# Patient Record
Sex: MALE | Race: WHITE | NOT HISPANIC OR LATINO | Employment: PART TIME | URBAN - METROPOLITAN AREA
[De-identification: names, ages, dates, MRNs, and addresses within clinical notes are randomized per-mention and may not be internally consistent; named-entity substitution may affect disease eponyms.]

---

## 2017-03-15 ENCOUNTER — APPOINTMENT (OUTPATIENT)
Dept: RADIOLOGY | Facility: MEDICAL CENTER | Age: 72
DRG: 504 | End: 2017-03-15
Attending: EMERGENCY MEDICINE
Payer: COMMERCIAL

## 2017-03-15 ENCOUNTER — RESOLUTE PROFESSIONAL BILLING HOSPITAL PROF FEE (OUTPATIENT)
Dept: HOSPITALIST | Facility: MEDICAL CENTER | Age: 72
End: 2017-03-15
Payer: COMMERCIAL

## 2017-03-15 ENCOUNTER — HOSPITAL ENCOUNTER (INPATIENT)
Facility: MEDICAL CENTER | Age: 72
LOS: 35 days | DRG: 504 | End: 2017-04-19
Attending: EMERGENCY MEDICINE | Admitting: HOSPITALIST
Payer: COMMERCIAL

## 2017-03-15 DIAGNOSIS — M86.9 OSTEOMYELITIS OF TOE OF RIGHT FOOT (HCC): ICD-10-CM

## 2017-03-15 DIAGNOSIS — N13.30 HYDRONEPHROSIS, UNSPECIFIED HYDRONEPHROSIS TYPE: ICD-10-CM

## 2017-03-15 PROBLEM — H91.90 HARD OF HEARING: Status: ACTIVE | Noted: 2017-03-15

## 2017-03-15 PROBLEM — F17.200 TOBACCO DEPENDENCE: Status: ACTIVE | Noted: 2017-03-15

## 2017-03-15 PROBLEM — M54.9 BACK PAIN: Status: ACTIVE | Noted: 2017-03-15

## 2017-03-15 PROBLEM — I50.9 CHF (CONGESTIVE HEART FAILURE) (HCC): Status: ACTIVE | Noted: 2017-03-15

## 2017-03-15 PROBLEM — E87.6 HYPOKALEMIA: Status: ACTIVE | Noted: 2017-03-15

## 2017-03-15 LAB
ALBUMIN SERPL BCP-MCNC: 2.6 G/DL (ref 3.2–4.9)
ALBUMIN/GLOB SERPL: 0.5 G/DL
ALP SERPL-CCNC: 63 U/L (ref 30–99)
ALT SERPL-CCNC: 21 U/L (ref 2–50)
ANION GAP SERPL CALC-SCNC: 10 MMOL/L (ref 0–11.9)
AST SERPL-CCNC: 25 U/L (ref 12–45)
BASOPHILS # BLD AUTO: 0.1 % (ref 0–1.8)
BASOPHILS # BLD: 0.02 K/UL (ref 0–0.12)
BILIRUB SERPL-MCNC: 0.7 MG/DL (ref 0.1–1.5)
BNP SERPL-MCNC: 1952 PG/ML (ref 0–100)
BUN SERPL-MCNC: 10 MG/DL (ref 8–22)
CALCIUM SERPL-MCNC: 8.1 MG/DL (ref 8.5–10.5)
CHLORIDE SERPL-SCNC: 97 MMOL/L (ref 96–112)
CO2 SERPL-SCNC: 22 MMOL/L (ref 20–33)
CREAT SERPL-MCNC: 0.71 MG/DL (ref 0.5–1.4)
EOSINOPHIL # BLD AUTO: 0.01 K/UL (ref 0–0.51)
EOSINOPHIL NFR BLD: 0.1 % (ref 0–6.9)
ERYTHROCYTE [DISTWIDTH] IN BLOOD BY AUTOMATED COUNT: 50.4 FL (ref 35.9–50)
GFR SERPL CREATININE-BSD FRML MDRD: >60 ML/MIN/1.73 M 2
GLOBULIN SER CALC-MCNC: 5.6 G/DL (ref 1.9–3.5)
GLUCOSE SERPL-MCNC: 115 MG/DL (ref 65–99)
HCT VFR BLD AUTO: 32 % (ref 42–52)
HGB BLD-MCNC: 11.1 G/DL (ref 14–18)
IMM GRANULOCYTES # BLD AUTO: 0.1 K/UL (ref 0–0.11)
IMM GRANULOCYTES NFR BLD AUTO: 0.7 % (ref 0–0.9)
INR PPP: 1.48 (ref 0.87–1.13)
LACTATE BLD-SCNC: 1.1 MMOL/L (ref 0.5–2)
LYMPHOCYTES # BLD AUTO: 1.02 K/UL (ref 1–4.8)
LYMPHOCYTES NFR BLD: 7.5 % (ref 22–41)
MCH RBC QN AUTO: 34.8 PG (ref 27–33)
MCHC RBC AUTO-ENTMCNC: 34.7 G/DL (ref 33.7–35.3)
MCV RBC AUTO: 100.3 FL (ref 81.4–97.8)
MONOCYTES # BLD AUTO: 1.01 K/UL (ref 0–0.85)
MONOCYTES NFR BLD AUTO: 7.4 % (ref 0–13.4)
NEUTROPHILS # BLD AUTO: 11.47 K/UL (ref 1.82–7.42)
NEUTROPHILS NFR BLD: 84.2 % (ref 44–72)
NRBC # BLD AUTO: 0 K/UL
NRBC BLD AUTO-RTO: 0 /100 WBC
PLATELET # BLD AUTO: 196 K/UL (ref 164–446)
PMV BLD AUTO: 9.6 FL (ref 9–12.9)
POTASSIUM SERPL-SCNC: 3.4 MMOL/L (ref 3.6–5.5)
PROT SERPL-MCNC: 8.2 G/DL (ref 6–8.2)
PROTHROMBIN TIME: 18.4 SEC (ref 12–14.6)
RBC # BLD AUTO: 3.19 M/UL (ref 4.7–6.1)
SODIUM SERPL-SCNC: 129 MMOL/L (ref 135–145)
WBC # BLD AUTO: 13.6 K/UL (ref 4.8–10.8)

## 2017-03-15 PROCEDURE — 72100 X-RAY EXAM L-S SPINE 2/3 VWS: CPT

## 2017-03-15 PROCEDURE — 700111 HCHG RX REV CODE 636 W/ 250 OVERRIDE (IP): Performed by: EMERGENCY MEDICINE

## 2017-03-15 PROCEDURE — 71010 DX-CHEST-PORTABLE (1 VIEW): CPT

## 2017-03-15 PROCEDURE — 96376 TX/PRO/DX INJ SAME DRUG ADON: CPT

## 2017-03-15 PROCEDURE — 96374 THER/PROPH/DIAG INJ IV PUSH: CPT

## 2017-03-15 PROCEDURE — 700102 HCHG RX REV CODE 250 W/ 637 OVERRIDE(OP): Performed by: HOSPITALIST

## 2017-03-15 PROCEDURE — 96375 TX/PRO/DX INJ NEW DRUG ADDON: CPT

## 2017-03-15 PROCEDURE — 80053 COMPREHEN METABOLIC PANEL: CPT

## 2017-03-15 PROCEDURE — 96365 THER/PROPH/DIAG IV INF INIT: CPT

## 2017-03-15 PROCEDURE — 302128 INFUSION PUMP: Performed by: EMERGENCY MEDICINE

## 2017-03-15 PROCEDURE — A9270 NON-COVERED ITEM OR SERVICE: HCPCS | Performed by: HOSPITALIST

## 2017-03-15 PROCEDURE — 700105 HCHG RX REV CODE 258: Performed by: HOSPITALIST

## 2017-03-15 PROCEDURE — 99223 1ST HOSP IP/OBS HIGH 75: CPT | Mod: AI | Performed by: HOSPITALIST

## 2017-03-15 PROCEDURE — 85610 PROTHROMBIN TIME: CPT

## 2017-03-15 PROCEDURE — 96368 THER/DIAG CONCURRENT INF: CPT

## 2017-03-15 PROCEDURE — 83605 ASSAY OF LACTIC ACID: CPT

## 2017-03-15 PROCEDURE — 85025 COMPLETE CBC W/AUTO DIFF WBC: CPT

## 2017-03-15 PROCEDURE — 770020 HCHG ROOM/CARE - TELE (206)

## 2017-03-15 PROCEDURE — 36415 COLL VENOUS BLD VENIPUNCTURE: CPT

## 2017-03-15 PROCEDURE — 99285 EMERGENCY DEPT VISIT HI MDM: CPT

## 2017-03-15 PROCEDURE — 87040 BLOOD CULTURE FOR BACTERIA: CPT

## 2017-03-15 PROCEDURE — 83880 ASSAY OF NATRIURETIC PEPTIDE: CPT

## 2017-03-15 PROCEDURE — 700105 HCHG RX REV CODE 258: Performed by: EMERGENCY MEDICINE

## 2017-03-15 PROCEDURE — 700111 HCHG RX REV CODE 636 W/ 250 OVERRIDE (IP): Performed by: HOSPITALIST

## 2017-03-15 PROCEDURE — 96366 THER/PROPH/DIAG IV INF ADDON: CPT

## 2017-03-15 RX ORDER — MORPHINE SULFATE 4 MG/ML
2 INJECTION, SOLUTION INTRAMUSCULAR; INTRAVENOUS EVERY 4 HOURS PRN
Status: DISCONTINUED | OUTPATIENT
Start: 2017-03-15 | End: 2017-03-17

## 2017-03-15 RX ORDER — AMOXICILLIN 250 MG
2 CAPSULE ORAL 2 TIMES DAILY
Status: DISCONTINUED | OUTPATIENT
Start: 2017-03-16 | End: 2017-03-17

## 2017-03-15 RX ORDER — SIMVASTATIN 20 MG
20 TABLET ORAL NIGHTLY
Status: DISCONTINUED | OUTPATIENT
Start: 2017-03-15 | End: 2017-03-21

## 2017-03-15 RX ORDER — MORPHINE SULFATE 4 MG/ML
4 INJECTION, SOLUTION INTRAMUSCULAR; INTRAVENOUS ONCE
Status: COMPLETED | OUTPATIENT
Start: 2017-03-15 | End: 2017-03-15

## 2017-03-15 RX ORDER — NICOTINE 21 MG/24HR
14 PATCH, TRANSDERMAL 24 HOURS TRANSDERMAL
Status: DISCONTINUED | OUTPATIENT
Start: 2017-03-16 | End: 2017-04-07

## 2017-03-15 RX ORDER — BISACODYL 10 MG
10 SUPPOSITORY, RECTAL RECTAL
Status: DISCONTINUED | OUTPATIENT
Start: 2017-03-16 | End: 2017-03-17

## 2017-03-15 RX ORDER — POTASSIUM CHLORIDE 20 MEQ/1
20 TABLET, EXTENDED RELEASE ORAL 2 TIMES DAILY
Status: DISCONTINUED | OUTPATIENT
Start: 2017-03-15 | End: 2017-03-17

## 2017-03-15 RX ORDER — GABAPENTIN 300 MG/1
300 CAPSULE ORAL 3 TIMES DAILY
Status: DISCONTINUED | OUTPATIENT
Start: 2017-03-15 | End: 2017-03-24

## 2017-03-15 RX ORDER — OXYCODONE HYDROCHLORIDE 5 MG/1
5-10 TABLET ORAL EVERY 6 HOURS PRN
Status: DISCONTINUED | OUTPATIENT
Start: 2017-03-15 | End: 2017-03-15

## 2017-03-15 RX ORDER — OXYCODONE HYDROCHLORIDE 10 MG/1
10 TABLET ORAL EVERY 6 HOURS PRN
Status: DISCONTINUED | OUTPATIENT
Start: 2017-03-15 | End: 2017-03-20

## 2017-03-15 RX ORDER — FUROSEMIDE 10 MG/ML
20 INJECTION INTRAMUSCULAR; INTRAVENOUS
Status: DISCONTINUED | OUTPATIENT
Start: 2017-03-15 | End: 2017-03-22

## 2017-03-15 RX ORDER — OXYCODONE HYDROCHLORIDE 5 MG/1
5 TABLET ORAL EVERY 6 HOURS PRN
Status: DISCONTINUED | OUTPATIENT
Start: 2017-03-15 | End: 2017-03-20

## 2017-03-15 RX ORDER — ONDANSETRON 2 MG/ML
4 INJECTION INTRAMUSCULAR; INTRAVENOUS ONCE
Status: COMPLETED | OUTPATIENT
Start: 2017-03-15 | End: 2017-03-15

## 2017-03-15 RX ORDER — POLYETHYLENE GLYCOL 3350 17 G/17G
1 POWDER, FOR SOLUTION ORAL
Status: DISCONTINUED | OUTPATIENT
Start: 2017-03-16 | End: 2017-03-17

## 2017-03-15 RX ADMIN — MORPHINE SULFATE 4 MG: 4 INJECTION INTRAVENOUS at 18:47

## 2017-03-15 RX ADMIN — VANCOMYCIN HYDROCHLORIDE 2200 MG: 100 INJECTION, POWDER, LYOPHILIZED, FOR SOLUTION INTRAVENOUS at 20:42

## 2017-03-15 RX ADMIN — ONDANSETRON 4 MG: 2 INJECTION, SOLUTION INTRAMUSCULAR; INTRAVENOUS at 18:47

## 2017-03-15 RX ADMIN — MORPHINE SULFATE 4 MG: 4 INJECTION INTRAVENOUS at 20:42

## 2017-03-15 RX ADMIN — SIMVASTATIN 20 MG: 20 TABLET, FILM COATED ORAL at 23:01

## 2017-03-15 RX ADMIN — FUROSEMIDE 20 MG: 10 INJECTION, SOLUTION INTRAVENOUS at 23:01

## 2017-03-15 RX ADMIN — POTASSIUM CHLORIDE 20 MEQ: 1500 TABLET, EXTENDED RELEASE ORAL at 23:01

## 2017-03-15 RX ADMIN — GABAPENTIN 300 MG: 300 CAPSULE ORAL at 23:01

## 2017-03-15 RX ADMIN — AMPICILLIN SODIUM AND SULBACTAM SODIUM 3 G: 2; 1 INJECTION, POWDER, FOR SOLUTION INTRAMUSCULAR; INTRAVENOUS at 23:01

## 2017-03-15 ASSESSMENT — PAIN SCALES - GENERAL
PAINLEVEL_OUTOF10: 10
PAINLEVEL_OUTOF10: 10

## 2017-03-15 NOTE — IP AVS SNAPSHOT
[employer] Access Code: [email or web address]  Expires: 5/19/2017  2:42 PM    [employer]  A secure, online tool to manage your health information     [employer]® is a secure, online tool that connects you to your personalized health information from the privacy of your home -- day or night - making it very easy for you to manage your healthcare. Once the activation process is completed, you can even access your medical information using the [employer] jaymie, which is available for free in the Apple Jaymie store or Google Play store.     [employer] provides the following levels of access (as shown below):   My Chart Features   [facility] Primary Care Doctor [facility]  Specialists [facility]  Urgent  Care Non-[facility]  Primary Care  Doctor   Email your healthcare team securely and privately 24/7 X X X X   Manage appointments: schedule your next appointment; view details of past/upcoming appointments X      Request prescription refills. X      View recent personal medical records, including lab and immunizations X X X X   View health record, including health history, allergies, medications X X X X   Read reports about your outpatient visits, procedures, consult and ER notes X X X X   See your discharge summary, which is a recap of your hospital and/or ER visit that includes your diagnosis, lab results, and care plan. X X       How to register for [employer]:  1. Go to  [email or web address].  2. Click on the Sign Up Now box, which takes you to the New Member Sign Up page. You will need to provide the following information:  a. Enter your [employer] Access Code exactly as it appears at the top of this page. (You will not need to use this code after you’ve completed the sign-up process. If you do not sign up before the expiration date, you must request a new code.)   b. Enter your date of birth.   c. Enter your home email address.   d. Click Submit, and follow the next screen’s instructions.  3. Create a [employer] ID. This will be your [employer]  login ID and cannot be changed, so think of one that is secure and easy to remember.  4. Create a [employer] password. You can change your password at any time.  5. Enter your Password Reset Question and Answer. This can be used at a later time if you forget your password.   6. Enter your e-mail address. This allows you to receive e-mail notifications when new information is available in [employer].  7. Click Sign Up. You can now view your health information.    For assistance activating your [employer] account, call [phone]

## 2017-03-15 NOTE — IP AVS SNAPSHOT
" <p align=\"LEFT\"><IMG SRC=\"//[email or web address]$/[email or web address]\" alt=\"Image\" WIDTH=\"50%\" HEIGHT=\"200\" BORDER=\"\"></p>                   Name:Nicola Jimenez  Medical Record Number:[id number]  CSN: [id number]    YOB: 1945   Age: 72 y.o.  Sex: male  HT:1.778 m (5' 10\") WT: 77 kg (169 lb 12.1 oz)          Admit Date: 3/15/2017     Discharge Date:   Today's Date: 4/19/2017  Attending Doctor:  Bahman Reis M.D.                  Allergies:  Review of patient's allergies indicates no known allergies.          Follow-up Information     1. Follow up with Pcp Not In Computer.    Specialty:  Family Medicine        2. Follow up with Pcp Not In Computer.    Specialty:  Family Medicine        3. Follow up with Wm Cottrell M.D.. Schedule an appointment as soon as possible for a visit in 2 weeks.    Specialty:  Gastroenterology    Contact information    [address] [place] [address]  [phone]           Medication List      Take these Medications        Instructions    acetaminophen 325 MG Tabs   Commonly known as:  TYLENOL    Take 2 Tabs by mouth every 6 hours.   Dose:  650 mg       amiodarone 200 MG Tabs   Commonly known as:  CORDARONE    Take 1 Tab by mouth every day.   Dose:  200 mg       ferrous sulfate 325 (65 FE) MG tablet    Take 1 Tab by mouth every morning with breakfast.   Dose:  325 mg       furosemide 20 MG Tabs   Commonly known as:  LASIX    Take 1 Tab by mouth every day.   Dose:  20 mg       gabapentin 300 MG Caps   Commonly known as:  NEURONTIN    Take 300 mg by mouth 3 times a day.   Dose:  300 mg       lactobacillus granules Pack    Take 1 Packet by mouth 3 times a day, with meals.   Dose:  1 Packet       lidocaine 5 % Ptch   Commonly known as:  LIDODERM    Apply 2 Patches to skin as directed every 24 hours.   Dose:  2 Patch       methocarbamol 500 MG Tabs   Commonly known as:  ROBAXIN    Take 1 Tab by mouth 3 times a day as needed (muscle pain/spasm).   Dose:  500 mg       nicotine 7 " MG/24HR Pt24   Commonly known as:  NICODERM    Apply 1 Patch to skin as directed every 24 hours.   Dose:  1 Patch       NS SOLN 100 mL with ampicillin/sulbactam 3 (2-1) G SOLR 3 g    Doctor's comments:  Continue through 4/27/27   3 g by Intravenous route every 6 hours.   Dose:  3 g       * oxyCODONE CR 20 MG T12a   Commonly known as:  OXYCONTIN    Take 1 Tab by mouth every 12 hours.   Dose:  20 mg       * oxycodone immediate release 10 MG immediate release tablet   Commonly known as:  ROXICODONE    Take 1 Tab by mouth every four hours as needed for Severe Pain.   Dose:  10 mg       potassium chloride SA 20 MEQ Tbcr   Commonly known as:  Kdur    Take 2 Tabs by mouth 2 Times a Day.   Dose:  40 mEq       senna-docusate 8.6-50 MG Tabs   Commonly known as:  PERICOLACE or SENOKOT S    Take 1 Tab by mouth every day.   Dose:  1 Tab       simvastatin 20 MG Tabs   Commonly known as:  ZOCOR    Take 20 mg by mouth every evening.   Dose:  20 mg       vancomycin 50 mg/mL 50 mg/mL Soln    Doctor's comments:  Continue till 4/30/17   Take 2.5 mL by mouth 2 Times a Day.   Dose:  125 mg       * Notice:  This list has 2 medication(s) that are the same as other medications prescribed for you. Read the directions carefully, and ask your doctor or other care provider to review them with you.

## 2017-03-15 NOTE — IP AVS SNAPSHOT
4/19/2017    Nicola Jimenez  [address]  [address]  [facility] [address]    Dear Nicola:    [facility] wants to ensure your discharge home is safe and you or your loved ones have had all of your questions answered regarding your care after you leave the hospital.    Below is a list of resources and contact information should you have any questions regarding your hospital stay, follow-up instructions, or active medical symptoms.    Questions or Concerns Regarding… Contact   Medical Questions Related to Your Discharge  (7 days a week, 8am-5pm) Contact a Nurse Care Coordinator   [phone]   Medical Questions Not Related to Your Discharge  (24 hours a day / 7 days a week)  Contact the Nurse Health Line   [phone]    Medications or Discharge Instructions Refer to your discharge packet   or contact your [facility] Primary Care Provider   [phone]   Follow-up Appointment(s) Schedule your appointment via [employer]   or contact Scheduling [phone]   Billing Review your statement via [employer]  or contact Billing [phone]   Medical Records Review your records via [employer]   or contact Medical Records [phone]     You may receive a telephone call within two days of discharge. This call is to make certain you understand your discharge instructions and have the opportunity to have any questions answered. You can also easily access your medical information, test results and upcoming appointments via the [employer] free online health management tool. You can learn more and sign up at [email or web address]. For assistance setting up your [employer] account, please call [phone].    Once again, we want to ensure your discharge home is safe and that you have a clear understanding of any next steps in your care. If you have any questions or concerns, please do not hesitate to contact us, we are here for you. Thank you for choosing [facility] for your healthcare needs.    Sincerely,    Your [facility] Healthcare Team

## 2017-03-15 NOTE — IP AVS SNAPSHOT
" Home Care Instructions                                                                                                                  Name:Nicola Jimenez  Medical Record Number:[id number]  CSN: [id number]    YOB: 1945   Age: 72 y.o.  Sex: male  HT:1.778 m (5' 10\") WT: 77 kg (169 lb 12.1 oz)          Admit Date: 3/15/2017     Discharge Date:   Today's Date: 4/19/2017  Attending Doctor:  Bahman Reis M.D.                  Allergies:  Review of patient's allergies indicates no known allergies.            Discharge Instructions       Discharge Instructions    Discharged to other by [facility] with escort. Discharged via wheelchair, hospital escort: Yes.  Special equipment needed: Not Applicable    Be sure to schedule a follow-up appointment with your primary care doctor or any specialists as instructed.     Discharge Plan:   Influenza Vaccine Indication: Not indicated: Previously immunized this influenza season and > 8 years of age    I understand that a diet low in cholesterol, fat, and sodium is recommended for good health. Unless I have been given specific instructions below for another diet, I accept this instruction as my diet prescription.   Other diet: regular    Special Instructions: None    · Is patient discharged on Warfarin / Coumadin?   No     · Is patient Post Blood Transfusion?  No    Sutured Wound Care  Sutures are stitches that can be used to close wounds. Taking care of your wound properly can help prevent pain and infection. It can also help your wound to heal more quickly.  HOW TO CARE FOR YOUR SUTURED WOUND  Wound Care   · Keep the wound clean and dry.  · If you were given a bandage (dressing), change it at least one time per day or as told by your doctor. You should also change it if it gets wet or dirty.  · Keep the wound completely dry for the first 24 hours or as told by your doctor. After that time, you may shower or bathe. However, make sure that the wound is not soaked in " water until the sutures have been removed.  · Clean the wound one time each day or as told by your doctor.  ¨ Wash the wound with soap and water.  ¨ Rinse the wound with water to remove all soap.  ¨ Pat the wound dry with a clean towel. Do not rub the wound.  · After cleaning the wound, put a thin layer of antibiotic ointment on it as told your doctor. This ointment:  ¨ Helps to prevent infection.  ¨ Keeps the bandage from sticking to the wound.  · Have the sutures removed as told by your doctor.  General Instructions  · Take or apply medicines only as told by your doctor.  · To help prevent scarring, make sure to cover your wound with sunscreen whenever you are outside after the sutures are removed and the wound is healed. Make sure to wear a sunscreen of at least 30 SPF.  · If you were prescribed an antibiotic medicine or ointment, finish all of it even if you start to feel better.  · Do not scratch or pick at the wound.  · Keep all follow-up visits as told by your doctor. This is important.  · Check your wound every day for signs of infection. Watch for:  ¨ Redness, swelling, or pain.  ¨ Fluid, blood, or pus.  · Raise (elevate) the injured area above the level of your heart while you are sitting or lying down, if possible.  · Avoid stretching your wound.  · Drink enough fluids to keep your pee (urine) clear or pale yellow.  GET HELP IF:  · You were given a tetanus shot and you have any of these where the needle went in:  ¨ Swelling.  ¨ Very bad pain.  ¨ Redness.  ¨ Bleeding.  · You have a fever.  · A wound that was closed breaks open.  · You notice a bad smell coming from the wound.  · You notice something coming out of the wound, such as wood or glass.  · Medicine does not help your pain.  · You have any of these at the site of the wound.  ¨ More redness.  ¨ More swelling.  ¨ More pain.  · You have any of these coming from the wound.  ¨ Fluid.  ¨ Blood.  ¨ Pus.  · You notice a change in the color of your skin  near the wound.  · You need to change the bandage often due to fluid, blood, or pus coming from the wound.  · You have a new rash.  · You have numbness around the wound.  GET HELP RIGHT AWAY IF:  · You have very bad swelling around the wound.  · Your pain suddenly gets worse and is very bad.  · You have painful lumps near the wound or on skin that is anywhere on your body.  · You have a red streak going away from the wound.  · The wound is on your hand or foot and you cannot move a finger or toe like normal.  · The wound is on your hand or foot and you notice that your fingers or toes look pale or bluish.     This information is not intended to replace advice given to you by your health care provider. Make sure you discuss any questions you have with your health care provider.     Document Released: 06/05/2009 Document Revised: 05/03/2016 Document Reviewed: 12/14/2015  [employer] Interactive Patient Education ©2016 [employer]      Depression / Suicide Risk    As you are discharged from this [facility] facility, it is important to learn how to keep safe from harming yourself.    Recognize the warning signs:  · Abrupt changes in personality, positive or negative- including increase in energy   · Giving away possessions  · Change in eating patterns- significant weight changes-  positive or negative  · Change in sleeping patterns- unable to sleep or sleeping all the time   · Unwillingness or inability to communicate  · Depression  · Unusual sadness, discouragement and loneliness  · Talk of wanting to die  · Neglect of personal appearance   · Rebelliousness- reckless behavior  · Withdrawal from people/activities they love  · Confusion- inability to concentrate     If you or a loved one observes any of these behaviors or has concerns about self-harm, here's what you can do:  · Talk about it- your feelings and reasons for harming yourself  · Remove any means that you might use to hurt yourself (examples: pills, rope,  extension cords, firearm)  · Get professional help from the community (Mental Health, Substance Abuse, psychological counseling)  · Do not be alone:Call your Safe Contact- someone whom you trust who will be there for you.  · Call your local CRISIS HOTLINE 803-0461 or 377-009-5839  · Call your local Children's Mobile Crisis Response Team [place] (539) 728-8571 or www.[employer]  · Call the toll free National Suicide Prevention Hotlines   · National Suicide Prevention Lifeline 132-653-CHGL (0998)  · [employer] Hope Line Network 800-SUICIDE (821-6408)  Managing Your High Blood Pressure  Blood pressure is a measurement of how forceful your blood is pressing against the walls of the arteries. Arteries are muscular tubes within the circulatory system. Blood pressure does not stay the same. Blood pressure rises when you are active, excited, or nervous; and it lowers during sleep and relaxation. If the numbers measuring your blood pressure stay above normal most of the time, you are at risk for health problems. High blood pressure (hypertension) is a long-term (chronic) condition in which blood pressure is elevated.  A blood pressure reading is recorded as two numbers, such as 120 over 80 (or 120/80). The first, higher number is called the systolic pressure. It is a measure of the pressure in your arteries as the heart beats. The second, lower number is called the diastolic pressure. It is a measure of the pressure in your arteries as the heart relaxes between beats.   Keeping your blood pressure in a normal range is important to your overall health and prevention of health problems, such as heart disease and stroke. When your blood pressure is uncontrolled, your heart has to work harder than normal. High blood pressure is a very common condition in adults because blood pressure tends to rise with age. Men and women are equally likely to have hypertension but at different times in life. Before age 45, men are more  likely to have hypertension. After 65 years of age, women are more likely to have it. Hypertension is especially common in  Americans. This condition often has no signs or symptoms. The cause of the condition is usually not known. Your caregiver can help you come up with a plan to keep your blood pressure in a normal, healthy range.  BLOOD PRESSURE STAGES  Blood pressure is classified into four stages: normal, prehypertension, stage 1, and stage 2. Your blood pressure reading will be used to determine what type of treatment, if any, is necessary. Appropriate treatment options are tied to these four stages:   Normal  Systolic pressure (mm Hg): below 120.  Diastolic pressure (mm Hg): below 80.  Prehypertension  Systolic pressure (mm Hg): 120 to 139.  Diastolic pressure (mm Hg): 80 to 89.  Stage 1  Systolic pressure (mm Hg): 140 to 159.  Diastolic pressure (mm Hg): 90 to 99.  Stage 2  Systolic pressure (mm Hg): 160 or above.  Diastolic pressure (mm Hg): 100 or above.  RISKS RELATED TO HIGH BLOOD PRESSURE  Managing your blood pressure is an important responsibility. Uncontrolled high blood pressure can lead to:  A heart attack.  A stroke.  A weakened blood vessel (aneurysm).  Heart failure.  Kidney damage.  Eye damage.  Metabolic syndrome.  Memory and concentration problems.  HOW TO MANAGE YOUR BLOOD PRESSURE  Blood pressure can be managed effectively with lifestyle changes and medicines (if needed). Your caregiver will help you come up with a plan to bring your blood pressure within a normal range. Your plan should include the following:  Education  Read all information provided by your caregivers about how to control blood pressure.  Educate yourself on the latest guidelines and treatment recommendations. New research is always being done to further define the risks and treatments for high blood pressure.  Lifestyle changes  Control your weight.  Avoid smoking.  Stay physically active.  Reduce the amount of  salt in your diet.  Reduce stress.  Control any chronic conditions, such as high cholesterol or diabetes.  Reduce your alcohol intake.  Medicines  Several medicines (antihypertensive medicines) are available, if needed, to bring blood pressure within a normal range.  Communication  Review all the medicines you take with your caregiver because there may be side effects or interactions.  Talk with your caregiver about your diet, exercise habits, and other lifestyle factors that may be contributing to high blood pressure.  See your caregiver regularly. Your caregiver can help you create and adjust your plan for managing high blood pressure.  RECOMMENDATIONS FOR TREATMENT AND FOLLOW-UP   The following recommendations are based on current guidelines for managing high blood pressure in nonpregnant adults. Use these recommendations to identify the proper follow-up period or treatment option based on your blood pressure reading. You can discuss these options with your caregiver.  Systolic pressure of 120 to 139 or diastolic pressure of 80 to 89: Follow up with your caregiver as directed.  Systolic pressure of 140 to 160 or diastolic pressure of 90 to 100: Follow up with your caregiver within 2 months.  Systolic pressure above 160 or diastolic pressure above 100: Follow up with your caregiver within 1 month.  Systolic pressure above 180 or diastolic pressure above 110: Consider antihypertensive therapy; follow up with your caregiver within 1 week.  Systolic pressure above 200 or diastolic pressure above 120: Begin antihypertensive therapy; follow up with your caregiver within 1 week.     This information is not intended to replace advice given to you by your health care provider. Make sure you discuss any questions you have with your health care provider.     Document Released: 09/11/2013 Document Reviewed: 09/11/2013  [employer] Interactive Patient Education ©2016 [employer]        Follow-up Information     1. Follow up  with Pcp Not In Computer.    Specialty:  Family Medicine        2. Follow up with Pcp Not In Computer.    Specialty:  Family Medicine        3. Follow up with Wm Cottrell M.D.. Schedule an appointment as soon as possible for a visit in 2 weeks.    Specialty:  Gastroenterology    Contact information    [facility] [place] [id number]  [phone]           Discharge Medication Instructions:    Below are the medications your physician expects you to take upon discharge:    Review all your home medications and newly ordered medications with your doctor and/or pharmacist. Follow medication instructions as directed by your doctor and/or pharmacist.    Please keep your medication list with you and share with your physician.               Medication List      START taking these medications        Instructions    Morning Afternoon Evening Bedtime    acetaminophen 325 MG Tabs   Last time this was given:  650 mg on 4/19/2017 11:13 AM   Commonly known as:  TYLENOL        Take 2 Tabs by mouth every 6 hours.   Dose:  650 mg                        amiodarone 200 MG Tabs   Last time this was given:  200 mg on 4/19/2017  8:35 AM   Commonly known as:  CORDARONE        Take 1 Tab by mouth every day.   Dose:  200 mg                        ferrous sulfate 325 (65 FE) MG tablet   Last time this was given:  325 mg on 4/19/2017  8:35 AM        Take 1 Tab by mouth every morning with breakfast.   Dose:  325 mg                        furosemide 20 MG Tabs   Last time this was given:  20 mg on 4/19/2017  8:35 AM   Commonly known as:  LASIX        Take 1 Tab by mouth every day.   Dose:  20 mg                        lactobacillus granules Pack   Last time this was given:  1 Packet on 4/19/2017 11:13 AM        Take 1 Packet by mouth 3 times a day, with meals.   Dose:  1 Packet                        lidocaine 5 % Ptch   Last time this was given:  2 Patches on 4/19/2017 11:13 AM   Commonly known as:  LIDODERM        Apply 2 Patches to  skin as directed every 24 hours.   Dose:  2 Patch                        methocarbamol 500 MG Tabs   Last time this was given:  500 mg on 4/19/2017  2:31 PM   Commonly known as:  ROBAXIN        Take 1 Tab by mouth 3 times a day as needed (muscle pain/spasm).   Dose:  500 mg                        nicotine 7 MG/24HR Pt24   Last time this was given:  7 mg on 4/19/2017  5:28 AM   Commonly known as:  NICODERM        Apply 1 Patch to skin as directed every 24 hours.   Dose:  1 Patch                        NS SOLN 100 mL with ampicillin/sulbactam 3 (2-1) G SOLR 3 g   Last time this was given:  3 g on 4/19/2017 11:13 AM        Doctor's comments:  Continue through 4/27/27   3 g by Intravenous route every 6 hours.   Dose:  3 g                        * oxyCODONE CR 20 MG T12a   Last time this was given:  20 mg on 4/19/2017  8:35 AM   Commonly known as:  OXYCONTIN        Take 1 Tab by mouth every 12 hours.   Dose:  20 mg                        * oxycodone immediate release 10 MG immediate release tablet   Last time this was given:  10 mg on 4/19/2017  2:31 PM   Commonly known as:  ROXICODONE        Take 1 Tab by mouth every four hours as needed for Severe Pain.   Dose:  10 mg                        potassium chloride SA 20 MEQ Tbcr   Last time this was given:  40 mEq on 4/19/2017  8:35 AM   Commonly known as:  Kdur        Take 2 Tabs by mouth 2 Times a Day.   Dose:  40 mEq                        senna-docusate 8.6-50 MG Tabs   Last time this was given:  1 Tab on 4/17/2017  8:25 PM   Commonly known as:  PERICOLACE or SENOKOT S        Take 1 Tab by mouth every day.   Dose:  1 Tab                        vancomycin 50 mg/mL 50 mg/mL Soln   Last time this was given:  125 mg on 4/19/2017  8:35 AM        Doctor's comments:  Continue till 4/30/17   Take 2.5 mL by mouth 2 Times a Day.   Dose:  125 mg                        * Notice:  This list has 2 medication(s) that are the same as other medications prescribed for you. Read the  directions carefully, and ask your doctor or other care provider to review them with you.      CONTINUE taking these medications        Instructions    Morning Afternoon Evening Bedtime    gabapentin 300 MG Caps   Last time this was given:  600 mg on 4/19/2017  2:31 PM   Commonly known as:  NEURONTIN        Take 300 mg by mouth 3 times a day.   Dose:  300 mg                        simvastatin 20 MG Tabs   Last time this was given:  20 mg on 4/18/2017  8:46 PM   Commonly known as:  ZOCOR        Take 20 mg by mouth every evening.   Dose:  20 mg                             Where to Get Your Medications      Information about where to get these medications is not yet available     ! Ask your nurse or doctor about these medications    - acetaminophen 325 MG Tabs  - amiodarone 200 MG Tabs  - ferrous sulfate 325 (65 FE) MG tablet  - furosemide 20 MG Tabs  - lactobacillus granules Pack  - lidocaine 5 % Ptch  - methocarbamol 500 MG Tabs  - nicotine 7 MG/24HR Pt24  - NS SOLN 100 mL with ampicillin/sulbactam 3 (2-1) G SOLR 3 g  - oxyCODONE CR 20 MG T12a  - oxycodone immediate release 10 MG immediate release tablet  - potassium chloride SA 20 MEQ Tbcr  - senna-docusate 8.6-50 MG Tabs  - vancomycin 50 mg/mL 50 mg/mL Soln            Orders for after discharge     REFERRAL TO LONG TERM ACUTE CARE    Complete by:  As directed        REFERRAL TO SKILLED NURSING FACILITY    Complete by:  As directed        REFERRAL TO SKILLED NURSING FACILITY    Complete by:  As directed        REFERRAL TO SKILLED NURSING FACILITY    Complete by:  As directed              Instructions           Diet / Nutrition:    Follow any diet instructions given to you by your doctor or the dietician, including how much salt (sodium) you are allowed each day.    If you are overweight, talk to your doctor about a weight reduction plan.    Activity:    Remain physically active following your doctor's instructions about exercise and activity.    Rest often.     Any  time you become even a little tired or short of breath, SIT DOWN and rest.    Worsening Symptoms:    Report any of the following signs and symptoms to the doctor's office immediately:    *Pain of jaw, arm, or neck  *Chest pain not relieved by medication                               *Dizziness or loss of consciousness  *Difficulty breathing even when at rest   *More tired than usual                                       *Bleeding drainage or swelling of surgical site  *Swelling of feet, ankles, legs or stomach                 *Fever (>100ºF)  *Pink or blood tinged sputum  *Weight gain (3lbs/day or 5lbs /week)           *Shock from internal defibrillator (if applicable)  *Palpitations or irregular heartbeats                *Cool and/or numb extremities    Stroke Awareness    Common Risk Factors for Stroke include:    Age  Atrial Fibrillation  Carotid Artery Stenosis  Diabetes Mellitus  Excessive alcohol consumption  High blood pressure  Overweight   Physical inactivity  Smoking    Warning signs and symptoms of a stroke include:    *Sudden numbness or weakness of the face, arm or leg (especially on one side of the body).  *Sudden confusion, trouble speaking or understanding.  *Sudden trouble seeing in one or both eyes.  *Sudden trouble walking, dizziness, loss of balance or coordination.Sudden severe headache with no known cause.    It is very important to get treatment quickly when a stroke occurs. If you experience any of the above warning signs, call 911 immediately.                   Disclaimer         Quit Smoking / Tobacco Use:    I understand the use of any tobacco products increases my chance of suffering from future heart disease or stroke and could cause other illnesses which may shorten my life. Quitting the use of tobacco products is the single most important thing I can do to improve my health. For further information on smoking / tobacco cessation call a Toll Free Quit Line at 1-330.544.1225 (*National  Cancer [place]) or 1-565.379.6057 (American Lung Association) or you can access the web based program at www.lungusa.org.    [place] Tobacco Users Help Line:  (161) 320-1154       Toll Free: 6-088-837-8751  Quit Tobacco Program [facility] (383)008-4261    Crisis Hotline:    [place] Crisis Hotline:  8-284-SBBVKUR or 1-737.827.5279    [place] Crisis Hotline:    1-178.344.9189 or 721-760-9506    Discharge Survey:   Thank you for choosing [facility]. We hope we did everything we could to make your hospital stay a pleasant one. You may be receiving a phone survey and we would appreciate your time and participation in answering the questions. Your input is very valuable to us in our efforts to improve our service to our patients and their families.        My signature on this form indicates that:    1. I have reviewed and understand the above information.  2. My questions regarding this information have been answered to my satisfaction.  3. I have formulated a plan with my discharge nurse to obtain my prescribed medications for home.                  Disclaimer         __________________________________                     __________       ________                       Patient Signature                                                 Date                    Time

## 2017-03-16 ENCOUNTER — APPOINTMENT (OUTPATIENT)
Dept: RADIOLOGY | Facility: MEDICAL CENTER | Age: 72
DRG: 504 | End: 2017-03-16
Attending: HOSPITALIST
Payer: COMMERCIAL

## 2017-03-16 ENCOUNTER — APPOINTMENT (OUTPATIENT)
Dept: RADIOLOGY | Facility: MEDICAL CENTER | Age: 72
DRG: 504 | End: 2017-03-16
Attending: NURSE PRACTITIONER
Payer: COMMERCIAL

## 2017-03-16 PROBLEM — R78.81 BACTEREMIA: Status: ACTIVE | Noted: 2017-03-16

## 2017-03-16 LAB
ANION GAP SERPL CALC-SCNC: 10 MMOL/L (ref 0–11.9)
BASOPHILS # BLD AUTO: 0.2 % (ref 0–1.8)
BASOPHILS # BLD: 0.02 K/UL (ref 0–0.12)
BNP SERPL-MCNC: 1688 PG/ML (ref 0–100)
BUN SERPL-MCNC: 10 MG/DL (ref 8–22)
CALCIUM SERPL-MCNC: 8.1 MG/DL (ref 8.5–10.5)
CHLORIDE SERPL-SCNC: 96 MMOL/L (ref 96–112)
CO2 SERPL-SCNC: 24 MMOL/L (ref 20–33)
CREAT SERPL-MCNC: 0.8 MG/DL (ref 0.5–1.4)
EOSINOPHIL # BLD AUTO: 0.02 K/UL (ref 0–0.51)
EOSINOPHIL NFR BLD: 0.2 % (ref 0–6.9)
ERYTHROCYTE [DISTWIDTH] IN BLOOD BY AUTOMATED COUNT: 48.9 FL (ref 35.9–50)
ERYTHROCYTE [SEDIMENTATION RATE] IN BLOOD BY WESTERGREN METHOD: 120 MM/HOUR (ref 0–20)
GFR SERPL CREATININE-BSD FRML MDRD: >60 ML/MIN/1.73 M 2
GLUCOSE SERPL-MCNC: 111 MG/DL (ref 65–99)
HCT VFR BLD AUTO: 34.1 % (ref 42–52)
HGB BLD-MCNC: 11.4 G/DL (ref 14–18)
IMM GRANULOCYTES # BLD AUTO: 0.09 K/UL (ref 0–0.11)
IMM GRANULOCYTES NFR BLD AUTO: 0.8 % (ref 0–0.9)
LYMPHOCYTES # BLD AUTO: 1.29 K/UL (ref 1–4.8)
LYMPHOCYTES NFR BLD: 11.1 % (ref 22–41)
MCH RBC QN AUTO: 33.8 PG (ref 27–33)
MCHC RBC AUTO-ENTMCNC: 33.4 G/DL (ref 33.7–35.3)
MCV RBC AUTO: 101.2 FL (ref 81.4–97.8)
MONOCYTES # BLD AUTO: 1.16 K/UL (ref 0–0.85)
MONOCYTES NFR BLD AUTO: 9.9 % (ref 0–13.4)
NEUTROPHILS # BLD AUTO: 9.09 K/UL (ref 1.82–7.42)
NEUTROPHILS NFR BLD: 77.8 % (ref 44–72)
NRBC # BLD AUTO: 0 K/UL
NRBC BLD AUTO-RTO: 0 /100 WBC
PLATELET # BLD AUTO: 188 K/UL (ref 164–446)
PMV BLD AUTO: 9.9 FL (ref 9–12.9)
POTASSIUM SERPL-SCNC: 3.3 MMOL/L (ref 3.6–5.5)
RBC # BLD AUTO: 3.37 M/UL (ref 4.7–6.1)
SODIUM SERPL-SCNC: 130 MMOL/L (ref 135–145)
WBC # BLD AUTO: 11.7 K/UL (ref 4.8–10.8)

## 2017-03-16 PROCEDURE — 700102 HCHG RX REV CODE 250 W/ 637 OVERRIDE(OP): Performed by: HOSPITALIST

## 2017-03-16 PROCEDURE — 73630 X-RAY EXAM OF FOOT: CPT | Mod: RT

## 2017-03-16 PROCEDURE — 700105 HCHG RX REV CODE 258

## 2017-03-16 PROCEDURE — 700117 HCHG RX CONTRAST REV CODE 255: Performed by: HOSPITALIST

## 2017-03-16 PROCEDURE — 700105 HCHG RX REV CODE 258: Performed by: HOSPITALIST

## 2017-03-16 PROCEDURE — A9270 NON-COVERED ITEM OR SERVICE: HCPCS | Performed by: HOSPITALIST

## 2017-03-16 PROCEDURE — 80048 BASIC METABOLIC PNL TOTAL CA: CPT

## 2017-03-16 PROCEDURE — 73630 X-RAY EXAM OF FOOT: CPT | Mod: LT

## 2017-03-16 PROCEDURE — A9579 GAD-BASE MR CONTRAST NOS,1ML: HCPCS | Performed by: HOSPITALIST

## 2017-03-16 PROCEDURE — 36415 COLL VENOUS BLD VENIPUNCTURE: CPT

## 2017-03-16 PROCEDURE — 72158 MRI LUMBAR SPINE W/O & W/DYE: CPT

## 2017-03-16 PROCEDURE — 85025 COMPLETE CBC W/AUTO DIFF WBC: CPT

## 2017-03-16 PROCEDURE — 99233 SBSQ HOSP IP/OBS HIGH 50: CPT | Performed by: INTERNAL MEDICINE

## 2017-03-16 PROCEDURE — 770020 HCHG ROOM/CARE - TELE (206)

## 2017-03-16 PROCEDURE — 83880 ASSAY OF NATRIURETIC PEPTIDE: CPT

## 2017-03-16 PROCEDURE — 700111 HCHG RX REV CODE 636 W/ 250 OVERRIDE (IP): Performed by: HOSPITALIST

## 2017-03-16 PROCEDURE — 85652 RBC SED RATE AUTOMATED: CPT

## 2017-03-16 RX ORDER — SODIUM CHLORIDE 9 MG/ML
INJECTION, SOLUTION INTRAVENOUS
Status: COMPLETED
Start: 2017-03-16 | End: 2017-03-16

## 2017-03-16 RX ORDER — AMIODARONE HYDROCHLORIDE 200 MG/1
400 TABLET ORAL TWICE DAILY
Status: DISCONTINUED | OUTPATIENT
Start: 2017-03-16 | End: 2017-03-29

## 2017-03-16 RX ADMIN — NICOTINE 14 MG: 14 PATCH TRANSDERMAL at 04:43

## 2017-03-16 RX ADMIN — GABAPENTIN 300 MG: 300 CAPSULE ORAL at 21:26

## 2017-03-16 RX ADMIN — GABAPENTIN 300 MG: 300 CAPSULE ORAL at 15:24

## 2017-03-16 RX ADMIN — MORPHINE SULFATE 2 MG: 4 INJECTION INTRAVENOUS at 00:45

## 2017-03-16 RX ADMIN — VANCOMYCIN HYDROCHLORIDE 125 MG: 10 INJECTION, POWDER, LYOPHILIZED, FOR SOLUTION INTRAVENOUS at 04:44

## 2017-03-16 RX ADMIN — AMPICILLIN SODIUM AND SULBACTAM SODIUM 3 G: 2; 1 INJECTION, POWDER, FOR SOLUTION INTRAMUSCULAR; INTRAVENOUS at 17:21

## 2017-03-16 RX ADMIN — VANCOMYCIN HYDROCHLORIDE 125 MG: 10 INJECTION, POWDER, LYOPHILIZED, FOR SOLUTION INTRAVENOUS at 17:21

## 2017-03-16 RX ADMIN — GABAPENTIN 300 MG: 300 CAPSULE ORAL at 08:54

## 2017-03-16 RX ADMIN — METOPROLOL TARTRATE 25 MG: 25 TABLET, FILM COATED ORAL at 21:27

## 2017-03-16 RX ADMIN — POTASSIUM CHLORIDE 20 MEQ: 1500 TABLET, EXTENDED RELEASE ORAL at 08:55

## 2017-03-16 RX ADMIN — AMIODARONE HYDROCHLORIDE 400 MG: 200 TABLET ORAL at 04:42

## 2017-03-16 RX ADMIN — VANCOMYCIN HYDROCHLORIDE 125 MG: 10 INJECTION, POWDER, LYOPHILIZED, FOR SOLUTION INTRAVENOUS at 23:51

## 2017-03-16 RX ADMIN — MORPHINE SULFATE 2 MG: 4 INJECTION INTRAVENOUS at 04:42

## 2017-03-16 RX ADMIN — MORPHINE SULFATE 2 MG: 4 INJECTION INTRAVENOUS at 21:27

## 2017-03-16 RX ADMIN — METOPROLOL TARTRATE 25 MG: 25 TABLET, FILM COATED ORAL at 04:42

## 2017-03-16 RX ADMIN — MORPHINE SULFATE 2 MG: 4 INJECTION INTRAVENOUS at 08:55

## 2017-03-16 RX ADMIN — OXYCODONE HYDROCHLORIDE 10 MG: 10 TABLET ORAL at 02:56

## 2017-03-16 RX ADMIN — OXYCODONE HYDROCHLORIDE 10 MG: 10 TABLET ORAL at 23:51

## 2017-03-16 RX ADMIN — AMPICILLIN SODIUM AND SULBACTAM SODIUM 3 G: 2; 1 INJECTION, POWDER, FOR SOLUTION INTRAMUSCULAR; INTRAVENOUS at 11:59

## 2017-03-16 RX ADMIN — AMIODARONE HYDROCHLORIDE 400 MG: 200 TABLET ORAL at 21:26

## 2017-03-16 RX ADMIN — SODIUM CHLORIDE 500 ML: 9 INJECTION, SOLUTION INTRAVENOUS at 04:41

## 2017-03-16 RX ADMIN — AMPICILLIN SODIUM AND SULBACTAM SODIUM 3 G: 2; 1 INJECTION, POWDER, FOR SOLUTION INTRAMUSCULAR; INTRAVENOUS at 04:41

## 2017-03-16 RX ADMIN — AMPICILLIN SODIUM AND SULBACTAM SODIUM 3 G: 2; 1 INJECTION, POWDER, FOR SOLUTION INTRAMUSCULAR; INTRAVENOUS at 23:52

## 2017-03-16 RX ADMIN — FUROSEMIDE 20 MG: 10 INJECTION, SOLUTION INTRAVENOUS at 15:24

## 2017-03-16 RX ADMIN — VANCOMYCIN HYDROCHLORIDE 125 MG: 10 INJECTION, POWDER, LYOPHILIZED, FOR SOLUTION INTRAVENOUS at 00:46

## 2017-03-16 RX ADMIN — FUROSEMIDE 20 MG: 10 INJECTION, SOLUTION INTRAVENOUS at 04:42

## 2017-03-16 RX ADMIN — POTASSIUM CHLORIDE 20 MEQ: 1500 TABLET, EXTENDED RELEASE ORAL at 21:27

## 2017-03-16 RX ADMIN — VANCOMYCIN HYDROCHLORIDE 125 MG: 10 INJECTION, POWDER, LYOPHILIZED, FOR SOLUTION INTRAVENOUS at 11:59

## 2017-03-16 RX ADMIN — GADODIAMIDE 20 ML: 287 INJECTION INTRAVENOUS at 20:31

## 2017-03-16 RX ADMIN — MORPHINE SULFATE 2 MG: 4 INJECTION INTRAVENOUS at 15:24

## 2017-03-16 RX ADMIN — SIMVASTATIN 20 MG: 20 TABLET, FILM COATED ORAL at 21:27

## 2017-03-16 ASSESSMENT — ENCOUNTER SYMPTOMS
DEPRESSION: 0
COUGH: 0
CHILLS: 0
VOMITING: 0
SHORTNESS OF BREATH: 0
BACK PAIN: 1
DIZZINESS: 0
DIARRHEA: 0
PALPITATIONS: 0
CLAUDICATION: 0
FEVER: 0
NAUSEA: 0
CONSTIPATION: 0

## 2017-03-16 ASSESSMENT — PATIENT HEALTH QUESTIONNAIRE - PHQ9
2. FEELING DOWN, DEPRESSED, IRRITABLE, OR HOPELESS: NOT AT ALL
SUM OF ALL RESPONSES TO PHQ9 QUESTIONS 1 AND 2: 0
2. FEELING DOWN, DEPRESSED, IRRITABLE, OR HOPELESS: NOT AT ALL
SUM OF ALL RESPONSES TO PHQ QUESTIONS 1-9: 0
1. LITTLE INTEREST OR PLEASURE IN DOING THINGS: NOT AT ALL
SUM OF ALL RESPONSES TO PHQ QUESTIONS 1-9: 0
1. LITTLE INTEREST OR PLEASURE IN DOING THINGS: NOT AT ALL
SUM OF ALL RESPONSES TO PHQ9 QUESTIONS 1 AND 2: 0

## 2017-03-16 ASSESSMENT — PAIN SCALES - GENERAL
PAINLEVEL_OUTOF10: 8
PAINLEVEL_OUTOF10: 8
PAINLEVEL_OUTOF10: 10
PAINLEVEL_OUTOF10: 7
PAINLEVEL_OUTOF10: 9
PAINLEVEL_OUTOF10: 10
PAINLEVEL_OUTOF10: 8
PAINLEVEL_OUTOF10: 8
PAINLEVEL_OUTOF10: 7
PAINLEVEL_OUTOF10: 9

## 2017-03-16 ASSESSMENT — LIFESTYLE VARIABLES
HAVE YOU EVER FELT YOU SHOULD CUT DOWN ON YOUR DRINKING: NO
EVER FELT BAD OR GUILTY ABOUT YOUR DRINKING: NO
TOTAL SCORE: 1
DO YOU DRINK ALCOHOL: YES
HOW MANY TIMES IN THE PAST YEAR HAVE YOU HAD 5 OR MORE DRINKS IN A DAY: 0
EVER HAD A DRINK FIRST THING IN THE MORNING TO STEADY YOUR NERVES TO GET RID OF A HANGOVER: NO
EVER_SMOKED: YES
AVERAGE NUMBER OF DAYS PER WEEK YOU HAVE A DRINK CONTAINING ALCOHOL: 7
TOTAL SCORE: 1
HAVE PEOPLE ANNOYED YOU BY CRITICIZING YOUR DRINKING: YES
ON A TYPICAL DAY WHEN YOU DRINK ALCOHOL HOW MANY DRINKS DO YOU HAVE: 2
CONSUMPTION TOTAL: NEGATIVE
TOTAL SCORE: 1

## 2017-03-16 NOTE — CARE PLAN
Problem: Knowledge Deficit  Goal: Knowledge of disease process/condition, treatment plan, diagnostic tests, and medications will improve  Intervention: Assess knowledge level of disease process/condition, treatment plan, diagnostic tests, and medications  Assess understanding of MRIs

## 2017-03-16 NOTE — CONSULTS
ADULT  INFECTIOUS DISEASES INPATIENT CONSULT NOTE     Date of Service: 03/16/17    Consult Requested By: Gordo Jovel M.D.    Reason for Consultation: Group A strep bacteremia, MSSA right great toe osteomyelitis and clostridium difficile colitis    History of Present Illness:   Nicola Jimenez is a 72 y.o. Man who is a poor historian with a history of CAD with MI, aortic coarctation stent, PAD, AAA s/p graft, esophageal strictures s/p dilatation who left [facility]. History is obtained from review of the medical records at the OSH. He was recently admitted there on 3/9/17 and found to have afib with AVR. He underwent a JEANETH with cardioversion. No vegetation was noted. He was also diagnosed with group A streptococcal bacteremia and R great toe osteomyelitis via MRI. Dr. Powell was consulted but the patient had left prior to being evaluated. In addition, he was complaining of vomiting and was evaluated by GI who performed an esophageal dilatation. He was also diagnosed with C diff colitis. He left against medical advice because he states his pain was not adequately controlled. In discussion with the patient, he does not really know what he was treated for at [facility]. He states he injured his right foot after hitting it against furniture a few weeks ago. He also clipped a toenail on his left foot a few days ago and he noted that the toe started bleeding. He denies any bilateral foot pain at this time. He has also been vomiting recently and has been having diarrhea for a few weeks but no abdominal pain. No recent fevers or chills. He denies any dysuria or hematuria. He also complains of lower back pain. On presentation, he was afebrile with leukocytosis of 13.6. He was started on unasyn and oral vancomycin. ESR is 120. Blood cultures have been obtained and are pending. ID service was consulted for further management.    Review Of Systems:  ROS are negative except as noted above in the HPI.    PMH:  "  Past Medical History   Diagnosis Date   • MI (myocardial infarction)    • Status post aortic coarctation stent placement    • H/O heart artery stent      x2   • Arrhythmia    • Indigestion    • High cholesterol    • Atherosclerosis of aorta    • Carotid artery disease    • PAD (peripheral artery disease)    • History of AAA (abdominal aortic aneurysm) repair 01/30/2012     Graft repair with    • [place] (hard of hearing)    • Esophageal dilatation    • Hernia, inguinal, bilateral    • Umbilical hernia      repair \"years ago\"   • Arthritis    • Myocardial infarct    • MI, old      3 X's late 1980's with cardioversion & coronary stent placement         PSH:  Past Surgical History   Procedure Laterality Date   • Aaa with stent graft  1/30/2012     Performed by SHAGUFTA TRIPP at SURGERY [facility]   • Esophageal motility or manometry  3/27/2012     Performed by CATHY OCHOA at ENDOSCOPY [facility]       FAMILY HX:  None per patient    SOCIAL HX:  Social History     Social History   • Marital Status:      Spouse Name: N/A   • Number of Children: N/A   • Years of Education: N/A     Occupational History   • Not on file.     Social History Main Topics   • Smoking status: Current Every Day Smoker -- 1.00 packs/day     Types: Cigarettes   • Smokeless tobacco: Never Used   • Alcohol Use: Yes      Comment: 2 per day   • Drug Use: No   • Sexual Activity: Not on file     Other Topics Concern   • Not on file     Social History Narrative    ** Merged History Encounter **          History   Smoking status   • Current Every Day Smoker -- 1.00 packs/day   • Types: Cigarettes   Smokeless tobacco   • Never Used     History   Alcohol Use   • Yes     Comment: 2 per day   Admits to marijuana use but denies IVDU    Allergies/Intolerances:  No Known Allergies    History reviewed with the patient    Other Current Medications:    Current facility-administered medications:   •  metoprolol (LOPRESSOR) tablet 25 mg, " "25 mg, Oral, TWICE DAILY, Garett Aponte M.D., 25 mg at 17  •  amiodarone (CORDARONE) tablet 400 mg, 400 mg, Oral, TWICE DAILY, Garett pAonte M.D., 400 mg at 17  •  gabapentin (NEURONTIN) capsule 300 mg, 300 mg, Oral, TID, Garett Aponte M.D., 300 mg at 03/15/17 2301  •  simvastatin (ZOCOR) tablet 20 mg, 20 mg, Oral, Nightly, Garett Aponte M.D., 20 mg at 03/15/17 2301  •  ampicillin/sulbactam (UNASYN) 3 g in  mL IVPB, 3 g, Intravenous, Q6HRS, Garett Aponte M.D., Stopped at 17  •  nicotine (NICODERM) 14 MG/24HR 14 mg, 14 mg, Transdermal, Daily-0600, Garett Aponte M.D., 14 mg at 17  •  morphine (pf) 4 mg/ml injection 2 mg, 2 mg, Intravenous, Q4HRS PRN, Garett Aponte M.D., 2 mg at 17  •  senna-docusate (PERICOLACE or SENOKOT S) 8.6-50 MG per tablet 2 Tab, 2 Tab, Oral, BID **AND** polyethylene glycol/lytes (MIRALAX) PACKET 1 Packet, 1 Packet, Oral, QDAY PRN **AND** magnesium hydroxide (MILK OF MAGNESIA) suspension 30 mL, 30 mL, Oral, QDAY PRN **AND** bisacodyl (DULCOLAX) suppository 10 mg, 10 mg, Rectal, QDAY PRN, Garett Aponte M.D.  •  furosemide (LASIX) injection 20 mg, 20 mg, Intravenous, BID DIURETIC, Garett Aponte M.D., 20 mg at 17  •  potassium chloride SA (Kdur) tablet 20 mEq, 20 mEq, Oral, BID, Garett Aponte M.D., 20 mEq at 03/15/17 2301  •  oxycodone immediate-release (ROXICODONE) tablet 5 mg, 5 mg, Oral, Q6HRS PRN **OR** oxycodone immediate-release (ROXICODONE) tablet 10 mg, 10 mg, Oral, Q6HRS PRN, Garett Aponte M.D., 10 mg at 17 0256  •  vancomycin 50 mg/mL oral soln 125 mg, 125 mg, Oral, Q6HRS, Garett Aponte M.D., 125 mg at 17 0444  [unfilled]    Most Recent Vital Signs:  /88 mmHg  Pulse 99  Temp(Src) 36.8 °C (98.2 °F)  Resp 19  Ht 1.778 m (5' 10\")  Wt 82.5 kg (181 lb 14.1 oz)  BMI 26.10 kg/m2  SpO2 92%  Temp  Av °C (98.6 °F)  Min: 36.8 °C (98.2 °F)  Max: 37.3 °C " "(99.2 °F)    Physical Exam:  General: elderly, no acute distress  HEENT: sclera anicteric, PERRL, EOMI, MMM, no oral lesions, [place]  Neck: supple, no lymphadenopathy  Chest: Decreased breath sounds but CTAB, no r/r/w, normal work of breathing.  Cardiac: IRR, normal S1 S2, no m/r/g   Abdomen: + bowel sounds, soft, non-tender, non-distended, no HSM  Extremities: Bilateral LE and foot edema. R great toe with slight edema but no erythema or drainage. Left third toe with scabbed lesion and absent toenail. No drainage or erythema.   Skin: no rashes or worrisome lesions  Neuro: Alert and oriented times 3, non-focal exam    Pertinent Lab Results:  Recent Labs      03/15/17   1845  03/16/17   0311   WBC  13.6*  11.7*      Recent Labs      03/15/17   1845  03/16/17   0311   HEMOGLOBIN  11.1*  11.4*   HEMATOCRIT  32.0*  34.1*   MCV  100.3*  101.2*   MCH  34.8*  33.8*   PLATELETCT  196  188         Recent Labs      03/15/17   1845  03/16/17   0311   SODIUM  129*  130*   POTASSIUM  3.4*  3.3*   CHLORIDE  97  96   CO2  22  24   CREATININE  0.71  0.80        Recent Labs      03/15/17   1845   ALBUMIN  2.6*        Pertinent Micro:  Results     Procedure Component Value Units Date/Time    BLOOD CULTURE x2 [[id number]] Collected:  03/15/17 1835    Order Status:  Completed Specimen Information:  Blood from Peripheral Updated:  03/15/17 1959    Narrative:      Per Hospital Policy: Only change Specimen Src: to \"Line\" if  specified by physician order.    BLOOD CULTURE x2 [[id number]] Collected:  03/15/17 1907    Order Status:  Completed Specimen Information:  Blood from Peripheral Updated:  03/15/17 1952    Narrative:      Per Hospital Policy: Only change Specimen Src: to \"Line\" if  specified by physician order.    BLOOD CULTURE [[id number]] Collected:  03/15/17 0000    Order Status:  Canceled Specimen Information:  Other from Peripheral     Narrative:      ORDER WAS CANCELLED 03/15/2017 22:57, Duplicate . 03/15/2017  22:57.  Per " "Hospital Policy: Only change Specimen Src: to \"Line\" if  specified by physician order.    BLOOD CULTURE [[id number]] Collected:  03/15/17 0000    Order Status:  Canceled Specimen Information:  Other from Peripheral     Narrative:      ORDER WAS CANCELLED 03/15/2017 22:57, Duplicate . 03/15/2017  22:57.  Per Hospital Policy: Only change Specimen Src: to \"Line\" if  specified by physician order.        No results found for: BLOODCULTU, BLDCULT, BCHOLD     Studies:  Xray of the lumbar spine - degenerative changes; Abdominal aortic aneurysm with stent graft present.    CXR - Hypoinflation without other evidence for acute cardiopulmonary disease.    IMPRESSION:   1. Group A streptococcus bacteremia  2. MSSA and group A strep right foot osteomyelitis  3. Clostridium difficile colitis  4. Back pain  6. Atrial fibrillation  7. Congestive heart failure      PLAN:   Nicola Jimenez is a 72 y.o. man with a history of CAD with MI and stent, aortic coarctation stent, and AAA s/p stent recently admitted at [facility] and was diagnosed with Afib with RVR s/p cardioversion, group A strep bacteremia, group A strep and MSSA right foot osteomyelitis and clostridium difficile colitis who left AMA. I called [facility] microlab today and confirmed the culture data. He had a positive blood culture on 3/9 with group A strep with follow up Bcx on 3/14 negative to date. He also had a wound culture of his left foot which is growing group A strep and MSSA. Agree with current abx. Awaiting for orthopedic evaluation for possible debridement. If no surgical plans, will anticipate a 6 week course of IV abx. Hold PICC for now as patient remains high-risk for leaving against medical advice.        Discussed with IM. Will continue to follow    Laurel Darby M.D.      "

## 2017-03-16 NOTE — H&P
"REASON FOR EVALUATION:  Back pain.    PRIMARY CARE PROVIDER:  None.    HISTORY OF PRESENT ILLNESS:  This is a 72-year-old male, who was recently   discharged from [facility].  The patient has signed out against medical   advice.  During his hospital stay at [facility] from March 9th to March 14th,   the patient was diagnosed with new onset atrial fibrillation with RVR.  He was   seen by Dr. Posada during that hospital stay, did a transesophageal   echocardiogram and cardioverted the patient.  At that time, they also noted no   valve vegetations.  He is being treated for bacteremia, group A strep as well   as methicillin-sensitive Staph aureus.  Patient was on IV antibiotics and was   supposed to get a PICC line and go home with IV antibiotics.  Per the   documentation, a PICC line was placed and removed prior to his discharge.  He   also found evidence of osteomyelitis of the big toe on the right and Dr. Powell of orthopedics was consulted, but the patient left AMA prior to Dr. Powell has seen the patient.  He was also noted that the patient was found   to have C. difficile colitis.  There is also documentation of elevated BNP   that improved with diuresis.  His hospital course at [facility] also   complicated by dysphagia for which apparently GI was consulted and patient   underwent a distal esophageal dilation.  Patient was started on Eliquis for   his atrial fibrillation and it was recommended that it be held until   03/17/2017.  The patient was a very poor historian and all history ____ in the   old chart, but his main complaint when he came to the hospital was lower back   pain.  Patient is able to ambulate.  His lower back pain intensity \"13/10\"   sharp pain with no radiation.  Patient had x-rays done in our facility of the   lumbar spine did not show any acute abnormality other than osteoarthritis.    Patient will be admitted for further care and management for right foot big   toe osteomyelitis, " back pain, known streptococcus and Staph aureus bacteremia,   elevated BNP, and C. difficile colitis.  Based on the patient's grumpiness in   the emergency room, it is very possible patient will leave against medical   advice at our facility as well.    PAST MEDICAL HISTORY:  1.  New onset atrial fibrillation.  2.  Elevated BNP.  3.  Recent diagnosis of C. difficile colitis.  4.  Recent diagnosis of right big toe osteomyelitis.  5.  Dysphagia, status post esophageal dilation.  6.  History of hard of hearing.  7.  History of coronary artery disease with 2 stents.    PAST SURGICAL HISTORY:  Two cardiac stents placed, open reduction and internal   fixation of right ankle, umbilical hernia repair, and abdominal aortic   aneurysm repair.    FAMILY HISTORY:  No family history of coronary artery disease or stroke in the   family.    SOCIAL HISTORY:  He smokes 1 pack of cigarettes per day, drinks 2 alcoholic   beverages per day.  No illicit drugs listed.    ALLERGIES:  None.    MEDICATIONS:  Currently Neurontin 300 mg t.i.d. and Zocor 20 mg daily.    Patient was prescribed some antibiotics from [place], he did not get them   filled.    REVIEW OF SYSTEMS:  No fever, no chills.  No headache.  Generalized malaise.    No chest pain.  No shortness of breath.  No palpitations.  No diaphoresis.  No   abdominal pain or constipation.  He is having diarrhea.  He has the lower   back pain as above.  He has leg edema in both lower extremities and bilateral   foot pain.  Other review systems otherwise negative.    PHYSICAL EXAMINATION:  VITAL SIGNS:  Blood pressure 127/86, respirations 20, pulse 99, and   temperature 37.3.  GENERAL:  He is an elderly male, disheveled, not in acute distress, hard of   hearing.  HEENT:  Oral mucosa is moist.  NECK:  Supple.  There is evidence of mild JVD.  HEART:  S1, S2, regular rate, no murmurs appreciated.  LUNGS:  Mild bilateral rhonchi.  No rales or wheezing.  ABDOMEN:  Distended, soft, and  nontender.  Positive bowel sounds appreciated.  BACK:  I cannot appreciate any pinpoint tenderness.  EXTREMITIES:  He has +1 edema in both lower extremities with chronic venous   stasis changes.  NEUROLOGIC:  Nonfocal.    LABORATORY DATA:  White cell count 13.6, hemoglobin 11, hematocrit 32, MCV   100, and platelet count is 196.  Sodium 129, potassium 3.4, glucose 115, CO2   22, BUN of 10, and creatinine 0.71.  BNP of 1952.  Lactic acid 1.1.  Lumbar   x-ray is showing multilevel degenerative changes.  X-ray of the chest shows   hyperinflation and cardiomegaly.    As per cardiology, patient is supposed to be on Eliquis 5 mg b.i.d.,   amiodarone 400 mg b.i.d., metoprolol 25 mg b.i.d., and the Eliquis supposed to   be held until 03/17/2017 because of his recent esophageal dilatation.    IMPRESSION:  1.  Acute lower back pain.  2.  Group A streptococcus bacteremia.  3.  Methicillin-sensitive Staphylococcus aureus bacteremia.  4.  Osteomyelitis of right big toe.  5.  Clostridium difficile colitis.  6.  Hyponatremia.  7.  Hypokalemia.  8.  Microcytosis.  9.  Anemia of chronic disease.  10.  History of coronary artery disease.  11.  History of tobacco dependence.  12.  Recent esophageal dilation for dysphagia.  13.  Noncompliance.  14.  History of peripheral neuropathy.  15.  Hard of hearing.  16.  Elevated BNP, suspect chronic congestive heart failure.    PLAN:  Patient will be admitted and monitored on telemetry.  Based on   patient's presentation and plan of care, we anticipated 2 midnights stay for   this patient for his lower back pain.  Patient will get an MRI of the lumbar   spine, antibiotics of Unasyn 3 g IV q. 6 hours.  Recommend consulting   infectious disease in the a.m.  Also, recommend consulting Dr. Powell,   orthopedics in the a.m. in regards to his osteomyelitis of his right big toe.    If there is evidence of spine infection, it is unclear whether or not Dr. Powell would be performed surgery on  the spine, we may need a separate   spine surgeon.  Patient's elevated BNP, ____, shortness of breath consistent   with chronic CHF.  Patient will be diuresed with Lasix 20 mg IV q. 12 hours,   replace potassium by mouth.  Patient already had a recent echocardiogram.  We   will try to obtain the results of echocardiogram to see what his ejection   fraction is from [place].  Repeat labs in a.m., CBC, chemistry, and BNP.    Continue patient's Neurontin for history of peripheral neuropathy.  Hold   Eliquis until 03/17/2017 and then resume.  We will be starting patient on   Lopressor 25 mg b.i.d. and amiodarone 400 mg b.i.d. as [facility] cardi[facility]  For his C. difficile colitis, patient will   be on oral vancomycin taper.  Smoking cessation counseled with this patient.    Patient offered a nicotine patch.  Patient's past medical history is   complicated.  Medical decision making is high.       ____________________________________     MD MALAIKA FERNANDEZ / KELLY    DD:  03/16/2017 02:56:23  DT:  03/16/2017 04:57:09    D#:  [id number]  Job#:  [id number]

## 2017-03-16 NOTE — PROGRESS NOTES
2 RN skin assessment completed with RAKESH Cortez. Pt left middle toe is deep red/purple and peeling. Lateral right ankle is red and peeling. Bilateral lower extremities edematous, red/dusky and dry. Bruising noted on pt inner left upper arm (from PICC line removal per pt). Generalized bruising noted. All bony prominences intact.

## 2017-03-16 NOTE — PROGRESS NOTES
Pt refusing completion of admit profile at this time. Naz Keenan RN notified. Will attempt in am prior to end of shift.

## 2017-03-16 NOTE — CARE PLAN
Problem: Safety  Goal: Will remain free from injury  Intervention: Provide assistance with mobility  Ambulate pt in hallways tid

## 2017-03-16 NOTE — ED PROVIDER NOTES
"ED Provider Note    CHIEF COMPLAINT  Chief Complaint   Patient presents with   • Low Back Pain       HPI  Nicola Jimenez is a 72 y.o. male who presents to emergency room today with complaints of low back pain. Patient states this started for 5 days ago when he was hospitalized at [facility] when he woke up with back pain denies any injury to the area. He had left AMA from the hospital diagnosed with bacteremia grew out strep, MRI scan positive for osteomyelitis to his right great toe. Had been treated with IV antibiotics but not completed treatment when he left against medical advice. Denies chest pain, shortness breath. Pain is to his lower back L4-L5 area worsened with palpation nonradiating no numbness or tingling no saddle paresthesia no loss of bowel or bladder no fever or chills. Fairly also patient did develop C. Difficile./MRSA from his culture. Also apparently had history of atrial fibrillation which is now resolved currently on Xarelto. Patient left AMA did not fill his medication/antibiotics.    REVIEW OF SYSTEMS  See HPI for further details. All other systems are negative.     PAST MEDICAL HISTORY  Past Medical History   Diagnosis Date   • MI (myocardial infarction)    • Status post aortic coarctation stent placement    • H/O heart artery stent      x2   • Arrhythmia    • Indigestion    • High cholesterol    • Atherosclerosis of aorta    • Carotid artery disease    • PAD (peripheral artery disease)    • History of AAA (abdominal aortic aneurysm) repair 01/30/2012     Graft repair with    • [place] (hard of hearing)    • Esophageal dilatation    • Hernia, inguinal, bilateral    • Umbilical hernia      repair \"years ago\"   • Arthritis    • Myocardial infarct    • MI, old      3 X's late 1980's with cardioversion & coronary stent placement       FAMILY HISTORY  [unfilled]    SOCIAL HISTORY  Social History     Social History   • Marital Status:      Spouse Name: N/A   • Number of " "Children: N/A   • Years of Education: N/A     Social History Main Topics   • Smoking status: Current Every Day Smoker -- 1.00 packs/day     Types: Cigarettes   • Smokeless tobacco: Never Used   • Alcohol Use: Yes      Comment: 2 per day   • Drug Use: No   • Sexual Activity: Not on file     Other Topics Concern   • Not on file     Social History Narrative    ** Merged History Encounter **            SURGICAL HISTORY  Past Surgical History   Procedure Laterality Date   • Aaa with stent graft  1/30/2012     Performed by SHAGUFTA TRIPP at SURGERY [facility]   • Esophageal motility or manometry  3/27/2012     Performed by CATHY OCHOA at ENDOSCOPY [facility]       CURRENT MEDICATIONS  Home Medications     **Home medications have not yet been reviewed for this encounter**          ALLERGIES  No Known Allergies    PHYSICAL EXAM  VITAL SIGNS: /97 mmHg  Pulse 100  Temp(Src) 36.8 °C (98.3 °F)  Resp 27  Ht 1.778 m (5' 10\")  Wt 86.183 kg (190 lb)  BMI 27.26 kg/m2      Constitutional: Well developed, Well nourished,  acute distress, Non-toxic appearance.   HENT: Normocephalic, Atraumatic, Bilateral external ears normal, Oropharynx moist, No oral exudates, Nose normal.   Eyes: PERRLA, EOMI, Conjunctiva normal, No discharge.   Neck: Normal range of motion, No tenderness, Supple, No stridor.   Lymphatic: No lymphadenopathy noted.   Cardiovascular: Normal heart rate, Normal rhythm, No murmurs, No rubs, No gallops.   Thorax & Lungs: Normal breath sounds, No respiratory distress, No wheezing, No chest tenderness.   Abdomen: Bowel sounds normal, Soft, No tenderness, No masses, No pulsatile masses.   Skin: Warm, Dry, there is some breakdown to the skin over his toes right great toe and left with minimal erythema no other signs of cellulitis at this time  Back: Lower lumbar tenderness L4-L5 increased with direct palpation and with flexion/extension, No CVA tenderness. No signs of infection. No swelling or " erythema.  Extremities: Intact distal pulses, bilateral lower extremity edema, No tenderness, No cyanosis, No clubbing.   Musculoskeletal: Good range of motion in all major joints. No tenderness to palpation or major deformities noted.   Neurologic: Alert & oriented x 3, Normal motor function, Normal sensory function, No focal deficits noted.   Psychiatric: Affect normal, Judgment normal, Mood normal.     EKG      RADIOLOGY/PROCEDURES  DX-LUMBAR SPINE-2 OR 3 VIEWS   Final Result      1.  Multilevel degenerative change of lumbar spine.   2.  No fracture or subluxation.   3.  Abdominal aortic aneurysm with stent graft present.      DX-CHEST-PORTABLE (1 VIEW)   Final Result      1.  Hypoinflation without other evidence for acute cardiopulmonary disease.   2.  Stable cardiomegaly.            COURSE & MEDICAL DECISION MAKING  Pertinent Labs & Imaging studies reviewed. (See chart for details)  Discussed case with hospitalist patient be admitted for further treatment of his osteomyelitis continue IV antibiotics started on vancomycin here in the emergency room pain medication for his lower lumbar back pain which appears to be degenerative in nature a CT scan shows evidence of degenerative process.    FINAL IMPRESSION  1. Osteomyelitis right great toe  2. Bacteremia/strep  3. Lower lumbar back pain, degenerative joint disease         Electronically signed by: Herb Worthy, 3/15/2017 8:29 PM

## 2017-03-16 NOTE — PROGRESS NOTES
Pt arrived on unit via gurney. Transferred to bed x1 assist. Appropriate functioning of tele monitor confirmed with monitor room, rate and rhythm verified. White board updated. Pt updated on POC. VSS. Pt complaining of back pain, prn pain medication not due for 45 more minutes. Pt verbalized understanding.

## 2017-03-16 NOTE — ED NOTES
"Pt back in bed at this time.  Placed on o2 at this time after morphine given.  \"you are cutting into my morphine time\".   Pt upset with the frequent visits from staff.  Pt on monitors.    "

## 2017-03-16 NOTE — ED NOTES
Pt unable to go to the floor at this time secondary to pt needs to be on contact precautions with a single room.

## 2017-03-16 NOTE — ED NOTES
.  Chief Complaint   Patient presents with   • Low Back Pain   pt came via [facility] to [facility] pt c/o back pain at 10 left ama from [facility] earlier today pmh afib currently nsr on monitor

## 2017-03-16 NOTE — PROGRESS NOTES
Hospital Medicine Progress Note, Adult, Complex               Author: Gladysrodger Med Date & Time created: 3/16/2017  2:28 PM     ID/CC: 73 y/o m who recently at [facility] and diagnosed with bacteremia, right great toe osteo and also A.fibb. Left AMA from there and now comes with back pain.     Interval History:  Pt seen and examined, afebrile, having back pain no nausea or vomiting.   ID, ortho and LPS have been consulted appreciate rec.     Review of Systems:  Review of Systems   Constitutional: Negative for fever and chills.   Respiratory: Negative for cough and shortness of breath.    Cardiovascular: Negative for chest pain and palpitations.   Gastrointestinal: Negative for nausea and vomiting.   Genitourinary: Negative for dysuria and urgency.   Musculoskeletal: Positive for back pain.   Psychiatric/Behavioral: Negative for depression.       Physical Exam:  Physical Exam   Constitutional: He is oriented to person, place, and time. He appears well-developed and well-nourished.   HENT:   Head: Normocephalic and atraumatic.   Eyes: Conjunctivae are normal. No scleral icterus.   Neck: Neck supple. No JVD present.   Abdominal: Soft. He exhibits no distension. There is no tenderness.   Musculoskeletal: He exhibits edema (b/l LE).   R great toe with slight edema but no erythema or drainage. Left third toe with scabbed lesion and absent toenail.   Neurological: He is alert and oriented to person, place, and time.   Skin: Skin is warm and dry. No erythema.       Labs:        Invalid input(s): [id number]  Recent Labs      03/15/17   1845  03/16/17   0311   BNPBTYPENAT  1952*  1688*     Recent Labs      03/15/17   1845  03/16/17   0311   SODIUM  129*  130*   POTASSIUM  3.4*  3.3*   CHLORIDE  97  96   CO2  22  24   BUN  10  10   CREATININE  0.71  0.80   CALCIUM  8.1*  8.1*     Recent Labs      03/15/17   1845  03/16/17   0311   ALTSGPT  21   --    ASTSGOT  25   --    ALKPHOSPHAT  63   --    TBILIRUBIN  0.7   --    GLUCOSE   115*  111*     Recent Labs      03/15/17   1807  03/15/17   1845  17   0311   RBC   --   3.19*  3.37*   HEMOGLOBIN   --   11.1*  11.4*   HEMATOCRIT   --   32.0*  34.1*   PLATELETCT   --   196  188   PROTHROMBTM  18.4*   --    --    INR  1.48*   --    --      Recent Labs      03/15/17   1845  17   0311   WBC  13.6*  11.7*   NEUTSPOLYS  84.20*  77.80*   LYMPHOCYTES  7.50*  11.10*   MONOCYTES  7.40  9.90   EOSINOPHILS  0.10  0.20   BASOPHILS  0.10  0.20   ASTSGOT  25   --    ALTSGPT  21   --    ALKPHOSPHAT  63   --    TBILIRUBIN  0.7   --            Hemodynamics:  Temp (24hrs), Av.8 °C (98.3 °F), Min:36.6 °C (97.9 °F), Max:37.3 °C (99.2 °F)  Temperature: 36.7 °C (98.1 °F)  Pulse  Av.3  Min: 80  Max: 104Heart Rate (Monitored): (!) 105  Blood Pressure : 113/75 mmHg, NIBP: 127/86 mmHg     Respiratory:    Respiration: 16, Pulse Oximetry: 91 %        RUL Breath Sounds: Clear, RML Breath Sounds: Clear, RLL Breath Sounds: Diminished, SOLEDAD Breath Sounds: Clear, LLL Breath Sounds: Diminished  Fluids:    Intake/Output Summary (Last 24 hours) at 17 1428  Last data filed at 17 0800   Gross per 24 hour   Intake      0 ml   Output   2450 ml   Net  -2450 ml     Weight: 82.5 kg (181 lb 14.1 oz)  GI/Nutrition:  Orders Placed This Encounter   Procedures   • Diet Order     Standing Status: Standing      Number of Occurrences: 1      Standing Expiration Date:      Order Specific Question:  Diet:     Answer:  Regular [1]     Medical Decision Making, by Problem:  Active Hospital Problems    Diagnosis   • *Osteomyelitis of toe (CMS-HCC) [M86.9]  -diagnosed at [facility]  - Ortho Dr. Samayoa and also LPS have luis consulted appreciate rec  -Repeating MRI of the foot.  -ID consulted appreciate rec.    • Bacteremia [R78.81]  -diagnosed at the outside facility, repeating Bcx here  - Bcx on outside facility  -ID consulted appreciate rec.    • Back pain [M54.9]  -will check MRI of the lumbar spine to r/o  any infection  -cont pain management.    • CHF (congestive heart failure) (CMS-HCC) [I50.9]  - cont diuresis  -monitor    • Hypokalemia [E87.6]  replete  -monitor   • Atrial fibrillation (CMS-HCC) [I48.91]  -cont amiodarone, metoprolol        Labs reviewed, Medications reviewed and Radiology images reviewed  Art catheter: No Art            Antibiotics: Treating active infection/contamination beyond 24 hours perioperative coverage

## 2017-03-16 NOTE — PROGRESS NOTES
"LIMB PRESERVATION SERVICE      DATE OF CONSULTATION: 3/16/2017    REASON FOR CONSULT: Left 3rd toe wound, possible osteomyelitis is right great toe      HISTORY OF PRESENT ILLNESS: Patient is a 72-year-old male, who was hospitalized at [facility] from March 9th to March 14th, with new onset atrial fibrillation with RVR.  He was seen by cardiology during that hospital stay, who cardioverted the patient.  He was being treated for bacteremia, clostridium difficile colitis, group A strep as well  as methicillin-sensitive Staph aureus.  Apparently, an MRI of his right foot was done and showed possible osteomyelitis of his great toe, although he does not have a wound or redness to this toe.  He does have a dry black wound to the middle toe of his left foot, which was cultured and grew MSSA and group A strep.    He was supposed to have been seen by Dr. Powell, however he decided to leave [place] before Dr. Powell was able to consult.  Patient then presented to [facility] ED on 3/15 with back pain, and was admitted.  Infectious diseases has consulted and are managing his antibiotics.  An LPS consult has been requested for his left toe wound and possible osteomyelitis of the right great toe. Patient denies any pain or discomfort in either toe.  He thinks he may have jammed his left toe on something about a week ago.  He is not diabetic.  He has a 60+ year smoking history.         PAST MEDICAL HISTORY:   Past Medical History   Diagnosis Date   • MI (myocardial infarction)    • Status post aortic coarctation stent placement    • H/O heart artery stent      x2   • Arrhythmia    • Indigestion    • High cholesterol    • Atherosclerosis of aorta    • Carotid artery disease    • PAD (peripheral artery disease)    • History of AAA (abdominal aortic aneurysm) repair 01/30/2012     Graft repair with    • DENNIS (hard of hearing)    • Esophageal dilatation    • Hernia, inguinal, bilateral    • Umbilical hernia      repair \"years " "ago\"   • Arthritis    • Myocardial infarct    • MI, old      3 X's late 1980's with cardioversion & coronary stent placement        MEDICATIONS:   Current Facility-Administered Medications   Medication Dose   • metoprolol (LOPRESSOR) tablet 25 mg  25 mg   • amiodarone (CORDARONE) tablet 400 mg  400 mg   • gabapentin (NEURONTIN) capsule 300 mg  300 mg   • simvastatin (ZOCOR) tablet 20 mg  20 mg   • ampicillin/sulbactam (UNASYN) 3 g in  mL IVPB  3 g   • nicotine (NICODERM) 14 MG/24HR 14 mg  14 mg   • morphine (pf) 4 mg/ml injection 2 mg  2 mg   • senna-docusate (PERICOLACE or SENOKOT S) 8.6-50 MG per tablet 2 Tab  2 Tab    And   • polyethylene glycol/lytes (MIRALAX) PACKET 1 Packet  1 Packet    And   • magnesium hydroxide (MILK OF MAGNESIA) suspension 30 mL  30 mL    And   • bisacodyl (DULCOLAX) suppository 10 mg  10 mg   • furosemide (LASIX) injection 20 mg  20 mg   • potassium chloride SA (Kdur) tablet 20 mEq  20 mEq   • oxycodone immediate-release (ROXICODONE) tablet 5 mg  5 mg    Or   • oxycodone immediate-release (ROXICODONE) tablet 10 mg  10 mg   • vancomycin 50 mg/mL oral soln 125 mg  125 mg       ALLERGIES:  No Known Allergies     PAST SURGICAL HISTORY:   Past Surgical History   Procedure Laterality Date   • Aaa with stent graft  1/30/2012     Performed by SHAGUFTA TRIPP at SURGERY [facility]   • Esophageal motility or manometry  3/27/2012     Performed by CATHY OCHOA at ENDOSCOPY [facility]       SOCIAL HISTORY:   Social History     Social History   • Marital Status:      Spouse Name: N/A   • Number of Children: N/A   • Years of Education: N/A     Social History Main Topics   • Smoking status: Current Every Day Smoker -- 1.00 packs/day     Types: Cigarettes   • Smokeless tobacco: Never Used   • Alcohol Use: Yes      Comment: 2 per day   • Drug Use: No   • Sexual Activity: Not on file     Other Topics Concern   • Not on file     Social History Narrative    ** Merged History " "Encounter **             FAMILY HISTORY: No family history on file.    REVIEW OF SYSTEMS:   Review of Systems   Constitutional: Negative for fever, chills and malaise/fatigue.   Respiratory: Negative for cough and shortness of breath.    Cardiovascular: Positive for leg swelling. Negative for chest pain and claudication.   Gastrointestinal: Negative for nausea, vomiting, diarrhea and constipation.   Musculoskeletal: Positive for back pain.   Neurological: Negative for dizziness.        Feet are slightly numb         PHYSICAL EXAMINATION:   VITAL SIGNS: Blood pressure 113/75, pulse 80, temperature 36.7 °C (98.1 °F), resp. rate 16, height 1.778 m (5' 10\"), weight 82.5 kg (181 lb 14.1 oz), SpO2 91 %.  Physical Exam   Constitutional: He is well-developed, well-nourished, and in no distress.   HENT:   Head: Normocephalic.   Eyes: Pupils are equal, round, and reactive to light.   Cardiovascular:   3+ edema to BLEs  Unable to palpate pedal pulses  Sparse hair growth to BLEs  Hemisiderin staining of BLEs     Pulmonary/Chest: Effort normal.   Musculoskeletal: He exhibits edema.   Skin: There is erythema.   Dry, brown tissue and erythema to left 3rd toe, non drainage, no tenderness   Psychiatric: Affect normal.         DIAGNOSTIC DATA:   Recent Labs      03/15/17   1845  03/16/17   0311   WBC  13.6*  11.7*   RBC  3.19*  3.37*   HEMOGLOBIN  11.1*  11.4*   HEMATOCRIT  32.0*  34.1*   MCV  100.3*  101.2*   MCH  34.8*  33.8*   MCHC  34.7  33.4*   RDW  50.4*  48.9   PLATELETCT  196  188   MPV  9.6  9.9     Recent Labs      03/15/17   1845  03/16/17   0311   SODIUM  129*  130*   POTASSIUM  3.4*  3.3*   CHLORIDE  97  96   CO2  22  24   GLUCOSE  115*  111*   BUN  10  10         ASSESSMENT AND PLAN:     1. Possible osteomyelitis of right great toe- Reported as MRI result from [place], images are not available in EPIC.  Patient does not have a wound, redness or swelling of this toe.  Unclear why an MRI was done at [place].  A new " MRI ordered by hospitalist.  I canceled MRI and ordered Xray. Discussed with Dr. Jovel.    2. Ulcer of toe- Dry necrotic ulcer to left 3rd toe, with erythema, no drainage,  Patient states that he jammed his toe on something about a week ago,  Xray ordered.  Wound left GILBERTO    3.  Diminished pedal pulses- Unable to palpate pedal pulses, although pt does have 3+ edema in his feet.  Given the appearance of the left 2nd toe, and pt's long smoking history, BLE arterial studies ordered to assess perfusion    4.  Bilateral lower extremity edema- Per patient, recent onset.  However he has hemisiderin staining of both lower legs, indicating chronic venous stasis.  Consider compression once arterial perfusion has been evaluated.

## 2017-03-16 NOTE — CARE PLAN
Problem: Pain Management  Goal: Pain level will decrease to patient’s comfort goal  Pt assessed for pain regularly and medicated PRN per MAR.     Problem: Communication  Goal: The ability to communicate needs accurately and effectively will improve  Intervention: Use communication aids and/or /Language Line as appropriate  White board updated with POC and care team information upon pt arrival to unit.

## 2017-03-16 NOTE — RESPIRATORY CARE
COPD EDUCATION by COPD CLINICAL EDUCATOR  3/16/2017 at 6:22 AM by Corrina Cabrera     Patient reviewed by COPD education team. Patient does not qualify for COPD program.

## 2017-03-17 ENCOUNTER — APPOINTMENT (OUTPATIENT)
Dept: RADIOLOGY | Facility: MEDICAL CENTER | Age: 72
DRG: 504 | End: 2017-03-17
Attending: INTERNAL MEDICINE
Payer: COMMERCIAL

## 2017-03-17 LAB
ANION GAP SERPL CALC-SCNC: 10 MMOL/L (ref 0–11.9)
BUN SERPL-MCNC: 14 MG/DL (ref 8–22)
CALCIUM SERPL-MCNC: 8.2 MG/DL (ref 8.5–10.5)
CHLORIDE SERPL-SCNC: 95 MMOL/L (ref 96–112)
CO2 SERPL-SCNC: 25 MMOL/L (ref 20–33)
CREAT SERPL-MCNC: 0.77 MG/DL (ref 0.5–1.4)
ERYTHROCYTE [DISTWIDTH] IN BLOOD BY AUTOMATED COUNT: 51 FL (ref 35.9–50)
GFR SERPL CREATININE-BSD FRML MDRD: >60 ML/MIN/1.73 M 2
GLUCOSE SERPL-MCNC: 139 MG/DL (ref 65–99)
HCT VFR BLD AUTO: 35.2 % (ref 42–52)
HGB BLD-MCNC: 11.4 G/DL (ref 14–18)
MCH RBC QN AUTO: 33.8 PG (ref 27–33)
MCHC RBC AUTO-ENTMCNC: 32.4 G/DL (ref 33.7–35.3)
MCV RBC AUTO: 104.5 FL (ref 81.4–97.8)
PLATELET # BLD AUTO: 206 K/UL (ref 164–446)
PMV BLD AUTO: 9 FL (ref 9–12.9)
POTASSIUM SERPL-SCNC: 3.4 MMOL/L (ref 3.6–5.5)
RBC # BLD AUTO: 3.37 M/UL (ref 4.7–6.1)
SODIUM SERPL-SCNC: 130 MMOL/L (ref 135–145)
WBC # BLD AUTO: 12.9 K/UL (ref 4.8–10.8)

## 2017-03-17 PROCEDURE — 700117 HCHG RX CONTRAST REV CODE 255: Performed by: ORTHOPAEDIC SURGERY

## 2017-03-17 PROCEDURE — 99233 SBSQ HOSP IP/OBS HIGH 50: CPT | Performed by: INTERNAL MEDICINE

## 2017-03-17 PROCEDURE — 80048 BASIC METABOLIC PNL TOTAL CA: CPT

## 2017-03-17 PROCEDURE — 51798 US URINE CAPACITY MEASURE: CPT

## 2017-03-17 PROCEDURE — 700102 HCHG RX REV CODE 250 W/ 637 OVERRIDE(OP): Performed by: HOSPITALIST

## 2017-03-17 PROCEDURE — 700102 HCHG RX REV CODE 250 W/ 637 OVERRIDE(OP): Performed by: INTERNAL MEDICINE

## 2017-03-17 PROCEDURE — 74220 X-RAY XM ESOPHAGUS 1CNTRST: CPT

## 2017-03-17 PROCEDURE — A9270 NON-COVERED ITEM OR SERVICE: HCPCS | Performed by: INTERNAL MEDICINE

## 2017-03-17 PROCEDURE — 700105 HCHG RX REV CODE 258: Performed by: HOSPITALIST

## 2017-03-17 PROCEDURE — A9270 NON-COVERED ITEM OR SERVICE: HCPCS | Performed by: HOSPITALIST

## 2017-03-17 PROCEDURE — 700111 HCHG RX REV CODE 636 W/ 250 OVERRIDE (IP): Performed by: HOSPITALIST

## 2017-03-17 PROCEDURE — 93922 UPR/L XTREMITY ART 2 LEVELS: CPT

## 2017-03-17 PROCEDURE — 36415 COLL VENOUS BLD VENIPUNCTURE: CPT

## 2017-03-17 PROCEDURE — 85027 COMPLETE CBC AUTOMATED: CPT

## 2017-03-17 PROCEDURE — 74174 CTA ABD&PLVS W/CONTRAST: CPT

## 2017-03-17 PROCEDURE — 770020 HCHG ROOM/CARE - TELE (206)

## 2017-03-17 PROCEDURE — 700111 HCHG RX REV CODE 636 W/ 250 OVERRIDE (IP): Performed by: INTERNAL MEDICINE

## 2017-03-17 RX ORDER — MORPHINE SULFATE 4 MG/ML
1 INJECTION, SOLUTION INTRAMUSCULAR; INTRAVENOUS EVERY 4 HOURS PRN
Status: DISCONTINUED | OUTPATIENT
Start: 2017-03-17 | End: 2017-03-18

## 2017-03-17 RX ORDER — POTASSIUM CHLORIDE 20 MEQ/1
40 TABLET, EXTENDED RELEASE ORAL 2 TIMES DAILY
Status: DISCONTINUED | OUTPATIENT
Start: 2017-03-17 | End: 2017-03-20

## 2017-03-17 RX ADMIN — POTASSIUM CHLORIDE 40 MEQ: 1500 TABLET, EXTENDED RELEASE ORAL at 08:34

## 2017-03-17 RX ADMIN — OXYCODONE HYDROCHLORIDE 10 MG: 10 TABLET ORAL at 08:39

## 2017-03-17 RX ADMIN — MORPHINE SULFATE 2 MG: 4 INJECTION INTRAVENOUS at 06:05

## 2017-03-17 RX ADMIN — AMPICILLIN SODIUM AND SULBACTAM SODIUM 3 G: 2; 1 INJECTION, POWDER, FOR SOLUTION INTRAMUSCULAR; INTRAVENOUS at 17:32

## 2017-03-17 RX ADMIN — FUROSEMIDE 20 MG: 10 INJECTION, SOLUTION INTRAVENOUS at 06:06

## 2017-03-17 RX ADMIN — AMIODARONE HYDROCHLORIDE 400 MG: 200 TABLET ORAL at 08:34

## 2017-03-17 RX ADMIN — MORPHINE SULFATE 1 MG: 4 INJECTION INTRAVENOUS at 21:10

## 2017-03-17 RX ADMIN — VANCOMYCIN HYDROCHLORIDE 125 MG: 10 INJECTION, POWDER, LYOPHILIZED, FOR SOLUTION INTRAVENOUS at 17:32

## 2017-03-17 RX ADMIN — VANCOMYCIN HYDROCHLORIDE 125 MG: 10 INJECTION, POWDER, LYOPHILIZED, FOR SOLUTION INTRAVENOUS at 12:47

## 2017-03-17 RX ADMIN — NICOTINE 14 MG: 14 PATCH TRANSDERMAL at 06:11

## 2017-03-17 RX ADMIN — METOPROLOL TARTRATE 25 MG: 25 TABLET, FILM COATED ORAL at 08:34

## 2017-03-17 RX ADMIN — FUROSEMIDE 20 MG: 10 INJECTION, SOLUTION INTRAVENOUS at 17:32

## 2017-03-17 RX ADMIN — VANCOMYCIN HYDROCHLORIDE 125 MG: 10 INJECTION, POWDER, LYOPHILIZED, FOR SOLUTION INTRAVENOUS at 06:10

## 2017-03-17 RX ADMIN — GABAPENTIN 300 MG: 300 CAPSULE ORAL at 08:34

## 2017-03-17 RX ADMIN — AMPICILLIN SODIUM AND SULBACTAM SODIUM 3 G: 2; 1 INJECTION, POWDER, FOR SOLUTION INTRAMUSCULAR; INTRAVENOUS at 23:33

## 2017-03-17 RX ADMIN — MORPHINE SULFATE 1 MG: 4 INJECTION INTRAVENOUS at 14:19

## 2017-03-17 RX ADMIN — IOHEXOL 100 ML: 350 INJECTION, SOLUTION INTRAVENOUS at 12:44

## 2017-03-17 RX ADMIN — MORPHINE SULFATE 2 MG: 4 INJECTION INTRAVENOUS at 01:55

## 2017-03-17 RX ADMIN — GABAPENTIN 300 MG: 300 CAPSULE ORAL at 14:19

## 2017-03-17 RX ADMIN — AMPICILLIN SODIUM AND SULBACTAM SODIUM 3 G: 2; 1 INJECTION, POWDER, FOR SOLUTION INTRAMUSCULAR; INTRAVENOUS at 06:04

## 2017-03-17 RX ADMIN — MORPHINE SULFATE 2 MG: 4 INJECTION INTRAVENOUS at 10:13

## 2017-03-17 RX ADMIN — AMPICILLIN SODIUM AND SULBACTAM SODIUM 3 G: 2; 1 INJECTION, POWDER, FOR SOLUTION INTRAMUSCULAR; INTRAVENOUS at 12:47

## 2017-03-17 ASSESSMENT — ENCOUNTER SYMPTOMS
COUGH: 0
PALPITATIONS: 0
DIARRHEA: 0
HEADACHES: 0
BACK PAIN: 1
SHORTNESS OF BREATH: 0
NAUSEA: 0
CHILLS: 0
ABDOMINAL PAIN: 0
DEPRESSION: 0
VOMITING: 0
FEVER: 0

## 2017-03-17 ASSESSMENT — PAIN SCALES - GENERAL
PAINLEVEL_OUTOF10: 8
PAINLEVEL_OUTOF10: 8
PAINLEVEL_OUTOF10: 10
PAINLEVEL_OUTOF10: 8

## 2017-03-17 NOTE — CARE PLAN
Problem: Pain Management  Goal: Pain level will decrease to patient’s comfort goal  Pt assessed for pain regularly and medicated PRN per MAR.     Problem: Communication  Goal: The ability to communicate needs accurately and effectively will improve  Intervention: Use communication aids and/or /Language Line as appropriate  White board updated with POC and care team information during bedside report.      Problem: Safety  Goal: Will remain free from falls  Fall precautions in place. Bed in lowest position. Non-skid socks in place. Personal possessions within reach. Mobility sign on door. Bed-alarm on. Call light within reach. Pt educated regarding fall prevention and states understanding.

## 2017-03-17 NOTE — THERAPY
Pt with hist of esoph diliatation recently at [facility] per nurs. Pt now with non-toleration of fluids and food per nurs with probable reflux. Nurs asking for MBS. SLP provided educ regarding discussing with MD for esophogram orders and follow up with GI as appropriate. Nurs trying to determine which GI was seen by pt while at [facility]. SLP collaborated with MD regarding consideration for esophagram. Thanks, Ajay

## 2017-03-17 NOTE — PROGRESS NOTES
Abd/pelvis CT with contrast scheduled for 11:30, consent signed and in chart, 20 gauge right AC patent.

## 2017-03-17 NOTE — PROGRESS NOTES
Pt A*Ox4, awake in bed. IV site intact. SR on monitor. C/O back pain 4/10, pt aware of time med is due. Will continue to monitor.

## 2017-03-17 NOTE — PROGRESS NOTES
Bedside report received from RAKESH Puente. POC discussed with pt; all questions answered at this time. White board updated. Appropriate functioning of tele monitor confirmed. All needs met at this time.

## 2017-03-17 NOTE — PROGRESS NOTES
Hospital Medicine Progress Note, Adult, Complex               Author: Gordo Jovel Date & Time created: 3/17/2017  1:53 PM     ID/CC: 71 y/o m who recently at [facility] and diagnosed with bacteremia, right great toe osteo and also A.fibb. Left AMA from there and now comes with back pain.     Interval History:  No overnight events, complains of back pain, MRI lumbar done today shows possible infected aneurism endograft. Vascular surgery Ailyn Dinero consulted appreciate rec. CTA abdomen ordered.  X-ray of the foot shows possible osteomyelitis on the left third distal phalanx and also on the right great toe.  Ortho, LPS and ID following appreciate rec.   Pt was also having nausea and vomiting today, he had recently a EGD done and dilation of a esophageal stricture done at the outside facility. Will do a esophagogram today.     Review of Systems:  Review of Systems   Constitutional: Negative for fever and chills.   Respiratory: Negative for cough and shortness of breath.    Cardiovascular: Negative for chest pain and palpitations.   Gastrointestinal: Negative for nausea and vomiting.   Genitourinary: Negative for dysuria and urgency.   Musculoskeletal: Positive for back pain.   Psychiatric/Behavioral: Negative for depression.       Physical Exam:  Physical Exam   Constitutional: He is oriented to person, place, and time. He appears well-developed and well-nourished.   HENT:   Head: Normocephalic and atraumatic.   Eyes: Conjunctivae are normal. No scleral icterus.   Neck: Neck supple. No JVD present.   Abdominal: Soft. He exhibits no distension. There is no tenderness.   Musculoskeletal: He exhibits edema (b/l LE).   R great toe with slight edema but no erythema or drainage. Left third toe with scabbed lesion and absent toenail.   Neurological: He is alert and oriented to person, place, and time.   Skin: Skin is warm and dry. No erythema.       Labs:        Invalid input(s): [id number]  Recent Labs      03/15/17   2594   17   BNPBTYPENAT  1952*  1688*     Recent Labs      03/15/17   1845  03/16/17   0311  03/17/17   0347   SODIUM  129*  130*  130*   POTASSIUM  3.4*  3.3*  3.4*   CHLORIDE  97  96  95*   CO2  22  24  25   BUN  10  10  14   CREATININE  0.71  0.80  0.77   CALCIUM  8.1*  8.1*  8.2*     Recent Labs      03/15/17   1845  03/16/17   0311  03/17/17   0347   ALTSGPT  21   --    --    ASTSGOT  25   --    --    ALKPHOSPHAT  63   --    --    TBILIRUBIN  0.7   --    --    GLUCOSE  115*  111*  139*     Recent Labs      03/15/17   1807  03/15/17   1845  03/16/17   0311  03/17/17   0348   RBC   --   3.19*  3.37*  3.37*   HEMOGLOBIN   --   11.1*  11.4*  11.4*   HEMATOCRIT   --   32.0*  34.1*  35.2*   PLATELETCT   --   196  188  206   PROTHROMBTM  18.4*   --    --    --    INR  1.48*   --    --    --      Recent Labs      03/15/17   1845  03/16/17   0311  03/17/17   0348   WBC  13.6*  11.7*  12.9*   NEUTSPOLYS  84.20*  77.80*   --    LYMPHOCYTES  7.50*  11.10*   --    MONOCYTES  7.40  9.90   --    EOSINOPHILS  0.10  0.20   --    BASOPHILS  0.10  0.20   --    ASTSGOT  25   --    --    ALTSGPT  21   --    --    ALKPHOSPHAT  63   --    --    TBILIRUBIN  0.7   --    --            Hemodynamics:  Temp (24hrs), Av.9 °C (98.4 °F), Min:36.2 °C (97.2 °F), Max:37.4 °C (99.3 °F)  Temperature: 36.6 °C (97.8 °F)  Pulse  Av.6  Min: 70  Max: 104  Blood Pressure : 113/68 mmHg     Respiratory:    Respiration: 18, Pulse Oximetry: 98 %        RUL Breath Sounds: Clear, RML Breath Sounds: Clear, RLL Breath Sounds: Diminished, SOLEDAD Breath Sounds: Clear, LLL Breath Sounds: Diminished  Fluids:    Intake/Output Summary (Last 24 hours) at 17 1353  Last data filed at 17 0600   Gross per 24 hour   Intake      0 ml   Output   1425 ml   Net  -1425 ml     Weight: 81.3 kg (179 lb 3.7 oz)  GI/Nutrition:  Orders Placed This Encounter   Procedures   • Diet Order     Standing Status: Standing      Number of Occurrences: 1      Standing  Expiration Date:      Order Specific Question:  Diet:     Answer:  Regular [1]     Medical Decision Making, by Problem:  Active Hospital Problems    Diagnosis   • *Osteomyelitis of toe (CMS-HCC) [M86.9]  -diagnosed at [facility]  - Ortho Dr. Samayoa and also LPS following  appreciate rec  -xray of foot shows possible osteomyelitis   -ID following appreciate rec.     • Bacteremia [R78.81]  -diagnosed at the outside facility, repeating Bcx here  - Bcx on outside facility  -ID consulted appreciate rec.    • Back pain [M54.9]  -MRI lumbar spine today showed possible AAA endograft infection, CTA abdomen ordered  -Vascular surgery Dr. Dinero consulted appreciate rec.   -cont pain management.    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  -CTA abdomen pending appreciate rec.   -ID following    Vomiting  -was recently diagnosed with a esophageal stricture and had a EGD done with dilation 6-7 days ago  -will check a esophagogram at this time, and depending on results will consider consulting GI.        Labs reviewed, Medications reviewed and Radiology images reviewed  Art catheter: No Art            Antibiotics: Treating active infection/contamination beyond 24 hours perioperative coverage

## 2017-03-17 NOTE — PROGRESS NOTES
Assumed care at 0700, bedside report received from NOC shift RN. Patient is AAOX4 with mod complaint of back pain med per mar. Initial assessment completed, orders reviewed, call light within reach, bed alarm in use, and hourly rounding in place. POC addressed with patient no additional questions at this time.

## 2017-03-17 NOTE — PROGRESS NOTES
Infectious Disease Progress Note    Author: Laurel Darby M.D. DOS & Time created: 3/17/2017  10:27 AM    Chief Complaint   Patient presents with   • Low Back Pain   FU for osteomyelitis, GAS bacteremia and CDI    Interval History:  3/17 AF, WBC 12.9, pt c/o lower back pain, asking for morphine, poor insight into illness, denies any diarrhea  Labs Reviewed, Medications Reviewed, Radiology Reviewed and Wound Reviewed.    Review of Systems:  Review of Systems   Constitutional: Negative for fever and chills.   Respiratory: Negative for cough and shortness of breath.    Gastrointestinal: Negative for nausea, vomiting, abdominal pain and diarrhea.   Genitourinary: Negative for dysuria.   Musculoskeletal: Positive for back pain.   Neurological: Negative for headaches.   All other systems reviewed and are negative.      Hemodynamics:  Temp (24hrs), Av.9 °C (98.4 °F), Min:36.2 °C (97.2 °F), Max:37.4 °C (99.3 °F)  Temperature: 37.3 °C (99.2 °F)  Pulse  Av.2  Min: 80  Max: 104  Blood Pressure : 101/59 mmHg       Physical Exam:  Physical Exam   Constitutional: He appears well-developed.   HENT:   Head: Normocephalic and atraumatic.   [place]   Eyes: EOM are normal. Pupils are equal, round, and reactive to light.   Neck: Neck supple.   Cardiovascular: Normal rate.    Murmur heard.  IRR   Pulmonary/Chest: Effort normal and breath sounds normal.   Abdominal: Soft. There is no tenderness.   Musculoskeletal: Normal range of motion. He exhibits edema.   R great toe with slight edema but no drainage or erythema    Left 3rd toe ulcer without drainage or erythema    Lumbar spine no TTP   Neurological: He is alert.       Labs:  Recent Labs      03/15/17   1845  17   0311  17   0348   WBC  13.6*  11.7*  12.9*   RBC  3.19*  3.37*  3.37*   HEMOGLOBIN  11.1*  11.4*  11.4*   HEMATOCRIT  32.0*  34.1*  35.2*   MCV  100.3*  101.2*  104.5*   MCH  34.8*  33.8*  33.8*   RDW  50.4*  48.9  51.0*   PLATELETCT  196  188  206    MPV  9.6  9.9  9.0   NEUTSPOLYS  84.20*  77.80*   --    LYMPHOCYTES  7.50*  11.10*   --    MONOCYTES  7.40  9.90   --    EOSINOPHILS  0.10  0.20   --    BASOPHILS  0.10  0.20   --      Recent Labs      03/15/17   1845  03/16/17   0311  03/17/17   0347   SODIUM  129*  130*  130*   POTASSIUM  3.4*  3.3*  3.4*   CHLORIDE  97  96  95*   CO2  22  24  25   GLUCOSE  115*  111*  139*   BUN  10  10  14     Recent Labs      03/15/17   1845  03/16/17   0311  03/17/17   0347   ALBUMIN  2.6*   --    --    TBILIRUBIN  0.7   --    --    ALKPHOSPHAT  63   --    --    TOTPROTEIN  8.2   --    --    ALTSGPT  21   --    --    ASTSGOT  25   --    --    CREATININE  0.71  0.80  0.77       BLOOD CULTURE   Date Value Ref Range Status   03/15/2017   Preliminary    No Growth    Note: Blood cultures are incubated for 5 days and  are monitored continuously.Positive blood cultures  are called to the RN and reported as soon as  they are identified.      ':       Imaging  3/17 MRI lumbar spine: There are changes secondary to the abdominal aortic aneurysm endograft. The aneurysm sac measures an approximately 7.6 x 6.7 cm in size. Abnormal contrast enhancement is noted within the wall of the aneurysmal sac. There is also abnormal soft tissue   contrast enhancement in the surrounding soft tissue. These findings are highly suspicious for infected thrombosed aneurysm sac. The possibility of endograft infection is more likely. There is irregular outpouching of the aneurysmal sac along the   posterior portion indenting the left psoas muscle.  2.  Free fluid in the pelvis  3.  There is mild-to-moderate left hydronephrosis likely representing pelviureteric junction obstruction.    3/16 R foot xray - +OM  3/16 L foot xray +OM    Assessment:  Active Hospital Problems    Diagnosis   • *Osteomyelitis of toe (CMS-[facility]) [M86.9]   • Bacteremia [R78.81]   • Back pain [M54.9]   • CHF (congestive heart failure) (CMS-[facility]) [I50.9]   • Hypokalemia [E87.6]   • Tobacco  dependence [F17.200]   • Atrial fibrillation (CMS-HCC) [I48.91]    Endovascular infection       Plan:  Right great toe and left 3rd osteomyelitis  +OM on radiographs  Continue unasyn  3/9 OSH wound cx - GAS, MSSA (I confirmed with [place] microlab)  LPS following  If no surgical intervention, anticipate 6 week course of IV abx    Group A streptococcus bacteremia  3/9 OSH Bcx - GAS  3/14 OSH Bcx - NGTD  3/15 Bcx - NGTD  Abx per above    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 Will hold PICC placement for now    Prognosis guarded    DW IM

## 2017-03-17 NOTE — CONSULTS
DATE OF SERVICE:  03/16/2017    CHIEF COMPLAINT:  Possible right great toe osteomyelitis.    HISTORY OF PRESENT ILLNESS:  The patient is a 72-year-old male who was   hospitalized at [facility] from March 9th to March 14th for new onset atrial   fibrillation with RVR.  He was subsequently cardioverted.  He has also been   treated for bacteremia, Clostridium difficile colitis, group A strep as well   as methicillin-resistant Staphylococcus aureus.  Reportedly, an MRI of his   right foot was performed and showed possible osteomyelitis of his great toe,   although he did not have a wound or redness about his right toe.  He also has   an ulcer about his left third toe.  Records are not available from [facility]   at this time and an MRI is not available as well.  Orthopedics was consulted;   however, the patient left AMA prior to the consultation and then presented to   the ED at [facility] on 03/15/2017 for back pain and was subsequently admitted.    Infectious disease has been consulted and is managing his antibiotics.    Orthopedics was consulted for evaluation of his ____.  He has history of   neuropathy and he is not able to report where this came from.  He denies any   diabetes.  He has a longstanding smoking history.    PAST MEDICAL HISTORY:  Significant for:  1.  Atrial fibrillation.  2.  Elevated BNP.  3.  History of C. diff colitis.  4.  Recent diagnosis of right great toe osteomyelitis.  5.  Dysphagia status esophageal dilation.  6.  History of hearing loss.  7.  Coronary artery disease, status post 3 stent placements.  8.  History of MI.  9.  Peripheral artery disease.  10.  Umbilical hernia.    PAST SURGICAL HISTORY:  Significant for:  1.  AAA repair.  2.  Cardiac stent placement.  3.  Right ankle ORIF.  4.  Umbilical hernia repair.    FAMILY HISTORY:  No family history of coronary artery disease or stroke.    SOCIAL HISTORY:  He smokes, drinks alcohol about 2 drinks per day.  Denies   illicit drugs.    HOME  MEDICATIONS:  1.  Neurontin.  2.  Zocor.  3.  Antibiotics from [place], which was not filled, unknown type.    ALLERGIES:  None.    REVIEW OF SYSTEMS:  As per HPI, otherwise negative.    PHYSICAL EXAMINATION:  VITAL SIGNS:  Blood pressure 127/86, respirations 20, pulse 99, temperature   37.3.  Height 1.78 meters, weight 81 Kg.  GENERAL:  Elderly male, disheveled, in no distress.  HEENT:  Normocephalic, atraumatic.  Poor dentition.  Eyes, pupils equal, round   and reactive to light.  Extraocular muscles intact.  NECK:  No pain with range of motion.  Supple.  Midline trachea.  HEART:  Regular rate and rhythm.  ABDOMEN:  Soft, nontender and nondistended.  CHEST:  Nontender to palpation of the chest wall.  LYMPHATICS:  Bilateral lower extremity pitting edema, at least 2+.  VASCULAR:  Both feet are somewhat cool especially above ____.  Pedal pulses   are difficult to palpate, possibly secondary to notable edema.  MUSCULOSKELETAL:  Nontender to palpation of the feet.  SKIN:  Skin overlying the feet reveals a distal ulcer, which is mostly dry in   the left third toe.  There is no ulcer on the right great toe.  NEUROLOGIC:  Intact, but diminished sensation globally throughout bilateral   lower extremities.  Intact motor function in the tibialis anterior,   gastrocsoleus complex, EHL, and peroneals.    IMAGINGS:  None.    IMPRESSION:  1.  Left third toe ulcer.  2.  Right great toe recent osteomyelitis.  It is unclear about what prompted   the diagnosis of right great toe osteomyelitis.  This is a little bit unusual   given there is no overlying ulcer.  I have recommended we try to get the MRI   imaging from [place] for review.  With regard to his left third toe, this   is currently dry.  There is no evidence of superinfection or abscess   formation.  Given that he has weak pedal pulse, I would recommend a   noninvasive vascular studies and limb preservation consult prior to any   intervention.  He may ultimately  require a toe amputation for his left third   toe ulcer.  However, obtaining vascular service before this would probably be   better.  I have also recommended with the hospitalist consult the limb   preservation service for assistance and workup.  Radiographs of the feet will   be the starting point and probably reasonable bilaterally.  The patient is   cleared to receive any form of antibiotics treatment as determined appropriate   by infectious diseases.  Surgical plans, if any, will depend on the results   of imaging studies and the noninvasive vascular studies.       ____________________________________     MD MARY Garcia / KELLY    DD:  03/16/2017 23:07:25  DT:  03/17/2017 00:08:24    D#:  [id number]  Job#:  [id number]

## 2017-03-18 LAB
ALBUMIN SERPL BCP-MCNC: 2.5 G/DL (ref 3.2–4.9)
ALBUMIN/GLOB SERPL: 0.4 G/DL
ALP SERPL-CCNC: 64 U/L (ref 30–99)
ALT SERPL-CCNC: 27 U/L (ref 2–50)
ANION GAP SERPL CALC-SCNC: 10 MMOL/L (ref 0–11.9)
AST SERPL-CCNC: 37 U/L (ref 12–45)
BASOPHILS # BLD AUTO: 0.2 % (ref 0–1.8)
BASOPHILS # BLD: 0.03 K/UL (ref 0–0.12)
BILIRUB SERPL-MCNC: 0.7 MG/DL (ref 0.1–1.5)
BUN SERPL-MCNC: 10 MG/DL (ref 8–22)
CALCIUM SERPL-MCNC: 8.3 MG/DL (ref 8.5–10.5)
CHLORIDE SERPL-SCNC: 94 MMOL/L (ref 96–112)
CO2 SERPL-SCNC: 27 MMOL/L (ref 20–33)
CREAT SERPL-MCNC: 0.84 MG/DL (ref 0.5–1.4)
EOSINOPHIL # BLD AUTO: 0.02 K/UL (ref 0–0.51)
EOSINOPHIL NFR BLD: 0.2 % (ref 0–6.9)
ERYTHROCYTE [DISTWIDTH] IN BLOOD BY AUTOMATED COUNT: 47.9 FL (ref 35.9–50)
GFR SERPL CREATININE-BSD FRML MDRD: >60 ML/MIN/1.73 M 2
GLOBULIN SER CALC-MCNC: 6.3 G/DL (ref 1.9–3.5)
GLUCOSE SERPL-MCNC: 111 MG/DL (ref 65–99)
HCT VFR BLD AUTO: 34.2 % (ref 42–52)
HGB BLD-MCNC: 11.8 G/DL (ref 14–18)
IMM GRANULOCYTES # BLD AUTO: 0.09 K/UL (ref 0–0.11)
IMM GRANULOCYTES NFR BLD AUTO: 0.7 % (ref 0–0.9)
LYMPHOCYTES # BLD AUTO: 1.33 K/UL (ref 1–4.8)
LYMPHOCYTES NFR BLD: 10 % (ref 22–41)
MCH RBC QN AUTO: 34.8 PG (ref 27–33)
MCHC RBC AUTO-ENTMCNC: 34.5 G/DL (ref 33.7–35.3)
MCV RBC AUTO: 100.9 FL (ref 81.4–97.8)
MONOCYTES # BLD AUTO: 1.03 K/UL (ref 0–0.85)
MONOCYTES NFR BLD AUTO: 7.8 % (ref 0–13.4)
NEUTROPHILS # BLD AUTO: 10.79 K/UL (ref 1.82–7.42)
NEUTROPHILS NFR BLD: 81.1 % (ref 44–72)
NRBC # BLD AUTO: 0 K/UL
NRBC BLD AUTO-RTO: 0 /100 WBC
PLATELET # BLD AUTO: 225 K/UL (ref 164–446)
PMV BLD AUTO: 9.1 FL (ref 9–12.9)
POTASSIUM SERPL-SCNC: 3.3 MMOL/L (ref 3.6–5.5)
PROT SERPL-MCNC: 8.8 G/DL (ref 6–8.2)
RBC # BLD AUTO: 3.39 M/UL (ref 4.7–6.1)
SODIUM SERPL-SCNC: 131 MMOL/L (ref 135–145)
WBC # BLD AUTO: 13.3 K/UL (ref 4.8–10.8)

## 2017-03-18 PROCEDURE — 700111 HCHG RX REV CODE 636 W/ 250 OVERRIDE (IP): Performed by: HOSPITALIST

## 2017-03-18 PROCEDURE — 80053 COMPREHEN METABOLIC PANEL: CPT

## 2017-03-18 PROCEDURE — 700101 HCHG RX REV CODE 250: Performed by: INTERNAL MEDICINE

## 2017-03-18 PROCEDURE — 700111 HCHG RX REV CODE 636 W/ 250 OVERRIDE (IP): Performed by: NURSE PRACTITIONER

## 2017-03-18 PROCEDURE — 770020 HCHG ROOM/CARE - TELE (206)

## 2017-03-18 PROCEDURE — 85025 COMPLETE CBC W/AUTO DIFF WBC: CPT

## 2017-03-18 PROCEDURE — A9270 NON-COVERED ITEM OR SERVICE: HCPCS | Performed by: HOSPITALIST

## 2017-03-18 PROCEDURE — 36415 COLL VENOUS BLD VENIPUNCTURE: CPT

## 2017-03-18 PROCEDURE — 700105 HCHG RX REV CODE 258: Performed by: HOSPITALIST

## 2017-03-18 PROCEDURE — 99232 SBSQ HOSP IP/OBS MODERATE 35: CPT | Performed by: INTERNAL MEDICINE

## 2017-03-18 PROCEDURE — 700102 HCHG RX REV CODE 250 W/ 637 OVERRIDE(OP): Performed by: HOSPITALIST

## 2017-03-18 RX ORDER — MORPHINE SULFATE 4 MG/ML
2 INJECTION, SOLUTION INTRAMUSCULAR; INTRAVENOUS EVERY 4 HOURS PRN
Status: DISCONTINUED | OUTPATIENT
Start: 2017-03-18 | End: 2017-04-07

## 2017-03-18 RX ORDER — LIDOCAINE 50 MG/G
1 PATCH TOPICAL EVERY 24 HOURS
Status: DISCONTINUED | OUTPATIENT
Start: 2017-03-18 | End: 2017-03-21

## 2017-03-18 RX ADMIN — AMPICILLIN SODIUM AND SULBACTAM SODIUM 3 G: 2; 1 INJECTION, POWDER, FOR SOLUTION INTRAMUSCULAR; INTRAVENOUS at 17:21

## 2017-03-18 RX ADMIN — LIDOCAINE 1 PATCH: 50 PATCH CUTANEOUS at 13:31

## 2017-03-18 RX ADMIN — MORPHINE SULFATE 2 MG: 4 INJECTION INTRAVENOUS at 13:25

## 2017-03-18 RX ADMIN — AMPICILLIN SODIUM AND SULBACTAM SODIUM 3 G: 2; 1 INJECTION, POWDER, FOR SOLUTION INTRAMUSCULAR; INTRAVENOUS at 11:26

## 2017-03-18 RX ADMIN — AMPICILLIN SODIUM AND SULBACTAM SODIUM 3 G: 2; 1 INJECTION, POWDER, FOR SOLUTION INTRAMUSCULAR; INTRAVENOUS at 04:59

## 2017-03-18 RX ADMIN — MORPHINE SULFATE 2 MG: 4 INJECTION INTRAVENOUS at 04:55

## 2017-03-18 RX ADMIN — AMPICILLIN SODIUM AND SULBACTAM SODIUM 3 G: 2; 1 INJECTION, POWDER, FOR SOLUTION INTRAMUSCULAR; INTRAVENOUS at 23:43

## 2017-03-18 RX ADMIN — FUROSEMIDE 20 MG: 10 INJECTION, SOLUTION INTRAVENOUS at 04:59

## 2017-03-18 RX ADMIN — MORPHINE SULFATE 2 MG: 4 INJECTION INTRAVENOUS at 17:22

## 2017-03-18 RX ADMIN — MORPHINE SULFATE 2 MG: 4 INJECTION INTRAVENOUS at 01:04

## 2017-03-18 RX ADMIN — MORPHINE SULFATE 2 MG: 4 INJECTION INTRAVENOUS at 20:45

## 2017-03-18 RX ADMIN — NICOTINE 14 MG: 14 PATCH TRANSDERMAL at 04:59

## 2017-03-18 RX ADMIN — MORPHINE SULFATE 2 MG: 4 INJECTION INTRAVENOUS at 08:56

## 2017-03-18 ASSESSMENT — PAIN SCALES - GENERAL
PAINLEVEL_OUTOF10: 8
PAINLEVEL_OUTOF10: 7

## 2017-03-18 ASSESSMENT — ENCOUNTER SYMPTOMS
DEPRESSION: 0
BACK PAIN: 1
VOMITING: 0
NAUSEA: 0
ABDOMINAL PAIN: 0
DIARRHEA: 0
HEADACHES: 0
FEVER: 0
COUGH: 0
CHILLS: 0
PALPITATIONS: 0
SHORTNESS OF BREATH: 0

## 2017-03-18 NOTE — PROGRESS NOTES
Vascular    VSS afeb, only low grade temps since admit.     No new complaints.  No abdominal tenderness, still with some back pain.     Further review of the CT scan indicates the aortic sac is inflamed, but I can't clearly see gas or evidence of endoleak, despite the increased size of the residual sac compared to measurements on duplex about 2 years ago, ( the 2 studies are not definitely the same.). The mortality of explantation is up to 50% and by the appearance of the sac, we may have to consider complete resection of the aorta, a daunting task.        For now, continued IV antibiotics with close monitoring.

## 2017-03-18 NOTE — PROGRESS NOTES
Received report from night RN Hannah. Resumed care. Patient is A+Ox4. Bed alarm is in use. Patient is educated on the use of the call light and calls appropritaly. Bed is in the lowest position. Tele monitor is on the patient and heart rhythm  was confirmed by assessment of monitor reading. Patient has a pain level of 6/10 in back. Patient educated on the need for a stool sample to r/o cdiff. Strict NPO in place. Patient sitting at edge of bed tripod position over tray table.

## 2017-03-18 NOTE — CONSULTS
DATE OF SERVICE:  03/17/2017    REASON FOR CONSULTATION:  Endograft infection.    HISTORY OF PRESENT ILLNESS:  Patient is a 72-year-old gentleman who presents   after recent hospitalization at [facility].  He was   cardioverted after being diagnosed with new onset atrial AFib with RVR.  He   had treatment for bacteremia, Clostridium difficile colitis with MRSA and   group A strep.  Recently, an MRI of the right foot was performed and   demonstrated possible osteomyelitis of the great toe without any wound or   redness.  He left AMA from [facility], but presented   again the next day to the emergency room at [facility] for back pain.  He was   admitted again and we were consulted regarding the findings of a CT scan that   showed possible endograft infection.    Patient was last seen in our office in April 2014 by our nurse practitioner.    At that time, he had no complaints and his aneurysm sac measured 4.8 cm.    Noninvasive studies demonstrated no evidence of endoleak or graft migration.    ABIs at that point were 1.06 and 0.95 on the right, 0.89 and 0.91 on the left.    Presently, he has no complaints of claudication or foot pain, although he   does have pain in the left third toe where an ulcer is seen.  He has mild   lower extremity edema.    His endograft repair was performed January 2012 with a modular bifurcated   device.  A [id number] main body was deployed with a [id number] left limb.    PAST MEDICAL HISTORY:  1.  Atrial fibrillation, newly diagnosed.  2.  Dysphagia with esophageal dilatation.  3.  Hearing deficit.  4.  Coronary artery disease.  5.  Umbilical hernia.    PAST SURGICAL HISTORY:  1.  Endograft repair.  2.  Right ankle orthopedic repair.  3.  Umbilical hernia repair.  4.  Coronary intervention.    MEDICATIONS AT HOME:  Include:  1.  Neurontin.  2.  Zocor.  3.  Now vancomycin.  4.  Lasix.  5.  Unasyn.  6.  Cordarone 400 mg b.i.d.  7.  Lopressor 25 mg  b.i.d.  8.  Nicoderm patch.    FAMILY HISTORY:  Significant only for disease.    ALLERGIES:  None known.    SOCIAL HISTORY:  He continues to smoke 1/2 to 1 pack of cigarettes a day and   drinks alcohol daily, but denies alcohol abuse.  He is .    REVIEW OF SYSTEMS:  Presently,  GENERAL:  He denies any fever, chills, weight loss or gain.  PULMONARY:  He denies shortness of breath, chronic cough.  CARDIAC:  He has no shortness of breath, but does have lower extremity edema.  GASTROINTESTINAL:  He has no abdominal pain, diarrhea or constipation today.  GENITOURINARY:  He has no hematuria or dysuria.  MUSCULOSKELETAL:  He denies claudication.    PHYSICAL EXAMINATION:  VITAL SIGNS:  Blood pressure is 122/74, temperature is 98.6.  He has had no   fevers during this hospitalization.  HEENT:  Pupils are equal, round and reactive to light.  Sclerae anicteric.  NECK:  Supple.  He has no JVD today.  No carotid bruits.  HEART:  Irregular with normal, but distant tones.  No murmur.  ABDOMEN:  Soft and nontender.  I do not feel his aneurysm sac and   specifically, deep palpation is only tender to the fact that I am pushing   heard.  EXTREMITIES:  He has palpable femoral and popliteal pulses.  I cannot feel   tibial pulses, this is likely contributed to by his edema.  EXTREMITIES:  There are no wounds on the right foot and on the left, there is   slight ulcer on the dorsal aspect.  There is no drainage or erythema.    DIAGNOSTIC DATA:  CT scan of the abdomen and pelvis demonstrates stranding and   erythema surrounding the aneurysm sac.  I believe I can appreciate a couple   of small air bubbles within the aneurysm sac itself.  The sac has grown to the   size of 5.4x5.7 from past measurements of 4.8, no endoleak is identified.    There is no evidence of iliac stenosis or significant mesenteric or renal   artery stenosis.    IMPRESSION:  The patient certainly has a clinical scenario of a possible   endograft infection.  The  etiology of this is mysterious as he has had no   exposure recently and he is now 5 years out from his original repair;   therefore, one would suspect exposure from an outside source, but there is no   history consistent with this.  The treatment for this would be explantation of   his endograft and I believe, ideally replacement with a biologic replacement   or, rifampin soaked graft.  However, explantation carries a very high   mortality in the best of circumstances anywhere from 20-25%-50% mortality.  At   the present time, he is afebrile and shows no evidence of sepsis, so there is   no immediacy to repair.  Continue the antibiotic coverage and ordering a   biologic endograft with careful planning will lessen the mortality of this   significant procedure.  Explantation will require removal of the entire   fabric, but the suprarenal fixation portions of the endograft can be left in   place and cut to fit.  His repair certainly seems amenable to this plan.  I   briefly described the situation to the patient and we will review his films   with Dr. Dinero who performed the original repair.  In the meantime, he can   have a regular diet as long as he can tolerate it with his esophageal   stricture and IV antibiotics should be instituted.  Either a central line or   PICC line would be ideal.       ____________________________________     MD REDDY DICKERSON / KELLY    DD:  03/18/2017 07:55:07  DT:  03/18/2017 08:57:30    D#:  [id number]  Job#:  [id number]

## 2017-03-18 NOTE — PROGRESS NOTES
Paged Elder Guthrie to follow up with plan of care  10:21: Re paged JOLIE to follow up with plan of care  10:25: Dr. Rinaldi called back. Keep patient strict NPO. Possible EDG tomorrow.

## 2017-03-18 NOTE — PROGRESS NOTES
Hospital Medicine Progress Note, Adult, Complex               Author: Gladyslatavictorina Jovel Date & Time created: 3/18/2017  1:31 PM     ID/CC: 73 y/o m who recently at [facility] and diagnosed with bacteremia, right great toe osteo and also A.fibb. Left AMA from there and now comes with back pain.     Interval History:  Pt seen and examined, complaining of back pain and requiring more pain medication.   ID, Ortho, GI and vascular surgery following, appreciate rec.     Review of Systems:  Review of Systems   Constitutional: Negative for fever and chills.   Respiratory: Negative for cough and shortness of breath.    Cardiovascular: Negative for chest pain and palpitations.   Gastrointestinal: Negative for nausea and vomiting.   Genitourinary: Negative for dysuria and urgency.   Musculoskeletal: Positive for back pain.   Psychiatric/Behavioral: Negative for depression.       Physical Exam:  Physical Exam   Constitutional: He is oriented to person, place, and time. He appears well-developed and well-nourished.   HENT:   Head: Normocephalic and atraumatic.   Eyes: Conjunctivae are normal. No scleral icterus.   Neck: Neck supple. No JVD present.   Abdominal: Soft. He exhibits no distension. There is no tenderness.   Musculoskeletal: He exhibits edema (b/l LE).   R great toe with slight edema but no erythema or drainage. Left third toe with scabbed lesion and absent toenail.   Neurological: He is alert and oriented to person, place, and time.   Skin: Skin is warm and dry. No erythema.       Labs:        Invalid input(s): [id number]  Recent Labs      03/15/17   1845  03/16/17   0311   BNPBTYPENAT  1952*  1688*     Recent Labs      03/16/17 0311 03/17/17 0347 03/18/17   0356   SODIUM  130*  130*  131*   POTASSIUM  3.3*  3.4*  3.3*   CHLORIDE  96  95*  94*   CO2  24  25  27   BUN  10  14  10   CREATININE  0.80  0.77  0.84   CALCIUM  8.1*  8.2*  8.3*     Recent Labs      03/15/17   1845  03/16/17   0311  03/17/17   0347   17   0356   ALTSGPT  21   --    --   27   ASTSGOT  25   --    --   37   ALKPHOSPHAT  63   --    --   64   TBILIRUBIN  0.7   --    --   0.7   GLUCOSE  115*  111*  139*  111*     Recent Labs      03/15/17   1807   17   0356   RBC   --    < >  3.37*  3.37*  3.39*   HEMOGLOBIN   --    < >  11.4*  11.4*  11.8*   HEMATOCRIT   --    < >  34.1*  35.2*  34.2*   PLATELETCT   --    < >  188  206  225   PROTHROMBTM  18.4*   --    --    --    --    INR  1.48*   --    --    --    --     < > = values in this interval not displayed.     Recent Labs      03/15/17   1845  17   0356   WBC  13.6*  11.7*  12.9*  13.3*   NEUTSPOLYS  84.20*  77.80*   --   81.10*   LYMPHOCYTES  7.50*  11.10*   --   10.00*   MONOCYTES  7.40  9.90   --   7.80   EOSINOPHILS  0.10  0.20   --   0.20   BASOPHILS  0.10  0.20   --   0.20   ASTSGOT  25   --    --   37   ALTSGPT  21   --    --   27   ALKPHOSPHAT  63   --    --   64   TBILIRUBIN  0.7   --    --   0.7           Hemodynamics:  Temp (24hrs), Av.9 °C (98.4 °F), Min:36.4 °C (97.6 °F), Max:37.5 °C (99.5 °F)  Temperature: 36.6 °C (97.9 °F)  Pulse  Av.1  Min: 66  Max: 104  Blood Pressure : 144/80 mmHg     Respiratory:    Respiration: 18, Pulse Oximetry: 97 %        RUL Breath Sounds: Clear, RML Breath Sounds: Clear, RLL Breath Sounds: Diminished, SOLEDAD Breath Sounds: Clear, LLL Breath Sounds: Diminished  Fluids:    Intake/Output Summary (Last 24 hours) at 17 1331  Last data filed at 03/18/17 1000   Gross per 24 hour   Intake    600 ml   Output   2175 ml   Net  -1575 ml     Weight: 83.3 kg (183 lb 10.3 oz)  GI/Nutrition:  Orders Placed This Encounter   Procedures   • DIET NPO     Standing Status: Standing      Number of Occurrences: 1      Standing Expiration Date:      Order Specific Question:  Restrict to:     Answer:  Strict [1]     Medical Decision Making, by Problem:  Active Hospital Problems    Diagnosis    • *Osteomyelitis of toe (CMS-HCC) [M86.9]  -diagnosed at [facility]  - Ortho Dr. Samayoa and also LPS following  appreciate rec  -xray of foot shows possible osteomyelitis   -ID following appreciate rec.     • Bacteremia [R78.81]  -diagnosed at the outside facility, repeating Bcx here  - Bcx on outside facility  -ID consulted appreciate rec.    • Back pain [M54.9]  -MRI lumbar spine today showed possible AAA endograft infection, CTA abdomen ordered  -Vascular surgery following appreciate rec.   -cont pain management.    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  -ID following    Esophageal obstruction  -was recently diagnosed with a esophageal stricture and had a EGD done with dilation 6-7 days ago as per discharge summary from outside facility  -barium swallow here shows esophageal obstruction, GI  Has been consulted, appreciate rec.         Labs reviewed, Medications reviewed and Radiology images reviewed  Art catheter: No Art            Antibiotics: Treating active infection/contamination beyond 24 hours perioperative coverage

## 2017-03-18 NOTE — CARE PLAN
Problem: Communication  Goal: The ability to communicate needs accurately and effectively will improve  Outcome: PROGRESSING AS EXPECTED  Patient's white board is updated. Patient is updated on plan of care. All questions were answered.     Problem: Safety  Goal: Will remain free from falls  Outcome: PROGRESSING AS EXPECTED  Fall safety precautions in place, hourly rounding performed. Socks on, bed locked and lowered, personal possessions within reach, bed alarm on. Pt remains injury free.

## 2017-03-18 NOTE — PROGRESS NOTES
PT care assumed and assessment completed.  PT is aao4, up 1 with fww tolerates well.  Very agitated.  Strict NPO.  MEdicated for severe pain per MAR .  Q2 rounds in place

## 2017-03-18 NOTE — CONSULTS
DATE OF SERVICE:  03/18/2017    REQUESTING PHYSICIAN:  Gordo Jovel MD    REASON FOR CONSULTATION:  Dysphagia.    HISTORY OF PRESENT ILLNESS:  This is a 72-year-old male admitted on the 16th   with complaints of back pain.    The patient was just hospitalized at [facility] from the 9th to   the 14th of this month where he was diagnosed with atrial fibrillation with   rapid ventricular response, bacteremia and C. difficile infection.    Apparently, at that time, he was evaluated by  _____ from my group for   evaluation of dysphagia.  Reportedly, he underwent endoscopy with possible   dilation.    Patient has a long history of dysphagia extending back 5-10 years.  He has   been evaluated by Dr. Nixon in our group in the past.  He states he has   undergone multiple endoscopies with dilations in the past.  He states for the   past 1 month, he has had progressive dysphagia to both solids and liquids   presently despite his recent dilation.  He is unable to tolerate his own   secretions.  A barium esophagram shows distal esophageal obstruction.    REVIEW OF SYSTEMS:  PSYCHIATRIC:  He denies anxiety or depression.  MUSCULOSKELETAL:  Positive for severe low back pain.  DERMATOLOGIC:  No rashes.  GASTROINTESTINAL:  As above.  CARDIOVASCULAR:  Positive for recent palpitations.  PULMONARY:  He denies cough.  ALLERGY AND IMMUNOLOGY:  No hay fever.  ENDOCRINE:  Denies polyuria or polydipsia.  CONSTITUTIONAL:  Positive for fatigue.  GENITOURINARY:  Denies dysuria.    PAST MEDICAL HISTORY:  Dysphagia, atrial fibrillation, C. difficile infection,   osteomyelitis, coronary artery disease, peripheral vascular disease.    PAST SURGICAL HISTORY:  PTCA with stents, ankle surgery, umbilical   herniorrhaphy, abdominal aortic aneurysm repair.    OUTPATIENT MEDICATIONS:  Neurontin, Zocor.    ALLERGIES:  None.    SOCIAL HISTORY:  He is a 1 pack a day tobacco history, drinks 2 alcoholic   beverages per day.    PHYSICAL  EXAMINATION:  VITAL SIGNS:  Temperature is 36.6, pulse 99, respirations 18, /80, O2   sat is 97% on 2 L.  GENERAL APPEARANCE:  He is in mild distress secondary to pain.  He is alert   and oriented x3.  HEENT:  Sclerae are anicteric.  Oropharynx is moist.  Dentition is poor.  NECK:  Supple, without adenopathy.  HEART:  Regular rate and rhythm.  LUNGS:  Show few scattered rhonchi.  ABDOMEN:  Obese, soft, nontender.  BACK:  There is no vertebral tenderness.  EXTREMITIES:  Warm and dry without clubbing, cyanosis or edema.    LABORATORY DATA:  White count 13,000, hematocrit 34, platelet count 225,000.    Sodium 131, potassium 3.3, chloride 94, bicarbonate 27, BUN 10, creatinine   0.8, AST 37, ALT 27, bilirubin 0.7, INR is 1.4.  Blood cultures are negative.    DIAGNOSTIC DATA:  CTA of the abdomen shows aortoiliac graft, abdominal aortic   aneurysm, vascular disease and splenomegaly.  MRI of the lumbar spine shows   abdominal aortic aneurysm endograft with enhancement suggestive of possible   infection.  Barium esophagram shows moderate esophageal dysmotility,   obstruction of distal esophagus with trace barium extending into the stomach.    ASSESSMENT AND PLAN:  1.  A 72-year-old male with dysphagia.  The patient has a chronic esophageal   disorder, which may be related to a distal esophageal stricture.  He has   undergone prior endoscopies with dilations in the past, possibly one within   the past week without benefit.  Current esophagram shows esophageal   dysmotility and possible distal esophageal stricture.  He is not tolerant of   his own secretions at this point.  Plan, we will keep strictly n.p.o.  We will   proceed with endoscopy in the morning with possible dilation.  Informed   consent was obtained after explanation of risks, benefits, and alternatives.  2.  Group A strep and MRSA bacteremia.  3.  Osteomyelitis, the right big toe.  4.  History of recent Clostridium difficile colitis, currently without    symptoms.  5.  Possible infected endovascular grafts.  Plan, this is being evaluated by   Dr. Rodriguez.  6.  Anemia, macrocytic.  7.  Leukocytosis.  8.  Peripheral neuropathy.  9.  Coronary artery disease.  10.  Peripheral vascular disease.  11.  Coagulopathy, mild.  He has been on Eliquis in the past, but this was   stopped post-dilation.  Plan, we will repeat INR prior to possible dilation   tomorrow.       ____________________________________     MORENITA TRUJILLO DO    PIS / NTS    DD:  03/18/2017 12:49:37  DT:  03/18/2017 13:18:01    D#:  [id number]  Job#:  [id number]

## 2017-03-18 NOTE — THERAPY
esophagram findings 3/17-distal esophageal obstruction. Will d/c SLP orders at this time and await reorder if medically appropriate. jose manuel

## 2017-03-18 NOTE — PROGRESS NOTES
Infectious Disease Progress Note    Author: Laurel Darby M.D. DOS & Time created: 3/18/2017  10:13 AM    Chief Complaint   Patient presents with   • Low Back Pain   FU for osteomyelitis, GAS bacteremia and CDI, possible endograft infection    Interval History:  3/17 AF, WBC 12.9, pt c/o lower back pain, asking for morphine, poor insight into illness, denies any diarrhea  3/18 AF, WBC 13.3, sleeping and not arousable to voice, cultures here negative to date  Labs Reviewed, Medications Reviewed, Radiology Reviewed and Wound Reviewed.    Review of Systems:  Review of Systems   Constitutional: Negative for fever and chills.   Respiratory: Negative for cough and shortness of breath.    Gastrointestinal: Negative for nausea, vomiting, abdominal pain and diarrhea.   Genitourinary: Negative for dysuria.   Musculoskeletal: Positive for back pain.   Neurological: Negative for headaches.   All other systems reviewed and are negative.      Hemodynamics:  Temp (24hrs), Av.9 °C (98.4 °F), Min:36.4 °C (97.6 °F), Max:37.5 °C (99.5 °F)  Temperature: 36.6 °C (97.8 °F)  Pulse  Av.7  Min: 66  Max: 104  Blood Pressure : 113/78 mmHg       Physical Exam:  Physical Exam   Constitutional: He appears well-developed.   HENT:   Head: Normocephalic and atraumatic.   [place]   Eyes: EOM are normal. Pupils are equal, round, and reactive to light.   Neck: Neck supple.   Cardiovascular: Normal rate.    Murmur heard.  IRR   Pulmonary/Chest: Effort normal and breath sounds normal.   Abdominal: Soft. There is no tenderness.   Musculoskeletal: Normal range of motion. He exhibits edema.   R great toe with slight edema but no drainage or erythema    Left 3rd toe ulcer without drainage or erythema    Lumbar spine no TTP   Neurological: He is alert.       Labs:  Recent Labs      03/15/17   1845  17   0311  17   0348  17   0356   WBC  13.6*  11.7*  12.9*  13.3*   RBC  3.19*  3.37*  3.37*  3.39*   HEMOGLOBIN  11.1*  11.4*  11.4*   11.8*   HEMATOCRIT  32.0*  34.1*  35.2*  34.2*   MCV  100.3*  101.2*  104.5*  100.9*   MCH  34.8*  33.8*  33.8*  34.8*   RDW  50.4*  48.9  51.0*  47.9   PLATELETCT  196  188  206  225   MPV  9.6  9.9  9.0  9.1   NEUTSPOLYS  84.20*  77.80*   --   81.10*   LYMPHOCYTES  7.50*  11.10*   --   10.00*   MONOCYTES  7.40  9.90   --   7.80   EOSINOPHILS  0.10  0.20   --   0.20   BASOPHILS  0.10  0.20   --   0.20     Recent Labs      03/16/17 0311 03/17/17 0347 03/18/17   0356   SODIUM  130*  130*  131*   POTASSIUM  3.3*  3.4*  3.3*   CHLORIDE  96  95*  94*   CO2  24  25  27   GLUCOSE  111*  139*  111*   BUN  10  14  10     Recent Labs      03/15/17   1845  03/16/17 0311 03/17/17 0347 03/18/17   0356   ALBUMIN  2.6*   --    --   2.5*   TBILIRUBIN  0.7   --    --   0.7   ALKPHOSPHAT  63   --    --   64   TOTPROTEIN  8.2   --    --   8.8*   ALTSGPT  21   --    --   27   ASTSGOT  25   --    --   37   CREATININE  0.71  0.80  0.77  0.84       BLOOD CULTURE   Date Value Ref Range Status   03/15/2017   Preliminary    No Growth    Note: Blood cultures are incubated for 5 days and  are monitored continuously.Positive blood cultures  are called to the RN and reported as soon as  they are identified.      ':       Imaging  3/17 MRI lumbar spine: There are changes secondary to the abdominal aortic aneurysm endograft. The aneurysm sac measures an approximately 7.6 x 6.7 cm in size. Abnormal contrast enhancement is noted within the wall of the aneurysmal sac. There is also abnormal soft tissue   contrast enhancement in the surrounding soft tissue. These findings are highly suspicious for infected thrombosed aneurysm sac. The possibility of endograft infection is more likely. There is irregular outpouching of the aneurysmal sac along the   posterior portion indenting the left psoas muscle.  2.  Free fluid in the pelvis  3.  There is mild-to-moderate left hydronephrosis likely representing pelviureteric junction  obstruction.    3/16 R foot xray - +OM  3/16 L foot xray +OM    Assessment:  Active Hospital Problems    Diagnosis   • *Osteomyelitis of toe (CMS-HCC) [M86.9]   • Bacteremia [R78.81]   • Back pain [M54.9]   • CHF (congestive heart failure) (CMS-HCC) [I50.9]   • Hypokalemia [E87.6]   • Tobacco dependence [F17.200]   • Atrial fibrillation (CMS-HCC) [I48.91]    Endovascular infection       Plan:  Right great toe and left 3rd osteomyelitis  +OM on radiographs  Continue unasyn  3/9 OSH wound cx - GAS, MSSA (I confirmed with [facility] microlab)  LPS following  If no surgical intervention, anticipate 6 week course of IV abx  Arterial studies - neg    Group A streptococcus bacteremia  3/9 OSH Bcx - GAS  3/14 OSH Bcx - NGTD  3/15 Bcx - NGTD  Abx per above    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 Will hold PICC placement for now    Prognosis guarded    DW IM

## 2017-03-19 ENCOUNTER — APPOINTMENT (OUTPATIENT)
Dept: RADIOLOGY | Facility: MEDICAL CENTER | Age: 72
DRG: 504 | End: 2017-03-19
Attending: INTERNAL MEDICINE
Payer: COMMERCIAL

## 2017-03-19 LAB
ALBUMIN SERPL BCP-MCNC: 2.8 G/DL (ref 3.2–4.9)
ALBUMIN/GLOB SERPL: 0.4 G/DL
ALP SERPL-CCNC: 79 U/L (ref 30–99)
ALT SERPL-CCNC: 27 U/L (ref 2–50)
ANION GAP SERPL CALC-SCNC: 15 MMOL/L (ref 0–11.9)
AST SERPL-CCNC: 37 U/L (ref 12–45)
BASOPHILS # BLD AUTO: 0.2 % (ref 0–1.8)
BASOPHILS # BLD: 0.03 K/UL (ref 0–0.12)
BILIRUB SERPL-MCNC: 0.7 MG/DL (ref 0.1–1.5)
BUN SERPL-MCNC: 15 MG/DL (ref 8–22)
CALCIUM SERPL-MCNC: 8.7 MG/DL (ref 8.5–10.5)
CHLORIDE SERPL-SCNC: 90 MMOL/L (ref 96–112)
CO2 SERPL-SCNC: 25 MMOL/L (ref 20–33)
CREAT SERPL-MCNC: 0.8 MG/DL (ref 0.5–1.4)
EOSINOPHIL # BLD AUTO: 0 K/UL (ref 0–0.51)
EOSINOPHIL NFR BLD: 0 % (ref 0–6.9)
ERYTHROCYTE [DISTWIDTH] IN BLOOD BY AUTOMATED COUNT: 48.3 FL (ref 35.9–50)
GFR SERPL CREATININE-BSD FRML MDRD: >60 ML/MIN/1.73 M 2
GLOBULIN SER CALC-MCNC: 6.7 G/DL (ref 1.9–3.5)
GLUCOSE SERPL-MCNC: 104 MG/DL (ref 65–99)
HCT VFR BLD AUTO: 38.9 % (ref 42–52)
HGB BLD-MCNC: 12.7 G/DL (ref 14–18)
IMM GRANULOCYTES # BLD AUTO: 0.08 K/UL (ref 0–0.11)
IMM GRANULOCYTES NFR BLD AUTO: 0.6 % (ref 0–0.9)
INR PPP: 1.53 (ref 0.87–1.13)
LYMPHOCYTES # BLD AUTO: 0.87 K/UL (ref 1–4.8)
LYMPHOCYTES NFR BLD: 6.5 % (ref 22–41)
MCH RBC QN AUTO: 33.3 PG (ref 27–33)
MCHC RBC AUTO-ENTMCNC: 32.6 G/DL (ref 33.7–35.3)
MCV RBC AUTO: 102.1 FL (ref 81.4–97.8)
MONOCYTES # BLD AUTO: 0.92 K/UL (ref 0–0.85)
MONOCYTES NFR BLD AUTO: 6.9 % (ref 0–13.4)
NEUTROPHILS # BLD AUTO: 11.48 K/UL (ref 1.82–7.42)
NEUTROPHILS NFR BLD: 85.8 % (ref 44–72)
NRBC # BLD AUTO: 0 K/UL
NRBC BLD AUTO-RTO: 0 /100 WBC
PLATELET # BLD AUTO: 244 K/UL (ref 164–446)
PMV BLD AUTO: 9.4 FL (ref 9–12.9)
POTASSIUM SERPL-SCNC: 3.2 MMOL/L (ref 3.6–5.5)
PROT SERPL-MCNC: 9.5 G/DL (ref 6–8.2)
PROTHROMBIN TIME: 18.9 SEC (ref 12–14.6)
RBC # BLD AUTO: 3.81 M/UL (ref 4.7–6.1)
SODIUM SERPL-SCNC: 130 MMOL/L (ref 135–145)
WBC # BLD AUTO: 13.4 K/UL (ref 4.8–10.8)

## 2017-03-19 PROCEDURE — 160208 HCHG ENDO MINUTES - EA ADDL 1 MIN LEVEL 4: Performed by: INTERNAL MEDICINE

## 2017-03-19 PROCEDURE — 700101 HCHG RX REV CODE 250: Performed by: INTERNAL MEDICINE

## 2017-03-19 PROCEDURE — 99232 SBSQ HOSP IP/OBS MODERATE 35: CPT | Performed by: INTERNAL MEDICINE

## 2017-03-19 PROCEDURE — 700111 HCHG RX REV CODE 636 W/ 250 OVERRIDE (IP)

## 2017-03-19 PROCEDURE — 700111 HCHG RX REV CODE 636 W/ 250 OVERRIDE (IP): Performed by: NURSE PRACTITIONER

## 2017-03-19 PROCEDURE — 160048 HCHG OR STATISTICAL LEVEL 1-5: Performed by: INTERNAL MEDICINE

## 2017-03-19 PROCEDURE — 85025 COMPLETE CBC W/AUTO DIFF WBC: CPT

## 2017-03-19 PROCEDURE — 700111 HCHG RX REV CODE 636 W/ 250 OVERRIDE (IP): Performed by: HOSPITALIST

## 2017-03-19 PROCEDURE — 700102 HCHG RX REV CODE 250 W/ 637 OVERRIDE(OP): Performed by: HOSPITALIST

## 2017-03-19 PROCEDURE — 160203 HCHG ENDO MINUTES - 1ST 30 MINS LEVEL 4: Performed by: INTERNAL MEDICINE

## 2017-03-19 PROCEDURE — A9270 NON-COVERED ITEM OR SERVICE: HCPCS | Performed by: INTERNAL MEDICINE

## 2017-03-19 PROCEDURE — 700105 HCHG RX REV CODE 258: Performed by: HOSPITALIST

## 2017-03-19 PROCEDURE — 700111 HCHG RX REV CODE 636 W/ 250 OVERRIDE (IP): Performed by: INTERNAL MEDICINE

## 2017-03-19 PROCEDURE — 85610 PROTHROMBIN TIME: CPT

## 2017-03-19 PROCEDURE — A9270 NON-COVERED ITEM OR SERVICE: HCPCS | Performed by: HOSPITALIST

## 2017-03-19 PROCEDURE — 500066 HCHG BITE BLOCK, ECT: Performed by: INTERNAL MEDICINE

## 2017-03-19 PROCEDURE — 160035 HCHG PACU - 1ST 60 MINS PHASE I: Performed by: INTERNAL MEDICINE

## 2017-03-19 PROCEDURE — 700105 HCHG RX REV CODE 258: Performed by: INTERNAL MEDICINE

## 2017-03-19 PROCEDURE — 99153 MOD SED SAME PHYS/QHP EA: CPT | Performed by: INTERNAL MEDICINE

## 2017-03-19 PROCEDURE — 99152 MOD SED SAME PHYS/QHP 5/>YRS: CPT | Performed by: INTERNAL MEDICINE

## 2017-03-19 PROCEDURE — 302136 NUTRITION PUMP: Performed by: INTERNAL MEDICINE

## 2017-03-19 PROCEDURE — 770020 HCHG ROOM/CARE - TELE (206)

## 2017-03-19 PROCEDURE — 36415 COLL VENOUS BLD VENIPUNCTURE: CPT

## 2017-03-19 PROCEDURE — 3E0G76Z INTRODUCTION OF NUTRITIONAL SUBSTANCE INTO UPPER GI, VIA NATURAL OR ARTIFICIAL OPENING: ICD-10-PCS | Performed by: INTERNAL MEDICINE

## 2017-03-19 PROCEDURE — 93005 ELECTROCARDIOGRAM TRACING: CPT | Performed by: INTERNAL MEDICINE

## 2017-03-19 PROCEDURE — 160002 HCHG RECOVERY MINUTES (STAT): Performed by: INTERNAL MEDICINE

## 2017-03-19 PROCEDURE — 0DH68UZ INSERTION OF FEEDING DEVICE INTO STOMACH, VIA NATURAL OR ARTIFICIAL OPENING ENDOSCOPIC: ICD-10-PCS | Performed by: INTERNAL MEDICINE

## 2017-03-19 PROCEDURE — 700102 HCHG RX REV CODE 250 W/ 637 OVERRIDE(OP): Performed by: INTERNAL MEDICINE

## 2017-03-19 PROCEDURE — 80053 COMPREHEN METABOLIC PANEL: CPT

## 2017-03-19 PROCEDURE — 93010 ELECTROCARDIOGRAM REPORT: CPT | Performed by: INTERNAL MEDICINE

## 2017-03-19 RX ORDER — MIDAZOLAM HYDROCHLORIDE 1 MG/ML
.5-2 INJECTION INTRAMUSCULAR; INTRAVENOUS PRN
Status: ACTIVE | OUTPATIENT
Start: 2017-03-19 | End: 2017-03-19

## 2017-03-19 RX ORDER — MORPHINE SULFATE 4 MG/ML
INJECTION, SOLUTION INTRAMUSCULAR; INTRAVENOUS
Status: COMPLETED
Start: 2017-03-19 | End: 2017-03-19

## 2017-03-19 RX ORDER — MIDAZOLAM HYDROCHLORIDE 1 MG/ML
INJECTION INTRAMUSCULAR; INTRAVENOUS
Status: DISCONTINUED | OUTPATIENT
Start: 2017-03-19 | End: 2017-03-19 | Stop reason: HOSPADM

## 2017-03-19 RX ORDER — SODIUM CHLORIDE 9 MG/ML
500 INJECTION, SOLUTION INTRAVENOUS PRN
Status: DISCONTINUED | OUTPATIENT
Start: 2017-03-19 | End: 2017-03-24

## 2017-03-19 RX ORDER — SODIUM CHLORIDE 9 MG/ML
INJECTION, SOLUTION INTRAVENOUS CONTINUOUS
Status: DISCONTINUED | OUTPATIENT
Start: 2017-03-19 | End: 2017-03-21

## 2017-03-19 RX ADMIN — LIDOCAINE 1 PATCH: 50 PATCH CUTANEOUS at 11:57

## 2017-03-19 RX ADMIN — METOPROLOL TARTRATE 25 MG: 25 TABLET, FILM COATED ORAL at 20:18

## 2017-03-19 RX ADMIN — MORPHINE SULFATE 2 MG: 4 INJECTION INTRAVENOUS at 16:38

## 2017-03-19 RX ADMIN — AMPICILLIN SODIUM AND SULBACTAM SODIUM 3 G: 2; 1 INJECTION, POWDER, FOR SOLUTION INTRAMUSCULAR; INTRAVENOUS at 11:55

## 2017-03-19 RX ADMIN — AMIODARONE HYDROCHLORIDE 400 MG: 200 TABLET ORAL at 18:45

## 2017-03-19 RX ADMIN — POTASSIUM CHLORIDE 40 MEQ: 1500 TABLET, EXTENDED RELEASE ORAL at 20:19

## 2017-03-19 RX ADMIN — MORPHINE SULFATE 2 MG: 4 INJECTION INTRAVENOUS at 23:43

## 2017-03-19 RX ADMIN — OXYCODONE HYDROCHLORIDE 10 MG: 10 TABLET ORAL at 20:22

## 2017-03-19 RX ADMIN — FUROSEMIDE 20 MG: 10 INJECTION, SOLUTION INTRAVENOUS at 16:15

## 2017-03-19 RX ADMIN — SODIUM CHLORIDE: 9 INJECTION, SOLUTION INTRAVENOUS at 16:16

## 2017-03-19 RX ADMIN — MORPHINE SULFATE 2 MG: 4 INJECTION INTRAVENOUS at 10:00

## 2017-03-19 RX ADMIN — NICOTINE 14 MG: 14 PATCH TRANSDERMAL at 05:33

## 2017-03-19 RX ADMIN — AMPICILLIN SODIUM AND SULBACTAM SODIUM 3 G: 2; 1 INJECTION, POWDER, FOR SOLUTION INTRAMUSCULAR; INTRAVENOUS at 16:15

## 2017-03-19 RX ADMIN — FUROSEMIDE 20 MG: 10 INJECTION, SOLUTION INTRAVENOUS at 05:32

## 2017-03-19 RX ADMIN — GABAPENTIN 300 MG: 300 CAPSULE ORAL at 20:19

## 2017-03-19 RX ADMIN — MORPHINE SULFATE 2 MG: 4 INJECTION INTRAVENOUS at 01:37

## 2017-03-19 RX ADMIN — AMPICILLIN SODIUM AND SULBACTAM SODIUM 3 G: 2; 1 INJECTION, POWDER, FOR SOLUTION INTRAMUSCULAR; INTRAVENOUS at 23:43

## 2017-03-19 RX ADMIN — AMPICILLIN SODIUM AND SULBACTAM SODIUM 3 G: 2; 1 INJECTION, POWDER, FOR SOLUTION INTRAMUSCULAR; INTRAVENOUS at 05:34

## 2017-03-19 RX ADMIN — MORPHINE SULFATE 2 MG: 4 INJECTION INTRAVENOUS at 05:33

## 2017-03-19 RX ADMIN — MIDAZOLAM HYDROCHLORIDE 1 MG: 1 INJECTION, SOLUTION INTRAMUSCULAR; INTRAVENOUS at 11:10

## 2017-03-19 RX ADMIN — SIMVASTATIN 20 MG: 20 TABLET, FILM COATED ORAL at 20:19

## 2017-03-19 RX ADMIN — VANCOMYCIN HYDROCHLORIDE 125 MG: 10 INJECTION, POWDER, LYOPHILIZED, FOR SOLUTION INTRAVENOUS at 23:43

## 2017-03-19 ASSESSMENT — ENCOUNTER SYMPTOMS
DEPRESSION: 0
PALPITATIONS: 0
DOUBLE VISION: 0
HEADACHES: 0
NAUSEA: 0
CHILLS: 0
BACK PAIN: 1
DIARRHEA: 0
SHORTNESS OF BREATH: 0
COUGH: 0
VOMITING: 0
FEVER: 0
ABDOMINAL PAIN: 0

## 2017-03-19 ASSESSMENT — PAIN SCALES - GENERAL
PAINLEVEL_OUTOF10: 9
PAINLEVEL_OUTOF10: 8
PAINLEVEL_OUTOF10: 7
PAINLEVEL_OUTOF10: 8

## 2017-03-19 NOTE — PROGRESS NOTES
Hospital Medicine Progress Note, Adult, Complex               Author: Gordo Jovel Date & Time created: 3/19/2017  12:16 PM     ID/CC: 73 y/o m who recently at [facility] and diagnosed with bacteremia, right great toe osteo and also A.fibb. Left AMA from there and now comes with back pain.     Interval History:  No overnight events, still complaining of back pain, afebrile. Going for EGD today  ID, Vascular surgery, ortho, GI and LPS following appreciate rec.     Review of Systems:  Review of Systems   Constitutional: Negative for fever and chills.   Eyes: Negative for double vision.   Respiratory: Negative for cough and shortness of breath.    Cardiovascular: Negative for chest pain and palpitations.   Gastrointestinal: Negative for nausea, vomiting, abdominal pain and diarrhea.   Genitourinary: Negative for dysuria and urgency.   Musculoskeletal: Positive for back pain.   Psychiatric/Behavioral: Negative for depression.       Physical Exam:  Physical Exam   Constitutional: He is oriented to person, place, and time. He appears well-developed and well-nourished.   HENT:   Head: Normocephalic and atraumatic.   Eyes: Conjunctivae are normal. No scleral icterus.   Neck: Neck supple. No JVD present.   Cardiovascular: Normal heart sounds.    No murmur heard.  Abdominal: Soft. He exhibits no distension. There is no tenderness.   Musculoskeletal: He exhibits edema (b/l LE).   R great toe with slight edema but no erythema or drainage. Left third toe with scabbed lesion and absent toenail.   Neurological: He is alert and oriented to person, place, and time.   Skin: Skin is warm and dry. No erythema.   Nursing note and vitals reviewed.      Labs:        Invalid input(s): [id number]      Recent Labs      03/17/17   0347  03/18/17   0356  03/19/17   0431   SODIUM  130*  131*  130*   POTASSIUM  3.4*  3.3*  3.2*   CHLORIDE  95*  94*  90*   CO2  25  27  25   BUN  14  10  15   CREATININE  0.77  0.84  0.80   CALCIUM  8.2*  8.3*   8.7     Recent Labs      17   0356  17   0431   ALTSGPT   --   27  27   ASTSGOT   --   37  37   ALKPHOSPHAT   --   64  79   TBILIRUBIN   --   0.7  0.7   GLUCOSE  139*  111*  104*     Recent Labs      17   0356  17   0431   RBC  3.37*  3.39*  3.81*   HEMOGLOBIN  11.4*  11.8*  12.7*   HEMATOCRIT  35.2*  34.2*  38.9*   PLATELETCT  206  225  244   PROTHROMBTM   --    --   18.9*   INR   --    --   1.53*     Recent Labs      17   0356  17   0431   WBC  12.9*  13.3*  13.4*   NEUTSPOLYS   --   81.10*  85.80*   LYMPHOCYTES   --   10.00*  6.50*   MONOCYTES   --   7.80  6.90   EOSINOPHILS   --   0.20  0.00   BASOPHILS   --   0.20  0.20   ASTSGOT   --   37  37   ALTSGPT   --   27  27   ALKPHOSPHAT   --   64  79   TBILIRUBIN   --   0.7  0.7           Hemodynamics:  Temp (24hrs), Av.2 °C (99 °F), Min:36.8 °C (98.3 °F), Max:37.7 °C (99.8 °F)  Temperature: 37 °C (98.6 °F)  Pulse  Av.2  Min: 66  Max: 114Heart Rate (Monitored): (!) 108  Blood Pressure : 133/62 mmHg, NIBP: 128/78 mmHg     Respiratory:    Respiration: 18, Pulse Oximetry: 95 %        RUL Breath Sounds: Clear, RML Breath Sounds: Clear, RLL Breath Sounds: Diminished, SOLEDAD Breath Sounds: Clear, LLL Breath Sounds: Diminished  Fluids:    Intake/Output Summary (Last 24 hours) at 17 1216  Last data filed at 17 0549   Gross per 24 hour   Intake      0 ml   Output   1300 ml   Net  -1300 ml     Weight: 79.4 kg (175 lb 0.7 oz)  GI/Nutrition:  Orders Placed This Encounter   Procedures   • DIET NPO     Standing Status: Standing      Number of Occurrences: 1      Standing Expiration Date:      Order Specific Question:  Restrict to:     Answer:  Strict [1]     Medical Decision Making, by Problem:  Active Hospital Problems    Diagnosis   • *Osteomyelitis of toe (CMS-HCC) [M86.9]  -diagnosed at [facility]  - Ortho Dr. Samayoa and also LPS following  appreciate rec  -xray  of foot shows possible osteomyelitis   -ID following appreciate rec.     • Bacteremia [R78.81]  -diagnosed at the outside facility, repeating Bcx here NGTD  - Bcx on outside facility grew strep   -ID following  appreciate rec.    • Back pain [M54.9]  -MRI lumbar spine today showed possible AAA endograft infection, CTA abdomen ordered  -Vascular surgery following appreciate rec.   -cont pain management.    • CHF (congestive heart failure) (CMS-HCC) [I50.9]  - cont diuresis  -monitor    • Hypokalemia [E87.6]  replete  -monitor   • Atrial fibrillation (CMS-HCC) [I48.91]  -cont amiodarone, metoprolol     infected AAA endograft  -Vascular surgery following , appreciate rec.  -ID following    Esophageal obstruction  -was recently diagnosed with a esophageal stricture and had a EGD done with dilation 6-7 days ago as per discharge summary from outside facility  -barium swallow here shows esophageal obstruction,   -GI  Following going for EGD today , appreciate rec.         Labs reviewed, Medications reviewed and Radiology images reviewed  Art catheter: No Art            Antibiotics: Treating active infection/contamination beyond 24 hours perioperative coverage

## 2017-03-19 NOTE — PROGRESS NOTES
Infectious Disease Progress Note    Author: Laurel Darby M.D. DOS & Time created: 3/19/2017  9:57 AM    Chief Complaint   Patient presents with   • Low Back Pain   FU for osteomyelitis, GAS bacteremia and CDI, possible endograft infection    Interval History:  3/17 AF, WBC 12.9, pt c/o lower back pain, asking for morphine, poor insight into illness, denies any diarrhea  3/18 AF, WBC 13.3, sleeping and not arousable to voice, cultures here negative to date  3/19 AF, WBC 13.4, frustrated about lower back pain not adequately controlled on current pain regimen, plan for EGD with dilatation today  Labs Reviewed, Medications Reviewed, Radiology Reviewed and Wound Reviewed.    Review of Systems:  Review of Systems   Constitutional: Negative for fever and chills.   Respiratory: Negative for cough and shortness of breath.    Gastrointestinal: Negative for nausea, vomiting, abdominal pain and diarrhea.   Genitourinary: Negative for dysuria.   Musculoskeletal: Positive for back pain.   Neurological: Negative for headaches.   All other systems reviewed and are negative.      Hemodynamics:  Temp (24hrs), Av.1 °C (98.8 °F), Min:36.6 °C (97.9 °F), Max:37.7 °C (99.8 °F)  Temperature: 37 °C (98.6 °F)  Pulse  Av.9  Min: 66  Max: 114  Blood Pressure : 133/62 mmHg       Physical Exam:  Physical Exam   Constitutional: He appears well-developed.   HENT:   Head: Normocephalic and atraumatic.   [place]   Eyes: EOM are normal. Pupils are equal, round, and reactive to light.   Neck: Neck supple.   Cardiovascular: Normal rate.    Murmur heard.  IRR   Pulmonary/Chest: Effort normal and breath sounds normal.   Abdominal: Soft. There is no tenderness.   Musculoskeletal: Normal range of motion. He exhibits edema.   R great toe with slight edema but no drainage or erythema    Left 3rd toe ulcer without drainage or erythema    Lumbar spine no TTP   Neurological: He is alert.       Labs:  Recent Labs      17   0348  17   0356   03/19/17   0431   WBC  12.9*  13.3*  13.4*   RBC  3.37*  3.39*  3.81*   HEMOGLOBIN  11.4*  11.8*  12.7*   HEMATOCRIT  35.2*  34.2*  38.9*   MCV  104.5*  100.9*  102.1*   MCH  33.8*  34.8*  33.3*   RDW  51.0*  47.9  48.3   PLATELETCT  206  225  244   MPV  9.0  9.1  9.4   NEUTSPOLYS   --   81.10*  85.80*   LYMPHOCYTES   --   10.00*  6.50*   MONOCYTES   --   7.80  6.90   EOSINOPHILS   --   0.20  0.00   BASOPHILS   --   0.20  0.20     Recent Labs      03/17/17 0347 03/18/17 0356 03/19/17 0431   SODIUM  130*  131*  130*   POTASSIUM  3.4*  3.3*  3.2*   CHLORIDE  95*  94*  90*   CO2  25  27  25   GLUCOSE  139*  111*  104*   BUN  14  10  15     Recent Labs      03/17/17 0347 03/18/17 0356 03/19/17   0431   ALBUMIN   --   2.5*  2.8*   TBILIRUBIN   --   0.7  0.7   ALKPHOSPHAT   --   64  79   TOTPROTEIN   --   8.8*  9.5*   ALTSGPT   --   27  27   ASTSGOT   --   37  37   CREATININE  0.77  0.84  0.80       BLOOD CULTURE   Date Value Ref Range Status   03/15/2017   Preliminary    No Growth    Note: Blood cultures are incubated for 5 days and  are monitored continuously.Positive blood cultures  are called to the RN and reported as soon as  they are identified.      ':       Imaging  3/17 MRI lumbar spine: There are changes secondary to the abdominal aortic aneurysm endograft. The aneurysm sac measures an approximately 7.6 x 6.7 cm in size. Abnormal contrast enhancement is noted within the wall of the aneurysmal sac. There is also abnormal soft tissue   contrast enhancement in the surrounding soft tissue. These findings are highly suspicious for infected thrombosed aneurysm sac. The possibility of endograft infection is more likely. There is irregular outpouching of the aneurysmal sac along the   posterior portion indenting the left psoas muscle.  2.  Free fluid in the pelvis  3.  There is mild-to-moderate left hydronephrosis likely representing pelviureteric junction obstruction.    3/16 R foot xray - +OM  3/16 L  foot xray +OM    Assessment:  Active Hospital Problems    Diagnosis   • *Osteomyelitis of toe (CMS-HCC) [M86.9]   • Bacteremia [R78.81]   • Back pain [M54.9]   • CHF (congestive heart failure) (CMS-HCC) [I50.9]   • Hypokalemia [E87.6]   • Tobacco dependence [F17.200]   • Atrial fibrillation (CMS-HCC) [I48.91]    Endovascular infection       Plan:  Right great toe and left 3rd osteomyelitis  +OM on radiographs  Continue unasyn  3/9 OSH wound cx - GAS, MSSA (I confirmed with [facility] microlab)  LPS following  If no surgical intervention, anticipate 6 week course of IV abx  Arterial studies - neg    Group A streptococcus bacteremia  3/9 OSH Bcx - GAS  3/14 OSH Bcx - NGTD  3/15 Bcx - NGTD  Abx per above    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 Will hold PICC placement for now    Prognosis guarded    DW IM

## 2017-03-19 NOTE — PROGRESS NOTES
Received report at 1900 at bedside. Pt A&O x 4 C/O 8/10 back pain, medicated per mar. Completed assessment. POC discussed with patient. Bed alarm in use, belongings and call light within reach.

## 2017-03-19 NOTE — OR SURGEON
Immediate Post-Operative Note      PreOp Diagnosis: Dysphagia    PostOp Diagnosis: 1. Esophageal dysmotility 2. Hiatus hernia    Procedure(s):  GASTROSCOPY w/ tube placement    Surgeon(s):  Tod Adrian D.O.    Anesthesiologist/Type of Anesthesia:  No anesthesia staff entered./* No anesthesia type entered *    Surgical Staff:  Endoscopy Technician: Kyleigh Anderson  Sedation/Monitoring Nurse: Therese Blank R.N.    Specimen: none    Estimated Blood Loss: none    Findings: above    Complications: none    Rec: Tube feeds           Esophageal manometry        3/19/2017 11:20 AM Tod Adrian

## 2017-03-19 NOTE — PROGRESS NOTES
Patient back from endo with transport. Monitor room called to update. VSS. Patient is sleeping but can be awakened.

## 2017-03-19 NOTE — PROGRESS NOTES
Vascular    VSS afeb.    Patient out of room for EGD.  Notes suggest continued back pain.  Will discuss case with Dr. Dinero.

## 2017-03-19 NOTE — CARE PLAN
Problem: Safety  Goal: Will remain free from injury  Outcome: PROGRESSING AS EXPECTED  Pt is A&Ox4. Pt calls appropriately. Treaded socks are on.         Problem: Knowledge Deficit  Goal: Knowledge of disease process/condition, treatment plan, diagnostic tests, and medications will improve  Outcome: PROGRESSING AS EXPECTED  Plan of care discussed with patient at bedside. Pt verbalizes understanding.

## 2017-03-19 NOTE — PROGRESS NOTES
Received report from night RN millie. Resumed care. Patient is A+Ox4. Bed alarm is in use. Patient is educated on the use of the call light and calls appropritaly. Bed is in the lowest position. Tele monitor is on the patient and heart rhythm  was confirmed by assessment of monitor reading. Patient is sleeping at this time and in no distress. Strict NPO at this time.

## 2017-03-19 NOTE — OP REPORT
DATE OF SERVICE:  03/19/2017    PREOPERATIVE DIAGNOSES:  1.  Dysphagia.  2.  Abnormal barium esophagram.    POSTOPERATIVE DIAGNOSES:  1.  Esophageal dysmotility.  2.  Hiatus hernia.    PROCEDURE:  Esophagogastroduodenoscopy.    SUB-PROCEDURE:  Placement of Cortrak feeding tube.    Informed consent was obtained after explanation of risks, benefits, and   alternatives.    MEDICATIONS:  See nursing notes.    HISTORY:  This 72-year-old male with chronic dysphagia.  Recent endoscopy with   dilation showed no improvement.  Esophagram shows distal esophageal   obstruction.  Informed consent was obtained after explanation of risks,   benefits, and alternatives.    DESCRIPTION OF PROCEDURE:  The procedure was performed in the main endoscopy   unit at [facility].  Patient was monitored throughout the   procedure.  After adequate sedation was achieved, the Olympus    endoscope was passed via the oropharynx into the esophagus without difficulty.    It was then advanced while visualizing the lumen.  Esophagus showed mild   tortuosity and asynchronous contractions consistent with dysmotility.  There   was resistance to passage of the endoscope at the GE junction.  No strictures,   masses nor esophagitis were identified.  The endoscope was then passed   through the stomach and pylorus into the duodenum.  Second portion of duodenum   was reached and was normal.  The endoscope was then withdrawn revealing   normal duodenal bulb and pylorus.  Gastric antrum and body were normal.    Retroflexion of the endoscope revealed a normal gastric angularis and fundus.    A small hiatal hernia was present.  A Cortrak feeding tube was then advanced   via the nares into the esophagus and advanced under endoscopic visualization.    The endoscope was then withdrawn leaving the tip of the tube in the gastric   antrum.  The tube was secured in place to the nose.  The patient tolerated   procedure well.  There were no apparent  complications.    IMPRESSION:  A 72-year-old male with chronic dysphagia, abnormal esophagram   showing distal esophageal obstruction.  Endoscopy shows esophageal dysmotility   and raises concern for the possibility of achalasia.  A hiatal hernia was   also noted.  A Cortrak feeding tube was placed.  I would recommend further   evaluation with esophageal manometry.       ____________________________________     MORENITA TRUJILLO DO    PIS / NTS    DD:  03/19/2017 11:26:20  DT:  03/19/2017 13:51:00    D#:  [id number]  Job#:  [id number]

## 2017-03-20 LAB
ALBUMIN SERPL BCP-MCNC: 2.5 G/DL (ref 3.2–4.9)
ALBUMIN/GLOB SERPL: 0.4 G/DL
ALP SERPL-CCNC: 86 U/L (ref 30–99)
ALT SERPL-CCNC: 45 U/L (ref 2–50)
ANION GAP SERPL CALC-SCNC: 9 MMOL/L (ref 0–11.9)
AST SERPL-CCNC: 66 U/L (ref 12–45)
BACTERIA BLD CULT: NORMAL
BACTERIA BLD CULT: NORMAL
BASOPHILS # BLD AUTO: 0.1 % (ref 0–1.8)
BASOPHILS # BLD: 0.01 K/UL (ref 0–0.12)
BILIRUB SERPL-MCNC: 0.6 MG/DL (ref 0.1–1.5)
BUN SERPL-MCNC: 18 MG/DL (ref 8–22)
CALCIUM SERPL-MCNC: 8.5 MG/DL (ref 8.5–10.5)
CHLORIDE SERPL-SCNC: 95 MMOL/L (ref 96–112)
CO2 SERPL-SCNC: 27 MMOL/L (ref 20–33)
CREAT SERPL-MCNC: 0.88 MG/DL (ref 0.5–1.4)
CRP SERPL HS-MCNC: 16.02 MG/DL (ref 0–0.75)
EKG IMPRESSION: NORMAL
EOSINOPHIL # BLD AUTO: 0.03 K/UL (ref 0–0.51)
EOSINOPHIL NFR BLD: 0.3 % (ref 0–6.9)
ERYTHROCYTE [DISTWIDTH] IN BLOOD BY AUTOMATED COUNT: 47.4 FL (ref 35.9–50)
GFR SERPL CREATININE-BSD FRML MDRD: >60 ML/MIN/1.73 M 2
GLOBULIN SER CALC-MCNC: 5.6 G/DL (ref 1.9–3.5)
GLUCOSE SERPL-MCNC: 147 MG/DL (ref 65–99)
HCT VFR BLD AUTO: 35.1 % (ref 42–52)
HGB BLD-MCNC: 11.7 G/DL (ref 14–18)
IMM GRANULOCYTES # BLD AUTO: 0.04 K/UL (ref 0–0.11)
IMM GRANULOCYTES NFR BLD AUTO: 0.4 % (ref 0–0.9)
LYMPHOCYTES # BLD AUTO: 1.23 K/UL (ref 1–4.8)
LYMPHOCYTES NFR BLD: 12.8 % (ref 22–41)
MAGNESIUM SERPL-MCNC: 1.9 MG/DL (ref 1.5–2.5)
MCH RBC QN AUTO: 33.5 PG (ref 27–33)
MCHC RBC AUTO-ENTMCNC: 33.3 G/DL (ref 33.7–35.3)
MCV RBC AUTO: 100.6 FL (ref 81.4–97.8)
MONOCYTES # BLD AUTO: 0.87 K/UL (ref 0–0.85)
MONOCYTES NFR BLD AUTO: 9.1 % (ref 0–13.4)
NEUTROPHILS # BLD AUTO: 7.41 K/UL (ref 1.82–7.42)
NEUTROPHILS NFR BLD: 77.3 % (ref 44–72)
NRBC # BLD AUTO: 0 K/UL
NRBC BLD AUTO-RTO: 0 /100 WBC
PLATELET # BLD AUTO: 230 K/UL (ref 164–446)
PMV BLD AUTO: 9.2 FL (ref 9–12.9)
POTASSIUM SERPL-SCNC: 2.8 MMOL/L (ref 3.6–5.5)
POTASSIUM SERPL-SCNC: 3.9 MMOL/L (ref 3.6–5.5)
PREALB SERPL-MCNC: 6 MG/DL (ref 18–38)
PROT SERPL-MCNC: 8.1 G/DL (ref 6–8.2)
RBC # BLD AUTO: 3.49 M/UL (ref 4.7–6.1)
SIGNIFICANT IND 70042: NORMAL
SIGNIFICANT IND 70042: NORMAL
SITE SITE: NORMAL
SITE SITE: NORMAL
SODIUM SERPL-SCNC: 131 MMOL/L (ref 135–145)
SOURCE SOURCE: NORMAL
SOURCE SOURCE: NORMAL
WBC # BLD AUTO: 9.6 K/UL (ref 4.8–10.8)

## 2017-03-20 PROCEDURE — 36415 COLL VENOUS BLD VENIPUNCTURE: CPT

## 2017-03-20 PROCEDURE — 700111 HCHG RX REV CODE 636 W/ 250 OVERRIDE (IP): Performed by: NURSE PRACTITIONER

## 2017-03-20 PROCEDURE — 700102 HCHG RX REV CODE 250 W/ 637 OVERRIDE(OP): Performed by: INTERNAL MEDICINE

## 2017-03-20 PROCEDURE — 84134 ASSAY OF PREALBUMIN: CPT

## 2017-03-20 PROCEDURE — 80053 COMPREHEN METABOLIC PANEL: CPT

## 2017-03-20 PROCEDURE — 99232 SBSQ HOSP IP/OBS MODERATE 35: CPT | Performed by: INTERNAL MEDICINE

## 2017-03-20 PROCEDURE — 770020 HCHG ROOM/CARE - TELE (206)

## 2017-03-20 PROCEDURE — A9270 NON-COVERED ITEM OR SERVICE: HCPCS | Performed by: INTERNAL MEDICINE

## 2017-03-20 PROCEDURE — A9270 NON-COVERED ITEM OR SERVICE: HCPCS | Performed by: HOSPITALIST

## 2017-03-20 PROCEDURE — 83735 ASSAY OF MAGNESIUM: CPT

## 2017-03-20 PROCEDURE — 700105 HCHG RX REV CODE 258: Performed by: INTERNAL MEDICINE

## 2017-03-20 PROCEDURE — 84132 ASSAY OF SERUM POTASSIUM: CPT

## 2017-03-20 PROCEDURE — 86140 C-REACTIVE PROTEIN: CPT

## 2017-03-20 PROCEDURE — 700101 HCHG RX REV CODE 250: Performed by: INTERNAL MEDICINE

## 2017-03-20 PROCEDURE — 85025 COMPLETE CBC W/AUTO DIFF WBC: CPT

## 2017-03-20 PROCEDURE — 700105 HCHG RX REV CODE 258: Performed by: HOSPITALIST

## 2017-03-20 PROCEDURE — 700111 HCHG RX REV CODE 636 W/ 250 OVERRIDE (IP): Performed by: HOSPITALIST

## 2017-03-20 PROCEDURE — 700102 HCHG RX REV CODE 250 W/ 637 OVERRIDE(OP): Performed by: HOSPITALIST

## 2017-03-20 PROCEDURE — 302136 NUTRITION PUMP: Performed by: INTERNAL MEDICINE

## 2017-03-20 RX ORDER — POTASSIUM CHLORIDE 7.45 MG/ML
10 INJECTION INTRAVENOUS
Status: COMPLETED | OUTPATIENT
Start: 2017-03-20 | End: 2017-03-20

## 2017-03-20 RX ORDER — OXYCODONE HYDROCHLORIDE 10 MG/1
10 TABLET ORAL EVERY 4 HOURS PRN
Status: DISCONTINUED | OUTPATIENT
Start: 2017-03-20 | End: 2017-03-24

## 2017-03-20 RX ORDER — OXYCODONE HYDROCHLORIDE 5 MG/1
5 TABLET ORAL EVERY 6 HOURS PRN
Status: DISCONTINUED | OUTPATIENT
Start: 2017-03-20 | End: 2017-03-24

## 2017-03-20 RX ORDER — POTASSIUM CHLORIDE 1.5 G/1.58G
40 POWDER, FOR SOLUTION ORAL 2 TIMES DAILY
Status: DISCONTINUED | OUTPATIENT
Start: 2017-03-20 | End: 2017-03-21

## 2017-03-20 RX ADMIN — AMPICILLIN SODIUM AND SULBACTAM SODIUM 3 G: 2; 1 INJECTION, POWDER, FOR SOLUTION INTRAMUSCULAR; INTRAVENOUS at 18:16

## 2017-03-20 RX ADMIN — POTASSIUM CHLORIDE 10 MEQ: 7.46 INJECTION, SOLUTION INTRAVENOUS at 08:00

## 2017-03-20 RX ADMIN — POTASSIUM CHLORIDE 40 MEQ: 1.5 POWDER, FOR SOLUTION ORAL at 22:50

## 2017-03-20 RX ADMIN — GABAPENTIN 300 MG: 300 CAPSULE ORAL at 16:20

## 2017-03-20 RX ADMIN — AMPICILLIN SODIUM AND SULBACTAM SODIUM 3 G: 2; 1 INJECTION, POWDER, FOR SOLUTION INTRAMUSCULAR; INTRAVENOUS at 11:00

## 2017-03-20 RX ADMIN — FUROSEMIDE 20 MG: 10 INJECTION, SOLUTION INTRAVENOUS at 16:19

## 2017-03-20 RX ADMIN — GABAPENTIN 300 MG: 300 CAPSULE ORAL at 08:16

## 2017-03-20 RX ADMIN — VANCOMYCIN HYDROCHLORIDE 125 MG: 10 INJECTION, POWDER, LYOPHILIZED, FOR SOLUTION INTRAVENOUS at 13:36

## 2017-03-20 RX ADMIN — GABAPENTIN 300 MG: 300 CAPSULE ORAL at 22:50

## 2017-03-20 RX ADMIN — POTASSIUM CHLORIDE 10 MEQ: 7.46 INJECTION, SOLUTION INTRAVENOUS at 06:46

## 2017-03-20 RX ADMIN — METOPROLOL TARTRATE 25 MG: 25 TABLET, FILM COATED ORAL at 22:50

## 2017-03-20 RX ADMIN — OXYCODONE HYDROCHLORIDE 10 MG: 10 TABLET ORAL at 05:56

## 2017-03-20 RX ADMIN — AMIODARONE HYDROCHLORIDE 400 MG: 200 TABLET ORAL at 22:50

## 2017-03-20 RX ADMIN — VANCOMYCIN HYDROCHLORIDE 125 MG: 10 INJECTION, POWDER, LYOPHILIZED, FOR SOLUTION INTRAVENOUS at 18:16

## 2017-03-20 RX ADMIN — OXYCODONE HYDROCHLORIDE 10 MG: 10 TABLET ORAL at 09:30

## 2017-03-20 RX ADMIN — OXYCODONE HYDROCHLORIDE 10 MG: 10 TABLET ORAL at 16:20

## 2017-03-20 RX ADMIN — FUROSEMIDE 20 MG: 10 INJECTION, SOLUTION INTRAVENOUS at 05:52

## 2017-03-20 RX ADMIN — POTASSIUM CHLORIDE 10 MEQ: 7.46 INJECTION, SOLUTION INTRAVENOUS at 07:00

## 2017-03-20 RX ADMIN — SIMVASTATIN 20 MG: 20 TABLET, FILM COATED ORAL at 22:50

## 2017-03-20 RX ADMIN — POTASSIUM CHLORIDE 10 MEQ: 7.46 INJECTION, SOLUTION INTRAVENOUS at 10:16

## 2017-03-20 RX ADMIN — AMPICILLIN SODIUM AND SULBACTAM SODIUM 3 G: 2; 1 INJECTION, POWDER, FOR SOLUTION INTRAMUSCULAR; INTRAVENOUS at 05:52

## 2017-03-20 RX ADMIN — AMPICILLIN SODIUM AND SULBACTAM SODIUM 3 G: 2; 1 INJECTION, POWDER, FOR SOLUTION INTRAMUSCULAR; INTRAVENOUS at 22:59

## 2017-03-20 RX ADMIN — LIDOCAINE 1 PATCH: 50 PATCH CUTANEOUS at 13:36

## 2017-03-20 RX ADMIN — NICOTINE 14 MG: 14 PATCH TRANSDERMAL at 05:52

## 2017-03-20 RX ADMIN — METOPROLOL TARTRATE 25 MG: 25 TABLET, FILM COATED ORAL at 08:16

## 2017-03-20 RX ADMIN — SODIUM CHLORIDE: 9 INJECTION, SOLUTION INTRAVENOUS at 05:57

## 2017-03-20 RX ADMIN — POTASSIUM CHLORIDE 40 MEQ: 1.5 POWDER, FOR SOLUTION ORAL at 10:17

## 2017-03-20 RX ADMIN — AMIODARONE HYDROCHLORIDE 400 MG: 200 TABLET ORAL at 08:16

## 2017-03-20 RX ADMIN — VANCOMYCIN HYDROCHLORIDE 125 MG: 10 INJECTION, POWDER, LYOPHILIZED, FOR SOLUTION INTRAVENOUS at 05:53

## 2017-03-20 ASSESSMENT — ENCOUNTER SYMPTOMS
VOMITING: 0
CHILLS: 0
FEVER: 0
GASTROINTESTINAL NEGATIVE: 1
HEADACHES: 0
NAUSEA: 0
DIARRHEA: 0
DOUBLE VISION: 0
COUGH: 0
BACK PAIN: 1
PALPITATIONS: 0
ABDOMINAL PAIN: 0
DEPRESSION: 0
SHORTNESS OF BREATH: 0

## 2017-03-20 ASSESSMENT — PAIN SCALES - GENERAL
PAINLEVEL_OUTOF10: 8
PAINLEVEL_OUTOF10: 2
PAINLEVEL_OUTOF10: 8
PAINLEVEL_OUTOF10: 8

## 2017-03-20 NOTE — PROGRESS NOTES
Report received. Pt care assumed. Assessment performed. Pt AOx4. Pt laying supine in bed. HOB at 30* educated pt on purpose. Pt c/o 9/10 lower back pain and no signs of distress. Medicating per MAR. Bed in low, locked position. Bed alarm on. Call light within reach. Treaded socks on pt.  Hourly rounding in place.

## 2017-03-20 NOTE — PROGRESS NOTES
Explained to pt he needed to get back to bed from EOB since he is receiving a lot of narcotics and appeared fatigued at EOB. Pt refusing to get back to bed. Pt refusing treaded socks, educated, and still refusing.

## 2017-03-20 NOTE — CARE PLAN
Problem: Safety  Goal: Will remain free from injury  Outcome: PROGRESSING AS EXPECTED  Patient's bed is in low position, upper bed rails are up, and call light is within reach    Problem: Respiratory:  Goal: Respiratory status will improve  Outcome: PROGRESSING AS EXPECTED  Oxygen therapy is in use, HOB is at or greater than 30 deg., and patient has demonstrated the ability to cough and take deep breaths appropriately.

## 2017-03-20 NOTE — PROGRESS NOTES
Hospital Medicine Progress Note, Adult, Complex               Author: Gordo Jovel Date & Time created: 3/20/2017  12:41 PM     ID/CC: 71 y/o m who recently at [facility] and diagnosed with bacteremia, right great toe osteo and also A.fibb. Left AMA from there and now comes with back pain.     Interval History:  Pt seen and examined, afebrile, had EGD done yesterday showing dysmotility of the esophagus possible achalasia, GI following planing of doing esophageal manometry study tomorrow appreciate rec.    Osteomyelitis, Ortho and LPS following   Infected AAA endograft, vascular surgery following   Bacteremia ID following.     Review of Systems:  Review of Systems   Constitutional: Negative for fever and chills.   Eyes: Negative for double vision.   Respiratory: Negative for cough and shortness of breath.    Cardiovascular: Negative for chest pain and palpitations.   Gastrointestinal: Negative for nausea, vomiting, abdominal pain and diarrhea.   Genitourinary: Negative for dysuria and urgency.   Musculoskeletal: Positive for back pain.   Psychiatric/Behavioral: Negative for depression.       Physical Exam:  Physical Exam   Constitutional: He is oriented to person, place, and time. He appears well-developed and well-nourished.   HENT:   Head: Normocephalic and atraumatic.   Eyes: Conjunctivae are normal. No scleral icterus.   Neck: Neck supple. No JVD present.   Cardiovascular: Normal heart sounds.    No murmur heard.  Abdominal: Soft. He exhibits no distension. There is no tenderness.   Musculoskeletal: He exhibits edema (b/l LE).   R great toe with slight edema but no erythema or drainage. Left third toe with scabbed lesion and absent toenail.   Neurological: He is alert and oriented to person, place, and time.   Skin: Skin is warm and dry. No erythema.   Nursing note and vitals reviewed.      Labs:        Invalid input(s): [id number]      Recent Labs      03/18/17   0356  03/19/17   0431  03/20/17   0229   17   1153   SODIUM  131*  130*  131*   --    POTASSIUM  3.3*  3.2*  2.8*  3.9   CHLORIDE  94*  90*  95*   --    CO2     --    BUN  10  15  18   --    CREATININE  0.84  0.80  0.88   --    MAGNESIUM   --    --    --   1.9   CALCIUM  8.3*  8.7  8.5   --      Recent Labs      17   ALTSGPT    45   ASTSGOT  37  37  66*   ALKPHOSPHAT  64  79  86   TBILIRUBIN  0.7  0.7  0.6   PREALBUMIN   --    --   6.0*   GLUCOSE  111*  104*  147*     Recent Labs      17   RBC  3.39*  3.81*  3.49*   HEMOGLOBIN  11.8*  12.7*  11.7*   HEMATOCRIT  34.2*  38.9*  35.1*   PLATELETCT  225  244  230   PROTHROMBTM   --   18.9*   --    INR   --   1.53*   --      Recent Labs      17   WBC  13.3*  13.4*  9.6   --    NEUTSPOLYS  81.10*  85.80*  77.30*   --    LYMPHOCYTES  10.00*  6.50*  12.80*   --    MONOCYTES  7.80  6.90  9.10   --    EOSINOPHILS  0.20  0.00  0.30   --    BASOPHILS  0.20  0.20  0.10   --    ASTSGOT  37  37   --   66*   ALTSGPT  27  27   --   45   ALKPHOSPHAT  64  79   --   86   TBILIRUBIN  0.7  0.7   --   0.6           Hemodynamics:  Temp (24hrs), Av.7 °C (98.1 °F), Min:36.4 °C (97.6 °F), Max:37 °C (98.6 °F)  Temperature: 37 °C (98.6 °F)  Pulse  Av.9  Min: 66  Max: 114  Blood Pressure : 114/69 mmHg     Respiratory:    Respiration: 19, Pulse Oximetry: 91 %        RUL Breath Sounds: Clear, RML Breath Sounds: Clear, RLL Breath Sounds: Diminished, SOLEDAD Breath Sounds: Clear, LLL Breath Sounds: Diminished  Fluids:    Intake/Output Summary (Last 24 hours) at 17 1241  Last data filed at 17   Gross per 24 hour   Intake      0 ml   Output    225 ml   Net   -225 ml     Weight: 74.4 kg (164 lb 0.4 oz)  GI/Nutrition:  Orders Placed This Encounter   Procedures   • DIET NPO     Standing Status: Standing      Number of Occurrences: 1      Standing  Expiration Date:      Order Specific Question:  Restrict to:     Answer:  Strict [1]     Medical Decision Making, by Problem:  Active Hospital Problems    Diagnosis   • *Osteomyelitis of toe (CMS-HCC) [M86.9]  -diagnosed at [facility]  - Ortho Dr. Samayoa and also LPS following  appreciate rec  -xray of foot shows possible osteomyelitis   -ID following appreciate rec.     • Bacteremia [R78.81]  -diagnosed at the outside facility, repeating Bcx here NGTD  - Bcx on outside facility grew strep   -ID following  appreciate rec.    • Back pain [M54.9]  -MRI lumbar spine today showed possible AAA endograft infection, CTA abdomen ordered  -Vascular surgery following appreciate rec.   -cont pain management.    • CHF (congestive heart failure) (CMS-HCC) [I50.9]  - cont diuresis  -monitor    • Hypokalemia [E87.6]  replete  -monitor   • Atrial fibrillation (CMS-HCC) [I48.91]  -cont amiodarone, metoprolol     infected AAA endograft  -Vascular surgery following , appreciate rec.  -ID following    Esophageal obstruction  -was recently diagnosed with a esophageal stricture and had a EGD done with dilation 6-7 days ago as per discharge summary from outside facility  -barium swallow here shows esophageal obstruction,   -GI  Following had EGD done yesterday showing dysmotility of the esophagus possible achalasia, GI planing for esophageal manometry study tomorrow appreciate rec.         Labs reviewed, Medications reviewed and Radiology images reviewed  Art catheter: No Art            Antibiotics: Treating active infection/contamination beyond 24 hours perioperative coverage

## 2017-03-20 NOTE — PROGRESS NOTES
Assumed care at 0700, bedside report received from NOC shift RN. Patient is AAOX4 with mod complaint of pain med per MAR. Initial assessment completed, orders reviewed, call light within reach, bed alarm in use, and hourly rounding in place. POC addressed with patient no additional questions at this time. K riders running.

## 2017-03-20 NOTE — DIETARY
"Nutrition Support Assessment TF - Male    Nicola Jimenez is a 72 y.o. male with admitting DX of Back pain, Osteomyelitis of toe (CMS-HCC), CHF (congestive heart failure) (CMS-HCC), Hypokalemia, Tobacco dependence, Hard of hearing    Pertinent History:   1.  New onset atrial fibrillation.  2.  Elevated BNP.  3.  Recent diagnosis of C. difficile colitis.  4.  Recent diagnosis of right big toe osteomyelitis.  5.  Dysphagia, status post esophageal dilation.  6.  History of hard of hearing.  7.  History of coronary artery disease with 2 stents    Allergies:  Review of patient's allergies indicates no known allergies.    Height: 177.8 cm (5' 10\") (per chart)  Weight: 74.4 kg (164 lb 0.4 oz)  Weight to Use in Calculations: 74.4 kg (164 lb 0.4 oz)  Ideal Body Weight: 75.4 kg (166 lb 3.6 oz)  Body mass index is 23.53 kg/(m^2).     Pertinent Labs: Na: 131 (L), Glucose: 147 (H), Albumin: 2.5 (L), Pre-Albumin: 6 (L), CRP: 16.02 (H)  Last BM: 17  Pertinent Medications: Lasix, Klor-Con  Pertinent Fluids: NS Infusion at 75 ml/hr  Surgery / Procedures: Esophagogastroduodenoscopy ( 3/19), placement of Cortrak feeding tube  Skin: per chart a couple of wounds are noted- no wound team eval     Estimated Needs: MSJ x 1.2 = 1780 kcals/day   Total Calories / day: 1750 - 1900  (Calories / k.5 - 25.5)  Total Grams Protein / day: 90 - 104 (Grams Protein / k.2 - 1.4)  Total Fluids ml / day: 1860 ml/day   ( 25 ml/kg)         Assessment / Evaluation: Day 5 of admit, Cortrak has been placed by GI and MD orders for tube feeds to start.     Plan / Recommendation: Start tube feed of Diabetisource AC at 25 ml/hr and advance per protocol to goal rate of 65 ml/hr this will provide: 1872 kcals/day, 94 gm of protein per day and 1279 ml of free water per day. Fluids per MD  RD monitoring         "

## 2017-03-20 NOTE — PROGRESS NOTES
Gastroenterology Progress Note     Author: Wm Cottrell   Date & Time Created: 3/20/2017 10:35 AM    Interval History:  No new issues  Remains on tube feeds via cortrak    Review of Systems:  Review of Systems   Gastrointestinal: Negative.        Physical Exam:  Physical Exam   Cardiovascular: Normal rate.    Pulmonary/Chest: Effort normal.   Abdominal: Soft. Bowel sounds are normal. He exhibits no distension.       Labs:        Invalid input(s): [id number]      Recent Labs      17   SODIUM  131*  130*  131*   POTASSIUM  3.3*  3.2*  2.8*   CHLORIDE  94*  90*  95*   CO2  27  25  27   BUN  10  15  18   CREATININE  0.84  0.80  0.88   CALCIUM  8.3*  8.7  8.5     Recent Labs      17   ALTSGPT  27  27  45   ASTSGOT  37  37  66*   ALKPHOSPHAT  64  79  86   TBILIRUBIN  0.7  0.7  0.6   PREALBUMIN   --    --   6.0*   GLUCOSE  111*  104*  147*     Recent Labs      17   RBC  3.39*  3.81*  3.49*   HEMOGLOBIN  11.8*  12.7*  11.7*   HEMATOCRIT  34.2*  38.9*  35.1*   PLATELETCT  225  244  230   PROTHROMBTM   --   18.9*   --    INR   --   1.53*   --      Recent Labs      17   WBC  13.3*  13.4*  9.6   --    NEUTSPOLYS  81.10*  85.80*  77.30*   --    LYMPHOCYTES  10.00*  6.50*  12.80*   --    MONOCYTES  7.80  6.90  9.10   --    EOSINOPHILS  0.20  0.00  0.30   --    BASOPHILS  0.20  0.20  0.10   --    ASTSGOT  37  37   --   66*   ALTSGPT  27  27   --   45   ALKPHOSPHAT  64  79   --   86   TBILIRUBIN  0.7  0.7   --   0.6     Hemodynamics:  Temp (24hrs), Av.7 °C (98 °F), Min:36.4 °C (97.6 °F), Max:36.9 °C (98.4 °F)  Temperature: 36.7 °C (98 °F)  Pulse  Av.1  Min: 66  Max: 114Heart Rate (Monitored): (!) 108  Blood Pressure : 112/73 mmHg, NIBP: 128/78 mmHg     Respiratory:    Respiration: 20, Pulse Oximetry: 91 %         RUL Breath Sounds: Clear, RML Breath Sounds: Clear, RLL Breath Sounds: Diminished, SOLEDAD Breath Sounds: Clear, LLL Breath Sounds: Diminished  Fluids:    Intake/Output Summary (Last 24 hours) at 03/20/17 1035  Last data filed at 03/19/17 2000   Gross per 24 hour   Intake      0 ml   Output    225 ml   Net   -225 ml     Weight: 74.4 kg (164 lb 0.4 oz)  GI/Nutrition:  Orders Placed This Encounter   Procedures   • DIET NPO     Standing Status: Standing      Number of Occurrences: 1      Standing Expiration Date:      Order Specific Question:  Restrict to:     Answer:  Strict [1]     Medical Decision Making, by Problem:  Active Hospital Problems    Diagnosis   • *Osteomyelitis of toe (CMS-HCC) [M86.9]   • Bacteremia [R78.81]   • Back pain [M54.9]   • CHF (congestive heart failure) (CMS-HCC) [I50.9]   • Hypokalemia [E87.6]   • Tobacco dependence [F17.200]   • Atrial fibrillation (CMS-HCC) [I48.91]       Plan:  -he needs manometry to confirm achalasia  -if achalasia is confirmed then options are surgery, dilation or botox  -will need to pull cortrak at time of the procedure  -keep npo for procedure tomorrow     Core Measures

## 2017-03-20 NOTE — PROGRESS NOTES
Cortrak Placement    Tube Team verified patient name and medical record number prior to tube placement.  Cortrak tube (43 inches, 10 [place]) placed at 61 cm in right nare.  Per Cortrak picture, tube appears to be in the stomach.  Nursing Instructions: Awaiting KUB to confirm placement before use for medications or feeding. Once placement confirmed, flush tube with 30 ml of water, and then remove and save stylet, in patient medication drawer.

## 2017-03-20 NOTE — PROGRESS NOTES
Pt refusing to allow me to remove lidocaine patch. Patient becoming aggressive. Called Ric in pharmacy. Ok to leave patch on pt.

## 2017-03-20 NOTE — PROGRESS NOTES
Infectious Disease Progress Note    Author: Yary Lujan M.D. DOS & Time created: 3/20/2017  3:10 PM    Chief Complaint   Patient presents with   • Low Back Pain   FU for osteomyelitis, GAS bacteremia and CDI, possible endograft infection    Interval History:  3/17 AF, WBC 12.9, pt c/o lower back pain, asking for morphine, poor insight into illness, denies any diarrhea  3/18 AF, WBC 13.3, sleeping and not arousable to voice, cultures here negative to date  3/19 AF, WBC 13.4, frustrated about lower back pain not adequately controlled on current pain regimen, plan for EGD with dilatation today  3/20 AF WBC 9.6 +pain-denies SE abx  Labs Reviewed, Medications Reviewed, Radiology Reviewed and Wound Reviewed.    Review of Systems:  Review of Systems   Constitutional: Negative for fever and chills.   Respiratory: Negative for cough and shortness of breath.    Gastrointestinal: Negative for nausea, vomiting, abdominal pain and diarrhea.   Genitourinary: Negative for dysuria.   Musculoskeletal: Positive for back pain.   Neurological: Negative for headaches.   All other systems reviewed and are negative.      Hemodynamics:  Temp (24hrs), Av.7 °C (98.1 °F), Min:36.4 °C (97.6 °F), Max:37 °C (98.6 °F)  Temperature: 37 °C (98.6 °F)  Pulse  Av.9  Min: 66  Max: 114  Blood Pressure : 114/69 mmHg       Physical Exam:  Physical Exam   Constitutional: He appears well-developed. No distress.   HENT:   Head: Normocephalic and atraumatic.   [place]   Eyes: EOM are normal. Pupils are equal, round, and reactive to light.   Neck: Neck supple.   Cardiovascular: Normal rate.    Murmur heard.  IRR   Pulmonary/Chest: Effort normal and breath sounds normal.   Abdominal: Soft. He exhibits no distension. There is no tenderness.   Musculoskeletal: Normal range of motion. He exhibits edema.   R great toe with slight edema but no drainage or erythema    Left 3rd toe ulcer without drainage or erythema    Lumbar spine no TTP   Neurological:  "He is alert.   Nursing note and vitals reviewed.      Labs:  Recent Labs      03/18/17 0356 03/19/17 0431 03/20/17 0228   WBC  13.3*  13.4*  9.6   RBC  3.39*  3.81*  3.49*   HEMOGLOBIN  11.8*  12.7*  11.7*   HEMATOCRIT  34.2*  38.9*  35.1*   MCV  100.9*  102.1*  100.6*   MCH  34.8*  33.3*  33.5*   RDW  47.9  48.3  47.4   PLATELETCT  225  244  230   MPV  9.1  9.4  9.2   NEUTSPOLYS  81.10*  85.80*  77.30*   LYMPHOCYTES  10.00*  6.50*  12.80*   MONOCYTES  7.80  6.90  9.10   EOSINOPHILS  0.20  0.00  0.30   BASOPHILS  0.20  0.20  0.10     Recent Labs      03/18/17 0356 03/19/17 0431 03/20/17 0229 03/20/17   1153   SODIUM  131*  130*  131*   --    POTASSIUM  3.3*  3.2*  2.8*  3.9   CHLORIDE  94*  90*  95*   --    CO2  27  25  27   --    GLUCOSE  111*  104*  147*   --    BUN  10  15  18   --      Recent Labs      03/18/17 0356 03/19/17 0431 03/20/17 0229   ALBUMIN  2.5*  2.8*  2.5*   TBILIRUBIN  0.7  0.7  0.6   ALKPHOSPHAT  64  79  86   TOTPROTEIN  8.8*  9.5*  8.1   ALTSGPT  27  27  45   ASTSGOT  37  37  66*   CREATININE  0.84  0.80  0.88       BLOOD CULTURE   Date Value Ref Range Status   03/15/2017   Preliminary    No Growth    Note: Blood cultures are incubated for 5 days and  are monitored continuously.Positive blood cultures  are called to the RN and reported as soon as  they are identified.      ':  Results     Procedure Component Value Units Date/Time    BLOOD CULTURE x2 [[id number]] Collected:  03/15/17 1835    Order Status:  Completed Specimen Information:  Blood from Peripheral Updated:  03/16/17 0919     Significant Indicator NEG      Source BLD      Site PERIPHERAL      Blood Culture --      Result:        No Growth    Note: Blood cultures are incubated for 5 days and  are monitored continuously.Positive blood cultures  are called to the RN and reported as soon as  they are identified.      Narrative:      Per Hospital Policy: Only change Specimen Src: to \"Line\" if  specified by " "physician order.    BLOOD CULTURE x2 [[id number]] Collected:  03/15/17 1907    Order Status:  Completed Specimen Information:  Blood from Peripheral Updated:  03/16/17 0919     Significant Indicator NEG      Source BLD      Site PERIPHERAL      Blood Culture --      Result:        No Growth    Note: Blood cultures are incubated for 5 days and  are monitored continuously.Positive blood cultures  are called to the RN and reported as soon as  they are identified.      Narrative:      Per Hospital Policy: Only change Specimen Src: to \"Line\" if  specified by physician order.    BLOOD CULTURE [[id number]] Collected:  03/15/17 0000    Order Status:  Canceled Specimen Information:  Other from Peripheral     Narrative:      ORDER WAS CANCELLED 03/15/2017 22:57, Duplicate . 03/15/2017  22:57.  Per Hospital Policy: Only change Specimen Src: to \"Line\" if  specified by physician order.    BLOOD CULTURE [[id number]] Collected:  03/15/17 0000    Order Status:  Canceled Specimen Information:  Other from Peripheral     Narrative:      ORDER WAS CANCELLED 03/15/2017 22:57, Duplicate . 03/15/2017  22:57.  Per Hospital Policy: Only change Specimen Src: to \"Line\" if  specified by physician order.          Imaging  3/17 MRI lumbar spine: There are changes secondary to the abdominal aortic aneurysm endograft. The aneurysm sac measures an approximately 7.6 x 6.7 cm in size. Abnormal contrast enhancement is noted within the wall of the aneurysmal sac. There is also abnormal soft tissue   contrast enhancement in the surrounding soft tissue. These findings are highly suspicious for infected thrombosed aneurysm sac. The possibility of endograft infection is more likely. There is irregular outpouching of the aneurysmal sac along the   posterior portion indenting the left psoas muscle.  2.  Free fluid in the pelvis  3.  There is mild-to-moderate left hydronephrosis likely representing pelviureteric junction " obstruction.    3/16 R foot xray - +OM  3/16 L foot xray +OM    Assessment:  Active Hospital Problems    Diagnosis   • *Osteomyelitis of toe (CMS-HCC) [M86.9]   • Bacteremia [R78.81]   • Back pain [M54.9]   • CHF (congestive heart failure) (CMS-HCC) [I50.9]   • Hypokalemia [E87.6]   • Tobacco dependence [F17.200]   • Atrial fibrillation (CMS-HCC) [I48.91]    Endovascular infection       Plan:  Right great toe and left 3rd osteomyelitis  +OM on radiographs  3/9 OSH wound cx - GAS, MSSA (I confirmed with [facility] microlab)  LPS following  If no surgical intervention, anticipate 6 week course of IV abx followed by PO due to below concern for vascular infection  Arterial studies - neg  Continue Unasyn    Group A streptococcus bacteremia  3/9 OSH Bcx - GAS  3/14 OSH Bcx - NGTD  3/15 Bcx - NGTD  Abx per above    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 Will hold PICC placement for now    Prognosis guarded    DW IM

## 2017-03-21 ENCOUNTER — APPOINTMENT (OUTPATIENT)
Dept: RADIOLOGY | Facility: MEDICAL CENTER | Age: 72
DRG: 504 | End: 2017-03-21
Attending: FAMILY MEDICINE
Payer: COMMERCIAL

## 2017-03-21 PROBLEM — Z91.199 NONCOMPLIANCE: Status: ACTIVE | Noted: 2017-03-21

## 2017-03-21 PROBLEM — D75.89 MACROCYTOSIS: Status: ACTIVE | Noted: 2017-03-21

## 2017-03-21 PROBLEM — E87.1 HYPONATREMIA: Status: ACTIVE | Noted: 2017-03-21

## 2017-03-21 PROBLEM — A04.72 C. DIFFICILE DIARRHEA: Status: ACTIVE | Noted: 2017-03-21

## 2017-03-21 LAB
ALBUMIN SERPL BCP-MCNC: 2.7 G/DL (ref 3.2–4.9)
ALBUMIN/GLOB SERPL: 0.5 G/DL
ALP SERPL-CCNC: 124 U/L (ref 30–99)
ALT SERPL-CCNC: 96 U/L (ref 2–50)
ANION GAP SERPL CALC-SCNC: 9 MMOL/L (ref 0–11.9)
AST SERPL-CCNC: 100 U/L (ref 12–45)
BASOPHILS # BLD AUTO: 0.3 % (ref 0–1.8)
BASOPHILS # BLD: 0.03 K/UL (ref 0–0.12)
BILIRUB SERPL-MCNC: 0.7 MG/DL (ref 0.1–1.5)
BUN SERPL-MCNC: 16 MG/DL (ref 8–22)
CALCIUM SERPL-MCNC: 8.6 MG/DL (ref 8.5–10.5)
CHLORIDE SERPL-SCNC: 97 MMOL/L (ref 96–112)
CO2 SERPL-SCNC: 25 MMOL/L (ref 20–33)
CREAT SERPL-MCNC: 0.93 MG/DL (ref 0.5–1.4)
EOSINOPHIL # BLD AUTO: 0.02 K/UL (ref 0–0.51)
EOSINOPHIL NFR BLD: 0.2 % (ref 0–6.9)
ERYTHROCYTE [DISTWIDTH] IN BLOOD BY AUTOMATED COUNT: 47.7 FL (ref 35.9–50)
GFR SERPL CREATININE-BSD FRML MDRD: >60 ML/MIN/1.73 M 2
GLOBULIN SER CALC-MCNC: 6 G/DL (ref 1.9–3.5)
GLUCOSE SERPL-MCNC: 109 MG/DL (ref 65–99)
HCT VFR BLD AUTO: 37.2 % (ref 42–52)
HGB BLD-MCNC: 12.5 G/DL (ref 14–18)
IMM GRANULOCYTES # BLD AUTO: 0.06 K/UL (ref 0–0.11)
IMM GRANULOCYTES NFR BLD AUTO: 0.6 % (ref 0–0.9)
LYMPHOCYTES # BLD AUTO: 1.42 K/UL (ref 1–4.8)
LYMPHOCYTES NFR BLD: 13.6 % (ref 22–41)
MCH RBC QN AUTO: 34.2 PG (ref 27–33)
MCHC RBC AUTO-ENTMCNC: 33.6 G/DL (ref 33.7–35.3)
MCV RBC AUTO: 101.6 FL (ref 81.4–97.8)
MONOCYTES # BLD AUTO: 0.88 K/UL (ref 0–0.85)
MONOCYTES NFR BLD AUTO: 8.4 % (ref 0–13.4)
NEUTROPHILS # BLD AUTO: 8.06 K/UL (ref 1.82–7.42)
NEUTROPHILS NFR BLD: 76.9 % (ref 44–72)
NRBC # BLD AUTO: 0 K/UL
NRBC BLD AUTO-RTO: 0 /100 WBC
PLATELET # BLD AUTO: 272 K/UL (ref 164–446)
PMV BLD AUTO: 9.1 FL (ref 9–12.9)
POTASSIUM SERPL-SCNC: 3.4 MMOL/L (ref 3.6–5.5)
PROT SERPL-MCNC: 8.7 G/DL (ref 6–8.2)
RBC # BLD AUTO: 3.66 M/UL (ref 4.7–6.1)
SODIUM SERPL-SCNC: 131 MMOL/L (ref 135–145)
WBC # BLD AUTO: 10.5 K/UL (ref 4.8–10.8)

## 2017-03-21 PROCEDURE — 80053 COMPREHEN METABOLIC PANEL: CPT

## 2017-03-21 PROCEDURE — 700111 HCHG RX REV CODE 636 W/ 250 OVERRIDE (IP): Performed by: HOSPITALIST

## 2017-03-21 PROCEDURE — 160035 HCHG PACU - 1ST 60 MINS PHASE I: Performed by: INTERNAL MEDICINE

## 2017-03-21 PROCEDURE — 76705 ECHO EXAM OF ABDOMEN: CPT

## 2017-03-21 PROCEDURE — 700101 HCHG RX REV CODE 250: Performed by: FAMILY MEDICINE

## 2017-03-21 PROCEDURE — A9270 NON-COVERED ITEM OR SERVICE: HCPCS | Performed by: FAMILY MEDICINE

## 2017-03-21 PROCEDURE — 770020 HCHG ROOM/CARE - TELE (206)

## 2017-03-21 PROCEDURE — 700105 HCHG RX REV CODE 258: Performed by: HOSPITALIST

## 2017-03-21 PROCEDURE — 700111 HCHG RX REV CODE 636 W/ 250 OVERRIDE (IP): Performed by: NURSE PRACTITIONER

## 2017-03-21 PROCEDURE — 700105 HCHG RX REV CODE 258: Performed by: INTERNAL MEDICINE

## 2017-03-21 PROCEDURE — 700102 HCHG RX REV CODE 250 W/ 637 OVERRIDE(OP): Performed by: HOSPITALIST

## 2017-03-21 PROCEDURE — 99233 SBSQ HOSP IP/OBS HIGH 50: CPT | Performed by: FAMILY MEDICINE

## 2017-03-21 PROCEDURE — 160002 HCHG RECOVERY MINUTES (STAT): Performed by: INTERNAL MEDICINE

## 2017-03-21 PROCEDURE — A9270 NON-COVERED ITEM OR SERVICE: HCPCS | Performed by: HOSPITALIST

## 2017-03-21 PROCEDURE — 85025 COMPLETE CBC W/AUTO DIFF WBC: CPT

## 2017-03-21 PROCEDURE — A9270 NON-COVERED ITEM OR SERVICE: HCPCS | Performed by: INTERNAL MEDICINE

## 2017-03-21 PROCEDURE — 700102 HCHG RX REV CODE 250 W/ 637 OVERRIDE(OP): Performed by: FAMILY MEDICINE

## 2017-03-21 PROCEDURE — 160048 HCHG OR STATISTICAL LEVEL 1-5: Performed by: INTERNAL MEDICINE

## 2017-03-21 PROCEDURE — 700102 HCHG RX REV CODE 250 W/ 637 OVERRIDE(OP): Performed by: INTERNAL MEDICINE

## 2017-03-21 PROCEDURE — 91010 ESOPHAGUS MOTILITY STUDY: CPT | Performed by: INTERNAL MEDICINE

## 2017-03-21 PROCEDURE — 36415 COLL VENOUS BLD VENIPUNCTURE: CPT

## 2017-03-21 RX ORDER — POTASSIUM CHLORIDE 1.5 G/1.58G
40 POWDER, FOR SOLUTION ORAL 3 TIMES DAILY
Status: DISCONTINUED | OUTPATIENT
Start: 2017-03-21 | End: 2017-03-24

## 2017-03-21 RX ORDER — LIDOCAINE 50 MG/G
2 PATCH TOPICAL EVERY 24 HOURS
Status: DISCONTINUED | OUTPATIENT
Start: 2017-03-21 | End: 2017-04-19 | Stop reason: HOSPADM

## 2017-03-21 RX ADMIN — VANCOMYCIN HYDROCHLORIDE 125 MG: 10 INJECTION, POWDER, LYOPHILIZED, FOR SOLUTION INTRAVENOUS at 00:03

## 2017-03-21 RX ADMIN — LIDOCAINE 2 PATCH: 50 PATCH CUTANEOUS at 12:24

## 2017-03-21 RX ADMIN — MORPHINE SULFATE 2 MG: 4 INJECTION INTRAVENOUS at 16:49

## 2017-03-21 RX ADMIN — OXYCODONE HYDROCHLORIDE 10 MG: 10 TABLET ORAL at 00:02

## 2017-03-21 RX ADMIN — AMPICILLIN SODIUM AND SULBACTAM SODIUM 3 G: 2; 1 INJECTION, POWDER, FOR SOLUTION INTRAMUSCULAR; INTRAVENOUS at 12:23

## 2017-03-21 RX ADMIN — MORPHINE SULFATE 2 MG: 4 INJECTION INTRAVENOUS at 10:54

## 2017-03-21 RX ADMIN — MORPHINE SULFATE 2 MG: 4 INJECTION INTRAVENOUS at 21:46

## 2017-03-21 RX ADMIN — AMPICILLIN SODIUM AND SULBACTAM SODIUM 3 G: 2; 1 INJECTION, POWDER, FOR SOLUTION INTRAMUSCULAR; INTRAVENOUS at 22:58

## 2017-03-21 RX ADMIN — VANCOMYCIN HYDROCHLORIDE 125 MG: 10 INJECTION, POWDER, LYOPHILIZED, FOR SOLUTION INTRAVENOUS at 22:58

## 2017-03-21 RX ADMIN — OXYCODONE HYDROCHLORIDE 10 MG: 10 TABLET ORAL at 21:10

## 2017-03-21 RX ADMIN — FUROSEMIDE 20 MG: 10 INJECTION, SOLUTION INTRAVENOUS at 16:48

## 2017-03-21 RX ADMIN — VANCOMYCIN HYDROCHLORIDE 125 MG: 10 INJECTION, POWDER, LYOPHILIZED, FOR SOLUTION INTRAVENOUS at 18:40

## 2017-03-21 RX ADMIN — POTASSIUM CHLORIDE 40 MEQ: 1.5 POWDER, FOR SOLUTION ORAL at 21:10

## 2017-03-21 RX ADMIN — FUROSEMIDE 20 MG: 10 INJECTION, SOLUTION INTRAVENOUS at 04:43

## 2017-03-21 RX ADMIN — METOPROLOL TARTRATE 25 MG: 25 TABLET, FILM COATED ORAL at 21:10

## 2017-03-21 RX ADMIN — AMPICILLIN SODIUM AND SULBACTAM SODIUM 3 G: 2; 1 INJECTION, POWDER, FOR SOLUTION INTRAMUSCULAR; INTRAVENOUS at 04:41

## 2017-03-21 RX ADMIN — GABAPENTIN 300 MG: 300 CAPSULE ORAL at 21:10

## 2017-03-21 RX ADMIN — AMPICILLIN SODIUM AND SULBACTAM SODIUM 3 G: 2; 1 INJECTION, POWDER, FOR SOLUTION INTRAMUSCULAR; INTRAVENOUS at 16:48

## 2017-03-21 RX ADMIN — SODIUM CHLORIDE: 9 INJECTION, SOLUTION INTRAVENOUS at 00:03

## 2017-03-21 RX ADMIN — OXYCODONE HYDROCHLORIDE 10 MG: 10 TABLET ORAL at 04:41

## 2017-03-21 RX ADMIN — NICOTINE 14 MG: 14 PATCH TRANSDERMAL at 04:42

## 2017-03-21 RX ADMIN — VANCOMYCIN HYDROCHLORIDE 125 MG: 10 INJECTION, POWDER, LYOPHILIZED, FOR SOLUTION INTRAVENOUS at 04:43

## 2017-03-21 RX ADMIN — AMIODARONE HYDROCHLORIDE 400 MG: 200 TABLET ORAL at 21:10

## 2017-03-21 ASSESSMENT — PAIN SCALES - GENERAL
PAINLEVEL_OUTOF10: 5
PAINLEVEL_OUTOF10: 7
PAINLEVEL_OUTOF10: 6
PAINLEVEL_OUTOF10: 6

## 2017-03-21 ASSESSMENT — ENCOUNTER SYMPTOMS
DIARRHEA: 0
GASTROINTESTINAL NEGATIVE: 1
HEARTBURN: 0
WHEEZING: 0
RESPIRATORY NEGATIVE: 1
HEADACHES: 0
NAUSEA: 0
FEVER: 0
SHORTNESS OF BREATH: 0
BACK PAIN: 1
CHILLS: 0
COUGH: 0
DIZZINESS: 0
SORE THROAT: 0
FOCAL WEAKNESS: 0
VOMITING: 0
WEAKNESS: 1
BLURRED VISION: 0
ABDOMINAL PAIN: 0

## 2017-03-21 NOTE — CARE PLAN
Problem: Pain Management  Goal: Pain level will decrease to patient’s comfort goal  Intervention: Follow pain managment plan developed in collaboration with patient and Interdisciplinary Team  Pt c/o lower back pain. Pt repositioned, ambulated and medicated per MAR.      Problem: Safety  Goal: Will remain free from falls  Intervention: Implement fall precautions    03/21/17 0337   OTHER   Environmental Precautions Personal Belongings, Wastebasket, Call Bell etc. in Easy Reach;Report Given to Other Health Care Providers Regarding Fall Risk;Bed in Low Position;Communication Sign for Patients & Families;Mobility Assessed & Appropriate Sign Placed   IV Pole on Same Side of Bed as Bathroom Yes   Bedrails Bedrails Closest to Bathroom Down   Chair/Bed Strip Alarm Yes - Alarm On   Bed Alarm (Built in - for ICU ONLY) Yes - Alarm On   Pt refusing treaded socks. Educated on risks with out treaded foot wear.

## 2017-03-21 NOTE — PROGRESS NOTES
Cortrak Placement    Tube Team verified patient name and medical record number prior to tube placement.  Cortrak tube (43 inches, 10 [place]) placed at 65 cm in right nare.  Per Cortrak picture, tube appears to be in the stomach.  Nursing Instructions: Awaiting abdomen x-ray to confirm placement before use for medications or feeding. Once placement confirmed, flush tube with 30 ml of water, and then remove and save stylet, in patient medication drawer. Please contact me with any questions or concerns.

## 2017-03-21 NOTE — PROGRESS NOTES
Report received by day shift RN. Pt laying in bed sleeping with HOB 30 degrees and tube feed running diabetasource at 25mL/hr. Fall precauiotns in place with bed alarm on. Call light in place and bed side table with in reach.

## 2017-03-21 NOTE — PROGRESS NOTES
Infectious Disease Progress Note    Author: Yary Lujan M.D. DOS & Time created: 3/21/2017  11:50 AM    Chief Complaint   Patient presents with   • Low Back Pain   FU for osteomyelitis, GAS bacteremia and CDI, possible endograft infection    Interval History:  3/17 AF, WBC 12.9, pt c/o lower back pain, asking for morphine, poor insight into illness, denies any diarrhea  3/18 AF, WBC 13.3, sleeping and not arousable to voice, cultures here negative to date  3/19 AF, WBC 13.4, frustrated about lower back pain not adequately controlled on current pain regimen, plan for EGD with dilatation today  3/20 AF WBC 9.6 +pain-denies SE abx  3/21 AF WBC 10.5 no new complaints  Labs Reviewed, Medications Reviewed, Radiology Reviewed and Wound Reviewed.    Review of Systems:  Review of Systems   Constitutional: Negative for fever and chills.   Respiratory: Negative for cough and shortness of breath.    Gastrointestinal: Negative for nausea, vomiting, abdominal pain and diarrhea.   Genitourinary: Negative for dysuria.   Musculoskeletal: Positive for back pain.   Neurological: Negative for headaches.   All other systems reviewed and are negative.      Hemodynamics:  Temp (24hrs), Av.2 °C (98.9 °F), Min:37 °C (98.6 °F), Max:37.3 °C (99.2 °F)  Temperature: 37.1 °C (98.7 °F)  Pulse  Av.1  Min: 66  Max: 114  Blood Pressure : 109/80 mmHg       Physical Exam:  Physical Exam   Constitutional: He appears well-developed. No distress.   HENT:   Head: Normocephalic and atraumatic.   Eyes: EOM are normal. Pupils are equal, round, and reactive to light.   Neck: Neck supple.   Cardiovascular: Normal rate.    Murmur heard.  IRR   Pulmonary/Chest: Effort normal and breath sounds normal.   Abdominal: Soft. He exhibits no distension. There is no tenderness.   Musculoskeletal: Normal range of motion. He exhibits edema.   R great toe with slight edema but no drainage or erythema    Left 3rd toe ulcer without drainage or  "erythema    Lumbar spine no TTP   Neurological: He is alert.   Nursing note and vitals reviewed.      Labs:  Recent Labs      03/19/17   0431  03/20/17 0228  03/21/17   0152   WBC  13.4*  9.6  10.5   RBC  3.81*  3.49*  3.66*   HEMOGLOBIN  12.7*  11.7*  12.5*   HEMATOCRIT  38.9*  35.1*  37.2*   MCV  102.1*  100.6*  101.6*   MCH  33.3*  33.5*  34.2*   RDW  48.3  47.4  47.7   PLATELETCT  244  230  272   MPV  9.4  9.2  9.1   NEUTSPOLYS  85.80*  77.30*  76.90*   LYMPHOCYTES  6.50*  12.80*  13.60*   MONOCYTES  6.90  9.10  8.40   EOSINOPHILS  0.00  0.30  0.20   BASOPHILS  0.20  0.10  0.30     Recent Labs      03/19/17   0431  03/20/17 0229  03/20/17   1153  03/21/17   0152   SODIUM  130*  131*   --   131*   POTASSIUM  3.2*  2.8*  3.9  3.4*   CHLORIDE  90*  95*   --   97   CO2  25  27   --   25   GLUCOSE  104*  147*   --   109*   BUN  15  18   --   16     Recent Labs      03/19/17   0431 03/20/17 0229 03/21/17   0152   ALBUMIN  2.8*  2.5*  2.7*   TBILIRUBIN  0.7  0.6  0.7   ALKPHOSPHAT  79  86  124*   TOTPROTEIN  9.5*  8.1  8.7*   ALTSGPT  27  45  96*   ASTSGOT  37  66*  100*   CREATININE  0.80  0.88  0.93       BLOOD CULTURE   Date Value Ref Range Status   03/15/2017 No growth after 5 days of incubation.  Final    ':  Results     Procedure Component Value Units Date/Time    BLOOD CULTURE x2 [[id number]] Collected:  03/15/17 1835    Order Status:  Completed Specimen Information:  Blood from Peripheral Updated:  03/20/17 2100     Significant Indicator NEG      Source BLD      Site PERIPHERAL      Blood Culture No growth after 5 days of incubation.     Narrative:      Per Hospital Policy: Only change Specimen Src: to \"Line\" if  specified by physician order.    BLOOD CULTURE x2 [[id number]] Collected:  03/15/17 1907    Order Status:  Completed Specimen Information:  Blood from Peripheral Updated:  03/20/17 2100     Significant Indicator NEG      Source BLD      Site PERIPHERAL      Blood Culture No growth after 5 days of " "incubation.     Narrative:      Per Hospital Policy: Only change Specimen Src: to \"Line\" if  specified by physician order.    BLOOD CULTURE [[id number]] Collected:  03/15/17 0000    Order Status:  Canceled Specimen Information:  Other from Peripheral     Narrative:      ORDER WAS CANCELLED 03/15/2017 22:57, Duplicate . 03/15/2017  22:57.  Per Hospital Policy: Only change Specimen Src: to \"Line\" if  specified by physician order.    BLOOD CULTURE [[id number]] Collected:  03/15/17 0000    Order Status:  Canceled Specimen Information:  Other from Peripheral     Narrative:      ORDER WAS CANCELLED 03/15/2017 22:57, Duplicate . 03/15/2017  22:57.  Per Hospital Policy: Only change Specimen Src: to \"Line\" if  specified by physician order.          Imaging  3/17 MRI lumbar spine: There are changes secondary to the abdominal aortic aneurysm endograft. The aneurysm sac measures an approximately 7.6 x 6.7 cm in size. Abnormal contrast enhancement is noted within the wall of the aneurysmal sac. There is also abnormal soft tissue   contrast enhancement in the surrounding soft tissue. These findings are highly suspicious for infected thrombosed aneurysm sac. The possibility of endograft infection is more likely. There is irregular outpouching of the aneurysmal sac along the   posterior portion indenting the left psoas muscle.  2.  Free fluid in the pelvis  3.  There is mild-to-moderate left hydronephrosis likely representing pelviureteric junction obstruction.    3/16 R foot xray - +OM  3/16 L foot xray +OM    Assessment:  Active Hospital Problems    Diagnosis   • *Osteomyelitis of toe (CMS-HCC) [M86.9]   • Bacteremia [R78.81]   • Back pain [M54.9]   • CHF (congestive heart failure) (CMS-HCC) [I50.9]   • Hypokalemia [E87.6]   • Tobacco dependence [F17.200]   • Atrial fibrillation (CMS-HCC) [I48.91]    Endovascular infection       Plan:  Right great toe and left 3rd osteomyelitis  +OM on radiographs  3/9 OSH " wound cx - GAS, MSSA (I confirmed with [facility]'s microlab)  LPS following  If no surgical intervention, anticipate 6 week course of IV abx followed by PO due to below concern for vascular infection  Arterial studies - neg  Continue Unasyn    Group A streptococcus bacteremia  3/9 OSH Bcx - GAS  3/14 OSH Bcx - NGTD  3/15 Bcx - NGTD  Abx per above    Clostridium difficile diarrhea  Continue oral vancomycin QID for 2 weeks stop date 03/29/17 then BID while on IV abx    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 Will hold PICC placement for now    Prognosis guarded

## 2017-03-21 NOTE — CARE PLAN
Problem: Pain Management  Goal: Pain level will decrease to patient’s comfort goal  Intervention: Follow pain managment plan developed in collaboration with patient and Interdisciplinary Team  Discussed with patient his tolerable pain level based on 0-10 pain scale. Patient stated back to me his tolerable pain level based on 0-10 rating scale. All questions answered at this time.       Problem: Safety  Goal: Will remain free from injury  Discussed with patient the importance of calling for assistance prior to getting out of bed in order to help prevent any falls. Patient accepting of information. All questions answered at this time.

## 2017-03-21 NOTE — PROGRESS NOTES
Hospital Medicine Progress Note, Adult, Complex               Author: William Jimenez Date & Time created: 3/21/2017  1:43 PM     Interval History:  Admitted for Osteomyelitis of toe, Bacteremia, Clostridium difficile diarrhea, recently discharged AMA from outside hospital.    OM - MRI results?  Bacteremia - culture showed Grp A streptococcus from outside hospital  C diff - diarrhea has slowed down  AAA endograft - possible infection on incidental finding on MRI, discussed with Vascular surgery  Afib - back in Afib, cardioverted at outside hospital  CHF - echo done at outside hospital  Dysphagia - awaiting manometry results  Low K      Review of Systems:  Review of Systems   Constitutional: Positive for malaise/fatigue. Negative for fever and chills.   HENT: Negative for sore throat.    Eyes: Negative for blurred vision.   Respiratory: Negative for cough, shortness of breath and wheezing.    Cardiovascular: Negative for chest pain.   Gastrointestinal: Negative for heartburn, nausea, vomiting, abdominal pain and diarrhea.   Genitourinary: Negative for dysuria.   Musculoskeletal: Positive for back pain.   Neurological: Positive for weakness. Negative for dizziness, focal weakness and headaches.       Physical Exam:  Physical Exam   Constitutional: He is oriented to person, place, and time. He appears well-developed and well-nourished.   HENT:   Head: Normocephalic and atraumatic.   Eyes: Conjunctivae are normal. Pupils are equal, round, and reactive to light.   Neck: No tracheal deviation present. No thyromegaly present.   Cardiovascular: Normal rate and regular rhythm.    Pulmonary/Chest: Effort normal and breath sounds normal.   Abdominal: Soft. Bowel sounds are normal. He exhibits no distension. There is no tenderness.   Musculoskeletal: He exhibits edema.   L 3rd toe erythema and swelling   Lymphadenopathy:     He has no cervical adenopathy.   Neurological: He is alert and oriented to person, place, and time.    Nursing note and vitals reviewed.      Labs:        Invalid input(s): [id number]      Recent Labs      17   1153  17   SODIUM  130*  131*   --   131*   POTASSIUM  3.2*  2.8*  3.9  3.4*   CHLORIDE  90*  95*   --   97   CO2  25  27   --   25   BUN  15  18   --   16   CREATININE  0.80  0.88   --   0.93   MAGNESIUM   --    --   1.9   --    CALCIUM  8.7  8.5   --   8.6     Recent Labs      17   ALTSGPT  27  45  96*   ASTSGOT  37  66*  100*   ALKPHOSPHAT  79  86  124*   TBILIRUBIN  0.7  0.6  0.7   PREALBUMIN   --   6.0*   --    GLUCOSE  104*  147*  109*     Recent Labs      17   RBC  3.81*  3.49*  3.66*   HEMOGLOBIN  12.7*  11.7*  12.5*   HEMATOCRIT  38.9*  35.1*  37.2*   PLATELETCT  244  230  272   PROTHROMBTM  18.9*   --    --    INR  1.53*   --    --      Recent Labs      17   WBC  13.4*  9.6   --   10.5   NEUTSPOLYS  85.80*  77.30*   --   76.90*   LYMPHOCYTES  6.50*  12.80*   --   13.60*   MONOCYTES  6.90  9.10   --   8.40   EOSINOPHILS  0.00  0.30   --   0.20   BASOPHILS  0.20  0.10   --   0.30   ASTSGOT  37   --   66*  100*   ALTSGPT  27   --   45  96*   ALKPHOSPHAT  79   --   86  124*   TBILIRUBIN  0.7   --   0.6  0.7           Hemodynamics:  Temp (24hrs), Av.1 °C (98.8 °F), Min:36.8 °C (98.2 °F), Max:37.3 °C (99.2 °F)  Temperature: 36.8 °C (98.2 °F)  Pulse  Av.1  Min: 66  Max: 114  Blood Pressure : 115/87 mmHg     Respiratory:    Respiration: 18, Pulse Oximetry: 90 %        RUL Breath Sounds: Clear, RML Breath Sounds: Clear, RLL Breath Sounds: Diminished, SOLEDAD Breath Sounds: Clear, LLL Breath Sounds: Diminished  Fluids:    Intake/Output Summary (Last 24 hours) at 17 1343  Last data filed at 17 0700   Gross per 24 hour   Intake    660 ml   Output    450 ml   Net    210 ml      Weight: 74.5 kg (164 lb 3.9 oz)  GI/Nutrition:  Orders Placed This Encounter   Procedures   • DIET NPO     Standing Status: Standing      Number of Occurrences: 1      Standing Expiration Date:      Order Specific Question:  Restrict to:     Answer:  Strict [1]     Medical Decision Making, by Problem:  Active Hospital Problems    Diagnosis   • *Osteomyelitis of toe (CMS-HCC) [M86.9]       IV Unasyn, secure MRI results from outside hospital   • C. difficile diarrhea [A04.7]      oral Vancomycin    • Bacteremia [R78.81]         IV Unasyn   • Back pain [M54.9]         increase Lidocaine patch   • Hyponatremia [E87.1]         follow bmp   • Noncompliance [Z91.19]    counseling   • Macrocytosis [D75.89]       check b12, folic, tsh   • Hypokalemia [E87.6]       start Kdur, follow bmp and Mg   • CHF (congestive heart failure) (CMS-HCC) [I50.9]      Metoprolol, IV Lasix, secure Echo results from outside hospital    • Atrial fibrillation (CMS-HCC) [I48.91]     Amiodarone, Metoprolol, holding Eliquis   • S/P AAA (abdominal aortic aneurysm) repair [Z98.890, Z86.79]     possible endograft infection?   • Tobacco dependence [F17.200]      Nicotine patch        Dysphagia.      Cortrak, TF, GI following       Coagulopathy.   check INR       Elevated LFTs.      Follow cmp, check Hepatitis panel, US of RUQ       Dyslipidemia.   hold Zocor    Medications reviewed, EKG reviewed, Labs reviewed and Radiology images reviewed  Art catheter: No Art      DVT Prophylaxis: Contraindicated - High bleeding risk  DVT prophylaxis - mechanical: Contra-indicated  Ulcer prophylaxis: No  Antibiotics: Treating active infection/contamination beyond 24 hours perioperative coverage

## 2017-03-21 NOTE — PROGRESS NOTES
Gastroenterology Progress Note     Author: Wm Cottrell   Date & Time Created: 3/21/2017 7:50 AM    Interval History:  No events    Review of Systems:  Review of Systems   Gastrointestinal: Negative.        Physical Exam:  Physical Exam   Abdominal: Soft. Bowel sounds are normal. He exhibits no distension. There is no tenderness.       Labs:        Invalid input(s): [id number]      Recent Labs      17   1153  17   SODIUM  130*  131*   --   131*   POTASSIUM  3.2*  2.8*  3.9  3.4*   CHLORIDE  90*  95*   --   97   CO2  25  27   --   25   BUN  15  18   --   16   CREATININE  0.80  0.88   --   0.93   MAGNESIUM   --    --   1.9   --    CALCIUM  8.7  8.5   --   8.6     Recent Labs      17   ALTSGPT  27  45  96*   ASTSGOT  37  66*  100*   ALKPHOSPHAT  79  86  124*   TBILIRUBIN  0.7  0.6  0.7   PREALBUMIN   --   6.0*   --    GLUCOSE  104*  147*  109*     Recent Labs      17   RBC  3.81*  3.49*  3.66*   HEMOGLOBIN  12.7*  11.7*  12.5*   HEMATOCRIT  38.9*  35.1*  37.2*   PLATELETCT  244  230  272   PROTHROMBTM  18.9*   --    --    INR  1.53*   --    --      Recent Labs      17   WBC  13.4*  9.6   --   10.5   NEUTSPOLYS  85.80*  77.30*   --   76.90*   LYMPHOCYTES  6.50*  12.80*   --   13.60*   MONOCYTES  6.90  9.10   --   8.40   EOSINOPHILS  0.00  0.30   --   0.20   BASOPHILS  0.20  0.10   --   0.30   ASTSGOT  37   --   66*  100*   ALTSGPT  27   --   45  96*   ALKPHOSPHAT  79   --   86  124*   TBILIRUBIN  0.7   --   0.6  0.7     Hemodynamics:  Temp (24hrs), Av.1 °C (98.7 °F), Min:36.7 °C (98 °F), Max:37.3 °C (99.2 °F)  Temperature: 37 °C (98.6 °F)  Pulse  Av.9  Min: 66  Max: 114  Blood Pressure : 141/88 mmHg     Respiratory:    Respiration: 16, Pulse Oximetry: 93 %        RUL Breath Sounds: Clear, RML  Breath Sounds: Clear, RLL Breath Sounds: Diminished, SOLEDAD Breath Sounds: Clear, LLL Breath Sounds: Diminished  Fluids:    Intake/Output Summary (Last 24 hours) at 03/21/17 0750  Last data filed at 03/21/17 0400   Gross per 24 hour   Intake    660 ml   Output    200 ml   Net    460 ml     Weight: 74.5 kg (164 lb 3.9 oz)  GI/Nutrition:  Orders Placed This Encounter   Procedures   • DIET NPO     Standing Status: Standing      Number of Occurrences: 1      Standing Expiration Date:      Order Specific Question:  Restrict to:     Answer:  Strict [1]     Medical Decision Making, by Problem:  Active Hospital Problems    Diagnosis   • *Osteomyelitis of toe (CMS-HCC) [M86.9]   • Bacteremia [R78.81]   • Back pain [M54.9]   • CHF (congestive heart failure) (CMS-HCC) [I50.9]   • Hypokalemia [E87.6]   • Tobacco dependence [F17.200]   • Atrial fibrillation (CMS-HCC) [I48.91]       Plan:  -manometry today to assess for achalasia  -will need to replace cortrak after manometry  -if indeed he has achalasia then EGD with botox is likely the best option for the short term     Core Measures

## 2017-03-21 NOTE — PROGRESS NOTES
Received report from night RN. Assumed care of patient. Pt A&O x4. Pt has 20g in Rt forearm running NS at 75ml/hr. While getting report received call from Tunde in [facility] stating that she will be calling transport to pick patient up for procedure.  Bed locked and in lowest position with call light within reach.

## 2017-03-22 ENCOUNTER — APPOINTMENT (OUTPATIENT)
Dept: RADIOLOGY | Facility: MEDICAL CENTER | Age: 72
DRG: 504 | End: 2017-03-22
Attending: INTERNAL MEDICINE
Payer: COMMERCIAL

## 2017-03-22 LAB
ALBUMIN SERPL BCP-MCNC: 2.8 G/DL (ref 3.2–4.9)
ALBUMIN/GLOB SERPL: 0.4 G/DL
ALP SERPL-CCNC: 140 U/L (ref 30–99)
ALT SERPL-CCNC: 90 U/L (ref 2–50)
ANION GAP SERPL CALC-SCNC: 11 MMOL/L (ref 0–11.9)
AST SERPL-CCNC: 98 U/L (ref 12–45)
BILIRUB SERPL-MCNC: 0.7 MG/DL (ref 0.1–1.5)
BNP SERPL-MCNC: 434 PG/ML (ref 0–100)
BUN SERPL-MCNC: 19 MG/DL (ref 8–22)
CALCIUM SERPL-MCNC: 8.9 MG/DL (ref 8.5–10.5)
CHLORIDE SERPL-SCNC: 96 MMOL/L (ref 96–112)
CO2 SERPL-SCNC: 24 MMOL/L (ref 20–33)
CREAT SERPL-MCNC: 0.91 MG/DL (ref 0.5–1.4)
FOLATE SERPL-MCNC: 13.4 NG/ML
GFR SERPL CREATININE-BSD FRML MDRD: >60 ML/MIN/1.73 M 2
GLOBULIN SER CALC-MCNC: 7 G/DL (ref 1.9–3.5)
GLUCOSE SERPL-MCNC: 111 MG/DL (ref 65–99)
HAV IGM SERPL QL IA: NEGATIVE
HBV CORE IGM SER QL: NEGATIVE
HBV SURFACE AG SER QL: NEGATIVE
HCV AB SER QL: REACTIVE
INR PPP: 1.4 (ref 0.87–1.13)
MAGNESIUM SERPL-MCNC: 2 MG/DL (ref 1.5–2.5)
PHOSPHATE SERPL-MCNC: 3.7 MG/DL (ref 2.5–4.5)
POTASSIUM SERPL-SCNC: 3.6 MMOL/L (ref 3.6–5.5)
PROT SERPL-MCNC: 9.8 G/DL (ref 6–8.2)
PROTHROMBIN TIME: 17.6 SEC (ref 12–14.6)
SODIUM SERPL-SCNC: 131 MMOL/L (ref 135–145)
TSH SERPL DL<=0.005 MIU/L-ACNC: 7.11 UIU/ML (ref 0.3–3.7)
VIT B12 SERPL-MCNC: 461 PG/ML (ref 211–911)

## 2017-03-22 PROCEDURE — 700105 HCHG RX REV CODE 258: Performed by: HOSPITALIST

## 2017-03-22 PROCEDURE — 84100 ASSAY OF PHOSPHORUS: CPT

## 2017-03-22 PROCEDURE — 83735 ASSAY OF MAGNESIUM: CPT

## 2017-03-22 PROCEDURE — 700102 HCHG RX REV CODE 250 W/ 637 OVERRIDE(OP): Performed by: INTERNAL MEDICINE

## 2017-03-22 PROCEDURE — 700102 HCHG RX REV CODE 250 W/ 637 OVERRIDE(OP): Performed by: FAMILY MEDICINE

## 2017-03-22 PROCEDURE — A9270 NON-COVERED ITEM OR SERVICE: HCPCS | Performed by: INTERNAL MEDICINE

## 2017-03-22 PROCEDURE — 700101 HCHG RX REV CODE 250: Performed by: FAMILY MEDICINE

## 2017-03-22 PROCEDURE — 80053 COMPREHEN METABOLIC PANEL: CPT

## 2017-03-22 PROCEDURE — 80074 ACUTE HEPATITIS PANEL: CPT

## 2017-03-22 PROCEDURE — 87522 HEPATITIS C REVRS TRNSCRPJ: CPT

## 2017-03-22 PROCEDURE — 700105 HCHG RX REV CODE 258: Performed by: INTERNAL MEDICINE

## 2017-03-22 PROCEDURE — 71250 CT THORAX DX C-: CPT

## 2017-03-22 PROCEDURE — A9270 NON-COVERED ITEM OR SERVICE: HCPCS | Performed by: FAMILY MEDICINE

## 2017-03-22 PROCEDURE — 82607 VITAMIN B-12: CPT

## 2017-03-22 PROCEDURE — 82746 ASSAY OF FOLIC ACID SERUM: CPT

## 2017-03-22 PROCEDURE — 36415 COLL VENOUS BLD VENIPUNCTURE: CPT

## 2017-03-22 PROCEDURE — 700111 HCHG RX REV CODE 636 W/ 250 OVERRIDE (IP): Performed by: INTERNAL MEDICINE

## 2017-03-22 PROCEDURE — 84443 ASSAY THYROID STIM HORMONE: CPT

## 2017-03-22 PROCEDURE — 83880 ASSAY OF NATRIURETIC PEPTIDE: CPT

## 2017-03-22 PROCEDURE — 99233 SBSQ HOSP IP/OBS HIGH 50: CPT | Performed by: FAMILY MEDICINE

## 2017-03-22 PROCEDURE — 700102 HCHG RX REV CODE 250 W/ 637 OVERRIDE(OP): Performed by: HOSPITALIST

## 2017-03-22 PROCEDURE — A9270 NON-COVERED ITEM OR SERVICE: HCPCS | Performed by: HOSPITALIST

## 2017-03-22 PROCEDURE — 770020 HCHG ROOM/CARE - TELE (206)

## 2017-03-22 PROCEDURE — 85610 PROTHROMBIN TIME: CPT

## 2017-03-22 PROCEDURE — 700111 HCHG RX REV CODE 636 W/ 250 OVERRIDE (IP): Performed by: HOSPITALIST

## 2017-03-22 RX ORDER — FUROSEMIDE 20 MG/1
20 TABLET ORAL
Status: DISCONTINUED | OUTPATIENT
Start: 2017-03-22 | End: 2017-03-24

## 2017-03-22 RX ADMIN — GABAPENTIN 300 MG: 300 CAPSULE ORAL at 10:03

## 2017-03-22 RX ADMIN — AMPICILLIN SODIUM AND SULBACTAM SODIUM 3 G: 2; 1 INJECTION, POWDER, FOR SOLUTION INTRAMUSCULAR; INTRAVENOUS at 18:18

## 2017-03-22 RX ADMIN — LIDOCAINE 2 PATCH: 50 PATCH CUTANEOUS at 11:26

## 2017-03-22 RX ADMIN — AMIODARONE HYDROCHLORIDE 400 MG: 200 TABLET ORAL at 10:03

## 2017-03-22 RX ADMIN — OXYCODONE HYDROCHLORIDE 10 MG: 10 TABLET ORAL at 16:08

## 2017-03-22 RX ADMIN — FUROSEMIDE 20 MG: 20 TABLET ORAL at 17:10

## 2017-03-22 RX ADMIN — METOPROLOL TARTRATE 25 MG: 25 TABLET, FILM COATED ORAL at 20:42

## 2017-03-22 RX ADMIN — VANCOMYCIN HYDROCHLORIDE 125 MG: 10 INJECTION, POWDER, LYOPHILIZED, FOR SOLUTION INTRAVENOUS at 11:19

## 2017-03-22 RX ADMIN — NICOTINE 14 MG: 14 PATCH TRANSDERMAL at 07:41

## 2017-03-22 RX ADMIN — POTASSIUM CHLORIDE 40 MEQ: 1.5 POWDER, FOR SOLUTION ORAL at 20:41

## 2017-03-22 RX ADMIN — AMIODARONE HYDROCHLORIDE 400 MG: 200 TABLET ORAL at 20:41

## 2017-03-22 RX ADMIN — POTASSIUM CHLORIDE 40 MEQ: 1.5 POWDER, FOR SOLUTION ORAL at 10:03

## 2017-03-22 RX ADMIN — OXYCODONE HYDROCHLORIDE 10 MG: 10 TABLET ORAL at 11:19

## 2017-03-22 RX ADMIN — GABAPENTIN 300 MG: 300 CAPSULE ORAL at 16:07

## 2017-03-22 RX ADMIN — OXYCODONE HYDROCHLORIDE 10 MG: 10 TABLET ORAL at 20:42

## 2017-03-22 RX ADMIN — FUROSEMIDE 20 MG: 10 INJECTION, SOLUTION INTRAVENOUS at 07:41

## 2017-03-22 RX ADMIN — METOPROLOL TARTRATE 25 MG: 25 TABLET, FILM COATED ORAL at 10:03

## 2017-03-22 RX ADMIN — OXYCODONE HYDROCHLORIDE 10 MG: 10 TABLET ORAL at 06:51

## 2017-03-22 RX ADMIN — VANCOMYCIN HYDROCHLORIDE 125 MG: 10 INJECTION, POWDER, LYOPHILIZED, FOR SOLUTION INTRAVENOUS at 06:49

## 2017-03-22 RX ADMIN — AMPICILLIN SODIUM AND SULBACTAM SODIUM 3 G: 2; 1 INJECTION, POWDER, FOR SOLUTION INTRAMUSCULAR; INTRAVENOUS at 05:35

## 2017-03-22 RX ADMIN — AMPICILLIN SODIUM AND SULBACTAM SODIUM 3 G: 2; 1 INJECTION, POWDER, FOR SOLUTION INTRAMUSCULAR; INTRAVENOUS at 12:47

## 2017-03-22 RX ADMIN — GABAPENTIN 300 MG: 300 CAPSULE ORAL at 20:42

## 2017-03-22 RX ADMIN — OXYCODONE HYDROCHLORIDE 10 MG: 10 TABLET ORAL at 02:09

## 2017-03-22 RX ADMIN — VANCOMYCIN HYDROCHLORIDE 125 MG: 10 INJECTION, POWDER, LYOPHILIZED, FOR SOLUTION INTRAVENOUS at 18:30

## 2017-03-22 RX ADMIN — POTASSIUM CHLORIDE 40 MEQ: 1.5 POWDER, FOR SOLUTION ORAL at 16:06

## 2017-03-22 ASSESSMENT — PAIN SCALES - GENERAL
PAINLEVEL_OUTOF10: 7
PAINLEVEL_OUTOF10: 7
PAINLEVEL_OUTOF10: 8
PAINLEVEL_OUTOF10: 7
PAINLEVEL_OUTOF10: 7
PAINLEVEL_OUTOF10: 8
PAINLEVEL_OUTOF10: 7
PAINLEVEL_OUTOF10: 5

## 2017-03-22 ASSESSMENT — ENCOUNTER SYMPTOMS
BACK PAIN: 1
SORE THROAT: 0
NAUSEA: 0
SHORTNESS OF BREATH: 0
WEAKNESS: 1
HEARTBURN: 0
DIARRHEA: 0
BLURRED VISION: 0
CHILLS: 0
FOCAL WEAKNESS: 0
HEADACHES: 0
GASTROINTESTINAL NEGATIVE: 1
ABDOMINAL PAIN: 0
DIZZINESS: 0
COUGH: 0
VOMITING: 0
WHEEZING: 0
FEVER: 0

## 2017-03-22 NOTE — PROGRESS NOTES
Received report from night RN. Assumed care of patient. Patient A&O x4. Patient on room air. Patient has cortrack at 65 in right nare running diabetasource at 50ml/hr. No IV access at this time. Patient laying in bed watching TV. Bed locked and in lowest position with call light within reach.

## 2017-03-22 NOTE — PROGRESS NOTES
Surgical Progress Note    Author: Rod Dinero Date & Time created: 3/21/2017   6:01 PM     Interval Events:  Patient with Strep and Staph bacteremia.  Possible endograft infection. On antibiotics.  Multiple other problems.    Review of Systems   Respiratory: Negative.    Cardiovascular:        Atrial fibrillation recently cardioverted at [facility]  No significant arterial insufficiency by NIV lower extremities   Gastrointestinal: Negative for nausea, vomiting and abdominal pain.        C. Difficile  Esophageal stricture, Achalasia.   Musculoskeletal:        Low lumbar back pain persistent.  History of osteo toe (left 3rd)       Hemodynamics:  Temp (24hrs), Av.1 °C (98.7 °F), Min:36.7 °C (98.1 °F), Max:37.3 °C (99.2 °F)  Temperature: 36.7 °C (98.1 °F)  Pulse  Av.1  Min: 66  Max: 114  Blood Pressure : 118/82 mmHg     Respiratory:    Respiration: 18, Pulse Oximetry: 92 %        RUL Breath Sounds: Clear, RML Breath Sounds: Clear, RLL Breath Sounds: Diminished, SOLEDAD Breath Sounds: Clear, LLL Breath Sounds: Diminished  Neuro:  GCS       Fluids:    Intake/Output Summary (Last 24 hours) at 17 1801  Last data filed at 17 0700   Gross per 24 hour   Intake    660 ml   Output    450 ml   Net    210 ml     Weight: 74.5 kg (164 lb 3.9 oz)  Current Diet Order   Procedures   • DIET NPO     Physical Exam   Constitutional: He is oriented to person, place, and time.   Eyes: Conjunctivae are normal.   Cardiovascular:   DP and PT pulses not palp.  Femoral and popliteal normal.  AAA not palpable.   Pulmonary/Chest: No respiratory distress.   Abdominal: Soft. He exhibits no distension and no mass. There is no tenderness. There is no rebound and no guarding.   Musculoskeletal: He exhibits no edema or tenderness.   Dry small eschar left third toe.   Neurological: He is alert and oriented to person, place, and time.   Skin: Skin is warm.   Psychiatric: His behavior is normal.     Labs:  Recent Results (from the  past 24 hour(s))   COMP METABOLIC PANEL    Collection Time: 03/21/17  1:52 AM   Result Value Ref Range    Sodium 131 (L) 135 - 145 mmol/L    Potassium 3.4 (L) 3.6 - 5.5 mmol/L    Chloride 97 96 - 112 mmol/L    Co2 25 20 - 33 mmol/L    Anion Gap 9.0 0.0 - 11.9    Glucose 109 (H) 65 - 99 mg/dL    Bun 16 8 - 22 mg/dL    Creatinine 0.93 0.50 - 1.40 mg/dL    Calcium 8.6 8.5 - 10.5 mg/dL    AST(SGOT) 100 (H) 12 - 45 U/L    ALT(SGPT) 96 (H) 2 - 50 U/L    Alkaline Phosphatase 124 (H) 30 - 99 U/L    Total Bilirubin 0.7 0.1 - 1.5 mg/dL    Albumin 2.7 (L) 3.2 - 4.9 g/dL    Total Protein 8.7 (H) 6.0 - 8.2 g/dL    Globulin 6.0 (H) 1.9 - 3.5 g/dL    A-G Ratio 0.5 g/dL   CBC WITH DIFFERENTIAL    Collection Time: 03/21/17  1:52 AM   Result Value Ref Range    WBC 10.5 4.8 - 10.8 K/uL    RBC 3.66 (L) 4.70 - 6.10 M/uL    Hemoglobin 12.5 (L) 14.0 - 18.0 g/dL    Hematocrit 37.2 (L) 42.0 - 52.0 %    .6 (H) 81.4 - 97.8 fL    MCH 34.2 (H) 27.0 - 33.0 pg    MCHC 33.6 (L) 33.7 - 35.3 g/dL    RDW 47.7 35.9 - 50.0 fL    Platelet Count 272 164 - 446 K/uL    MPV 9.1 9.0 - 12.9 fL    Neutrophils-Polys 76.90 (H) 44.00 - 72.00 %    Lymphocytes 13.60 (L) 22.00 - 41.00 %    Monocytes 8.40 0.00 - 13.40 %    Eosinophils 0.20 0.00 - 6.90 %    Basophils 0.30 0.00 - 1.80 %    Immature Granulocytes 0.60 0.00 - 0.90 %    Nucleated RBC 0.00 /100 WBC    Neutrophils (Absolute) 8.06 (H) 1.82 - 7.42 K/uL    Lymphs (Absolute) 1.42 1.00 - 4.80 K/uL    Monos (Absolute) 0.88 (H) 0.00 - 0.85 K/uL    Eos (Absolute) 0.02 0.00 - 0.51 K/uL    Baso (Absolute) 0.03 0.00 - 0.12 K/uL    Immature Granulocytes (abs) 0.06 0.00 - 0.11 K/uL    NRBC (Absolute) 0.00 K/uL   ESTIMATED GFR    Collection Time: 03/21/17  1:52 AM   Result Value Ref Range    GFR If African American >60 >60 mL/min/1.73 m 2    GFR If Non African American >60 >60 mL/min/1.73 m 2     Medical Decision Making, by Problem:  Active Hospital Problems    Diagnosis   • C. difficile diarrhea [A04.7]      Priority: High   • Bacteremia [R78.81]     Priority: High   • Back pain [M54.9]     Priority: High   • Osteomyelitis of toe (CMS-HCC) [M86.9]     Priority: High   • Hyponatremia [E87.1]     Priority: Medium   • Noncompliance [Z91.19]     Priority: Medium   • Macrocytosis [D75.89]     Priority: Medium   • Hypokalemia [E87.6]     Priority: Medium   • CHF (congestive heart failure) (CMS-[facility]) [I50.9]     Priority: Medium   • Atrial fibrillation (CMS-[facility]) [I48.91]     Priority: Medium   • S/P AAA (abdominal aortic aneurysm) repair [Z98.890, Z86.79]     Priority: Medium   • Tobacco dependence [F17.200]     Priority: Low     Plan:  Observe while treatment and evaluation underway.  Considering very high risk explant of endograft with aortic allograft or axillo bifemoral bypass.  Not ready to make this plan yet.    Quality Measures:  Core Measures    Discussed patient condition with patient and hospitalist.

## 2017-03-22 NOTE — PROGRESS NOTES
Received report from previous nurse regarding prior 12 hours.  POC reviewed with pt, white board updated, pt verbalized understanding, call light within reach.  Pt tolerated evening meds via Cortrak.  Pt medicated per MAR for pain in low back 7 out of 10.

## 2017-03-22 NOTE — PROGRESS NOTES
Received call from Mai Colunga who is friend of patient. Discussed with patient and he stated okay to also give her personal medical information. Mai phone [phone].

## 2017-03-22 NOTE — PROGRESS NOTES
Hospital Medicine Progress Note, Adult, Complex               Author: William Jimenez Date & Time created: 3/22/2017  1:46 PM     Interval History:  Admitted for Osteomyelitis of toe, Bacteremia, Clostridium difficile diarrhea, recently discharged AMA from outside hospital.    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 Negative for fever and chills.   HENT: Negative for sore throat.    Eyes: Negative for blurred vision.   Respiratory: Negative for cough, shortness of breath and wheezing.    Cardiovascular: Negative for chest pain.   Gastrointestinal: Negative for heartburn, nausea, vomiting, abdominal pain and diarrhea.   Genitourinary: Negative for dysuria.   Musculoskeletal: Positive for back pain.   Neurological: Positive for weakness. Negative for dizziness, focal weakness and headaches.       Physical Exam:  Physical Exam   Constitutional: He is oriented to person, place, and time. He appears well-developed and well-nourished.   HENT:   Head: Normocephalic and atraumatic.   Eyes: Conjunctivae are normal. Pupils are equal, round, and reactive to light.   Neck: No tracheal deviation present. No thyromegaly present.   Cardiovascular: Normal rate and regular rhythm.    Pulmonary/Chest: Effort normal and breath sounds normal.   Abdominal: Soft. Bowel sounds are normal. He exhibits no distension. There is no tenderness.   Musculoskeletal: He exhibits edema.   L 3rd toe erythema and swelling   Lymphadenopathy:     He has no cervical adenopathy.   Neurological: He is alert and oriented to person, place, and time.    Nursing note and vitals reviewed.      Labs:        Invalid input(s): [id number]  Recent Labs      17   BNPBTYPENAT  434*     Recent Labs      17   1153  17   SODIUM  131*   --   131*  131*   POTASSIUM  2.8*  3.9  3.4*  3.6   CHLORIDE  95*   --   97  96   CO2  27   --   25  24   BUN  18   --   16  19   CREATININE  0.88   --   0.93  0.91   MAGNESIUM   --   1.9   --   2.0   PHOSPHORUS   --    --    --   3.7   CALCIUM  8.5   --   8.6  8.9     Recent Labs      17   ALTSGPT  45  96*  90*   ASTSGOT  66*  100*  98*   ALKPHOSPHAT  86  124*  140*   TBILIRUBIN  0.6  0.7  0.7   PREALBUMIN  6.0*   --    --    GLUCOSE  147*  109*  111*     Recent Labs      17   RBC  3.49*  3.66*   --    HEMOGLOBIN  11.7*  12.5*   --    HEMATOCRIT  35.1*  37.2*   --    PLATELETCT  230  272   --    PROTHROMBTM   --    --   17.6*   INR   --    --   1.40*     Recent Labs      17   WBC  9.6   --   10.5   --    NEUTSPOLYS  77.30*   --   76.90*   --    LYMPHOCYTES  12.80*   --   13.60*   --    MONOCYTES  9.10   --   8.40   --    EOSINOPHILS  0.30   --   0.20   --    BASOPHILS  0.10   --   0.30   --    ASTSGOT   --   66*  100*  98*   ALTSGPT   --   45  96*  90*   ALKPHOSPHAT   --   86  124*  140*   TBILIRUBIN   --   0.6  0.7  0.7           Hemodynamics:  Temp (24hrs), Av.5 °C (97.7 °F), Min:36.2 °C (97.2 °F), Max:36.7 °C (98.1 °F)  Temperature: 36.6 °C (97.8 °F)  Pulse  Av.5  Min: 66  Max: 114  Blood Pressure : 102/69 mmHg     Respiratory:    Respiration: 20, Pulse Oximetry: 96 %        RUL Breath Sounds: Clear, RML Breath Sounds: Clear, RLL Breath Sounds: Diminished, SOLEDAD Breath Sounds: Clear, LLL Breath Sounds: Diminished  Fluids:    Intake/Output Summary (Last 24 hours) at 17 1346  Last data filed at 17  0800   Gross per 24 hour   Intake      0 ml   Output   1500 ml   Net  -1500 ml     Weight: 74.2 kg (163 lb 9.3 oz)  GI/Nutrition:  Orders Placed This Encounter   Procedures   • DIET NPO     Standing Status: Standing      Number of Occurrences: 1      Standing Expiration Date:      Order Specific Question:  Restrict to:     Answer:  Strict [1]     Medical Decision Making, by Problem:  Active Hospital Problems    Diagnosis   • *Osteomyelitis of toe (CMS-HCC) [M86.9]       IV Unasyn, secure MRI results from outside hospital   • C. difficile diarrhea [A04.7]      oral Vancomycin    • Bacteremia [R78.81]         IV Unasyn   • Back pain [M54.9]         Lidocaine patch, Oxycodone   • Hyponatremia [E87.1]         follow bmp   • Noncompliance [Z91.19]    counseling   • Macrocytosis [D75.89]       B12, folic wnl   • Hypokalemia [E87.6]      Kdur, follow bmp    • CHF (congestive heart failure) (CMS-HCC) [I50.9]      Metoprolol, IV Lasix, secure Echo results from outside hospital    • Atrial fibrillation (CMS-HCC) [I48.91]     Amiodarone, Metoprolol, holding Eliquis   • S/P AAA (abdominal aortic aneurysm) repair [Z98.890, Z86.79]     possible endograft infection?   • Tobacco dependence [F17.200]      Nicotine patch        Dysphagia.      Cortrak, TF       Coagulopathy.   follow INR       Hepatitis C.      follow cmp       Dyslipidemia.   hold Zocor       Achalasia.   GI following    Medications reviewed, EKG reviewed, Labs reviewed and Radiology images reviewed  Art catheter: No Art      DVT Prophylaxis: Contraindicated - High bleeding risk  DVT prophylaxis - mechanical: Contra-indicated  Ulcer prophylaxis: No  Antibiotics: Treating active infection/contamination beyond 24 hours perioperative coverage

## 2017-03-22 NOTE — CARE PLAN
Problem: Communication  Goal: The ability to communicate needs accurately and effectively will improve  Outcome: PROGRESSING SLOWER THAN EXPECTED  Discussed with the patient the need to communicate effectively with staff in order to address his concerns in the best possible way. Patient understanding of the information and agrees to try.     Problem: Psychosocial Needs:  Goal: Level of anxiety will decrease  Outcome: PROGRESSING SLOWER THAN EXPECTED  Discussed with patient all his concerns about his upcoming procedure tomorrow and answered all questions regarding the information that was discussed between him and the doctor this morning. All patients concerns were addressed at this time and pt states he understands better now the information.

## 2017-03-22 NOTE — PROGRESS NOTES
Infectious Disease Progress Note    Author: Yary Lujan M.D. DOS & Time created: 3/22/2017  4:30 PM    Chief Complaint   Patient presents with   • Low Back Pain   FU for osteomyelitis, GAS bacteremia and CDI, possible endograft infection    Interval History:  3/17 AF, WBC 12.9, pt c/o lower back pain, asking for morphine, poor insight into illness, denies any diarrhea  3/18 AF, WBC 13.3, sleeping and not arousable to voice, cultures here negative to date  3/19 AF, WBC 13.4, frustrated about lower back pain not adequately controlled on current pain regimen, plan for EGD with dilatation today  3/20 AF WBC 9.6 +pain-denies SE abx  3/21 AF WBC 10.5 no new complaints  3/22 AF WBC not done tolerating abx well  Labs Reviewed, Medications Reviewed, Radiology Reviewed and Wound Reviewed.    Review of Systems:  Review of Systems   Constitutional: Negative for fever and chills.   Respiratory: Negative for cough and shortness of breath.    Gastrointestinal: Negative for nausea, vomiting, abdominal pain and diarrhea.   Genitourinary: Negative for dysuria.   Musculoskeletal: Positive for back pain.   Neurological: Negative for headaches.   All other systems reviewed and are negative.      Hemodynamics:  Temp (24hrs), Av.4 °C (97.6 °F), Min:36.2 °C (97.2 °F), Max:36.7 °C (98.1 °F)  Temperature: 36.3 °C (97.3 °F)  Pulse  Av  Min: 63  Max: 114  Blood Pressure : 101/73 mmHg       Physical Exam:  Physical Exam   Constitutional: He appears well-developed. No distress.   HENT:   Head: Normocephalic and atraumatic.   Eyes: EOM are normal. Pupils are equal, round, and reactive to light.   Pink eye right   Neck: Neck supple.   Cardiovascular: Normal rate.    Murmur heard.  IRR   Pulmonary/Chest: Effort normal and breath sounds normal.   Abdominal: Soft. He exhibits no distension. There is no tenderness.   Musculoskeletal: Normal range of motion. He exhibits edema.   R great toe with slight edema but no drainage or  "erythema    Left 3rd toe ulcer without drainage or erythema    Lumbar spine no TTP   Neurological: He is alert.   Nursing note and vitals reviewed.      Labs:  Recent Labs      03/20/17 0228 03/21/17 0152   WBC  9.6  10.5   RBC  3.49*  3.66*   HEMOGLOBIN  11.7*  12.5*   HEMATOCRIT  35.1*  37.2*   MCV  100.6*  101.6*   MCH  33.5*  34.2*   RDW  47.4  47.7   PLATELETCT  230  272   MPV  9.2  9.1   NEUTSPOLYS  77.30*  76.90*   LYMPHOCYTES  12.80*  13.60*   MONOCYTES  9.10  8.40   EOSINOPHILS  0.30  0.20   BASOPHILS  0.10  0.30     Recent Labs      03/20/17 0229 03/20/17   1153  03/21/17 0152 03/22/17   0218   SODIUM  131*   --   131*  131*   POTASSIUM  2.8*  3.9  3.4*  3.6   CHLORIDE  95*   --   97  96   CO2  27   --   25  24   GLUCOSE  147*   --   109*  111*   BUN  18   --   16  19     Recent Labs      03/20/17 0229 03/21/17 0152 03/22/17 0218   ALBUMIN  2.5*  2.7*  2.8*   TBILIRUBIN  0.6  0.7  0.7   ALKPHOSPHAT  86  124*  140*   TOTPROTEIN  8.1  8.7*  9.8*   ALTSGPT  45  96*  90*   ASTSGOT  66*  100*  98*   CREATININE  0.88  0.93  0.91       BLOOD CULTURE   Date Value Ref Range Status   03/15/2017 No growth after 5 days of incubation.  Final    ':  Results     Procedure Component Value Units Date/Time    BLOOD CULTURE x2 [[id number]] Collected:  03/15/17 1835    Order Status:  Completed Specimen Information:  Blood from Peripheral Updated:  03/20/17 2100     Significant Indicator NEG      Source BLD      Site PERIPHERAL      Blood Culture No growth after 5 days of incubation.     Narrative:      Per Hospital Policy: Only change Specimen Src: to \"Line\" if  specified by physician order.    BLOOD CULTURE x2 [[id number]] Collected:  03/15/17 1907    Order Status:  Completed Specimen Information:  Blood from Peripheral Updated:  03/20/17 2100     Significant Indicator NEG      Source BLD      Site PERIPHERAL      Blood Culture No growth after 5 days of incubation.     Narrative:      Per Hospital Policy: " "Only change Specimen Src: to \"Line\" if  specified by physician order.          Imaging  3/17 MRI lumbar spine: There are changes secondary to the abdominal aortic aneurysm endograft. The aneurysm sac measures an approximately 7.6 x 6.7 cm in size. Abnormal contrast enhancement is noted within the wall of the aneurysmal sac. There is also abnormal soft tissue   contrast enhancement in the surrounding soft tissue. These findings are highly suspicious for infected thrombosed aneurysm sac. The possibility of endograft infection is more likely. There is irregular outpouching of the aneurysmal sac along the   posterior portion indenting the left psoas muscle.  2.  Free fluid in the pelvis  3.  There is mild-to-moderate left hydronephrosis likely representing pelviureteric junction obstruction.    3/16 R foot xray - +OM  3/16 L foot xray +OM    Assessment:  Active Hospital Problems    Diagnosis   • *Osteomyelitis of toe (CMS-HCC) [M86.9]   • Bacteremia [R78.81]   • Back pain [M54.9]   • CHF (congestive heart failure) (CMS-HCC) [I50.9]   • Hypokalemia [E87.6]   • Tobacco dependence [F17.200]   • Atrial fibrillation (CMS-HCC) [I48.91]    Endovascular infection       Plan:  Right great toe and left 3rd osteomyelitis  +OM on radiographs  3/9 OSH wound cx - GAS, MSSA (I confirmed with [place]gman microlab)  LPS following  If no surgical intervention, anticipate 6 week course of IV abx followed by PO due to below concern for vascular infection  Arterial studies - neg  Continue Unasyn as above    Group A streptococcus bacteremia  3/9 OSH Bcx - GAS  3/14 OSH Bcx - NGTD  3/15 Bcx - NGTD  Abx per above    Clostridium difficile diarrhea  Continue oral vancomycin QID for 2 weeks stop date 03/29/17 then BID while on IV abx    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

## 2017-03-22 NOTE — CARE PLAN
Problem: Nutritional:  Goal: Nutrition support tolerated and meeting greater than 85% of estimated needs  Outcome: PROGRESSING AS EXPECTED  Intervention: Initiate TF per protocol  TF running DAC @ 50 mL/hr - goal rate 65 mL/hr

## 2017-03-22 NOTE — PROGRESS NOTES
Surgical Progress Note    Author: Rod Dinero Date & Time created: 3/22/2017   2:49 PM     Interval Events:  Vascular follow up.   Review of Systems   Gastrointestinal: Negative for nausea, vomiting, abdominal pain and diarrhea.        Probable achalasia.  On tube feeds   Musculoskeletal: Positive for back pain.        Left third toe osteomyelitis to be amputated.     Hemodynamics:  Temp (24hrs), Av.5 °C (97.7 °F), Min:36.2 °C (97.2 °F), Max:36.7 °C (98.1 °F)  Temperature: 36.6 °C (97.8 °F)  Pulse  Av.5  Min: 66  Max: 114  Blood Pressure : 102/69 mmHg     Respiratory:    Respiration: 20, Pulse Oximetry: 96 %        RUL Breath Sounds: Clear, RML Breath Sounds: Clear, RLL Breath Sounds: Diminished, SOLEDAD Breath Sounds: Clear, LLL Breath Sounds: Diminished  Neuro:  GCS       Fluids:    Intake/Output Summary (Last 24 hours) at 17 1449  Last data filed at 17 0800   Gross per 24 hour   Intake      0 ml   Output   1500 ml   Net  -1500 ml     Weight: 74.2 kg (163 lb 9.3 oz)  Current Diet Order   Procedures   • DIET NPO     Physical Exam   Constitutional: He is oriented to person, place, and time. He appears well-developed.   Eyes: Conjunctivae are normal.   Cardiovascular: Normal rate.    Left DP palpable.  The right DP not.  PT not palpable bilaterally.   Abdominal: He exhibits distension. He exhibits no mass. There is tenderness. There is no rebound and no guarding.   Musculoskeletal: He exhibits edema.   Neurological: He is alert and oriented to person, place, and time.     Labs:  Recent Results (from the past 24 hour(s))   COMP METABOLIC PANEL    Collection Time: 17  2:18 AM   Result Value Ref Range    Sodium 131 (L) 135 - 145 mmol/L    Potassium 3.6 3.6 - 5.5 mmol/L    Chloride 96 96 - 112 mmol/L    Co2 24 20 - 33 mmol/L    Anion Gap 11.0 0.0 - 11.9    Glucose 111 (H) 65 - 99 mg/dL    Bun 19 8 - 22 mg/dL    Creatinine 0.91 0.50 - 1.40 mg/dL    Calcium 8.9 8.5 - 10.5 mg/dL    AST(SGOT) 98  (H) 12 - 45 U/L    ALT(SGPT) 90 (H) 2 - 50 U/L    Alkaline Phosphatase 140 (H) 30 - 99 U/L    Total Bilirubin 0.7 0.1 - 1.5 mg/dL    Albumin 2.8 (L) 3.2 - 4.9 g/dL    Total Protein 9.8 (H) 6.0 - 8.2 g/dL    Globulin 7.0 (H) 1.9 - 3.5 g/dL    A-G Ratio 0.4 g/dL   PROTHROMBIN TIME    Collection Time: 03/22/17  2:18 AM   Result Value Ref Range    PT 17.6 (H) 12.0 - 14.6 sec    INR 1.40 (H) 0.87 - 1.13   VITAMIN B12    Collection Time: 03/22/17  2:18 AM   Result Value Ref Range    Vitamin B12 -True Cobalamin 461 211 - 911 pg/mL   TSH    Collection Time: 03/22/17  2:18 AM   Result Value Ref Range    TSH 7.110 (H) 0.300 - 3.700 uIU/mL   FOLATE    Collection Time: 03/22/17  2:18 AM   Result Value Ref Range    Folate -Folic Acid 13.4 >4.0 ng/mL   MAGNESIUM    Collection Time: 03/22/17  2:18 AM   Result Value Ref Range    Magnesium 2.0 1.5 - 2.5 mg/dL   PHOSPHORUS    Collection Time: 03/22/17  2:18 AM   Result Value Ref Range    Phosphorus 3.7 2.5 - 4.5 mg/dL   HEPATITIS PANEL ACUTE(4 COMPONENTS)    Collection Time: 03/22/17  2:18 AM   Result Value Ref Range    Hepatitis B Surface Antigen Negative Negative    Hepatitis B Cors Ab,IgM Negative Negative    Hepatitis A Virus Ab, IgM Negative Negative    Hepatitis C Antibody Reactive (A) Negative   BTYPE NATRIURETIC PEPTIDE    Collection Time: 03/22/17  2:18 AM   Result Value Ref Range    B Natriuretic Peptide 434 (H) 0 - 100 pg/mL   ESTIMATED GFR    Collection Time: 03/22/17  2:18 AM   Result Value Ref Range    GFR If African American >60 >60 mL/min/1.73 m 2    GFR If Non African American >60 >60 mL/min/1.73 m 2     Medical Decision Making, by Problem:  Active Hospital Problems    Diagnosis   • C. difficile diarrhea [A04.7]     Priority: High   • Bacteremia [R78.81]     Priority: High   • Back pain [M54.9]     Priority: High   • Osteomyelitis of toe (CMS-HCC) [M86.9]     Priority: High   • Hyponatremia [E87.1]     Priority: Medium   • Noncompliance [Z91.19]     Priority: Medium    • Macrocytosis [D75.89]     Priority: Medium   • Hypokalemia [E87.6]     Priority: Medium   • CHF (congestive heart failure) (CMS-HCC) [I50.9]     Priority: Medium   • Atrial fibrillation (CMS-HCC) [I48.91]     Priority: Medium   • S/P AAA (abdominal aortic aneurysm) repair [Z98.890, Z86.79]     Priority: Medium   • Tobacco dependence [F17.200]     Priority: Low     Plan:  Currently on antibiotics, tube feeding with CT pending to evaluate esophagus. C. Dif on treatment, atrial fibrillation, left 3rd to amputation pending.  AAA endograft possible infection.  Would like confirmation before taking on high risk repair.    Quality Measures:  Labs reviewed  Art catheter: No Art                    Discussed patient condition with RN and Patient

## 2017-03-23 ENCOUNTER — APPOINTMENT (OUTPATIENT)
Dept: RADIOLOGY | Facility: MEDICAL CENTER | Age: 72
DRG: 504 | End: 2017-03-23
Attending: FAMILY MEDICINE
Payer: COMMERCIAL

## 2017-03-23 LAB
ALBUMIN SERPL BCP-MCNC: 2.6 G/DL (ref 3.2–4.9)
ALBUMIN/GLOB SERPL: 0.4 G/DL
ALP SERPL-CCNC: 161 U/L (ref 30–99)
ALT SERPL-CCNC: 131 U/L (ref 2–50)
ANION GAP SERPL CALC-SCNC: 10 MMOL/L (ref 0–11.9)
AST SERPL-CCNC: 119 U/L (ref 12–45)
BILIRUB SERPL-MCNC: 0.8 MG/DL (ref 0.1–1.5)
BUN SERPL-MCNC: 23 MG/DL (ref 8–22)
CALCIUM SERPL-MCNC: 8.7 MG/DL (ref 8.5–10.5)
CHLORIDE SERPL-SCNC: 96 MMOL/L (ref 96–112)
CO2 SERPL-SCNC: 22 MMOL/L (ref 20–33)
CREAT SERPL-MCNC: 0.87 MG/DL (ref 0.5–1.4)
GFR SERPL CREATININE-BSD FRML MDRD: >60 ML/MIN/1.73 M 2
GLOBULIN SER CALC-MCNC: 6.1 G/DL (ref 1.9–3.5)
GLUCOSE BLD-MCNC: 119 MG/DL (ref 65–99)
GLUCOSE SERPL-MCNC: 116 MG/DL (ref 65–99)
POTASSIUM SERPL-SCNC: 4 MMOL/L (ref 3.6–5.5)
PROT SERPL-MCNC: 8.7 G/DL (ref 6–8.2)
SODIUM SERPL-SCNC: 128 MMOL/L (ref 135–145)

## 2017-03-23 PROCEDURE — 99152 MOD SED SAME PHYS/QHP 5/>YRS: CPT | Performed by: INTERNAL MEDICINE

## 2017-03-23 PROCEDURE — A9270 NON-COVERED ITEM OR SERVICE: HCPCS | Performed by: INTERNAL MEDICINE

## 2017-03-23 PROCEDURE — A9579 GAD-BASE MR CONTRAST NOS,1ML: HCPCS | Performed by: FAMILY MEDICINE

## 2017-03-23 PROCEDURE — 36415 COLL VENOUS BLD VENIPUNCTURE: CPT

## 2017-03-23 PROCEDURE — A9270 NON-COVERED ITEM OR SERVICE: HCPCS | Performed by: HOSPITALIST

## 2017-03-23 PROCEDURE — 73720 MRI LWR EXTREMITY W/O&W/DYE: CPT | Mod: RT

## 2017-03-23 PROCEDURE — A9270 NON-COVERED ITEM OR SERVICE: HCPCS | Performed by: FAMILY MEDICINE

## 2017-03-23 PROCEDURE — 36569 INSJ PICC 5 YR+ W/O IMAGING: CPT

## 2017-03-23 PROCEDURE — 80053 COMPREHEN METABOLIC PANEL: CPT

## 2017-03-23 PROCEDURE — 160202 HCHG ENDO MINUTES - 1ST 30 MINS LEVEL 3: Performed by: INTERNAL MEDICINE

## 2017-03-23 PROCEDURE — 99153 MOD SED SAME PHYS/QHP EA: CPT | Performed by: INTERNAL MEDICINE

## 2017-03-23 PROCEDURE — 700117 HCHG RX CONTRAST REV CODE 255: Performed by: FAMILY MEDICINE

## 2017-03-23 PROCEDURE — 700111 HCHG RX REV CODE 636 W/ 250 OVERRIDE (IP): Performed by: NURSE PRACTITIONER

## 2017-03-23 PROCEDURE — 700111 HCHG RX REV CODE 636 W/ 250 OVERRIDE (IP): Performed by: INTERNAL MEDICINE

## 2017-03-23 PROCEDURE — 700102 HCHG RX REV CODE 250 W/ 637 OVERRIDE(OP): Performed by: HOSPITALIST

## 2017-03-23 PROCEDURE — 160002 HCHG RECOVERY MINUTES (STAT): Performed by: INTERNAL MEDICINE

## 2017-03-23 PROCEDURE — C1751 CATH, INF, PER/CENT/MIDLINE: HCPCS

## 2017-03-23 PROCEDURE — 700102 HCHG RX REV CODE 250 W/ 637 OVERRIDE(OP): Performed by: INTERNAL MEDICINE

## 2017-03-23 PROCEDURE — 99233 SBSQ HOSP IP/OBS HIGH 50: CPT | Performed by: FAMILY MEDICINE

## 2017-03-23 PROCEDURE — 770020 HCHG ROOM/CARE - TELE (206)

## 2017-03-23 PROCEDURE — 160048 HCHG OR STATISTICAL LEVEL 1-5: Performed by: INTERNAL MEDICINE

## 2017-03-23 PROCEDURE — 700111 HCHG RX REV CODE 636 W/ 250 OVERRIDE (IP)

## 2017-03-23 PROCEDURE — 700101 HCHG RX REV CODE 250: Performed by: FAMILY MEDICINE

## 2017-03-23 PROCEDURE — 73720 MRI LWR EXTREMITY W/O&W/DYE: CPT | Mod: LT

## 2017-03-23 PROCEDURE — 700102 HCHG RX REV CODE 250 W/ 637 OVERRIDE(OP): Performed by: FAMILY MEDICINE

## 2017-03-23 PROCEDURE — 3E0G8GC INTRODUCTION OF OTHER THERAPEUTIC SUBSTANCE INTO UPPER GI, VIA NATURAL OR ARTIFICIAL OPENING ENDOSCOPIC: ICD-10-PCS | Performed by: INTERNAL MEDICINE

## 2017-03-23 PROCEDURE — 160035 HCHG PACU - 1ST 60 MINS PHASE I: Performed by: INTERNAL MEDICINE

## 2017-03-23 PROCEDURE — 82962 GLUCOSE BLOOD TEST: CPT

## 2017-03-23 PROCEDURE — 700105 HCHG RX REV CODE 258: Performed by: INTERNAL MEDICINE

## 2017-03-23 PROCEDURE — 500066 HCHG BITE BLOCK, ECT: Performed by: INTERNAL MEDICINE

## 2017-03-23 PROCEDURE — 160207 HCHG ENDO MINUTES - EA ADDL 1 MIN LEVEL 3: Performed by: INTERNAL MEDICINE

## 2017-03-23 RX ORDER — SODIUM CHLORIDE 9 MG/ML
500 INJECTION, SOLUTION INTRAVENOUS PRN
Status: COMPLETED | OUTPATIENT
Start: 2017-03-23 | End: 2017-03-24

## 2017-03-23 RX ORDER — MIDAZOLAM HYDROCHLORIDE 1 MG/ML
.5-2 INJECTION INTRAMUSCULAR; INTRAVENOUS PRN
Status: ACTIVE | OUTPATIENT
Start: 2017-03-23 | End: 2017-03-23

## 2017-03-23 RX ADMIN — NICOTINE 14 MG: 14 PATCH TRANSDERMAL at 06:37

## 2017-03-23 RX ADMIN — FUROSEMIDE 20 MG: 20 TABLET ORAL at 16:50

## 2017-03-23 RX ADMIN — GABAPENTIN 300 MG: 300 CAPSULE ORAL at 16:51

## 2017-03-23 RX ADMIN — AMPICILLIN SODIUM AND SULBACTAM SODIUM 3 G: 2; 1 INJECTION, POWDER, FOR SOLUTION INTRAMUSCULAR; INTRAVENOUS at 06:31

## 2017-03-23 RX ADMIN — MORPHINE SULFATE 2 MG: 4 INJECTION INTRAVENOUS at 02:17

## 2017-03-23 RX ADMIN — OXYCODONE HYDROCHLORIDE 10 MG: 10 TABLET ORAL at 21:31

## 2017-03-23 RX ADMIN — AMPICILLIN SODIUM AND SULBACTAM SODIUM 3 G: 2; 1 INJECTION, POWDER, FOR SOLUTION INTRAMUSCULAR; INTRAVENOUS at 22:30

## 2017-03-23 RX ADMIN — VANCOMYCIN HYDROCHLORIDE 125 MG: 10 INJECTION, POWDER, LYOPHILIZED, FOR SOLUTION INTRAVENOUS at 23:48

## 2017-03-23 RX ADMIN — AMIODARONE HYDROCHLORIDE 400 MG: 200 TABLET ORAL at 21:33

## 2017-03-23 RX ADMIN — AMPICILLIN SODIUM AND SULBACTAM SODIUM 3 G: 2; 1 INJECTION, POWDER, FOR SOLUTION INTRAMUSCULAR; INTRAVENOUS at 11:51

## 2017-03-23 RX ADMIN — OXYCODONE HYDROCHLORIDE 10 MG: 10 TABLET ORAL at 16:50

## 2017-03-23 RX ADMIN — VANCOMYCIN HYDROCHLORIDE 125 MG: 10 INJECTION, POWDER, LYOPHILIZED, FOR SOLUTION INTRAVENOUS at 20:06

## 2017-03-23 RX ADMIN — METOPROLOL TARTRATE 25 MG: 25 TABLET, FILM COATED ORAL at 21:33

## 2017-03-23 RX ADMIN — POTASSIUM CHLORIDE 40 MEQ: 1.5 POWDER, FOR SOLUTION ORAL at 21:34

## 2017-03-23 RX ADMIN — GABAPENTIN 300 MG: 300 CAPSULE ORAL at 21:33

## 2017-03-23 RX ADMIN — MORPHINE SULFATE 2 MG: 4 INJECTION INTRAVENOUS at 06:27

## 2017-03-23 RX ADMIN — POTASSIUM CHLORIDE 40 MEQ: 1.5 POWDER, FOR SOLUTION ORAL at 16:50

## 2017-03-23 RX ADMIN — LIDOCAINE 2 PATCH: 50 PATCH CUTANEOUS at 13:32

## 2017-03-23 RX ADMIN — AMPICILLIN SODIUM AND SULBACTAM SODIUM 3 G: 2; 1 INJECTION, POWDER, FOR SOLUTION INTRAMUSCULAR; INTRAVENOUS at 16:50

## 2017-03-23 RX ADMIN — AMPICILLIN SODIUM AND SULBACTAM SODIUM 3 G: 2; 1 INJECTION, POWDER, FOR SOLUTION INTRAMUSCULAR; INTRAVENOUS at 00:05

## 2017-03-23 RX ADMIN — VANCOMYCIN HYDROCHLORIDE 125 MG: 10 INJECTION, POWDER, LYOPHILIZED, FOR SOLUTION INTRAVENOUS at 00:13

## 2017-03-23 RX ADMIN — GADODIAMIDE 15 ML: 287 INJECTION INTRAVENOUS at 18:57

## 2017-03-23 RX ADMIN — OXYCODONE HYDROCHLORIDE 10 MG: 10 TABLET ORAL at 02:17

## 2017-03-23 ASSESSMENT — ENCOUNTER SYMPTOMS
BACK PAIN: 1
WEAKNESS: 1
COUGH: 0
WHEEZING: 0
NAUSEA: 0
DIARRHEA: 0
SHORTNESS OF BREATH: 0
DIZZINESS: 0
CHILLS: 0
BLURRED VISION: 0
SORE THROAT: 0
HEARTBURN: 0
ABDOMINAL PAIN: 0
VOMITING: 0
FEVER: 0
FOCAL WEAKNESS: 0
HEADACHES: 0

## 2017-03-23 ASSESSMENT — PAIN SCALES - GENERAL
PAINLEVEL_OUTOF10: 5
PAINLEVEL_OUTOF10: 10
PAINLEVEL_OUTOF10: 8
PAINLEVEL_OUTOF10: 10
PAINLEVEL_OUTOF10: 6
PAINLEVEL_OUTOF10: 9

## 2017-03-23 NOTE — PROGRESS NOTES
Vascular    Discussed at Vascular Conference.  Recommendation is tagged WBC scan.  Alternative needle biopsy.  Scan ordered.  If positive consider ID consult for long term treatment and suppression.

## 2017-03-23 NOTE — PROGRESS NOTES
Hospital Medicine Progress Note, Adult, Complex               Author: William Jimenez Date & Time created: 3/23/2017  12:43 PM     Interval History:  Admitted for Osteomyelitis of toe, Bacteremia, Clostridium difficile diarrhea, recently discharged AMA from outside hospital.    OM - MRI from outside hospital showed abnormal marrow signal of R big toe  Bacteremia - culture showed Grp A streptococcus from outside hospital  C diff - diarrhea has slowed down  AAA endograft - possible infection on incidental finding on MRI  Afib - in and out of Afib, cardioverted at outside hospital  CHF - echo done at outside hospital  Dysphagia - EGD with botox done today     Review of Systems:  Review of Systems   Constitutional: Positive for malaise/fatigue. Negative for fever and chills.   HENT: Negative for sore throat.    Eyes: Negative for blurred vision.   Respiratory: Negative for cough, shortness of breath and wheezing.    Cardiovascular: Negative for chest pain.   Gastrointestinal: Negative for heartburn, nausea, vomiting, abdominal pain and diarrhea.   Genitourinary: Negative for dysuria.   Musculoskeletal: Positive for back pain.   Neurological: Positive for weakness. Negative for dizziness, focal weakness and headaches.       Physical Exam:  Physical Exam   Constitutional: He is oriented to person, place, and time. He appears well-developed and well-nourished.   HENT:   Head: Normocephalic and atraumatic.   Eyes: Conjunctivae are normal. Pupils are equal, round, and reactive to light.   Neck: No tracheal deviation present. No thyromegaly present.   Cardiovascular: Normal rate and regular rhythm.    Pulmonary/Chest: Effort normal and breath sounds normal.   Abdominal: Soft. Bowel sounds are normal. He exhibits no distension. There is no tenderness.   Musculoskeletal: He exhibits edema.   L 3rd toe erythema and swelling   Lymphadenopathy:     He has no cervical adenopathy.   Neurological: He is alert and oriented to  person, place, and time.   Nursing note and vitals reviewed.      Labs:        Invalid input(s): [id number]  Recent Labs      17   BNPBTYPENAT  434*     Recent Labs      17   SODIUM  131*  131*  128*   POTASSIUM  3.4*  3.6  4.0   CHLORIDE  97  96  96   CO2  25  24  22   BUN  16  19  23*   CREATININE  0.93  0.91  0.87   MAGNESIUM   --   2.0   --    PHOSPHORUS   --   3.7   --    CALCIUM  8.6  8.9  8.7     Recent Labs      17   ALTSGPT  96*  90*  131*   ASTSGOT  100*  98*  119*   ALKPHOSPHAT  124*  140*  161*   TBILIRUBIN  0.7  0.7  0.8   GLUCOSE  109*  111*  116*     Recent Labs      17   RBC  3.66*   --    HEMOGLOBIN  12.5*   --    HEMATOCRIT  37.2*   --    PLATELETCT  272   --    PROTHROMBTM   --   17.6*   INR   --   1.40*     Recent Labs      17   WBC  10.5   --    --    NEUTSPOLYS  76.90*   --    --    LYMPHOCYTES  13.60*   --    --    MONOCYTES  8.40   --    --    EOSINOPHILS  0.20   --    --    BASOPHILS  0.30   --    --    ASTSGOT  100*  98*  119*   ALTSGPT  96*  90*  131*   ALKPHOSPHAT  124*  140*  161*   TBILIRUBIN  0.7  0.7  0.8           Hemodynamics:  Temp (24hrs), Av.8 °C (98.3 °F), Min:36.3 °C (97.3 °F), Max:37.2 °C (98.9 °F)  Temperature: 36.9 °C (98.5 °F)  Pulse  Av.1  Min: 63  Max: 114Heart Rate (Monitored): 87  Blood Pressure : 118/78 mmHg, NIBP: 137/84 mmHg     Respiratory:    Respiration: 18, Pulse Oximetry: 95 %        RUL Breath Sounds: Clear, RML Breath Sounds: Clear, RLL Breath Sounds: Diminished, SOLEDAD Breath Sounds: Clear, LLL Breath Sounds: Diminished  Fluids:    Intake/Output Summary (Last 24 hours) at 17 1243  Last data filed at 17 0900   Gross per 24 hour   Intake    615 ml   Output   1250 ml   Net   -635 ml     Weight: 65.4 kg (144 lb 2.9 oz)  GI/Nutrition:  Orders Placed This  Encounter   Procedures   • DIET ORDER     Standing Status: Standing      Number of Occurrences: 1      Standing Expiration Date:      Order Specific Question:  Diet:     Answer:  Full Liquid [11]     Medical Decision Making, by Problem:  Active Hospital Problems    Diagnosis   • *Osteomyelitis of toe (CMS-HCC) [M86.9]       IV Unasyn, MRI here?   • C. difficile diarrhea [A04.7]      oral Vancomycin    • Bacteremia [R78.81]         IV Unasyn   • Back pain [M54.9]         Lidocaine patch, Oxycodone   • Hyponatremia [E87.1]         follow bmp   • Noncompliance [Z91.19]    counseling   • Macrocytosis [D75.89]       B12, folic wnl   • Hypokalemia [E87.6]      Kdur, follow bmp    • CHF (congestive heart failure) (CMS-HCC) [I50.9]      Metoprolol, IV Lasix, secure Echo results from outside hospital    • Atrial fibrillation (CMS-HCC) [I48.91]     Amiodarone, Metoprolol, holding Eliquis   • S/P AAA (abdominal aortic aneurysm) repair [Z98.890, Z86.79]     possible Endograft infection?, for tagged WBC scan   • Tobacco dependence [F17.200]      Nicotine patch        Dysphagia.      Cortrak, TF       Coagulopathy.   follow INR       Hepatitis C.      follow cmp       Dyslipidemia.   hold Zocor       Achalasia.        s/p EGD with botox injection    Medications reviewed, EKG reviewed, Labs reviewed and Radiology images reviewed  Art catheter: No Art      DVT Prophylaxis: Contraindicated - High bleeding risk  DVT prophylaxis - mechanical: Contra-indicated  Ulcer prophylaxis: No  Antibiotics: Treating active infection/contamination beyond 24 hours perioperative coverage

## 2017-03-23 NOTE — PROGRESS NOTES
Received report from previous nurse regarding prior 12 hours.  POC reviewed with pt, white board updated, pt verbalized understanding, call light within reach.  Pt c/o 8/10 pain.  Medicated per MAR.

## 2017-03-23 NOTE — PROCEDURES
GI    EGD  -no esophageal or gastric mass  -mild gastropathy  -placement of 100U of botox into GE junction     Plan  -monitor for improvement in dysphagia  -long term treatment with surgery or dilation depending on his overall clinical status

## 2017-03-23 NOTE — PROGRESS NOTES
Infectious Disease Progress Note    Author: Yary Lujan M.D. DOS & Time created: 3/23/2017  4:30 PM    Chief Complaint   Patient presents with   • Low Back Pain   FU for osteomyelitis, GAS bacteremia and CDI, possible endograft infection    Interval History:  3/17 AF, WBC 12.9, pt c/o lower back pain, asking for morphine, poor insight into illness, denies any diarrhea  3/18 AF, WBC 13.3, sleeping and not arousable to voice, cultures here negative to date  3/19 AF, WBC 13.4, frustrated about lower back pain not adequately controlled on current pain regimen, plan for EGD with dilatation today  3/20 AF WBC 9.6 +pain-denies SE abx  3/21 AF WBC 10.5 no new complaints  3/22 AF WBC not done tolerating abx well  3/23 AF eye feels a little better-pain about the same  Labs Reviewed, Medications Reviewed, Radiology Reviewed and Wound Reviewed.    Review of Systems:  Review of Systems   Constitutional: Negative for fever and chills.   Respiratory: Negative for cough and shortness of breath.    Gastrointestinal: Negative for nausea, vomiting, abdominal pain and diarrhea.   Genitourinary: Negative for dysuria.   Musculoskeletal: Positive for back pain.   Neurological: Negative for headaches.   All other systems reviewed and are negative.      Hemodynamics:  Temp (24hrs), Av.9 °C (98.5 °F), Min:36.7 °C (98.1 °F), Max:37.2 °C (98.9 °F)  Temperature: 36.9 °C (98.5 °F)  Pulse  Av.1  Min: 63  Max: 114Heart Rate (Monitored): 87  Blood Pressure : 118/78 mmHg, NIBP: 137/84 mmHg       Physical Exam:  Physical Exam   Constitutional: He appears well-developed. No distress.   HENT:   Head: Normocephalic and atraumatic.   Eyes: EOM are normal. Pupils are equal, round, and reactive to light.   Pink eye right   Neck: Neck supple.   Cardiovascular: Normal rate.    Murmur heard.  IRR   Pulmonary/Chest: Effort normal and breath sounds normal.   Abdominal: Soft. He exhibits no distension. There is no tenderness.   Musculoskeletal:  "Normal range of motion. He exhibits edema.   R great toe with slight edema but no drainage or erythema    Left 3rd toe ulcer without drainage or erythema    Lumbar spine no TTP   Neurological: He is alert.   Nursing note and vitals reviewed.      Labs:  Recent Labs      03/21/17 0152   WBC  10.5   RBC  3.66*   HEMOGLOBIN  12.5*   HEMATOCRIT  37.2*   MCV  101.6*   MCH  34.2*   RDW  47.7   PLATELETCT  272   MPV  9.1   NEUTSPOLYS  76.90*   LYMPHOCYTES  13.60*   MONOCYTES  8.40   EOSINOPHILS  0.20   BASOPHILS  0.30     Recent Labs      03/21/17   0152 03/22/17   0218  03/23/17   0345   SODIUM  131*  131*  128*   POTASSIUM  3.4*  3.6  4.0   CHLORIDE  97  96  96   CO2  25  24  22   GLUCOSE  109*  111*  116*   BUN  16  19  23*     Recent Labs      03/21/17 0152 03/22/17 0218  03/23/17   0345   ALBUMIN  2.7*  2.8*  2.6*   TBILIRUBIN  0.7  0.7  0.8   ALKPHOSPHAT  124*  140*  161*   TOTPROTEIN  8.7*  9.8*  8.7*   ALTSGPT  96*  90*  131*   ASTSGOT  100*  98*  119*   CREATININE  0.93  0.91  0.87       BLOOD CULTURE   Date Value Ref Range Status   03/15/2017 No growth after 5 days of incubation.  Final    ':  Results     Procedure Component Value Units Date/Time    BLOOD CULTURE x2 [[id number]] Collected:  03/15/17 1835    Order Status:  Completed Specimen Information:  Blood from Peripheral Updated:  03/20/17 2100     Significant Indicator NEG      Source BLD      Site PERIPHERAL      Blood Culture No growth after 5 days of incubation.     Narrative:      Per Hospital Policy: Only change Specimen Src: to \"Line\" if  specified by physician order.    BLOOD CULTURE x2 [[id number]] Collected:  03/15/17 1907    Order Status:  Completed Specimen Information:  Blood from Peripheral Updated:  03/20/17 2100     Significant Indicator NEG      Source BLD      Site PERIPHERAL      Blood Culture No growth after 5 days of incubation.     Narrative:      Per Hospital Policy: Only change Specimen Src: to \"Line\" if  specified by physician " order.          Imaging  3/17 MRI lumbar spine: There are changes secondary to the abdominal aortic aneurysm endograft. The aneurysm sac measures an approximately 7.6 x 6.7 cm in size. Abnormal contrast enhancement is noted within the wall of the aneurysmal sac. There is also abnormal soft tissue   contrast enhancement in the surrounding soft tissue. These findings are highly suspicious for infected thrombosed aneurysm sac. The possibility of endograft infection is more likely. There is irregular outpouching of the aneurysmal sac along the   posterior portion indenting the left psoas muscle.  2.  Free fluid in the pelvis  3.  There is mild-to-moderate left hydronephrosis likely representing pelviureteric junction obstruction.    3/16 R foot xray - +OM  3/16 L foot xray +OM    Assessment:  Active Hospital Problems    Diagnosis   • *Osteomyelitis of toe (CMS-HCC) [M86.9]   • Bacteremia [R78.81]   • Back pain [M54.9]   • CHF (congestive heart failure) (CMS-HCC) [I50.9]   • Tobacco dependence [F17.200]   • Atrial fibrillation (CMS-HCC) [I48.91]    Endovascular infection       Plan:  Right great toe and left 3rd osteomyelitis  +OM on radiographs-plan for repeat MRI  3/9 OSH wound cx - GAS, MSSA (I confirmed with Henry's microlab)  If no surgical intervention, anticipate 6 week course of IV abx followed by PO due to below concern for vascular infection  Continue Unasyn as above    Group A streptococcus bacteremia  3/9 OSH Bcx - GAS  3/14 OSH Bcx - NGTD  3/15 Bcx - NGTD  Abx per above    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

## 2017-03-23 NOTE — PROGRESS NOTES
Many attempts to gain IV access with different RN's and US guided attempts.    0240  Page to hospitalist to update on pt having no IV access.  RN requesting order for an EJ IV.      0245 RN updated Dr. Díaz on pt without IV access.  Ordered EJ placement.      0246  RN notified ED charge of need for placement.

## 2017-03-23 NOTE — PROGRESS NOTES
RN called ED charge RN 2nd time requesting ED nurse to place EJ IV.  Different RN attempted PIV again without success.  Pt now refusing IV placement until EJ can be placed. Pt understands that he has no IV access for pain meds and can no longer receive pain meds via Cortrak b/c procedure today.  Pt frustrated that EJ is taking so long to be placed.

## 2017-03-24 ENCOUNTER — APPOINTMENT (OUTPATIENT)
Dept: RADIOLOGY | Facility: MEDICAL CENTER | Age: 72
DRG: 504 | End: 2017-03-24
Attending: SURGERY
Payer: COMMERCIAL

## 2017-03-24 LAB
ALBUMIN SERPL BCP-MCNC: 2.5 G/DL (ref 3.2–4.9)
ALBUMIN/GLOB SERPL: 0.4 G/DL
ALP SERPL-CCNC: 136 U/L (ref 30–99)
ALT SERPL-CCNC: 93 U/L (ref 2–50)
ANION GAP SERPL CALC-SCNC: 7 MMOL/L (ref 0–11.9)
AST SERPL-CCNC: 65 U/L (ref 12–45)
BILIRUB SERPL-MCNC: 0.7 MG/DL (ref 0.1–1.5)
BUN SERPL-MCNC: 20 MG/DL (ref 8–22)
CALCIUM SERPL-MCNC: 8.7 MG/DL (ref 8.5–10.5)
CHLORIDE SERPL-SCNC: 98 MMOL/L (ref 96–112)
CO2 SERPL-SCNC: 24 MMOL/L (ref 20–33)
CREAT SERPL-MCNC: 0.98 MG/DL (ref 0.5–1.4)
GFR SERPL CREATININE-BSD FRML MDRD: >60 ML/MIN/1.73 M 2
GLOBULIN SER CALC-MCNC: 5.9 G/DL (ref 1.9–3.5)
GLUCOSE SERPL-MCNC: 97 MG/DL (ref 65–99)
HCV RNA SERPL NAA+PROBE-ACNC: <15 IU/ML
HCV RNA SERPL NAA+PROBE-LOG IU: <1.2 LOG IU
HCV RNA SERPL QL NAA+PROBE: NOT DETECTED
MAGNESIUM SERPL-MCNC: 2.1 MG/DL (ref 1.5–2.5)
PATHOLOGY STUDY: NORMAL
POTASSIUM SERPL-SCNC: 4 MMOL/L (ref 3.6–5.5)
PROT SERPL-MCNC: 8.4 G/DL (ref 6–8.2)
SODIUM SERPL-SCNC: 129 MMOL/L (ref 135–145)
T3FREE SERPL-MCNC: 2.48 PG/ML (ref 2.4–4.2)
T4 FREE SERPL-MCNC: 0.99 NG/DL (ref 0.53–1.43)

## 2017-03-24 PROCEDURE — A9270 NON-COVERED ITEM OR SERVICE: HCPCS | Performed by: INTERNAL MEDICINE

## 2017-03-24 PROCEDURE — 84481 FREE ASSAY (FT-3): CPT

## 2017-03-24 PROCEDURE — 83735 ASSAY OF MAGNESIUM: CPT

## 2017-03-24 PROCEDURE — 84439 ASSAY OF FREE THYROXINE: CPT

## 2017-03-24 PROCEDURE — A9270 NON-COVERED ITEM OR SERVICE: HCPCS | Performed by: FAMILY MEDICINE

## 2017-03-24 PROCEDURE — 700102 HCHG RX REV CODE 250 W/ 637 OVERRIDE(OP): Performed by: HOSPITALIST

## 2017-03-24 PROCEDURE — 700111 HCHG RX REV CODE 636 W/ 250 OVERRIDE (IP): Performed by: INTERNAL MEDICINE

## 2017-03-24 PROCEDURE — 99233 SBSQ HOSP IP/OBS HIGH 50: CPT | Performed by: FAMILY MEDICINE

## 2017-03-24 PROCEDURE — 80053 COMPREHEN METABOLIC PANEL: CPT

## 2017-03-24 PROCEDURE — 700105 HCHG RX REV CODE 258: Performed by: INTERNAL MEDICINE

## 2017-03-24 PROCEDURE — 700102 HCHG RX REV CODE 250 W/ 637 OVERRIDE(OP): Performed by: FAMILY MEDICINE

## 2017-03-24 PROCEDURE — 700101 HCHG RX REV CODE 250: Performed by: FAMILY MEDICINE

## 2017-03-24 PROCEDURE — 770020 HCHG ROOM/CARE - TELE (206)

## 2017-03-24 PROCEDURE — 700111 HCHG RX REV CODE 636 W/ 250 OVERRIDE (IP): Performed by: NURSE PRACTITIONER

## 2017-03-24 PROCEDURE — 700102 HCHG RX REV CODE 250 W/ 637 OVERRIDE(OP): Performed by: INTERNAL MEDICINE

## 2017-03-24 PROCEDURE — A9270 NON-COVERED ITEM OR SERVICE: HCPCS | Performed by: HOSPITALIST

## 2017-03-24 RX ORDER — OXYCODONE HCL 10 MG/1
10 TABLET, FILM COATED, EXTENDED RELEASE ORAL EVERY 12 HOURS
Status: DISCONTINUED | OUTPATIENT
Start: 2017-03-24 | End: 2017-03-27

## 2017-03-24 RX ORDER — SODIUM CHLORIDE 9 MG/ML
500 INJECTION, SOLUTION INTRAVENOUS ONCE
Status: COMPLETED | OUTPATIENT
Start: 2017-03-24 | End: 2017-03-24

## 2017-03-24 RX ORDER — OXYCODONE HYDROCHLORIDE 5 MG/1
5 TABLET ORAL EVERY 4 HOURS PRN
Status: DISCONTINUED | OUTPATIENT
Start: 2017-03-24 | End: 2017-03-28

## 2017-03-24 RX ORDER — GABAPENTIN 400 MG/1
400 CAPSULE ORAL 3 TIMES DAILY
Status: DISCONTINUED | OUTPATIENT
Start: 2017-03-24 | End: 2017-03-29

## 2017-03-24 RX ORDER — OXYCODONE HYDROCHLORIDE 10 MG/1
10 TABLET ORAL EVERY 4 HOURS PRN
Status: DISCONTINUED | OUTPATIENT
Start: 2017-03-24 | End: 2017-03-28

## 2017-03-24 RX ADMIN — GABAPENTIN 400 MG: 400 CAPSULE ORAL at 20:44

## 2017-03-24 RX ADMIN — VANCOMYCIN HYDROCHLORIDE 125 MG: 10 INJECTION, POWDER, LYOPHILIZED, FOR SOLUTION INTRAVENOUS at 17:49

## 2017-03-24 RX ADMIN — AMPICILLIN SODIUM AND SULBACTAM SODIUM 3 G: 2; 1 INJECTION, POWDER, FOR SOLUTION INTRAMUSCULAR; INTRAVENOUS at 10:47

## 2017-03-24 RX ADMIN — VANCOMYCIN HYDROCHLORIDE 125 MG: 10 INJECTION, POWDER, LYOPHILIZED, FOR SOLUTION INTRAVENOUS at 06:23

## 2017-03-24 RX ADMIN — AMPICILLIN SODIUM AND SULBACTAM SODIUM 3 G: 2; 1 INJECTION, POWDER, FOR SOLUTION INTRAMUSCULAR; INTRAVENOUS at 16:44

## 2017-03-24 RX ADMIN — OXYCODONE HYDROCHLORIDE 10 MG: 10 TABLET, FILM COATED, EXTENDED RELEASE ORAL at 20:44

## 2017-03-24 RX ADMIN — VANCOMYCIN HYDROCHLORIDE 125 MG: 10 INJECTION, POWDER, LYOPHILIZED, FOR SOLUTION INTRAVENOUS at 12:46

## 2017-03-24 RX ADMIN — GABAPENTIN 300 MG: 300 CAPSULE ORAL at 10:03

## 2017-03-24 RX ADMIN — OXYCODONE HYDROCHLORIDE 10 MG: 10 TABLET ORAL at 17:50

## 2017-03-24 RX ADMIN — SODIUM CHLORIDE 500 ML: 9 INJECTION, SOLUTION INTRAVENOUS at 20:52

## 2017-03-24 RX ADMIN — AMPICILLIN SODIUM AND SULBACTAM SODIUM 3 G: 2; 1 INJECTION, POWDER, FOR SOLUTION INTRAMUSCULAR; INTRAVENOUS at 23:24

## 2017-03-24 RX ADMIN — AMIODARONE HYDROCHLORIDE 400 MG: 200 TABLET ORAL at 10:03

## 2017-03-24 RX ADMIN — OXYCODONE HYDROCHLORIDE 10 MG: 10 TABLET ORAL at 21:42

## 2017-03-24 RX ADMIN — OXYCODONE HYDROCHLORIDE 10 MG: 10 TABLET ORAL at 06:23

## 2017-03-24 RX ADMIN — AMPICILLIN SODIUM AND SULBACTAM SODIUM 3 G: 2; 1 INJECTION, POWDER, FOR SOLUTION INTRAMUSCULAR; INTRAVENOUS at 05:40

## 2017-03-24 RX ADMIN — NICOTINE 14 MG: 14 PATCH TRANSDERMAL at 06:23

## 2017-03-24 RX ADMIN — METOPROLOL TARTRATE 12.5 MG: 25 TABLET, FILM COATED ORAL at 21:42

## 2017-03-24 RX ADMIN — OXYCODONE HYDROCHLORIDE 10 MG: 10 TABLET, FILM COATED, EXTENDED RELEASE ORAL at 14:04

## 2017-03-24 RX ADMIN — POTASSIUM CHLORIDE 40 MEQ: 1.5 POWDER, FOR SOLUTION ORAL at 10:03

## 2017-03-24 RX ADMIN — OXYCODONE HYDROCHLORIDE 10 MG: 10 TABLET ORAL at 02:08

## 2017-03-24 RX ADMIN — VANCOMYCIN HYDROCHLORIDE 125 MG: 10 INJECTION, POWDER, LYOPHILIZED, FOR SOLUTION INTRAVENOUS at 23:24

## 2017-03-24 RX ADMIN — AMIODARONE HYDROCHLORIDE 400 MG: 200 TABLET ORAL at 21:42

## 2017-03-24 RX ADMIN — SODIUM CHLORIDE 500 ML: 9 INJECTION, SOLUTION INTRAVENOUS at 21:45

## 2017-03-24 RX ADMIN — FUROSEMIDE 20 MG: 20 TABLET ORAL at 06:23

## 2017-03-24 RX ADMIN — MORPHINE SULFATE 2 MG: 4 INJECTION INTRAVENOUS at 02:10

## 2017-03-24 RX ADMIN — GABAPENTIN 400 MG: 400 CAPSULE ORAL at 14:04

## 2017-03-24 RX ADMIN — LIDOCAINE 2 PATCH: 50 PATCH CUTANEOUS at 12:46

## 2017-03-24 RX ADMIN — MORPHINE SULFATE 2 MG: 4 INJECTION INTRAVENOUS at 06:22

## 2017-03-24 RX ADMIN — OXYCODONE HYDROCHLORIDE 10 MG: 10 TABLET ORAL at 10:45

## 2017-03-24 ASSESSMENT — PAIN SCALES - GENERAL
PAINLEVEL_OUTOF10: 3
PAINLEVEL_OUTOF10: 7
PAINLEVEL_OUTOF10: 7
PAINLEVEL_OUTOF10: 8
PAINLEVEL_OUTOF10: 5
PAINLEVEL_OUTOF10: 7
PAINLEVEL_OUTOF10: 8
PAINLEVEL_OUTOF10: 6
PAINLEVEL_OUTOF10: 6
PAINLEVEL_OUTOF10: 7
PAINLEVEL_OUTOF10: 6

## 2017-03-24 ASSESSMENT — ENCOUNTER SYMPTOMS
HEADACHES: 0
DIZZINESS: 0
WHEEZING: 0
FEVER: 0
ABDOMINAL PAIN: 0
FOCAL WEAKNESS: 0
VOMITING: 0
DIARRHEA: 0
BACK PAIN: 1
NAUSEA: 0
SORE THROAT: 0
SHORTNESS OF BREATH: 0
CHILLS: 0
CARDIOVASCULAR NEGATIVE: 1
BRUISES/BLEEDS EASILY: 0
WEAKNESS: 1
COUGH: 0
HEARTBURN: 0
BLURRED VISION: 0

## 2017-03-24 NOTE — CARE PLAN
Problem: Nutritional:  Goal: Achieve adequate nutritional intake  Patient will consume 50% of meals on new diet.   Outcome: NOT MET  Intervention: Collaborate with transitional care team and interdisciplinary team to meet patient’s needs  Recommend pt to be evaluated by Speech Pathology to better assess swallowing ability.

## 2017-03-24 NOTE — PROGRESS NOTES
Received report from previous nurse regarding prior 12 hours.  POC reviewed with pt, white board updated, pt verbalized understanding, call light within reach.  Pt EOB, ate 75% of full liquid dinner, able to swallow and keep pills down.  Pt very satisfied he can eat and drink now.

## 2017-03-24 NOTE — DISCHARGE PLANNING
TCN met with patient at bedside to discuss the care teams recommendation for SNF. Patient is agreeable to suggestion and selected [facility]. Choice signed and faxed to [facility]. TCN to follow as needed.

## 2017-03-24 NOTE — PROGRESS NOTES
PICC Insertion Procedure Note    Consents confirmed, vessel patency confirmed with ultrasound. Risks and benefits of procedure explained to patient/family and education regarding central line associated bloodstream infections provided. Questions answered.     PICC placed in RUE per MD order with ultrasound guidance. 4 Fr, single lumen PICC placed in basilic vein after 1 attempt(s). 3 cc's of 1% lidocaine injected intradermally, 21 gauge microintroducer needle and modified Seldinger technique used. 41 cm total catheter length, 0 cm external measurement inserted with good blood return. Each lumen flushed without resistance with 10 mL 0.9% normal saline. PICC line secured with Biopatch and Tegaderm.    PICC tip location in the SVC confirmed by ECG technology. Pt tolerated procedure well.  Patient condition relayed to unit RN or ordering physician via this post procedure note in the EMR.     OneTouchEMR Power PICC ref # [id number], Lot # [id number]

## 2017-03-24 NOTE — PROGRESS NOTES
Pt had 13 beats of VT. Pt was asymptomatic at the time, sitting EOB.  RN paged hospitalist.  Dr. Díaz ordered a BMP and mag.

## 2017-03-24 NOTE — DIETARY
Nutrition Services:  Brief Update    Pt was previously on nutrition support.  Per RN, Cortrak was removed and pt is now on a full liquid diet.  Per ADLs, pt consumed 75% of dinner last night.  RD to monitor for consistent PO intake >50%.  Recommend SLP eval to assess pt swallowing ability.

## 2017-03-24 NOTE — DISCHARGE PLANNING
Medical Social Work    SW reviewed pt's chart and noted that pt does not have an order for SNF. SW contacted assigned RN and requested assigned MD input the order so pt will not have delay in d/c to SNF when approved and ready.     Plan: SW will continue to follow for d/c needs.

## 2017-03-24 NOTE — PROGRESS NOTES
Pt back from MRI via pt transport.  Tele monitor on, monitor room notified, pt currently SR HR 80s. Pt EOB attempting to drink liquid.

## 2017-03-24 NOTE — PROGRESS NOTES
Left 3rd toe osteomyelitis on x-ray.  MRI R foot ordered.  No new complaints.    Exam:  LLE 3rd toe ulcer dry, no erythema.  RLE no changes about the great toe.    #1 Left 3rd toe osteomyelitis    Plan:  - To OR Saturday for L 3rd toe amputation.  Likely no surgery for right great toe given that there is no drainage or other sign of infection at this time  - NPO after midnight Friday  - WBAT  - ABX per ID

## 2017-03-24 NOTE — CARE PLAN
Problem: Safety  Goal: Will remain free from injury  Intervention: Provide assistance with mobility  Pt calls for assistance      Problem: Knowledge Deficit  Goal: Knowledge of the prescribed therapeutic regimen will improve  Outcome: PROGRESSING AS EXPECTED  Pt educated on MRI

## 2017-03-24 NOTE — PROGRESS NOTES
Infectious Disease Progress Note    Author: Yary Lujan M.D. DOS & Time created: 3/24/2017  4:53 PM    Chief Complaint   Patient presents with   • Low Back Pain   FU for osteomyelitis, GAS bacteremia and CDI, suspected endograft infection    Interval History:  3/17 AF, WBC 12.9, pt c/o lower back pain, asking for morphine, poor insight into illness, denies any diarrhea  3/18 AF, WBC 13.3, sleeping and not arousable to voice, cultures here negative to date  3/19 AF, WBC 13.4, frustrated about lower back pain not adequately controlled on current pain regimen, plan for EGD with dilatation today  3/20 AF WBC 9.6 +pain-denies SE abx  3/21 AF WBC 10.5 no new complaints  3/22 AF WBC not done tolerating abx well  3/23 AF eye feels a little better-pain about the same  3/24 AF tolerating abx well-MRI confirm OM  Labs Reviewed, Medications Reviewed, Radiology Reviewed and Wound Reviewed.    Review of Systems:  Review of Systems   Constitutional: Negative for fever and chills.   Respiratory: Negative for cough and shortness of breath.    Gastrointestinal: Negative for nausea, vomiting, abdominal pain and diarrhea.   Genitourinary: Negative for dysuria.   Musculoskeletal: Positive for back pain.   Neurological: Negative for headaches.   All other systems reviewed and are negative.      Hemodynamics:  Temp (24hrs), Av.9 °C (98.4 °F), Min:36.4 °C (97.6 °F), Max:37.4 °C (99.3 °F)  Temperature: 37.1 °C (98.8 °F)  Pulse  Av  Min: 63  Max: 114   Blood Pressure : (!) 90/68 mmHg       Physical Exam:  Physical Exam   Constitutional: He appears well-developed. No distress.   HENT:   Head: Normocephalic and atraumatic.   Eyes: EOM are normal. Pupils are equal, round, and reactive to light.   Pink eye right   Neck: Neck supple.   Cardiovascular: Normal rate.    Murmur heard.  IRR   Pulmonary/Chest: Effort normal and breath sounds normal.   Abdominal: Soft. He exhibits no distension. There is no tenderness.  "  Musculoskeletal: Normal range of motion. He exhibits edema.   R great toe with slight edema but no drainage or erythema, medial callus    Left 3rd toe ulcer without drainage or erythema-dorsal fissure    RUE PICC nontender   Neurological: He is alert.   Nursing note and vitals reviewed.      Labs:  No results for input(s): WBC, RBC, HEMOGLOBIN, HEMATOCRIT, MCV, MCH, RDW, PLATELETCT, MPV, NEUTSPOLYS, LYMPHOCYTES, MONOCYTES, EOSINOPHILS, BASOPHILS, RBCMORPHOLO in the last 72 hours.  Recent Labs      03/22/17 0218 03/23/17 0345 03/24/17 0222   SODIUM  131*  128*  129*   POTASSIUM  3.6  4.0  4.0   CHLORIDE  96  96  98   CO2  24  22  24   GLUCOSE  111*  116*  97   BUN  19  23*  20     Recent Labs      03/22/17 0218 03/23/17 0345 03/24/17 0222   ALBUMIN  2.8*  2.6*  2.5*   TBILIRUBIN  0.7  0.8  0.7   ALKPHOSPHAT  140*  161*  136*   TOTPROTEIN  9.8*  8.7*  8.4*   ALTSGPT  90*  131*  93*   ASTSGOT  98*  119*  65*   CREATININE  0.91  0.87  0.98       BLOOD CULTURE   Date Value Ref Range Status   03/15/2017 No growth after 5 days of incubation.  Final    ':  Results     Procedure Component Value Units Date/Time    BLOOD CULTURE x2 [[id number]] Collected:  03/15/17 1835    Order Status:  Completed Specimen Information:  Blood from Peripheral Updated:  03/20/17 2100     Significant Indicator NEG      Source BLD      Site PERIPHERAL      Blood Culture No growth after 5 days of incubation.     Narrative:      Per Hospital Policy: Only change Specimen Src: to \"Line\" if  specified by physician order.    BLOOD CULTURE x2 [[id number]] Collected:  03/15/17 1907    Order Status:  Completed Specimen Information:  Blood from Peripheral Updated:  03/20/17 2100     Significant Indicator NEG      Source BLD      Site PERIPHERAL      Blood Culture No growth after 5 days of incubation.     Narrative:      Per Hospital Policy: Only change Specimen Src: to \"Line\" if  specified by physician order.          Imaging  3/17 MRI " lumbar spine: There are changes secondary to the abdominal aortic aneurysm endograft. The aneurysm sac measures an approximately 7.6 x 6.7 cm in size. Abnormal contrast enhancement is noted within the wall of the aneurysmal sac. There is also abnormal soft tissue   contrast enhancement in the surrounding soft tissue. These findings are highly suspicious for infected thrombosed aneurysm sac. The possibility of endograft infection is more likely. There is irregular outpouching of the aneurysmal sac along the   posterior portion indenting the left psoas muscle.  2.  Free fluid in the pelvis  3.  There is mild-to-moderate left hydronephrosis likely representing pelviureteric junction obstruction.    3/16 R foot xray - +OM  3/16 L foot xray +OM    Assessment:  Active Hospital Problems    Diagnosis   • *Osteomyelitis of toe (CMS-HCC) [M86.9]   • Bacteremia [R78.81]   • Back pain [M54.9]   • CHF (congestive heart failure) (CMS-HCC) [I50.9]   • Tobacco dependence [F17.200]   • Atrial fibrillation (CMS-HCC) [I48.91]    Endovascular infection       Plan:  Right great toe and left 3rd osteomyelitis  +OM on radiographs-confirmed on repeat MRIs  3/9 OSH wound cx - GAS, MSSA (I confirmed with Garvin's microlab)  If no surgical intervention, anticipate 6 week course of IV abx followed by PO due to below concern for vascular infection  Continue Unasyn as above    Group A streptococcus bacteremia  3/9 OSH Bcx - GAS  3/14 OSH Bcx - NGTD  3/15 Bcx - NGTD  Abx per above    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

## 2017-03-24 NOTE — PROGRESS NOTES
Assumed care at 0715. Report received from RAKESH Cheney. Pt. Awake in bed. Pt. Updated on plan of care. Questions answered. All safety measures in place. Vital signs stable.

## 2017-03-25 LAB
ALBUMIN SERPL BCP-MCNC: 2.2 G/DL (ref 3.2–4.9)
ALBUMIN/GLOB SERPL: 0.4 G/DL
ALP SERPL-CCNC: 119 U/L (ref 30–99)
ALT SERPL-CCNC: 77 U/L (ref 2–50)
ANION GAP SERPL CALC-SCNC: 7 MMOL/L (ref 0–11.9)
AST SERPL-CCNC: 55 U/L (ref 12–45)
BASOPHILS # BLD AUTO: 0.2 % (ref 0–1.8)
BASOPHILS # BLD: 0.02 K/UL (ref 0–0.12)
BILIRUB SERPL-MCNC: 0.7 MG/DL (ref 0.1–1.5)
BUN SERPL-MCNC: 18 MG/DL (ref 8–22)
CALCIUM SERPL-MCNC: 8.1 MG/DL (ref 8.5–10.5)
CHLORIDE SERPL-SCNC: 99 MMOL/L (ref 96–112)
CO2 SERPL-SCNC: 23 MMOL/L (ref 20–33)
CREAT SERPL-MCNC: 0.99 MG/DL (ref 0.5–1.4)
EOSINOPHIL # BLD AUTO: 0.02 K/UL (ref 0–0.51)
EOSINOPHIL NFR BLD: 0.2 % (ref 0–6.9)
ERYTHROCYTE [DISTWIDTH] IN BLOOD BY AUTOMATED COUNT: 48.3 FL (ref 35.9–50)
GFR SERPL CREATININE-BSD FRML MDRD: >60 ML/MIN/1.73 M 2
GLOBULIN SER CALC-MCNC: 5 G/DL (ref 1.9–3.5)
GLUCOSE SERPL-MCNC: 103 MG/DL (ref 65–99)
GRAM STN SPEC: NORMAL
HCT VFR BLD AUTO: 27 % (ref 42–52)
HGB BLD-MCNC: 8.9 G/DL (ref 14–18)
IMM GRANULOCYTES # BLD AUTO: 0.05 K/UL (ref 0–0.11)
IMM GRANULOCYTES NFR BLD AUTO: 0.5 % (ref 0–0.9)
LYMPHOCYTES # BLD AUTO: 0.96 K/UL (ref 1–4.8)
LYMPHOCYTES NFR BLD: 10.1 % (ref 22–41)
MCH RBC QN AUTO: 33.2 PG (ref 27–33)
MCHC RBC AUTO-ENTMCNC: 32.5 G/DL (ref 33.7–35.3)
MCV RBC AUTO: 102.3 FL (ref 81.4–97.8)
MONOCYTES # BLD AUTO: 0.91 K/UL (ref 0–0.85)
MONOCYTES NFR BLD AUTO: 9.6 % (ref 0–13.4)
NEUTROPHILS # BLD AUTO: 7.54 K/UL (ref 1.82–7.42)
NEUTROPHILS NFR BLD: 79.4 % (ref 44–72)
NRBC # BLD AUTO: 0 K/UL
NRBC BLD AUTO-RTO: 0 /100 WBC
PLATELET # BLD AUTO: 187 K/UL (ref 164–446)
PMV BLD AUTO: 9.2 FL (ref 9–12.9)
POTASSIUM SERPL-SCNC: 3.7 MMOL/L (ref 3.6–5.5)
PROT SERPL-MCNC: 7.2 G/DL (ref 6–8.2)
RBC # BLD AUTO: 2.65 M/UL (ref 4.7–6.1)
SIGNIFICANT IND 70042: NORMAL
SITE SITE: NORMAL
SODIUM SERPL-SCNC: 129 MMOL/L (ref 135–145)
SOURCE SOURCE: NORMAL
WBC # BLD AUTO: 9.5 K/UL (ref 4.8–10.8)

## 2017-03-25 PROCEDURE — 99233 SBSQ HOSP IP/OBS HIGH 50: CPT | Performed by: FAMILY MEDICINE

## 2017-03-25 PROCEDURE — 85025 COMPLETE CBC W/AUTO DIFF WBC: CPT

## 2017-03-25 PROCEDURE — 501838 HCHG SUTURE GENERAL: Performed by: ORTHOPAEDIC SURGERY

## 2017-03-25 PROCEDURE — 87186 SC STD MICRODIL/AGAR DIL: CPT | Mod: 91

## 2017-03-25 PROCEDURE — 502240 HCHG MISC OR SUPPLY RC 0272: Performed by: ORTHOPAEDIC SURGERY

## 2017-03-25 PROCEDURE — 87102 FUNGUS ISOLATION CULTURE: CPT

## 2017-03-25 PROCEDURE — 770020 HCHG ROOM/CARE - TELE (206)

## 2017-03-25 PROCEDURE — 500053 HCHG BANDAGE, ELASTIC 4: Performed by: ORTHOPAEDIC SURGERY

## 2017-03-25 PROCEDURE — 160036 HCHG PACU - EA ADDL 30 MINS PHASE I: Performed by: ORTHOPAEDIC SURGERY

## 2017-03-25 PROCEDURE — 700111 HCHG RX REV CODE 636 W/ 250 OVERRIDE (IP): Performed by: INTERNAL MEDICINE

## 2017-03-25 PROCEDURE — 700102 HCHG RX REV CODE 250 W/ 637 OVERRIDE(OP): Performed by: INTERNAL MEDICINE

## 2017-03-25 PROCEDURE — 87205 SMEAR GRAM STAIN: CPT

## 2017-03-25 PROCEDURE — A9270 NON-COVERED ITEM OR SERVICE: HCPCS | Performed by: INTERNAL MEDICINE

## 2017-03-25 PROCEDURE — 110382 HCHG SHELL REV 271: Performed by: ORTHOPAEDIC SURGERY

## 2017-03-25 PROCEDURE — 160039 HCHG SURGERY MINUTES - EA ADDL 1 MIN LEVEL 3: Performed by: ORTHOPAEDIC SURGERY

## 2017-03-25 PROCEDURE — A4606 OXYGEN PROBE USED W OXIMETER: HCPCS | Performed by: ORTHOPAEDIC SURGERY

## 2017-03-25 PROCEDURE — 700101 HCHG RX REV CODE 250

## 2017-03-25 PROCEDURE — 500440 HCHG DRESSING, STERILE ROLL (KERLIX): Performed by: ORTHOPAEDIC SURGERY

## 2017-03-25 PROCEDURE — 87015 SPECIMEN INFECT AGNT CONCNTJ: CPT

## 2017-03-25 PROCEDURE — 160009 HCHG ANES TIME/MIN: Performed by: ORTHOPAEDIC SURGERY

## 2017-03-25 PROCEDURE — 700102 HCHG RX REV CODE 250 W/ 637 OVERRIDE(OP): Performed by: FAMILY MEDICINE

## 2017-03-25 PROCEDURE — 110371 HCHG SHELL REV 272: Performed by: ORTHOPAEDIC SURGERY

## 2017-03-25 PROCEDURE — 87070 CULTURE OTHR SPECIMN AEROBIC: CPT

## 2017-03-25 PROCEDURE — A9270 NON-COVERED ITEM OR SERVICE: HCPCS | Performed by: HOSPITALIST

## 2017-03-25 PROCEDURE — 700101 HCHG RX REV CODE 250: Performed by: FAMILY MEDICINE

## 2017-03-25 PROCEDURE — 700111 HCHG RX REV CODE 636 W/ 250 OVERRIDE (IP)

## 2017-03-25 PROCEDURE — 160048 HCHG OR STATISTICAL LEVEL 1-5: Performed by: ORTHOPAEDIC SURGERY

## 2017-03-25 PROCEDURE — 87077 CULTURE AEROBIC IDENTIFY: CPT

## 2017-03-25 PROCEDURE — 160028 HCHG SURGERY MINUTES - 1ST 30 MINS LEVEL 3: Performed by: ORTHOPAEDIC SURGERY

## 2017-03-25 PROCEDURE — 80053 COMPREHEN METABOLIC PANEL: CPT

## 2017-03-25 PROCEDURE — 700102 HCHG RX REV CODE 250 W/ 637 OVERRIDE(OP): Performed by: HOSPITALIST

## 2017-03-25 PROCEDURE — 500088 HCHG BLADE, SAGITTAL: Performed by: ORTHOPAEDIC SURGERY

## 2017-03-25 PROCEDURE — 88311 DECALCIFY TISSUE: CPT

## 2017-03-25 PROCEDURE — 88305 TISSUE EXAM BY PATHOLOGIST: CPT

## 2017-03-25 PROCEDURE — 160002 HCHG RECOVERY MINUTES (STAT): Performed by: ORTHOPAEDIC SURGERY

## 2017-03-25 PROCEDURE — A9270 NON-COVERED ITEM OR SERVICE: HCPCS | Performed by: FAMILY MEDICINE

## 2017-03-25 PROCEDURE — 160035 HCHG PACU - 1ST 60 MINS PHASE I: Performed by: ORTHOPAEDIC SURGERY

## 2017-03-25 PROCEDURE — 87075 CULTR BACTERIA EXCEPT BLOOD: CPT

## 2017-03-25 PROCEDURE — 0Y6U0Z0 DETACHMENT AT LEFT 3RD TOE, COMPLETE, OPEN APPROACH: ICD-10-PCS | Performed by: ORTHOPAEDIC SURGERY

## 2017-03-25 PROCEDURE — 700105 HCHG RX REV CODE 258: Performed by: INTERNAL MEDICINE

## 2017-03-25 PROCEDURE — 500881 HCHG PACK, EXTREMITY: Performed by: ORTHOPAEDIC SURGERY

## 2017-03-25 RX ORDER — AMOXICILLIN 250 MG
1 CAPSULE ORAL NIGHTLY
Status: DISCONTINUED | OUTPATIENT
Start: 2017-03-25 | End: 2017-04-19 | Stop reason: HOSPADM

## 2017-03-25 RX ORDER — AMOXICILLIN 250 MG
1 CAPSULE ORAL
Status: DISCONTINUED | OUTPATIENT
Start: 2017-03-25 | End: 2017-04-19 | Stop reason: HOSPADM

## 2017-03-25 RX ADMIN — AMPICILLIN SODIUM AND SULBACTAM SODIUM 3 G: 2; 1 INJECTION, POWDER, FOR SOLUTION INTRAMUSCULAR; INTRAVENOUS at 23:08

## 2017-03-25 RX ADMIN — VANCOMYCIN HYDROCHLORIDE 125 MG: 10 INJECTION, POWDER, LYOPHILIZED, FOR SOLUTION INTRAVENOUS at 18:18

## 2017-03-25 RX ADMIN — NICOTINE 14 MG: 14 PATCH TRANSDERMAL at 05:40

## 2017-03-25 RX ADMIN — OXYCODONE HYDROCHLORIDE 10 MG: 10 TABLET ORAL at 18:19

## 2017-03-25 RX ADMIN — LIDOCAINE 2 PATCH: 50 PATCH CUTANEOUS at 12:25

## 2017-03-25 RX ADMIN — OXYCODONE HYDROCHLORIDE 10 MG: 10 TABLET, FILM COATED, EXTENDED RELEASE ORAL at 08:12

## 2017-03-25 RX ADMIN — METOPROLOL TARTRATE 12.5 MG: 25 TABLET, FILM COATED ORAL at 20:32

## 2017-03-25 RX ADMIN — VANCOMYCIN HYDROCHLORIDE 125 MG: 10 INJECTION, POWDER, LYOPHILIZED, FOR SOLUTION INTRAVENOUS at 05:39

## 2017-03-25 RX ADMIN — AMIODARONE HYDROCHLORIDE 400 MG: 200 TABLET ORAL at 20:32

## 2017-03-25 RX ADMIN — OXYCODONE HYDROCHLORIDE 10 MG: 10 TABLET ORAL at 05:40

## 2017-03-25 RX ADMIN — OXYCODONE HYDROCHLORIDE 10 MG: 10 TABLET, FILM COATED, EXTENDED RELEASE ORAL at 20:32

## 2017-03-25 RX ADMIN — GABAPENTIN 400 MG: 400 CAPSULE ORAL at 20:32

## 2017-03-25 RX ADMIN — OXYCODONE HYDROCHLORIDE 10 MG: 10 TABLET ORAL at 14:21

## 2017-03-25 RX ADMIN — AMPICILLIN SODIUM AND SULBACTAM SODIUM 3 G: 2; 1 INJECTION, POWDER, FOR SOLUTION INTRAMUSCULAR; INTRAVENOUS at 05:39

## 2017-03-25 RX ADMIN — GABAPENTIN 400 MG: 400 CAPSULE ORAL at 14:21

## 2017-03-25 RX ADMIN — VANCOMYCIN HYDROCHLORIDE 125 MG: 10 INJECTION, POWDER, LYOPHILIZED, FOR SOLUTION INTRAVENOUS at 23:08

## 2017-03-25 RX ADMIN — AMIODARONE HYDROCHLORIDE 400 MG: 200 TABLET ORAL at 08:12

## 2017-03-25 RX ADMIN — GABAPENTIN 400 MG: 400 CAPSULE ORAL at 08:12

## 2017-03-25 RX ADMIN — AMPICILLIN SODIUM AND SULBACTAM SODIUM 3 G: 2; 1 INJECTION, POWDER, FOR SOLUTION INTRAMUSCULAR; INTRAVENOUS at 12:25

## 2017-03-25 RX ADMIN — VANCOMYCIN HYDROCHLORIDE 125 MG: 10 INJECTION, POWDER, LYOPHILIZED, FOR SOLUTION INTRAVENOUS at 12:25

## 2017-03-25 RX ADMIN — AMPICILLIN SODIUM AND SULBACTAM SODIUM 3 G: 2; 1 INJECTION, POWDER, FOR SOLUTION INTRAMUSCULAR; INTRAVENOUS at 18:18

## 2017-03-25 ASSESSMENT — PAIN SCALES - GENERAL
PAINLEVEL_OUTOF10: 0
PAINLEVEL_OUTOF10: 0
PAINLEVEL_OUTOF10: 5
PAINLEVEL_OUTOF10: 0
PAINLEVEL_OUTOF10: 7
PAINLEVEL_OUTOF10: 0
PAINLEVEL_OUTOF10: 0
PAINLEVEL_OUTOF10: 2
PAINLEVEL_OUTOF10: 0

## 2017-03-25 ASSESSMENT — ENCOUNTER SYMPTOMS
COUGH: 0
FEVER: 0
CHILLS: 0
DIARRHEA: 0
HEADACHES: 0
VOMITING: 0
SHORTNESS OF BREATH: 0
NAUSEA: 0
ABDOMINAL PAIN: 0
BACK PAIN: 1

## 2017-03-25 NOTE — PROGRESS NOTES
Bedside report taken on patient, Assume care of patient. Patient is alert and oriented, Assessment done on patient, patient's BP low, MD page, patient complain of pain, schedule pain medication given with IVF bolus, Patient regurgitating milky brown fluid, asked patient if he was nauseated, patient stated no, that he did not need anything for nausea, that he regurgitate all the time, it was normal for him. Inform patient that he was NPO at midnight.

## 2017-03-25 NOTE — OP REPORT
DATE OF SERVICE:  03/25/2017    PREOPERATIVE DIAGNOSIS:  Left third toe osteomyelitis.    POSTOPERATIVE DIAGNOSIS:  Left third toe osteomyelitis.    PROCEDURE PERFORMED:  Left third toe amputation.    SURGEON:  Edson Samayoa MD    ASSISTANT:  Edgar Sumner PA-C    ANESTHESIOLOGIST:  Torres Hunter MD    ANESTHESIA TYPE:  General.    SPECIMENS:  None.    COMPLICATIONS:  None.    ESTIMATED BLOOD LOSS:  Minimal.    SPECIMENS:  Cultures from left third toe.    OPERATIVE INDICATIONS:  Patient is a pleasant 72-year-old male who left AMA   from [facility], was previously diagnosed with left third toe and right great   toe osteomyelitis.  He has an ulcer with left third toe with no ulcer in his   right great toe.  He has had sporadic drainage from his left third toe.    Radiographs and MRI demonstrated changes consistent with osteomyelitis of the   left third toe.  He has palpable dorsalis pedis pulse and no vascular   intervention has been warranted.  He possibly has an infected endovascular   graft as well.  Given these findings, he was an appropriately indicated   candidate for left third toe amputation.  I discussed the risks, benefits and   alternatives with the patient including the risk of infection, wound healing   complications, neurovascular injury, blood loss, DVT, PE, wound healing   complication resulting an additional surgery as well as well as medical risks   of anesthesia including MI, stroke and death.  We discussed alternatives   including nonoperative management.  We discussed benefits including improved   chance of clearance of infection.  He is happy with the plan.  His informed   consent was signed and documented in the medical record proceeded to the   operating room at [facility].    OPERATIVE COURSE:  He underwent general anesthesia with Dr. Hunter and was   positioned supine.  His left lower extremity was prepped and draped in the   sterile orthopedic fashion using ChloraPrep and the surgical  team scrubbed in.    Procedural pause was conducted to verify correct patient, correct extremity,   presence of the surgeon's initials on the operative extremity and   administration of IV antibiotics in this case with Ancef.  Following   generalized agreement, a teardrop incision about the base of the third toe was   marked and then made with a scalpel.  We dissected down sharply and   disarticulated the third toe at the MTP sharply.  No evidence of purulence was   found proximally.  Hemostasis was obtained with cautery.  We then thoroughly   irrigated the wound with copious amounts of normal saline and closed in layers   with 2-0 Vicryl sutures in the subcutaneous tissue and 3-0 nylon in the skin.    Sterile dressings were applied.  Patient was transferred to the recovery   room in stable condition with no complications.  Cultures from the third toe   were sent and toe was sent for permanent pathology.  Use of Edgar Sumner PA-C as surgical assistant was necessary for assistance with retraction and   wound closure.  Prior to surgery, patient underwent an ankle block by Dr. Hunter, which was requested by me for postoperative pain control.    POSTOPERATIVE PLAN:  1.  Weightbearing as tolerated on left heel.  2.  IV antibiotics per ID.  3.  DVT prophylaxis per medicine, SCDs from orthopedic perspective.  4.  Discharge planning.  I do not anticipate any operative management of the   great toe as there is no overlying ulcer right now.  If ulcerations do   develop, he may need right great toe amputation.       ____________________________________     MD MARY Garcia / KELLY    DD:  03/25/2017 09:57:35  DT:  03/25/2017 11:09:07    D#:  [id number]  Job#:  [id number]

## 2017-03-25 NOTE — PROGRESS NOTES
Infectious Disease Progress Note    Author: Yary Lujan M.D. DOS & Time created: 3/25/2017  12:49 PM    Chief Complaint   Patient presents with   • Low Back Pain   FU for osteomyelitis, GAS bacteremia and CDI, suspected endograft infection    Interval History:  3/17 AF, WBC 12.9, pt c/o lower back pain, asking for morphine, poor insight into illness, denies any diarrhea  3/18 AF, WBC 13.3, sleeping and not arousable to voice, cultures here negative to date  3/19 AF, WBC 13.4, frustrated about lower back pain not adequately controlled on current pain regimen, plan for EGD with dilatation today  3/20 AF WBC 9.6 +pain-denies SE abx  3/21 AF WBC 10.5 no new complaints  3/22 AF WBC not done tolerating abx well  3/23 AF eye feels a little better-pain about the same  3/24 AF tolerating abx well-MRI confirm OM  3/25 AF WBC 9.5 s/p toe amp this am  Labs Reviewed, Medications Reviewed, Radiology Reviewed and Wound Reviewed.    Review of Systems:  Review of Systems   Constitutional: Negative for fever and chills.   Respiratory: Negative for cough and shortness of breath.    Gastrointestinal: Negative for nausea, vomiting, abdominal pain and diarrhea.   Genitourinary: Negative for dysuria.   Musculoskeletal: Positive for back pain.   Neurological: Negative for headaches.   All other systems reviewed and are negative.      Hemodynamics:  Temp (24hrs), Av.9 °C (98.5 °F), Min:36.7 °C (98 °F), Max:37.3 °C (99.2 °F)  Temperature:  (Pt. at procedure )  Pulse  Av.5  Min: 63  Max: 114Heart Rate (Monitored): 76  Blood Pressure : 101/60 mmHg, NIBP: 123/63 mmHg       Physical Exam:  Physical Exam   Constitutional: He appears well-developed. No distress.   HENT:   Head: Normocephalic and atraumatic.   Eyes: EOM are normal. Pupils are equal, round, and reactive to light.   Pink eye right, resolving   Neck: Neck supple.   Cardiovascular: Normal rate.    Murmur heard.  IRR   Pulmonary/Chest: Effort normal and breath sounds  normal.   Abdominal: Soft. He exhibits no distension. There is no tenderness.   Musculoskeletal: Normal range of motion. He exhibits edema.   R great toe with slight edema but no drainage or erythema, medial callus    Left 3rd toe-dressed    RUE PICC nontender   Neurological: He is alert.   Nursing note and vitals reviewed.      Labs:  Recent Labs      03/25/17   0244   WBC  9.5   RBC  2.65*   HEMOGLOBIN  8.9*   HEMATOCRIT  27.0*   MCV  102.3*   MCH  33.2*   RDW  48.3   PLATELETCT  187   MPV  9.2   NEUTSPOLYS  79.40*   LYMPHOCYTES  10.10*   MONOCYTES  9.60   EOSINOPHILS  0.20   BASOPHILS  0.20     Recent Labs      03/23/17   0345  03/24/17   0222  03/25/17   0244   SODIUM  128*  129*  129*   POTASSIUM  4.0  4.0  3.7   CHLORIDE  96  98  99   CO2  22  24  23   GLUCOSE  116*  97  103*   BUN  23*  20  18     Recent Labs      03/23/17   0345  03/24/17   0222  03/25/17   0244   ALBUMIN  2.6*  2.5*  2.2*   TBILIRUBIN  0.8  0.7  0.7   ALKPHOSPHAT  161*  136*  119*   TOTPROTEIN  8.7*  8.4*  7.2   ALTSGPT  131*  93*  77*   ASTSGOT  119*  65*  55*   CREATININE  0.87  0.98  0.99       BLOOD CULTURE   Date Value Ref Range Status   03/15/2017 No growth after 5 days of incubation.  Final    ':  Results     Procedure Component Value Units Date/Time    CULTURE TISSUE W/ GRM STAIN [[id number]] Collected:  03/25/17 0936    Order Status:  Completed Specimen Information:  Other Updated:  03/25/17 1046    ANAEROBIC CULTURE [[id number]] Collected:  03/25/17 0936    Order Status:  Completed Specimen Information:  Other Updated:  03/25/17 1046    FUNGAL CULTURE [[id number]] Collected:  03/25/17 0936    Order Status:  Completed Specimen Information:  Other Updated:  03/25/17 1046    BLOOD CULTURE x2 [[id number]] Collected:  03/15/17 1835    Order Status:  Completed Specimen Information:  Blood from Peripheral Updated:  03/20/17 2100     Significant Indicator NEG      Source BLD      Site PERIPHERAL      Blood Culture No growth after 5 days of  "incubation.     Narrative:      Per Hospital Policy: Only change Specimen Src: to \"Line\" if  specified by physician order.    BLOOD CULTURE x2 [[id number]] Collected:  03/15/17 1907    Order Status:  Completed Specimen Information:  Blood from Peripheral Updated:  03/20/17 2100     Significant Indicator NEG      Source BLD      Site PERIPHERAL      Blood Culture No growth after 5 days of incubation.     Narrative:      Per Hospital Policy: Only change Specimen Src: to \"Line\" if  specified by physician order.          Imaging  3/17 MRI lumbar spine: There are changes secondary to the abdominal aortic aneurysm endograft. The aneurysm sac measures an approximately 7.6 x 6.7 cm in size. Abnormal contrast enhancement is noted within the wall of the aneurysmal sac. There is also abnormal soft tissue   contrast enhancement in the surrounding soft tissue. These findings are highly suspicious for infected thrombosed aneurysm sac. The possibility of endograft infection is more likely. There is irregular outpouching of the aneurysmal sac along the   posterior portion indenting the left psoas muscle.  2.  Free fluid in the pelvis  3.  There is mild-to-moderate left hydronephrosis likely representing pelviureteric junction obstruction.    3/16 R foot xray - +OM  3/16 L foot xray +OM    Assessment:  Active Hospital Problems    Diagnosis   • *Osteomyelitis of toe (CMS-HCC) [M86.9]   • Bacteremia [R78.81]   • Back pain [M54.9]   • CHF (congestive heart failure) (CMS-HCC) [I50.9]   • Tobacco dependence [F17.200]   • Atrial fibrillation (CMS-HCC) [I48.91]    Endovascular infection       Plan:  Right great toe and left 3rd osteomyelitis  +OM on radiographs-confirmed on repeat MRIs  3/9 OSH wound cx - GAS, MSSA (I confirmed with [facility] microlab)  3/25 s/p 3rd toe amp  FU cultures  Continue Unasyn    Group A streptococcus bacteremia  3/9 OSH Bcx - GAS  3/14 OSH Bcx - NGTD  3/15 Bcx - NGTD  Abx per above    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

## 2017-03-25 NOTE — CARE PLAN
Problem: Pain Management  Goal: Pain level will decrease to patient’s comfort goal  Outcome: PROGRESSING AS EXPECTED  Pain control with prn and schedule pain medication

## 2017-03-25 NOTE — CARE PLAN
Problem: Safety  Goal: Will remain free from falls  Outcome: PROGRESSING AS EXPECTED  Fall risk assessed and fall precautions in place, pt standby assist. Bed alarm on, appropriate signs in place, call light and personal belongings within reach. Pt educated to call for help and not get up without assistance. Verbalized understanding.        Problem: Knowledge Deficit  Goal: Knowledge of disease process/condition, treatment plan, diagnostic tests, and medications will improve  Outcome: PROGRESSING AS EXPECTED  Pt educated regarding plan of care for the day, activity, diet, and medications. Verbalized understanding.

## 2017-03-25 NOTE — OR SURGEON
Immediate Post-Operative Note      PreOp Diagnosis: Left 3rd toe osteomyelitis    PostOp Diagnosis: Same    Procedure(s):  Left third toe amputation at MTP    Surgeon(s):  Edson Samayoa M.D.    Anesthesiologist/Type of Anesthesia:  Anesthesiologist: Torres Hunter M.D./General    Surgical Staff:  Circulator: Carlos Chau R.N.  Scrub Person: Edu Johnson  First Assist: Edgar Sumner PA-C    Specimen: Toe, cultures    Estimated Blood Loss: Minimal    Findings: See op note    Complications: None        3/25/2017 9:53 AM Edson Samayoa

## 2017-03-25 NOTE — PROGRESS NOTES
Received report from RAKESH Okeefe. Assumed care at 0715. Pt. Awake in bed. A/OX4. Pt. Updated on plan of care. All safety measures in place.

## 2017-03-25 NOTE — PROGRESS NOTES
MD (Bre) page patient's BP low 80/50, retake 78/53 , MD order 500cc bolus on patient order put in and bolus being given

## 2017-03-25 NOTE — CARE PLAN
Problem: Safety  Goal: Will remain free from injury  Outcome: PROGRESSING AS EXPECTED  Educate and Encourage patient to contact staff before getting up

## 2017-03-25 NOTE — PROGRESS NOTES
Monitor Summary:      Patient stayed Sinus rhythm with some PVC through the night, HR 75-88.  .20/.08/.40

## 2017-03-25 NOTE — PROGRESS NOTES
Vascular  WBC scan will take a few days to complete.  If we confirm endograft infection 6 weeks antibiotics and then long term suppression recommended by vascular panel.  Mortality with explant very high but may be required in some patients.  I explained this to the patient. He feels very weak from weight loss from achalasia.  Knows surgery high risk.  I explained the purpose of the tagged WBC scan.

## 2017-03-25 NOTE — DISCHARGE PLANNING
Received notification via fax from [facility], referral is pending OT and PT notes.

## 2017-03-25 NOTE — PROGRESS NOTES
"Seen and examined.  Left third toe ulcer and MRI findings concerning for osteo.  Similar MRI findings for right great toe, but no overlying ulcer, and patient denies history of ulceration there.    Blood pressure 101/60, pulse 89, temperature 36.9 °C (98.4 °F), resp. rate 18, height 1.778 m (5' 10\"), weight 75.3 kg (166 lb 0.1 oz), SpO2 94 %.    Exam:  LLE palpable DP, some necrotic skin about left third toe with minimal erythema today.  Fires TA/GS/EHL/P, sensation intact S/S/SP/DP/T.    #1 Left third toe osteomyelitis    Plan:  - To OR today for L 3rd toe amputation.  Medical treatment of right great toe given lack of ulcer  - NPO  - WBAT on heel post-op  "

## 2017-03-25 NOTE — DISCHARGE PLANNING
OT and PT notes are needed from [facility], doesn't look like it was completed yet for this pt. NAZARIO Matute notified via voicemail.

## 2017-03-25 NOTE — CARE PLAN
Problem: Pain Management  Goal: Pain level will decrease to patient’s comfort goal  Outcome: PROGRESSING AS EXPECTED  Pt assessed for pain Q4h and medicated PRN. Pt instructed to notify RN of any new or increasing pain to prevent it from becoming intolerable. Verbalized understanding.         Problem: Safety  Goal: Will remain free from falls  Outcome: PROGRESSING AS EXPECTED  Fall risk assessed and fall precautions in place, pt standby assist. Appropriate signs in place, call light and personal belongings within reach. Pt educated to call for help and not get up without assistance. Verbalized understanding.

## 2017-03-26 LAB
ALBUMIN SERPL BCP-MCNC: 2.3 G/DL (ref 3.2–4.9)
ALBUMIN/GLOB SERPL: 0.4 G/DL
ALP SERPL-CCNC: 145 U/L (ref 30–99)
ALT SERPL-CCNC: 103 U/L (ref 2–50)
ANION GAP SERPL CALC-SCNC: 8 MMOL/L (ref 0–11.9)
AST SERPL-CCNC: 128 U/L (ref 12–45)
BASOPHILS # BLD AUTO: 0.2 % (ref 0–1.8)
BASOPHILS # BLD: 0.02 K/UL (ref 0–0.12)
BILIRUB SERPL-MCNC: 0.8 MG/DL (ref 0.1–1.5)
BUN SERPL-MCNC: 16 MG/DL (ref 8–22)
CALCIUM SERPL-MCNC: 7.9 MG/DL (ref 8.5–10.5)
CHLORIDE SERPL-SCNC: 95 MMOL/L (ref 96–112)
CO2 SERPL-SCNC: 24 MMOL/L (ref 20–33)
CREAT SERPL-MCNC: 1.06 MG/DL (ref 0.5–1.4)
EOSINOPHIL # BLD AUTO: 0.02 K/UL (ref 0–0.51)
EOSINOPHIL NFR BLD: 0.2 % (ref 0–6.9)
ERYTHROCYTE [DISTWIDTH] IN BLOOD BY AUTOMATED COUNT: 48.8 FL (ref 35.9–50)
GFR SERPL CREATININE-BSD FRML MDRD: >60 ML/MIN/1.73 M 2
GLOBULIN SER CALC-MCNC: 5.2 G/DL (ref 1.9–3.5)
GLUCOSE SERPL-MCNC: 143 MG/DL (ref 65–99)
HCT VFR BLD AUTO: 24.7 % (ref 42–52)
HGB BLD-MCNC: 8.4 G/DL (ref 14–18)
IMM GRANULOCYTES # BLD AUTO: 0.05 K/UL (ref 0–0.11)
IMM GRANULOCYTES NFR BLD AUTO: 0.5 % (ref 0–0.9)
LYMPHOCYTES # BLD AUTO: 0.78 K/UL (ref 1–4.8)
LYMPHOCYTES NFR BLD: 7.7 % (ref 22–41)
MCH RBC QN AUTO: 34.3 PG (ref 27–33)
MCHC RBC AUTO-ENTMCNC: 34 G/DL (ref 33.7–35.3)
MCV RBC AUTO: 100.8 FL (ref 81.4–97.8)
MONOCYTES # BLD AUTO: 1.02 K/UL (ref 0–0.85)
MONOCYTES NFR BLD AUTO: 10.1 % (ref 0–13.4)
NEUTROPHILS # BLD AUTO: 8.23 K/UL (ref 1.82–7.42)
NEUTROPHILS NFR BLD: 81.3 % (ref 44–72)
NRBC # BLD AUTO: 0 K/UL
NRBC BLD AUTO-RTO: 0 /100 WBC
PLATELET # BLD AUTO: 174 K/UL (ref 164–446)
PMV BLD AUTO: 9.5 FL (ref 9–12.9)
POTASSIUM SERPL-SCNC: 3.5 MMOL/L (ref 3.6–5.5)
PROT SERPL-MCNC: 7.5 G/DL (ref 6–8.2)
RBC # BLD AUTO: 2.45 M/UL (ref 4.7–6.1)
SODIUM SERPL-SCNC: 127 MMOL/L (ref 135–145)
WBC # BLD AUTO: 10.1 K/UL (ref 4.8–10.8)

## 2017-03-26 PROCEDURE — A9270 NON-COVERED ITEM OR SERVICE: HCPCS | Performed by: FAMILY MEDICINE

## 2017-03-26 PROCEDURE — 700105 HCHG RX REV CODE 258: Performed by: INTERNAL MEDICINE

## 2017-03-26 PROCEDURE — 700102 HCHG RX REV CODE 250 W/ 637 OVERRIDE(OP): Performed by: HOSPITALIST

## 2017-03-26 PROCEDURE — 700111 HCHG RX REV CODE 636 W/ 250 OVERRIDE (IP): Performed by: INTERNAL MEDICINE

## 2017-03-26 PROCEDURE — 700111 HCHG RX REV CODE 636 W/ 250 OVERRIDE (IP): Performed by: NURSE PRACTITIONER

## 2017-03-26 PROCEDURE — 700102 HCHG RX REV CODE 250 W/ 637 OVERRIDE(OP): Performed by: INTERNAL MEDICINE

## 2017-03-26 PROCEDURE — 770020 HCHG ROOM/CARE - TELE (206)

## 2017-03-26 PROCEDURE — A9547 IN111 OXYQUINOLINE: HCPCS

## 2017-03-26 PROCEDURE — 700101 HCHG RX REV CODE 250: Performed by: FAMILY MEDICINE

## 2017-03-26 PROCEDURE — A9270 NON-COVERED ITEM OR SERVICE: HCPCS | Performed by: INTERNAL MEDICINE

## 2017-03-26 PROCEDURE — 700102 HCHG RX REV CODE 250 W/ 637 OVERRIDE(OP): Performed by: FAMILY MEDICINE

## 2017-03-26 PROCEDURE — A9270 NON-COVERED ITEM OR SERVICE: HCPCS | Performed by: HOSPITALIST

## 2017-03-26 PROCEDURE — 80053 COMPREHEN METABOLIC PANEL: CPT

## 2017-03-26 PROCEDURE — 700105 HCHG RX REV CODE 258

## 2017-03-26 PROCEDURE — 85025 COMPLETE CBC W/AUTO DIFF WBC: CPT

## 2017-03-26 PROCEDURE — 99233 SBSQ HOSP IP/OBS HIGH 50: CPT | Performed by: FAMILY MEDICINE

## 2017-03-26 RX ORDER — POTASSIUM CHLORIDE 1.5 G/1.58G
20 POWDER, FOR SOLUTION ORAL DAILY
Status: DISCONTINUED | OUTPATIENT
Start: 2017-03-26 | End: 2017-03-27

## 2017-03-26 RX ORDER — POTASSIUM CHLORIDE 20 MEQ/1
20 TABLET, EXTENDED RELEASE ORAL DAILY
Status: DISCONTINUED | OUTPATIENT
Start: 2017-03-26 | End: 2017-03-27

## 2017-03-26 RX ORDER — SODIUM CHLORIDE 9 MG/ML
INJECTION, SOLUTION INTRAVENOUS
Status: COMPLETED
Start: 2017-03-26 | End: 2017-03-26

## 2017-03-26 RX ORDER — SODIUM CHLORIDE 1 G/1
1 TABLET ORAL 2 TIMES DAILY WITH MEALS
Status: DISCONTINUED | OUTPATIENT
Start: 2017-03-26 | End: 2017-03-27

## 2017-03-26 RX ADMIN — OXYCODONE HYDROCHLORIDE 10 MG: 10 TABLET, FILM COATED, EXTENDED RELEASE ORAL at 09:42

## 2017-03-26 RX ADMIN — VANCOMYCIN HYDROCHLORIDE 125 MG: 10 INJECTION, POWDER, LYOPHILIZED, FOR SOLUTION INTRAVENOUS at 06:18

## 2017-03-26 RX ADMIN — AMPICILLIN SODIUM AND SULBACTAM SODIUM 3 G: 2; 1 INJECTION, POWDER, FOR SOLUTION INTRAMUSCULAR; INTRAVENOUS at 23:27

## 2017-03-26 RX ADMIN — GABAPENTIN 400 MG: 400 CAPSULE ORAL at 16:22

## 2017-03-26 RX ADMIN — OXYCODONE HYDROCHLORIDE 10 MG: 10 TABLET ORAL at 06:18

## 2017-03-26 RX ADMIN — LIDOCAINE 2 PATCH: 50 PATCH CUTANEOUS at 13:01

## 2017-03-26 RX ADMIN — OXYCODONE HYDROCHLORIDE 10 MG: 10 TABLET, FILM COATED, EXTENDED RELEASE ORAL at 20:32

## 2017-03-26 RX ADMIN — OXYCODONE HYDROCHLORIDE 10 MG: 10 TABLET ORAL at 00:21

## 2017-03-26 RX ADMIN — SODIUM CHLORIDE 500 ML: 9 INJECTION, SOLUTION INTRAVENOUS at 23:28

## 2017-03-26 RX ADMIN — GABAPENTIN 400 MG: 400 CAPSULE ORAL at 09:42

## 2017-03-26 RX ADMIN — AMIODARONE HYDROCHLORIDE 400 MG: 200 TABLET ORAL at 20:26

## 2017-03-26 RX ADMIN — MORPHINE SULFATE 2 MG: 4 INJECTION INTRAVENOUS at 19:34

## 2017-03-26 RX ADMIN — GABAPENTIN 400 MG: 400 CAPSULE ORAL at 20:31

## 2017-03-26 RX ADMIN — MORPHINE SULFATE 2 MG: 4 INJECTION INTRAVENOUS at 19:30

## 2017-03-26 RX ADMIN — AMPICILLIN SODIUM AND SULBACTAM SODIUM 3 G: 2; 1 INJECTION, POWDER, FOR SOLUTION INTRAMUSCULAR; INTRAVENOUS at 11:59

## 2017-03-26 RX ADMIN — OXYCODONE HYDROCHLORIDE 10 MG: 10 TABLET ORAL at 16:22

## 2017-03-26 RX ADMIN — OXYCODONE HYDROCHLORIDE 10 MG: 10 TABLET ORAL at 11:59

## 2017-03-26 RX ADMIN — VANCOMYCIN HYDROCHLORIDE 125 MG: 10 INJECTION, POWDER, LYOPHILIZED, FOR SOLUTION INTRAVENOUS at 23:27

## 2017-03-26 RX ADMIN — METOPROLOL TARTRATE 12.5 MG: 25 TABLET, FILM COATED ORAL at 09:42

## 2017-03-26 RX ADMIN — NICOTINE 14 MG: 14 PATCH TRANSDERMAL at 06:18

## 2017-03-26 RX ADMIN — AMIODARONE HYDROCHLORIDE 400 MG: 200 TABLET ORAL at 09:42

## 2017-03-26 RX ADMIN — AMPICILLIN SODIUM AND SULBACTAM SODIUM 3 G: 2; 1 INJECTION, POWDER, FOR SOLUTION INTRAMUSCULAR; INTRAVENOUS at 06:18

## 2017-03-26 RX ADMIN — VANCOMYCIN HYDROCHLORIDE 125 MG: 10 INJECTION, POWDER, LYOPHILIZED, FOR SOLUTION INTRAVENOUS at 18:29

## 2017-03-26 RX ADMIN — VANCOMYCIN HYDROCHLORIDE 125 MG: 10 INJECTION, POWDER, LYOPHILIZED, FOR SOLUTION INTRAVENOUS at 11:59

## 2017-03-26 RX ADMIN — SODIUM CHLORIDE TAB 1 GM 1 G: 1 TAB at 18:29

## 2017-03-26 RX ADMIN — POTASSIUM CHLORIDE 20 MEQ: 1.5 POWDER, FOR SOLUTION ORAL at 09:42

## 2017-03-26 RX ADMIN — AMPICILLIN SODIUM AND SULBACTAM SODIUM 3 G: 2; 1 INJECTION, POWDER, FOR SOLUTION INTRAMUSCULAR; INTRAVENOUS at 16:22

## 2017-03-26 ASSESSMENT — ENCOUNTER SYMPTOMS
SHORTNESS OF BREATH: 0
SORE THROAT: 0
DIZZINESS: 0
ABDOMINAL PAIN: 0
HEARTBURN: 0
NAUSEA: 0
COUGH: 0
WHEEZING: 0
WEAKNESS: 1
DIARRHEA: 0
BLURRED VISION: 0
FEVER: 0
BACK PAIN: 1
VOMITING: 0
FOCAL WEAKNESS: 0
CHILLS: 0
HEADACHES: 0

## 2017-03-26 ASSESSMENT — PAIN SCALES - GENERAL
PAINLEVEL_OUTOF10: 7
PAINLEVEL_OUTOF10: 7
PAINLEVEL_OUTOF10: 8
PAINLEVEL_OUTOF10: 0
PAINLEVEL_OUTOF10: 5
PAINLEVEL_OUTOF10: 7

## 2017-03-26 NOTE — PROGRESS NOTES
Hospital Medicine Progress Note, Adult, Complex               Author: William Jimenez Date & Time created: 3/25/2017  12:23 PM     Interval History:  Admitted for Osteomyelitis of toe, Bacteremia, Clostridium difficile diarrhea, recently discharged AMA from outside hospital.    OM - L 3rd toe amputation  Bacteremia - culture showed Grp A Streptococcus from outside hospital, rpt cult neg  C diff - diarrhea has resolved  AAA endograft - possible infection on incidental finding on MRI  Afib - mostly in SR, cardioverted at outside hospital  Dysphagia - EGD with botox done, tolerating full liquids  Low Na, K     Review of Systems:  Review of Systems   Constitutional: Positive for malaise/fatigue. Negative for fever and chills.   HENT: Negative for sore throat.    Eyes: Negative for blurred vision.   Respiratory: Negative for cough, shortness of breath and wheezing.    Cardiovascular: Negative for chest pain.   Gastrointestinal: Negative for heartburn, nausea, vomiting, abdominal pain and diarrhea.   Genitourinary: Negative for dysuria.   Musculoskeletal: Positive for back pain.   Neurological: Positive for weakness. Negative for dizziness, focal weakness and headaches.       Physical Exam:  Physical Exam   Constitutional: He is oriented to person, place, and time. He appears well-developed and well-nourished.   HENT:   Head: Normocephalic and atraumatic.   Eyes: Conjunctivae are normal. Pupils are equal, round, and reactive to light.   Neck: No tracheal deviation present. No thyromegaly present.   Cardiovascular: Normal rate and regular rhythm.    Pulmonary/Chest: Effort normal and breath sounds normal.   Abdominal: Soft. Bowel sounds are normal. He exhibits no distension. There is no tenderness.   Musculoskeletal: He exhibits edema.   L 3rd toe amputation   Lymphadenopathy:     He has no cervical adenopathy.   Neurological: He is alert and oriented to person, place, and time.   Nursing note and vitals  reviewed.      Labs:        Invalid input(s): [id number]      Recent Labs      17   0306   SODIUM  129*  129*  127*   POTASSIUM  4.0  3.7  3.5*   CHLORIDE  98  99  95*   CO2  24  23  24   BUN  20  18  16   CREATININE  0.98  0.99  1.06   MAGNESIUM  2.1   --    --    CALCIUM  8.7  8.1*  7.9*     Recent Labs      17   0306   ALTSGPT  93*  77*  103*   ASTSGOT  65*  55*  128*   ALKPHOSPHAT  136*  119*  145*   TBILIRUBIN  0.7  0.7  0.8   GLUCOSE  97  103*  143*     Recent Labs      17   0306   RBC  2.65*  2.45*   HEMOGLOBIN  8.9*  8.4*   HEMATOCRIT  27.0*  24.7*   PLATELETCT  187  174     Recent Labs      17   0306   WBC   --   9.5  10.1   NEUTSPOLYS   --   79.40*  81.30*   LYMPHOCYTES   --   10.10*  7.70*   MONOCYTES   --   9.60  10.10   EOSINOPHILS   --   0.20  0.20   BASOPHILS   --   0.20  0.20   ASTSGOT  65*  55*  128*   ALTSGPT  93*  77*  103*   ALKPHOSPHAT  136*  119*  145*   TBILIRUBIN  0.7  0.7  0.8           Hemodynamics:  Temp (24hrs), Av.1 °C (98.7 °F), Min:36.8 °C (98.2 °F), Max:37.3 °C (99.2 °F)  Temperature: 37.3 °C (99.2 °F)  Pulse  Av.6  Min: 61  Max: 114   Blood Pressure : 104/55 mmHg     Respiratory:    Respiration: 20, Pulse Oximetry: 94 %        RUL Breath Sounds: Clear, RML Breath Sounds: Clear, RLL Breath Sounds: Diminished, SOLEDAD Breath Sounds: Clear, LLL Breath Sounds: Diminished  Fluids:    Intake/Output Summary (Last 24 hours) at 17 1223  Last data filed at 17 0800   Gross per 24 hour   Intake      0 ml   Output    650 ml   Net   -650 ml     Weight: 78.1 kg (172 lb 2.9 oz)  GI/Nutrition:  Orders Placed This Encounter   Procedures   • DIET ORDER     Standing Status: Standing      Number of Occurrences: 1      Standing Expiration Date:      Order Specific Question:  Diet:     Answer:  Full Liquid [11]     Medical Decision Making, by  Problem:  Active Hospital Problems    Diagnosis   • *Osteomyelitis of toe (CMS-HCC) [M86.9]       IV Unasyn, s/p L 3rd toe amputation   • C. difficile diarrhea [A04.7]      oral Vancomycin    • Bacteremia [R78.81]         IV Unasyn   • Back pain [M54.9]         Lidocaine patch, Oxycodone, add OxyER    • Hyponatremia [E87.1]         start Salt tabs, follow bmp   • Noncompliance [Z91.19]    counseling   • Macrocytosis [D75.89]       B12, folic wnl   • Hypokalemia [E87.6]            start Kdur, follow bmp    • Aortic Stenosis, moderate.   Echo from outside hospital reviewed, no CHF   • Atrial fibrillation (CMS-[facility]) [I48.91]     Amiodarone, Metoprolol, holding Eliquis   • S/P AAA (abdominal aortic aneurysm) repair [Z98.890, Z86.79]     possible Endograft infection?, for tagged WBC scan   • Tobacco dependence [F17.200]      Nicotine patch        Dysphagia.     full liquid, speech to follow       Coagulopathy.   follow INR       Hepatitis C.      follow cmp       Dyslipidemia.   hold Zocor       Achalasia.        s/p EGD with botox injection, Full liquid diet    Medications reviewed, EKG reviewed, Labs reviewed and Radiology images reviewed  Art catheter: No Art      DVT Prophylaxis: Contraindicated - High bleeding risk  DVT prophylaxis - mechanical: Contra-indicated  Ulcer prophylaxis: No  Antibiotics: Treating active infection/contamination beyond 24 hours perioperative coverage

## 2017-03-26 NOTE — PROGRESS NOTES
Persistent sacral area back pain.  Not tender.  Abdomen not tender to deep palpation.  Cannot palpate AAA.  I discussed mortality risk of infected  Endograft.  Alternatives for management reviewed with him.  Hoping for antibiotic response and suppression.

## 2017-03-26 NOTE — PROGRESS NOTES
Hospital Medicine Progress Note, Adult, Complex               Author: William Jimenez Date & Time created: 3/25/2017  12:17 PM     Interval History:  Admitted for Osteomyelitis of toe, Bacteremia, Clostridium difficile diarrhea, recently discharged AMA from outside hospital.    OM - L 3rd toe amputation  Bacteremia - culture showed Grp A Streptococcus from outside hospital, rpt cult neg  C diff - diarrhea has resolved  AAA endograft - possible infection on incidental finding on MRI  Afib - in and out of Afib, cardioverted at outside hospital  Dysphagia - EGD with botox done, tolerating full liquids  Low Na     Review of Systems:  Review of Systems   Constitutional: Positive for malaise/fatigue. Negative for fever and chills.   HENT: Negative for sore throat.    Eyes: Negative for blurred vision.   Respiratory: Negative for cough, shortness of breath and wheezing.    Cardiovascular: Negative for chest pain.   Gastrointestinal: Negative for heartburn, nausea, vomiting, abdominal pain and diarrhea.   Genitourinary: Negative for dysuria.   Musculoskeletal: Positive for back pain.   Neurological: Positive for weakness. Negative for dizziness, focal weakness and headaches.       Physical Exam:  Physical Exam   Constitutional: He is oriented to person, place, and time. He appears well-developed and well-nourished.   HENT:   Head: Normocephalic and atraumatic.   Eyes: Conjunctivae are normal. Pupils are equal, round, and reactive to light.   Neck: No tracheal deviation present. No thyromegaly present.   Cardiovascular: Normal rate and regular rhythm.    Pulmonary/Chest: Effort normal and breath sounds normal.   Abdominal: Soft. Bowel sounds are normal. He exhibits no distension. There is no tenderness.   Musculoskeletal: He exhibits edema.   L 3rd toe amputation   Lymphadenopathy:     He has no cervical adenopathy.   Neurological: He is alert and oriented to person, place, and time.   Nursing note and vitals  reviewed.      Labs:        Invalid input(s): [id number]      Recent Labs      17   0306   SODIUM  129*  129*  127*   POTASSIUM  4.0  3.7  3.5*   CHLORIDE  98  99  95*   CO2  24  23  24   BUN  20  18  16   CREATININE  0.98  0.99  1.06   MAGNESIUM  2.1   --    --    CALCIUM  8.7  8.1*  7.9*     Recent Labs      17   0306   ALTSGPT  93*  77*  103*   ASTSGOT  65*  55*  128*   ALKPHOSPHAT  136*  119*  145*   TBILIRUBIN  0.7  0.7  0.8   GLUCOSE  97  103*  143*     Recent Labs      17   0306   RBC  2.65*  2.45*   HEMOGLOBIN  8.9*  8.4*   HEMATOCRIT  27.0*  24.7*   PLATELETCT  187  174     Recent Labs      17   0306   WBC   --   9.5  10.1   NEUTSPOLYS   --   79.40*  81.30*   LYMPHOCYTES   --   10.10*  7.70*   MONOCYTES   --   9.60  10.10   EOSINOPHILS   --   0.20  0.20   BASOPHILS   --   0.20  0.20   ASTSGOT  65*  55*  128*   ALTSGPT  93*  77*  103*   ALKPHOSPHAT  136*  119*  145*   TBILIRUBIN  0.7  0.7  0.8           Hemodynamics:  Temp (24hrs), Av.1 °C (98.7 °F), Min:36.8 °C (98.2 °F), Max:37.3 °C (99.2 °F)  Temperature: 37.3 °C (99.2 °F)  Pulse  Av.6  Min: 61  Max: 114   Blood Pressure : 104/55 mmHg     Respiratory:    Respiration: 20, Pulse Oximetry: 94 %        RUL Breath Sounds: Clear, RML Breath Sounds: Clear, RLL Breath Sounds: Diminished, SOLEDAD Breath Sounds: Clear, LLL Breath Sounds: Diminished  Fluids:    Intake/Output Summary (Last 24 hours) at 17 1217  Last data filed at 17 0800   Gross per 24 hour   Intake      0 ml   Output    650 ml   Net   -650 ml     Weight: 78.1 kg (172 lb 2.9 oz)  GI/Nutrition:  Orders Placed This Encounter   Procedures   • DIET ORDER     Standing Status: Standing      Number of Occurrences: 1      Standing Expiration Date:      Order Specific Question:  Diet:     Answer:  Full Liquid [11]     Medical Decision Making, by  Problem:  Active Hospital Problems    Diagnosis   • *Osteomyelitis of toe (CMS-[facility]) [M86.9]       IV Unasyn, s/p L 3rd toe amputation   • C. difficile diarrhea [A04.7]      oral Vancomycin    • Bacteremia [R78.81]         IV Unasyn   • Back pain [M54.9]         Lidocaine patch, Oxycodone, add OxyER    • Hyponatremia [E87.1]         follow bmp   • Noncompliance [Z91.19]    counseling   • Macrocytosis [D75.89]       B12, folic wnl   • Hypokalemia [E87.6]            follow bmp    • Aortic Stenosis, moderate.   Echo from outside hospital reviewed, no CHF   • Atrial fibrillation (CMS-HCC) [I48.91]     Amiodarone, Metoprolol, holding Eliquis   • S/P AAA (abdominal aortic aneurysm) repair [Z98.890, Z86.79]     possible Endograft infection?, for tagged WBC scan   • Tobacco dependence [F17.200]      Nicotine patch        Dysphagia.     full liquid, speech to follow       Coagulopathy.   follow INR       Hepatitis C.      follow cmp       Dyslipidemia.   hold Zocor       Achalasia.        s/p EGD with botox injection, Full liquid diet    Medications reviewed, EKG reviewed, Labs reviewed and Radiology images reviewed  Art catheter: No Art      DVT Prophylaxis: Contraindicated - High bleeding risk  DVT prophylaxis - mechanical: Contra-indicated  Ulcer prophylaxis: No  Antibiotics: Treating active infection/contamination beyond 24 hours perioperative coverage

## 2017-03-26 NOTE — CARE PLAN
Problem: Pain Management  Goal: Pain level will decrease to patient’s comfort goal  Outcome: PROGRESSING AS EXPECTED  Pt assessed for pain regularly and medicated PRN per MAR.     Problem: Infection  Goal: Will remain free from infection  Outcome: PROGRESSING AS EXPECTED  Vital signs stable. Pt and family educated on proper hand hygiene. Scrub access port for at least 15 seconds.

## 2017-03-26 NOTE — PROGRESS NOTES
Infectious Disease Progress Note    Author: Yary Lujan M.D. DOS & Time created: 3/26/2017  12:02 PM    Chief Complaint   Patient presents with   • Low Back Pain   FU for osteomyelitis, GAS bacteremia and CDI, suspected endograft infection    Interval History:  3/17 AF, WBC 12.9, pt c/o lower back pain, asking for morphine, poor insight into illness, denies any diarrhea  3/18 AF, WBC 13.3, sleeping and not arousable to voice, cultures here negative to date  3/19 AF, WBC 13.4, frustrated about lower back pain not adequately controlled on current pain regimen, plan for EGD with dilatation today  3/20 AF WBC 9.6 +pain-denies SE abx  3/21 AF WBC 10.5 no new complaints  3/22 AF WBC not done tolerating abx well  3/23 AF eye feels a little better-pain about the same  3/24 AF tolerating abx well-MRI confirm OM  3/25 AF WBC 9.5 s/p toe amp this am  3/26 AF WBC 10.1 deneies any new sxs-  Labs Reviewed, Medications Reviewed, Radiology Reviewed and Wound Reviewed.    Review of Systems:  Review of Systems   Constitutional: Negative for fever and chills.   Respiratory: Negative for cough and shortness of breath.    Gastrointestinal: Negative for nausea, vomiting, abdominal pain and diarrhea.   Genitourinary: Negative for dysuria.   Musculoskeletal: Positive for back pain.   Neurological: Negative for headaches.   All other systems reviewed and are negative.      Hemodynamics:  Temp (24hrs), Av.1 °C (98.7 °F), Min:36.8 °C (98.2 °F), Max:37.3 °C (99.2 °F)  Temperature: 37.3 °C (99.2 °F)  Pulse  Av.6  Min: 61  Max: 114   Blood Pressure : 104/55 mmHg       Physical Exam:  Physical Exam   Constitutional: He appears well-developed. No distress.   HENT:   Head: Normocephalic and atraumatic.   Eyes: EOM are normal. Pupils are equal, round, and reactive to light.   Pink eye right, resolving   Neck: Neck supple.   Cardiovascular: Normal rate.    Murmur heard.  IRR   Pulmonary/Chest: Effort normal and breath sounds normal.    Abdominal: Soft. He exhibits no distension. There is no tenderness.   Musculoskeletal: Normal range of motion. He exhibits edema.   R great toe with slight edema but no drainage or erythema, medial callus    Left 3rd toe-dressed    RUE PICC nontender   Neurological: He is alert.   Nursing note and vitals reviewed.      Labs:  Recent Labs      03/25/17   0244  03/26/17   0306   WBC  9.5  10.1   RBC  2.65*  2.45*   HEMOGLOBIN  8.9*  8.4*   HEMATOCRIT  27.0*  24.7*   MCV  102.3*  100.8*   MCH  33.2*  34.3*   RDW  48.3  48.8   PLATELETCT  187  174   MPV  9.2  9.5   NEUTSPOLYS  79.40*  81.30*   LYMPHOCYTES  10.10*  7.70*   MONOCYTES  9.60  10.10   EOSINOPHILS  0.20  0.20   BASOPHILS  0.20  0.20     Recent Labs      03/24/17   0222  03/25/17   0244  03/26/17   0306   SODIUM  129*  129*  127*   POTASSIUM  4.0  3.7  3.5*   CHLORIDE  98  99  95*   CO2  24  23  24   GLUCOSE  97  103*  143*   BUN  20  18  16     Recent Labs      03/24/17   0222  03/25/17   0244  03/26/17   0306   ALBUMIN  2.5*  2.2*  2.3*   TBILIRUBIN  0.7  0.7  0.8   ALKPHOSPHAT  136*  119*  145*   TOTPROTEIN  8.4*  7.2  7.5   ALTSGPT  93*  77*  103*   ASTSGOT  65*  55*  128*   CREATININE  0.98  0.99  1.06       BLOOD CULTURE   Date Value Ref Range Status   03/15/2017 No growth after 5 days of incubation.  Final    ':  Results     Procedure Component Value Units Date/Time    ANAEROBIC CULTURE [[id number]] Collected:  03/25/17 0936    Order Status:  Completed Specimen Information:  Tissue Updated:  03/26/17 1153     Significant Indicator NEG      Source TISS      Site Left 3rd Toe      Anaerobic Culture, Culture Res Culture in progress.     GRAM STAIN [[id number]] Collected:  03/25/17 0936    Order Status:  Completed Specimen Information:  Tissue Updated:  03/25/17 1718     Significant Indicator .      Source TISS      Site Left 3rd Toe      Gram Stain Result No organisms seen.     CULTURE TISSUE W/ GRM STAIN [[id number]] Collected:  03/25/17 0936    Order  "Status:  Completed Specimen Information:  Other Updated:  03/25/17 1046    FUNGAL CULTURE [[id number]] Collected:  03/25/17 0936    Order Status:  Completed Specimen Information:  Other Updated:  03/25/17 1046    BLOOD CULTURE x2 [[id number]] Collected:  03/15/17 1835    Order Status:  Completed Specimen Information:  Blood from Peripheral Updated:  03/20/17 2100     Significant Indicator NEG      Source BLD      Site PERIPHERAL      Blood Culture No growth after 5 days of incubation.     Narrative:      Per Hospital Policy: Only change Specimen Src: to \"Line\" if  specified by physician order.    BLOOD CULTURE x2 [[id number]] Collected:  03/15/17 1907    Order Status:  Completed Specimen Information:  Blood from Peripheral Updated:  03/20/17 2100     Significant Indicator NEG      Source BLD      Site PERIPHERAL      Blood Culture No growth after 5 days of incubation.     Narrative:      Per Hospital Policy: Only change Specimen Src: to \"Line\" if  specified by physician order.          Imaging  3/17 MRI lumbar spine: There are changes secondary to the abdominal aortic aneurysm endograft. The aneurysm sac measures an approximately 7.6 x 6.7 cm in size. Abnormal contrast enhancement is noted within the wall of the aneurysmal sac. There is also abnormal soft tissue   contrast enhancement in the surrounding soft tissue. These findings are highly suspicious for infected thrombosed aneurysm sac. The possibility of endograft infection is more likely. There is irregular outpouching of the aneurysmal sac along the   posterior portion indenting the left psoas muscle.  2.  Free fluid in the pelvis  3.  There is mild-to-moderate left hydronephrosis likely representing pelviureteric junction obstruction.    3/16 R foot xray - +OM  3/16 L foot xray +OM    Assessment:  Active Hospital Problems    Diagnosis   • *Osteomyelitis of toe (CMS-[facility]) [M86.9]   • Bacteremia [R78.81]   • Back pain [M54.9]   • CHF (congestive heart failure) " (CMS-[facility]) [I50.9]   • Tobacco dependence [F17.200]   • Atrial fibrillation (CMS-HCC) [I48.91]    Endovascular infection       Plan:  Right great toe and left 3rd osteomyelitis  +OM on radiographs-confirmed on repeat MRIs  3/9 OSH wound cx - GAS, MSSA (I confirmed with 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

## 2017-03-27 LAB
ALBUMIN SERPL BCP-MCNC: 2.4 G/DL (ref 3.2–4.9)
ALBUMIN/GLOB SERPL: 0.4 G/DL
ALP SERPL-CCNC: 131 U/L (ref 30–99)
ALT SERPL-CCNC: 86 U/L (ref 2–50)
ANION GAP SERPL CALC-SCNC: 7 MMOL/L (ref 0–11.9)
AST SERPL-CCNC: 75 U/L (ref 12–45)
BASOPHILS # BLD AUTO: 0.1 % (ref 0–1.8)
BASOPHILS # BLD: 0.01 K/UL (ref 0–0.12)
BILIRUB SERPL-MCNC: 0.6 MG/DL (ref 0.1–1.5)
BUN SERPL-MCNC: 12 MG/DL (ref 8–22)
CALCIUM SERPL-MCNC: 8.5 MG/DL (ref 8.5–10.5)
CHLORIDE SERPL-SCNC: 97 MMOL/L (ref 96–112)
CO2 SERPL-SCNC: 24 MMOL/L (ref 20–33)
CREAT SERPL-MCNC: 1 MG/DL (ref 0.5–1.4)
CRP SERPL HS-MCNC: 17.29 MG/DL (ref 0–0.75)
EOSINOPHIL # BLD AUTO: 0.04 K/UL (ref 0–0.51)
EOSINOPHIL NFR BLD: 0.4 % (ref 0–6.9)
ERYTHROCYTE [DISTWIDTH] IN BLOOD BY AUTOMATED COUNT: 48.6 FL (ref 35.9–50)
GFR SERPL CREATININE-BSD FRML MDRD: >60 ML/MIN/1.73 M 2
GLOBULIN SER CALC-MCNC: 5.5 G/DL (ref 1.9–3.5)
GLUCOSE SERPL-MCNC: 119 MG/DL (ref 65–99)
HCT VFR BLD AUTO: 26.2 % (ref 42–52)
HGB BLD-MCNC: 8.7 G/DL (ref 14–18)
IMM GRANULOCYTES # BLD AUTO: 0.05 K/UL (ref 0–0.11)
IMM GRANULOCYTES NFR BLD AUTO: 0.6 % (ref 0–0.9)
IRON SATN MFR SERPL: 15 % (ref 15–55)
IRON SERPL-MCNC: 26 UG/DL (ref 50–180)
LYMPHOCYTES # BLD AUTO: 1.08 K/UL (ref 1–4.8)
LYMPHOCYTES NFR BLD: 11.9 % (ref 22–41)
MAGNESIUM SERPL-MCNC: 2 MG/DL (ref 1.5–2.5)
MCH RBC QN AUTO: 33.2 PG (ref 27–33)
MCHC RBC AUTO-ENTMCNC: 33.2 G/DL (ref 33.7–35.3)
MCV RBC AUTO: 100 FL (ref 81.4–97.8)
MONOCYTES # BLD AUTO: 0.95 K/UL (ref 0–0.85)
MONOCYTES NFR BLD AUTO: 10.5 % (ref 0–13.4)
NEUTROPHILS # BLD AUTO: 6.92 K/UL (ref 1.82–7.42)
NEUTROPHILS NFR BLD: 76.5 % (ref 44–72)
NRBC # BLD AUTO: 0 K/UL
NRBC BLD AUTO-RTO: 0 /100 WBC
PHOSPHATE SERPL-MCNC: 3.5 MG/DL (ref 2.5–4.5)
PLATELET # BLD AUTO: 185 K/UL (ref 164–446)
PMV BLD AUTO: 9.2 FL (ref 9–12.9)
POTASSIUM SERPL-SCNC: 3.4 MMOL/L (ref 3.6–5.5)
PREALB SERPL-MCNC: 5 MG/DL (ref 18–38)
PROT SERPL-MCNC: 7.9 G/DL (ref 6–8.2)
RBC # BLD AUTO: 2.62 M/UL (ref 4.7–6.1)
SODIUM SERPL-SCNC: 128 MMOL/L (ref 135–145)
TIBC SERPL-MCNC: 178 UG/DL (ref 250–450)
WBC # BLD AUTO: 9.1 K/UL (ref 4.8–10.8)

## 2017-03-27 PROCEDURE — 99233 SBSQ HOSP IP/OBS HIGH 50: CPT | Performed by: FAMILY MEDICINE

## 2017-03-27 PROCEDURE — A9270 NON-COVERED ITEM OR SERVICE: HCPCS | Performed by: FAMILY MEDICINE

## 2017-03-27 PROCEDURE — 700105 HCHG RX REV CODE 258: Performed by: INTERNAL MEDICINE

## 2017-03-27 PROCEDURE — 85025 COMPLETE CBC W/AUTO DIFF WBC: CPT

## 2017-03-27 PROCEDURE — 700101 HCHG RX REV CODE 250: Performed by: FAMILY MEDICINE

## 2017-03-27 PROCEDURE — 700111 HCHG RX REV CODE 636 W/ 250 OVERRIDE (IP): Performed by: INTERNAL MEDICINE

## 2017-03-27 PROCEDURE — A9270 NON-COVERED ITEM OR SERVICE: HCPCS | Performed by: INTERNAL MEDICINE

## 2017-03-27 PROCEDURE — 770020 HCHG ROOM/CARE - TELE (206)

## 2017-03-27 PROCEDURE — 86140 C-REACTIVE PROTEIN: CPT

## 2017-03-27 PROCEDURE — 700102 HCHG RX REV CODE 250 W/ 637 OVERRIDE(OP): Performed by: FAMILY MEDICINE

## 2017-03-27 PROCEDURE — 83550 IRON BINDING TEST: CPT

## 2017-03-27 PROCEDURE — 700102 HCHG RX REV CODE 250 W/ 637 OVERRIDE(OP): Performed by: INTERNAL MEDICINE

## 2017-03-27 PROCEDURE — 83540 ASSAY OF IRON: CPT

## 2017-03-27 PROCEDURE — A9270 NON-COVERED ITEM OR SERVICE: HCPCS | Performed by: HOSPITALIST

## 2017-03-27 PROCEDURE — 83735 ASSAY OF MAGNESIUM: CPT

## 2017-03-27 PROCEDURE — 84134 ASSAY OF PREALBUMIN: CPT

## 2017-03-27 PROCEDURE — 700102 HCHG RX REV CODE 250 W/ 637 OVERRIDE(OP): Performed by: HOSPITALIST

## 2017-03-27 PROCEDURE — 84100 ASSAY OF PHOSPHORUS: CPT

## 2017-03-27 PROCEDURE — 80053 COMPREHEN METABOLIC PANEL: CPT

## 2017-03-27 RX ORDER — OXYCODONE HCL 20 MG/1
20 TABLET, FILM COATED, EXTENDED RELEASE ORAL EVERY 12 HOURS
Status: DISCONTINUED | OUTPATIENT
Start: 2017-03-27 | End: 2017-04-19 | Stop reason: HOSPADM

## 2017-03-27 RX ORDER — POTASSIUM CHLORIDE 20 MEQ/1
20 TABLET, EXTENDED RELEASE ORAL 2 TIMES DAILY
Status: DISCONTINUED | OUTPATIENT
Start: 2017-03-27 | End: 2017-03-29

## 2017-03-27 RX ORDER — POTASSIUM CHLORIDE 1.5 G/1.58G
20 POWDER, FOR SOLUTION ORAL 2 TIMES DAILY
Status: DISCONTINUED | OUTPATIENT
Start: 2017-03-27 | End: 2017-03-29

## 2017-03-27 RX ORDER — SODIUM CHLORIDE 1 G/1
1 TABLET ORAL
Status: DISCONTINUED | OUTPATIENT
Start: 2017-03-27 | End: 2017-03-28

## 2017-03-27 RX ADMIN — AMPICILLIN SODIUM AND SULBACTAM SODIUM 3 G: 2; 1 INJECTION, POWDER, FOR SOLUTION INTRAMUSCULAR; INTRAVENOUS at 05:15

## 2017-03-27 RX ADMIN — POTASSIUM CHLORIDE 20 MEQ: 1500 TABLET, EXTENDED RELEASE ORAL at 20:43

## 2017-03-27 RX ADMIN — VANCOMYCIN HYDROCHLORIDE 125 MG: 10 INJECTION, POWDER, LYOPHILIZED, FOR SOLUTION INTRAVENOUS at 18:13

## 2017-03-27 RX ADMIN — AMIODARONE HYDROCHLORIDE 400 MG: 200 TABLET ORAL at 09:44

## 2017-03-27 RX ADMIN — AMPICILLIN SODIUM AND SULBACTAM SODIUM 3 G: 2; 1 INJECTION, POWDER, FOR SOLUTION INTRAMUSCULAR; INTRAVENOUS at 23:37

## 2017-03-27 RX ADMIN — LIDOCAINE 2 PATCH: 50 PATCH CUTANEOUS at 12:46

## 2017-03-27 RX ADMIN — OXYCODONE HYDROCHLORIDE 20 MG: 20 TABLET, FILM COATED, EXTENDED RELEASE ORAL at 09:46

## 2017-03-27 RX ADMIN — OXYCODONE HYDROCHLORIDE 10 MG: 10 TABLET ORAL at 18:12

## 2017-03-27 RX ADMIN — VANCOMYCIN HYDROCHLORIDE 125 MG: 10 INJECTION, POWDER, LYOPHILIZED, FOR SOLUTION INTRAVENOUS at 05:15

## 2017-03-27 RX ADMIN — AMIODARONE HYDROCHLORIDE 400 MG: 200 TABLET ORAL at 20:43

## 2017-03-27 RX ADMIN — AMPICILLIN SODIUM AND SULBACTAM SODIUM 3 G: 2; 1 INJECTION, POWDER, FOR SOLUTION INTRAMUSCULAR; INTRAVENOUS at 12:46

## 2017-03-27 RX ADMIN — OXYCODONE HYDROCHLORIDE 10 MG: 10 TABLET ORAL at 12:46

## 2017-03-27 RX ADMIN — GABAPENTIN 400 MG: 400 CAPSULE ORAL at 20:42

## 2017-03-27 RX ADMIN — GABAPENTIN 400 MG: 400 CAPSULE ORAL at 16:00

## 2017-03-27 RX ADMIN — AMPICILLIN SODIUM AND SULBACTAM SODIUM 3 G: 2; 1 INJECTION, POWDER, FOR SOLUTION INTRAMUSCULAR; INTRAVENOUS at 18:12

## 2017-03-27 RX ADMIN — METOPROLOL TARTRATE 12.5 MG: 25 TABLET, FILM COATED ORAL at 09:45

## 2017-03-27 RX ADMIN — VANCOMYCIN HYDROCHLORIDE 125 MG: 10 INJECTION, POWDER, LYOPHILIZED, FOR SOLUTION INTRAVENOUS at 12:46

## 2017-03-27 RX ADMIN — SODIUM CHLORIDE TAB 1 GM 1 G: 1 TAB at 18:13

## 2017-03-27 RX ADMIN — OXYCODONE HYDROCHLORIDE 10 MG: 10 TABLET ORAL at 23:37

## 2017-03-27 RX ADMIN — GABAPENTIN 400 MG: 400 CAPSULE ORAL at 09:44

## 2017-03-27 RX ADMIN — OXYCODONE HYDROCHLORIDE 20 MG: 20 TABLET, FILM COATED, EXTENDED RELEASE ORAL at 20:44

## 2017-03-27 RX ADMIN — OXYCODONE HYDROCHLORIDE 10 MG: 10 TABLET ORAL at 03:00

## 2017-03-27 RX ADMIN — POTASSIUM CHLORIDE 20 MEQ: 1500 TABLET, EXTENDED RELEASE ORAL at 09:00

## 2017-03-27 RX ADMIN — VANCOMYCIN HYDROCHLORIDE 125 MG: 10 INJECTION, POWDER, LYOPHILIZED, FOR SOLUTION INTRAVENOUS at 23:37

## 2017-03-27 RX ADMIN — NICOTINE 14 MG: 14 PATCH TRANSDERMAL at 05:15

## 2017-03-27 ASSESSMENT — ENCOUNTER SYMPTOMS
BACK PAIN: 1
ABDOMINAL PAIN: 0
CHILLS: 0
HEADACHES: 0
SHORTNESS OF BREATH: 0
HEARTBURN: 0
BLURRED VISION: 0
SORE THROAT: 0
DIARRHEA: 0
FEVER: 0
DIZZINESS: 0
COUGH: 0
VOMITING: 0
WEAKNESS: 1
NAUSEA: 0
FOCAL WEAKNESS: 0
WHEEZING: 0

## 2017-03-27 ASSESSMENT — PAIN SCALES - GENERAL
PAINLEVEL_OUTOF10: 7
PAINLEVEL_OUTOF10: 6
PAINLEVEL_OUTOF10: 7
PAINLEVEL_OUTOF10: 5
PAINLEVEL_OUTOF10: 0

## 2017-03-27 NOTE — PROGRESS NOTES
Vascular    Tagged WBC scan does not show findings consistent with endograft infection.  His low back pain not explained by infection.    Recommend OK from vascular for SNF for long term antibiotics and probable life long suppression.  I will follow up in office on our routine schedule for endografts.

## 2017-03-27 NOTE — PROGRESS NOTES
Bedside report taken on patient, Assume care of patient. Patient is alert and oriented, Assessment done on patient, Patient complaint of pain,  prn pain medication given to patient, patient denies any more need at this time

## 2017-03-27 NOTE — PROGRESS NOTES
"   Orthopaedic Progress Note    Interval changes:  Patient doing well  Left foot dressings CDI    ROS - Patient denies any new issues.  Pain well controlled.    Blood pressure 101/64, pulse 83, temperature 36.8 °C (98.2 °F), resp. rate 20, height 1.778 m (5' 10\"), weight 78.1 kg (172 lb 2.9 oz), SpO2 92 %.      Patient seen and examined  No acute distress  Breathing non labored  RRR  Left foot surgical dressing is clean, dry, and intact. Patient clearly fires tibialis anterior, EHL, and gastrocnemius/soleus. Sensation is intact to light touch throughout superficial peroneal, deep peroneal, tibial, saphenous, and sural nerve distributions. Strong and palpable 2+ dorsalis pedis and posterior tibial pulses with capillary refill less than 2 seconds. No lower leg tenderness or discomfort.       Recent Labs      03/25/17   0244  03/26/17   0306   WBC  9.5  10.1   RBC  2.65*  2.45*   HEMOGLOBIN  8.9*  8.4*   HEMATOCRIT  27.0*  24.7*   MCV  102.3*  100.8*   MCH  33.2*  34.3*   MCHC  32.5*  34.0   RDW  48.3  48.8   PLATELETCT  187  174   MPV  9.2  9.5       Active Hospital Problems    Diagnosis   • C. difficile diarrhea [A04.7]     Priority: High   • Bacteremia [R78.81]     Priority: High   • Back pain [M54.9]     Priority: High   • Osteomyelitis of toe (CMS-[facility]) [M86.9]     Priority: High   • Hyponatremia [E87.1]     Priority: Medium   • Noncompliance [Z91.19]     Priority: Medium   • Macrocytosis [D75.89]     Priority: Medium   • Hypokalemia [E87.6]     Priority: Medium   • CHF (congestive heart failure) (CMS-[facility]) [I50.9]     Priority: Medium   • Atrial fibrillation (CMS-HCC) [I48.91]     Priority: Medium   • S/P AAA (abdominal aortic aneurysm) repair [Z98.890, Z86.79]     Priority: Medium   • Tobacco dependence [F17.200]     Priority: Low       Assessment/Plan:  Patient doing well  POD#1 S/P Left third toe amputation  Wt bearing status - WBAT  Wound care/Drains - dressing change tomorrow then every other day " therafter  Future Procedures - none planned  Sutures/Staples out- 14-21 days post operatively  PT/OT-initiated  Antibiotics: unasyn 3g Q6  DVT Prophylaxis- TEDS/SCDs/Foot pumps  Art-none  Case Coordination for Discharge Planning - Disposition home

## 2017-03-27 NOTE — DISCHARGE PLANNING
Received call from Fariha at [facility] requesting we send over PT notes, Pt has not been completed at this time. Notified NAZARIO Matute and was told an order was placed for the PT and she would notify me when that is completed so I can fax it to [facility]. Pt's insurance is requesting this information for processing.

## 2017-03-27 NOTE — PROGRESS NOTES
Hospital Medicine Progress Note, Adult, Complex               Author: William Jimenez Date & Time created: 3/25/2017  12:39 PM     Interval History:  Admitted for Osteomyelitis of toe, Bacteremia, Clostridium difficile diarrhea, recently discharged AMA from outside hospital.    OM - L 3rd toe amputation  Bacteremia - culture showed Grp A Streptococcus from outside hospital, rpt cult neg  C diff - diarrhea has resolved  AAA endograft - possible infection on incidental finding on MRI, tagged WBC scan negative  Afib - mostly in SR, cardioverted at outside hospital  Dysphagia - EGD with botox done, tolerating full liquids  Low Na, K     Review of Systems:  Review of Systems   Constitutional: Positive for malaise/fatigue. Negative for fever and chills.   HENT: Negative for sore throat.    Eyes: Negative for blurred vision.   Respiratory: Negative for cough, shortness of breath and wheezing.    Cardiovascular: Negative for chest pain.   Gastrointestinal: Negative for heartburn, nausea, vomiting, abdominal pain and diarrhea.   Genitourinary: Negative for dysuria.   Musculoskeletal: Positive for back pain.   Neurological: Positive for weakness. Negative for dizziness, focal weakness and headaches.       Physical Exam:  Physical Exam   Constitutional: He is oriented to person, place, and time. He appears well-developed and well-nourished.   HENT:   Head: Normocephalic and atraumatic.   Eyes: Conjunctivae are normal. Pupils are equal, round, and reactive to light.   Neck: No tracheal deviation present. No thyromegaly present.   Cardiovascular: Normal rate and regular rhythm.    Pulmonary/Chest: Effort normal and breath sounds normal.   Abdominal: Soft. Bowel sounds are normal. He exhibits no distension. There is no tenderness.   Musculoskeletal: He exhibits edema.   L 3rd toe amputation   Lymphadenopathy:     He has no cervical adenopathy.   Neurological: He is alert and oriented to person, place, and time.   Nursing note  and vitals reviewed.      Labs:        Invalid input(s): [id number]      Recent Labs      17   SODIUM  129*  127*  128*   POTASSIUM  3.7  3.5*  3.4*   CHLORIDE  99  95*  97   CO2  23  24  24   BUN  18  16  12   CREATININE  0.99  1.06  1.00   MAGNESIUM   --    --   2.0   PHOSPHORUS   --    --   3.5   CALCIUM  8.1*  7.9*  8.5     Recent Labs      17   ALTSGPT  77*  103*  86*   ASTSGOT  55*  128*  75*   ALKPHOSPHAT  119*  145*  131*   TBILIRUBIN  0.7  0.8  0.6   PREALBUMIN   --    --   5.0*   GLUCOSE  103*  143*  119*     Recent Labs      17   RBC  2.65*  2.45*  2.62*   --    HEMOGLOBIN  8.9*  8.4*  8.7*   --    HEMATOCRIT  27.0*  24.7*  26.2*   --    PLATELETCT  187  174  185   --    IRON   --    --    --   26*   TOTIRONBC   --    --    --   178*     Recent Labs      17   WBC  9.5  10.1  9.1   --    NEUTSPOLYS  79.40*  81.30*  76.50*   --    LYMPHOCYTES  10.10*  7.70*  11.90*   --    MONOCYTES  9.60  10.10  10.50   --    EOSINOPHILS  0.20  0.20  0.40   --    BASOPHILS  0.20  0.20  0.10   --    ASTSGOT  55*  128*   --   75*   ALTSGPT  77*  103*   --   86*   ALKPHOSPHAT  119*  145*   --   131*   TBILIRUBIN  0.7  0.8   --   0.6           Hemodynamics:  Temp (24hrs), Av.9 °C (98.5 °F), Min:36.4 °C (97.6 °F), Max:37.3 °C (99.2 °F)  Temperature: 36.4 °C (97.6 °F)  Pulse  Av.1  Min: 61  Max: 114   Blood Pressure : 106/59 mmHg     Respiratory:    Respiration: 18, Pulse Oximetry: 93 %        RUL Breath Sounds: Clear, RML Breath Sounds: Clear, RLL Breath Sounds: Diminished, SOLEDAD Breath Sounds: Clear, LLL Breath Sounds: Diminished  Fluids:    Intake/Output Summary (Last 24 hours) at 17 1239  Last data filed at 17 0500   Gross per 24 hour   Intake    870 ml   Output   1100 ml   Net   -230  ml     Weight: 74.3 kg (163 lb 12.8 oz)  GI/Nutrition:  Orders Placed This Encounter   Procedures   • DIET ORDER     Standing Status: Standing      Number of Occurrences: 1      Standing Expiration Date:      Order Specific Question:  Diet:     Answer:  Full Liquid [11]     Medical Decision Making, by Problem:  Active Hospital Problems    Diagnosis   • *Osteomyelitis of toe (CMS-HCC) [M86.9]       IV Unasyn until 4/27, s/p L 3rd toe amputation   • C. difficile diarrhea [A04.7]      oral Vancomycin QID until 3/29, then BID while on IV Unasyn   • Bacteremia [R78.81]         IV Unasyn   • Back pain [M54.9]         Lidocaine patch, Oxycodone, increase OxyER    • Hyponatremia [E87.1]         increase Salt tabs, follow bmp   • Noncompliance [Z91.19]    counseling   • Macrocytosis [D75.89]       B12, folic wnl   • Hypokalemia [E87.6]          increase Kdur, follow bmp    • Aortic Stenosis, moderate.   Echo from outside hospital reviewed, no CHF   • Atrial fibrillation (CMS-HCC) [I48.91]     Amiodarone, Metoprolol, holding Eliquis   • S/P AAA (abdominal aortic aneurysm) repair [Z98.890, Z86.79]      tagged WBC scan negative for possible Endograft infection   • Tobacco dependence [F17.200]      Nicotine patch        Dysphagia.     full liquid, speech to follow       Coagulopathy.   follow INR       Hepatitis C.      follow cmp       Dyslipidemia.   hold Zocor       Achalasia.        s/p EGD with botox injection, Full liquid diet    Medications reviewed, EKG reviewed, Labs reviewed and Radiology images reviewed  Art catheter: No Art      DVT Prophylaxis: Contraindicated - High bleeding risk  DVT prophylaxis - mechanical: Contra-indicated  Ulcer prophylaxis: No  Antibiotics: Treating active infection/contamination beyond 24 hours perioperative coverage

## 2017-03-27 NOTE — CARE PLAN
Problem: Pain Management  Goal: Pain level will decrease to patient’s comfort goal  Outcome: PROGRESSING AS EXPECTED  Pain control with prn pain medication     Problem: Safety  Goal: Will remain free from injury  Outcome: PROGRESSING AS EXPECTED  Call bell within reach, pt alert and oriented

## 2017-03-27 NOTE — DISCHARGE PLANNING
Medical Social Work    NAZARIO received voicemail from [facility] who indicated pt would need to have PT/OT notes for auth to be obtained for his return to [facility]. NAZARIO confirmed pt does have PT orders active and submitted on 3/25/17. NAZARIO contacted RAKESH Montiel and informed her [facility] is attempting to complete auth of pt and PT/OT needs to see pt before this can be done. RN confirmed understanding.    Plan: NAZARIO will continue to monitor pt's status and d/c needs. NAZARIO will update [facility] of pt's completed PT/OT once done so auth process can be finished.

## 2017-03-27 NOTE — PROGRESS NOTES
Infectious Disease Progress Note    Author: Laurel Darby M.D. DOS & Time created: 3/27/2017  8:17 AM    Chief Complaint   Patient presents with   • Low Back Pain   FU for osteomyelitis, GAS bacteremia and CDI, suspected endograft infection    Interval History:  3/17 AF, WBC 12.9, pt c/o lower back pain, asking for morphine, poor insight into illness, denies any diarrhea  3/18 AF, WBC 13.3, sleeping and not arousable to voice, cultures here negative to date  3/19 AF, WBC 13.4, frustrated about lower back pain not adequately controlled on current pain regimen, plan for EGD with dilatation today  3/20 AF WBC 9.6 +pain-denies SE abx  3/21 AF WBC 10.5 no new complaints  3/22 AF WBC not done tolerating abx well  3/23 AF eye feels a little better-pain about the same  3/24 AF tolerating abx well-MRI confirm OM  3/25 AF WBC 9.5 s/p toe amp this am  3/26 AF WBC 10.1 deneies any new sxs-  3/27 AF, WBC 9.1, sleeping but arousable to voice, back pain stable  Labs Reviewed, Medications Reviewed, Radiology Reviewed and Wound Reviewed.    Review of Systems:  Review of Systems   Constitutional: Negative for fever and chills.   Respiratory: Negative for cough and shortness of breath.    Gastrointestinal: Negative for nausea, vomiting, abdominal pain and diarrhea.   Genitourinary: Negative for dysuria.   Musculoskeletal: Positive for back pain.   Neurological: Negative for headaches.   All other systems reviewed and are negative.      Hemodynamics:  Temp (24hrs), Av.2 °C (98.9 °F), Min:36.8 °C (98.2 °F), Max:37.4 °C (99.4 °F)  Temperature: 37.3 °C (99.2 °F)  Pulse  Av.2  Min: 61  Max: 114   Blood Pressure : 101/62 mmHg       Physical Exam:  Physical Exam   Constitutional: He appears well-developed. No distress.   HENT:   Head: Normocephalic and atraumatic.   Eyes: EOM are normal. Pupils are equal, round, and reactive to light.   Pink eye right, resolving   Neck: Neck supple.   Cardiovascular: Normal rate.    Murmur  heard.  IRR   Pulmonary/Chest: Effort normal and breath sounds normal.   Abdominal: Soft. He exhibits no distension. There is no tenderness.   Musculoskeletal: Normal range of motion. He exhibits edema.   R great toe with slight edema but no drainage or erythema, medial callus    Left 3rd toe-dressed    RUE PICC nontender   Neurological: He is alert.   Nursing note and vitals reviewed.      Labs:  Recent Labs      03/25/17   0244  03/26/17   0306  03/27/17   0142   WBC  9.5  10.1  9.1   RBC  2.65*  2.45*  2.62*   HEMOGLOBIN  8.9*  8.4*  8.7*   HEMATOCRIT  27.0*  24.7*  26.2*   MCV  102.3*  100.8*  100.0*   MCH  33.2*  34.3*  33.2*   RDW  48.3  48.8  48.6   PLATELETCT  187  174  185   MPV  9.2  9.5  9.2   NEUTSPOLYS  79.40*  81.30*  76.50*   LYMPHOCYTES  10.10*  7.70*  11.90*   MONOCYTES  9.60  10.10  10.50   EOSINOPHILS  0.20  0.20  0.40   BASOPHILS  0.20  0.20  0.10     Recent Labs      03/25/17   0244  03/26/17   0306  03/27/17   0255   SODIUM  129*  127*  128*   POTASSIUM  3.7  3.5*  3.4*   CHLORIDE  99  95*  97   CO2  23  24  24   GLUCOSE  103*  143*  119*   BUN  18  16  12     Recent Labs      03/25/17   0244  03/26/17   0306  03/27/17   0255   ALBUMIN  2.2*  2.3*  2.4*   TBILIRUBIN  0.7  0.8  0.6   ALKPHOSPHAT  119*  145*  131*   TOTPROTEIN  7.2  7.5  7.9   ALTSGPT  77*  103*  86*   ASTSGOT  55*  128*  75*   CREATININE  0.99  1.06  1.00       BLOOD CULTURE   Date Value Ref Range Status   03/15/2017 No growth after 5 days of incubation.  Final    ':  Results     Procedure Component Value Units Date/Time    CULTURE TISSUE W/ GRM STAIN [[id number]]  (Abnormal) Collected:  03/25/17 0936    Order Status:  Completed Specimen Information:  Tissue Updated:  03/26/17 1441     Significant Indicator POS (POS)      Source TISS      Site Left 3rd Toe      Tissue Culture -- (A)      Gram Stain Result No organisms seen.      Tissue Culture -- (A)      Result:        Staphylococcus aureus  Rare growth      ANAEROBIC CULTURE  "[[id number]] Collected:  03/25/17 0936    Order Status:  Completed Specimen Information:  Tissue Updated:  03/26/17 1441     Significant Indicator NEG      Source TISS      Site Left 3rd Toe      Anaerobic Culture, Culture Res Culture in progress.     FUNGAL CULTURE [[id number]] Collected:  03/25/17 0936    Order Status:  Completed Specimen Information:  Tissue Updated:  03/26/17 1441     Significant Indicator NEG      Source TISS      Site Left 3rd Toe      Fungal Culture Culture in progress.     GRAM STAIN [[id number]] Collected:  03/25/17 0936    Order Status:  Completed Specimen Information:  Tissue Updated:  03/25/17 1718     Significant Indicator .      Source TISS      Site Left 3rd Toe      Gram Stain Result No organisms seen.     BLOOD CULTURE x2 [[id number]] Collected:  03/15/17 1835    Order Status:  Completed Specimen Information:  Blood from Peripheral Updated:  03/20/17 2100     Significant Indicator NEG      Source BLD      Site PERIPHERAL      Blood Culture No growth after 5 days of incubation.     Narrative:      Per Hospital Policy: Only change Specimen Src: to \"Line\" if  specified by physician order.    BLOOD CULTURE x2 [[id number]] Collected:  03/15/17 1907    Order Status:  Completed Specimen Information:  Blood from Peripheral Updated:  03/20/17 2100     Significant Indicator NEG      Source BLD      Site PERIPHERAL      Blood Culture No growth after 5 days of incubation.     Narrative:      Per Hospital Policy: Only change Specimen Src: to \"Line\" if  specified by physician order.          Imaging  3/17 MRI lumbar spine: There are changes secondary to the abdominal aortic aneurysm endograft. The aneurysm sac measures an approximately 7.6 x 6.7 cm in size. Abnormal contrast enhancement is noted within the wall of the aneurysmal sac. There is also abnormal soft tissue   contrast enhancement in the surrounding soft tissue. These findings are highly suspicious for infected thrombosed aneurysm sac. The " possibility of endograft infection is more likely. There is irregular outpouching of the aneurysmal sac along the   posterior portion indenting the left psoas muscle.  2.  Free fluid in the pelvis  3.  There is mild-to-moderate left hydronephrosis likely representing pelviureteric junction obstruction.    3/16 R foot xray - +OM  3/16 L foot xray +OM    Assessment:  Active Hospital Problems    Diagnosis   • *Osteomyelitis of toe (CMS-HCC) [M86.9]   • Bacteremia [R78.81]   • Back pain [M54.9]   • CHF (congestive heart failure) (CMS-HCC) [I50.9]   • Tobacco dependence [F17.200]   • Atrial fibrillation (CMS-HCC) [I48.91]    Endovascular infection       Plan:  Right great toe and left 3rd osteomyelitis  +OM on radiographs-confirmed on repeat MRIs  3/9 OSH wound cx - GAS, MSSA (I confirmed with Atkinson's microlab)  3/25 s/p 3rd toe amp. No purulence seen proximally to amp site  OR cx (left 3rd toe) - staph aureus   Continue Unasyn  Anticipate 6 weeks of IV abx. 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  Appreciate vascular evaluation - surgery associated with high mortality  Abx per above followed by chronic abx suppression   Tagged WBC scan - no e/o endovascular infection    Clostridium difficile diarrhea  Continue oral vancomycin QID for 2 weeks stop date 03/29/17 then BID while on IV abx    Esophageal dysmotility-concern for achalasia  H/o strictures with recent dilatation as Atkinson's  Esophagram +distal esophageal obstruction  s/p EGD with dilatation  Keep meds IV    Back pain  Likely 2/2 suspected abdominal aorta endovascular infection    Drug seeking behavior    Prognosis guarded    Dispo: Rec SNF for long-term abx    DW IM

## 2017-03-27 NOTE — PROGRESS NOTES
Assumed care at 0700 . Report received from night  RN. Patient's chart and MAR reviewed. Assessment complete. Pt is resting. Patient was updated on plan of care. Questions answered and concerns addressed. Reports pain of 7 on a 0-10 scale. Pt denies any additional needs at this time. White board updated. Call light, hospital room phone and personal belongings within reach. Medicated per mar

## 2017-03-28 LAB
ALBUMIN SERPL BCP-MCNC: 2.4 G/DL (ref 3.2–4.9)
ALBUMIN/GLOB SERPL: 0.4 G/DL
ALP SERPL-CCNC: 130 U/L (ref 30–99)
ALT SERPL-CCNC: 74 U/L (ref 2–50)
ANION GAP SERPL CALC-SCNC: 9 MMOL/L (ref 0–11.9)
AST SERPL-CCNC: 73 U/L (ref 12–45)
BACTERIA SPEC ANAEROBE CULT: NORMAL
BACTERIA TISS AEROBE CULT: ABNORMAL
BASOPHILS # BLD AUTO: 0 % (ref 0–1.8)
BASOPHILS # BLD: 0 K/UL (ref 0–0.12)
BILIRUB SERPL-MCNC: 0.6 MG/DL (ref 0.1–1.5)
BUN SERPL-MCNC: 9 MG/DL (ref 8–22)
CALCIUM SERPL-MCNC: 8.3 MG/DL (ref 8.5–10.5)
CHLORIDE SERPL-SCNC: 98 MMOL/L (ref 96–112)
CO2 SERPL-SCNC: 25 MMOL/L (ref 20–33)
CREAT SERPL-MCNC: 0.96 MG/DL (ref 0.5–1.4)
EOSINOPHIL # BLD AUTO: 0.07 K/UL (ref 0–0.51)
EOSINOPHIL NFR BLD: 0.9 % (ref 0–6.9)
ERYTHROCYTE [DISTWIDTH] IN BLOOD BY AUTOMATED COUNT: 48.1 FL (ref 35.9–50)
GFR SERPL CREATININE-BSD FRML MDRD: >60 ML/MIN/1.73 M 2
GLOBULIN SER CALC-MCNC: 5.5 G/DL (ref 1.9–3.5)
GLUCOSE SERPL-MCNC: 111 MG/DL (ref 65–99)
GRAM STN SPEC: ABNORMAL
HCT VFR BLD AUTO: 25.6 % (ref 42–52)
HGB BLD-MCNC: 8.5 G/DL (ref 14–18)
LYMPHOCYTES # BLD AUTO: 0.95 K/UL (ref 1–4.8)
LYMPHOCYTES NFR BLD: 12.4 % (ref 22–41)
MANUAL DIFF BLD: NORMAL
MCH RBC QN AUTO: 33.1 PG (ref 27–33)
MCHC RBC AUTO-ENTMCNC: 33.2 G/DL (ref 33.7–35.3)
MCV RBC AUTO: 99.6 FL (ref 81.4–97.8)
MONOCYTES # BLD AUTO: 0.27 K/UL (ref 0–0.85)
MONOCYTES NFR BLD AUTO: 3.5 % (ref 0–13.4)
MORPHOLOGY BLD-IMP: NORMAL
NEUTROPHILS # BLD AUTO: 6.41 K/UL (ref 1.82–7.42)
NEUTROPHILS NFR BLD: 83.2 % (ref 44–72)
NRBC # BLD AUTO: 0 K/UL
NRBC BLD AUTO-RTO: 0 /100 WBC
PLATELET # BLD AUTO: 197 K/UL (ref 164–446)
PLATELET BLD QL SMEAR: NORMAL
PMV BLD AUTO: 8.9 FL (ref 9–12.9)
POTASSIUM SERPL-SCNC: 3.4 MMOL/L (ref 3.6–5.5)
PROT SERPL-MCNC: 7.9 G/DL (ref 6–8.2)
RBC # BLD AUTO: 2.57 M/UL (ref 4.7–6.1)
RBC BLD AUTO: NORMAL
SIGNIFICANT IND 70042: ABNORMAL
SIGNIFICANT IND 70042: NORMAL
SITE SITE: ABNORMAL
SITE SITE: NORMAL
SODIUM SERPL-SCNC: 132 MMOL/L (ref 135–145)
SOURCE SOURCE: ABNORMAL
SOURCE SOURCE: NORMAL
WBC # BLD AUTO: 7.7 K/UL (ref 4.8–10.8)

## 2017-03-28 PROCEDURE — 700111 HCHG RX REV CODE 636 W/ 250 OVERRIDE (IP): Performed by: INTERNAL MEDICINE

## 2017-03-28 PROCEDURE — A9270 NON-COVERED ITEM OR SERVICE: HCPCS | Performed by: FAMILY MEDICINE

## 2017-03-28 PROCEDURE — 85027 COMPLETE CBC AUTOMATED: CPT

## 2017-03-28 PROCEDURE — A9270 NON-COVERED ITEM OR SERVICE: HCPCS | Performed by: HOSPITALIST

## 2017-03-28 PROCEDURE — 80053 COMPREHEN METABOLIC PANEL: CPT

## 2017-03-28 PROCEDURE — 99233 SBSQ HOSP IP/OBS HIGH 50: CPT | Performed by: HOSPITALIST

## 2017-03-28 PROCEDURE — G8978 MOBILITY CURRENT STATUS: HCPCS | Mod: CJ

## 2017-03-28 PROCEDURE — 770006 HCHG ROOM/CARE - MED/SURG/GYN SEMI*

## 2017-03-28 PROCEDURE — 700102 HCHG RX REV CODE 250 W/ 637 OVERRIDE(OP): Performed by: ORTHOPAEDIC SURGERY

## 2017-03-28 PROCEDURE — 700102 HCHG RX REV CODE 250 W/ 637 OVERRIDE(OP): Performed by: FAMILY MEDICINE

## 2017-03-28 PROCEDURE — 700102 HCHG RX REV CODE 250 W/ 637 OVERRIDE(OP): Performed by: INTERNAL MEDICINE

## 2017-03-28 PROCEDURE — G8996 SWALLOW CURRENT STATUS: HCPCS | Mod: CI

## 2017-03-28 PROCEDURE — 92610 EVALUATE SWALLOWING FUNCTION: CPT

## 2017-03-28 PROCEDURE — 700111 HCHG RX REV CODE 636 W/ 250 OVERRIDE (IP): Performed by: HOSPITALIST

## 2017-03-28 PROCEDURE — 700102 HCHG RX REV CODE 250 W/ 637 OVERRIDE(OP): Performed by: HOSPITALIST

## 2017-03-28 PROCEDURE — A9270 NON-COVERED ITEM OR SERVICE: HCPCS | Performed by: INTERNAL MEDICINE

## 2017-03-28 PROCEDURE — A9270 NON-COVERED ITEM OR SERVICE: HCPCS | Performed by: ORTHOPAEDIC SURGERY

## 2017-03-28 PROCEDURE — 97161 PT EVAL LOW COMPLEX 20 MIN: CPT

## 2017-03-28 PROCEDURE — 700105 HCHG RX REV CODE 258: Performed by: INTERNAL MEDICINE

## 2017-03-28 PROCEDURE — G8997 SWALLOW GOAL STATUS: HCPCS | Mod: CI

## 2017-03-28 PROCEDURE — G8979 MOBILITY GOAL STATUS: HCPCS | Mod: CH

## 2017-03-28 PROCEDURE — 85007 BL SMEAR W/DIFF WBC COUNT: CPT

## 2017-03-28 PROCEDURE — 700101 HCHG RX REV CODE 250: Performed by: FAMILY MEDICINE

## 2017-03-28 RX ORDER — OXYCODONE HYDROCHLORIDE 5 MG/1
5 TABLET ORAL EVERY 4 HOURS PRN
Status: DISCONTINUED | OUTPATIENT
Start: 2017-03-28 | End: 2017-04-07

## 2017-03-28 RX ORDER — L. ACIDOPHILUS/L.BULGARICUS 100MM CELL
1 GRANULES IN PACKET (EA) ORAL
Status: DISCONTINUED | OUTPATIENT
Start: 2017-03-28 | End: 2017-04-19 | Stop reason: HOSPADM

## 2017-03-28 RX ORDER — OXYCODONE HYDROCHLORIDE 10 MG/1
10 TABLET ORAL EVERY 4 HOURS PRN
Status: DISCONTINUED | OUTPATIENT
Start: 2017-03-28 | End: 2017-04-07

## 2017-03-28 RX ORDER — ACETAMINOPHEN 325 MG/1
650 TABLET ORAL EVERY 6 HOURS
Status: DISCONTINUED | OUTPATIENT
Start: 2017-03-28 | End: 2017-04-19 | Stop reason: HOSPADM

## 2017-03-28 RX ORDER — SODIUM CHLORIDE 9 MG/ML
INJECTION, SOLUTION INTRAVENOUS
Status: ACTIVE
Start: 2017-03-28 | End: 2017-03-29

## 2017-03-28 RX ADMIN — AMPICILLIN SODIUM AND SULBACTAM SODIUM 3 G: 2; 1 INJECTION, POWDER, FOR SOLUTION INTRAMUSCULAR; INTRAVENOUS at 18:30

## 2017-03-28 RX ADMIN — SODIUM CHLORIDE TAB 1 GM 1 G: 1 TAB at 09:04

## 2017-03-28 RX ADMIN — OXYCODONE HYDROCHLORIDE 10 MG: 10 TABLET ORAL at 12:50

## 2017-03-28 RX ADMIN — VANCOMYCIN HYDROCHLORIDE 125 MG: 10 INJECTION, POWDER, LYOPHILIZED, FOR SOLUTION INTRAVENOUS at 18:30

## 2017-03-28 RX ADMIN — LIDOCAINE 2 PATCH: 50 PATCH CUTANEOUS at 12:50

## 2017-03-28 RX ADMIN — AMIODARONE HYDROCHLORIDE 400 MG: 200 TABLET ORAL at 09:04

## 2017-03-28 RX ADMIN — AMPICILLIN SODIUM AND SULBACTAM SODIUM 3 G: 2; 1 INJECTION, POWDER, FOR SOLUTION INTRAMUSCULAR; INTRAVENOUS at 05:07

## 2017-03-28 RX ADMIN — VANCOMYCIN HYDROCHLORIDE 125 MG: 10 INJECTION, POWDER, LYOPHILIZED, FOR SOLUTION INTRAVENOUS at 23:43

## 2017-03-28 RX ADMIN — ACETAMINOPHEN 650 MG: 325 TABLET, FILM COATED ORAL at 23:43

## 2017-03-28 RX ADMIN — METOPROLOL TARTRATE 12.5 MG: 25 TABLET, FILM COATED ORAL at 09:03

## 2017-03-28 RX ADMIN — VANCOMYCIN HYDROCHLORIDE 125 MG: 10 INJECTION, POWDER, LYOPHILIZED, FOR SOLUTION INTRAVENOUS at 12:50

## 2017-03-28 RX ADMIN — STANDARDIZED SENNA CONCENTRATE AND DOCUSATE SODIUM 1 TABLET: 8.6; 5 TABLET, FILM COATED ORAL at 21:16

## 2017-03-28 RX ADMIN — SODIUM CHLORIDE TAB 1 GM 1 G: 1 TAB at 16:00

## 2017-03-28 RX ADMIN — ACETAMINOPHEN 650 MG: 325 TABLET, FILM COATED ORAL at 18:32

## 2017-03-28 RX ADMIN — METOPROLOL TARTRATE 12.5 MG: 25 TABLET, FILM COATED ORAL at 21:17

## 2017-03-28 RX ADMIN — OXYCODONE HYDROCHLORIDE 20 MG: 20 TABLET, FILM COATED, EXTENDED RELEASE ORAL at 09:04

## 2017-03-28 RX ADMIN — AMPICILLIN SODIUM AND SULBACTAM SODIUM 3 G: 2; 1 INJECTION, POWDER, FOR SOLUTION INTRAMUSCULAR; INTRAVENOUS at 23:43

## 2017-03-28 RX ADMIN — OXYCODONE HYDROCHLORIDE 20 MG: 20 TABLET, FILM COATED, EXTENDED RELEASE ORAL at 21:17

## 2017-03-28 RX ADMIN — POTASSIUM CHLORIDE 20 MEQ: 20 POWDER ORAL at 21:16

## 2017-03-28 RX ADMIN — GABAPENTIN 400 MG: 400 CAPSULE ORAL at 21:17

## 2017-03-28 RX ADMIN — POTASSIUM CHLORIDE 20 MEQ: 1500 TABLET, EXTENDED RELEASE ORAL at 09:04

## 2017-03-28 RX ADMIN — OXYCODONE HYDROCHLORIDE 10 MG: 10 TABLET ORAL at 18:32

## 2017-03-28 RX ADMIN — SODIUM CHLORIDE TAB 1 GM 1 G: 1 TAB at 12:51

## 2017-03-28 RX ADMIN — AMPICILLIN SODIUM AND SULBACTAM SODIUM 3 G: 2; 1 INJECTION, POWDER, FOR SOLUTION INTRAMUSCULAR; INTRAVENOUS at 12:49

## 2017-03-28 RX ADMIN — NICOTINE 14 MG: 14 PATCH TRANSDERMAL at 05:08

## 2017-03-28 RX ADMIN — GABAPENTIN 400 MG: 400 CAPSULE ORAL at 09:04

## 2017-03-28 RX ADMIN — LACTOBACILLUS ACIDOPHILUS / LACTOBACILLUS BULGARICUS 1 PACKET: 100 MILLION CFU STRENGTH GRANULES at 18:32

## 2017-03-28 RX ADMIN — ENOXAPARIN SODIUM 40 MG: 100 INJECTION SUBCUTANEOUS at 18:33

## 2017-03-28 RX ADMIN — OXYCODONE HYDROCHLORIDE 10 MG: 10 TABLET ORAL at 05:06

## 2017-03-28 RX ADMIN — VANCOMYCIN HYDROCHLORIDE 125 MG: 10 INJECTION, POWDER, LYOPHILIZED, FOR SOLUTION INTRAVENOUS at 05:07

## 2017-03-28 RX ADMIN — OXYCODONE HYDROCHLORIDE 10 MG: 10 TABLET ORAL at 23:43

## 2017-03-28 RX ADMIN — GABAPENTIN 400 MG: 400 CAPSULE ORAL at 16:00

## 2017-03-28 RX ADMIN — AMIODARONE HYDROCHLORIDE 400 MG: 200 TABLET ORAL at 21:17

## 2017-03-28 ASSESSMENT — PAIN SCALES - GENERAL
PAINLEVEL_OUTOF10: 8
PAINLEVEL_OUTOF10: 5
PAINLEVEL_OUTOF10: 6
PAINLEVEL_OUTOF10: 2
PAINLEVEL_OUTOF10: 7
PAINLEVEL_OUTOF10: 6

## 2017-03-28 ASSESSMENT — ENCOUNTER SYMPTOMS
SHORTNESS OF BREATH: 0
CHILLS: 0
DIARRHEA: 0
HEADACHES: 0
BACK PAIN: 0
VOMITING: 0
BACK PAIN: 1
NAUSEA: 0
FEVER: 0
ABDOMINAL PAIN: 0
MYALGIAS: 0
COUGH: 0

## 2017-03-28 ASSESSMENT — GAIT ASSESSMENTS
DISTANCE (FEET): 40
GAIT LEVEL OF ASSIST: STAND BY ASSIST
ASSISTIVE DEVICE: FRONT WHEEL WALKER

## 2017-03-28 NOTE — CARE PLAN
Problem: Pain Management  Goal: Pain level will decrease to patient’s comfort goal  Outcome: PROGRESSING AS EXPECTED  Pharmacological pain management in use, repositioning provided, relaxation techniques encouraged.     Problem: Safety  Goal: Will remain free from injury  Outcome: PROGRESSING AS EXPECTED  Patient's bed is in low position, treaded socks are on, bed alarm is on, upper bed rails are up, and call light is within reach

## 2017-03-28 NOTE — CARE PLAN
Problem: Pain Management  Goal: Pain level will decrease to patient’s comfort goal  Outcome: PROGRESSING AS EXPECTED  Pain control with schedule pain med and prn medication

## 2017-03-28 NOTE — PROGRESS NOTES
Vascular  Patient alert.  Chronic low back pain persists.  Can't fully explain source.  We discussed his status. Discharge anticipated to SNF.

## 2017-03-28 NOTE — DISCHARGE PLANNING
Medical Social Work    SW confirmed PT has assessed pt and is recommending discharge to a transitional care facility for continued skilled therapy services prior to d/c home. NAZARIO called AMIE Li and requested she forward therapy notes to [facility] so auth can be obtained. AMIE Li confirmed she would send the needed notes.    Plan: AMIE Li to forward therapy notes for pt to [facility]. NAZARIO will continue to follow for pt's acceptance into SNF.

## 2017-03-28 NOTE — PROGRESS NOTES
Assumed care at 0700, bedside report received from NOC shift RN. Patient is AOx4 with no signs of distress. Initial assessment completed, orders reviewed, call light within reach, pt resting in bedside chair with strip alarm, and hourly rounding in place. POC addressed with patient, no additional questions at this time.

## 2017-03-28 NOTE — PROGRESS NOTES
Received shift report from mariano RN and assumed care of this pt at 1500. Pt AOx4 . Pt reports pain is 6/10 to LLE and back pain.  - Recently medicated prn per MAR. Pt is up in wheel chair ambulated to bed with one assist, . Pt appropriately with call light when needing assistance. Declining bed alarm. PIV assessed and is patent, CDI, SL. Pt is on RA.  Discussed POC for day shift,  comfort, and safety. Patient has call light and personal belongings within reach. Safety and fall precautions in place. Reviewed orders, notes, labs, and test results. Hourly rounding in place with RN rounding on odd hours and CNA on even hours.

## 2017-03-28 NOTE — THERAPY
"Pt is 72 y.o. male admitted 3/15/17 after leaving AMA from outside hospital 3/14/17 where pt was diagnosed with new onset afib and received a cardioversion; R great toe osteomyelitis; hiatal hernia. Pt presented to [facility] ED with primary c/o back pain. PT consulted 3/25/17 after amputation of L 3rd toe at [facility] secondary to progressing osteomyelitis, at this time R great toe osteomyelitis being managed conservatively. PT evaluation revealing poor L LE muscular endurance limiting pt's ability to maintain L LE heel wt bearing status for > 40ft w/ use of FWW. Additionally pt will require further training for safe use of FWW to limit fall risk and increase likelihood of L LE healing. At this time pt will benefit from further rehabilitation at transitional facility prior to d/c home.     Physical Therapy Evaluation completed.   Bed Mobility:  Supine to Sit: Supervised (secondary to lines)  Transfers: Sit to Stand: Contact Guard Assist  Gait: Level Of Assist: Stand by Assist with Front-Wheel Walker       Plan of Care: Will benefit from Physical Therapy 2 times per week  Discharge Recommendations: Equipment: Will Continue to Assess for Equipment Needs. Discharge to a transitional care facility for continued skilled therapy services.    Kamini Braun, [phone]    See \"Rehab Therapy-Acute\" Patient Summary Report for complete documentation.     "

## 2017-03-28 NOTE — PROGRESS NOTES
Patient being transferred to T313 by transport via personal wheelchair, all personal belongings with patient, tele monitor removed, report called by Jocelyne CAMERON, no additional questions or needs at this time.

## 2017-03-28 NOTE — THERAPY
"  Speech Language Therapy Clinical Swallow Evaluation completed.  Functional Status: Pt reports greater than 5 year hist of coughing up food \"into my bucket and then I am on my merry way.\" Pt stating he is tired of the full liquid diet and requests regular food. Pt with resent hist of esophageal issues while at [place]. Pt then admitted to [facility]. Earlier this hospitalization, esophagram and mammometry ordered with esophageal achalasia dx and treated by botox injection by  Dr. Lemus per EMR. Pt ordered for full liquids. Per nurs, pt is requesting \"food.\" Pt seen while sitting up in a chair and at 90 degrees. He is drinking carbonated soda \"ginger ale.\" Nurs has observed pt coughing up what he drinks. Pt taking sips of the ginger ale with no coughing post swallow but pt with probable refluxing up of the ginger ale within one minute of drinking. Pt spitting up the frothy type liquid into a cup. Pt took one medium sized pill and one smaller pill with no reflux of the medications. Pt stating he only \"coughs up large pills.\" Pt provided with possible strategies for esophageal achalasia with pt stating \"I do most of that. Lady, just give me regular food and I will cut it up.\" SLP provided educ to the pt and nurs that MD, either hospitalist or Dr. Lemus, would need to order and approve a diet upgrade. Consider discussing diet upgrade despite risks if medically appropriate. Pt verbalizing his preference is for \"regular food.\" Pt transferring to [facility] now. Plan for SLP to collaborate with staff on orth regarding pt's current status with variable to poor toleration of full liquids with pt stating \"I will do better if they give me food.\"   Recommendations - Diet:  Ordered for full liquids                          Strategies: sitting up for all oral intake= 90 degrees, liquid wash, carbonated drinks as tolerated.                          Medication Administration:  As tolerated.  Plan of Care: Will benefit from " "Speech Therapy 3 times per week  Post-Acute Therapy: Discharge to a transitional care facility for continued skilled therapy services. Thanks, Ajay    See \"Rehab Therapy-Acute\" Patient Summary Report for complete documentation.   "

## 2017-03-28 NOTE — PROGRESS NOTES
Bedside report taken on patient, Assume care of patient. Patient is alert and oriented, Assessment done on patient, vital sign taken,  dressing change on left foot, medication given to patient, Schedule metoprolol held due to patient's BP being low, patient denies any more need at this time

## 2017-03-28 NOTE — DISCHARGE PLANNING
Care Transitions Team    Situation: Pt is a 72 year old male who was admitted for back pain and osteomyelitis of his right great toe. Prior to pt's admission on 3/15/17, pt was at [facility] for atrial fibrillation with RVR and a transecophageal echocardiogram and cardioverted was completed while he was there. Pt left [facility] AMA. While at [facility], pt did have a PICC line placed for IV antibiotics, though it was removed prior to pt leaving.     Background: Since pt's admit, pt has been seen by SLP and it was recommended pt maintain a full-liquid diet as he coughs up what he swallows and was recommended to ST 3x/wk. Pt evaluated by PT and is recommended to discharge to a transitional care facility for continued skilled therapy services prior to d/c home. Pt is currently receiving IV Unasyn, which will have an end date of 4/27/17, and PO Vanco for his active infections. Pt has his 3rd left toe amputated on 3/25/17.    Assessment: Pt has been recommended for SNF. A choice form has been completed for pt with his choice's being [facility] and [facility] second. Pt is yet to be medically cleared for d/c.    Recommendation/Requests: Follow-up with [facility] to determine which SNF is accepting pt.

## 2017-03-28 NOTE — PROGRESS NOTES
Infectious Disease Progress Note    Author: Laurel Darby M.D. DOS & Time created: 3/28/2017  8:16 AM    Chief Complaint   Patient presents with   • Low Back Pain   FU for osteomyelitis, GAS bacteremia and CDI, suspected endograft infection    Interval History:  3/17 AF, WBC 12.9, pt c/o lower back pain, asking for morphine, poor insight into illness, denies any diarrhea  3/18 AF, WBC 13.3, sleeping and not arousable to voice, cultures here negative to date  3/19 AF, WBC 13.4, frustrated about lower back pain not adequately controlled on current pain regimen, plan for EGD with dilatation today  3/20 AF WBC 9.6 +pain-denies SE abx  3/21 AF WBC 10.5 no new complaints  3/22 AF WBC not done tolerating abx well  3/23 AF eye feels a little better-pain about the same  3/24 AF tolerating abx well-MRI confirm OM  3/25 AF WBC 9.5 s/p toe amp this am  3/26 AF WBC 10.1 deneies any new sxs-  3/27 AF, WBC 9.1, sleeping but arousable to voice, back pain stable  3/28 Tmax 99.9, WBC 7.7, not happy about soft foods, and wants to go back on regular diet, having normal BMs, back pain controlled on pain regimen  Labs Reviewed, Medications Reviewed, Radiology Reviewed and Wound Reviewed.    Review of Systems:  Review of Systems   Constitutional: Negative for fever and chills.   Respiratory: Negative for cough and shortness of breath.    Gastrointestinal: Negative for nausea, vomiting, abdominal pain and diarrhea.   Genitourinary: Negative for dysuria.   Musculoskeletal: Positive for back pain.        Stable   Neurological: Negative for headaches.   All other systems reviewed and are negative.      Hemodynamics:  Temp (24hrs), Av.1 °C (98.7 °F), Min:36.2 °C (97.2 °F), Max:37.7 °C (99.9 °F)  Temperature: 37.7 °C (99.9 °F) (RN notified. )  Pulse  Av.5  Min: 61  Max: 114   Blood Pressure : (!) 93/55 mmHg (RN notified. )       Physical Exam:  Physical Exam   Constitutional: He appears well-developed. No distress.   HENT:   Head:  Normocephalic and atraumatic.   Eyes: EOM are normal. Pupils are equal, round, and reactive to light.   Neck: Neck supple.   Cardiovascular: Normal rate.    Murmur heard.  IRR   Pulmonary/Chest: Effort normal and breath sounds normal.   Abdominal: Soft. He exhibits no distension. There is no tenderness.   Musculoskeletal: Normal range of motion. He exhibits edema.   R great toe with slight edema but no drainage or erythema, medial callus. Erythema resolving    Left 3rd toe-dressed    RUE PICC nontender   Neurological: He is alert.   Nursing note and vitals reviewed.      Labs:  Recent Labs      03/26/17   0306  03/27/17   0142  03/28/17   0113   WBC  10.1  9.1  7.7   RBC  2.45*  2.62*  2.57*   HEMOGLOBIN  8.4*  8.7*  8.5*   HEMATOCRIT  24.7*  26.2*  25.6*   MCV  100.8*  100.0*  99.6*   MCH  34.3*  33.2*  33.1*   RDW  48.8  48.6  48.1   PLATELETCT  174  185  197   MPV  9.5  9.2  8.9*   NEUTSPOLYS  81.30*  76.50*  83.20*   LYMPHOCYTES  7.70*  11.90*  12.40*   MONOCYTES  10.10  10.50  3.50   EOSINOPHILS  0.20  0.40  0.90   BASOPHILS  0.20  0.10  0.00   RBCMORPHOLO   --    --   Normal     Recent Labs      03/26/17   0306  03/27/17   0255  03/28/17   0113   SODIUM  127*  128*  132*   POTASSIUM  3.5*  3.4*  3.4*   CHLORIDE  95*  97  98   CO2  24  24  25   GLUCOSE  143*  119*  111*   BUN  16  12  9     Recent Labs      03/26/17   0306  03/27/17   0255  03/28/17   0113   ALBUMIN  2.3*  2.4*  2.4*   TBILIRUBIN  0.8  0.6  0.6   ALKPHOSPHAT  145*  131*  130*   TOTPROTEIN  7.5  7.9  7.9   ALTSGPT  103*  86*  74*   ASTSGOT  128*  75*  73*   CREATININE  1.06  1.00  0.96       BLOOD CULTURE   Date Value Ref Range Status   03/15/2017 No growth after 5 days of incubation.  Final    ':  Results     Procedure Component Value Units Date/Time    CULTURE TISSUE W/ GRM STAIN [[id number]]  (Abnormal)  (Susceptibility) Collected:  03/25/17 0980    Order Status:  Completed Specimen Information:  Tissue Updated:  03/27/17 1108     Significant  Indicator POS (POS)      Source TISS      Site Left 3rd Toe      Tissue Culture -- (A)      Gram Stain Result No organisms seen.      Tissue Culture -- (A)      Result:        Staphylococcus aureus  Rare growth       Tissue Culture -- (A)      Result:        Coagulase-negative Staphylococcus species  Rare growth      Culture & Susceptibility     STAPHYLOCOCCUS AUREUS     Antibiotic Sensitivity Microscan Unit Status    Ampicillin/sulbactam Sensitive <=8/4 mcg/mL Final    Clindamycin Sensitive <=0.5 mcg/mL Final    Daptomycin Sensitive <=0.5 mcg/mL Final    Erythromycin Sensitive <=0.5 mcg/mL Final    Moxifloxacin Sensitive <=0.5 mcg/mL Final    Oxacillin Sensitive <=0.25 mcg/mL Final    Tetracycline Sensitive <=4 mcg/mL Final    Trimeth/Sulfa Sensitive <=0.5/9.5 mcg/mL Final    Vancomycin Sensitive 2 mcg/mL Final                       ANAEROBIC CULTURE [[id number]] Collected:  03/25/17 0936    Order Status:  Completed Specimen Information:  Tissue Updated:  03/27/17 1108     Significant Indicator NEG      Source TISS      Site Left 3rd Toe      Anaerobic Culture, Culture Res Culture in progress.     FUNGAL CULTURE [[id number]] Collected:  03/25/17 0936    Order Status:  Completed Specimen Information:  Tissue Updated:  03/27/17 1108     Significant Indicator NEG      Source TISS      Site Left 3rd Toe      Fungal Culture Culture in progress.     GRAM STAIN [[id number]] Collected:  03/25/17 0936    Order Status:  Completed Specimen Information:  Tissue Updated:  03/27/17 1108     Significant Indicator .      Source TISS      Site Left 3rd Toe      Gram Stain Result No organisms seen.           Imaging  3/17 MRI lumbar spine: There are changes secondary to the abdominal aortic aneurysm endograft. The aneurysm sac measures an approximately 7.6 x 6.7 cm in size. Abnormal contrast enhancement is noted within the wall of the aneurysmal sac. There is also abnormal soft tissue   contrast enhancement in the surrounding soft  tissue. These findings are highly suspicious for infected thrombosed aneurysm sac. The possibility of endograft infection is more likely. There is irregular outpouching of the aneurysmal sac along the   posterior portion indenting the left psoas muscle.  2.  Free fluid in the pelvis  3.  There is mild-to-moderate left hydronephrosis likely representing pelviureteric junction obstruction.    3/16 R foot xray - +OM  3/16 L foot xray +OM    Assessment:  Active Hospital Problems    Diagnosis   • *Osteomyelitis of toe (CMS-HCC) [M86.9]   • Bacteremia [R78.81]   • Back pain [M54.9]   • CHF (congestive heart failure) (CMS-HCC) [I50.9]   • Tobacco dependence [F17.200]   • Atrial fibrillation (CMS-HCC) [I48.91]    Endovascular infection       Plan:  Right great toe and left 3rd osteomyelitis  +OM on radiographs-confirmed on repeat MRIs  3/9 OSH wound cx - GAS, MSSA (I confirmed with [facility] microlab)  3/25 s/p 3rd toe amp. No purulence seen proximally to amp site  OR cx (left 3rd toe) - CoNS (S-unasyn) Vanco LINUS 2  Continue Unasyn  Anticipate 6 weeks of IV abx. 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  Appreciate vascular evaluation - surgery associated with high mortality  Abx per above followed by chronic abx suppression   Tagged WBC scan - no e/o endovascular infection  Abx as per above followed by PO augmentin for suppressive given endograft    Clostridium difficile diarrhea  Continue oral vancomycin QID for 2 weeks stop date 03/29/17 then BID while on IV abx with stop date of 04/30/17    Esophageal dysmotility-concern for achalasia  H/o strictures with recent dilatation as [facility]  Esophagram +distal esophageal obstruction  s/p EGD with dilatation  Keep meds IV    Back pain  Likely  2/2 suspected abdominal aorta endovascular infection    Drug seeking behavior    Prognosis guarded    Dispo: Rec SNF for long-term abx     IM

## 2017-03-29 PROBLEM — I71.40 AAA (ABDOMINAL AORTIC ANEURYSM) (HCC): Status: RESOLVED | Noted: 2017-03-29 | Resolved: 2017-03-29

## 2017-03-29 PROBLEM — I25.10 CAD (CORONARY ARTERY DISEASE): Status: ACTIVE | Noted: 2017-03-29

## 2017-03-29 PROBLEM — D64.9 ANEMIA: Status: ACTIVE | Noted: 2017-03-29

## 2017-03-29 PROBLEM — K74.60 CIRRHOSIS (HCC): Status: ACTIVE | Noted: 2017-03-29

## 2017-03-29 PROBLEM — B18.2 HEP C W/O COMA, CHRONIC (HCC): Status: ACTIVE | Noted: 2017-03-29

## 2017-03-29 PROBLEM — I71.40 AAA (ABDOMINAL AORTIC ANEURYSM) (HCC): Status: ACTIVE | Noted: 2017-03-29

## 2017-03-29 PROBLEM — R13.10 DYSPHAGIA: Status: ACTIVE | Noted: 2017-03-29

## 2017-03-29 PROBLEM — K74.60 CIRRHOSIS (HCC): Status: RESOLVED | Noted: 2017-03-29 | Resolved: 2017-03-29

## 2017-03-29 LAB
ALBUMIN SERPL BCP-MCNC: 2.3 G/DL (ref 3.2–4.9)
ALBUMIN/GLOB SERPL: 0.5 G/DL
ALP SERPL-CCNC: 105 U/L (ref 30–99)
ALT SERPL-CCNC: 56 U/L (ref 2–50)
ANION GAP SERPL CALC-SCNC: 7 MMOL/L (ref 0–11.9)
AST SERPL-CCNC: 45 U/L (ref 12–45)
BASOPHILS # BLD AUTO: 0.2 % (ref 0–1.8)
BASOPHILS # BLD: 0.01 K/UL (ref 0–0.12)
BILIRUB CONJ SERPL-MCNC: 0.2 MG/DL (ref 0.1–0.5)
BILIRUB INDIRECT SERPL-MCNC: 0.3 MG/DL (ref 0–1)
BILIRUB SERPL-MCNC: 0.5 MG/DL (ref 0.1–1.5)
BUN SERPL-MCNC: 10 MG/DL (ref 8–22)
CALCIUM SERPL-MCNC: 8.1 MG/DL (ref 8.5–10.5)
CHLORIDE SERPL-SCNC: 98 MMOL/L (ref 96–112)
CO2 SERPL-SCNC: 27 MMOL/L (ref 20–33)
CREAT SERPL-MCNC: 0.99 MG/DL (ref 0.5–1.4)
EOSINOPHIL # BLD AUTO: 0.08 K/UL (ref 0–0.51)
EOSINOPHIL NFR BLD: 1.4 % (ref 0–6.9)
ERYTHROCYTE [DISTWIDTH] IN BLOOD BY AUTOMATED COUNT: 48.8 FL (ref 35.9–50)
GFR SERPL CREATININE-BSD FRML MDRD: >60 ML/MIN/1.73 M 2
GLOBULIN SER CALC-MCNC: 4.8 G/DL (ref 1.9–3.5)
GLUCOSE SERPL-MCNC: 97 MG/DL (ref 65–99)
HCT VFR BLD AUTO: 22.8 % (ref 42–52)
HGB BLD-MCNC: 7.5 G/DL (ref 14–18)
IMM GRANULOCYTES # BLD AUTO: 0.02 K/UL (ref 0–0.11)
IMM GRANULOCYTES NFR BLD AUTO: 0.3 % (ref 0–0.9)
LYMPHOCYTES # BLD AUTO: 1.5 K/UL (ref 1–4.8)
LYMPHOCYTES NFR BLD: 26.2 % (ref 22–41)
MCH RBC QN AUTO: 33.2 PG (ref 27–33)
MCHC RBC AUTO-ENTMCNC: 32.9 G/DL (ref 33.7–35.3)
MCV RBC AUTO: 100.9 FL (ref 81.4–97.8)
MONOCYTES # BLD AUTO: 0.67 K/UL (ref 0–0.85)
MONOCYTES NFR BLD AUTO: 11.7 % (ref 0–13.4)
NEUTROPHILS # BLD AUTO: 3.44 K/UL (ref 1.82–7.42)
NEUTROPHILS NFR BLD: 60.2 % (ref 44–72)
NRBC # BLD AUTO: 0 K/UL
NRBC BLD AUTO-RTO: 0 /100 WBC
PLATELET # BLD AUTO: 157 K/UL (ref 164–446)
PMV BLD AUTO: 9.1 FL (ref 9–12.9)
POTASSIUM SERPL-SCNC: 3.1 MMOL/L (ref 3.6–5.5)
PROT SERPL-MCNC: 7.1 G/DL (ref 6–8.2)
RBC # BLD AUTO: 2.26 M/UL (ref 4.7–6.1)
SODIUM SERPL-SCNC: 132 MMOL/L (ref 135–145)
WBC # BLD AUTO: 5.7 K/UL (ref 4.8–10.8)

## 2017-03-29 PROCEDURE — 99233 SBSQ HOSP IP/OBS HIGH 50: CPT | Performed by: HOSPITALIST

## 2017-03-29 PROCEDURE — 700102 HCHG RX REV CODE 250 W/ 637 OVERRIDE(OP): Performed by: HOSPITALIST

## 2017-03-29 PROCEDURE — 93970 EXTREMITY STUDY: CPT | Mod: 26 | Performed by: SURGERY

## 2017-03-29 PROCEDURE — 700101 HCHG RX REV CODE 250: Performed by: FAMILY MEDICINE

## 2017-03-29 PROCEDURE — A9270 NON-COVERED ITEM OR SERVICE: HCPCS | Performed by: INTERNAL MEDICINE

## 2017-03-29 PROCEDURE — 770006 HCHG ROOM/CARE - MED/SURG/GYN SEMI*

## 2017-03-29 PROCEDURE — 85025 COMPLETE CBC W/AUTO DIFF WBC: CPT

## 2017-03-29 PROCEDURE — 700102 HCHG RX REV CODE 250 W/ 637 OVERRIDE(OP): Performed by: INTERNAL MEDICINE

## 2017-03-29 PROCEDURE — 700111 HCHG RX REV CODE 636 W/ 250 OVERRIDE (IP): Performed by: INTERNAL MEDICINE

## 2017-03-29 PROCEDURE — 93970 EXTREMITY STUDY: CPT

## 2017-03-29 PROCEDURE — 700105 HCHG RX REV CODE 258

## 2017-03-29 PROCEDURE — 82248 BILIRUBIN DIRECT: CPT

## 2017-03-29 PROCEDURE — 700105 HCHG RX REV CODE 258: Performed by: INTERNAL MEDICINE

## 2017-03-29 PROCEDURE — 700111 HCHG RX REV CODE 636 W/ 250 OVERRIDE (IP): Performed by: HOSPITALIST

## 2017-03-29 PROCEDURE — 700102 HCHG RX REV CODE 250 W/ 637 OVERRIDE(OP): Performed by: FAMILY MEDICINE

## 2017-03-29 PROCEDURE — A9270 NON-COVERED ITEM OR SERVICE: HCPCS

## 2017-03-29 PROCEDURE — 700102 HCHG RX REV CODE 250 W/ 637 OVERRIDE(OP)

## 2017-03-29 PROCEDURE — A9270 NON-COVERED ITEM OR SERVICE: HCPCS | Performed by: FAMILY MEDICINE

## 2017-03-29 PROCEDURE — A9270 NON-COVERED ITEM OR SERVICE: HCPCS | Performed by: HOSPITALIST

## 2017-03-29 PROCEDURE — 80053 COMPREHEN METABOLIC PANEL: CPT

## 2017-03-29 RX ORDER — SIMVASTATIN 20 MG
20 TABLET ORAL EVERY EVENING
Status: DISCONTINUED | OUTPATIENT
Start: 2017-03-29 | End: 2017-04-19 | Stop reason: HOSPADM

## 2017-03-29 RX ORDER — POTASSIUM CHLORIDE 20 MEQ/1
20 TABLET, EXTENDED RELEASE ORAL DAILY
Status: DISCONTINUED | OUTPATIENT
Start: 2017-03-29 | End: 2017-03-29

## 2017-03-29 RX ORDER — ALBUTEROL SULFATE 90 UG/1
AEROSOL, METERED RESPIRATORY (INHALATION)
Status: COMPLETED
Start: 2017-03-29 | End: 2017-03-29

## 2017-03-29 RX ORDER — OXYCODONE HCL 20 MG/1
TABLET, FILM COATED, EXTENDED RELEASE ORAL
Status: COMPLETED
Start: 2017-03-29 | End: 2017-03-29

## 2017-03-29 RX ORDER — POTASSIUM CHLORIDE 20 MEQ/1
TABLET, EXTENDED RELEASE ORAL
Status: COMPLETED
Start: 2017-03-29 | End: 2017-03-29

## 2017-03-29 RX ORDER — AMIODARONE HYDROCHLORIDE 200 MG/1
200 TABLET ORAL
Status: DISCONTINUED | OUTPATIENT
Start: 2017-03-30 | End: 2017-04-19 | Stop reason: HOSPADM

## 2017-03-29 RX ORDER — POTASSIUM CHLORIDE 20 MEQ/1
40 TABLET, EXTENDED RELEASE ORAL 2 TIMES DAILY
Status: DISCONTINUED | OUTPATIENT
Start: 2017-03-29 | End: 2017-04-08

## 2017-03-29 RX ORDER — POTASSIUM CHLORIDE 1.5 G/1.58G
40 POWDER, FOR SOLUTION ORAL 2 TIMES DAILY
Status: DISCONTINUED | OUTPATIENT
Start: 2017-03-29 | End: 2017-04-08

## 2017-03-29 RX ORDER — AMOXICILLIN 250 MG
CAPSULE ORAL
Status: ACTIVE
Start: 2017-03-29 | End: 2017-03-30

## 2017-03-29 RX ORDER — GABAPENTIN 300 MG/1
600 CAPSULE ORAL 3 TIMES DAILY
Status: DISCONTINUED | OUTPATIENT
Start: 2017-03-29 | End: 2017-04-19 | Stop reason: HOSPADM

## 2017-03-29 RX ORDER — SODIUM CHLORIDE 9 MG/ML
INJECTION, SOLUTION INTRAVENOUS
Status: COMPLETED
Start: 2017-03-29 | End: 2017-03-29

## 2017-03-29 RX ORDER — OXYCODONE HYDROCHLORIDE 10 MG/1
TABLET ORAL
Status: COMPLETED
Start: 2017-03-29 | End: 2017-03-29

## 2017-03-29 RX ORDER — SIMVASTATIN 20 MG
TABLET ORAL
Status: COMPLETED
Start: 2017-03-29 | End: 2017-03-29

## 2017-03-29 RX ORDER — GABAPENTIN 300 MG/1
CAPSULE ORAL
Status: COMPLETED
Start: 2017-03-29 | End: 2017-03-29

## 2017-03-29 RX ADMIN — POTASSIUM CHLORIDE 40 MEQ: 1500 TABLET, EXTENDED RELEASE ORAL at 20:49

## 2017-03-29 RX ADMIN — GABAPENTIN 400 MG: 400 CAPSULE ORAL at 08:41

## 2017-03-29 RX ADMIN — NICOTINE 14 MG: 14 PATCH TRANSDERMAL at 05:28

## 2017-03-29 RX ADMIN — GABAPENTIN 600 MG: 300 CAPSULE ORAL at 20:49

## 2017-03-29 RX ADMIN — VANCOMYCIN HYDROCHLORIDE 125 MG: 10 INJECTION, POWDER, LYOPHILIZED, FOR SOLUTION INTRAVENOUS at 05:28

## 2017-03-29 RX ADMIN — OXYCODONE HYDROCHLORIDE 10 MG: 10 TABLET ORAL at 16:52

## 2017-03-29 RX ADMIN — OXYCODONE HYDROCHLORIDE 20 MG: 20 TABLET, FILM COATED, EXTENDED RELEASE ORAL at 20:49

## 2017-03-29 RX ADMIN — SIMVASTATIN 20 MG: 20 TABLET, FILM COATED ORAL at 20:50

## 2017-03-29 RX ADMIN — OXYCODONE HYDROCHLORIDE 10 MG: 10 TABLET ORAL at 20:49

## 2017-03-29 RX ADMIN — ACETAMINOPHEN 650 MG: 325 TABLET, FILM COATED ORAL at 05:29

## 2017-03-29 RX ADMIN — AMPICILLIN SODIUM AND SULBACTAM SODIUM 3 G: 2; 1 INJECTION, POWDER, FOR SOLUTION INTRAMUSCULAR; INTRAVENOUS at 11:56

## 2017-03-29 RX ADMIN — SODIUM CHLORIDE: 9 INJECTION, SOLUTION INTRAVENOUS at 14:15

## 2017-03-29 RX ADMIN — VANCOMYCIN HYDROCHLORIDE 125 MG: 10 INJECTION, POWDER, LYOPHILIZED, FOR SOLUTION INTRAVENOUS at 17:28

## 2017-03-29 RX ADMIN — OXYCODONE HYDROCHLORIDE 20 MG: 20 TABLET, FILM COATED, EXTENDED RELEASE ORAL at 20:45

## 2017-03-29 RX ADMIN — AMPICILLIN SODIUM AND SULBACTAM SODIUM 3 G: 2; 1 INJECTION, POWDER, FOR SOLUTION INTRAMUSCULAR; INTRAVENOUS at 06:23

## 2017-03-29 RX ADMIN — OXYCODONE HYDROCHLORIDE 10 MG: 10 TABLET ORAL at 12:57

## 2017-03-29 RX ADMIN — METOPROLOL TARTRATE 12.5 MG: 25 TABLET, FILM COATED ORAL at 20:50

## 2017-03-29 RX ADMIN — ENOXAPARIN SODIUM 40 MG: 100 INJECTION SUBCUTANEOUS at 08:41

## 2017-03-29 RX ADMIN — GABAPENTIN 300 MG: 300 CAPSULE ORAL at 20:45

## 2017-03-29 RX ADMIN — POTASSIUM CHLORIDE 20 MEQ: 1500 TABLET, EXTENDED RELEASE ORAL at 20:45

## 2017-03-29 RX ADMIN — METOPROLOL TARTRATE 25 MG: 25 TABLET, FILM COATED ORAL at 20:45

## 2017-03-29 RX ADMIN — OXYCODONE HYDROCHLORIDE 10 MG: 10 TABLET ORAL at 20:45

## 2017-03-29 RX ADMIN — OXYCODONE HYDROCHLORIDE 20 MG: 20 TABLET, FILM COATED, EXTENDED RELEASE ORAL at 08:41

## 2017-03-29 RX ADMIN — AMIODARONE HYDROCHLORIDE 400 MG: 200 TABLET ORAL at 08:42

## 2017-03-29 RX ADMIN — OXYCODONE HYDROCHLORIDE 10 MG: 10 TABLET ORAL at 05:29

## 2017-03-29 RX ADMIN — GABAPENTIN 600 MG: 300 CAPSULE ORAL at 14:05

## 2017-03-29 RX ADMIN — VANCOMYCIN HYDROCHLORIDE 125 MG: 10 INJECTION, POWDER, LYOPHILIZED, FOR SOLUTION INTRAVENOUS at 11:56

## 2017-03-29 RX ADMIN — AMPICILLIN SODIUM AND SULBACTAM SODIUM 3 G: 2; 1 INJECTION, POWDER, FOR SOLUTION INTRAMUSCULAR; INTRAVENOUS at 16:47

## 2017-03-29 RX ADMIN — LACTOBACILLUS ACIDOPHILUS / LACTOBACILLUS BULGARICUS 1 PACKET: 100 MILLION CFU STRENGTH GRANULES at 16:47

## 2017-03-29 RX ADMIN — LACTOBACILLUS ACIDOPHILUS / LACTOBACILLUS BULGARICUS 1 PACKET: 100 MILLION CFU STRENGTH GRANULES at 08:41

## 2017-03-29 RX ADMIN — SIMVASTATIN 20 MG: 20 TABLET, FILM COATED ORAL at 21:00

## 2017-03-29 RX ADMIN — POTASSIUM CHLORIDE 20 MEQ: 1500 TABLET, EXTENDED RELEASE ORAL at 08:41

## 2017-03-29 RX ADMIN — LIDOCAINE 2 PATCH: 50 PATCH CUTANEOUS at 11:56

## 2017-03-29 ASSESSMENT — ENCOUNTER SYMPTOMS
COUGH: 0
VOMITING: 0
CHILLS: 0
SPEECH CHANGE: 0
FOCAL WEAKNESS: 0
WEAKNESS: 1
MYALGIAS: 1
FEVER: 0
SHORTNESS OF BREATH: 0
ABDOMINAL PAIN: 0
PALPITATIONS: 0
BACK PAIN: 1
DIARRHEA: 0
HEADACHES: 0
NAUSEA: 0

## 2017-03-29 ASSESSMENT — LIFESTYLE VARIABLES: SUBSTANCE_ABUSE: 1

## 2017-03-29 ASSESSMENT — PAIN SCALES - GENERAL
PAINLEVEL_OUTOF10: 4
PAINLEVEL_OUTOF10: 5
PAINLEVEL_OUTOF10: 3
PAINLEVEL_OUTOF10: 5
PAINLEVEL_OUTOF10: 3

## 2017-03-29 NOTE — DISCHARGE PLANNING
Medical Social Work    SW spoke with Geremias from [facility] who reported that they denied the pt due to the referral stating the pt was in isolation and that the pt is drug seeking.    SW asked [facility] to expand referrals to all local SNFs.

## 2017-03-29 NOTE — PROGRESS NOTES
Hospital Medicine Progress Note, Adult, Complex               Author: Cabrera Berrios Date & Time created: 3/29/2017  8:57 AM     CC: 73 yo hx afib, cad, chf , hep c liver dz- Group A strep, MSSA  Bacteremia, OM Rt big toe, cdiff, dysphagia - - left [facility] from [facility] -admitted w  back pain - s/p  L-3rd toe amputation .    Interval History:    Diarrhea resolved. Pain controlled. Hypokalemic. Anemia- worsened- no symptoms of bleed.     Review of Systems:  Review of Systems   Constitutional: Negative for fever and chills.   HENT: Negative for congestion.    Respiratory: Negative for cough and shortness of breath.    Cardiovascular: Positive for leg swelling. Negative for chest pain and palpitations.   Gastrointestinal: Negative for vomiting and abdominal pain.   Musculoskeletal: Positive for myalgias and back pain.   Neurological: Positive for weakness. Negative for speech change, focal weakness and headaches.   Psychiatric/Behavioral: Positive for substance abuse (tobacco).       Physical Exam:  Physical Exam   Constitutional: He is oriented to person, place, and time. No distress.   Eyes: EOM are normal. No scleral icterus.   Neck: Neck supple.   Cardiovascular: Normal rate and regular rhythm.    Murmur (III/6 erin) heard.  Pulmonary/Chest: No stridor. He has no wheezes. He has no rales.   Abdominal: Soft. Bowel sounds are normal. He exhibits no distension.   Musculoskeletal: He exhibits edema and tenderness (rt great toe w swelling, mild rendess ).   Left 3rd toe amputation - wrapped   Neurological: He is alert and oriented to person, place, and time. No cranial nerve deficit.   Skin: Skin is warm and dry. He is not diaphoretic.       Labs:        Invalid input(s): [id number]      Recent Labs      03/27/17   0255  03/28/17   0113  03/29/17   0540   SODIUM  128*  132*  132*   POTASSIUM  3.4*  3.4*  3.1*   CHLORIDE  97  98  98   CO2  24  25  27   BUN  12  9  10   CREATININE  1.00  0.96  0.99   MAGNESIUM  2.0   --     --    PHOSPHORUS  3.5   --    --    CALCIUM  8.5  8.3*  8.1*     Recent Labs      17   0540   ALTSGPT  86*  74*  56*   ASTSGOT  75*  73*  45   ALKPHOSPHAT  131*  130*  105*   TBILIRUBIN  0.6  0.6  0.5   DBILIRUBIN   --    --   0.2   PREALBUMIN  5.0*   --    --    GLUCOSE  119*  111*  97     Recent Labs      17   0540   RBC  2.62*   --   2.57*  2.26*   HEMOGLOBIN  8.7*   --   8.5*  7.5*   HEMATOCRIT  26.2*   --   25.6*  22.8*   PLATELETCT  185   --   197  157*   IRON   --   26*   --    --    TOTIRONBC   --   178*   --    --      Recent Labs      17   0540   WBC  9.1   --   7.7  5.7   NEUTSPOLYS  76.50*   --   83.20*  60.20   LYMPHOCYTES  11.90*   --   12.40*  26.20   MONOCYTES  10.50   --   3.50  11.70   EOSINOPHILS  0.40   --   0.90  1.40   BASOPHILS  0.10   --   0.00  0.20   ASTSGOT   --   75*  73*  45   ALTSGPT   --   86*  74*  56*   ALKPHOSPHAT   --   131*  130*  105*   TBILIRUBIN   --   0.6  0.6  0.5           Hemodynamics:  Temp (24hrs), Av °C (98.6 °F), Min:36.9 °C (98.4 °F), Max:37.2 °C (99 °F)  Temperature: 36.9 °C (98.5 °F)  Pulse  Av.9  Min: 61  Max: 114   Blood Pressure : 101/61 mmHg     Respiratory:    Respiration: 15, Pulse Oximetry: 92 %        RUL Breath Sounds: Diminished, RML Breath Sounds: Diminished, RLL Breath Sounds: Diminished, SOLEDAD Breath Sounds: Diminished, LLL Breath Sounds: Diminished  Fluids:    Intake/Output Summary (Last 24 hours) at 17 0857  Last data filed at 17 1200   Gross per 24 hour   Intake    840 ml   Output    250 ml   Net    590 ml        GI/Nutrition:  Orders Placed This Encounter   Procedures   • DIET ORDER     Standing Status: Standing      Number of Occurrences: 1      Standing Expiration Date:      Order Specific Question:  Diet:     Answer:  Full Liquid [11]     Medical Decision Making, by  Problem:  Active Hospital Problems    Diagnosis   • *Osteomyelitis of left great toe (CMS-[facility]) [M86.9]- post left  3rd amputation   Wound care  Pain control - w prn oxycodone   Iv unasyn - plan 6 weeks- dw ID dr. Darby.  Leg swelling- fu US eval for dvt.    • C. difficile diarrhea [A04.7]- clinically better  Oral vanco.   • Bacteremia [R78.81] recent mssa, strep   Iv unasyn   • Back pain [M54.9]   sched oxycontin , neurontin - increase dose   • Anemia [D64.9]- worsened- no symptoms of bleed- likely chronic dz, asympt  Monitor.   • Hyponatremia [E87.1]improved, mild   • Macrocytosis [D75.89]   • Hypokalemia [E87.6]start oral kcl.   • CHF (congestive heart failure) (CMS-HCC) [I50.9]   • Atrial fibrillation (CMS-HCC) [I48.91] converted to sinus.  cw amiodarone.    • S/P AAA (abdominal aortic aneurysm) repair [Z98.890, Z86.79]- ? endograft infxn- too high risk for surgery - clinically stable- iv atbs.   • Noncompliance [Z91.19]   • Tobacco dependence [F17.200]   • Hep C w/o coma, chronic (CMS-HCC) [B18.2]   • Dysphagia [R13.10]- w achalasia - post botox at GE junction   Monitor response, full liquids- slp eval .   • CAD (coronary artery disease) [I25.10] stable .       Labs reviewed, Radiology images reviewed and Medications reviewed  Art catheter: No Art      DVT Prophylaxis: Enoxaparin (Lovenox)      Antibiotics: Treating active infection/contamination beyond 24 hours perioperative coverage

## 2017-03-29 NOTE — PROGRESS NOTES
"   Orthopaedic Progress Note    Interval changes:  Patient doing well  Left foot dressings changed incision without complication    ROS - Patient denies any new issues.  Pain well controlled.    Blood pressure 91/66, pulse 68, temperature 36.2 °C (97.2 °F), resp. rate 16, height 1.778 m (5' 10\"), weight 77 kg (169 lb 12.1 oz), SpO2 95 %.      Patient seen and examined  No acute distress  Breathing non labored  RRR  Left foot dressing changed, surgical incision is well approximated and is dry and clean.  There is no erythema, induration, or signs of infection.  Patient clearly fires tibialis anterior, EHL, and gastrocnemius/soleus. Sensation is intact to light touch throughout superficial peroneal, deep peroneal, tibial, saphenous, and sural nerve distributions. Strong and palpable 2+ dorsalis pedis and posterior tibial pulses with capillary refill less than 2 seconds. No lower leg tenderness or discomfort.       Recent Labs      03/27/17   0142  03/28/17   0113  03/29/17   0540   WBC  9.1  7.7  5.7   RBC  2.62*  2.57*  2.26*   HEMOGLOBIN  8.7*  8.5*  7.5*   HEMATOCRIT  26.2*  25.6*  22.8*   MCV  100.0*  99.6*  100.9*   MCH  33.2*  33.1*  33.2*   MCHC  33.2*  33.2*  32.9*   RDW  48.6  48.1  48.8   PLATELETCT  185  197  157*   MPV  9.2  8.9*  9.1       Active Hospital Problems    Diagnosis   • C. difficile diarrhea [A04.7]     Priority: High   • Bacteremia [R78.81]     Priority: High   • Back pain [M54.9]     Priority: High   • Osteomyelitis of toe (CMS-HCC) [M86.9]     Priority: High   • Anemia [D64.9]     Priority: Medium   • Hyponatremia [E87.1]     Priority: Medium   • Macrocytosis [D75.89]     Priority: Medium   • Hypokalemia [E87.6]     Priority: Medium   • CHF (congestive heart failure) (CMS-HCC) [I50.9]     Priority: Medium   • Atrial fibrillation (CMS-HCC) [I48.91]     Priority: Medium   • S/P AAA (abdominal aortic aneurysm) repair [Z98.890, Z86.79]     Priority: Medium   • Noncompliance [Z91.19]     " Priority: Low   • Tobacco dependence [F17.200]     Priority: Low   • Hep C w/o coma, chronic (CMS-HCC) [B18.2]   • Dysphagia [R13.10]   • CAD (coronary artery disease) [I25.10]       Assessment/Plan:  Patient doing well  POD#4 S/P Left third toe amputation  Wt bearing status - WBAT  Wound care/Drains - dressing change every other day    Future Procedures - none planned  Sutures/Staples out- 14-21 days post operatively  PT/OT-initiated  Antibiotics: unasyn 3g Q6, vanco 125mg po Q12  DVT Prophylaxis- TEDS/SCDs/Foot pumps  Art-none  Case Coordination for Discharge Planning - Disposition home

## 2017-03-29 NOTE — PROGRESS NOTES
Vascular    No clinical change.  Labs show anemia and hypokalemia. Patient resting.  Discussed with Dr. Berrios.  No surgery plans at present.

## 2017-03-29 NOTE — PROGRESS NOTES
Infectious Disease Progress Note    Author: Laurel Darby M.D. DOS & Time created: 3/29/2017  2:10 PM    Chief Complaint   Patient presents with   • Low Back Pain   FU for osteomyelitis, GAS bacteremia and CDI, suspected endograft infection    Interval History:  3/17 AF, WBC 12.9, pt c/o lower back pain, asking for morphine, poor insight into illness, denies any diarrhea  3/18 AF, WBC 13.3, sleeping and not arousable to voice, cultures here negative to date  3/19 AF, WBC 13.4, frustrated about lower back pain not adequately controlled on current pain regimen, plan for EGD with dilatation today  3/20 AF WBC 9.6 +pain-denies SE abx  3/21 AF WBC 10.5 no new complaints  3/22 AF WBC not done tolerating abx well  3/23 AF eye feels a little better-pain about the same  3/24 AF tolerating abx well-MRI confirm OM  3/25 AF WBC 9.5 s/p toe amp this am  3/26 AF WBC 10.1 deneies any new sxs-  3/27 AF, WBC 9.1, sleeping but arousable to voice, back pain stable  3/28 Tmax 99.9, WBC 7.7, not happy about soft foods, and wants to go back on regular diet, having normal BMs, back pain controlled on pain regimen  3/29 AF, WBC 5.7, transferred to ortho, happy he took a shower today, plan of care discussed with pain, states he was told he has arthritis of his back, back pain stable, no diarrhea  Labs Reviewed, Medications Reviewed, Radiology Reviewed and Wound Reviewed.    Review of Systems:  Review of Systems   Constitutional: Negative for fever and chills.   Respiratory: Negative for cough and shortness of breath.    Gastrointestinal: Negative for nausea, vomiting, abdominal pain and diarrhea.   Genitourinary: Negative for dysuria.   Musculoskeletal: Positive for back pain.        Stable   Neurological: Negative for headaches.   All other systems reviewed and are negative.      Hemodynamics:  Temp (24hrs), Av.8 °C (98.2 °F), Min:36.2 °C (97.2 °F), Max:37.2 °C (99 °F)  Temperature: 36.2 °C (97.2 °F)  Pulse  Av.6  Min: 61   Max: 114   Blood Pressure : (!) 91/66 mmHg       Physical Exam:  Physical Exam   Constitutional: He appears well-developed. No distress.   HENT:   Head: Normocephalic and atraumatic.   Eyes: EOM are normal. Pupils are equal, round, and reactive to light.   Neck: Neck supple.   Cardiovascular: Normal rate.    Murmur heard.  IRR   Pulmonary/Chest: Effort normal and breath sounds normal.   Abdominal: Soft. He exhibits no distension. There is no tenderness.   Musculoskeletal: Normal range of motion. He exhibits edema.   R great toe with slight edema but no drainage or erythema, medial callus. Erythema resolving    Left 3rd toe-dressed    RUE PICC nontender   Neurological: He is alert.   Nursing note and vitals reviewed.      Labs:  Recent Labs      03/27/17   0142  03/28/17   0113  03/29/17   0540   WBC  9.1  7.7  5.7   RBC  2.62*  2.57*  2.26*   HEMOGLOBIN  8.7*  8.5*  7.5*   HEMATOCRIT  26.2*  25.6*  22.8*   MCV  100.0*  99.6*  100.9*   MCH  33.2*  33.1*  33.2*   RDW  48.6  48.1  48.8   PLATELETCT  185  197  157*   MPV  9.2  8.9*  9.1   NEUTSPOLYS  76.50*  83.20*  60.20   LYMPHOCYTES  11.90*  12.40*  26.20   MONOCYTES  10.50  3.50  11.70   EOSINOPHILS  0.40  0.90  1.40   BASOPHILS  0.10  0.00  0.20   RBCMORPHOLO   --   Normal   --      Recent Labs      03/27/17   0255  03/28/17   0113  03/29/17   0540   SODIUM  128*  132*  132*   POTASSIUM  3.4*  3.4*  3.1*   CHLORIDE  97  98  98   CO2  24  25  27   GLUCOSE  119*  111*  97   BUN  12  9  10     Recent Labs      03/27/17   0255 03/28/17   0113  03/29/17   0540   ALBUMIN  2.4*  2.4*  2.3*   TBILIRUBIN  0.6  0.6  0.5   ALKPHOSPHAT  131*  130*  105*   TOTPROTEIN  7.9  7.9  7.1   ALTSGPT  86*  74*  56*   ASTSGOT  75*  73*  45   CREATININE  1.00  0.96  0.99       BLOOD CULTURE   Date Value Ref Range Status   03/15/2017 No growth after 5 days of incubation.  Final    ':  Results     Procedure Component Value Units Date/Time    FUNGAL CULTURE [[id number]] Collected:  03/25/17  0936    Order Status:  Completed Specimen Information:  Tissue Updated:  03/28/17 1155     Significant Indicator NEG      Source TISS      Site Left 3rd Toe      Fungal Culture Culture in progress.     CULTURE TISSUE W/ GRM STAIN [[id number]]  (Abnormal)  (Susceptibility) Collected:  03/25/17 0936    Order Status:  Completed Specimen Information:  Tissue Updated:  03/28/17 1155     Significant Indicator POS (POS)      Source TISS      Site Left 3rd Toe      Tissue Culture -- (A)      Gram Stain Result No organisms seen.      Tissue Culture -- (A)      Result:        Staphylococcus aureus  Rare growth       Tissue Culture -- (A)      Result:        Staphylococcus epidermidis  Rare growth      Culture & Susceptibility     STAPHYLOCOCCUS AUREUS     Antibiotic Sensitivity Microscan Unit Status    Ampicillin/sulbactam Sensitive <=8/4 mcg/mL Final    Clindamycin Sensitive <=0.5 mcg/mL Final    Daptomycin Sensitive <=0.5 mcg/mL Final    Erythromycin Sensitive <=0.5 mcg/mL Final    Moxifloxacin Sensitive <=0.5 mcg/mL Final    Oxacillin Sensitive <=0.25 mcg/mL Final    Tetracycline Sensitive <=4 mcg/mL Final    Trimeth/Sulfa Sensitive <=0.5/9.5 mcg/mL Final    Vancomycin Sensitive 2 mcg/mL Final              STAPHYLOCOCCUS EPIDERMIDIS     Antibiotic Sensitivity Microscan Unit Status    Ampicillin/sulbactam Resistant <=8/4 mcg/mL Final    Clindamycin Resistant 4 mcg/mL Final    Daptomycin Sensitive <=0.5 mcg/mL Final    Erythromycin Sensitive <=0.5 mcg/mL Final    Moxifloxacin Sensitive <=0.5 mcg/mL Final    Oxacillin Resistant >2 mcg/mL Final    Penicillin Resistant >8 mcg/mL Final    Tetracycline Resistant >8 mcg/mL Final    Trimeth/Sulfa Sensitive <=0.5/9.5 mcg/mL Final    Vancomycin Sensitive 2 mcg/mL Final                       ANAEROBIC CULTURE [[id number]] Collected:  03/25/17 0936    Order Status:  Completed Specimen Information:  Tissue Updated:  03/28/17 1155     Significant Indicator NEG      Source TISS      Site  Left 3rd Toe      Anaerobic Culture, Culture Res No Anaerobes isolated.     GRAM STAIN [[id number]] Collected:  03/25/17 0936    Order Status:  Completed Specimen Information:  Tissue Updated:  03/27/17 1108     Significant Indicator .      Source TISS      Site Left 3rd Toe      Gram Stain Result No organisms seen.           Imaging  3/17 MRI lumbar spine: There are changes secondary to the abdominal aortic aneurysm endograft. The aneurysm sac measures an approximately 7.6 x 6.7 cm in size. Abnormal contrast enhancement is noted within the wall of the aneurysmal sac. There is also abnormal soft tissue   contrast enhancement in the surrounding soft tissue. These findings are highly suspicious for infected thrombosed aneurysm sac. The possibility of endograft infection is more likely. There is irregular outpouching of the aneurysmal sac along the   posterior portion indenting the left psoas muscle.  2.  Free fluid in the pelvis  3.  There is mild-to-moderate left hydronephrosis likely representing pelviureteric junction obstruction.    3/16 R foot xray - +OM  3/16 L foot xray +OM    Assessment:  Active Hospital Problems    Diagnosis   • *Osteomyelitis of toe (CMS-HCC) [M86.9]   • Bacteremia [R78.81]   • Back pain [M54.9]   • CHF (congestive heart failure) (CMS-HCC) [I50.9]   • Tobacco dependence [F17.200]   • Atrial fibrillation (CMS-HCC) [I48.91]    Endovascular infection       Plan:  Right great toe and left 3rd osteomyelitis  +OM on radiographs-confirmed on repeat MRIs  3/9 OSH wound cx - GAS, MSSA (I confirmed with [facility] microlab)  3/25 s/p 3rd toe amp. No purulence seen proximally to amp site  OR cx (left 3rd toe) - CoNS (S-unasyn) Vanco LINUS 2  Continue Unasyn  Anticipate 6 weeks of IV abx. 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  Appreciate vascular evaluation - surgery associated with high mortality  Abx per above followed by chronic abx suppression   Tagged WBC scan - no e/o endovascular infection  Abx as per above followed by PO augmentin for suppressive given endograft    Clostridium difficile diarrhea  Continue oral vancomycin QID for 2 weeks stop date 03/29/17 then BID while on IV abx with stop date of 04/30/17    Esophageal dysmotility-concern for achalasia  H/o strictures with recent dilatation as [place]  Esophagram +distal esophageal obstruction  s/p EGD with dilatation  Keep meds IV    Back pain  Likely 2/2 suspected abdominal aorta endovascular infection    Drug seeking behavior    Prognosis guarded    Dispo: Rec SNF for long-term abx    DW IM

## 2017-03-30 LAB
ALBUMIN SERPL BCP-MCNC: 2.5 G/DL (ref 3.2–4.9)
ALBUMIN/GLOB SERPL: 0.4 G/DL
ALP SERPL-CCNC: 112 U/L (ref 30–99)
ALT SERPL-CCNC: 55 U/L (ref 2–50)
ANION GAP SERPL CALC-SCNC: 6 MMOL/L (ref 0–11.9)
AST SERPL-CCNC: 38 U/L (ref 12–45)
BASOPHILS # BLD AUTO: 0.2 % (ref 0–1.8)
BASOPHILS # BLD: 0.02 K/UL (ref 0–0.12)
BILIRUB SERPL-MCNC: 0.5 MG/DL (ref 0.1–1.5)
BUN SERPL-MCNC: 8 MG/DL (ref 8–22)
CALCIUM SERPL-MCNC: 8.4 MG/DL (ref 8.5–10.5)
CHLORIDE SERPL-SCNC: 101 MMOL/L (ref 96–112)
CO2 SERPL-SCNC: 26 MMOL/L (ref 20–33)
CREAT SERPL-MCNC: 1.01 MG/DL (ref 0.5–1.4)
EOSINOPHIL # BLD AUTO: 0.07 K/UL (ref 0–0.51)
EOSINOPHIL NFR BLD: 0.8 % (ref 0–6.9)
ERYTHROCYTE [DISTWIDTH] IN BLOOD BY AUTOMATED COUNT: 49.1 FL (ref 35.9–50)
GFR SERPL CREATININE-BSD FRML MDRD: >60 ML/MIN/1.73 M 2
GLOBULIN SER CALC-MCNC: 5.8 G/DL (ref 1.9–3.5)
GLUCOSE SERPL-MCNC: 110 MG/DL (ref 65–99)
HCT VFR BLD AUTO: 24.7 % (ref 42–52)
HGB BLD-MCNC: 8.3 G/DL (ref 14–18)
IMM GRANULOCYTES # BLD AUTO: 0.03 K/UL (ref 0–0.11)
IMM GRANULOCYTES NFR BLD AUTO: 0.4 % (ref 0–0.9)
LYMPHOCYTES # BLD AUTO: 1.6 K/UL (ref 1–4.8)
LYMPHOCYTES NFR BLD: 18.8 % (ref 22–41)
MAGNESIUM SERPL-MCNC: 2 MG/DL (ref 1.5–2.5)
MCH RBC QN AUTO: 33.9 PG (ref 27–33)
MCHC RBC AUTO-ENTMCNC: 33.6 G/DL (ref 33.7–35.3)
MCV RBC AUTO: 100.8 FL (ref 81.4–97.8)
MONOCYTES # BLD AUTO: 0.8 K/UL (ref 0–0.85)
MONOCYTES NFR BLD AUTO: 9.4 % (ref 0–13.4)
NEUTROPHILS # BLD AUTO: 6.01 K/UL (ref 1.82–7.42)
NEUTROPHILS NFR BLD: 70.4 % (ref 44–72)
NRBC # BLD AUTO: 0 K/UL
NRBC BLD AUTO-RTO: 0 /100 WBC
PLATELET # BLD AUTO: 209 K/UL (ref 164–446)
PMV BLD AUTO: 8.7 FL (ref 9–12.9)
POTASSIUM SERPL-SCNC: 3.5 MMOL/L (ref 3.6–5.5)
PROT SERPL-MCNC: 8.3 G/DL (ref 6–8.2)
RBC # BLD AUTO: 2.45 M/UL (ref 4.7–6.1)
SODIUM SERPL-SCNC: 133 MMOL/L (ref 135–145)
WBC # BLD AUTO: 8.5 K/UL (ref 4.8–10.8)

## 2017-03-30 PROCEDURE — 99232 SBSQ HOSP IP/OBS MODERATE 35: CPT | Performed by: HOSPITALIST

## 2017-03-30 PROCEDURE — A9270 NON-COVERED ITEM OR SERVICE: HCPCS | Performed by: HOSPITALIST

## 2017-03-30 PROCEDURE — A9270 NON-COVERED ITEM OR SERVICE: HCPCS | Performed by: INTERNAL MEDICINE

## 2017-03-30 PROCEDURE — 80053 COMPREHEN METABOLIC PANEL: CPT

## 2017-03-30 PROCEDURE — 700102 HCHG RX REV CODE 250 W/ 637 OVERRIDE(OP): Performed by: FAMILY MEDICINE

## 2017-03-30 PROCEDURE — A9270 NON-COVERED ITEM OR SERVICE: HCPCS | Performed by: FAMILY MEDICINE

## 2017-03-30 PROCEDURE — 700102 HCHG RX REV CODE 250 W/ 637 OVERRIDE(OP): Performed by: INTERNAL MEDICINE

## 2017-03-30 PROCEDURE — 700105 HCHG RX REV CODE 258

## 2017-03-30 PROCEDURE — 700105 HCHG RX REV CODE 258: Performed by: INTERNAL MEDICINE

## 2017-03-30 PROCEDURE — 770001 HCHG ROOM/CARE - MED/SURG/GYN PRIV*

## 2017-03-30 PROCEDURE — 83735 ASSAY OF MAGNESIUM: CPT

## 2017-03-30 PROCEDURE — 700102 HCHG RX REV CODE 250 W/ 637 OVERRIDE(OP): Performed by: HOSPITALIST

## 2017-03-30 PROCEDURE — 700111 HCHG RX REV CODE 636 W/ 250 OVERRIDE (IP): Performed by: INTERNAL MEDICINE

## 2017-03-30 PROCEDURE — 85025 COMPLETE CBC W/AUTO DIFF WBC: CPT

## 2017-03-30 PROCEDURE — 700111 HCHG RX REV CODE 636 W/ 250 OVERRIDE (IP): Performed by: HOSPITALIST

## 2017-03-30 PROCEDURE — 700101 HCHG RX REV CODE 250: Performed by: FAMILY MEDICINE

## 2017-03-30 RX ORDER — SODIUM CHLORIDE 9 MG/ML
INJECTION, SOLUTION INTRAVENOUS
Status: COMPLETED
Start: 2017-03-30 | End: 2017-03-30

## 2017-03-30 RX ADMIN — SODIUM CHLORIDE: 9 INJECTION, SOLUTION INTRAVENOUS at 11:45

## 2017-03-30 RX ADMIN — OXYCODONE HYDROCHLORIDE 20 MG: 20 TABLET, FILM COATED, EXTENDED RELEASE ORAL at 09:10

## 2017-03-30 RX ADMIN — NICOTINE 14 MG: 14 PATCH TRANSDERMAL at 05:30

## 2017-03-30 RX ADMIN — AMPICILLIN SODIUM AND SULBACTAM SODIUM 3 G: 2; 1 INJECTION, POWDER, FOR SOLUTION INTRAMUSCULAR; INTRAVENOUS at 00:22

## 2017-03-30 RX ADMIN — METOPROLOL TARTRATE 12.5 MG: 25 TABLET, FILM COATED ORAL at 21:40

## 2017-03-30 RX ADMIN — AMPICILLIN SODIUM AND SULBACTAM SODIUM 3 G: 2; 1 INJECTION, POWDER, FOR SOLUTION INTRAMUSCULAR; INTRAVENOUS at 17:18

## 2017-03-30 RX ADMIN — OXYCODONE HYDROCHLORIDE 10 MG: 10 TABLET ORAL at 03:27

## 2017-03-30 RX ADMIN — VANCOMYCIN HYDROCHLORIDE 125 MG: 10 INJECTION, POWDER, LYOPHILIZED, FOR SOLUTION INTRAVENOUS at 09:05

## 2017-03-30 RX ADMIN — AMIODARONE HYDROCHLORIDE 200 MG: 200 TABLET ORAL at 09:05

## 2017-03-30 RX ADMIN — LACTOBACILLUS ACIDOPHILUS / LACTOBACILLUS BULGARICUS 1 PACKET: 100 MILLION CFU STRENGTH GRANULES at 11:35

## 2017-03-30 RX ADMIN — AMPICILLIN SODIUM AND SULBACTAM SODIUM 3 G: 2; 1 INJECTION, POWDER, FOR SOLUTION INTRAMUSCULAR; INTRAVENOUS at 05:29

## 2017-03-30 RX ADMIN — GABAPENTIN 600 MG: 300 CAPSULE ORAL at 09:09

## 2017-03-30 RX ADMIN — ENOXAPARIN SODIUM 40 MG: 100 INJECTION SUBCUTANEOUS at 09:04

## 2017-03-30 RX ADMIN — OXYCODONE HYDROCHLORIDE 10 MG: 10 TABLET ORAL at 17:18

## 2017-03-30 RX ADMIN — GABAPENTIN 600 MG: 300 CAPSULE ORAL at 21:37

## 2017-03-30 RX ADMIN — AMPICILLIN SODIUM AND SULBACTAM SODIUM 3 G: 2; 1 INJECTION, POWDER, FOR SOLUTION INTRAMUSCULAR; INTRAVENOUS at 11:35

## 2017-03-30 RX ADMIN — POTASSIUM CHLORIDE 40 MEQ: 1500 TABLET, EXTENDED RELEASE ORAL at 21:41

## 2017-03-30 RX ADMIN — LACTOBACILLUS ACIDOPHILUS / LACTOBACILLUS BULGARICUS 1 PACKET: 100 MILLION CFU STRENGTH GRANULES at 09:04

## 2017-03-30 RX ADMIN — METOPROLOL TARTRATE 12.5 MG: 25 TABLET, FILM COATED ORAL at 09:10

## 2017-03-30 RX ADMIN — OXYCODONE HYDROCHLORIDE 20 MG: 20 TABLET, FILM COATED, EXTENDED RELEASE ORAL at 21:38

## 2017-03-30 RX ADMIN — OXYCODONE HYDROCHLORIDE 10 MG: 10 TABLET ORAL at 13:12

## 2017-03-30 RX ADMIN — POTASSIUM CHLORIDE 40 MEQ: 1500 TABLET, EXTENDED RELEASE ORAL at 09:10

## 2017-03-30 RX ADMIN — LIDOCAINE 2 PATCH: 50 PATCH CUTANEOUS at 11:35

## 2017-03-30 RX ADMIN — VANCOMYCIN HYDROCHLORIDE 125 MG: 10 INJECTION, POWDER, LYOPHILIZED, FOR SOLUTION INTRAVENOUS at 21:37

## 2017-03-30 RX ADMIN — AMPICILLIN SODIUM AND SULBACTAM SODIUM 3 G: 2; 1 INJECTION, POWDER, FOR SOLUTION INTRAMUSCULAR; INTRAVENOUS at 23:07

## 2017-03-30 RX ADMIN — OXYCODONE HYDROCHLORIDE 10 MG: 10 TABLET ORAL at 09:10

## 2017-03-30 RX ADMIN — GABAPENTIN 600 MG: 300 CAPSULE ORAL at 15:25

## 2017-03-30 RX ADMIN — ACETAMINOPHEN 650 MG: 325 TABLET, FILM COATED ORAL at 03:27

## 2017-03-30 RX ADMIN — SIMVASTATIN 20 MG: 20 TABLET, FILM COATED ORAL at 21:38

## 2017-03-30 RX ADMIN — OXYCODONE HYDROCHLORIDE 5 MG: 5 TABLET ORAL at 21:39

## 2017-03-30 ASSESSMENT — ENCOUNTER SYMPTOMS
MYALGIAS: 1
DIARRHEA: 0
SHORTNESS OF BREATH: 0
SPEECH CHANGE: 0
NAUSEA: 0
CHILLS: 0
VOMITING: 0
COUGH: 0
ABDOMINAL PAIN: 0
FOCAL WEAKNESS: 0
WEAKNESS: 1
DIARRHEA: 1
BACK PAIN: 1
HEADACHES: 0
FEVER: 0

## 2017-03-30 ASSESSMENT — PAIN SCALES - GENERAL
PAINLEVEL_OUTOF10: 8
PAINLEVEL_OUTOF10: 7
PAINLEVEL_OUTOF10: 7
PAINLEVEL_OUTOF10: 6
PAINLEVEL_OUTOF10: 8

## 2017-03-30 ASSESSMENT — LIFESTYLE VARIABLES: SUBSTANCE_ABUSE: 1

## 2017-03-30 NOTE — PROGRESS NOTES
"LIMB PRESERVATION SERVICE     72-year-old male with history of COPD, CAD, and osteomyelitis of toe, admitted with an infected left 3rd toe wound.     POD #4 s/p left 3rd toe amputation    /69 mmHg  Pulse 74  Temp(Src) 36.7 °C (98.1 °F)  Resp 16  Ht 1.778 m (5' 10\")  Wt 77 kg (169 lb 12.1 oz)  BMI 24.36 kg/m2  SpO2 92%   Pain well controlled    Dressing CDI  Foot warm  DP pulse palpable    Vascular on board, no further surgery anticipated    SW working on SNF placement    PLAN  -offloading shoe ordered  -WBAT LLE  -LPS to sign off      "

## 2017-03-30 NOTE — PROGRESS NOTES
Hospital Medicine Progress Note, Adult, Complex               Author: Cabrera Berrios Date & Time created: 3/30/2017  3:20 PM     CC: 73 yo hx afib, cad, chf , hep c liver dz- Group A strep, MSSA  Bacteremia, OM Rt big toe, cdiff, dysphagia - - left AMA from [facility] -admitted w  back pain - s/p  L-3rd toe amputation .    Interval History:    Having loose stools- placed back in isolation.  Us lower exts- no DVT.     Review of Systems:  Review of Systems   Constitutional: Negative for fever and chills.   Respiratory: Negative for cough and shortness of breath.    Cardiovascular: Positive for leg swelling. Negative for chest pain.   Gastrointestinal: Positive for diarrhea. Negative for vomiting and abdominal pain.   Musculoskeletal: Positive for myalgias and back pain.   Neurological: Positive for weakness. Negative for speech change and focal weakness.   Psychiatric/Behavioral: Positive for substance abuse (tobacco).       Physical Exam:  Physical Exam   Constitutional: He is oriented to person, place, and time. No distress.   Eyes: EOM are normal. No scleral icterus.   Neck: Neck supple.   Cardiovascular: Normal rate and regular rhythm.    Murmur (III/6 erin) heard.  Pulmonary/Chest: No stridor. He has no wheezes. He has no rales.   Abdominal: Soft. Bowel sounds are normal. He exhibits no distension.   Musculoskeletal: He exhibits edema and tenderness (rt great toe w swelling).   Left 3rd toe amputation - wrapped   Neurological: He is alert and oriented to person, place, and time. No cranial nerve deficit.   Skin: Skin is warm and dry. He is not diaphoretic.       Labs:        Invalid input(s): [id number]      Recent Labs      03/28/17 0113 03/29/17   0540  03/30/17   0335   SODIUM  132*  132*  133*   POTASSIUM  3.4*  3.1*  3.5*   CHLORIDE  98  98  101   CO2  25  27  26   BUN  9  10  8   CREATININE  0.96  0.99  1.01   MAGNESIUM   --    --   2.0   CALCIUM  8.3*  8.1*  8.4*     Recent Labs      03/28/17 0113   17   0540  17   0335   ALTSGPT  74*  56*  55*   ASTSGOT  73*  45  38   ALKPHOSPHAT  130*  105*  112*   TBILIRUBIN  0.6  0.5  0.5   DBILIRUBIN   --   0.2   --    GLUCOSE  111*  97  110*     Recent Labs      17   0113  17   0540  17   0335   RBC  2.57*  2.26*  2.45*   HEMOGLOBIN  8.5*  7.5*  8.3*   HEMATOCRIT  25.6*  22.8*  24.7*   PLATELETCT  197  157*  209     Recent Labs      17   0113  17   0540  17   0335   WBC  7.7  5.7  8.5   NEUTSPOLYS  83.20*  60.20  70.40   LYMPHOCYTES  12.40*  26.20  18.80*   MONOCYTES  3.50  11.70  9.40   EOSINOPHILS  0.90  1.40  0.80   BASOPHILS  0.00  0.20  0.20   ASTSGOT  73*  45  38   ALTSGPT  74*  56*  55*   ALKPHOSPHAT  130*  105*  112*   TBILIRUBIN  0.6  0.5  0.5           Hemodynamics:  Temp (24hrs), Av.7 °C (98.1 °F), Min:36.3 °C (97.4 °F), Max:36.9 °C (98.5 °F)  Temperature: 36.8 °C (98.2 °F)  Pulse  Av.9  Min: 61  Max: 114   Blood Pressure : 123/73 mmHg     Respiratory:    Respiration: 18, Pulse Oximetry: 92 %        RUL Breath Sounds: Diminished, RML Breath Sounds: Diminished, RLL Breath Sounds: Diminished, SOLEDAD Breath Sounds: Diminished, LLL Breath Sounds: Diminished  Fluids:    Intake/Output Summary (Last 24 hours) at 17 1520  Last data filed at 17 0400   Gross per 24 hour   Intake    800 ml   Output    250 ml   Net    550 ml        GI/Nutrition:  Orders Placed This Encounter   Procedures   • DIET ORDER     Standing Status: Standing      Number of Occurrences: 1      Standing Expiration Date:      Order Specific Question:  Diet:     Answer:  Full Liquid [11]     Medical Decision Making, by Problem:  Active Hospital Problems    Diagnosis   • *Osteomyelitis of left great toe (CMS-HCC) [M86.9]- post left  3rd amputation   Wound care  cw prn oxycodone   Iv unasyn - plan 6 weeks therapy . SS working on DC planning.   Leg swelling- fu US no evid for DVT   • C. difficile diarrhea [A04.7]- recurrent diarrhea.   Oral  vanco.  Isolation precautions.    • Bacteremia [R78.81] recent mssa, strep   Iv unasyn   • Back pain [M54.9]   sched oxycontin , neurontin - recently increased- will titrate up dose as lori.   • Anemia [D64.9]- worsened- no symptoms of bleed- likely chronic dz, asympt  Monitor.   • Hyponatremia [E87.1]improved, mild   • Macrocytosis [D75.89]   • Hypokalemia [E87.6]improved  oral kcl.   • CHF (congestive heart failure) (CMS-HCC) [I50.9]   • Atrial fibrillation (CMS-HCC) [I48.91] converted to sinus.  cw amiodarone.    • S/P AAA (abdominal aortic aneurysm) repair [Z98.890, Z86.79]- ? endograft infxn- surgery is high risk- clinically stable-conservative therapies, dw Dr. Mitchell.   • Noncompliance [Z91.19]   • Tobacco dependence [F17.200]   • Hep C w/o coma, chronic (CMS-HCC) [B18.2]   • Dysphagia [R13.10]- w achalasia - post botox at [facility]   Monitor response, full liquids- slp eval .   • CAD (coronary artery disease) [I25.10] stable .     Discuss with  CM.    Labs reviewed, Radiology images reviewed and Medications reviewed  Art catheter: No Art      DVT Prophylaxis: Enoxaparin (Lovenox)      Antibiotics: Treating active infection/contamination beyond 24 hours perioperative coverage

## 2017-03-30 NOTE — DISCHARGE PLANNING
Medical Social Work    SW spoke with Trini from [facility] who reported that pt was accepted to the [facility] pending insurance auth.    Trini met with pt at bedside; however, pt reported that he did not wish to go to [facility] because his mother passed away there.

## 2017-03-30 NOTE — PROGRESS NOTES
Infectious Disease Progress Note    Author: Laurel Darby M.D. DOS & Time created: 3/30/2017  11:26 AM    Chief Complaint   Patient presents with   • Low Back Pain   FU for osteomyelitis, GAS bacteremia and CDI, suspected endograft infection    Interval History:  3/17 AF, WBC 12.9, pt c/o lower back pain, asking for morphine, poor insight into illness, denies any diarrhea  3/18 AF, WBC 13.3, sleeping and not arousable to voice, cultures here negative to date  3/19 AF, WBC 13.4, frustrated about lower back pain not adequately controlled on current pain regimen, plan for EGD with dilatation today  3/20 AF WBC 9.6 +pain-denies SE abx  3/21 AF WBC 10.5 no new complaints  3/22 AF WBC not done tolerating abx well  3/23 AF eye feels a little better-pain about the same  3/24 AF tolerating abx well-MRI confirm OM  3/25 AF WBC 9.5 s/p toe amp this am  3/26 AF WBC 10.1 deneies any new sxs-  3/27 AF, WBC 9.1, sleeping but arousable to voice, back pain stable  3/28 Tmax 99.9, WBC 7.7, not happy about soft foods, and wants to go back on regular diet, having normal BMs, back pain controlled on pain regimen  3/29 AF, WBC 5.7, transferred to ortho, happy he took a shower today, plan of care discussed with pain, states he was told he has arthritis of his back, back pain stable, no diarrhea  3/30 AF, WBC 8.5, had a loose BM this am follow by normal BM, no abd pain  Labs Reviewed, Medications Reviewed, Radiology Reviewed and Wound Reviewed.    Review of Systems:  Review of Systems   Constitutional: Negative for fever and chills.   Respiratory: Negative for cough and shortness of breath.    Gastrointestinal: Negative for nausea, vomiting, abdominal pain and diarrhea.   Genitourinary: Negative for dysuria.   Musculoskeletal: Positive for back pain.        Stable   Neurological: Negative for headaches.   All other systems reviewed and are negative.      Hemodynamics:  Temp (24hrs), Av.6 °C (97.9 °F), Min:36.2 °C (97.2 °F),  Max:36.9 °C (98.5 °F)  Temperature: 36.3 °C (97.4 °F)  Pulse  Av  Min: 61  Max: 114   Blood Pressure : 117/65 mmHg       Physical Exam:  Physical Exam   Constitutional: He appears well-developed. No distress.   HENT:   Head: Normocephalic and atraumatic.   Eyes: EOM are normal. Pupils are equal, round, and reactive to light.   Neck: Neck supple.   Cardiovascular: Normal rate.    Murmur heard.  IRR   Pulmonary/Chest: Effort normal and breath sounds normal.   Abdominal: Soft. He exhibits no distension. There is no tenderness.   Musculoskeletal: Normal range of motion. He exhibits edema.   R great toe with slight edema but no drainage or erythema, medial callus. Erythema resolving    Left 3rd toe-dressed    RUE PICC nontender   Neurological: He is alert.   Nursing note and vitals reviewed.      Labs:  Recent Labs      1740  17   0335   WBC  7.7  5.7  8.5   RBC  2.57*  2.26*  2.45*   HEMOGLOBIN  8.5*  7.5*  8.3*   HEMATOCRIT  25.6*  22.8*  24.7*   MCV  99.6*  100.9*  100.8*   MCH  33.1*  33.2*  33.9*   RDW  48.1  48.8  49.1   PLATELETCT  197  157*  209   MPV  8.9*  9.1  8.7*   NEUTSPOLYS  83.20*  60.20  70.40   LYMPHOCYTES  12.40*  26.20  18.80*   MONOCYTES  3.50  11.70  9.40   EOSINOPHILS  0.90  1.40  0.80   BASOPHILS  0.00  0.20  0.20   RBCMORPHOLO  Normal   --    --      Recent Labs      17   0540  17   0335   SODIUM  132*  132*  133*   POTASSIUM  3.4*  3.1*  3.5*   CHLORIDE  98  98  101   CO2  25  27  26   GLUCOSE  111*  97  110*   BUN  9  10  8     Recent Labs      17   0540  17   0335   ALBUMIN  2.4*  2.3*  2.5*   TBILIRUBIN  0.6  0.5  0.5   ALKPHOSPHAT  130*  105*  112*   TOTPROTEIN  7.9  7.1  8.3*   ALTSGPT  74*  56*  55*   ASTSGOT  73*  45  38   CREATININE  0.96  0.99  1.01       BLOOD CULTURE   Date Value Ref Range Status   03/15/2017 No growth after 5 days of incubation.  Final    ':  Results     Procedure  Component Value Units Date/Time    CDIFF BY PCR [[id number]]     Order Status:  Canceled Specimen Information:  Stool from Stool     FUNGAL CULTURE [[id number]] Collected:  03/25/17 0936    Order Status:  Completed Specimen Information:  Tissue Updated:  03/28/17 1155     Significant Indicator NEG      Source TISS      Site Left 3rd Toe      Fungal Culture Culture in progress.     CULTURE TISSUE W/ GRM STAIN [[id number]]  (Abnormal)  (Susceptibility) Collected:  03/25/17 0936    Order Status:  Completed Specimen Information:  Tissue Updated:  03/28/17 1155     Significant Indicator POS (POS)      Source TISS      Site Left 3rd Toe      Tissue Culture -- (A)      Gram Stain Result No organisms seen.      Tissue Culture -- (A)      Result:        Staphylococcus aureus  Rare growth       Tissue Culture -- (A)      Result:        Staphylococcus epidermidis  Rare growth      Culture & Susceptibility     STAPHYLOCOCCUS AUREUS     Antibiotic Sensitivity Microscan Unit Status    Ampicillin/sulbactam Sensitive <=8/4 mcg/mL Final    Clindamycin Sensitive <=0.5 mcg/mL Final    Daptomycin Sensitive <=0.5 mcg/mL Final    Erythromycin Sensitive <=0.5 mcg/mL Final    Moxifloxacin Sensitive <=0.5 mcg/mL Final    Oxacillin Sensitive <=0.25 mcg/mL Final    Tetracycline Sensitive <=4 mcg/mL Final    Trimeth/Sulfa Sensitive <=0.5/9.5 mcg/mL Final    Vancomycin Sensitive 2 mcg/mL Final              STAPHYLOCOCCUS EPIDERMIDIS     Antibiotic Sensitivity Microscan Unit Status    Ampicillin/sulbactam Resistant <=8/4 mcg/mL Final    Clindamycin Resistant 4 mcg/mL Final    Daptomycin Sensitive <=0.5 mcg/mL Final    Erythromycin Sensitive <=0.5 mcg/mL Final    Moxifloxacin Sensitive <=0.5 mcg/mL Final    Oxacillin Resistant >2 mcg/mL Final    Penicillin Resistant >8 mcg/mL Final    Tetracycline Resistant >8 mcg/mL Final    Trimeth/Sulfa Sensitive <=0.5/9.5 mcg/mL Final    Vancomycin Sensitive 2 mcg/mL Final                       ANAEROBIC CULTURE  [[id number]] Collected:  03/25/17 0936    Order Status:  Completed Specimen Information:  Tissue Updated:  03/28/17 1155     Significant Indicator NEG      Source TISS      Site Left 3rd Toe      Anaerobic Culture, Culture Res No Anaerobes isolated.     GRAM STAIN [[id number]] Collected:  03/25/17 0936    Order Status:  Completed Specimen Information:  Tissue Updated:  03/27/17 1108     Significant Indicator .      Source TISS      Site Left 3rd Toe      Gram Stain Result No organisms seen.           Imaging  3/17 MRI lumbar spine: There are changes secondary to the abdominal aortic aneurysm endograft. The aneurysm sac measures an approximately 7.6 x 6.7 cm in size. Abnormal contrast enhancement is noted within the wall of the aneurysmal sac. There is also abnormal soft tissue   contrast enhancement in the surrounding soft tissue. These findings are highly suspicious for infected thrombosed aneurysm sac. The possibility of endograft infection is more likely. There is irregular outpouching of the aneurysmal sac along the   posterior portion indenting the left psoas muscle.  2.  Free fluid in the pelvis  3.  There is mild-to-moderate left hydronephrosis likely representing pelviureteric junction obstruction.    3/16 R foot xray - +OM  3/16 L foot xray +OM    Assessment:  Active Hospital Problems    Diagnosis   • *Osteomyelitis of toe (CMS-HCC) [M86.9]   • Bacteremia [R78.81]   • Back pain [M54.9]   • CHF (congestive heart failure) (CMS-HCC) [I50.9]   • Tobacco dependence [F17.200]   • Atrial fibrillation (CMS-HCC) [I48.91]    Endovascular infection       Plan:  Right great toe and left 3rd osteomyelitis  +OM on radiographs-confirmed on repeat MRIs  3/9 OSH wound cx - GAS, MSSA (I confirmed with [facility] microlab)  3/25 s/p 3rd toe amp. No purulence seen proximally to amp site  OR cx (left 3rd toe) - CoNS (S-unasyn) Vanco LINUS 2  Continue Unasyn  Anticipate 6 weeks of IV abx. 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  Appreciate vascular evaluation - surgery associated with high mortality  Abx per above followed by chronic abx suppression   Tagged WBC scan - no e/o endovascular infection  Abx as per above followed by PO augmentin for suppressive given endograft    Clostridium difficile diarrhea  Continue oral vancomycin QID for 2 weeks stop date 03/29/17 then BID while on IV abx with stop date of 04/30/17    Esophageal dysmotility-concern for achalasia  H/o strictures with recent dilatation as [place]'s  Esophagram +distal esophageal obstruction  s/p EGD with dilatation  Keep meds IV    Back pain  Likely 2/2 suspected abdominal aorta endovascular infection    Drug seeking behavior    Prognosis guarded    Dispo:  Awaiting SNF placement for long-term abx    DW IM

## 2017-03-30 NOTE — PROGRESS NOTES
Vascular  Infectious disease note reviewed.  I reviewed CT scans again.  No endoleak. Possibly old thrombus outside the AAA. No reason apparent to operated.Chronic low back pain.

## 2017-03-30 NOTE — PROGRESS NOTES
Spoke with Lindsey in infection control of patient's isolation status. She stated per hospital protocol, the patient should not be retested for C. Diff but should be in special isolation until d/c. Patient has been transferred to private room.

## 2017-03-31 LAB
ALBUMIN SERPL BCP-MCNC: 2.3 G/DL (ref 3.2–4.9)
ALBUMIN/GLOB SERPL: 0.4 G/DL
ALP SERPL-CCNC: 105 U/L (ref 30–99)
ALT SERPL-CCNC: 41 U/L (ref 2–50)
ANION GAP SERPL CALC-SCNC: 7 MMOL/L (ref 0–11.9)
AST SERPL-CCNC: 28 U/L (ref 12–45)
BASOPHILS # BLD AUTO: 0.2 % (ref 0–1.8)
BASOPHILS # BLD: 0.02 K/UL (ref 0–0.12)
BILIRUB SERPL-MCNC: 0.5 MG/DL (ref 0.1–1.5)
BUN SERPL-MCNC: 7 MG/DL (ref 8–22)
CALCIUM SERPL-MCNC: 8.5 MG/DL (ref 8.5–10.5)
CHLORIDE SERPL-SCNC: 102 MMOL/L (ref 96–112)
CO2 SERPL-SCNC: 24 MMOL/L (ref 20–33)
CREAT SERPL-MCNC: 1.03 MG/DL (ref 0.5–1.4)
EOSINOPHIL # BLD AUTO: 0.07 K/UL (ref 0–0.51)
EOSINOPHIL NFR BLD: 0.8 % (ref 0–6.9)
ERYTHROCYTE [DISTWIDTH] IN BLOOD BY AUTOMATED COUNT: 49.3 FL (ref 35.9–50)
GFR SERPL CREATININE-BSD FRML MDRD: >60 ML/MIN/1.73 M 2
GLOBULIN SER CALC-MCNC: 5.6 G/DL (ref 1.9–3.5)
GLUCOSE SERPL-MCNC: 93 MG/DL (ref 65–99)
HCT VFR BLD AUTO: 24 % (ref 42–52)
HGB BLD-MCNC: 7.8 G/DL (ref 14–18)
IMM GRANULOCYTES # BLD AUTO: 0.04 K/UL (ref 0–0.11)
IMM GRANULOCYTES NFR BLD AUTO: 0.4 % (ref 0–0.9)
LYMPHOCYTES # BLD AUTO: 1.8 K/UL (ref 1–4.8)
LYMPHOCYTES NFR BLD: 20 % (ref 22–41)
MCH RBC QN AUTO: 32.9 PG (ref 27–33)
MCHC RBC AUTO-ENTMCNC: 32.5 G/DL (ref 33.7–35.3)
MCV RBC AUTO: 101.3 FL (ref 81.4–97.8)
MONOCYTES # BLD AUTO: 0.87 K/UL (ref 0–0.85)
MONOCYTES NFR BLD AUTO: 9.6 % (ref 0–13.4)
NEUTROPHILS # BLD AUTO: 6.22 K/UL (ref 1.82–7.42)
NEUTROPHILS NFR BLD: 69 % (ref 44–72)
NRBC # BLD AUTO: 0 K/UL
NRBC BLD AUTO-RTO: 0 /100 WBC
PLATELET # BLD AUTO: 198 K/UL (ref 164–446)
PMV BLD AUTO: 9 FL (ref 9–12.9)
POTASSIUM SERPL-SCNC: 4.1 MMOL/L (ref 3.6–5.5)
PROT SERPL-MCNC: 7.9 G/DL (ref 6–8.2)
RBC # BLD AUTO: 2.37 M/UL (ref 4.7–6.1)
SODIUM SERPL-SCNC: 133 MMOL/L (ref 135–145)
WBC # BLD AUTO: 9 K/UL (ref 4.8–10.8)

## 2017-03-31 PROCEDURE — 700102 HCHG RX REV CODE 250 W/ 637 OVERRIDE(OP): Performed by: FAMILY MEDICINE

## 2017-03-31 PROCEDURE — 93978 VASCULAR STUDY: CPT | Mod: 26 | Performed by: SURGERY

## 2017-03-31 PROCEDURE — 700102 HCHG RX REV CODE 250 W/ 637 OVERRIDE(OP): Performed by: INTERNAL MEDICINE

## 2017-03-31 PROCEDURE — A9270 NON-COVERED ITEM OR SERVICE: HCPCS | Performed by: INTERNAL MEDICINE

## 2017-03-31 PROCEDURE — 770001 HCHG ROOM/CARE - MED/SURG/GYN PRIV*

## 2017-03-31 PROCEDURE — 93978 VASCULAR STUDY: CPT

## 2017-03-31 PROCEDURE — 700102 HCHG RX REV CODE 250 W/ 637 OVERRIDE(OP): Performed by: HOSPITALIST

## 2017-03-31 PROCEDURE — A9270 NON-COVERED ITEM OR SERVICE: HCPCS | Performed by: HOSPITALIST

## 2017-03-31 PROCEDURE — 99232 SBSQ HOSP IP/OBS MODERATE 35: CPT | Performed by: HOSPITALIST

## 2017-03-31 PROCEDURE — 700105 HCHG RX REV CODE 258: Performed by: INTERNAL MEDICINE

## 2017-03-31 PROCEDURE — 85025 COMPLETE CBC W/AUTO DIFF WBC: CPT

## 2017-03-31 PROCEDURE — 700101 HCHG RX REV CODE 250: Performed by: FAMILY MEDICINE

## 2017-03-31 PROCEDURE — 700111 HCHG RX REV CODE 636 W/ 250 OVERRIDE (IP): Performed by: INTERNAL MEDICINE

## 2017-03-31 PROCEDURE — G8998 SWALLOW D/C STATUS: HCPCS | Mod: CI

## 2017-03-31 PROCEDURE — 92526 ORAL FUNCTION THERAPY: CPT

## 2017-03-31 PROCEDURE — 80053 COMPREHEN METABOLIC PANEL: CPT

## 2017-03-31 PROCEDURE — A9270 NON-COVERED ITEM OR SERVICE: HCPCS | Performed by: FAMILY MEDICINE

## 2017-03-31 PROCEDURE — G8997 SWALLOW GOAL STATUS: HCPCS | Mod: CI

## 2017-03-31 PROCEDURE — 700111 HCHG RX REV CODE 636 W/ 250 OVERRIDE (IP): Performed by: HOSPITALIST

## 2017-03-31 RX ADMIN — LACTOBACILLUS ACIDOPHILUS / LACTOBACILLUS BULGARICUS 1 PACKET: 100 MILLION CFU STRENGTH GRANULES at 08:40

## 2017-03-31 RX ADMIN — VANCOMYCIN HYDROCHLORIDE 125 MG: 10 INJECTION, POWDER, LYOPHILIZED, FOR SOLUTION INTRAVENOUS at 08:44

## 2017-03-31 RX ADMIN — METOPROLOL TARTRATE 12.5 MG: 25 TABLET, FILM COATED ORAL at 08:44

## 2017-03-31 RX ADMIN — VANCOMYCIN HYDROCHLORIDE 125 MG: 10 INJECTION, POWDER, LYOPHILIZED, FOR SOLUTION INTRAVENOUS at 20:34

## 2017-03-31 RX ADMIN — GABAPENTIN 600 MG: 300 CAPSULE ORAL at 20:34

## 2017-03-31 RX ADMIN — AMIODARONE HYDROCHLORIDE 200 MG: 200 TABLET ORAL at 08:42

## 2017-03-31 RX ADMIN — OXYCODONE HYDROCHLORIDE 10 MG: 10 TABLET ORAL at 15:14

## 2017-03-31 RX ADMIN — OXYCODONE HYDROCHLORIDE 20 MG: 20 TABLET, FILM COATED, EXTENDED RELEASE ORAL at 20:33

## 2017-03-31 RX ADMIN — ENOXAPARIN SODIUM 40 MG: 100 INJECTION SUBCUTANEOUS at 08:42

## 2017-03-31 RX ADMIN — OXYCODONE HYDROCHLORIDE 10 MG: 10 TABLET ORAL at 20:32

## 2017-03-31 RX ADMIN — AMPICILLIN SODIUM AND SULBACTAM SODIUM 3 G: 2; 1 INJECTION, POWDER, FOR SOLUTION INTRAMUSCULAR; INTRAVENOUS at 23:28

## 2017-03-31 RX ADMIN — POTASSIUM CHLORIDE 40 MEQ: 1500 TABLET, EXTENDED RELEASE ORAL at 20:36

## 2017-03-31 RX ADMIN — OXYCODONE HYDROCHLORIDE 20 MG: 20 TABLET, FILM COATED, EXTENDED RELEASE ORAL at 08:41

## 2017-03-31 RX ADMIN — GABAPENTIN 600 MG: 300 CAPSULE ORAL at 15:12

## 2017-03-31 RX ADMIN — NICOTINE 14 MG: 14 PATCH TRANSDERMAL at 05:31

## 2017-03-31 RX ADMIN — GABAPENTIN 600 MG: 300 CAPSULE ORAL at 08:42

## 2017-03-31 RX ADMIN — LIDOCAINE 2 PATCH: 50 PATCH CUTANEOUS at 12:03

## 2017-03-31 RX ADMIN — LACTOBACILLUS ACIDOPHILUS / LACTOBACILLUS BULGARICUS 1 PACKET: 100 MILLION CFU STRENGTH GRANULES at 12:04

## 2017-03-31 RX ADMIN — METOPROLOL TARTRATE 12.5 MG: 25 TABLET, FILM COATED ORAL at 20:34

## 2017-03-31 RX ADMIN — AMPICILLIN SODIUM AND SULBACTAM SODIUM 3 G: 2; 1 INJECTION, POWDER, FOR SOLUTION INTRAMUSCULAR; INTRAVENOUS at 17:02

## 2017-03-31 RX ADMIN — SIMVASTATIN 20 MG: 20 TABLET, FILM COATED ORAL at 20:34

## 2017-03-31 RX ADMIN — AMPICILLIN SODIUM AND SULBACTAM SODIUM 3 G: 2; 1 INJECTION, POWDER, FOR SOLUTION INTRAMUSCULAR; INTRAVENOUS at 12:03

## 2017-03-31 RX ADMIN — POTASSIUM CHLORIDE 40 MEQ: 1500 TABLET, EXTENDED RELEASE ORAL at 08:44

## 2017-03-31 RX ADMIN — LACTOBACILLUS ACIDOPHILUS / LACTOBACILLUS BULGARICUS 1 PACKET: 100 MILLION CFU STRENGTH GRANULES at 17:02

## 2017-03-31 RX ADMIN — AMPICILLIN SODIUM AND SULBACTAM SODIUM 3 G: 2; 1 INJECTION, POWDER, FOR SOLUTION INTRAMUSCULAR; INTRAVENOUS at 05:31

## 2017-03-31 ASSESSMENT — ENCOUNTER SYMPTOMS
FEVER: 0
VOMITING: 0
VOMITING: 1
ABDOMINAL PAIN: 0
COUGH: 0
NAUSEA: 0
SPEECH CHANGE: 0
DIARRHEA: 0
HEADACHES: 0
WEAKNESS: 1
BACK PAIN: 1
FOCAL WEAKNESS: 0
NAUSEA: 1
SHORTNESS OF BREATH: 0
MYALGIAS: 1
DIARRHEA: 1
CHILLS: 0

## 2017-03-31 ASSESSMENT — PAIN SCALES - GENERAL
PAINLEVEL_OUTOF10: 7
PAINLEVEL_OUTOF10: 6

## 2017-03-31 ASSESSMENT — LIFESTYLE VARIABLES: SUBSTANCE_ABUSE: 1

## 2017-03-31 NOTE — PROGRESS NOTES
Vascular    No change in symptoms.  Low back pain.  Eating well.  Antibiotic therapy.  Discussed management alternatives.  Plan AAA duplex

## 2017-03-31 NOTE — PROGRESS NOTES
"S/p 3rd toe amputation.  Doing well, no pain.    Blood pressure 113/60, pulse 68, temperature 36.5 °C (97.7 °F), resp. rate 17, height 1.778 m (5' 10\"), weight 77 kg (169 lb 12.1 oz), SpO2 93 %.    Exam:  Toe amputation incision c/d/i    #1 s/p 3rd toe amputation    Plan:  - WBAT on heel  - ABX per medicine/ID  - d/c planning  "

## 2017-03-31 NOTE — PROGRESS NOTES
Will need to discuss medication orders with physician team, patient unable to tolerate PO medication r/t esophageal reflux.

## 2017-03-31 NOTE — THERAPY
"Speech Language Therapy dysphagia treatment completed.   Functional Status:  Pt awake, alert and sitting up EOB. Pt and RN report pt still with frequent reflux/vomiting on full liquid diet and with med administration. Pt stating \"sometimes I can take 6 bites sometimes I can take 12 before it comes back up.\" Pt reports no improvement in swallow function after Botox for distal esophagus achalasia. Pt taking drinks of thin liquids and bites of purees with no immediate overt s/sx of aspiration however after 3-4 minutes he had belching and coughing concerning for ascending/reflux aspiration. Collaborated with RN ongoing aspiration risk d/t reflux and need to update GI on pts CLOF. RN to further discuss with hospitalist. Oropharyngeal swallow functional.   Recommendations: Update GI re: pts continued difficulty and inability to tolerate full liquid diet/meds.   Plan of Care: Patient with no further skilled SLP needs in the acute care setting at this time  Post-Acute Therapy: Currently anticipate no further skilled therapy needs once patient is discharged from the inpatient setting.    See \"Rehab Therapy-Acute\" Patient Summary Report for complete documentation.     "

## 2017-03-31 NOTE — CARE PLAN
Problem: Pain Management  Goal: Pain level will decrease to patient’s comfort goal  Outcome: PROGRESSING AS EXPECTED    Problem: Communication  Goal: The ability to communicate needs accurately and effectively will improve  Outcome: PROGRESSING AS EXPECTED

## 2017-03-31 NOTE — PROGRESS NOTES
Infectious Disease Progress Note    Author: Laurel Darby M.D. DOS & Time created: 3/31/2017  11:18 AM    Chief Complaint   Patient presents with   • Low Back Pain   FU for osteomyelitis, GAS bacteremia and CDI, suspected endograft infection    Interval History:  3/17 AF, WBC 12.9, pt c/o lower back pain, asking for morphine, poor insight into illness, denies any diarrhea  3/18 AF, WBC 13.3, sleeping and not arousable to voice, cultures here negative to date  3/19 AF, WBC 13.4, frustrated about lower back pain not adequately controlled on current pain regimen, plan for EGD with dilatation today  3/20 AF WBC 9.6 +pain-denies SE abx  3/21 AF WBC 10.5 no new complaints  3/22 AF WBC not done tolerating abx well  3/23 AF eye feels a little better-pain about the same  3/24 AF tolerating abx well-MRI confirm OM  3/25 AF WBC 9.5 s/p toe amp this am  3/26 AF WBC 10.1 deneies any new sxs-  3/27 AF, WBC 9.1, sleeping but arousable to voice, back pain stable  3/28 Tmax 99.9, WBC 7.7, not happy about soft foods, and wants to go back on regular diet, having normal BMs, back pain controlled on pain regimen  3/29 AF, WBC 5.7, transferred to ortho, happy he took a shower today, plan of care discussed with pain, states he was told he has arthritis of his back, back pain stable, no diarrhea  3/30 AF, WBC 8.5, had a loose BM this am follow by normal BM, no abd pain  3/31 AF, WBC 9, nausea and vomiting this am, had soft BM this am  Labs Reviewed, Medications Reviewed, Radiology Reviewed and Wound Reviewed.    Review of Systems:  Review of Systems   Constitutional: Negative for fever and chills.   Respiratory: Negative for cough and shortness of breath.    Gastrointestinal: Negative for nausea, vomiting, abdominal pain and diarrhea.   Genitourinary: Negative for dysuria.   Musculoskeletal: Positive for back pain.        Stable   Neurological: Negative for headaches.   All other systems reviewed and are  negative.      Hemodynamics:  Temp (24hrs), Av.7 °C (98.1 °F), Min:36.1 °C (97 °F), Max:37.1 °C (98.8 °F)  Temperature: 37.1 °C (98.8 °F)  Pulse  Av  Min: 61  Max: 114   Blood Pressure : 111/75 mmHg       Physical Exam:  Physical Exam   Constitutional: He appears well-developed. No distress.   HENT:   Head: Normocephalic and atraumatic.   Eyes: EOM are normal. Pupils are equal, round, and reactive to light.   Neck: Neck supple.   Cardiovascular: Normal rate.    Murmur heard.  IRR   Pulmonary/Chest: Effort normal and breath sounds normal.   Abdominal: Soft. He exhibits no distension. There is no tenderness.   Musculoskeletal: Normal range of motion. He exhibits edema.   R great toe with slight edema but no drainage or erythema, medial callus. Erythema resolving    Left 3rd toe-dressed    RUE PICC nontender   Neurological: He is alert.   Nursing note and vitals reviewed.      Labs:  Recent Labs      17   0305   WBC  5.7  8.5  9.0   RBC  2.26*  2.45*  2.37*   HEMOGLOBIN  7.5*  8.3*  7.8*   HEMATOCRIT  22.8*  24.7*  24.0*   MCV  100.9*  100.8*  101.3*   MCH  33.2*  33.9*  32.9   RDW  48.8  49.1  49.3   PLATELETCT  157*  209  198   MPV  9.1  8.7*  9.0   NEUTSPOLYS  60.20  70.40  69.00   LYMPHOCYTES  26.20  18.80*  20.00*   MONOCYTES  11.70  9.40  9.60   EOSINOPHILS  1.40  0.80  0.80   BASOPHILS  0.20  0.20  0.20     Recent Labs      17   0305   SODIUM  132*  133*  133*   POTASSIUM  3.1*  3.5*  4.1   CHLORIDE  98  101  102   CO2  27  26  24   GLUCOSE  97  110*  93   BUN  10  8  7*     Recent Labs      17   05175  17   0305   ALBUMIN  2.3*  2.5*  2.3*   TBILIRUBIN  0.5  0.5  0.5   ALKPHOSPHAT  105*  112*  105*   TOTPROTEIN  7.1  8.3*  7.9   ALTSGPT  56*  55*  41   ASTSGOT  45  38  28   CREATININE  0.99  1.01  1.03       BLOOD CULTURE   Date Value Ref Range Status   03/15/2017 No growth after 5 days of  incubation.  Final    ':  Results     Procedure Component Value Units Date/Time    CDIFF BY PCR [[id number]]     Order Status:  Canceled Specimen Information:  Stool from Stool     FUNGAL CULTURE [[id number]] Collected:  03/25/17 0936    Order Status:  Completed Specimen Information:  Tissue Updated:  03/28/17 1155     Significant Indicator NEG      Source TISS      Site Left 3rd Toe      Fungal Culture Culture in progress.     CULTURE TISSUE W/ GRM STAIN [[id number]]  (Abnormal)  (Susceptibility) Collected:  03/25/17 0936    Order Status:  Completed Specimen Information:  Tissue Updated:  03/28/17 1155     Significant Indicator POS (POS)      Source TISS      Site Left 3rd Toe      Tissue Culture -- (A)      Gram Stain Result No organisms seen.      Tissue Culture -- (A)      Result:        Staphylococcus aureus  Rare growth       Tissue Culture -- (A)      Result:        Staphylococcus epidermidis  Rare growth      Culture & Susceptibility     STAPHYLOCOCCUS AUREUS     Antibiotic Sensitivity Microscan Unit Status    Ampicillin/sulbactam Sensitive <=8/4 mcg/mL Final    Clindamycin Sensitive <=0.5 mcg/mL Final    Daptomycin Sensitive <=0.5 mcg/mL Final    Erythromycin Sensitive <=0.5 mcg/mL Final    Moxifloxacin Sensitive <=0.5 mcg/mL Final    Oxacillin Sensitive <=0.25 mcg/mL Final    Tetracycline Sensitive <=4 mcg/mL Final    Trimeth/Sulfa Sensitive <=0.5/9.5 mcg/mL Final    Vancomycin Sensitive 2 mcg/mL Final              STAPHYLOCOCCUS EPIDERMIDIS     Antibiotic Sensitivity Microscan Unit Status    Ampicillin/sulbactam Resistant <=8/4 mcg/mL Final    Clindamycin Resistant 4 mcg/mL Final    Daptomycin Sensitive <=0.5 mcg/mL Final    Erythromycin Sensitive <=0.5 mcg/mL Final    Moxifloxacin Sensitive <=0.5 mcg/mL Final    Oxacillin Resistant >2 mcg/mL Final    Penicillin Resistant >8 mcg/mL Final    Tetracycline Resistant >8 mcg/mL Final    Trimeth/Sulfa Sensitive <=0.5/9.5 mcg/mL Final    Vancomycin Sensitive 2  mcg/mL Final                       ANAEROBIC CULTURE [[id number]] Collected:  03/25/17 0936    Order Status:  Completed Specimen Information:  Tissue Updated:  03/28/17 1155     Significant Indicator NEG      Source TISS      Site Left 3rd Toe      Anaerobic Culture, Culture Res No Anaerobes isolated.     GRAM STAIN [[id number]] Collected:  03/25/17 0936    Order Status:  Completed Specimen Information:  Tissue Updated:  03/27/17 1108     Significant Indicator .      Source TISS      Site Left 3rd Toe      Gram Stain Result No organisms seen.           Imaging  3/17 MRI lumbar spine: There are changes secondary to the abdominal aortic aneurysm endograft. The aneurysm sac measures an approximately 7.6 x 6.7 cm in size. Abnormal contrast enhancement is noted within the wall of the aneurysmal sac. There is also abnormal soft tissue   contrast enhancement in the surrounding soft tissue. These findings are highly suspicious for infected thrombosed aneurysm sac. The possibility of endograft infection is more likely. There is irregular outpouching of the aneurysmal sac along the   posterior portion indenting the left psoas muscle.  2.  Free fluid in the pelvis  3.  There is mild-to-moderate left hydronephrosis likely representing pelviureteric junction obstruction.    3/16 R foot xray - +OM  3/16 L foot xray +OM    Assessment:  Active Hospital Problems    Diagnosis   • *Osteomyelitis of toe (CMS-HCC) [M86.9]   • Bacteremia [R78.81]   • Back pain [M54.9]   • CHF (congestive heart failure) (CMS-HCC) [I50.9]   • Tobacco dependence [F17.200]   • Atrial fibrillation (CMS-HCC) [I48.91]    Endovascular infection       Plan:  Right great toe and left 3rd osteomyelitis  +OM on radiographs-confirmed on repeat MRIs  3/9 OSH wound cx - GAS, MSSA (I confirmed with [place] microlab)  3/25 s/p 3rd toe amp. No purulence seen proximally to amp site  OR cx (left 3rd toe) - CoNS (S-unasyn) Vanco LINUS 2  Continue Unasyn  Anticipate 6 weeks of  IV abx. 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  Appreciate vascular evaluation - surgery associated with high mortality  Abx per above followed by chronic abx suppression   Tagged WBC scan - no e/o endovascular infection  Abx as per above followed by PO augmentin for suppressive given endograft    Clostridium difficile diarrhea  Continue oral vancomycin QID for 2 weeks stop date 03/29/17 then BID while on IV abx with stop date of 04/30/17    Esophageal dysmotility-concern for achalasia  H/o strictures with recent dilatation as [place]'s  Esophagram +distal esophageal obstruction  s/p EGD with dilatation  Again with increased N/V today - needs GI re-eval for possible repeat EGD given hx of strictures  Keep meds IV    Back pain  Likely 2/2 suspected abdominal aorta endovascular infection    Drug seeking behavior    Prognosis guarded    Dispo:  Awaiting SNF placement for long-term abx    DW IM

## 2017-03-31 NOTE — PROGRESS NOTES
Hospital Medicine Progress Note, Adult, Complex               Author: Cabrera Berrios Date & Time created: 3/31/2017  8:49 AM     CC: 71 yo hx afib, cad, chf , hep c liver dz- Group A strep, MSSA  Bacteremia, OM Rt big toe, cdiff, dysphagia - - left AMA from [facility] -admitted w  back pain - s/p  L-3rd toe amputation .    Interval History:    Episodes of clear emesis on liquid diet. Reports no signif improvement since botox.    Review of Systems:  Review of Systems   Constitutional: Negative for fever and chills.   Respiratory: Negative for cough and shortness of breath.    Cardiovascular: Positive for leg swelling. Negative for chest pain.   Gastrointestinal: Positive for nausea, vomiting and diarrhea (improved). Negative for abdominal pain.   Musculoskeletal: Positive for myalgias and back pain.   Neurological: Positive for weakness. Negative for speech change and focal weakness.   Psychiatric/Behavioral: Positive for substance abuse (tobacco).       Physical Exam:  Physical Exam   Constitutional: He is oriented to person, place, and time. No distress.   Neck: No JVD present.   Cardiovascular: Normal rate and regular rhythm.    Murmur (III/6 erin) heard.  Pulmonary/Chest: No stridor. He has no wheezes. He has no rales.   Abdominal: Soft. Bowel sounds are normal. He exhibits no distension.   Musculoskeletal: He exhibits edema and tenderness (rt great toe w swelling).   Left 3rd toe amputation - wrapped   Neurological: He is alert and oriented to person, place, and time. No cranial nerve deficit.   Skin: Skin is warm and dry. He is not diaphoretic.       Labs:        Invalid input(s): [id number]      Recent Labs      03/29/17   0540  03/30/17   0335  03/31/17   0305   SODIUM  132*  133*  133*   POTASSIUM  3.1*  3.5*  4.1   CHLORIDE  98  101  102   CO2  27  26  24   BUN  10  8  7*   CREATININE  0.99  1.01  1.03   MAGNESIUM   --   2.0   --    CALCIUM  8.1*  8.4*  8.5     Recent Labs      03/29/17   0540  03/30/17    0335  17   0305   ALTSGPT  56*  55*  41   ASTSGOT  45  38  28   ALKPHOSPHAT  105*  112*  105*   TBILIRUBIN  0.5  0.5  0.5   DBILIRUBIN  0.2   --    --    GLUCOSE  97  110*  93     Recent Labs      17   0540  17   0335  17   0305   RBC  2.26*  2.45*  2.37*   HEMOGLOBIN  7.5*  8.3*  7.8*   HEMATOCRIT  22.8*  24.7*  24.0*   PLATELETCT  157*  209  198     Recent Labs      17   0540  17   0335  17   0305   WBC  5.7  8.5  9.0   NEUTSPOLYS  60.20  70.40  69.00   LYMPHOCYTES  26.20  18.80*  20.00*   MONOCYTES  11.70  9.40  9.60   EOSINOPHILS  1.40  0.80  0.80   BASOPHILS  0.20  0.20  0.20   ASTSGOT  45  38  28   ALTSGPT  56*  55*  41   ALKPHOSPHAT  105*  112*  105*   TBILIRUBIN  0.5  0.5  0.5           Hemodynamics:  Temp (24hrs), Av.6 °C (97.8 °F), Min:36.1 °C (97 °F), Max:36.8 °C (98.2 °F)  Temperature: 36.1 °C (97 °F)  Pulse  Av.8  Min: 61  Max: 114   Blood Pressure : 122/72 mmHg     Respiratory:    Respiration: 18, Pulse Oximetry: 94 %        RUL Breath Sounds: Diminished;Coarse Crackles, RML Breath Sounds: Diminished, RLL Breath Sounds: Diminished, SOLEDAD Breath Sounds: Diminished;Coarse Crackles, LLL Breath Sounds: Diminished  Fluids:  No intake or output data in the 24 hours ending 17 0849     GI/Nutrition:  Orders Placed This Encounter   Procedures   • DIET ORDER     Standing Status: Standing      Number of Occurrences: 1      Standing Expiration Date:      Order Specific Question:  Diet:     Answer:  Full Liquid [11]     Medical Decision Making, by Problem:  Active Hospital Problems    Diagnosis   • *Osteomyelitis of left great toe (CMS-HCC) [M86.9]- post left  3rd amputation   Wound care  cw prn oxycodone   Iv unasyn - plan 6 weeks therapy   • C. difficile diarrhea [A04.7]- recurrent diarrhea.   Oral vanco.  Isolation precautions.    • Bacteremia [R78.81] recent mssa, strep   Iv unasyn   • Back pain [M54.9]   sched oxycontin , neurontin - recently increased-  will titrate up dose as lori.   • Anemia [D64.9]- worsened- no symptoms of bleed- likely chronic dz, asympt  Monitor.   • Hyponatremia [E87.1]improved, mild   • Macrocytosis [D75.89]   • Hypokalemia [E87.6]improved  oral kcl.   • CHF (congestive heart failure) (CMS-[facility]) [I50.9]   • Atrial fibrillation (CMS-[facility]) [I48.91] converted to sinus.  cw amiodarone.    • S/P AAA (abdominal aortic aneurysm) repair [Z98.890, Z86.79]- ? endograft infxn- surgery is high risk- clinically stable-conservative therapies, dw Dr. Mitchell.   • Noncompliance [Z91.19]   • Tobacco dependence [F17.200]   • Hep C w/o coma, chronic (CMS-[facility]) [B18.2]   • Dysphagia [R13.10]- w achalasia - post botox at GE junction - persistent nausea, emesis   Monitor response, full liquids-- will dw GI   • CAD (coronary artery disease) [I25.10] stable .     SNF Placement being worked on , Discussed with CM    Labs reviewed, Radiology images reviewed and Medications reviewed  Art catheter: No Art      DVT Prophylaxis: Enoxaparin (Lovenox)      Antibiotics: Treating active infection/contamination beyond 24 hours perioperative coverage

## 2017-04-01 LAB
ALBUMIN SERPL BCP-MCNC: 2.3 G/DL (ref 3.2–4.9)
ALBUMIN/GLOB SERPL: 0.4 G/DL
ALP SERPL-CCNC: 102 U/L (ref 30–99)
ALT SERPL-CCNC: 33 U/L (ref 2–50)
ANION GAP SERPL CALC-SCNC: 7 MMOL/L (ref 0–11.9)
AST SERPL-CCNC: 24 U/L (ref 12–45)
BASOPHILS # BLD AUTO: 0.1 % (ref 0–1.8)
BASOPHILS # BLD: 0.02 K/UL (ref 0–0.12)
BILIRUB SERPL-MCNC: 0.6 MG/DL (ref 0.1–1.5)
BUN SERPL-MCNC: 8 MG/DL (ref 8–22)
CALCIUM SERPL-MCNC: 8.1 MG/DL (ref 8.5–10.5)
CHLORIDE SERPL-SCNC: 99 MMOL/L (ref 96–112)
CO2 SERPL-SCNC: 25 MMOL/L (ref 20–33)
CREAT SERPL-MCNC: 1.11 MG/DL (ref 0.5–1.4)
EOSINOPHIL # BLD AUTO: 0.02 K/UL (ref 0–0.51)
EOSINOPHIL NFR BLD: 0.1 % (ref 0–6.9)
ERYTHROCYTE [DISTWIDTH] IN BLOOD BY AUTOMATED COUNT: 50 FL (ref 35.9–50)
GFR SERPL CREATININE-BSD FRML MDRD: >60 ML/MIN/1.73 M 2
GLOBULIN SER CALC-MCNC: 5.6 G/DL (ref 1.9–3.5)
GLUCOSE SERPL-MCNC: 112 MG/DL (ref 65–99)
HCT VFR BLD AUTO: 22.9 % (ref 42–52)
HGB BLD-MCNC: 7.5 G/DL (ref 14–18)
IMM GRANULOCYTES # BLD AUTO: 0.06 K/UL (ref 0–0.11)
IMM GRANULOCYTES NFR BLD AUTO: 0.4 % (ref 0–0.9)
LYMPHOCYTES # BLD AUTO: 1.6 K/UL (ref 1–4.8)
LYMPHOCYTES NFR BLD: 11.6 % (ref 22–41)
MCH RBC QN AUTO: 33 PG (ref 27–33)
MCHC RBC AUTO-ENTMCNC: 32.8 G/DL (ref 33.7–35.3)
MCV RBC AUTO: 100.9 FL (ref 81.4–97.8)
MONOCYTES # BLD AUTO: 1 K/UL (ref 0–0.85)
MONOCYTES NFR BLD AUTO: 7.3 % (ref 0–13.4)
NEUTROPHILS # BLD AUTO: 11.04 K/UL (ref 1.82–7.42)
NEUTROPHILS NFR BLD: 80.5 % (ref 44–72)
NRBC # BLD AUTO: 0 K/UL
NRBC BLD AUTO-RTO: 0 /100 WBC
PLATELET # BLD AUTO: 190 K/UL (ref 164–446)
PMV BLD AUTO: 8.9 FL (ref 9–12.9)
POTASSIUM SERPL-SCNC: 4.2 MMOL/L (ref 3.6–5.5)
PROT SERPL-MCNC: 7.9 G/DL (ref 6–8.2)
RBC # BLD AUTO: 2.27 M/UL (ref 4.7–6.1)
SODIUM SERPL-SCNC: 131 MMOL/L (ref 135–145)
WBC # BLD AUTO: 13.7 K/UL (ref 4.8–10.8)

## 2017-04-01 PROCEDURE — A9270 NON-COVERED ITEM OR SERVICE: HCPCS | Performed by: INTERNAL MEDICINE

## 2017-04-01 PROCEDURE — 700102 HCHG RX REV CODE 250 W/ 637 OVERRIDE(OP): Performed by: HOSPITALIST

## 2017-04-01 PROCEDURE — 700111 HCHG RX REV CODE 636 W/ 250 OVERRIDE (IP): Performed by: NURSE PRACTITIONER

## 2017-04-01 PROCEDURE — 700105 HCHG RX REV CODE 258

## 2017-04-01 PROCEDURE — A9270 NON-COVERED ITEM OR SERVICE: HCPCS | Performed by: HOSPITALIST

## 2017-04-01 PROCEDURE — 700111 HCHG RX REV CODE 636 W/ 250 OVERRIDE (IP): Performed by: HOSPITALIST

## 2017-04-01 PROCEDURE — 700102 HCHG RX REV CODE 250 W/ 637 OVERRIDE(OP): Performed by: INTERNAL MEDICINE

## 2017-04-01 PROCEDURE — 700101 HCHG RX REV CODE 250: Performed by: FAMILY MEDICINE

## 2017-04-01 PROCEDURE — 80053 COMPREHEN METABOLIC PANEL: CPT

## 2017-04-01 PROCEDURE — A9270 NON-COVERED ITEM OR SERVICE: HCPCS | Performed by: FAMILY MEDICINE

## 2017-04-01 PROCEDURE — 99232 SBSQ HOSP IP/OBS MODERATE 35: CPT | Performed by: HOSPITALIST

## 2017-04-01 PROCEDURE — 700111 HCHG RX REV CODE 636 W/ 250 OVERRIDE (IP): Performed by: INTERNAL MEDICINE

## 2017-04-01 PROCEDURE — 770001 HCHG ROOM/CARE - MED/SURG/GYN PRIV*

## 2017-04-01 PROCEDURE — 700105 HCHG RX REV CODE 258: Performed by: INTERNAL MEDICINE

## 2017-04-01 PROCEDURE — 85025 COMPLETE CBC W/AUTO DIFF WBC: CPT

## 2017-04-01 PROCEDURE — 700102 HCHG RX REV CODE 250 W/ 637 OVERRIDE(OP): Performed by: FAMILY MEDICINE

## 2017-04-01 RX ORDER — SODIUM CHLORIDE 9 MG/ML
INJECTION, SOLUTION INTRAVENOUS
Status: COMPLETED
Start: 2017-04-01 | End: 2017-04-01

## 2017-04-01 RX ADMIN — OXYCODONE HYDROCHLORIDE 10 MG: 10 TABLET ORAL at 21:32

## 2017-04-01 RX ADMIN — OXYCODONE HYDROCHLORIDE 20 MG: 20 TABLET, FILM COATED, EXTENDED RELEASE ORAL at 21:32

## 2017-04-01 RX ADMIN — GABAPENTIN 600 MG: 300 CAPSULE ORAL at 09:00

## 2017-04-01 RX ADMIN — LACTOBACILLUS ACIDOPHILUS / LACTOBACILLUS BULGARICUS 1 PACKET: 100 MILLION CFU STRENGTH GRANULES at 11:36

## 2017-04-01 RX ADMIN — AMIODARONE HYDROCHLORIDE 200 MG: 200 TABLET ORAL at 08:37

## 2017-04-01 RX ADMIN — AMPICILLIN SODIUM AND SULBACTAM SODIUM 3 G: 2; 1 INJECTION, POWDER, FOR SOLUTION INTRAMUSCULAR; INTRAVENOUS at 05:28

## 2017-04-01 RX ADMIN — AMPICILLIN SODIUM AND SULBACTAM SODIUM 3 G: 2; 1 INJECTION, POWDER, FOR SOLUTION INTRAMUSCULAR; INTRAVENOUS at 16:32

## 2017-04-01 RX ADMIN — AMPICILLIN SODIUM AND SULBACTAM SODIUM 3 G: 2; 1 INJECTION, POWDER, FOR SOLUTION INTRAMUSCULAR; INTRAVENOUS at 11:35

## 2017-04-01 RX ADMIN — OXYCODONE HYDROCHLORIDE 20 MG: 20 TABLET, FILM COATED, EXTENDED RELEASE ORAL at 08:30

## 2017-04-01 RX ADMIN — VANCOMYCIN HYDROCHLORIDE 125 MG: 10 INJECTION, POWDER, LYOPHILIZED, FOR SOLUTION INTRAVENOUS at 08:30

## 2017-04-01 RX ADMIN — POTASSIUM CHLORIDE 40 MEQ: 1.5 POWDER, FOR SOLUTION ORAL at 08:30

## 2017-04-01 RX ADMIN — OXYCODONE HYDROCHLORIDE 10 MG: 10 TABLET ORAL at 00:46

## 2017-04-01 RX ADMIN — SODIUM CHLORIDE: 9 INJECTION, SOLUTION INTRAVENOUS at 04:20

## 2017-04-01 RX ADMIN — OXYCODONE HYDROCHLORIDE 10 MG: 10 TABLET ORAL at 17:15

## 2017-04-01 RX ADMIN — AMPICILLIN SODIUM AND SULBACTAM SODIUM 3 G: 2; 1 INJECTION, POWDER, FOR SOLUTION INTRAMUSCULAR; INTRAVENOUS at 23:46

## 2017-04-01 RX ADMIN — ENOXAPARIN SODIUM 40 MG: 100 INJECTION SUBCUTANEOUS at 08:32

## 2017-04-01 RX ADMIN — POTASSIUM CHLORIDE 40 MEQ: 1500 TABLET, EXTENDED RELEASE ORAL at 21:33

## 2017-04-01 RX ADMIN — GABAPENTIN 600 MG: 300 CAPSULE ORAL at 21:32

## 2017-04-01 RX ADMIN — METOPROLOL TARTRATE 12.5 MG: 25 TABLET, FILM COATED ORAL at 21:33

## 2017-04-01 RX ADMIN — OXYCODONE HYDROCHLORIDE 10 MG: 10 TABLET ORAL at 11:42

## 2017-04-01 RX ADMIN — LACTOBACILLUS ACIDOPHILUS / LACTOBACILLUS BULGARICUS 1 PACKET: 100 MILLION CFU STRENGTH GRANULES at 16:31

## 2017-04-01 RX ADMIN — NICOTINE 14 MG: 14 PATCH TRANSDERMAL at 05:28

## 2017-04-01 RX ADMIN — SIMVASTATIN 20 MG: 20 TABLET, FILM COATED ORAL at 21:32

## 2017-04-01 RX ADMIN — VANCOMYCIN HYDROCHLORIDE 125 MG: 10 INJECTION, POWDER, LYOPHILIZED, FOR SOLUTION INTRAVENOUS at 21:32

## 2017-04-01 RX ADMIN — LIDOCAINE 2 PATCH: 50 PATCH CUTANEOUS at 11:38

## 2017-04-01 RX ADMIN — METOPROLOL TARTRATE 12.5 MG: 25 TABLET, FILM COATED ORAL at 08:36

## 2017-04-01 RX ADMIN — GABAPENTIN 600 MG: 300 CAPSULE ORAL at 14:54

## 2017-04-01 ASSESSMENT — ENCOUNTER SYMPTOMS
CHILLS: 0
BACK PAIN: 1
SPEECH CHANGE: 0
MYALGIAS: 1
DIARRHEA: 1
HEADACHES: 0
SHORTNESS OF BREATH: 0
NAUSEA: 1
COUGH: 0
NAUSEA: 0
FOCAL WEAKNESS: 0
WEAKNESS: 1
VOMITING: 1
ROS GI COMMENTS: IMPROVING
FEVER: 0
ABDOMINAL PAIN: 0

## 2017-04-01 ASSESSMENT — PAIN SCALES - GENERAL
PAINLEVEL_OUTOF10: 7
PAINLEVEL_OUTOF10: 6

## 2017-04-01 ASSESSMENT — LIFESTYLE VARIABLES: SUBSTANCE_ABUSE: 1

## 2017-04-01 NOTE — PROGRESS NOTES
Infectious Disease Progress Note    Author: Laurel Darby M.D. DOS & Time created: 4/1/2017  10:56 AM    Chief Complaint   Patient presents with   • Low Back Pain   FU for osteomyelitis, GAS bacteremia and CDI, suspected endograft infection    Interval History:  3/17 AF, WBC 12.9, pt c/o lower back pain, asking for morphine, poor insight into illness, denies any diarrhea  3/18 AF, WBC 13.3, sleeping and not arousable to voice, cultures here negative to date  3/19 AF, WBC 13.4, frustrated about lower back pain not adequately controlled on current pain regimen, plan for EGD with dilatation today  3/20 AF WBC 9.6 +pain-denies SE abx  3/21 AF WBC 10.5 no new complaints  3/22 AF WBC not done tolerating abx well  3/23 AF eye feels a little better-pain about the same  3/24 AF tolerating abx well-MRI confirm OM  3/25 AF WBC 9.5 s/p toe amp this am  3/26 AF WBC 10.1 deneies any new sxs-  3/27 AF, WBC 9.1, sleeping but arousable to voice, back pain stable  3/28 Tmax 99.9, WBC 7.7, not happy about soft foods, and wants to go back on regular diet, having normal BMs, back pain controlled on pain regimen  3/29 AF, WBC 5.7, transferred to ortho, happy he took a shower today, plan of care discussed with pain, states he was told he has arthritis of his back, back pain stable, no diarrhea  3/30 AF, WBC 8.5, had a loose BM this am follow by normal BM, no abd pain  3/31 AF, WBC 9, nausea and vomiting this am, had soft BM this am  4/1 WBC 13.7, still having emesis, states he continues to be thirsty despite drinking lots of water yesterday, diarrhea improving and stools forming  Labs Reviewed, Medications Reviewed, Radiology Reviewed and Wound Reviewed.    Review of Systems:  Review of Systems   Constitutional: Negative for fever and chills.   Respiratory: Negative for cough and shortness of breath.    Gastrointestinal: Positive for vomiting and diarrhea. Negative for nausea and abdominal pain.        Improving   Genitourinary:  Negative for dysuria.   Musculoskeletal: Positive for back pain.        Stable   Neurological: Negative for headaches.   All other systems reviewed and are negative.      Hemodynamics:  Temp (24hrs), Av.8 °C (98.3 °F), Min:36 °C (96.8 °F), Max:37.5 °C (99.5 °F)  Temperature: 37.4 °C (99.3 °F)  Pulse  Av.4  Min: 61  Max: 114   Blood Pressure : 101/53 mmHg       Physical Exam:  Physical Exam   Constitutional: He appears well-developed. No distress.   HENT:   Head: Normocephalic and atraumatic.   Eyes: EOM are normal. Pupils are equal, round, and reactive to light.   Neck: Neck supple.   Cardiovascular: Normal rate.    Murmur heard.  IRR   Pulmonary/Chest: Effort normal and breath sounds normal.   Abdominal: Soft. He exhibits no distension. There is no tenderness.   Musculoskeletal: Normal range of motion. He exhibits edema.   R great toe with slight edema but no drainage or erythema, medial callus. Erythema resolving    Left 3rd toe-suture in place. No drainage    RUE PICC nontender   Neurological: He is alert.   Nursing note and vitals reviewed.      Labs:  Recent Labs      17   0420   WBC  8.5  9.0  13.7*   RBC  2.45*  2.37*  2.27*   HEMOGLOBIN  8.3*  7.8*  7.5*   HEMATOCRIT  24.7*  24.0*  22.9*   MCV  100.8*  101.3*  100.9*   MCH  33.9*  32.9  33.0   RDW  49.1  49.3  50.0   PLATELETCT  209  198  190   MPV  8.7*  9.0  8.9*   NEUTSPOLYS  70.40  69.00  80.50*   LYMPHOCYTES  18.80*  20.00*  11.60*   MONOCYTES  9.40  9.60  7.30   EOSINOPHILS  0.80  0.80  0.10   BASOPHILS  0.20  0.20  0.10     Recent Labs      17   0305  17   0420   SODIUM  133*  133*  131*   POTASSIUM  3.5*  4.1  4.2   CHLORIDE  101  102  99   CO2  26  24  25   GLUCOSE  110*  93  112*   BUN  8  7*  8     Recent Labs      17  17   0305  17   0420   ALBUMIN  2.5*  2.3*  2.3*   TBILIRUBIN  0.5  0.5  0.6   ALKPHOSPHAT  112*  105*  102*   TOTPROTEIN  8.3*   7.9  7.9   ALTSGPT  55*  41  33   ASTSGOT  38  28  24   CREATININE  1.01  1.03  1.11       BLOOD CULTURE   Date Value Ref Range Status   03/15/2017 No growth after 5 days of incubation.  Final    ':  Results     Procedure Component Value Units Date/Time    CDIFF BY PCR [[id number]]     Order Status:  Canceled Specimen Information:  Stool from Stool     FUNGAL CULTURE [[id number]] Collected:  03/25/17 0936    Order Status:  Completed Specimen Information:  Tissue Updated:  03/28/17 1155     Significant Indicator NEG      Source TISS      Site Left 3rd Toe      Fungal Culture Culture in progress.     CULTURE TISSUE W/ GRM STAIN [[id number]]  (Abnormal)  (Susceptibility) Collected:  03/25/17 0936    Order Status:  Completed Specimen Information:  Tissue Updated:  03/28/17 1155     Significant Indicator POS (POS)      Source TISS      Site Left 3rd Toe      Tissue Culture -- (A)      Gram Stain Result No organisms seen.      Tissue Culture -- (A)      Result:        Staphylococcus aureus  Rare growth       Tissue Culture -- (A)      Result:        Staphylococcus epidermidis  Rare growth      Culture & Susceptibility     STAPHYLOCOCCUS AUREUS     Antibiotic Sensitivity Microscan Unit Status    Ampicillin/sulbactam Sensitive <=8/4 mcg/mL Final    Clindamycin Sensitive <=0.5 mcg/mL Final    Daptomycin Sensitive <=0.5 mcg/mL Final    Erythromycin Sensitive <=0.5 mcg/mL Final    Moxifloxacin Sensitive <=0.5 mcg/mL Final    Oxacillin Sensitive <=0.25 mcg/mL Final    Tetracycline Sensitive <=4 mcg/mL Final    Trimeth/Sulfa Sensitive <=0.5/9.5 mcg/mL Final    Vancomycin Sensitive 2 mcg/mL Final              STAPHYLOCOCCUS EPIDERMIDIS     Antibiotic Sensitivity Microscan Unit Status    Ampicillin/sulbactam Resistant <=8/4 mcg/mL Final    Clindamycin Resistant 4 mcg/mL Final    Daptomycin Sensitive <=0.5 mcg/mL Final    Erythromycin Sensitive <=0.5 mcg/mL Final    Moxifloxacin Sensitive <=0.5 mcg/mL Final    Oxacillin Resistant >2  mcg/mL Final    Penicillin Resistant >8 mcg/mL Final    Tetracycline Resistant >8 mcg/mL Final    Trimeth/Sulfa Sensitive <=0.5/9.5 mcg/mL Final    Vancomycin Sensitive 2 mcg/mL Final                       ANAEROBIC CULTURE [[id number]] Collected:  03/25/17 0936    Order Status:  Completed Specimen Information:  Tissue Updated:  03/28/17 1155     Significant Indicator NEG      Source TISS      Site Left 3rd Toe      Anaerobic Culture, Culture Res No Anaerobes isolated.     GRAM STAIN [[id number]] Collected:  03/25/17 0936    Order Status:  Completed Specimen Information:  Tissue Updated:  03/27/17 1108     Significant Indicator .      Source TISS      Site Left 3rd Toe      Gram Stain Result No organisms seen.           Imaging  3/17 MRI lumbar spine: There are changes secondary to the abdominal aortic aneurysm endograft. The aneurysm sac measures an approximately 7.6 x 6.7 cm in size. Abnormal contrast enhancement is noted within the wall of the aneurysmal sac. There is also abnormal soft tissue   contrast enhancement in the surrounding soft tissue. These findings are highly suspicious for infected thrombosed aneurysm sac. The possibility of endograft infection is more likely. There is irregular outpouching of the aneurysmal sac along the   posterior portion indenting the left psoas muscle.  2.  Free fluid in the pelvis  3.  There is mild-to-moderate left hydronephrosis likely representing pelviureteric junction obstruction.    3/16 R foot xray - +OM  3/16 L foot xray +OM    Assessment:  Active Hospital Problems    Diagnosis   • *Osteomyelitis of toe (CMS-HCC) [M86.9]   • Bacteremia [R78.81]   • Back pain [M54.9]   • CHF (congestive heart failure) (CMS-HCC) [I50.9]   • Tobacco dependence [F17.200]   • Atrial fibrillation (CMS-HCC) [I48.91]    Endovascular infection       Plan:  Right great toe and left 3rd osteomyelitis  +OM on radiographs-confirmed on repeat MRIs  3/9 OSH wound cx - GAS, MSSA (I confirmed with [facility]'s microlab)  3/25 s/p 3rd toe amp. No purulence seen proximally to amp site  OR cx (left 3rd toe) - CoNS (S-unasyn) Vanco LINUS 2  Continue Unasyn  Anticipate 6 weeks of IV abx. 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  Appreciate vascular evaluation - surgery associated with high mortality  Abx per above followed by chronic abx suppression   Tagged WBC scan - no e/o endovascular infection  Abx as per above followed by PO augmentin for suppressive given endograft    Clostridium difficile diarrhea  Continue oral vancomycin QID for 2 weeks stop date 03/29/17 then BID while on IV abx with stop date of 04/30/17    Esophageal dysmotility-concern for achalasia  H/o strictures with recent dilatation as [place]'s  Esophagram +distal esophageal obstruction  s/p EGD with dilatation  Needs GI re-eval for possible repeat EGD given hx of strictures  Keep meds IV    Leukocytosis today  Multifactorial  No new signs or symptoms of infection  Monitor    Back pain  Likely 2/2 suspected abdominal aorta endovascular infection    Drug seeking behavior    Prognosis guarded    Dispo:  Awaiting SNF placement for long-term abx    DW IM

## 2017-04-02 LAB
ABO GROUP BLD: NORMAL
ALBUMIN SERPL BCP-MCNC: 2.2 G/DL (ref 3.2–4.9)
ALBUMIN/GLOB SERPL: 0.4 G/DL
ALP SERPL-CCNC: 88 U/L (ref 30–99)
ALT SERPL-CCNC: 29 U/L (ref 2–50)
ANION GAP SERPL CALC-SCNC: 6 MMOL/L (ref 0–11.9)
AST SERPL-CCNC: 28 U/L (ref 12–45)
BARCODED ABORH UBTYP: 5100
BARCODED PRD CODE UBPRD: NORMAL
BARCODED UNIT NUM UBUNT: NORMAL
BASOPHILS # BLD AUTO: 0.2 % (ref 0–1.8)
BASOPHILS # BLD AUTO: 0.3 % (ref 0–1.8)
BASOPHILS # BLD: 0.02 K/UL (ref 0–0.12)
BASOPHILS # BLD: 0.03 K/UL (ref 0–0.12)
BILIRUB SERPL-MCNC: 0.5 MG/DL (ref 0.1–1.5)
BLD GP AB SCN SERPL QL: NORMAL
BUN SERPL-MCNC: 9 MG/DL (ref 8–22)
CALCIUM SERPL-MCNC: 7.8 MG/DL (ref 8.5–10.5)
CHLORIDE SERPL-SCNC: 98 MMOL/L (ref 96–112)
CO2 SERPL-SCNC: 23 MMOL/L (ref 20–33)
COMPONENT R 8504R: NORMAL
CREAT SERPL-MCNC: 1.11 MG/DL (ref 0.5–1.4)
EOSINOPHIL # BLD AUTO: 0.06 K/UL (ref 0–0.51)
EOSINOPHIL # BLD AUTO: 0.11 K/UL (ref 0–0.51)
EOSINOPHIL NFR BLD: 0.5 % (ref 0–6.9)
EOSINOPHIL NFR BLD: 1.3 % (ref 0–6.9)
ERYTHROCYTE [DISTWIDTH] IN BLOOD BY AUTOMATED COUNT: 50 FL (ref 35.9–50)
ERYTHROCYTE [DISTWIDTH] IN BLOOD BY AUTOMATED COUNT: 51.1 FL (ref 35.9–50)
GFR SERPL CREATININE-BSD FRML MDRD: >60 ML/MIN/1.73 M 2
GLOBULIN SER CALC-MCNC: 5.1 G/DL (ref 1.9–3.5)
GLUCOSE SERPL-MCNC: 114 MG/DL (ref 65–99)
HCT VFR BLD AUTO: 20.2 % (ref 42–52)
HCT VFR BLD AUTO: 22.4 % (ref 42–52)
HGB BLD-MCNC: 6.6 G/DL (ref 14–18)
HGB BLD-MCNC: 7.3 G/DL (ref 14–18)
HGB BLD-MCNC: 7.9 G/DL (ref 14–18)
IMM GRANULOCYTES # BLD AUTO: 0.06 K/UL (ref 0–0.11)
IMM GRANULOCYTES # BLD AUTO: 0.07 K/UL (ref 0–0.11)
IMM GRANULOCYTES NFR BLD AUTO: 0.6 % (ref 0–0.9)
IMM GRANULOCYTES NFR BLD AUTO: 0.7 % (ref 0–0.9)
LYMPHOCYTES # BLD AUTO: 1.63 K/UL (ref 1–4.8)
LYMPHOCYTES # BLD AUTO: 1.76 K/UL (ref 1–4.8)
LYMPHOCYTES NFR BLD: 15.2 % (ref 22–41)
LYMPHOCYTES NFR BLD: 18.7 % (ref 22–41)
MCH RBC QN AUTO: 32.7 PG (ref 27–33)
MCH RBC QN AUTO: 33 PG (ref 27–33)
MCHC RBC AUTO-ENTMCNC: 32.6 G/DL (ref 33.7–35.3)
MCHC RBC AUTO-ENTMCNC: 32.7 G/DL (ref 33.7–35.3)
MCV RBC AUTO: 100.4 FL (ref 81.4–97.8)
MCV RBC AUTO: 101 FL (ref 81.4–97.8)
MONOCYTES # BLD AUTO: 0.78 K/UL (ref 0–0.85)
MONOCYTES # BLD AUTO: 0.82 K/UL (ref 0–0.85)
MONOCYTES NFR BLD AUTO: 7.1 % (ref 0–13.4)
MONOCYTES NFR BLD AUTO: 8.9 % (ref 0–13.4)
NEUTROPHILS # BLD AUTO: 6.13 K/UL (ref 1.82–7.42)
NEUTROPHILS # BLD AUTO: 8.85 K/UL (ref 1.82–7.42)
NEUTROPHILS NFR BLD: 70.2 % (ref 44–72)
NEUTROPHILS NFR BLD: 76.3 % (ref 44–72)
NRBC # BLD AUTO: 0 K/UL
NRBC # BLD AUTO: 0 K/UL
NRBC BLD AUTO-RTO: 0 /100 WBC
NRBC BLD AUTO-RTO: 0 /100 WBC
PLATELET # BLD AUTO: 169 K/UL (ref 164–446)
PLATELET # BLD AUTO: 174 K/UL (ref 164–446)
PMV BLD AUTO: 8.7 FL (ref 9–12.9)
PMV BLD AUTO: 8.9 FL (ref 9–12.9)
POTASSIUM SERPL-SCNC: 3.8 MMOL/L (ref 3.6–5.5)
PRODUCT TYPE UPROD: NORMAL
PROT SERPL-MCNC: 7.3 G/DL (ref 6–8.2)
RBC # BLD AUTO: 2 M/UL (ref 4.7–6.1)
RBC # BLD AUTO: 2.23 M/UL (ref 4.7–6.1)
RH BLD: NORMAL
SODIUM SERPL-SCNC: 127 MMOL/L (ref 135–145)
UNIT STATUS USTAT: NORMAL
WBC # BLD AUTO: 11.6 K/UL (ref 4.8–10.8)
WBC # BLD AUTO: 8.7 K/UL (ref 4.8–10.8)

## 2017-04-02 PROCEDURE — 86850 RBC ANTIBODY SCREEN: CPT

## 2017-04-02 PROCEDURE — 30243N1 TRANSFUSION OF NONAUTOLOGOUS RED BLOOD CELLS INTO CENTRAL VEIN, PERCUTANEOUS APPROACH: ICD-10-PCS | Performed by: HOSPITALIST

## 2017-04-02 PROCEDURE — 700102 HCHG RX REV CODE 250 W/ 637 OVERRIDE(OP): Performed by: FAMILY MEDICINE

## 2017-04-02 PROCEDURE — A9270 NON-COVERED ITEM OR SERVICE: HCPCS | Performed by: INTERNAL MEDICINE

## 2017-04-02 PROCEDURE — 86923 COMPATIBILITY TEST ELECTRIC: CPT

## 2017-04-02 PROCEDURE — 86900 BLOOD TYPING SEROLOGIC ABO: CPT

## 2017-04-02 PROCEDURE — 85025 COMPLETE CBC W/AUTO DIFF WBC: CPT

## 2017-04-02 PROCEDURE — A9270 NON-COVERED ITEM OR SERVICE: HCPCS | Performed by: HOSPITALIST

## 2017-04-02 PROCEDURE — 700105 HCHG RX REV CODE 258

## 2017-04-02 PROCEDURE — 700102 HCHG RX REV CODE 250 W/ 637 OVERRIDE(OP): Performed by: HOSPITALIST

## 2017-04-02 PROCEDURE — 85018 HEMOGLOBIN: CPT

## 2017-04-02 PROCEDURE — P9016 RBC LEUKOCYTES REDUCED: HCPCS

## 2017-04-02 PROCEDURE — 700102 HCHG RX REV CODE 250 W/ 637 OVERRIDE(OP): Performed by: INTERNAL MEDICINE

## 2017-04-02 PROCEDURE — 700101 HCHG RX REV CODE 250: Performed by: FAMILY MEDICINE

## 2017-04-02 PROCEDURE — 700102 HCHG RX REV CODE 250 W/ 637 OVERRIDE(OP): Performed by: ORTHOPAEDIC SURGERY

## 2017-04-02 PROCEDURE — 80053 COMPREHEN METABOLIC PANEL: CPT

## 2017-04-02 PROCEDURE — 99232 SBSQ HOSP IP/OBS MODERATE 35: CPT | Performed by: HOSPITALIST

## 2017-04-02 PROCEDURE — 700111 HCHG RX REV CODE 636 W/ 250 OVERRIDE (IP): Performed by: INTERNAL MEDICINE

## 2017-04-02 PROCEDURE — 700105 HCHG RX REV CODE 258: Performed by: INTERNAL MEDICINE

## 2017-04-02 PROCEDURE — 770001 HCHG ROOM/CARE - MED/SURG/GYN PRIV*

## 2017-04-02 PROCEDURE — 86901 BLOOD TYPING SEROLOGIC RH(D): CPT

## 2017-04-02 PROCEDURE — 36430 TRANSFUSION BLD/BLD COMPNT: CPT

## 2017-04-02 PROCEDURE — A9270 NON-COVERED ITEM OR SERVICE: HCPCS | Performed by: ORTHOPAEDIC SURGERY

## 2017-04-02 PROCEDURE — A9270 NON-COVERED ITEM OR SERVICE: HCPCS | Performed by: FAMILY MEDICINE

## 2017-04-02 RX ORDER — SODIUM CHLORIDE 9 MG/ML
INJECTION, SOLUTION INTRAVENOUS
Status: COMPLETED
Start: 2017-04-02 | End: 2017-04-02

## 2017-04-02 RX ADMIN — LIDOCAINE 2 PATCH: 50 PATCH CUTANEOUS at 11:38

## 2017-04-02 RX ADMIN — OXYCODONE HYDROCHLORIDE 10 MG: 10 TABLET ORAL at 18:05

## 2017-04-02 RX ADMIN — AMPICILLIN SODIUM AND SULBACTAM SODIUM 3 G: 2; 1 INJECTION, POWDER, FOR SOLUTION INTRAMUSCULAR; INTRAVENOUS at 12:53

## 2017-04-02 RX ADMIN — LACTOBACILLUS ACIDOPHILUS / LACTOBACILLUS BULGARICUS 1 PACKET: 100 MILLION CFU STRENGTH GRANULES at 11:38

## 2017-04-02 RX ADMIN — VANCOMYCIN HYDROCHLORIDE 125 MG: 10 INJECTION, POWDER, LYOPHILIZED, FOR SOLUTION INTRAVENOUS at 20:22

## 2017-04-02 RX ADMIN — POTASSIUM CHLORIDE 40 MEQ: 1500 TABLET, EXTENDED RELEASE ORAL at 07:45

## 2017-04-02 RX ADMIN — OXYCODONE HYDROCHLORIDE 10 MG: 10 TABLET ORAL at 01:38

## 2017-04-02 RX ADMIN — OXYCODONE HYDROCHLORIDE 10 MG: 10 TABLET ORAL at 10:04

## 2017-04-02 RX ADMIN — LACTOBACILLUS ACIDOPHILUS / LACTOBACILLUS BULGARICUS 1 PACKET: 100 MILLION CFU STRENGTH GRANULES at 07:45

## 2017-04-02 RX ADMIN — OXYCODONE HYDROCHLORIDE 20 MG: 20 TABLET, FILM COATED, EXTENDED RELEASE ORAL at 07:46

## 2017-04-02 RX ADMIN — NICOTINE 14 MG: 14 PATCH TRANSDERMAL at 05:53

## 2017-04-02 RX ADMIN — POTASSIUM CHLORIDE 40 MEQ: 1.5 POWDER, FOR SOLUTION ORAL at 20:26

## 2017-04-02 RX ADMIN — SODIUM CHLORIDE 500 ML: 9 INJECTION, SOLUTION INTRAVENOUS at 07:48

## 2017-04-02 RX ADMIN — AMPICILLIN SODIUM AND SULBACTAM SODIUM 3 G: 2; 1 INJECTION, POWDER, FOR SOLUTION INTRAMUSCULAR; INTRAVENOUS at 05:53

## 2017-04-02 RX ADMIN — VANCOMYCIN HYDROCHLORIDE 125 MG: 10 INJECTION, POWDER, LYOPHILIZED, FOR SOLUTION INTRAVENOUS at 07:46

## 2017-04-02 RX ADMIN — OXYCODONE HYDROCHLORIDE 20 MG: 20 TABLET, FILM COATED, EXTENDED RELEASE ORAL at 20:22

## 2017-04-02 RX ADMIN — AMPICILLIN SODIUM AND SULBACTAM SODIUM 3 G: 2; 1 INJECTION, POWDER, FOR SOLUTION INTRAMUSCULAR; INTRAVENOUS at 17:33

## 2017-04-02 RX ADMIN — GABAPENTIN 600 MG: 300 CAPSULE ORAL at 14:02

## 2017-04-02 RX ADMIN — STANDARDIZED SENNA CONCENTRATE AND DOCUSATE SODIUM 1 TABLET: 8.6; 5 TABLET, FILM COATED ORAL at 20:22

## 2017-04-02 RX ADMIN — GABAPENTIN 600 MG: 300 CAPSULE ORAL at 07:45

## 2017-04-02 RX ADMIN — GABAPENTIN 600 MG: 300 CAPSULE ORAL at 20:22

## 2017-04-02 RX ADMIN — SODIUM CHLORIDE 1000 ML: 9 INJECTION, SOLUTION INTRAVENOUS at 12:45

## 2017-04-02 RX ADMIN — SIMVASTATIN 20 MG: 20 TABLET, FILM COATED ORAL at 20:22

## 2017-04-02 RX ADMIN — OXYCODONE HYDROCHLORIDE 10 MG: 10 TABLET ORAL at 05:53

## 2017-04-02 RX ADMIN — LACTOBACILLUS ACIDOPHILUS / LACTOBACILLUS BULGARICUS 1 PACKET: 100 MILLION CFU STRENGTH GRANULES at 17:32

## 2017-04-02 RX ADMIN — OXYCODONE HYDROCHLORIDE 10 MG: 10 TABLET ORAL at 14:03

## 2017-04-02 ASSESSMENT — ENCOUNTER SYMPTOMS
HALLUCINATIONS: 0
DIZZINESS: 0
FEVER: 0
HEADACHES: 0
TREMORS: 0
DIARRHEA: 1
MYALGIAS: 1
BACK PAIN: 1
COUGH: 0
NAUSEA: 0
ABDOMINAL PAIN: 0
WEAKNESS: 1
CHILLS: 0
BLOOD IN STOOL: 0
VOMITING: 1
DIARRHEA: 0
NAUSEA: 1
FOCAL WEAKNESS: 0
SHORTNESS OF BREATH: 0
SPEECH CHANGE: 0
TINGLING: 0

## 2017-04-02 ASSESSMENT — PAIN SCALES - GENERAL
PAINLEVEL_OUTOF10: 6
PAINLEVEL_OUTOF10: 4
PAINLEVEL_OUTOF10: 6

## 2017-04-02 ASSESSMENT — LIFESTYLE VARIABLES: SUBSTANCE_ABUSE: 0

## 2017-04-02 NOTE — PROGRESS NOTES
Infectious Disease Progress Note    Author: Laurel Darby M.D. DOS & Time created: 4/2/2017  11:33 AM    Chief Complaint   Patient presents with   • Low Back Pain   FU for osteomyelitis, GAS bacteremia and CDI, suspected endograft infection    Interval History:  3/17 AF, WBC 12.9, pt c/o lower back pain, asking for morphine, poor insight into illness, denies any diarrhea  3/18 AF, WBC 13.3, sleeping and not arousable to voice, cultures here negative to date  3/19 AF, WBC 13.4, frustrated about lower back pain not adequately controlled on current pain regimen, plan for EGD with dilatation today  3/20 AF WBC 9.6 +pain-denies SE abx  3/21 AF WBC 10.5 no new complaints  3/22 AF WBC not done tolerating abx well  3/23 AF eye feels a little better-pain about the same  3/24 AF tolerating abx well-MRI confirm OM  3/25 AF WBC 9.5 s/p toe amp this am  3/26 AF WBC 10.1 deneies any new sxs-  3/27 AF, WBC 9.1, sleeping but arousable to voice, back pain stable  3/28 Tmax 99.9, WBC 7.7, not happy about soft foods, and wants to go back on regular diet, having normal BMs, back pain controlled on pain regimen  3/29 AF, WBC 5.7, transferred to ortho, happy he took a shower today, plan of care discussed with pain, states he was told he has arthritis of his back, back pain stable, no diarrhea  3/30 AF, WBC 8.5, had a loose BM this am follow by normal BM, no abd pain  3/31 AF, WBC 9, nausea and vomiting this am, had soft BM this am  4/1 WBC 13.7, still having emesis, states he continues to be thirsty despite drinking lots of water yesterday, diarrhea improving and stools forming  4/2 AF, WBC 11.6, had nose bleed overnight, being transfused blood for Hg 6.6, tolerating pills  Labs Reviewed, Medications Reviewed, Radiology Reviewed and Wound Reviewed.    Review of Systems:  Review of Systems   Constitutional: Negative for fever and chills.   HENT: Positive for nosebleeds.    Respiratory: Negative for cough and shortness of breath.     Gastrointestinal: Positive for vomiting. Negative for nausea, abdominal pain and diarrhea.        With liquids   Genitourinary: Negative for dysuria.   Musculoskeletal: Positive for back pain.        Stable   Neurological: Negative for headaches.   All other systems reviewed and are negative.      Hemodynamics:  Temp (24hrs), Av.7 °C (98.1 °F), Min:36.3 °C (97.3 °F), Max:37.3 °C (99.1 °F)  Temperature: 36.5 °C (97.7 °F)  Pulse  Av.9  Min: 56  Max: 114   Blood Pressure : 106/61 mmHg       Physical Exam:  Physical Exam   Constitutional: He appears well-developed. No distress.   HENT:   Head: Normocephalic and atraumatic.   Eyes: EOM are normal. Pupils are equal, round, and reactive to light.   Neck: Neck supple.   Cardiovascular: Normal rate.    Murmur heard.  IRR   Pulmonary/Chest: Effort normal and breath sounds normal.   Abdominal: Soft. He exhibits no distension. There is no tenderness.   Musculoskeletal: Normal range of motion. He exhibits edema.   R great toe with slight edema but no drainage or erythema, medial callus. Erythema resolving    Left 3rd toe-suture in place. No drainage    RUE PICC nontender   Neurological: He is alert.   Nursing note and vitals reviewed.      Labs:  Recent Labs      17   0357  17   1000   WBC  13.7*  11.6*  8.7   RBC  2.27*  2.00*  2.23*   HEMOGLOBIN  7.5*  6.6*  7.3*   HEMATOCRIT  22.9*  20.2*  22.4*   MCV  100.9*  101.0*  100.4*   MCH  33.0  33.0  32.7   RDW  50.0  51.1*  50.0   PLATELETCT  190  169  174   MPV  8.9*  8.9*  8.7*   NEUTSPOLYS  80.50*  76.30*  70.20   LYMPHOCYTES  11.60*  15.20*  18.70*   MONOCYTES  7.30  7.10  8.90   EOSINOPHILS  0.10  0.50  1.30   BASOPHILS  0.10  0.30  0.20     Recent Labs      17   0305  17   0420  17   0357   SODIUM  133*  131*  127*   POTASSIUM  4.1  4.2  3.8   CHLORIDE  102  99  98   CO2  24  25  23   GLUCOSE  93  112*  114*   BUN  7*  8  9     Recent Labs      17   7863   04/01/17   0420  04/02/17   0357   ALBUMIN  2.3*  2.3*  2.2*   TBILIRUBIN  0.5  0.6  0.5   ALKPHOSPHAT  105*  102*  88   TOTPROTEIN  7.9  7.9  7.3   ALTSGPT  41  33  29   ASTSGOT  28  24  28   CREATININE  1.03  1.11  1.11       BLOOD CULTURE   Date Value Ref Range Status   03/15/2017 No growth after 5 days of incubation.  Final    ':  Results     Procedure Component Value Units Date/Time    CDIFF BY PCR [[id number]]     Order Status:  Canceled Specimen Information:  Stool from Stool     FUNGAL CULTURE [[id number]] Collected:  03/25/17 0936    Order Status:  Completed Specimen Information:  Tissue Updated:  03/28/17 1155     Significant Indicator NEG      Source TISS      Site Left 3rd Toe      Fungal Culture Culture in progress.     CULTURE TISSUE W/ GRM STAIN [[id number]]  (Abnormal)  (Susceptibility) Collected:  03/25/17 0936    Order Status:  Completed Specimen Information:  Tissue Updated:  03/28/17 1155     Significant Indicator POS (POS)      Source TISS      Site Left 3rd Toe      Tissue Culture -- (A)      Gram Stain Result No organisms seen.      Tissue Culture -- (A)      Result:        Staphylococcus aureus  Rare growth       Tissue Culture -- (A)      Result:        Staphylococcus epidermidis  Rare growth      Culture & Susceptibility     STAPHYLOCOCCUS AUREUS     Antibiotic Sensitivity Microscan Unit Status    Ampicillin/sulbactam Sensitive <=8/4 mcg/mL Final    Clindamycin Sensitive <=0.5 mcg/mL Final    Daptomycin Sensitive <=0.5 mcg/mL Final    Erythromycin Sensitive <=0.5 mcg/mL Final    Moxifloxacin Sensitive <=0.5 mcg/mL Final    Oxacillin Sensitive <=0.25 mcg/mL Final    Tetracycline Sensitive <=4 mcg/mL Final    Trimeth/Sulfa Sensitive <=0.5/9.5 mcg/mL Final    Vancomycin Sensitive 2 mcg/mL Final              STAPHYLOCOCCUS EPIDERMIDIS     Antibiotic Sensitivity Microscan Unit Status    Ampicillin/sulbactam Resistant <=8/4 mcg/mL Final    Clindamycin Resistant 4 mcg/mL Final    Daptomycin Sensitive  <=0.5 mcg/mL Final    Erythromycin Sensitive <=0.5 mcg/mL Final    Moxifloxacin Sensitive <=0.5 mcg/mL Final    Oxacillin Resistant >2 mcg/mL Final    Penicillin Resistant >8 mcg/mL Final    Tetracycline Resistant >8 mcg/mL Final    Trimeth/Sulfa Sensitive <=0.5/9.5 mcg/mL Final    Vancomycin Sensitive 2 mcg/mL Final                       ANAEROBIC CULTURE [[id number]] Collected:  03/25/17 0936    Order Status:  Completed Specimen Information:  Tissue Updated:  03/28/17 1155     Significant Indicator NEG      Source TISS      Site Left 3rd Toe      Anaerobic Culture, Culture Res No Anaerobes isolated.     GRAM STAIN [[id number]] Collected:  03/25/17 0936    Order Status:  Completed Specimen Information:  Tissue Updated:  03/27/17 1108     Significant Indicator .      Source TISS      Site Left 3rd Toe      Gram Stain Result No organisms seen.           Imaging  3/17 MRI lumbar spine: There are changes secondary to the abdominal aortic aneurysm endograft. The aneurysm sac measures an approximately 7.6 x 6.7 cm in size. Abnormal contrast enhancement is noted within the wall of the aneurysmal sac. There is also abnormal soft tissue   contrast enhancement in the surrounding soft tissue. These findings are highly suspicious for infected thrombosed aneurysm sac. The possibility of endograft infection is more likely. There is irregular outpouching of the aneurysmal sac along the   posterior portion indenting the left psoas muscle.  2.  Free fluid in the pelvis  3.  There is mild-to-moderate left hydronephrosis likely representing pelviureteric junction obstruction.    3/16 R foot xray - +OM  3/16 L foot xray +OM    Assessment:  Active Hospital Problems    Diagnosis   • *Osteomyelitis of toe (CMS-HCC) [M86.9]   • Bacteremia [R78.81]   • Back pain [M54.9]   • CHF (congestive heart failure) (CMS-HCC) [I50.9]   • Tobacco dependence [F17.200]   • Atrial fibrillation (CMS-HCC) [I48.91]    Endovascular infection       Plan:  Right  great toe and left 3rd osteomyelitis  +OM on radiographs-confirmed on repeat MRIs  3/9 OSH wound cx - GAS, MSSA (I confirmed with [place]'s microlab)  3/25 s/p 3rd toe amp. No purulence seen proximally to amp site  OR cx (left 3rd toe) - CoNS (S-unasyn) Vanco LINUS 2  Continue Unasyn  Anticipate 6 weeks of IV abx. 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  Appreciate vascular evaluation - surgery associated with high mortality  Abx per above followed by chronic abx suppression   Tagged WBC scan - no e/o endovascular infection  Abx as per above followed by PO augmentin for suppressive given endograft    Clostridium difficile diarrhea  Continue oral vancomycin QID for 2 weeks stop date 03/29/17 then BID while on IV abx with stop date of 04/30/17    Esophageal dysmotility-concern for achalasia  H/o strictures with recent dilatation as [place]'s  Esophagram +distal esophageal obstruction  s/p EGD with dilatation  Needs GI re-eval for possible repeat EGD given hx of strictures  Keep meds IV    Leukocytosis. Resolved  Multifactorial  No new signs or symptoms of infection  Monitor    Back pain  Likely 2/2 suspected abdominal aorta endovascular infection    Drug seeking behavior    Prognosis guarded    Dispo:  Awaiting SNF placement for long-term abx    DW IM

## 2017-04-02 NOTE — PROGRESS NOTES
Hospital Medicine Progress Note, Adult, Complex               Author: Cabrera Berrios Date & Time created: 4/2/2017  8:29 AM     CC: 73 yo hx afib, cad, chf , hep c liver dz- Group A strep, MSSA  Bacteremia, OM Rt big toe, cdiff, dysphagia - - left AMA from [facility] -admitted w  back pain - s/p  L-3rd toe amputation .    Interval History:    Hgb dropped overnight- had mild nose bleed yesterday , was on gentle fluids. Diarrhea improved- no reported bloody stools.   No acute pain , lt headedness or dizziness.     Review of Systems:  Review of Systems   Constitutional: Negative for fever and chills.   HENT: Positive for nosebleeds.    Respiratory: Negative for cough and shortness of breath.    Cardiovascular: Positive for leg swelling (improved. ). Negative for chest pain.   Gastrointestinal: Positive for nausea, vomiting and diarrhea (improved). Negative for abdominal pain, blood in stool and melena.   Musculoskeletal: Positive for myalgias and back pain.   Neurological: Positive for weakness. Negative for dizziness, tingling, tremors, speech change and focal weakness.   Psychiatric/Behavioral: Negative for hallucinations and substance abuse.       Physical Exam:  Physical Exam   Constitutional: He is oriented to person, place, and time. No distress.   Eyes: EOM are normal. No scleral icterus.   Neck: No JVD present.   Cardiovascular: Normal rate and regular rhythm.    Murmur (III/6 erin) heard.  Pulmonary/Chest: No stridor. He has no wheezes. He has no rales.   Abdominal: Soft. Bowel sounds are normal. He exhibits no distension. There is no tenderness.   Musculoskeletal: He exhibits edema and tenderness (rt great toe w mild swelling.).   Left 3rd toe amputation - wrapped   Neurological: He is alert and oriented to person, place, and time. No cranial nerve deficit.   Skin: Skin is warm and dry. He is not diaphoretic.   Psychiatric: He has a normal mood and affect. His behavior is normal.       Labs:        Invalid  input(s): [id number]      Recent Labs      17   SODIUM  133*  131*  127*   POTASSIUM  4.1  4.2  3.8   CHLORIDE  102  99  98   CO2  24  25  23   BUN  7*  8  9   CREATININE  1.03  1.11  1.11   CALCIUM  8.5  8.1*  7.8*     Recent Labs      17   ALTSGPT  41  33  29   ASTSGOT  28  24  28   ALKPHOSPHAT  105*  102*  88   TBILIRUBIN  0.5  0.6  0.5   GLUCOSE  93  112*  114*     Recent Labs      17   RBC  2.37*  2.27*  2.00*   HEMOGLOBIN  7.8*  7.5*  6.6*   HEMATOCRIT  24.0*  22.9*  20.2*   PLATELETCT  198  190  169     Recent Labs      17   WBC  9.0  13.7*  11.6*   NEUTSPOLYS  69.00  80.50*  76.30*   LYMPHOCYTES  20.00*  11.60*  15.20*   MONOCYTES  9.60  7.30  7.10   EOSINOPHILS  0.80  0.10  0.50   BASOPHILS  0.20  0.10  0.30   ASTSGOT  28  24  28   ALTSGPT  41  33  29   ALKPHOSPHAT  105*  102*  88   TBILIRUBIN  0.5  0.6  0.5           Hemodynamics:  Temp (24hrs), Av.9 °C (98.5 °F), Min:36.3 °C (97.3 °F), Max:37.3 °C (99.1 °F)  Temperature: 36.3 °C (97.3 °F)  Pulse  Av.2  Min: 61  Max: 114   Blood Pressure : (!) 95/47 mmHg     Respiratory:    Respiration: 18, Pulse Oximetry: 92 %        RUL Breath Sounds: Diminished;Coarse Crackles, RML Breath Sounds: Diminished, RLL Breath Sounds: Diminished, SOL[place]AD Breath Sounds: Diminished;Coarse Crackles, LLL Breath Sounds: Diminished  Fluids:    Intake/Output Summary (Last 24 hours) at 17 0829  Last data filed at 04/01/17 2130   Gross per 24 hour   Intake      0 ml   Output    400 ml   Net   -400 ml        GI/Nutrition:  Orders Placed This Encounter   Procedures   • DIET ORDER     Standing Status: Standing      Number of Occurrences: 1      Standing Expiration Date:      Order Specific Question:  Diet:     Answer:  Full Liquid [11]     Medical Decision Making, by Problem:  Active  Hospital Problems    Diagnosis   • *Osteomyelitis of left great toe (CMS-HCC) [M86.9]- post left  3rd amputation - wound with decreased redness, swelling, pain.   Iv unasyn - plan 6 weeks therapy  Wound care.   • C. difficile diarrhea [A04.7]-  improved  Oral vanco.  Isolation precautions.    • Bacteremia [R78.81] recent mssa, strep   Iv unasyn   • Back pain [M54.9]   sched oxycontin , neurontin    • Anemia [D64.9]-  no symptoms of severe active bleed, asympt- likely chronic dz, recent nose bleed- no tachycardia, low bp likely baseline cw recent measurements   tx prbc x 1 to goal hgb > 7.  Hold bb for now.   • Hyponatremia [E87.1] worsened  Add Na Cl tablets.   • Macrocytosis [D75.89]   • Hypokalemia [E87.6]improved  oral kcl.   • CHF (congestive heart failure) (CMS-HCC) [I50.9] w/o decompensation .   • Atrial fibrillation (CMS-HCC) [I48.91] converted to sinus.  cw amiodarone.    • S/P AAA (abdominal aortic aneurysm) repair [Z98.890, Z86.79]- ? endograft infxn- surgery is high risk- clinically stable-conservative therapies   • Noncompliance [Z91.19]   • Tobacco dependence [F17.200]   • Hep C w/o coma, chronic (CMS-HCC) [B18.2]   • Dysphagia [R13.10]- w achalasia - post botox at GE junction - persistent liquid regurg -- tolerated  Monitor response, full liquids.  Refer to GI   • CAD (coronary artery disease) [I25.10] stable .       Labs reviewed, Radiology images reviewed and Medications reviewed  Art catheter: No Art      DVT Prophylaxis: Enoxaparin (Lovenox)      Antibiotics: Treating active infection/contamination beyond 24 hours perioperative coverage

## 2017-04-02 NOTE — PROGRESS NOTES
"Surgical Progress Note    Author: Scarlett Rodriguez Date & Time created: 2017   12:09 PM     Interval Events:  Continued complaints of back pain.  Complains that \"they won't let me leave\".     Review of Systems   Constitutional: Negative for fever and chills.   Respiratory: Negative for cough and shortness of breath.    Cardiovascular: Negative for chest pain.   Gastrointestinal: Negative for abdominal pain.   Musculoskeletal: Positive for back pain.     Hemodynamics:  Temp (24hrs), Av.7 °C (98.1 °F), Min:36.3 °C (97.3 °F), Max:37.3 °C (99.1 °F)  Temperature: 36.5 °C (97.7 °F)  Pulse  Av.9  Min: 56  Max: 114   Blood Pressure : 106/61 mmHg     Respiratory:    Respiration: 18, Pulse Oximetry: 96 %        RUL Breath Sounds: Diminished;Coarse Crackles, RML Breath Sounds: Diminished, RLL Breath Sounds: Diminished, SOLEDAD Breath Sounds: Diminished;Coarse Crackles, LLL Breath Sounds: Diminished  Neuro:  GCS       Fluids:    Intake/Output Summary (Last 24 hours) at 17 1209  Last data filed at 17 2130   Gross per 24 hour   Intake      0 ml   Output    400 ml   Net   -400 ml        Current Diet Order   Procedures   • DIET ORDER     Physical Exam   Cardiovascular: Normal rate, regular rhythm, normal heart sounds and intact distal pulses.    Pulmonary/Chest: Effort normal and breath sounds normal.   Abdominal: Soft. There is no tenderness. There is no rebound and no guarding.   Musculoskeletal: Normal range of motion. He exhibits edema.      Aorto-Iliac   Duplex Report     Vascular Laboratory   CONCLUSIONS   1.  Residual endograft sac measuring 5.0cm by 5.4cm.   2.  Patent endograft without stenosis or endoleak.    3.  Possible moderate right common iliac artery stenosis with no evidence    of external iliac artery stenosis.   4.  No evidence of left iliac artery stenosis.      Labs:  Recent Results (from the past 24 hour(s))   COMP METABOLIC PANEL    Collection Time: 17  3:57 AM   Result Value Ref " Range    Sodium 127 (L) 135 - 145 mmol/L    Potassium 3.8 3.6 - 5.5 mmol/L    Chloride 98 96 - 112 mmol/L    Co2 23 20 - 33 mmol/L    Anion Gap 6.0 0.0 - 11.9    Glucose 114 (H) 65 - 99 mg/dL    Bun 9 8 - 22 mg/dL    Creatinine 1.11 0.50 - 1.40 mg/dL    Calcium 7.8 (L) 8.5 - 10.5 mg/dL    AST(SGOT) 28 12 - 45 U/L    ALT(SGPT) 29 2 - 50 U/L    Alkaline Phosphatase 88 30 - 99 U/L    Total Bilirubin 0.5 0.1 - 1.5 mg/dL    Albumin 2.2 (L) 3.2 - 4.9 g/dL    Total Protein 7.3 6.0 - 8.2 g/dL    Globulin 5.1 (H) 1.9 - 3.5 g/dL    A-G Ratio 0.4 g/dL   CBC WITH DIFFERENTIAL    Collection Time: 04/02/17  3:57 AM   Result Value Ref Range    WBC 11.6 (H) 4.8 - 10.8 K/uL    RBC 2.00 (L) 4.70 - 6.10 M/uL    Hemoglobin 6.6 (L) 14.0 - 18.0 g/dL    Hematocrit 20.2 (L) 42.0 - 52.0 %    .0 (H) 81.4 - 97.8 fL    MCH 33.0 27.0 - 33.0 pg    MCHC 32.7 (L) 33.7 - 35.3 g/dL    RDW 51.1 (H) 35.9 - 50.0 fL    Platelet Count 169 164 - 446 K/uL    MPV 8.9 (L) 9.0 - 12.9 fL    Neutrophils-Polys 76.30 (H) 44.00 - 72.00 %    Lymphocytes 15.20 (L) 22.00 - 41.00 %    Monocytes 7.10 0.00 - 13.40 %    Eosinophils 0.50 0.00 - 6.90 %    Basophils 0.30 0.00 - 1.80 %    Immature Granulocytes 0.60 0.00 - 0.90 %    Nucleated RBC 0.00 /100 WBC    Neutrophils (Absolute) 8.85 (H) 1.82 - 7.42 K/uL    Lymphs (Absolute) 1.76 1.00 - 4.80 K/uL    Monos (Absolute) 0.82 0.00 - 0.85 K/uL    Eos (Absolute) 0.06 0.00 - 0.51 K/uL    Baso (Absolute) 0.03 0.00 - 0.12 K/uL    Immature Granulocytes (abs) 0.07 0.00 - 0.11 K/uL    NRBC (Absolute) 0.00 K/uL   ESTIMATED GFR    Collection Time: 04/02/17  3:57 AM   Result Value Ref Range    GFR If African American >60 >60 mL/min/1.73 m 2    GFR If Non African American >60 >60 mL/min/1.73 m 2   COD (ADULT)    Collection Time: 04/02/17  3:57 AM   Result Value Ref Range    ABO Grouping Only O     Rh Grouping Only POS     Antibody Screen-Cod NEG     Component R       R                   Red Blood Cells     [id number]   issued        04/02/17   09:53      Product Type Red Blood Cells LR     Dispense Status Issued     Unit Number (Barcoded) [id number]     Product Code (Barcoded) [id number]     Blood Type (Barcoded) 5100    CBC WITH DIFFERENTIAL    Collection Time: 04/02/17 10:00 AM   Result Value Ref Range    WBC 8.7 4.8 - 10.8 K/uL    RBC 2.23 (L) 4.70 - 6.10 M/uL    Hemoglobin 7.3 (L) 14.0 - 18.0 g/dL    Hematocrit 22.4 (L) 42.0 - 52.0 %    .4 (H) 81.4 - 97.8 fL    MCH 32.7 27.0 - 33.0 pg    MCHC 32.6 (L) 33.7 - 35.3 g/dL    RDW 50.0 35.9 - 50.0 fL    Platelet Count 174 164 - 446 K/uL    MPV 8.7 (L) 9.0 - 12.9 fL    Neutrophils-Polys 70.20 44.00 - 72.00 %    Lymphocytes 18.70 (L) 22.00 - 41.00 %    Monocytes 8.90 0.00 - 13.40 %    Eosinophils 1.30 0.00 - 6.90 %    Basophils 0.20 0.00 - 1.80 %    Immature Granulocytes 0.70 0.00 - 0.90 %    Nucleated RBC 0.00 /100 WBC    Neutrophils (Absolute) 6.13 1.82 - 7.42 K/uL    Lymphs (Absolute) 1.63 1.00 - 4.80 K/uL    Monos (Absolute) 0.78 0.00 - 0.85 K/uL    Eos (Absolute) 0.11 0.00 - 0.51 K/uL    Baso (Absolute) 0.02 0.00 - 0.12 K/uL    Immature Granulocytes (abs) 0.06 0.00 - 0.11 K/uL    NRBC (Absolute) 0.00 K/uL     Medical Decision Making, by Problem:  Active Hospital Problems    Diagnosis   • C. difficile diarrhea [A04.7]     Priority: High   • Bacteremia [R78.81]     Priority: High   • Back pain [M54.9]     Priority: High   • Osteomyelitis of toe (CMS-HCC) [M86.9]     Priority: High   • Anemia [D64.9]     Priority: Medium   • Hyponatremia [E87.1]     Priority: Medium   • Macrocytosis [D75.89]     Priority: Medium   • Hypokalemia [E87.6]     Priority: Medium   • CHF (congestive heart failure) (CMS-HCC) [I50.9]     Priority: Medium   • Atrial fibrillation (CMS-HCC) [I48.91]     Priority: Medium   • S/P AAA (abdominal aortic aneurysm) repair [Z98.890, Z86.79]     Priority: Medium   • Noncompliance [Z91.19]     Priority: Low   • Tobacco dependence [F17.200]     Priority: Low   • Hep C  w/o coma, chronic (CMS-HCC) [B18.2]   • Dysphagia [R13.10]   • CAD (coronary artery disease) [I25.10]     Plan:  No immediate need for intervention with normal DEVANG's and only slight elevation in PSV across right distal CURT.  Normal tagged WBCscan so diagnosis of graft infection remains up in the air.  He has never been tender.  Do we have another good reason for back pain.  Somewhere, mention was made of spinal stenosis, so we do something for treatment or further diagnosis.    Quality Measures:  Core Measures    Discussed patient condition with Patient

## 2017-04-03 LAB
ALBUMIN SERPL BCP-MCNC: 2.2 G/DL (ref 3.2–4.9)
ALBUMIN/GLOB SERPL: 0.4 G/DL
ALP SERPL-CCNC: 87 U/L (ref 30–99)
ALT SERPL-CCNC: 31 U/L (ref 2–50)
ANION GAP SERPL CALC-SCNC: 5 MMOL/L (ref 0–11.9)
AST SERPL-CCNC: 37 U/L (ref 12–45)
BASOPHILS # BLD AUTO: 0.1 % (ref 0–1.8)
BASOPHILS # BLD: 0.01 K/UL (ref 0–0.12)
BILIRUB SERPL-MCNC: 0.6 MG/DL (ref 0.1–1.5)
BUN SERPL-MCNC: 8 MG/DL (ref 8–22)
CALCIUM SERPL-MCNC: 8 MG/DL (ref 8.5–10.5)
CHLORIDE SERPL-SCNC: 98 MMOL/L (ref 96–112)
CO2 SERPL-SCNC: 26 MMOL/L (ref 20–33)
CREAT SERPL-MCNC: 0.99 MG/DL (ref 0.5–1.4)
CRP SERPL HS-MCNC: 14.75 MG/DL (ref 0–0.75)
EOSINOPHIL # BLD AUTO: 0.07 K/UL (ref 0–0.51)
EOSINOPHIL NFR BLD: 0.8 % (ref 0–6.9)
ERYTHROCYTE [DISTWIDTH] IN BLOOD BY AUTOMATED COUNT: 56.8 FL (ref 35.9–50)
GFR SERPL CREATININE-BSD FRML MDRD: >60 ML/MIN/1.73 M 2
GLOBULIN SER CALC-MCNC: 5.1 G/DL (ref 1.9–3.5)
GLUCOSE SERPL-MCNC: 109 MG/DL (ref 65–99)
HCT VFR BLD AUTO: 22.5 % (ref 42–52)
HGB BLD-MCNC: 7.5 G/DL (ref 14–18)
IMM GRANULOCYTES # BLD AUTO: 0.06 K/UL (ref 0–0.11)
IMM GRANULOCYTES NFR BLD AUTO: 0.7 % (ref 0–0.9)
LYMPHOCYTES # BLD AUTO: 1.28 K/UL (ref 1–4.8)
LYMPHOCYTES NFR BLD: 14.5 % (ref 22–41)
MCH RBC QN AUTO: 32.1 PG (ref 27–33)
MCHC RBC AUTO-ENTMCNC: 33.3 G/DL (ref 33.7–35.3)
MCV RBC AUTO: 96.2 FL (ref 81.4–97.8)
MONOCYTES # BLD AUTO: 0.7 K/UL (ref 0–0.85)
MONOCYTES NFR BLD AUTO: 7.9 % (ref 0–13.4)
NEUTROPHILS # BLD AUTO: 6.7 K/UL (ref 1.82–7.42)
NEUTROPHILS NFR BLD: 76 % (ref 44–72)
NRBC # BLD AUTO: 0 K/UL
NRBC BLD AUTO-RTO: 0 /100 WBC
PLATELET # BLD AUTO: 171 K/UL (ref 164–446)
PMV BLD AUTO: 8.8 FL (ref 9–12.9)
POTASSIUM SERPL-SCNC: 3.6 MMOL/L (ref 3.6–5.5)
PREALB SERPL-MCNC: 4 MG/DL (ref 18–38)
PROT SERPL-MCNC: 7.3 G/DL (ref 6–8.2)
RBC # BLD AUTO: 2.34 M/UL (ref 4.7–6.1)
SODIUM SERPL-SCNC: 129 MMOL/L (ref 135–145)
WBC # BLD AUTO: 8.8 K/UL (ref 4.8–10.8)

## 2017-04-03 PROCEDURE — 700101 HCHG RX REV CODE 250: Performed by: FAMILY MEDICINE

## 2017-04-03 PROCEDURE — 86140 C-REACTIVE PROTEIN: CPT

## 2017-04-03 PROCEDURE — 700105 HCHG RX REV CODE 258

## 2017-04-03 PROCEDURE — A9270 NON-COVERED ITEM OR SERVICE: HCPCS | Performed by: HOSPITALIST

## 2017-04-03 PROCEDURE — 85025 COMPLETE CBC W/AUTO DIFF WBC: CPT

## 2017-04-03 PROCEDURE — 700102 HCHG RX REV CODE 250 W/ 637 OVERRIDE(OP): Performed by: HOSPITALIST

## 2017-04-03 PROCEDURE — 770001 HCHG ROOM/CARE - MED/SURG/GYN PRIV*

## 2017-04-03 PROCEDURE — 700111 HCHG RX REV CODE 636 W/ 250 OVERRIDE (IP): Performed by: INTERNAL MEDICINE

## 2017-04-03 PROCEDURE — 700111 HCHG RX REV CODE 636 W/ 250 OVERRIDE (IP): Performed by: HOSPITALIST

## 2017-04-03 PROCEDURE — A9270 NON-COVERED ITEM OR SERVICE: HCPCS | Performed by: FAMILY MEDICINE

## 2017-04-03 PROCEDURE — 700105 HCHG RX REV CODE 258: Performed by: INTERNAL MEDICINE

## 2017-04-03 PROCEDURE — 84134 ASSAY OF PREALBUMIN: CPT

## 2017-04-03 PROCEDURE — 99232 SBSQ HOSP IP/OBS MODERATE 35: CPT | Performed by: HOSPITALIST

## 2017-04-03 PROCEDURE — A9270 NON-COVERED ITEM OR SERVICE: HCPCS | Performed by: INTERNAL MEDICINE

## 2017-04-03 PROCEDURE — 700102 HCHG RX REV CODE 250 W/ 637 OVERRIDE(OP): Performed by: FAMILY MEDICINE

## 2017-04-03 PROCEDURE — 700102 HCHG RX REV CODE 250 W/ 637 OVERRIDE(OP): Performed by: INTERNAL MEDICINE

## 2017-04-03 PROCEDURE — 80053 COMPREHEN METABOLIC PANEL: CPT

## 2017-04-03 RX ORDER — SODIUM CHLORIDE 9 MG/ML
INJECTION, SOLUTION INTRAVENOUS
Status: COMPLETED
Start: 2017-04-03 | End: 2017-04-03

## 2017-04-03 RX ADMIN — LACTOBACILLUS ACIDOPHILUS / LACTOBACILLUS BULGARICUS 1 PACKET: 100 MILLION CFU STRENGTH GRANULES at 11:40

## 2017-04-03 RX ADMIN — VANCOMYCIN HYDROCHLORIDE 125 MG: 10 INJECTION, POWDER, LYOPHILIZED, FOR SOLUTION INTRAVENOUS at 08:29

## 2017-04-03 RX ADMIN — OXYCODONE HYDROCHLORIDE 10 MG: 10 TABLET ORAL at 05:10

## 2017-04-03 RX ADMIN — SIMVASTATIN 20 MG: 20 TABLET, FILM COATED ORAL at 20:19

## 2017-04-03 RX ADMIN — AMPICILLIN SODIUM AND SULBACTAM SODIUM 3 G: 2; 1 INJECTION, POWDER, FOR SOLUTION INTRAMUSCULAR; INTRAVENOUS at 16:07

## 2017-04-03 RX ADMIN — OXYCODONE HYDROCHLORIDE 20 MG: 20 TABLET, FILM COATED, EXTENDED RELEASE ORAL at 20:19

## 2017-04-03 RX ADMIN — GABAPENTIN 600 MG: 300 CAPSULE ORAL at 08:27

## 2017-04-03 RX ADMIN — POTASSIUM CHLORIDE 40 MEQ: 1500 TABLET, EXTENDED RELEASE ORAL at 08:28

## 2017-04-03 RX ADMIN — GABAPENTIN 600 MG: 300 CAPSULE ORAL at 15:10

## 2017-04-03 RX ADMIN — OXYCODONE HYDROCHLORIDE 10 MG: 10 TABLET ORAL at 16:07

## 2017-04-03 RX ADMIN — OXYCODONE HYDROCHLORIDE 10 MG: 10 TABLET ORAL at 11:57

## 2017-04-03 RX ADMIN — AMPICILLIN SODIUM AND SULBACTAM SODIUM 3 G: 2; 1 INJECTION, POWDER, FOR SOLUTION INTRAMUSCULAR; INTRAVENOUS at 11:39

## 2017-04-03 RX ADMIN — AMPICILLIN SODIUM AND SULBACTAM SODIUM 3 G: 2; 1 INJECTION, POWDER, FOR SOLUTION INTRAMUSCULAR; INTRAVENOUS at 00:19

## 2017-04-03 RX ADMIN — POTASSIUM CHLORIDE 40 MEQ: 1500 TABLET, EXTENDED RELEASE ORAL at 20:20

## 2017-04-03 RX ADMIN — NICOTINE 14 MG: 14 PATCH TRANSDERMAL at 05:08

## 2017-04-03 RX ADMIN — LACTOBACILLUS ACIDOPHILUS / LACTOBACILLUS BULGARICUS 1 PACKET: 100 MILLION CFU STRENGTH GRANULES at 08:27

## 2017-04-03 RX ADMIN — SODIUM CHLORIDE: 9 INJECTION, SOLUTION INTRAVENOUS at 16:15

## 2017-04-03 RX ADMIN — OXYCODONE HYDROCHLORIDE 20 MG: 20 TABLET, FILM COATED, EXTENDED RELEASE ORAL at 08:27

## 2017-04-03 RX ADMIN — LIDOCAINE 2 PATCH: 50 PATCH CUTANEOUS at 11:39

## 2017-04-03 RX ADMIN — AMPICILLIN SODIUM AND SULBACTAM SODIUM 3 G: 2; 1 INJECTION, POWDER, FOR SOLUTION INTRAMUSCULAR; INTRAVENOUS at 05:08

## 2017-04-03 RX ADMIN — VANCOMYCIN HYDROCHLORIDE 125 MG: 10 INJECTION, POWDER, LYOPHILIZED, FOR SOLUTION INTRAVENOUS at 20:19

## 2017-04-03 RX ADMIN — METOPROLOL TARTRATE 12.5 MG: 25 TABLET, FILM COATED ORAL at 08:27

## 2017-04-03 RX ADMIN — OXYCODONE HYDROCHLORIDE 10 MG: 10 TABLET ORAL at 00:21

## 2017-04-03 RX ADMIN — GABAPENTIN 600 MG: 300 CAPSULE ORAL at 20:20

## 2017-04-03 RX ADMIN — LACTOBACILLUS ACIDOPHILUS / LACTOBACILLUS BULGARICUS 1 PACKET: 100 MILLION CFU STRENGTH GRANULES at 16:07

## 2017-04-03 RX ADMIN — ENOXAPARIN SODIUM 40 MG: 100 INJECTION SUBCUTANEOUS at 08:28

## 2017-04-03 RX ADMIN — AMIODARONE HYDROCHLORIDE 200 MG: 200 TABLET ORAL at 08:28

## 2017-04-03 ASSESSMENT — ENCOUNTER SYMPTOMS
CHILLS: 0
WEAKNESS: 1
MYALGIAS: 1
VOMITING: 0
COUGH: 0
DIARRHEA: 1
NAUSEA: 0
ABDOMINAL PAIN: 0
VOMITING: 1
SHORTNESS OF BREATH: 0
DIARRHEA: 0
FEVER: 0
SPEECH CHANGE: 0
HEADACHES: 0
FOCAL WEAKNESS: 0
BACK PAIN: 1

## 2017-04-03 ASSESSMENT — PAIN SCALES - GENERAL
PAINLEVEL_OUTOF10: 5
PAINLEVEL_OUTOF10: 6
PAINLEVEL_OUTOF10: 4
PAINLEVEL_OUTOF10: 6
PAINLEVEL_OUTOF10: 8
PAINLEVEL_OUTOF10: 5
PAINLEVEL_OUTOF10: 5
PAINLEVEL_OUTOF10: 6
PAINLEVEL_OUTOF10: 5
PAINLEVEL_OUTOF10: 6
PAINLEVEL_OUTOF10: 5
PAINLEVEL_OUTOF10: 5

## 2017-04-03 NOTE — PROGRESS NOTES
Hospital Medicine Progress Note, Adult, Complex               Author: Cabrera Berrios Date & Time created: 4/3/2017  8:12 AM     CC: 71 yo hx afib, cad, chf , hep c liver dz- Group A strep, MSSA  Bacteremia, OM Rt big toe, cdiff, dysphagia - - left AMA from [facility] -admitted w  back pain - s/p  L-3rd toe amputation .    Interval History:    Anemia improved post Prbc tx. No acute bleed symptoms or epistaxis.  Still regurgeing his liquids.    Review of Systems:  Review of Systems   Constitutional: Negative for fever.   HENT: Positive for nosebleeds.    Respiratory: Negative for cough and shortness of breath.    Cardiovascular: Positive for leg swelling (improved. ). Negative for chest pain.   Gastrointestinal: Positive for vomiting and diarrhea (improved). Negative for abdominal pain.   Musculoskeletal: Positive for myalgias and back pain.   Neurological: Positive for weakness. Negative for speech change and focal weakness.       Physical Exam:  Physical Exam   Constitutional: He is oriented to person, place, and time. No distress.   Eyes: EOM are normal. No scleral icterus.   Neck: No JVD present.   Cardiovascular: Normal rate and regular rhythm.    Murmur (III/6 erin) heard.  Pulmonary/Chest: No stridor. He has no wheezes. He has no rales.   Abdominal: Soft. Bowel sounds are normal. He exhibits no distension. There is no tenderness.   Musculoskeletal: He exhibits edema and tenderness (mild.  rt great toe w mild swelling.).   Left 3rd toe amputation - wrapped- sutures dry, no dehisc, or redness.    Neurological: He is alert and oriented to person, place, and time. No cranial nerve deficit.   Skin: Skin is warm and dry. He is not diaphoretic.       Labs:        Invalid input(s): [id number]      Recent Labs      04/01/17   0420  04/02/17   0357  04/03/17   0320   SODIUM  131*  127*  129*   POTASSIUM  4.2  3.8  3.6   CHLORIDE  99  98  98   CO2  25  23  26   BUN  8  9  8   CREATININE  1.11  1.11  0.99   CALCIUM  8.1*   7.8*  8.0*     Recent Labs      17   0420  17   0357  17   0320   ALTSGPT  33  29  31   ASTSGOT  24  28  37   ALKPHOSPHAT  102*  88  87   TBILIRUBIN  0.6  0.5  0.6   PREALBUMIN   --    --   4.0*   GLUCOSE  112*  114*  109*     Recent Labs      17   0357  17   1000  17   1410  17   0320   RBC  2.00*  2.23*   --   2.34*   HEMOGLOBIN  6.6*  7.3*  7.9*  7.5*   HEMATOCRIT  20.2*  22.4*   --   22.5*   PLATELETCT  169  174   --   171     Recent Labs      17   0420  17   0357  17   1000  17   0320   WBC  13.7*  11.6*  8.7  8.8   NEUTSPOLYS  80.50*  76.30*  70.20  76.00*   LYMPHOCYTES  11.60*  15.20*  18.70*  14.50*   MONOCYTES  7.30  7.10  8.90  7.90   EOSINOPHILS  0.10  0.50  1.30  0.80   BASOPHILS  0.10  0.30  0.20  0.10   ASTSGOT  24  28   --   37   ALTSGPT  33  29   --   31   ALKPHOSPHAT  102*  88   --   87   TBILIRUBIN  0.6  0.5   --   0.6           Hemodynamics:  Temp (24hrs), Av.7 °C (98 °F), Min:36.4 °C (97.5 °F), Max:37 °C (98.6 °F)  Temperature: 36.9 °C (98.4 °F)  Pulse  Av.6  Min: 56  Max: 114   Blood Pressure : (!) 92/55 mmHg (RN notified)     Respiratory:    Respiration: 17, Pulse Oximetry: 100 %           Fluids:    Intake/Output Summary (Last 24 hours) at 17 0812  Last data filed at 17 0500   Gross per 24 hour   Intake    300 ml   Output   1100 ml   Net   -800 ml        GI/Nutrition:  Orders Placed This Encounter   Procedures   • DIET ORDER     Standing Status: Standing      Number of Occurrences: 1      Standing Expiration Date:      Order Specific Question:  Diet:     Answer:  Full Liquid [11]     Medical Decision Making, by Problem:  Active Hospital Problems    Diagnosis   • *Osteomyelitis of left great toe (CMS-HCC) [M86.9]- post left  3rd amputation - wound with decreased redness, swelling, pain - wound stabilized .  Iv unasyn - plan 6 weeks ,  Wound care.  SS arranging for snf assignment.  No acceptance so far.   • C.  difficile diarrhea [A04.7]-  improved  Oral vanco.  Isolation precautions.    • Bacteremia [R78.81] recent mssa, strep   Iv unasyn   • Back pain [M54.9] improved control.  sched oxycontin , neurontin    • Anemia [D64.9]-  no symptoms of severe active bleed, asympt- likely chronic dz, recent nose bleed- no tachycardia, low bp likely baseline - hgb improved post prbc tx   Hold bb for sbp < 100  Monitor hgb.   • Hyponatremia [E87.1] improved   Na Cl tablet trial if worsens.    • Macrocytosis [D75.89]   • Hypokalemia [E87.6]improved  oral kcl.   • CHF (congestive heart failure) (CMS-[facility]) [I50.9] w/o decompensation .   • Atrial fibrillation (CMS-[facility]) [I48.91] converted to sinus.  cw amiodarone.    • S/P AAA (abdominal aortic aneurysm) repair [Z98.890, Z86.79]- ? endograft infxn- surgery is high risk- clinically stable-conservative therapies   • Noncompliance [Z91.19]   • Tobacco dependence [F17.200]   • Hep C w/o coma, chronic (CMS-[facility]) [B18.2]   • Dysphagia [R13.10]- w achalasia - post botox at GE junction - persistent liquid regurg -- tolerated  Monitor response, full liquids.  Refer to GI   • CAD (coronary artery disease) [I25.10] stable .       Labs reviewed, Radiology images reviewed and Medications reviewed  Art catheter: No Art      DVT Prophylaxis: Enoxaparin (Lovenox)      Antibiotics: Treating active infection/contamination beyond 24 hours perioperative coverage

## 2017-04-03 NOTE — PROGRESS NOTES
Infectious Disease Progress Note    Author: Kerrie Flores M.D. DOS & Time created: 4/3/2017  2:55 PM    Chief Complaint   Patient presents with   • Low Back Pain   FU for osteomyelitis, GAS bacteremia and CDI, suspected endograft infection    Interval History:  3/17 AF, WBC 12.9, pt c/o lower back pain, asking for morphine, poor insight into illness, denies any diarrhea  3/18 AF, WBC 13.3, sleeping and not arousable to voice, cultures here negative to date  3/19 AF, WBC 13.4, frustrated about lower back pain not adequately controlled on current pain regimen, plan for EGD with dilatation today  3/20 AF WBC 9.6 +pain-denies SE abx  3/21 AF WBC 10.5 no new complaints  3/22 AF WBC not done tolerating abx well  3/23 AF eye feels a little better-pain about the same  3/24 AF tolerating abx well-MRI confirm OM  3/25 AF WBC 9.5 s/p toe amp this am  3/26 AF WBC 10.1 deneies any new sxs-  3/27 AF, WBC 9.1, sleeping but arousable to voice, back pain stable  3/28 Tmax 99.9, WBC 7.7, not happy about soft foods, and wants to go back on regular diet, having normal BMs, back pain controlled on pain regimen  3/29 AF, WBC 5.7, transferred to ortho, happy he took a shower today, plan of care discussed with pain, states he was told he has arthritis of his back, back pain stable, no diarrhea  3/30 AF, WBC 8.5, had a loose BM this am follow by normal BM, no abd pain  3/31 AF, WBC 9, nausea and vomiting this am, had soft BM this am  4/1 WBC 13.7, still having emesis, states he continues to be thirsty despite drinking lots of water yesterday, diarrhea improving and stools forming  4/2 AF, WBC 11.6, had nose bleed overnight, being transfused blood for Hg 6.6, tolerating pills  4/3- denies any diarrhea. No fevers.   Labs Reviewed, Medications Reviewed, Radiology Reviewed and Wound Reviewed.    Review of Systems:  Review of Systems   Constitutional: Negative for fever and chills.   HENT: Positive for nosebleeds.    Respiratory: Negative for  cough and shortness of breath.    Gastrointestinal: Negative for nausea, vomiting, abdominal pain and diarrhea.   Genitourinary: Negative for dysuria.   Musculoskeletal: Positive for back pain.        Stable   Neurological: Negative for headaches.   All other systems reviewed and are negative.      Hemodynamics:  Temp (24hrs), Av.7 °C (98.1 °F), Min:36.3 °C (97.3 °F), Max:37 °C (98.6 °F)  Temperature: 36.5 °C (97.7 °F)  Pulse  Av.5  Min: 56  Max: 114   Blood Pressure : 111/58 mmHg       Physical Exam:  Physical Exam   Constitutional: He appears well-developed. No distress.   HENT:   Head: Normocephalic and atraumatic.   Eyes: EOM are normal. Pupils are equal, round, and reactive to light.   Neck: Neck supple.   Cardiovascular: Normal rate.    Murmur heard.  IRR   Pulmonary/Chest: Effort normal and breath sounds normal.   Abdominal: Soft. He exhibits no distension. There is no tenderness.   Musculoskeletal: Normal range of motion. He exhibits edema.   R great toe with slight edema but no drainage or erythema, medial callus. Erythema resolving    Left 3rd toe-suture in place. No drainage    RUE PICC nontender   Neurological: He is alert.   Nursing note and vitals reviewed.      Labs:  Recent Labs      17   0357  17   1000  17   1410  17   0320   WBC  11.6*  8.7   --   8.8   RBC  2.00*  2.23*   --   2.34*   HEMOGLOBIN  6.6*  7.3*  7.9*  7.5*   HEMATOCRIT  20.2*  22.4*   --   22.5*   MCV  101.0*  100.4*   --   96.2   MCH  33.0  32.7   --   32.1   RDW  51.1*  50.0   --   56.8*   PLATELETCT  169  174   --   171   MPV  8.9*  8.7*   --   8.8*   NEUTSPOLYS  76.30*  70.20   --   76.00*   LYMPHOCYTES  15.20*  18.70*   --   14.50*   MONOCYTES  7.10  8.90   --   7.90   EOSINOPHILS  0.50  1.30   --   0.80   BASOPHILS  0.30  0.20   --   0.10     Recent Labs      17   0420  17   0357  17   0320   SODIUM  131*  127*  129*   POTASSIUM  4.2  3.8  3.6   CHLORIDE  99  98  98   CO2  25   23  26   GLUCOSE  112*  114*  109*   BUN  8  9  8     Recent Labs      04/01/17   0420  04/02/17   0357  04/03/17   0320   ALBUMIN  2.3*  2.2*  2.2*   TBILIRUBIN  0.6  0.5  0.6   ALKPHOSPHAT  102*  88  87   TOTPROTEIN  7.9  7.3  7.3   ALTSGPT  33  29  31   ASTSGOT  24  28  37   CREATININE  1.11  1.11  0.99       BLOOD CULTURE   Date Value Ref Range Status   03/15/2017 No growth after 5 days of incubation.  Final    ':  Results     Procedure Component Value Units Date/Time    CDIFF BY PCR [[id number]]     Order Status:  Canceled Specimen Information:  Stool from Stool     FUNGAL CULTURE [[id number]] Collected:  03/25/17 0936    Order Status:  Completed Specimen Information:  Tissue Updated:  03/28/17 1155     Significant Indicator NEG      Source TISS      Site Left 3rd Toe      Fungal Culture Culture in progress.     CULTURE TISSUE W/ GRM STAIN [[id number]]  (Abnormal)  (Susceptibility) Collected:  03/25/17 0936    Order Status:  Completed Specimen Information:  Tissue Updated:  03/28/17 1155     Significant Indicator POS (POS)      Source TISS      Site Left 3rd Toe      Tissue Culture -- (A)      Gram Stain Result No organisms seen.      Tissue Culture -- (A)      Result:        Staphylococcus aureus  Rare growth       Tissue Culture -- (A)      Result:        Staphylococcus epidermidis  Rare growth      Culture & Susceptibility     STAPHYLOCOCCUS AUREUS     Antibiotic Sensitivity Microscan Unit Status    Ampicillin/sulbactam Sensitive <=8/4 mcg/mL Final    Clindamycin Sensitive <=0.5 mcg/mL Final    Daptomycin Sensitive <=0.5 mcg/mL Final    Erythromycin Sensitive <=0.5 mcg/mL Final    Moxifloxacin Sensitive <=0.5 mcg/mL Final    Oxacillin Sensitive <=0.25 mcg/mL Final    Tetracycline Sensitive <=4 mcg/mL Final    Trimeth/Sulfa Sensitive <=0.5/9.5 mcg/mL Final    Vancomycin Sensitive 2 mcg/mL Final              STAPHYLOCOCCUS EPIDERMIDIS     Antibiotic Sensitivity Microscan Unit Status    Ampicillin/sulbactam  Resistant <=8/4 mcg/mL Final    Clindamycin Resistant 4 mcg/mL Final    Daptomycin Sensitive <=0.5 mcg/mL Final    Erythromycin Sensitive <=0.5 mcg/mL Final    Moxifloxacin Sensitive <=0.5 mcg/mL Final    Oxacillin Resistant >2 mcg/mL Final    Penicillin Resistant >8 mcg/mL Final    Tetracycline Resistant >8 mcg/mL Final    Trimeth/Sulfa Sensitive <=0.5/9.5 mcg/mL Final    Vancomycin Sensitive 2 mcg/mL Final                       ANAEROBIC CULTURE [[id number]] Collected:  03/25/17 0936    Order Status:  Completed Specimen Information:  Tissue Updated:  03/28/17 1155     Significant Indicator NEG      Source TISS      Site Left 3rd Toe      Anaerobic Culture, Culture Res No Anaerobes isolated.           Imaging  3/17 MRI lumbar spine: There are changes secondary to the abdominal aortic aneurysm endograft. The aneurysm sac measures an approximately 7.6 x 6.7 cm in size. Abnormal contrast enhancement is noted within the wall of the aneurysmal sac. There is also abnormal soft tissue   contrast enhancement in the surrounding soft tissue. These findings are highly suspicious for infected thrombosed aneurysm sac. The possibility of endograft infection is more likely. There is irregular outpouching of the aneurysmal sac along the   posterior portion indenting the left psoas muscle.  2.  Free fluid in the pelvis  3.  There is mild-to-moderate left hydronephrosis likely representing pelviureteric junction obstruction.    3/16 R foot xray - +OM  3/16 L foot xray +OM    Assessment:  Active Hospital Problems    Diagnosis   • *Osteomyelitis of toe (CMS-HCC) [M86.9]   • Bacteremia [R78.81]   • Back pain [M54.9]   • CHF (congestive heart failure) (CMS-HCC) [I50.9]   • Tobacco dependence [F17.200]   • Atrial fibrillation (CMS-HCC) [I48.91]    Endovascular infection       Plan:  Right great toe and left 3rd osteomyelitis  +OM on radiographs-confirmed on repeat MRIs  3/9 OSH wound cx - GAS, MSSA (I confirmed with [place]  microlab)  3/25 s/p 3rd toe amp. No purulence seen proximally to amp site  OR cx (left 3rd toe) - CoNS (S-unasyn) Vanco LINUS 2  Continue Unasyn  Anticipate 6 weeks of IV abx. 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  Appreciate vascular evaluation - surgery associated with high mortality  Abx per above followed by chronic abx suppression   Tagged WBC scan - no e/o endovascular infection  Abx as per above followed by PO augmentin for suppressive given endograft    Clostridium difficile diarrhea  Continue oral vancomycin QID for 2 weeks stop date 03/29/17 then BID while on IV abx with stop date of 04/30/17    Esophageal dysmotility-concern for achalasia  H/o strictures with recent dilatation as Isabela's  Esophagram +distal esophageal obstruction  s/p EGD with dilatation  Needs GI re-eval for possible repeat EGD given hx of strictures  Keep meds IV    Leukocytosis. Resolved  Multifactorial  No new signs or symptoms of infection  Monitor    Back pain  Likely 2/2 suspected abdominal aorta endovascular infection    Drug seeking behavior    Prognosis guarded    Dispo:  Awaiting SNF placement for long-term abx    DW IM

## 2017-04-03 NOTE — CARE PLAN
Problem: Pain Management  Goal: Pain level will decrease to patient’s comfort goal  Pain medications given per MAR. Other non-pharmacologic measures for pain initiated.    Problem: Skin Integrity  Goal: Risk for impaired skin integrity will decrease  Assessed for signs of skin breakdown. Encouraged frequent turns and repositioning to prevent development of pressure ulcers.

## 2017-04-03 NOTE — CARE PLAN
Problem: Pain Management  Goal: Pain level will decrease to patient’s comfort goal  PRN and scheduled pain medication given to pt.  Encouraged pt to rest and elevate Lower extremities.  Pt uses call light when needing assistance. Pt steady on feet needing stand by.

## 2017-04-03 NOTE — PROGRESS NOTES
Received shift report from mariano RN and assumed care of this pt at 0715. Pt AOx4 . Pt reports pain is 5/10 to LLE - Recently medicated prn per MAR. Pt is up with one assist and haynes assist, . Pt appropriately with call light when needing assistance. Bed alarm declined, pt educated on importance of using call light when needing assistance. Pt verbalized and demonstrates uses of call light . PIV assessed and is patent, CDI, running  IVF. Pt is on Ra  .  Discussed POC for day shift,  comfort, and safety. Patient has call light and personal belongings within reach. Safety and fall precautions in place. Reviewed orders, notes, labs, and test results. Hourly rounding in place with RN rounding on odd hours and CNA on even hours.

## 2017-04-03 NOTE — DISCHARGE PLANNING
Medical Social Work    Pt is declined or pending by all local SNFs other than [facility]. IV abx (Unayn) Stop date: 4/27/17. SW asked [facility] to resend referral to [facility].

## 2017-04-04 LAB
ALBUMIN SERPL BCP-MCNC: 2.4 G/DL (ref 3.2–4.9)
ALBUMIN/GLOB SERPL: 0.5 G/DL
ALP SERPL-CCNC: 98 U/L (ref 30–99)
ALT SERPL-CCNC: 45 U/L (ref 2–50)
ANION GAP SERPL CALC-SCNC: 8 MMOL/L (ref 0–11.9)
AST SERPL-CCNC: 60 U/L (ref 12–45)
BASOPHILS # BLD AUTO: 0.1 % (ref 0–1.8)
BASOPHILS # BLD: 0.02 K/UL (ref 0–0.12)
BILIRUB SERPL-MCNC: 0.6 MG/DL (ref 0.1–1.5)
BUN SERPL-MCNC: 7 MG/DL (ref 8–22)
CALCIUM SERPL-MCNC: 8 MG/DL (ref 8.5–10.5)
CHLORIDE SERPL-SCNC: 99 MMOL/L (ref 96–112)
CO2 SERPL-SCNC: 23 MMOL/L (ref 20–33)
CREAT SERPL-MCNC: 1.1 MG/DL (ref 0.5–1.4)
EOSINOPHIL # BLD AUTO: 0.03 K/UL (ref 0–0.51)
EOSINOPHIL NFR BLD: 0.2 % (ref 0–6.9)
ERYTHROCYTE [DISTWIDTH] IN BLOOD BY AUTOMATED COUNT: 56.7 FL (ref 35.9–50)
GFR SERPL CREATININE-BSD FRML MDRD: >60 ML/MIN/1.73 M 2
GLOBULIN SER CALC-MCNC: 5.3 G/DL (ref 1.9–3.5)
GLUCOSE SERPL-MCNC: 136 MG/DL (ref 65–99)
HCT VFR BLD AUTO: 22.7 % (ref 42–52)
HGB BLD-MCNC: 7.4 G/DL (ref 14–18)
IMM GRANULOCYTES # BLD AUTO: 0.08 K/UL (ref 0–0.11)
IMM GRANULOCYTES NFR BLD AUTO: 0.6 % (ref 0–0.9)
LYMPHOCYTES # BLD AUTO: 1.25 K/UL (ref 1–4.8)
LYMPHOCYTES NFR BLD: 8.7 % (ref 22–41)
MCH RBC QN AUTO: 31.5 PG (ref 27–33)
MCHC RBC AUTO-ENTMCNC: 32.6 G/DL (ref 33.7–35.3)
MCV RBC AUTO: 96.6 FL (ref 81.4–97.8)
MONOCYTES # BLD AUTO: 1.07 K/UL (ref 0–0.85)
MONOCYTES NFR BLD AUTO: 7.4 % (ref 0–13.4)
NEUTROPHILS # BLD AUTO: 11.93 K/UL (ref 1.82–7.42)
NEUTROPHILS NFR BLD: 83 % (ref 44–72)
NRBC # BLD AUTO: 0 K/UL
NRBC BLD AUTO-RTO: 0 /100 WBC
PLATELET # BLD AUTO: 176 K/UL (ref 164–446)
PMV BLD AUTO: 8.8 FL (ref 9–12.9)
POTASSIUM SERPL-SCNC: 3.7 MMOL/L (ref 3.6–5.5)
PROT SERPL-MCNC: 7.7 G/DL (ref 6–8.2)
RBC # BLD AUTO: 2.35 M/UL (ref 4.7–6.1)
SODIUM SERPL-SCNC: 130 MMOL/L (ref 135–145)
WBC # BLD AUTO: 14.4 K/UL (ref 4.8–10.8)

## 2017-04-04 PROCEDURE — 700102 HCHG RX REV CODE 250 W/ 637 OVERRIDE(OP): Performed by: FAMILY MEDICINE

## 2017-04-04 PROCEDURE — 99232 SBSQ HOSP IP/OBS MODERATE 35: CPT | Performed by: HOSPITALIST

## 2017-04-04 PROCEDURE — 700101 HCHG RX REV CODE 250: Performed by: FAMILY MEDICINE

## 2017-04-04 PROCEDURE — 700102 HCHG RX REV CODE 250 W/ 637 OVERRIDE(OP): Performed by: HOSPITALIST

## 2017-04-04 PROCEDURE — 700105 HCHG RX REV CODE 258: Performed by: INTERNAL MEDICINE

## 2017-04-04 PROCEDURE — A9270 NON-COVERED ITEM OR SERVICE: HCPCS | Performed by: HOSPITALIST

## 2017-04-04 PROCEDURE — 85025 COMPLETE CBC W/AUTO DIFF WBC: CPT

## 2017-04-04 PROCEDURE — A9270 NON-COVERED ITEM OR SERVICE: HCPCS | Performed by: FAMILY MEDICINE

## 2017-04-04 PROCEDURE — 80053 COMPREHEN METABOLIC PANEL: CPT

## 2017-04-04 PROCEDURE — 700111 HCHG RX REV CODE 636 W/ 250 OVERRIDE (IP): Performed by: HOSPITALIST

## 2017-04-04 PROCEDURE — 700102 HCHG RX REV CODE 250 W/ 637 OVERRIDE(OP): Performed by: ORTHOPAEDIC SURGERY

## 2017-04-04 PROCEDURE — A9270 NON-COVERED ITEM OR SERVICE: HCPCS | Performed by: ORTHOPAEDIC SURGERY

## 2017-04-04 PROCEDURE — 700105 HCHG RX REV CODE 258

## 2017-04-04 PROCEDURE — 700102 HCHG RX REV CODE 250 W/ 637 OVERRIDE(OP): Performed by: INTERNAL MEDICINE

## 2017-04-04 PROCEDURE — 770021 HCHG ROOM/CARE - ISO PRIVATE

## 2017-04-04 PROCEDURE — 700111 HCHG RX REV CODE 636 W/ 250 OVERRIDE (IP): Performed by: INTERNAL MEDICINE

## 2017-04-04 PROCEDURE — A9270 NON-COVERED ITEM OR SERVICE: HCPCS | Performed by: INTERNAL MEDICINE

## 2017-04-04 RX ORDER — SODIUM CHLORIDE 9 MG/ML
INJECTION, SOLUTION INTRAVENOUS
Status: COMPLETED
Start: 2017-04-04 | End: 2017-04-04

## 2017-04-04 RX ADMIN — LACTOBACILLUS ACIDOPHILUS / LACTOBACILLUS BULGARICUS 1 PACKET: 100 MILLION CFU STRENGTH GRANULES at 11:55

## 2017-04-04 RX ADMIN — OXYCODONE HYDROCHLORIDE 20 MG: 20 TABLET, FILM COATED, EXTENDED RELEASE ORAL at 09:46

## 2017-04-04 RX ADMIN — VANCOMYCIN HYDROCHLORIDE 125 MG: 10 INJECTION, POWDER, LYOPHILIZED, FOR SOLUTION INTRAVENOUS at 09:47

## 2017-04-04 RX ADMIN — SIMVASTATIN 20 MG: 20 TABLET, FILM COATED ORAL at 20:40

## 2017-04-04 RX ADMIN — GABAPENTIN 600 MG: 300 CAPSULE ORAL at 09:45

## 2017-04-04 RX ADMIN — ENOXAPARIN SODIUM 40 MG: 100 INJECTION SUBCUTANEOUS at 09:45

## 2017-04-04 RX ADMIN — LACTOBACILLUS ACIDOPHILUS / LACTOBACILLUS BULGARICUS 1 PACKET: 100 MILLION CFU STRENGTH GRANULES at 09:46

## 2017-04-04 RX ADMIN — GABAPENTIN 600 MG: 300 CAPSULE ORAL at 16:12

## 2017-04-04 RX ADMIN — SODIUM CHLORIDE 500 ML: 9 INJECTION, SOLUTION INTRAVENOUS at 16:17

## 2017-04-04 RX ADMIN — METOPROLOL TARTRATE 12.5 MG: 25 TABLET, FILM COATED ORAL at 20:40

## 2017-04-04 RX ADMIN — SODIUM CHLORIDE 500 ML: 9 INJECTION, SOLUTION INTRAVENOUS at 05:17

## 2017-04-04 RX ADMIN — OXYCODONE HYDROCHLORIDE 10 MG: 10 TABLET ORAL at 20:40

## 2017-04-04 RX ADMIN — GABAPENTIN 600 MG: 300 CAPSULE ORAL at 20:39

## 2017-04-04 RX ADMIN — LIDOCAINE 2 PATCH: 50 PATCH CUTANEOUS at 11:54

## 2017-04-04 RX ADMIN — LACTOBACILLUS ACIDOPHILUS / LACTOBACILLUS BULGARICUS 1 PACKET: 100 MILLION CFU STRENGTH GRANULES at 16:13

## 2017-04-04 RX ADMIN — AMPICILLIN SODIUM AND SULBACTAM SODIUM 3 G: 2; 1 INJECTION, POWDER, FOR SOLUTION INTRAMUSCULAR; INTRAVENOUS at 00:02

## 2017-04-04 RX ADMIN — POTASSIUM CHLORIDE 40 MEQ: 1500 TABLET, EXTENDED RELEASE ORAL at 20:42

## 2017-04-04 RX ADMIN — AMPICILLIN SODIUM AND SULBACTAM SODIUM 3 G: 2; 1 INJECTION, POWDER, FOR SOLUTION INTRAMUSCULAR; INTRAVENOUS at 22:56

## 2017-04-04 RX ADMIN — OXYCODONE HYDROCHLORIDE 10 MG: 10 TABLET ORAL at 16:14

## 2017-04-04 RX ADMIN — STANDARDIZED SENNA CONCENTRATE AND DOCUSATE SODIUM 1 TABLET: 8.6; 5 TABLET, FILM COATED ORAL at 20:40

## 2017-04-04 RX ADMIN — OXYCODONE HYDROCHLORIDE 20 MG: 20 TABLET, FILM COATED, EXTENDED RELEASE ORAL at 20:40

## 2017-04-04 RX ADMIN — AMPICILLIN SODIUM AND SULBACTAM SODIUM 3 G: 2; 1 INJECTION, POWDER, FOR SOLUTION INTRAMUSCULAR; INTRAVENOUS at 16:12

## 2017-04-04 RX ADMIN — POTASSIUM CHLORIDE 40 MEQ: 1500 TABLET, EXTENDED RELEASE ORAL at 09:46

## 2017-04-04 RX ADMIN — AMPICILLIN SODIUM AND SULBACTAM SODIUM 3 G: 2; 1 INJECTION, POWDER, FOR SOLUTION INTRAMUSCULAR; INTRAVENOUS at 11:52

## 2017-04-04 RX ADMIN — VANCOMYCIN HYDROCHLORIDE 125 MG: 10 INJECTION, POWDER, LYOPHILIZED, FOR SOLUTION INTRAVENOUS at 20:39

## 2017-04-04 RX ADMIN — NICOTINE 14 MG: 14 PATCH TRANSDERMAL at 05:16

## 2017-04-04 RX ADMIN — AMPICILLIN SODIUM AND SULBACTAM SODIUM 3 G: 2; 1 INJECTION, POWDER, FOR SOLUTION INTRAMUSCULAR; INTRAVENOUS at 05:16

## 2017-04-04 ASSESSMENT — ENCOUNTER SYMPTOMS
SHORTNESS OF BREATH: 0
FOCAL WEAKNESS: 0
WEAKNESS: 1
SPEECH CHANGE: 0
MYALGIAS: 1
NERVOUS/ANXIOUS: 1
HEADACHES: 0
ABDOMINAL PAIN: 0
VOMITING: 1
BACK PAIN: 1
NAUSEA: 0
GASTROINTESTINAL NEGATIVE: 1
VOMITING: 0
HALLUCINATIONS: 0
COUGH: 0
DIARRHEA: 1
CHILLS: 0
FEVER: 0

## 2017-04-04 ASSESSMENT — PAIN SCALES - GENERAL
PAINLEVEL_OUTOF10: 6
PAINLEVEL_OUTOF10: 8
PAINLEVEL_OUTOF10: 7
PAINLEVEL_OUTOF10: 3
PAINLEVEL_OUTOF10: 6
PAINLEVEL_OUTOF10: 7
PAINLEVEL_OUTOF10: 5

## 2017-04-04 NOTE — PROGRESS NOTES
Received shift report from mariano RN and assumed care of this pt at 0715. Pt AOx4 . Pt reports pain is 8/10 to LLE - Sceduled medicated per MAR Given. Pt is up at edge of bed,  Pt declines bed alarm. Education given, pt verbalizes and compliant withusing call light when needing assistance . PICC line to right upper arm assessed and is patent,  Pt declining Chlorhexidine bath, education provided. PICC dressing CDI, IVF running TKO. Pt is on RA.  Discussed POC for day shift,  comfort, and safety. Patient has call light and personal belongings within reach. Safety and fall precautions in place. Reviewed orders, notes, labs, and test results. Hourly rounding in place with RN rounding on odd hours and CNA on even hours.

## 2017-04-04 NOTE — PROGRESS NOTES
Hospital Medicine Progress Note, Adult, Complex               Author: Cabrera Berrios Date & Time created: 4/4/2017  8:19 AM     CC: 73 yo hx afib, cad, chf , hep c liver dz- Group A strep, MSSA  Bacteremia, OM Rt big toe, cdiff, dysphagia - - left AMA from [facility] -admitted w  back pain - s/p  L-3rd toe amputation .    Interval History:    Formed bowel movement. No bloody stools.   Increased wbc - afeb.  Tolerating IV atbs.  Declined by SNF s - plan cont'd inpt antibiotics     Review of Systems:  Review of Systems   Constitutional: Negative for fever.   HENT: Negative for nosebleeds.    Respiratory: Negative for cough and shortness of breath.    Cardiovascular: Positive for leg swelling (improved. ). Negative for chest pain.   Gastrointestinal: Positive for vomiting and diarrhea (improved). Negative for abdominal pain.   Musculoskeletal: Positive for myalgias and back pain.   Neurological: Positive for weakness. Negative for speech change and focal weakness.   Psychiatric/Behavioral: Negative for hallucinations. The patient is nervous/anxious.        Physical Exam:  Physical Exam   Constitutional: He is oriented to person, place, and time. No distress.   Eyes: EOM are normal. No scleral icterus.   Neck: No JVD present.   Cardiovascular: Normal rate and regular rhythm.    Murmur (III/6 erin) heard.  Pulmonary/Chest: No stridor. He has no wheezes. He has no rales.   Abdominal: Soft. Bowel sounds are normal. He exhibits no distension. There is no tenderness.   Musculoskeletal: He exhibits edema and tenderness (mild.  rt great toe w mild swelling.).   Left 3rd toe amputation - wrapped- sutures dry, no dehisc, or redness.    Neurological: He is alert and oriented to person, place, and time. No cranial nerve deficit.   Skin: Skin is warm and dry. He is not diaphoretic.   Psychiatric:   Easily restless, anxious        Labs:        Invalid input(s): [id number]      Recent Labs      04/02/17   0357  04/03/17   0320   17   0530   SODIUM  127*  129*  130*   POTASSIUM  3.8  3.6  3.7   CHLORIDE  98  98  99   CO2  23  26  23   BUN  9  8  7*   CREATININE  1.11  0.99  1.10   CALCIUM  7.8*  8.0*  8.0*     Recent Labs      17   0357  17   0320  17   0530   ALTSGPT  29  31  45   ASTSGOT  28  37  60*   ALKPHOSPHAT  88  87  98   TBILIRUBIN  0.5  0.6  0.6   PREALBUMIN   --   4.0*   --    GLUCOSE  114*  109*  136*     Recent Labs      17   1000  17   1410  17   0320  17   0530   RBC  2.23*   --   2.34*  2.35*   HEMOGLOBIN  7.3*  7.9*  7.5*  7.4*   HEMATOCRIT  22.4*   --   22.5*  22.7*   PLATELETCT  174   --   171  176     Recent Labs      17   03517   1000  17   0320  17   0530   WBC  11.6*  8.7  8.8  14.4*   NEUTSPOLYS  76.30*  70.20  76.00*  83.00*   LYMPHOCYTES  15.20*  18.70*  14.50*  8.70*   MONOCYTES  7.10  8.90  7.90  7.40   EOSINOPHILS  0.50  1.30  0.80  0.20   BASOPHILS  0.30  0.20  0.10  0.10   ASTSGOT  28   --   37  60*   ALTSGPT  29   --   31  45   ALKPHOSPHAT  88   --   87  98   TBILIRUBIN  0.5   --   0.6  0.6           Hemodynamics:  Temp (24hrs), Av °C (98.6 °F), Min:36.5 °C (97.7 °F), Max:37.4 °C (99.3 °F)  Temperature: 37.4 °C (99.3 °F)  Pulse  Av.4  Min: 56  Max: 114   Blood Pressure : (!) 93/46 mmHg (RN notified)     Respiratory:    Respiration: 18, Pulse Oximetry: 90 %        RUL Breath Sounds: Diminished, RML Breath Sounds: Diminished, RLL Breath Sounds: Diminished, SOLEDAD Breath Sounds: Diminished, LLL Breath Sounds: Diminished  Fluids:    Intake/Output Summary (Last 24 hours) at 17 0819  Last data filed at 17 0600   Gross per 24 hour   Intake   1320 ml   Output   1201 ml   Net    119 ml        GI/Nutrition:  Orders Placed This Encounter   Procedures   • DIET ORDER     Standing Status: Standing      Number of Occurrences: 1      Standing Expiration Date:      Order Specific Question:  Diet:     Answer:  Full Liquid [11]      Medical Decision Making, by Problem:  Active Hospital Problems    Diagnosis   • *Osteomyelitis of left great toe (CMS-[facility]) [M86.9]- post left  3rd amputation - clinically better   Iv unasyn - plan 6 weeks, Wound care.  ID input.  Difficult dc to SNF, no acceptance.    • C. difficile diarrhea [A04.7]-  improved  Oral vanco.  Isolation precautions.    • Bacteremia [R78.81] recent mssa, strep on atbs above, afeb.   • Back pain [M54.9] improved control.  sched oxycontin , neurontin    • Anemia [D64.9]- asympt- likely chronic dz, recent nose bleed- no tachycardia, low bp likely baseline - post prbc tx.  Hold bb for sbp < 100  Monitor hgb, acute bleed symptoms.   • Hyponatremia [E87.1] improved   Na Cl tablet trial if worsens.    • Macrocytosis [D75.89]   • Hypokalemia [E87.6]corrected   Continue oral  kcl with concern for additional gi losses    • CHF (congestive heart failure) (CMS-HCC) [I50.9] w/o decompensation .   • Atrial fibrillation (CMS-HCC) [I48.91] converted to sinus.  cw amiodarone.    • S/P AAA (abdominal aortic aneurysm) repair [Z98.890, Z86.79]- ? endograft infxn- surgery is high risk- clinically stable-conservative therapies   • Noncompliance [Z91.19]   • Tobacco dependence [F17.200]   • Hep C w/o coma, chronic (CMS-HCC) [B18.2]   • Dysphagia [R13.10]- w achalasia - post botox at GE junction - persistent liquid regurg   \will Dw GI   • CAD (coronary artery disease) [I25.10] stable .       Labs reviewed, Radiology images reviewed and Medications reviewed  Art catheter: No Art      DVT Prophylaxis: Enoxaparin (Lovenox)      Antibiotics: Treating active infection/contamination beyond 24 hours perioperative coverage

## 2017-04-04 NOTE — PROGRESS NOTES
Surgical Progress Note    Author: Rod Dinero Date & Time created: 2017   12:01 PM     Interval Events:  Vascular follow up of possible endograft infection.   Review of Systems   Gastrointestinal: Negative.         Swallowing food but not completely normally.  Low back not hurting presently.       Hemodynamics:  Temp (24hrs), Av.1 °C (98.7 °F), Min:36.7 °C (98.1 °F), Max:37.4 °C (99.3 °F)  Temperature: 36.7 °C (98.1 °F)  Pulse  Av.3  Min: 56  Max: 114   Blood Pressure : (!) 99/59 mmHg     Respiratory:    Respiration: 16, Pulse Oximetry: 94 %        RUL Breath Sounds: Diminished, RML Breath Sounds: Diminished, RLL Breath Sounds: Diminished, SOLEDAD Breath Sounds: Diminished, LLL Breath Sounds: Diminished  Neuro:  GCS       Fluids:    Intake/Output Summary (Last 24 hours) at 17 1201  Last data filed at 17 1000   Gross per 24 hour   Intake   1320 ml   Output   1550 ml   Net   -230 ml        Current Diet Order   Procedures   • DIET ORDER     Physical Exam   Constitutional: He is oriented to person, place, and time.   Abdominal: He exhibits no mass. There is no tenderness. There is no rebound and no guarding.   Musculoskeletal: He exhibits edema.   Neurological: He is alert and oriented to person, place, and time.   Skin: Skin is warm.   Psychiatric: He has a normal mood and affect. His behavior is normal.     Labs:  Recent Results (from the past 24 hour(s))   COMP METABOLIC PANEL    Collection Time: 17  5:30 AM   Result Value Ref Range    Sodium 130 (L) 135 - 145 mmol/L    Potassium 3.7 3.6 - 5.5 mmol/L    Chloride 99 96 - 112 mmol/L    Co2 23 20 - 33 mmol/L    Anion Gap 8.0 0.0 - 11.9    Glucose 136 (H) 65 - 99 mg/dL    Bun 7 (L) 8 - 22 mg/dL    Creatinine 1.10 0.50 - 1.40 mg/dL    Calcium 8.0 (L) 8.5 - 10.5 mg/dL    AST(SGOT) 60 (H) 12 - 45 U/L    ALT(SGPT) 45 2 - 50 U/L    Alkaline Phosphatase 98 30 - 99 U/L    Total Bilirubin 0.6 0.1 - 1.5 mg/dL    Albumin 2.4 (L) 3.2 - 4.9 g/dL     Total Protein 7.7 6.0 - 8.2 g/dL    Globulin 5.3 (H) 1.9 - 3.5 g/dL    A-G Ratio 0.5 g/dL   CBC WITH DIFFERENTIAL    Collection Time: 04/04/17  5:30 AM   Result Value Ref Range    WBC 14.4 (H) 4.8 - 10.8 K/uL    RBC 2.35 (L) 4.70 - 6.10 M/uL    Hemoglobin 7.4 (L) 14.0 - 18.0 g/dL    Hematocrit 22.7 (L) 42.0 - 52.0 %    MCV 96.6 81.4 - 97.8 fL    MCH 31.5 27.0 - 33.0 pg    MCHC 32.6 (L) 33.7 - 35.3 g/dL    RDW 56.7 (H) 35.9 - 50.0 fL    Platelet Count 176 164 - 446 K/uL    MPV 8.8 (L) 9.0 - 12.9 fL    Neutrophils-Polys 83.00 (H) 44.00 - 72.00 %    Lymphocytes 8.70 (L) 22.00 - 41.00 %    Monocytes 7.40 0.00 - 13.40 %    Eosinophils 0.20 0.00 - 6.90 %    Basophils 0.10 0.00 - 1.80 %    Immature Granulocytes 0.60 0.00 - 0.90 %    Nucleated RBC 0.00 /100 WBC    Neutrophils (Absolute) 11.93 (H) 1.82 - 7.42 K/uL    Lymphs (Absolute) 1.25 1.00 - 4.80 K/uL    Monos (Absolute) 1.07 (H) 0.00 - 0.85 K/uL    Eos (Absolute) 0.03 0.00 - 0.51 K/uL    Baso (Absolute) 0.02 0.00 - 0.12 K/uL    Immature Granulocytes (abs) 0.08 0.00 - 0.11 K/uL    NRBC (Absolute) 0.00 K/uL   ESTIMATED GFR    Collection Time: 04/04/17  5:30 AM   Result Value Ref Range    GFR If African American >60 >60 mL/min/1.73 m 2    GFR If Non African American >60 >60 mL/min/1.73 m 2     Medical Decision Making, by Problem:  Active Hospital Problems    Diagnosis   • C. difficile diarrhea [A04.7]     Priority: High   • Bacteremia [R78.81]     Priority: High   • Back pain [M54.9]     Priority: High   • Osteomyelitis of toe (CMS-HCC) [M86.9]     Priority: High   • Anemia [D64.9]     Priority: Medium   • Hyponatremia [E87.1]     Priority: Medium   • Macrocytosis [D75.89]     Priority: Medium   • Hypokalemia [E87.6]     Priority: Medium   • CHF (congestive heart failure) (CMS-[facility]) [I50.9]     Priority: Medium   • Atrial fibrillation (CMS-[facility]) [I48.91]     Priority: Medium   • S/P AAA (abdominal aortic aneurysm) repair [Z98.890, Z86.79]     Priority: Medium   •  Noncompliance [Z91.19]     Priority: Low   • Tobacco dependence [F17.200]     Priority: Low   • Hep C w/o coma, chronic (CMS-HCC) [B18.2]   • Dysphagia [R13.10]   • CAD (coronary artery disease) [I25.10]     Plan:  Encourage him to walk more in room and elevate legs to reduce edema.  On antibiotic therapy.  Duplex findings AAA endograft are not abnormal.  Recommend long term antibiotic therapy and monitoring of endograft.    Quality Measures:  Core Measures    Discussed patient condition with RN and Patient

## 2017-04-04 NOTE — PROGRESS NOTES
Infectious Disease Progress Note    Author: Kerrie Flores M.D. DOS & Time created: 4/4/2017  4:25 PM    Chief Complaint   Patient presents with   • Low Back Pain   FU for osteomyelitis, GAS bacteremia and CDI, suspected endograft infection    Interval History:  3/17 AF, WBC 12.9, pt c/o lower back pain, asking for morphine, poor insight into illness, denies any diarrhea  3/18 AF, WBC 13.3, sleeping and not arousable to voice, cultures here negative to date  3/19 AF, WBC 13.4, frustrated about lower back pain not adequately controlled on current pain regimen, plan for EGD with dilatation today  3/20 AF WBC 9.6 +pain-denies SE abx  3/21 AF WBC 10.5 no new complaints  3/22 AF WBC not done tolerating abx well  3/23 AF eye feels a little better-pain about the same  3/24 AF tolerating abx well-MRI confirm OM  3/25 AF WBC 9.5 s/p toe amp this am  3/26 AF WBC 10.1 deneies any new sxs-  3/27 AF, WBC 9.1, sleeping but arousable to voice, back pain stable  3/28 Tmax 99.9, WBC 7.7, not happy about soft foods, and wants to go back on regular diet, having normal BMs, back pain controlled on pain regimen  3/29 AF, WBC 5.7, transferred to ortho, happy he took a shower today, plan of care discussed with pain, states he was told he has arthritis of his back, back pain stable, no diarrhea  3/30 AF, WBC 8.5, had a loose BM this am follow by normal BM, no abd pain  3/31 AF, WBC 9, nausea and vomiting this am, had soft BM this am  4/1 WBC 13.7, still having emesis, states he continues to be thirsty despite drinking lots of water yesterday, diarrhea improving and stools forming  4/2 AF, WBC 11.6, had nose bleed overnight, being transfused blood for Hg 6.6, tolerating pills  4/3- denies any diarrhea. No fevers.   Labs Reviewed, Medications Reviewed, Radiology Reviewed and Wound Reviewed.    Review of Systems:  Review of Systems   Constitutional: Negative for fever and chills.   HENT: Positive for nosebleeds.    Respiratory: Negative for  cough and shortness of breath.    Gastrointestinal: Positive for diarrhea. Negative for nausea, vomiting and abdominal pain.   Genitourinary: Negative for dysuria.   Musculoskeletal: Positive for back pain.        Stable   Neurological: Negative for headaches.   All other systems reviewed and are negative.      Hemodynamics:  Temp (24hrs), Av.8 °C (98.3 °F), Min:36.4 °C (97.6 °F), Max:37.4 °C (99.3 °F)  Temperature: 36.6 °C (97.8 °F)  Pulse  Av.3  Min: 56  Max: 114   Blood Pressure : 107/59 mmHg       Physical Exam:  Physical Exam   Constitutional: He appears well-developed. No distress.   HENT:   Head: Normocephalic and atraumatic.   Eyes: EOM are normal. Pupils are equal, round, and reactive to light.   Neck: Neck supple.   Cardiovascular: Normal rate.    Murmur heard.  IRR   Pulmonary/Chest: Effort normal and breath sounds normal.   Abdominal: Soft. He exhibits no distension. There is no tenderness.   Musculoskeletal: Normal range of motion. He exhibits edema.   R great toe with slight edema but no drainage or erythema, medial callus. Erythema resolving    Left 3rd toe-suture in place. No drainage    RUE PICC nontender   Neurological: He is alert.   Nursing note and vitals reviewed.      Labs:  Recent Labs      17   1000  17   1410  17   0320  17   0530   WBC  8.7   --   8.8  14.4*   RBC  2.23*   --   2.34*  2.35*   HEMOGLOBIN  7.3*  7.9*  7.5*  7.4*   HEMATOCRIT  22.4*   --   22.5*  22.7*   MCV  100.4*   --   96.2  96.6   MCH  32.7   --   32.1  31.5   RDW  50.0   --   56.8*  56.7*   PLATELETCT  174   --   171  176   MPV  8.7*   --   8.8*  8.8*   NEUTSPOLYS  70.20   --   76.00*  83.00*   LYMPHOCYTES  18.70*   --   14.50*  8.70*   MONOCYTES  8.90   --   7.90  7.40   EOSINOPHILS  1.30   --   0.80  0.20   BASOPHILS  0.20   --   0.10  0.10     Recent Labs      17   0357  17   0320  17   0530   SODIUM  127*  129*  130*   POTASSIUM  3.8  3.6  3.7   CHLORIDE  98  98  99    CO2  23  26  23   GLUCOSE  114*  109*  136*   BUN  9  8  7*     Recent Labs      04/02/17   0357  04/03/17   0320  04/04/17   0530   ALBUMIN  2.2*  2.2*  2.4*   TBILIRUBIN  0.5  0.6  0.6   ALKPHOSPHAT  88  87  98   TOTPROTEIN  7.3  7.3  7.7   ALTSGPT  29  31  45   ASTSGOT  28  37  60*   CREATININE  1.11  0.99  1.10       BLOOD CULTURE   Date Value Ref Range Status   03/15/2017 No growth after 5 days of incubation.  Final    ':  Results     Procedure Component Value Units Date/Time    CDIFF BY PCR [[id number]]     Order Status:  Canceled Specimen Information:  Stool from Stool           Imaging  3/17 MRI lumbar spine: There are changes secondary to the abdominal aortic aneurysm endograft. The aneurysm sac measures an approximately 7.6 x 6.7 cm in size. Abnormal contrast enhancement is noted within the wall of the aneurysmal sac. There is also abnormal soft tissue   contrast enhancement in the surrounding soft tissue. These findings are highly suspicious for infected thrombosed aneurysm sac. The possibility of endograft infection is more likely. There is irregular outpouching of the aneurysmal sac along the   posterior portion indenting the left psoas muscle.  2.  Free fluid in the pelvis  3.  There is mild-to-moderate left hydronephrosis likely representing pelviureteric junction obstruction.    3/16 R foot xray - +OM  3/16 L foot xray +OM    Assessment:  Active Hospital Problems    Diagnosis   • *Osteomyelitis of toe (CMS-HCC) [M86.9]   • Bacteremia [R78.81]   • Back pain [M54.9]   • CHF (congestive heart failure) (CMS-HCC) [I50.9]   • Tobacco dependence [F17.200]   • Atrial fibrillation (CMS-HCC) [I48.91]    Endovascular infection       Plan:  Right great toe and left 3rd osteomyelitis  +OM on radiographs-confirmed on repeat MRIs  3/9 OSH wound cx - GAS, MSSA (I confirmed with [place] microlab)  3/25 s/p 3rd toe amp. No purulence seen proximally to amp site  OR cx (left 3rd toe) - CoNS (S-unasyn) Vanco LINUS  2  Continue Unasyn  Anticipate 6 weeks of IV abx. 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  Appreciate vascular evaluation - surgery associated with high mortality  Abx per above followed by chronic abx suppression   Tagged WBC scan - no e/o endovascular infection  If truly a graft infection is suspected will consider a pet scan as outpt    Clostridium difficile diarrhea  Continue oral vancomycin QID for 2 weeks stop date 03/29/17 then BID while on IV abx with stop date of 04/30/17    Esophageal dysmotility-concern for achalasia  H/o strictures with recent dilatation as [place]'s  Esophagram +distal esophageal obstruction  s/p EGD with dilatation  Needs GI re-eval for possible repeat EGD given hx of strictures  Keep meds IV    Leukocytosis. Resolved  Multifactorial  No new signs or symptoms of infection  Monitor    Back pain  Chronic per patient  Drug seeking behavior    Prognosis guarded    Dispo:  Awaiting SNF placement for long-term abx    JENNA HOSKINS

## 2017-04-04 NOTE — CARE PLAN
"Problem: Hemodynamic Status  Goal: Stable Vital Signs and Fluid Balance  BP 99/59 mmHg  Pulse 78  Temp(Src) 36.7 °C (98.1 °F)  Resp 16  Ht 1.778 m (5' 10\")  Wt 77 kg (169 lb 12.1 oz)  BMI 24.36 kg/m2  SpO2 94%  Pt asymptomatic, able to make needs known.  IVF running TKO,  Diminished LS, apical sinus devonte rhythm.  Held morning scheduled blood pressure medication, Dr. Sinclair aware.         "

## 2017-04-04 NOTE — DISCHARGE PLANNING
Medical Social Work    SW met with pt at bedside to complete assessment and discuss discharge plan. Pt is declined or pending by all local SNFs other than [facility]. IV abx (Unayn) Stop date: 4/27/17. Pt refuses to go to [facility]. SW will speak with attending physician about D/C home with home infusion.    Care Transition Team Assessment    Information Source  Orientation : Oriented x 4  Information Given By: Patient  Informant's Name:  (Nicola Jimenez)  Who is responsible for making decisions for patient? : Patient    Readmission Evaluation  Is this a readmission?: No    Elopement Risk  Legal Hold: No  Ambulatory or Self Mobile in Wheelchair: No-Not an Elopement Risk  Disoriented: No  Psychiatric Symptoms: None  History of Wandering: No  Elopement this Admit: No  Vocalizing Wanting to Leave: No  Displays Behaviors, Body Language Wanting to Leave: No-Not at Risk for Elopement  Elopement Risk: Not at Risk for Elopement  Wanderguard On: Unavailable  Personal Belongings: Hospital Clothing Only    Interdisciplinary Discharge Planning  Lives with - Patient's Self Care Capacity: Alone and Able to Care For Self  Patient or legal guardian wants to designate a caregiver (see row info): No  Support Systems: Family Member(s)  Housing / Facility: 1 Story House  Do You Take your Prescribed Medications Regularly: Yes  Able to Return to Previous ADL's: Yes  Mobility Issues: No  Prior Services: None  Patient Expects to be Discharged to:: home  Assistance Needed: Unknown at this Time  Durable Medical Equipment: Not Applicable    Discharge Preparedness  What is your plan after discharge?: Uncertain - pending medical team collaboration  What are your discharge supports?: Child  Prior Functional Level: Ambulatory  Difficulity with ADLs: None  Difficulity with IADLs: None    Functional Assesment  Prior Functional Level: Ambulatory    Finances  Financial Barriers to Discharge: No  Prescription Coverage:  (Not sure)    Vision /  Hearing Impairment  Vision Impairment : No  Hearing Impairment : Yes  Hearing Impairment: Both Ears  Does Pt Need Special Equipment for the Hearing Impaired?: No    Values / Beliefs / Concerns  Values / Beliefs Concerns : No    Advance Directive  Advance Directive?: DPOA for Health Care (Pt states daughter is POA, but no paperwork is scanned in)    Domestic Abuse  Have you ever been the victim of abuse or violence?: No  Physical Abuse or Sexual Abuse: No  Verbal Abuse or Emotional Abuse: No  Possible Abuse Reported to::     Psychological Assessment  History of Substance Abuse: None  History of Psychiatric Problems: No    Anticipated discharge disposition: Discharge needs currently unknown

## 2017-04-04 NOTE — PROGRESS NOTES
Pt declined chlorhexidine daily bath. Educated pt on importance of keeping the central line clean.  Pt contentious to decline.

## 2017-04-05 LAB
ALBUMIN SERPL BCP-MCNC: 2.4 G/DL (ref 3.2–4.9)
ALBUMIN/GLOB SERPL: 0.5 G/DL
ALP SERPL-CCNC: 101 U/L (ref 30–99)
ALT SERPL-CCNC: 53 U/L (ref 2–50)
ANION GAP SERPL CALC-SCNC: 9 MMOL/L (ref 0–11.9)
AST SERPL-CCNC: 73 U/L (ref 12–45)
BASOPHILS # BLD AUTO: 0.2 % (ref 0–1.8)
BASOPHILS # BLD: 0.02 K/UL (ref 0–0.12)
BILIRUB SERPL-MCNC: 0.6 MG/DL (ref 0.1–1.5)
BUN SERPL-MCNC: 7 MG/DL (ref 8–22)
CALCIUM SERPL-MCNC: 8.4 MG/DL (ref 8.5–10.5)
CHLORIDE SERPL-SCNC: 100 MMOL/L (ref 96–112)
CO2 SERPL-SCNC: 22 MMOL/L (ref 20–33)
CREAT SERPL-MCNC: 1 MG/DL (ref 0.5–1.4)
EOSINOPHIL # BLD AUTO: 0.08 K/UL (ref 0–0.51)
EOSINOPHIL NFR BLD: 0.7 % (ref 0–6.9)
ERYTHROCYTE [DISTWIDTH] IN BLOOD BY AUTOMATED COUNT: 56 FL (ref 35.9–50)
GFR SERPL CREATININE-BSD FRML MDRD: >60 ML/MIN/1.73 M 2
GLOBULIN SER CALC-MCNC: 5.3 G/DL (ref 1.9–3.5)
GLUCOSE SERPL-MCNC: 112 MG/DL (ref 65–99)
HCT VFR BLD AUTO: 23.8 % (ref 42–52)
HGB BLD-MCNC: 7.8 G/DL (ref 14–18)
HGB BLD-MCNC: 8 G/DL (ref 14–18)
IMM GRANULOCYTES # BLD AUTO: 0.05 K/UL (ref 0–0.11)
IMM GRANULOCYTES NFR BLD AUTO: 0.5 % (ref 0–0.9)
LYMPHOCYTES # BLD AUTO: 1.62 K/UL (ref 1–4.8)
LYMPHOCYTES NFR BLD: 14.9 % (ref 22–41)
MCH RBC QN AUTO: 32.7 PG (ref 27–33)
MCHC RBC AUTO-ENTMCNC: 33.6 G/DL (ref 33.7–35.3)
MCV RBC AUTO: 97.1 FL (ref 81.4–97.8)
MONOCYTES # BLD AUTO: 0.82 K/UL (ref 0–0.85)
MONOCYTES NFR BLD AUTO: 7.5 % (ref 0–13.4)
NEUTROPHILS # BLD AUTO: 8.31 K/UL (ref 1.82–7.42)
NEUTROPHILS NFR BLD: 76.2 % (ref 44–72)
NRBC # BLD AUTO: 0 K/UL
NRBC BLD AUTO-RTO: 0 /100 WBC
PLATELET # BLD AUTO: 213 K/UL (ref 164–446)
PMV BLD AUTO: 8.9 FL (ref 9–12.9)
POTASSIUM SERPL-SCNC: 4.1 MMOL/L (ref 3.6–5.5)
PROT SERPL-MCNC: 7.7 G/DL (ref 6–8.2)
RBC # BLD AUTO: 2.45 M/UL (ref 4.7–6.1)
SODIUM SERPL-SCNC: 131 MMOL/L (ref 135–145)
WBC # BLD AUTO: 10.9 K/UL (ref 4.8–10.8)

## 2017-04-05 PROCEDURE — 700111 HCHG RX REV CODE 636 W/ 250 OVERRIDE (IP): Performed by: HOSPITALIST

## 2017-04-05 PROCEDURE — 700102 HCHG RX REV CODE 250 W/ 637 OVERRIDE(OP): Performed by: HOSPITALIST

## 2017-04-05 PROCEDURE — A9270 NON-COVERED ITEM OR SERVICE: HCPCS | Performed by: INTERNAL MEDICINE

## 2017-04-05 PROCEDURE — 85025 COMPLETE CBC W/AUTO DIFF WBC: CPT

## 2017-04-05 PROCEDURE — 700102 HCHG RX REV CODE 250 W/ 637 OVERRIDE(OP): Performed by: FAMILY MEDICINE

## 2017-04-05 PROCEDURE — 700105 HCHG RX REV CODE 258: Performed by: INTERNAL MEDICINE

## 2017-04-05 PROCEDURE — 700111 HCHG RX REV CODE 636 W/ 250 OVERRIDE (IP): Performed by: INTERNAL MEDICINE

## 2017-04-05 PROCEDURE — A9270 NON-COVERED ITEM OR SERVICE: HCPCS | Performed by: HOSPITALIST

## 2017-04-05 PROCEDURE — 99232 SBSQ HOSP IP/OBS MODERATE 35: CPT | Performed by: HOSPITALIST

## 2017-04-05 PROCEDURE — 700105 HCHG RX REV CODE 258

## 2017-04-05 PROCEDURE — 85018 HEMOGLOBIN: CPT

## 2017-04-05 PROCEDURE — 700102 HCHG RX REV CODE 250 W/ 637 OVERRIDE(OP): Performed by: INTERNAL MEDICINE

## 2017-04-05 PROCEDURE — 80053 COMPREHEN METABOLIC PANEL: CPT

## 2017-04-05 PROCEDURE — A9270 NON-COVERED ITEM OR SERVICE: HCPCS | Performed by: ORTHOPAEDIC SURGERY

## 2017-04-05 PROCEDURE — 770021 HCHG ROOM/CARE - ISO PRIVATE

## 2017-04-05 PROCEDURE — 700101 HCHG RX REV CODE 250: Performed by: FAMILY MEDICINE

## 2017-04-05 PROCEDURE — A9270 NON-COVERED ITEM OR SERVICE: HCPCS | Performed by: FAMILY MEDICINE

## 2017-04-05 PROCEDURE — 700102 HCHG RX REV CODE 250 W/ 637 OVERRIDE(OP): Performed by: ORTHOPAEDIC SURGERY

## 2017-04-05 RX ORDER — SODIUM CHLORIDE 9 MG/ML
500 INJECTION, SOLUTION INTRAVENOUS ONCE
Status: COMPLETED | OUTPATIENT
Start: 2017-04-05 | End: 2017-04-05

## 2017-04-05 RX ORDER — SODIUM CHLORIDE 9 MG/ML
INJECTION, SOLUTION INTRAVENOUS
Status: COMPLETED
Start: 2017-04-05 | End: 2017-04-05

## 2017-04-05 RX ADMIN — LACTOBACILLUS ACIDOPHILUS / LACTOBACILLUS BULGARICUS 1 PACKET: 100 MILLION CFU STRENGTH GRANULES at 08:47

## 2017-04-05 RX ADMIN — NICOTINE 14 MG: 14 PATCH TRANSDERMAL at 05:24

## 2017-04-05 RX ADMIN — SODIUM CHLORIDE 500 ML: 9 INJECTION, SOLUTION INTRAVENOUS at 18:23

## 2017-04-05 RX ADMIN — AMPICILLIN SODIUM AND SULBACTAM SODIUM 3 G: 2; 1 INJECTION, POWDER, FOR SOLUTION INTRAMUSCULAR; INTRAVENOUS at 11:37

## 2017-04-05 RX ADMIN — STANDARDIZED SENNA CONCENTRATE AND DOCUSATE SODIUM 1 TABLET: 8.6; 5 TABLET, FILM COATED ORAL at 21:51

## 2017-04-05 RX ADMIN — AMPICILLIN SODIUM AND SULBACTAM SODIUM 3 G: 2; 1 INJECTION, POWDER, FOR SOLUTION INTRAMUSCULAR; INTRAVENOUS at 16:58

## 2017-04-05 RX ADMIN — POTASSIUM CHLORIDE 40 MEQ: 1500 TABLET, EXTENDED RELEASE ORAL at 21:51

## 2017-04-05 RX ADMIN — ENOXAPARIN SODIUM 40 MG: 100 INJECTION SUBCUTANEOUS at 08:49

## 2017-04-05 RX ADMIN — GABAPENTIN 600 MG: 300 CAPSULE ORAL at 14:21

## 2017-04-05 RX ADMIN — GABAPENTIN 600 MG: 300 CAPSULE ORAL at 08:47

## 2017-04-05 RX ADMIN — LIDOCAINE 2 PATCH: 50 PATCH CUTANEOUS at 11:38

## 2017-04-05 RX ADMIN — VANCOMYCIN HYDROCHLORIDE 125 MG: 10 INJECTION, POWDER, LYOPHILIZED, FOR SOLUTION INTRAVENOUS at 08:52

## 2017-04-05 RX ADMIN — SODIUM CHLORIDE 500 ML: 9 INJECTION, SOLUTION INTRAVENOUS at 14:21

## 2017-04-05 RX ADMIN — LACTOBACILLUS ACIDOPHILUS / LACTOBACILLUS BULGARICUS 1 PACKET: 100 MILLION CFU STRENGTH GRANULES at 16:58

## 2017-04-05 RX ADMIN — LACTOBACILLUS ACIDOPHILUS / LACTOBACILLUS BULGARICUS 1 PACKET: 100 MILLION CFU STRENGTH GRANULES at 11:37

## 2017-04-05 RX ADMIN — AMPICILLIN SODIUM AND SULBACTAM SODIUM 3 G: 2; 1 INJECTION, POWDER, FOR SOLUTION INTRAMUSCULAR; INTRAVENOUS at 05:24

## 2017-04-05 RX ADMIN — OXYCODONE HYDROCHLORIDE 5 MG: 5 TABLET ORAL at 02:38

## 2017-04-05 RX ADMIN — OXYCODONE HYDROCHLORIDE 20 MG: 20 TABLET, FILM COATED, EXTENDED RELEASE ORAL at 08:46

## 2017-04-05 RX ADMIN — VANCOMYCIN HYDROCHLORIDE 125 MG: 10 INJECTION, POWDER, LYOPHILIZED, FOR SOLUTION INTRAVENOUS at 21:52

## 2017-04-05 RX ADMIN — GABAPENTIN 600 MG: 300 CAPSULE ORAL at 21:51

## 2017-04-05 RX ADMIN — OXYCODONE HYDROCHLORIDE 5 MG: 5 TABLET ORAL at 15:36

## 2017-04-05 RX ADMIN — POTASSIUM CHLORIDE 40 MEQ: 1500 TABLET, EXTENDED RELEASE ORAL at 08:47

## 2017-04-05 RX ADMIN — AMPICILLIN SODIUM AND SULBACTAM SODIUM 3 G: 2; 1 INJECTION, POWDER, FOR SOLUTION INTRAMUSCULAR; INTRAVENOUS at 21:50

## 2017-04-05 RX ADMIN — SIMVASTATIN 20 MG: 20 TABLET, FILM COATED ORAL at 21:51

## 2017-04-05 RX ADMIN — OXYCODONE HYDROCHLORIDE 20 MG: 20 TABLET, FILM COATED, EXTENDED RELEASE ORAL at 21:51

## 2017-04-05 RX ADMIN — AMIODARONE HYDROCHLORIDE 200 MG: 200 TABLET ORAL at 08:47

## 2017-04-05 ASSESSMENT — ENCOUNTER SYMPTOMS
HALLUCINATIONS: 0
ABDOMINAL PAIN: 0
BACK PAIN: 1
SHORTNESS OF BREATH: 0
DIARRHEA: 1
COUGH: 0
WEAKNESS: 1
NERVOUS/ANXIOUS: 1
HEADACHES: 0
FOCAL WEAKNESS: 0
SPEECH CHANGE: 0
NAUSEA: 0
DIARRHEA: 0
VOMITING: 1
VOMITING: 0
FEVER: 0
MYALGIAS: 1
CHILLS: 0

## 2017-04-05 ASSESSMENT — PAIN SCALES - GENERAL
PAINLEVEL_OUTOF10: 5
PAINLEVEL_OUTOF10: 4
PAINLEVEL_OUTOF10: 4

## 2017-04-05 NOTE — PROGRESS NOTES
Infectious Disease Progress Note    Author: Kerrie Flores M.D. DOS & Time created: 4/5/2017  11:37 AM    Chief Complaint   Patient presents with   • Low Back Pain   FU for osteomyelitis, GAS bacteremia and CDI, suspected endograft infection    Interval History:  3/17 AF, WBC 12.9, pt c/o lower back pain, asking for morphine, poor insight into illness, denies any diarrhea  3/18 AF, WBC 13.3, sleeping and not arousable to voice, cultures here negative to date  3/19 AF, WBC 13.4, frustrated about lower back pain not adequately controlled on current pain regimen, plan for EGD with dilatation today  3/20 AF WBC 9.6 +pain-denies SE abx  3/21 AF WBC 10.5 no new complaints  3/22 AF WBC not done tolerating abx well  3/23 AF eye feels a little better-pain about the same  3/24 AF tolerating abx well-MRI confirm OM  3/25 AF WBC 9.5 s/p toe amp this am  3/26 AF WBC 10.1 deneies any new sxs-  3/27 AF, WBC 9.1, sleeping but arousable to voice, back pain stable  3/28 Tmax 99.9, WBC 7.7, not happy about soft foods, and wants to go back on regular diet, having normal BMs, back pain controlled on pain regimen  3/29 AF, WBC 5.7, transferred to ortho, happy he took a shower today, plan of care discussed with pain, states he was told he has arthritis of his back, back pain stable, no diarrhea  3/30 AF, WBC 8.5, had a loose BM this am follow by normal BM, no abd pain  3/31 AF, WBC 9, nausea and vomiting this am, had soft BM this am  4/1 WBC 13.7, still having emesis, states he continues to be thirsty despite drinking lots of water yesterday, diarrhea improving and stools forming  4/2 AF, WBC 11.6, had nose bleed overnight, being transfused blood for Hg 6.6, tolerating pills  4/3- denies any diarrhea. No fevers.   4/4- no fevers. No diarrhea. Back pain under control.   Labs Reviewed, Medications Reviewed, Radiology Reviewed and Wound Reviewed.    Review of Systems:  Review of Systems   Constitutional: Negative for fever and chills.    HENT: Negative for nosebleeds.    Respiratory: Negative for cough and shortness of breath.    Gastrointestinal: Negative for nausea, vomiting, abdominal pain and diarrhea.   Genitourinary: Negative for dysuria.   Musculoskeletal: Positive for back pain.        Stable   Neurological: Negative for headaches.   All other systems reviewed and are negative.      Hemodynamics:  Temp (24hrs), Av.4 °C (97.6 °F), Min:36.3 °C (97.4 °F), Max:36.6 °C (97.8 °F)  Temperature: 36.4 °C (97.6 °F)  Pulse  Av.2  Min: 56  Max: 114   Blood Pressure : (!) 97/59 mmHg       Physical Exam:  Physical Exam   Constitutional: He is oriented to person, place, and time. He appears well-developed. No distress.   HENT:   Head: Normocephalic and atraumatic.   Eyes: EOM are normal. Pupils are equal, round, and reactive to light.   Neck: Neck supple.   Cardiovascular: Normal rate.    Murmur heard.  IRR   Pulmonary/Chest: Effort normal and breath sounds normal.   Abdominal: Soft. He exhibits no distension. There is no tenderness.   Musculoskeletal: Normal range of motion. He exhibits edema.   R great toe with slight edema but no drainage or erythema, medial callus. Erythema resolving    Left 3rd toe-suture in place. No drainage    RUE PICC nontender   Neurological: He is alert and oriented to person, place, and time.   Skin:   Chronic changes   Nursing note and vitals reviewed.      Labs:  Recent Labs      17   0320  17   0530  17   0230   WBC  8.8  14.4*  10.9*   RBC  2.34*  2.35*  2.45*   HEMOGLOBIN  7.5*  7.4*  8.0*   HEMATOCRIT  22.5*  22.7*  23.8*   MCV  96.2  96.6  97.1   MCH  32.1  31.5  32.7   RDW  56.8*  56.7*  56.0*   PLATELETCT  171  176  213   MPV  8.8*  8.8*  8.9*   NEUTSPOLYS  76.00*  83.00*  76.20*   LYMPHOCYTES  14.50*  8.70*  14.90*   MONOCYTES  7.90  7.40  7.50   EOSINOPHILS  0.80  0.20  0.70   BASOPHILS  0.10  0.10  0.20     Recent Labs      17   0320  17   0530  17   0230   SODIUM  129*   130*  131*   POTASSIUM  3.6  3.7  4.1   CHLORIDE  98  99  100   CO2  26  23  22   GLUCOSE  109*  136*  112*   BUN  8  7*  7*     Recent Labs      04/03/17   0320  04/04/17   0530  04/05/17   0230   ALBUMIN  2.2*  2.4*  2.4*   TBILIRUBIN  0.6  0.6  0.6   ALKPHOSPHAT  87  98  101*   TOTPROTEIN  7.3  7.7  7.7   ALTSGPT  31  45  53*   ASTSGOT  37  60*  73*   CREATININE  0.99  1.10  1.00       BLOOD CULTURE   Date Value Ref Range Status   03/15/2017 No growth after 5 days of incubation.  Final    ':  Results     Procedure Component Value Units Date/Time    CDIFF BY PCR [[id number]]     Order Status:  Canceled Specimen Information:  Stool from Stool           Imaging  3/17 MRI lumbar spine: There are changes secondary to the abdominal aortic aneurysm endograft. The aneurysm sac measures an approximately 7.6 x 6.7 cm in size. Abnormal contrast enhancement is noted within the wall of the aneurysmal sac. There is also abnormal soft tissue   contrast enhancement in the surrounding soft tissue. These findings are highly suspicious for infected thrombosed aneurysm sac. The possibility of endograft infection is more likely. There is irregular outpouching of the aneurysmal sac along the   posterior portion indenting the left psoas muscle.  2.  Free fluid in the pelvis  3.  There is mild-to-moderate left hydronephrosis likely representing pelviureteric junction obstruction.    3/16 R foot xray - +OM  3/16 L foot xray +OM    Assessment:  Active Hospital Problems    Diagnosis   • *Osteomyelitis of toe (CMS-HCC) [M86.9]   • Bacteremia [R78.81]   • Back pain [M54.9]   • CHF (congestive heart failure) (CMS-HCC) [I50.9]   • Tobacco dependence [F17.200]   • Atrial fibrillation (CMS-HCC) [I48.91]    Endovascular infection       Plan:  Right great toe and left 3rd osteomyelitis  +OM on radiographs-confirmed on repeat MRIs  3/9 OSH wound cx - GAS, MSSA (I confirmed with [place] microlab)  3/25 s/p 3rd toe amp. No purulence seen  proximally to amp site  OR cx (left 3rd toe) - CoNS (S-unasyn) Vanco LINUS 2  Continue Unasyn  Anticipate 6 weeks of IV abx. 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  Appreciate vascular evaluation - surgery associated with high mortality  Abx per above followed by chronic abx suppression   Tagged WBC scan - no e/o endovascular infection  If truly a graft infection is suspected will consider a PET scan as outpt      Clostridium difficile diarrhea  Continue oral vancomycin QID for 2 weeks stop date 03/29/17 then BID while on IV abx with stop date of 04/30/17    Esophageal dysmotility-concern for achalasia  H/o strictures with recent dilatation as [place]'s  Esophagram +distal esophageal obstruction  s/p EGD with dilatation  Needs GI re-eval for possible repeat EGD given hx of strictures  Keep meds IV    Leukocytosis. Resolved  Multifactorial  No new signs or symptoms of infection  Monitor    Back pain  Chronic per patient      Prognosis guarded    Dispo:  Awaiting SNF placement for long-term abx    DW IM

## 2017-04-05 NOTE — PROGRESS NOTES
Hospital Medicine Progress Note, Adult, Complex               Author: Cabrera Berrios Date & Time created: 4/5/2017  9:29 AM     CC: 71 yo hx afib, cad, chf , hep c liver dz- Group A strep, MSSA  Bacteremia, OM Rt big toe, cdiff, dysphagia - - left AMA from [facility] -admitted w  back pain - s/p  L-3rd toe amputation .    Interval History:    Has one SNF acceptance, he refused to go there due death experience with loved one. Tolerating IV Atbs. Recent leukocytosis - now Nl. afeb.    Review of Systems:  Review of Systems   Constitutional: Negative for fever and chills.   Respiratory: Negative for cough and shortness of breath.    Cardiovascular: Positive for leg swelling (improved. ). Negative for chest pain.   Gastrointestinal: Positive for vomiting and diarrhea (improved). Negative for abdominal pain.   Musculoskeletal: Positive for myalgias and back pain.   Neurological: Positive for weakness. Negative for speech change and focal weakness.   Psychiatric/Behavioral: Negative for hallucinations. The patient is nervous/anxious.        Physical Exam:  Physical Exam   Constitutional: He is oriented to person, place, and time. No distress.   Eyes: EOM are normal. No scleral icterus.   Cardiovascular: Normal rate and regular rhythm.    Murmur (III/6 erin) heard.  Pulmonary/Chest: He has no wheezes. He has no rales.   Abdominal: Soft. Bowel sounds are normal. He exhibits no distension. There is no tenderness.   Musculoskeletal: He exhibits edema and tenderness (mild.  rt great toe w mild swelling.).   Left 3rd toe amputation - wrapped- sutures dry, no dehisc, or redness.    Neurological: He is alert and oriented to person, place, and time. No cranial nerve deficit.   Skin: Skin is warm and dry. He is not diaphoretic.   Psychiatric:   Easily restless, anxious        Labs:        Invalid input(s): [id number]      Recent Labs      04/03/17   0320  04/04/17   0530  04/05/17   0230   SODIUM  129*  130*  131*   POTASSIUM  3.6   3.7  4.1   CHLORIDE  98  99  100   CO2  26  23  22   BUN  8  7*  7*   CREATININE  0.99  1.10  1.00   CALCIUM  8.0*  8.0*  8.4*     Recent Labs      17   0230   ALTSGPT  31  45  53*   ASTSGOT  37  60*  73*   ALKPHOSPHAT  87  98  101*   TBILIRUBIN  0.6  0.6  0.6   PREALBUMIN  4.0*   --    --    GLUCOSE  109*  136*  112*     Recent Labs      17   0230   RBC  2.34*  2.35*  2.45*   HEMOGLOBIN  7.5*  7.4*  8.0*   HEMATOCRIT  22.5*  22.7*  23.8*   PLATELETCT  171  176  213     Recent Labs      17   0230   WBC  8.8  14.4*  10.9*   NEUTSPOLYS  76.00*  83.00*  76.20*   LYMPHOCYTES  14.50*  8.70*  14.90*   MONOCYTES  7.90  7.40  7.50   EOSINOPHILS  0.80  0.20  0.70   BASOPHILS  0.10  0.10  0.20   ASTSGOT  37  60*  73*   ALTSGPT  31  45  53*   ALKPHOSPHAT  87  98  101*   TBILIRUBIN  0.6  0.6  0.6           Hemodynamics:  Temp (24hrs), Av.4 °C (97.6 °F), Min:36.3 °C (97.4 °F), Max:36.6 °C (97.8 °F)  Temperature: 36.4 °C (97.6 °F)  Pulse  Av.2  Min: 56  Max: 114   Blood Pressure : (!) 97/59 mmHg     Respiratory:    Respiration: 18, Pulse Oximetry: 93 %        RUL Breath Sounds: Clear;Diminished, RML Breath Sounds: Diminished, RLL Breath Sounds: Diminished, SOLEDAD Breath Sounds: Clear;Diminished, LLL Breath Sounds: Diminished  Fluids:    Intake/Output Summary (Last 24 hours) at 17 0929  Last data filed at 17 0400   Gross per 24 hour   Intake    840 ml   Output    800 ml   Net     40 ml        GI/Nutrition:  Orders Placed This Encounter   Procedures   • DIET ORDER     Standing Status: Standing      Number of Occurrences: 1      Standing Expiration Date:      Order Specific Question:  Diet:     Answer:  Full Liquid [11]     Medical Decision Making, by Problem:  Active Hospital Problems    Diagnosis   • *Osteomyelitis of left great toe (CMS-HCC) [M86.9]- post left  3rd amputation - clinically  better   Iv unasyn - plan 6 weeks, Wound care.  Has [facility] snf acceptance,  refuses to go  w mothers death experience there.   • C. difficile diarrhea [A04.7]-  Improved w formed stools.  Oral vanco.  Isolation precautions.    • Bacteremia [R78.81] recent mssa, strep on atbs above, afeb.- fu cultures neg.   • Back pain [M54.9]   sched oxycontin , neurontin    • Anemia [D64.9]- asympt- likely chronic dz, recent nose bleed- no tachycardia, low bp likely baseline - post prbc tx.  Hold bb for sbp < 100  Monitor hgb, acute bleed symptoms.   • Hyponatremia [E87.1] improved   Monitor.    • Macrocytosis [D75.89]   • Hypokalemia [E87.6]corrected   Continue oral  kcl with concern for additional gi losses    • CHF (congestive heart failure) (CMS-[facility]) [I50.9] w/o decompensation .   • Atrial fibrillation (CMS-[facility]) [I48.91] converted to sinus.  cw amiodarone.    • S/P AAA (abdominal aortic aneurysm) repair [Z98.890, Z86.79]- ? endograft infxn- surgery is high risk- clinically stable-conservative therapies  dw Dr Nguyen ID- will consider outpt PET scan to more definitely dx infxn.  May need prolonged suppressive atb therapy if confirmed.    • Noncompliance [Z91.19]   • Tobacco dependence [F17.200]   • Hep C w/o coma, chronic (CMS-HCC) [B18.2]   • Dysphagia [R13.10]- w achalasia - post botox at GE junction - persistent liquid regurg   Gi referral.    • CAD (coronary artery disease) [I25.10] stable .       Labs reviewed, Radiology images reviewed and Medications reviewed  Art catheter: No Art      DVT Prophylaxis: Enoxaparin (Lovenox)      Antibiotics: Treating active infection/contamination beyond 24 hours perioperative coverage

## 2017-04-05 NOTE — CARE PLAN
Received report from day RN, assumed care at 1915; Pt A&Ox4, sitting up on the edge of bed; Pt pain 3-5/10 on his back, and Lt foot; CMS intact; Swelling/Pitting edema 2-3+ noted on BLE; Pt does call appropriately; Pt is up w SA assist, spoint Cane; PICC RUE assessed and is patent, CDI, TKO; Pt is on RA-2L NC w SaO2 >90%; Call light and personal possessions within reach, discussed safety interventions w pt; Reviewed recent notes, labs, diagnostics, MD orders; Hourly rounding in place;  s/p Lt 3rd toe amputated, dressing in place, CDI. On Abx Unasyn and Vanco Oral for cdiff        Problem: Pain Management  Goal: Pain level will decrease to patient’s comfort goal  Outcome: PROGRESSING AS EXPECTED  Pt pain kept under control at 3-5/10 on his back/Lt foot with current pain regimen.      Problem: Discharge Barriers/Planning  Goal: Patient’s continuum of care needs will be met  Outcome: PROGRESSING AS EXPECTED  Pending SNF placement vs d/c to Home with home infusion ?

## 2017-04-06 PROCEDURE — A9270 NON-COVERED ITEM OR SERVICE: HCPCS | Performed by: HOSPITALIST

## 2017-04-06 PROCEDURE — 700111 HCHG RX REV CODE 636 W/ 250 OVERRIDE (IP): Performed by: HOSPITALIST

## 2017-04-06 PROCEDURE — A9270 NON-COVERED ITEM OR SERVICE: HCPCS | Performed by: INTERNAL MEDICINE

## 2017-04-06 PROCEDURE — 700102 HCHG RX REV CODE 250 W/ 637 OVERRIDE(OP): Performed by: HOSPITALIST

## 2017-04-06 PROCEDURE — 99232 SBSQ HOSP IP/OBS MODERATE 35: CPT | Performed by: HOSPITALIST

## 2017-04-06 PROCEDURE — 770021 HCHG ROOM/CARE - ISO PRIVATE

## 2017-04-06 PROCEDURE — 700102 HCHG RX REV CODE 250 W/ 637 OVERRIDE(OP): Performed by: FAMILY MEDICINE

## 2017-04-06 PROCEDURE — 700101 HCHG RX REV CODE 250: Performed by: FAMILY MEDICINE

## 2017-04-06 PROCEDURE — 700102 HCHG RX REV CODE 250 W/ 637 OVERRIDE(OP): Performed by: INTERNAL MEDICINE

## 2017-04-06 PROCEDURE — A9270 NON-COVERED ITEM OR SERVICE: HCPCS | Performed by: FAMILY MEDICINE

## 2017-04-06 PROCEDURE — 700105 HCHG RX REV CODE 258: Performed by: INTERNAL MEDICINE

## 2017-04-06 PROCEDURE — 700111 HCHG RX REV CODE 636 W/ 250 OVERRIDE (IP): Performed by: INTERNAL MEDICINE

## 2017-04-06 RX ORDER — METHOCARBAMOL 500 MG/1
500 TABLET, FILM COATED ORAL 3 TIMES DAILY
Status: DISCONTINUED | OUTPATIENT
Start: 2017-04-06 | End: 2017-04-19 | Stop reason: HOSPADM

## 2017-04-06 RX ADMIN — GABAPENTIN 600 MG: 300 CAPSULE ORAL at 20:38

## 2017-04-06 RX ADMIN — METHOCARBAMOL 500 MG: 500 TABLET ORAL at 20:54

## 2017-04-06 RX ADMIN — GABAPENTIN 600 MG: 300 CAPSULE ORAL at 10:06

## 2017-04-06 RX ADMIN — OXYCODONE HYDROCHLORIDE 10 MG: 10 TABLET ORAL at 10:30

## 2017-04-06 RX ADMIN — OXYCODONE HYDROCHLORIDE 20 MG: 20 TABLET, FILM COATED, EXTENDED RELEASE ORAL at 10:06

## 2017-04-06 RX ADMIN — OXYCODONE HYDROCHLORIDE 10 MG: 10 TABLET ORAL at 20:40

## 2017-04-06 RX ADMIN — OXYCODONE HYDROCHLORIDE 10 MG: 10 TABLET ORAL at 15:39

## 2017-04-06 RX ADMIN — ENOXAPARIN SODIUM 40 MG: 100 INJECTION SUBCUTANEOUS at 10:07

## 2017-04-06 RX ADMIN — OXYCODONE HYDROCHLORIDE 20 MG: 20 TABLET, FILM COATED, EXTENDED RELEASE ORAL at 20:39

## 2017-04-06 RX ADMIN — POTASSIUM CHLORIDE 40 MEQ: 1500 TABLET, EXTENDED RELEASE ORAL at 10:07

## 2017-04-06 RX ADMIN — LACTOBACILLUS ACIDOPHILUS / LACTOBACILLUS BULGARICUS 1 PACKET: 100 MILLION CFU STRENGTH GRANULES at 10:06

## 2017-04-06 RX ADMIN — LIDOCAINE 2 PATCH: 50 PATCH CUTANEOUS at 12:07

## 2017-04-06 RX ADMIN — LACTOBACILLUS ACIDOPHILUS / LACTOBACILLUS BULGARICUS 1 PACKET: 100 MILLION CFU STRENGTH GRANULES at 11:59

## 2017-04-06 RX ADMIN — AMPICILLIN SODIUM AND SULBACTAM SODIUM 3 G: 2; 1 INJECTION, POWDER, FOR SOLUTION INTRAMUSCULAR; INTRAVENOUS at 17:17

## 2017-04-06 RX ADMIN — POTASSIUM CHLORIDE 40 MEQ: 1500 TABLET, EXTENDED RELEASE ORAL at 20:39

## 2017-04-06 RX ADMIN — AMPICILLIN SODIUM AND SULBACTAM SODIUM 3 G: 2; 1 INJECTION, POWDER, FOR SOLUTION INTRAMUSCULAR; INTRAVENOUS at 06:17

## 2017-04-06 RX ADMIN — GABAPENTIN 600 MG: 300 CAPSULE ORAL at 15:38

## 2017-04-06 RX ADMIN — OXYCODONE HYDROCHLORIDE 10 MG: 10 TABLET ORAL at 06:19

## 2017-04-06 RX ADMIN — SIMVASTATIN 20 MG: 20 TABLET, FILM COATED ORAL at 20:38

## 2017-04-06 RX ADMIN — METHOCARBAMOL 500 MG: 500 TABLET ORAL at 15:38

## 2017-04-06 RX ADMIN — LACTOBACILLUS ACIDOPHILUS / LACTOBACILLUS BULGARICUS 1 PACKET: 100 MILLION CFU STRENGTH GRANULES at 17:17

## 2017-04-06 RX ADMIN — AMPICILLIN SODIUM AND SULBACTAM SODIUM 3 G: 2; 1 INJECTION, POWDER, FOR SOLUTION INTRAMUSCULAR; INTRAVENOUS at 11:59

## 2017-04-06 RX ADMIN — AMIODARONE HYDROCHLORIDE 200 MG: 200 TABLET ORAL at 10:06

## 2017-04-06 RX ADMIN — VANCOMYCIN HYDROCHLORIDE 125 MG: 10 INJECTION, POWDER, LYOPHILIZED, FOR SOLUTION INTRAVENOUS at 10:06

## 2017-04-06 RX ADMIN — VANCOMYCIN HYDROCHLORIDE 125 MG: 10 INJECTION, POWDER, LYOPHILIZED, FOR SOLUTION INTRAVENOUS at 20:38

## 2017-04-06 RX ADMIN — NICOTINE 14 MG: 14 PATCH TRANSDERMAL at 06:19

## 2017-04-06 ASSESSMENT — ENCOUNTER SYMPTOMS
HEADACHES: 0
ABDOMINAL PAIN: 0
CHILLS: 0
NAUSEA: 0
DIARRHEA: 0
SHORTNESS OF BREATH: 0
NERVOUS/ANXIOUS: 1
SPEECH CHANGE: 0
MYALGIAS: 1
COUGH: 0
BACK PAIN: 1
PALPITATIONS: 0
HALLUCINATIONS: 0
WEAKNESS: 1
VOMITING: 1
FOCAL WEAKNESS: 0
FEVER: 0

## 2017-04-06 ASSESSMENT — PAIN SCALES - GENERAL
PAINLEVEL_OUTOF10: 4
PAINLEVEL_OUTOF10: 6
PAINLEVEL_OUTOF10: 4
PAINLEVEL_OUTOF10: 4
PAINLEVEL_OUTOF10: 6
PAINLEVEL_OUTOF10: 4

## 2017-04-06 NOTE — PROGRESS NOTES
Patient aox4. Contact for c-diff. Patient pain meds given. Rested well throughout shift. Call light within reach. Plan of care ongoing.

## 2017-04-06 NOTE — PROGRESS NOTES
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 I discussed with  recommendation of long term suppression and if approved an outpatient PET scan that would apparently confirm infection or absence thereof.    Continues to have regurgitation after meals secondary to achalasia.  Main complaint is low back pain.  MRI did not show apparent spine etiology.  AAA not tender on exam.  I cannot correlate the low back pain to the AAA, but if it is, the only resolusion might be explant of endograft with very high operative mortality.  Also unless we can confirm infection I don't recommend abdominal exploration.  Patient wants relief of low back pain before he is discharged.  Dr. Berrios and I discussed.  If back pain can be managed and patient discharged then we can see how he does on antibiotic suppression and see if PET scan can be approved and done.  30 minutes in total time examining and discussing with patient and doctor discussions re management.

## 2017-04-06 NOTE — CARE PLAN
Problem: Pain Management  Goal: Pain level will decrease to patient’s comfort goal  Outcome: PROGRESSING AS EXPECTED  Pt. Taking oxy 5 with adequate pain control

## 2017-04-06 NOTE — PROGRESS NOTES
Infectious Disease Progress Note    Author: Kerrie Flores M.D. DOS & Time created: 4/6/2017  11:00 AM    Chief Complaint   Patient presents with   • Low Back Pain   FU for osteomyelitis, GAS bacteremia and CDI, suspected endograft infection    Interval History:  3/17 AF, WBC 12.9, pt c/o lower back pain, asking for morphine, poor insight into illness, denies any diarrhea  3/18 AF, WBC 13.3, sleeping and not arousable to voice, cultures here negative to date  3/19 AF, WBC 13.4, frustrated about lower back pain not adequately controlled on current pain regimen, plan for EGD with dilatation today  3/20 AF WBC 9.6 +pain-denies SE abx  3/21 AF WBC 10.5 no new complaints  3/22 AF WBC not done tolerating abx well  3/23 AF eye feels a little better-pain about the same  3/24 AF tolerating abx well-MRI confirm OM  3/25 AF WBC 9.5 s/p toe amp this am  3/26 AF WBC 10.1 deneies any new sxs-  3/27 AF, WBC 9.1, sleeping but arousable to voice, back pain stable  3/28 Tmax 99.9, WBC 7.7, not happy about soft foods, and wants to go back on regular diet, having normal BMs, back pain controlled on pain regimen  3/29 AF, WBC 5.7, transferred to ortho, happy he took a shower today, plan of care discussed with pain, states he was told he has arthritis of his back, back pain stable, no diarrhea  3/30 AF, WBC 8.5, had a loose BM this am follow by normal BM, no abd pain  3/31 AF, WBC 9, nausea and vomiting this am, had soft BM this am  4/1 WBC 13.7, still having emesis, states he continues to be thirsty despite drinking lots of water yesterday, diarrhea improving and stools forming  4/2 AF, WBC 11.6, had nose bleed overnight, being transfused blood for Hg 6.6, tolerating pills  4/3- denies any diarrhea. No fevers.   4/4- no fevers. No diarrhea. Back pain under control.   4/6- no diarrhea. No fevers. Still throws up everything  Labs Reviewed, Medications Reviewed, Radiology Reviewed and Wound Reviewed.    Review of Systems:  Review of  Systems   Constitutional: Negative for fever and chills.   HENT: Negative for nosebleeds.    Respiratory: Negative for cough and shortness of breath.    Gastrointestinal: Positive for vomiting. Negative for nausea, abdominal pain and diarrhea.   Genitourinary: Negative for dysuria.   Musculoskeletal: Positive for back pain.        Stable   Neurological: Negative for headaches.   All other systems reviewed and are negative.      Hemodynamics:  Temp (24hrs), Av.8 °C (98.2 °F), Min:36.1 °C (97 °F), Max:37.3 °C (99.1 °F)  Temperature: 36.8 °C (98.2 °F)  Pulse  Av.2  Min: 56  Max: 114   Blood Pressure : (!) 95/53 mmHg       Physical Exam:  Physical Exam   Constitutional: He is oriented to person, place, and time. He appears well-developed. No distress.   HENT:   Head: Normocephalic and atraumatic.   Eyes: EOM are normal. Pupils are equal, round, and reactive to light.   Neck: Neck supple.   Cardiovascular: Normal rate.    Murmur heard.  IRR   Pulmonary/Chest: Effort normal and breath sounds normal.   Abdominal: Soft. He exhibits no distension. There is no tenderness.   Musculoskeletal: Normal range of motion. He exhibits edema.   R great toe with slight edema but no drainage or erythema, medial callus. Erythema resolving    Left 3rd toe-suture in place. No drainage    RUE PICC nontender   Neurological: He is alert and oriented to person, place, and time.   Skin:   Chronic changes   Nursing note and vitals reviewed.      Labs:  Recent Labs      17   0530  17   0230  17   1650   WBC  14.4*  10.9*   --    RBC  2.35*  2.45*   --    HEMOGLOBIN  7.4*  8.0*  7.8*   HEMATOCRIT  22.7*  23.8*   --    MCV  96.6  97.1   --    MCH  31.5  32.7   --    RDW  56.7*  56.0*   --    PLATELETCT  176  213   --    MPV  8.8*  8.9*   --    NEUTSPOLYS  83.00*  76.20*   --    LYMPHOCYTES  8.70*  14.90*   --    MONOCYTES  7.40  7.50   --    EOSINOPHILS  0.20  0.70   --    BASOPHILS  0.10  0.20   --      Recent Labs       04/04/17   0530  04/05/17   0230   SODIUM  130*  131*   POTASSIUM  3.7  4.1   CHLORIDE  99  100   CO2  23  22   GLUCOSE  136*  112*   BUN  7*  7*     Recent Labs      04/04/17   0530  04/05/17   0230   ALBUMIN  2.4*  2.4*   TBILIRUBIN  0.6  0.6   ALKPHOSPHAT  98  101*   TOTPROTEIN  7.7  7.7   ALTSGPT  45  53*   ASTSGOT  60*  73*   CREATININE  1.10  1.00       BLOOD CULTURE   Date Value Ref Range Status   03/15/2017 No growth after 5 days of incubation.  Final    ':  Results     ** No results found for the last 168 hours. **          Imaging  3/17 MRI lumbar spine: There are changes secondary to the abdominal aortic aneurysm endograft. The aneurysm sac measures an approximately 7.6 x 6.7 cm in size. Abnormal contrast enhancement is noted within the wall of the aneurysmal sac. There is also abnormal soft tissue   contrast enhancement in the surrounding soft tissue. These findings are highly suspicious for infected thrombosed aneurysm sac. The possibility of endograft infection is more likely. There is irregular outpouching of the aneurysmal sac along the   posterior portion indenting the left psoas muscle.  2.  Free fluid in the pelvis  3.  There is mild-to-moderate left hydronephrosis likely representing pelviureteric junction obstruction.    3/16 R foot xray - +OM  3/16 L foot xray +OM    Assessment:  Active Hospital Problems    Diagnosis   • *Osteomyelitis of toe (CMS-HCC) [M86.9]   • Bacteremia [R78.81]   • Back pain [M54.9]   • CHF (congestive heart failure) (CMS-HCC) [I50.9]   • Tobacco dependence [F17.200]   • Atrial fibrillation (CMS-HCC) [I48.91]    Endovascular infection       Plan:  Right great toe and left 3rd osteomyelitis  +OM on radiographs-confirmed on repeat MRIs  3/9 OSH wound cx - GAS, MSSA (I confirmed with [place] microlab)  3/25 s/p 3rd toe amp. No purulence seen proximally to amp site  OR cx (left 3rd toe) - CoNS (S-unasyn) Vanco LINUS 2  Continue Unasyn  Anticipate 6 weeks of IV abx. 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  Appreciate vascular evaluation - surgery associated with high mortality  Abx per above followed by chronic abx suppression   Tagged WBC scan - no e/o endovascular infection  If truly a graft infection is suspected will consider a PET scan as outpt      Clostridium difficile diarrhea  Continue oral vancomycin QID for 2 weeks stop date 03/29/17 then BID while on IV abx with stop date of 04/30/17    Esophageal dysmotility-concern for achalasia  H/o strictures with recent dilatation as [place]'s  Esophagram +distal esophageal obstruction  s/p EGD with dilatation  Needs GI re-eval for possible repeat EGD given hx of strictures      Leukocytosis. Resolved  Multifactorial  No new signs or symptoms of infection  Monitor    Back pain  Chronic per patient      Prognosis guarded    Dispo:  Awaiting SNF placement for long-term abx    JENNA IM

## 2017-04-06 NOTE — PROGRESS NOTES
Hospital Medicine Progress Note, Adult, Complex               Author: Cabrera Berrios Date & Time created: 4/6/2017  8:46 AM     CC: 73 yo hx afib, cad, chf , hep c liver dz- Group A strep, MSSA  Bacteremia, OM Rt big toe, cdiff, dysphagia - - left AMA from [facility] -admitted w  back pain - s/p  L-3rd toe amputation .    Interval History:    Episodes of asympt hypotension - sbp 80-90s. Afeb on IV atbs. Usual co back pain, regurgeing liquids.    Review of Systems:  Review of Systems   Constitutional: Negative for fever and chills.   Respiratory: Negative for cough and shortness of breath.    Cardiovascular: Positive for leg swelling. Negative for chest pain and palpitations.   Gastrointestinal: Positive for vomiting. Negative for abdominal pain and diarrhea.   Musculoskeletal: Positive for myalgias and back pain.   Neurological: Positive for weakness. Negative for speech change and focal weakness.   Psychiatric/Behavioral: Negative for hallucinations. The patient is nervous/anxious.        Physical Exam:  Physical Exam   Constitutional: He is oriented to person, place, and time. No distress.   Eyes: EOM are normal. No scleral icterus.   Neck: No JVD present.   Cardiovascular: Normal rate and regular rhythm.    Murmur (III/6 erin) heard.  Pulmonary/Chest: No stridor. He has no wheezes. He has no rales.   Abdominal: Soft. Bowel sounds are normal. He exhibits no distension. There is no tenderness.   Musculoskeletal: He exhibits edema and tenderness (mild.  rt great toe w mild swelling.).   Left 3rd toe amputation - wrapped- sutures dry, no dehisc, or redness.    Neurological: He is alert and oriented to person, place, and time. No cranial nerve deficit.   Skin: Skin is warm and dry. He is not diaphoretic.   Psychiatric: His mood appears anxious. He is inattentive.       Labs:        Invalid input(s): [id number]      Recent Labs      04/04/17   0530  04/05/17   0230   SODIUM  130*  131*   POTASSIUM  3.7  4.1   CHLORIDE   99  100   CO2  23  22   BUN  7*  7*   CREATININE  1.10  1.00   CALCIUM  8.0*  8.4*     Recent Labs      17   0530  17   0230   ALTSGPT  45  53*   ASTSGOT  60*  73*   ALKPHOSPHAT  98  101*   TBILIRUBIN  0.6  0.6   GLUCOSE  136*  112*     Recent Labs      17   0530  17   0230  17   1650   RBC  2.35*  2.45*   --    HEMOGLOBIN  7.4*  8.0*  7.8*   HEMATOCRIT  22.7*  23.8*   --    PLATELETCT  176  213   --      Recent Labs      17   0530  17   0230   WBC  14.4*  10.9*   NEUTSPOLYS  83.00*  76.20*   LYMPHOCYTES  8.70*  14.90*   MONOCYTES  7.40  7.50   EOSINOPHILS  0.20  0.70   BASOPHILS  0.10  0.20   ASTSGOT  60*  73*   ALTSGPT  45  53*   ALKPHOSPHAT  98  101*   TBILIRUBIN  0.6  0.6           Hemodynamics:  Temp (24hrs), Av.8 °C (98.2 °F), Min:36.1 °C (97 °F), Max:37.3 °C (99.1 °F)  Temperature: 36.8 °C (98.2 °F)  Pulse  Av.2  Min: 56  Max: 114   Blood Pressure : (!) 95/53 mmHg     Respiratory:    Respiration: 20, Pulse Oximetry: 90 %        RUL Breath Sounds: Clear;Diminished, RML Breath Sounds: Diminished, RLL Breath Sounds: Diminished, SOLEDAD Breath Sounds: Clear;Diminished, LLL Breath Sounds: Diminished  Fluids:  No intake or output data in the 24 hours ending 17 0846     GI/Nutrition:  Orders Placed This Encounter   Procedures   • DIET ORDER     Standing Status: Standing      Number of Occurrences: 1      Standing Expiration Date:      Order Specific Question:  Diet:     Answer:  Full Liquid [11]     Medical Decision Making, by Problem:  Active Hospital Problems    Diagnosis   • *Osteomyelitis of left great toe (CMS-HCC) [M86.9]- post left  3rd amputation - improved swelling, redness.  Iv unasyn - plan 6 weeks- thru 17 then oral atbs  Discussed with - [facility] accepted,  refuses to go  w [facility] there.   • C. difficile diarrhea [A04.7]-  Improved  Oral vanco thru  3 days beyond iv atb completion  Lift Isolation precaution if cont'd  formed stools.   • Bacteremia [R78.81] recent mssa, strep on atbs above, afeb.- fu cultures neg.   • Back pain [M54.9]   sched oxycontin    neurontin recently increased, monitor response.    • Anemia [D64.9]- asympt- likely chronic dz, recent nose bleed- no tachycardia, low bp likely baseline - post prbc tx- stable   Hypotensive episodes- BB stopped.  Monitor hgb, acute bleed symptoms.   • Hyponatremia [E87.1] improved   Monitor.    • Macrocytosis [D75.89]   • Hypokalemia [E87.6]corrected   Decrease kcl.   • CHF (congestive heart failure) (CMS-[facility]) [I50.9] w/o decompensation .   • Atrial fibrillation (CMS-[facility]) [I48.91] converted to sinus.  cw amiodarone.    • S/P AAA (abdominal aortic aneurysm) repair [Z98.890, Z86.79]- ? endograft infxn- surgery is high risk- clinically stable-conservative therapies  To fu ID outpatient -considering  PET scan to more definitely dx infxn.  May need prolonged suppressive atb therapy if confirmed.    • Noncompliance [Z91.19]   • Tobacco dependence [F17.200]   • Hep C w/o coma, chronic (CMS-[facility]) [B18.2]   • Dysphagia [R13.10]- w achalasia - post botox at GE junction - persistent liquid regurg   Gi referral.    • CAD (coronary artery disease) [I25.10] stable .  Transaminitis - mild, abd exam benign- ? Drug induced  Consider alternative to Amio if persistently elevated.       Labs reviewed, Radiology images reviewed and Medications reviewed  Art catheter: No Art      DVT Prophylaxis: Enoxaparin (Lovenox)      Antibiotics: Treating active infection/contamination beyond 24 hours perioperative coverage

## 2017-04-07 LAB
ABO GROUP BLD: NORMAL
ALBUMIN SERPL BCP-MCNC: 2.4 G/DL (ref 3.2–4.9)
ALBUMIN/GLOB SERPL: 0.5 G/DL
ALP SERPL-CCNC: 105 U/L (ref 30–99)
ALT SERPL-CCNC: 56 U/L (ref 2–50)
ANION GAP SERPL CALC-SCNC: 9 MMOL/L (ref 0–11.9)
AST SERPL-CCNC: 82 U/L (ref 12–45)
BASOPHILS # BLD AUTO: 0.3 % (ref 0–1.8)
BASOPHILS # BLD: 0.02 K/UL (ref 0–0.12)
BILIRUB SERPL-MCNC: 0.5 MG/DL (ref 0.1–1.5)
BUN SERPL-MCNC: 6 MG/DL (ref 8–22)
CALCIUM SERPL-MCNC: 8.2 MG/DL (ref 8.5–10.5)
CHLORIDE SERPL-SCNC: 100 MMOL/L (ref 96–112)
CO2 SERPL-SCNC: 24 MMOL/L (ref 20–33)
CREAT SERPL-MCNC: 1.04 MG/DL (ref 0.5–1.4)
EOSINOPHIL # BLD AUTO: 0.11 K/UL (ref 0–0.51)
EOSINOPHIL NFR BLD: 1.4 % (ref 0–6.9)
ERYTHROCYTE [DISTWIDTH] IN BLOOD BY AUTOMATED COUNT: 55.8 FL (ref 35.9–50)
GFR SERPL CREATININE-BSD FRML MDRD: >60 ML/MIN/1.73 M 2
GLOBULIN SER CALC-MCNC: 5 G/DL (ref 1.9–3.5)
GLUCOSE SERPL-MCNC: 114 MG/DL (ref 65–99)
HCT VFR BLD AUTO: 22 % (ref 42–52)
HGB BLD-MCNC: 7.1 G/DL (ref 14–18)
IMM GRANULOCYTES # BLD AUTO: 0.03 K/UL (ref 0–0.11)
IMM GRANULOCYTES NFR BLD AUTO: 0.4 % (ref 0–0.9)
LYMPHOCYTES # BLD AUTO: 1.43 K/UL (ref 1–4.8)
LYMPHOCYTES NFR BLD: 18.6 % (ref 22–41)
MCH RBC QN AUTO: 31.4 PG (ref 27–33)
MCHC RBC AUTO-ENTMCNC: 32.3 G/DL (ref 33.7–35.3)
MCV RBC AUTO: 97.3 FL (ref 81.4–97.8)
MONOCYTES # BLD AUTO: 0.8 K/UL (ref 0–0.85)
MONOCYTES NFR BLD AUTO: 10.4 % (ref 0–13.4)
NEUTROPHILS # BLD AUTO: 5.28 K/UL (ref 1.82–7.42)
NEUTROPHILS NFR BLD: 68.9 % (ref 44–72)
NRBC # BLD AUTO: 0 K/UL
NRBC BLD AUTO-RTO: 0 /100 WBC
PLATELET # BLD AUTO: 188 K/UL (ref 164–446)
PMV BLD AUTO: 8.6 FL (ref 9–12.9)
POTASSIUM SERPL-SCNC: 4.4 MMOL/L (ref 3.6–5.5)
PROT SERPL-MCNC: 7.4 G/DL (ref 6–8.2)
RBC # BLD AUTO: 2.26 M/UL (ref 4.7–6.1)
RH BLD: NORMAL
SODIUM SERPL-SCNC: 133 MMOL/L (ref 135–145)
WBC # BLD AUTO: 7.7 K/UL (ref 4.8–10.8)

## 2017-04-07 PROCEDURE — 700102 HCHG RX REV CODE 250 W/ 637 OVERRIDE(OP): Performed by: HOSPITALIST

## 2017-04-07 PROCEDURE — A9270 NON-COVERED ITEM OR SERVICE: HCPCS | Performed by: ORTHOPAEDIC SURGERY

## 2017-04-07 PROCEDURE — 700102 HCHG RX REV CODE 250 W/ 637 OVERRIDE(OP): Performed by: FAMILY MEDICINE

## 2017-04-07 PROCEDURE — A9270 NON-COVERED ITEM OR SERVICE: HCPCS | Performed by: HOSPITALIST

## 2017-04-07 PROCEDURE — 700102 HCHG RX REV CODE 250 W/ 637 OVERRIDE(OP): Performed by: INTERNAL MEDICINE

## 2017-04-07 PROCEDURE — 86900 BLOOD TYPING SEROLOGIC ABO: CPT

## 2017-04-07 PROCEDURE — A9270 NON-COVERED ITEM OR SERVICE: HCPCS | Performed by: INTERNAL MEDICINE

## 2017-04-07 PROCEDURE — A9270 NON-COVERED ITEM OR SERVICE: HCPCS | Performed by: FAMILY MEDICINE

## 2017-04-07 PROCEDURE — 700111 HCHG RX REV CODE 636 W/ 250 OVERRIDE (IP): Performed by: INTERNAL MEDICINE

## 2017-04-07 PROCEDURE — 86901 BLOOD TYPING SEROLOGIC RH(D): CPT

## 2017-04-07 PROCEDURE — 700111 HCHG RX REV CODE 636 W/ 250 OVERRIDE (IP): Performed by: HOSPITALIST

## 2017-04-07 PROCEDURE — 85025 COMPLETE CBC W/AUTO DIFF WBC: CPT

## 2017-04-07 PROCEDURE — 700105 HCHG RX REV CODE 258: Performed by: INTERNAL MEDICINE

## 2017-04-07 PROCEDURE — 700101 HCHG RX REV CODE 250: Performed by: FAMILY MEDICINE

## 2017-04-07 PROCEDURE — 99233 SBSQ HOSP IP/OBS HIGH 50: CPT | Performed by: HOSPITALIST

## 2017-04-07 PROCEDURE — 770021 HCHG ROOM/CARE - ISO PRIVATE

## 2017-04-07 PROCEDURE — 700102 HCHG RX REV CODE 250 W/ 637 OVERRIDE(OP): Performed by: ORTHOPAEDIC SURGERY

## 2017-04-07 PROCEDURE — 80053 COMPREHEN METABOLIC PANEL: CPT

## 2017-04-07 RX ORDER — OXYCODONE HYDROCHLORIDE 10 MG/1
10 TABLET ORAL EVERY 4 HOURS PRN
Status: DISCONTINUED | OUTPATIENT
Start: 2017-04-07 | End: 2017-04-12

## 2017-04-07 RX ORDER — FERROUS SULFATE 325(65) MG
325 TABLET ORAL
Status: DISCONTINUED | OUTPATIENT
Start: 2017-04-08 | End: 2017-04-19 | Stop reason: HOSPADM

## 2017-04-07 RX ORDER — OXYCODONE HYDROCHLORIDE 5 MG/1
5 TABLET ORAL EVERY 4 HOURS PRN
Status: DISCONTINUED | OUTPATIENT
Start: 2017-04-07 | End: 2017-04-12

## 2017-04-07 RX ADMIN — METHOCARBAMOL 500 MG: 500 TABLET ORAL at 07:54

## 2017-04-07 RX ADMIN — OXYCODONE HYDROCHLORIDE 10 MG: 10 TABLET ORAL at 05:51

## 2017-04-07 RX ADMIN — ACETAMINOPHEN 650 MG: 325 TABLET, FILM COATED ORAL at 17:29

## 2017-04-07 RX ADMIN — POTASSIUM CHLORIDE 40 MEQ: 1500 TABLET, EXTENDED RELEASE ORAL at 07:54

## 2017-04-07 RX ADMIN — AMIODARONE HYDROCHLORIDE 200 MG: 200 TABLET ORAL at 07:55

## 2017-04-07 RX ADMIN — LIDOCAINE 2 PATCH: 50 PATCH CUTANEOUS at 12:52

## 2017-04-07 RX ADMIN — GABAPENTIN 600 MG: 300 CAPSULE ORAL at 15:52

## 2017-04-07 RX ADMIN — AMPICILLIN SODIUM AND SULBACTAM SODIUM 3 G: 2; 1 INJECTION, POWDER, FOR SOLUTION INTRAMUSCULAR; INTRAVENOUS at 16:42

## 2017-04-07 RX ADMIN — AMPICILLIN SODIUM AND SULBACTAM SODIUM 3 G: 2; 1 INJECTION, POWDER, FOR SOLUTION INTRAMUSCULAR; INTRAVENOUS at 11:29

## 2017-04-07 RX ADMIN — AMPICILLIN SODIUM AND SULBACTAM SODIUM 3 G: 2; 1 INJECTION, POWDER, FOR SOLUTION INTRAMUSCULAR; INTRAVENOUS at 05:51

## 2017-04-07 RX ADMIN — LACTOBACILLUS ACIDOPHILUS / LACTOBACILLUS BULGARICUS 1 PACKET: 100 MILLION CFU STRENGTH GRANULES at 16:41

## 2017-04-07 RX ADMIN — VANCOMYCIN HYDROCHLORIDE 125 MG: 10 INJECTION, POWDER, LYOPHILIZED, FOR SOLUTION INTRAVENOUS at 07:54

## 2017-04-07 RX ADMIN — OXYCODONE HYDROCHLORIDE 20 MG: 20 TABLET, FILM COATED, EXTENDED RELEASE ORAL at 07:54

## 2017-04-07 RX ADMIN — ACETAMINOPHEN 650 MG: 325 TABLET, FILM COATED ORAL at 23:36

## 2017-04-07 RX ADMIN — STANDARDIZED SENNA CONCENTRATE AND DOCUSATE SODIUM 1 TABLET: 8.6; 5 TABLET, FILM COATED ORAL at 20:47

## 2017-04-07 RX ADMIN — AMPICILLIN SODIUM AND SULBACTAM SODIUM 3 G: 2; 1 INJECTION, POWDER, FOR SOLUTION INTRAMUSCULAR; INTRAVENOUS at 00:00

## 2017-04-07 RX ADMIN — AMPICILLIN SODIUM AND SULBACTAM SODIUM 3 G: 2; 1 INJECTION, POWDER, FOR SOLUTION INTRAMUSCULAR; INTRAVENOUS at 23:36

## 2017-04-07 RX ADMIN — ENOXAPARIN SODIUM 40 MG: 100 INJECTION SUBCUTANEOUS at 07:54

## 2017-04-07 RX ADMIN — OXYCODONE HYDROCHLORIDE 10 MG: 10 TABLET ORAL at 00:37

## 2017-04-07 RX ADMIN — POTASSIUM CHLORIDE 40 MEQ: 1500 TABLET, EXTENDED RELEASE ORAL at 20:46

## 2017-04-07 RX ADMIN — GABAPENTIN 600 MG: 300 CAPSULE ORAL at 20:47

## 2017-04-07 RX ADMIN — LACTOBACILLUS ACIDOPHILUS / LACTOBACILLUS BULGARICUS 1 PACKET: 100 MILLION CFU STRENGTH GRANULES at 07:54

## 2017-04-07 RX ADMIN — METHOCARBAMOL 500 MG: 500 TABLET ORAL at 15:52

## 2017-04-07 RX ADMIN — NICOTINE 14 MG: 14 PATCH TRANSDERMAL at 05:51

## 2017-04-07 RX ADMIN — LACTOBACILLUS ACIDOPHILUS / LACTOBACILLUS BULGARICUS 1 PACKET: 100 MILLION CFU STRENGTH GRANULES at 11:29

## 2017-04-07 RX ADMIN — SIMVASTATIN 20 MG: 20 TABLET, FILM COATED ORAL at 20:47

## 2017-04-07 RX ADMIN — ACETAMINOPHEN 650 MG: 325 TABLET, FILM COATED ORAL at 11:29

## 2017-04-07 RX ADMIN — OXYCODONE HYDROCHLORIDE 10 MG: 10 TABLET ORAL at 16:42

## 2017-04-07 RX ADMIN — GABAPENTIN 600 MG: 300 CAPSULE ORAL at 07:54

## 2017-04-07 RX ADMIN — OXYCODONE HYDROCHLORIDE 20 MG: 20 TABLET, FILM COATED, EXTENDED RELEASE ORAL at 20:46

## 2017-04-07 ASSESSMENT — ENCOUNTER SYMPTOMS
DIARRHEA: 0
SHORTNESS OF BREATH: 0
NAUSEA: 0
VOMITING: 1
CHILLS: 0
COUGH: 0
FEVER: 0
HEADACHES: 0
ABDOMINAL PAIN: 0
BACK PAIN: 1
VOMITING: 0

## 2017-04-07 ASSESSMENT — PAIN SCALES - GENERAL
PAINLEVEL_OUTOF10: 6
PAINLEVEL_OUTOF10: 4
PAINLEVEL_OUTOF10: 6

## 2017-04-07 NOTE — PROGRESS NOTES
Infectious Disease Progress Note    Author: Kerrie Flores M.D. DOS & Time created: 4/7/2017  12:16 PM    Chief Complaint   Patient presents with   • Low Back Pain   FU for osteomyelitis, GAS bacteremia and CDI, suspected endograft infection    Interval History:  3/17 AF, WBC 12.9, pt c/o lower back pain, asking for morphine, poor insight into illness, denies any diarrhea  3/18 AF, WBC 13.3, sleeping and not arousable to voice, cultures here negative to date  3/19 AF, WBC 13.4, frustrated about lower back pain not adequately controlled on current pain regimen, plan for EGD with dilatation today  3/20 AF WBC 9.6 +pain-denies SE abx  3/21 AF WBC 10.5 no new complaints  3/22 AF WBC not done tolerating abx well  3/23 AF eye feels a little better-pain about the same  3/24 AF tolerating abx well-MRI confirm OM  3/25 AF WBC 9.5 s/p toe amp this am  3/26 AF WBC 10.1 deneies any new sxs-  3/27 AF, WBC 9.1, sleeping but arousable to voice, back pain stable  3/28 Tmax 99.9, WBC 7.7, not happy about soft foods, and wants to go back on regular diet, having normal BMs, back pain controlled on pain regimen  3/29 AF, WBC 5.7, transferred to ortho, happy he took a shower today, plan of care discussed with pain, states he was told he has arthritis of his back, back pain stable, no diarrhea  3/30 AF, WBC 8.5, had a loose BM this am follow by normal BM, no abd pain  3/31 AF, WBC 9, nausea and vomiting this am, had soft BM this am  4/1 WBC 13.7, still having emesis, states he continues to be thirsty despite drinking lots of water yesterday, diarrhea improving and stools forming  4/2 AF, WBC 11.6, had nose bleed overnight, being transfused blood for Hg 6.6, tolerating pills  4/3- denies any diarrhea. No fevers.   4/4- no fevers. No diarrhea. Back pain under control.   4/6- no diarrhea. No fevers. Still throws up everything  4/7- no fevers. No new issues. The pain issues being addressed.  Labs Reviewed, Medications Reviewed, Radiology  Reviewed and Wound Reviewed.    Review of Systems:  Review of Systems   Constitutional: Negative for fever and chills.   HENT: Negative for nosebleeds.    Respiratory: Negative for cough and shortness of breath.    Gastrointestinal: Positive for vomiting. Negative for nausea, abdominal pain and diarrhea.   Genitourinary: Negative for dysuria.   Musculoskeletal: Positive for back pain.        Stable   Neurological: Negative for headaches.   All other systems reviewed and are negative.      Hemodynamics:  Temp (24hrs), Av.8 °C (98.2 °F), Min:36.5 °C (97.7 °F), Max:37.3 °C (99.1 °F)  Temperature: 36.5 °C (97.7 °F)  Pulse  Av.1  Min: 56  Max: 114   Blood Pressure : (!) 94/56 mmHg       Physical Exam:  Physical Exam   Constitutional: He is oriented to person, place, and time. He appears well-developed. No distress.   HENT:   Head: Normocephalic and atraumatic.   Eyes: EOM are normal. Pupils are equal, round, and reactive to light.   Neck: Neck supple.   Cardiovascular: Normal rate.    Murmur heard.  IRR   Pulmonary/Chest: Effort normal and breath sounds normal.   Abdominal: Soft. He exhibits no distension. There is no tenderness.   Musculoskeletal: Normal range of motion. He exhibits edema.   R great toe with slight edema but no drainage or erythema, medial callus. Erythema resolving    Left 3rd toe-suture in place. No drainage    RUE PICC nontender   Neurological: He is alert and oriented to person, place, and time.   Skin:   Chronic changes   Nursing note and vitals reviewed.      Labs:  Recent Labs      17   0230  17   1650  17   0038   WBC  10.9*   --   7.7   RBC  2.45*   --   2.26*   HEMOGLOBIN  8.0*  7.8*  7.1*   HEMATOCRIT  23.8*   --   22.0*   MCV  97.1   --   97.3   MCH  32.7   --   31.4   RDW  56.0*   --   55.8*   PLATELETCT  213   --   188   MPV  8.9*   --   8.6*   NEUTSPOLYS  76.20*   --   68.90   LYMPHOCYTES  14.90*   --   18.60*   MONOCYTES  7.50   --   10.40   EOSINOPHILS  0.70    --   1.40   BASOPHILS  0.20   --   0.30     Recent Labs      04/05/17   0230  04/07/17   0005   SODIUM  131*  133*   POTASSIUM  4.1  4.4   CHLORIDE  100  100   CO2  22  24   GLUCOSE  112*  114*   BUN  7*  6*     Recent Labs      04/05/17   0230  04/07/17   0005   ALBUMIN  2.4*  2.4*   TBILIRUBIN  0.6  0.5   ALKPHOSPHAT  101*  105*   TOTPROTEIN  7.7  7.4   ALTSGPT  53*  56*   ASTSGOT  73*  82*   CREATININE  1.00  1.04       BLOOD CULTURE   Date Value Ref Range Status   03/15/2017 No growth after 5 days of incubation.  Final    ':  Results     ** No results found for the last 168 hours. **          Imaging  3/17 MRI lumbar spine: There are changes secondary to the abdominal aortic aneurysm endograft. The aneurysm sac measures an approximately 7.6 x 6.7 cm in size. Abnormal contrast enhancement is noted within the wall of the aneurysmal sac. There is also abnormal soft tissue   contrast enhancement in the surrounding soft tissue. These findings are highly suspicious for infected thrombosed aneurysm sac. The possibility of endograft infection is more likely. There is irregular outpouching of the aneurysmal sac along the   posterior portion indenting the left psoas muscle.  2.  Free fluid in the pelvis  3.  There is mild-to-moderate left hydronephrosis likely representing pelviureteric junction obstruction.    3/16 R foot xray - +OM  3/16 L foot xray +OM    Assessment:  Active Hospital Problems    Diagnosis   • *Osteomyelitis of toe (CMS-HCC) [M86.9]   • Bacteremia [R78.81]   • Back pain [M54.9]   • CHF (congestive heart failure) (CMS-HCC) [I50.9]   • Tobacco dependence [F17.200]   • Atrial fibrillation (CMS-HCC) [I48.91]    Endovascular infection       Plan:  Right great toe and left 3rd osteomyelitis  +OM on radiographs-confirmed on repeat MRIs  3/9 OSH wound cx - GAS, MSSA (I confirmed with [place]'s microlab)  3/25 s/p 3rd toe amp. No purulence seen proximally to amp site  OR cx (left 3rd toe) - CoNS (S-unasyn)  Vanco LINUS 2  Continue Unasyn  Anticipate 6 weeks of IV abx. 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  Appreciate vascular evaluation - surgery associated with high mortality  Abx per above followed by chronic abx suppression   Tagged WBC scan - no e/o endovascular infection  If truly a graft infection is suspected consider a PET scan as outpt      Clostridium difficile diarrhea  Continue oral vancomycin QID for 2 weeks stop date 03/29/17 then BID while on IV abx with stop date of 04/30/17    Esophageal dysmotility-concern for achalasia  H/o strictures with recent dilatation as [place]'s  Esophagram +distal esophageal obstruction  s/p EGD with dilatation  Needs GI re-eval for possible repeat EGD given hx of strictures      Leukocytosis. Resolved  Multifactorial  No new signs or symptoms of infection  Monitor    Back pain  Chronic per patient      Prognosis guarded    Dispo:  Awaiting SNF placement for long-term abx    DW IM/ vascular

## 2017-04-07 NOTE — PROGRESS NOTES
Pt has been cooperative and friendly throughout the shift, pt is hard of hearing but has his hearing aides in place. Pain has been managed under current regimen. Pt has been spitting up fluids/saliva throughout the shift, this has been normal for him per night shift RN and MD.

## 2017-04-07 NOTE — PROGRESS NOTES
Patient's low back pain still present.  Dr. Berrios managing.  Goal to control patients pain and achieve discharge on antibiotics and do outpatient PET.  He is eating oatmeal for breakfast and the achalasia symptoms persist but not severe.

## 2017-04-07 NOTE — CARE PLAN
Problem: Pain Management  Goal: Pain level will decrease to patient’s comfort goal  Intervention: Follow pain managment plan developed in collaboration with patient and Interdisciplinary Team  Pain managed under current regimen.      Problem: Communication  Goal: The ability to communicate needs accurately and effectively will improve  Intervention: Educate patient and significant other/support system about the plan of care, procedures, treatments, medications and allow for questions  Discussed POC with pt, pt understands.

## 2017-04-07 NOTE — CARE PLAN
Problem: Pain Management  Goal: Pain level will decrease to patient’s comfort goal  Outcome: PROGRESSING AS EXPECTED  Pain controlled with scheduled oxycontin and tylenol    Problem: Knowledge Deficit  Goal: Knowledge of disease process/condition, treatment plan, diagnostic tests, and medications will improve  Outcome: PROGRESSING AS EXPECTED  Updated with plan of care, cont iv abx

## 2017-04-07 NOTE — PROGRESS NOTES
Hospital Medicine Progress Note, Adult, Complex               Author: Kane Moreira Date & Time created: 4/7/2017  3:33 PM     Interval History:  Admitted for with findings of bilateral toes osteomyelitis, dysphagia/achalasia, AAA endograft infection, hyponatremia, hypokalemia, transaminitis, cirrhosis, etc.    Review of Systems:  Review of Systems   Constitutional: Negative for fever and chills.   Cardiovascular: Positive for leg swelling.   Gastrointestinal: Negative for nausea and vomiting.   Skin: Negative for rash.       Physical Exam:  Physical Exam  Nursing note and vitals reviewed.  Constitutional: He is oriented to person, place, and time. He appears well-developed and well-nourished.   HENT:   Head: Normocephalic and atraumatic.   Right Ear: External ear normal.   Left Ear: External ear normal.   Nose: Nose normal.   Mouth/Throat: Oropharynx is small with Mallanpati score of 4.  Mucosa is clear and moist.   Eyes: Conjunctivae and extraocular motions are normal. Pupils are equal, round, and reactive to light.   Neck: Normal range of motion. Neck supple.   Cardiovascular: Normal rate, regular rhythm, 3/6 GERARDO and intact distal pulses.  +4 BLEE.  Pulmonary/Chest: Effort normal and breath sounds normal.   Abdominal: Soft. Bowel sounds are normal.   Musculoskeletal: Normal range of motion.   Neurological: He is alert and oriented to person, place, and time.   Skin: Skin is warm and dry.  R-foot dressing C/D/I.        Labs:        Invalid input(s): [id number]      Recent Labs      04/05/17   0230  04/07/17   0005   SODIUM  131*  133*   POTASSIUM  4.1  4.4   CHLORIDE  100  100   CO2  22  24   BUN  7*  6*   CREATININE  1.00  1.04   CALCIUM  8.4*  8.2*     Recent Labs      04/05/17   0230  04/07/17   0005   ALTSGPT  53*  56*   ASTSGOT  73*  82*   ALKPHOSPHAT  101*  105*   TBILIRUBIN  0.6  0.5   GLUCOSE  112*  114*     Recent Labs      04/05/17   0230  04/05/17   1650  04/07/17   0038   RBC  2.45*   --   2.26*    HEMOGLOBIN  8.0*  7.8*  7.1*   HEMATOCRIT  23.8*   --   22.0*   PLATELETCT  213   --   188     Recent Labs      17   0230  17   0005  17   0038   WBC  10.9*   --   7.7   NEUTSPOLYS  76.20*   --   68.90   LYMPHOCYTES  14.90*   --   18.60*   MONOCYTES  7.50   --   10.40   EOSINOPHILS  0.70   --   1.40   BASOPHILS  0.20   --   0.30   ASTSGOT  73*  82*   --    ALTSGPT  53*  56*   --    ALKPHOSPHAT  101*  105*   --    TBILIRUBIN  0.6  0.5   --            Hemodynamics:  Temp (24hrs), Av.7 °C (98.1 °F), Min:36.4 °C (97.6 °F), Max:37.3 °C (99.1 °F)  Temperature: 36.4 °C (97.6 °F)  Pulse  Av  Min: 56  Max: 114   Blood Pressure : (!) 94/49 mmHg     Respiratory:    Respiration: 16, Pulse Oximetry: 89 %        RUL Breath Sounds: Clear, RML Breath Sounds: Diminished, RLL Breath Sounds: Diminished, SOLEDAD Breath Sounds: Clear, LLL Breath Sounds: Diminished  Fluids:  No intake or output data in the 24 hours ending 17 1533     GI/Nutrition:  Orders Placed This Encounter   Procedures   • DIET ORDER     Standing Status: Standing      Number of Occurrences: 1      Standing Expiration Date:      Order Specific Question:  Diet:     Answer:  Full Liquid [11]     Medical Decision Making, by Problem:  Active Hospital Problems    Diagnosis   • Bacteremia [R78.81]/*Osteomyelitis of toe (CMS-[facility]) [M86.9]/?endograft infx - s/p amputation.  Cont Abx per ID.   • C. difficile diarrhea [A04.7] - resolved.  DC PO vanc.   • Back pain [M54.9] - judicious use of narcotics.   • Anemia [D64.9] - Hgb decreasing.  Transfuse if Hgb <7.  Borderline Fe deficiency - start FeSO4 replacement.   • Hyponatremia [E87.1] - mild.  Follow c NS IVF.   • CHF (congestive heart failure) (CMS-[facility]) [I50.9] - check echo.   • Atrial fibrillation (CMS-HCC) [I48.91] - recently cardioverted at [place].  Rate controlled.  CCR and follow.   • S/P AAA (abdominal aortic aneurysm) repair [Z98.890, Z86.79] - per Vas Surg.   • Noncompliance [Z91.19]  - pt education and encouragement.   • Tobacco dependence [F17.200] - cessation ed.  Nicoderm.   • Hep C w/o coma, chronic (CMS-HCC) [B18.2] - outpatient follow up.   • Dysphagia [R13.10] /achalasia - s/p EGD/injection.  Outpatient GI follow p.   Cirrhosis - avoid hepatotoxic med's.  EtOH - cessation ed.  Stable issues - med hx (CAD/PAD, presbycusis), hypokalemia  Preventives - IS, Vax, stool soft, DVTP.  Dispo - complex/guarded.      EKG reviewed, Radiology images reviewed, Labs reviewed and Medications reviewed  Art catheter: No Art      DVT Prophylaxis: Enoxaparin (Lovenox)    Ulcer prophylaxis: Not indicated  Antibiotics: Treating active infection/contamination beyond 24 hours perioperative coverage  Assessed for rehab: Patient was assess for and/or received rehabilitation services during this hospitalization

## 2017-04-07 NOTE — PROGRESS NOTES
Pt ambulating in kumar with steady gait and tolerated well.  Tolerating full liq diet fairly well but still with some difficulty swallowing.  Updated with plan of care, call light in reach and encourage to call for assistance.

## 2017-04-08 LAB
BASOPHILS # BLD AUTO: 0.4 % (ref 0–1.8)
BASOPHILS # BLD: 0.03 K/UL (ref 0–0.12)
EOSINOPHIL # BLD AUTO: 0.06 K/UL (ref 0–0.51)
EOSINOPHIL NFR BLD: 0.8 % (ref 0–6.9)
ERYTHROCYTE [DISTWIDTH] IN BLOOD BY AUTOMATED COUNT: 55.1 FL (ref 35.9–50)
HCT VFR BLD AUTO: 25.6 % (ref 42–52)
HGB BLD-MCNC: 8.3 G/DL (ref 14–18)
IMM GRANULOCYTES # BLD AUTO: 0.03 K/UL (ref 0–0.11)
IMM GRANULOCYTES NFR BLD AUTO: 0.4 % (ref 0–0.9)
LYMPHOCYTES # BLD AUTO: 1.29 K/UL (ref 1–4.8)
LYMPHOCYTES NFR BLD: 16.4 % (ref 22–41)
MCH RBC QN AUTO: 31.7 PG (ref 27–33)
MCHC RBC AUTO-ENTMCNC: 32.4 G/DL (ref 33.7–35.3)
MCV RBC AUTO: 97.7 FL (ref 81.4–97.8)
MONOCYTES # BLD AUTO: 0.52 K/UL (ref 0–0.85)
MONOCYTES NFR BLD AUTO: 6.6 % (ref 0–13.4)
NEUTROPHILS # BLD AUTO: 5.95 K/UL (ref 1.82–7.42)
NEUTROPHILS NFR BLD: 75.4 % (ref 44–72)
NRBC # BLD AUTO: 0 K/UL
NRBC BLD AUTO-RTO: 0 /100 WBC
PLATELET # BLD AUTO: 197 K/UL (ref 164–446)
PMV BLD AUTO: 8.7 FL (ref 9–12.9)
RBC # BLD AUTO: 2.62 M/UL (ref 4.7–6.1)
WBC # BLD AUTO: 7.9 K/UL (ref 4.8–10.8)

## 2017-04-08 PROCEDURE — 700111 HCHG RX REV CODE 636 W/ 250 OVERRIDE (IP): Performed by: INTERNAL MEDICINE

## 2017-04-08 PROCEDURE — A9270 NON-COVERED ITEM OR SERVICE: HCPCS | Performed by: HOSPITALIST

## 2017-04-08 PROCEDURE — 700102 HCHG RX REV CODE 250 W/ 637 OVERRIDE(OP): Performed by: HOSPITALIST

## 2017-04-08 PROCEDURE — 700102 HCHG RX REV CODE 250 W/ 637 OVERRIDE(OP): Performed by: FAMILY MEDICINE

## 2017-04-08 PROCEDURE — 700111 HCHG RX REV CODE 636 W/ 250 OVERRIDE (IP): Performed by: HOSPITALIST

## 2017-04-08 PROCEDURE — 700111 HCHG RX REV CODE 636 W/ 250 OVERRIDE (IP): Performed by: NURSE PRACTITIONER

## 2017-04-08 PROCEDURE — A9270 NON-COVERED ITEM OR SERVICE: HCPCS | Performed by: FAMILY MEDICINE

## 2017-04-08 PROCEDURE — 99233 SBSQ HOSP IP/OBS HIGH 50: CPT | Performed by: HOSPITALIST

## 2017-04-08 PROCEDURE — 700105 HCHG RX REV CODE 258: Performed by: INTERNAL MEDICINE

## 2017-04-08 PROCEDURE — 85025 COMPLETE CBC W/AUTO DIFF WBC: CPT

## 2017-04-08 PROCEDURE — 700101 HCHG RX REV CODE 250: Performed by: FAMILY MEDICINE

## 2017-04-08 PROCEDURE — 770021 HCHG ROOM/CARE - ISO PRIVATE

## 2017-04-08 PROCEDURE — 700105 HCHG RX REV CODE 258

## 2017-04-08 RX ORDER — SODIUM CHLORIDE 9 MG/ML
INJECTION, SOLUTION INTRAVENOUS
Status: COMPLETED
Start: 2017-04-08 | End: 2017-04-08

## 2017-04-08 RX ORDER — SODIUM CHLORIDE 9 MG/ML
500 INJECTION, SOLUTION INTRAVENOUS ONCE
Status: COMPLETED | OUTPATIENT
Start: 2017-04-08 | End: 2017-04-10

## 2017-04-08 RX ORDER — POTASSIUM CHLORIDE 20 MEQ/1
40 TABLET, EXTENDED RELEASE ORAL DAILY
Status: DISCONTINUED | OUTPATIENT
Start: 2017-04-08 | End: 2017-04-13

## 2017-04-08 RX ADMIN — METHOCARBAMOL 500 MG: 500 TABLET ORAL at 09:18

## 2017-04-08 RX ADMIN — AMPICILLIN SODIUM AND SULBACTAM SODIUM 3 G: 2; 1 INJECTION, POWDER, FOR SOLUTION INTRAMUSCULAR; INTRAVENOUS at 06:40

## 2017-04-08 RX ADMIN — GABAPENTIN 600 MG: 300 CAPSULE ORAL at 20:26

## 2017-04-08 RX ADMIN — ENOXAPARIN SODIUM 40 MG: 100 INJECTION SUBCUTANEOUS at 09:19

## 2017-04-08 RX ADMIN — GABAPENTIN 600 MG: 300 CAPSULE ORAL at 15:40

## 2017-04-08 RX ADMIN — LIDOCAINE 2 PATCH: 50 PATCH CUTANEOUS at 12:05

## 2017-04-08 RX ADMIN — AMPICILLIN SODIUM AND SULBACTAM SODIUM 3 G: 2; 1 INJECTION, POWDER, FOR SOLUTION INTRAMUSCULAR; INTRAVENOUS at 17:17

## 2017-04-08 RX ADMIN — SIMVASTATIN 20 MG: 20 TABLET, FILM COATED ORAL at 20:26

## 2017-04-08 RX ADMIN — METHOCARBAMOL 500 MG: 500 TABLET ORAL at 20:26

## 2017-04-08 RX ADMIN — AMPICILLIN SODIUM AND SULBACTAM SODIUM 3 G: 2; 1 INJECTION, POWDER, FOR SOLUTION INTRAMUSCULAR; INTRAVENOUS at 12:04

## 2017-04-08 RX ADMIN — OXYCODONE HYDROCHLORIDE 20 MG: 20 TABLET, FILM COATED, EXTENDED RELEASE ORAL at 20:26

## 2017-04-08 RX ADMIN — SODIUM CHLORIDE 500 ML: 9 INJECTION, SOLUTION INTRAVENOUS at 12:06

## 2017-04-08 RX ADMIN — LACTOBACILLUS ACIDOPHILUS / LACTOBACILLUS BULGARICUS 1 PACKET: 100 MILLION CFU STRENGTH GRANULES at 06:36

## 2017-04-08 RX ADMIN — OXYCODONE HYDROCHLORIDE 10 MG: 10 TABLET ORAL at 17:21

## 2017-04-08 RX ADMIN — ACETAMINOPHEN 650 MG: 325 TABLET, FILM COATED ORAL at 17:16

## 2017-04-08 RX ADMIN — SODIUM CHLORIDE 500 ML: 9 INJECTION, SOLUTION INTRAVENOUS at 06:41

## 2017-04-08 RX ADMIN — LACTOBACILLUS ACIDOPHILUS / LACTOBACILLUS BULGARICUS 1 PACKET: 100 MILLION CFU STRENGTH GRANULES at 12:04

## 2017-04-08 RX ADMIN — AMPICILLIN SODIUM AND SULBACTAM SODIUM 3 G: 2; 1 INJECTION, POWDER, FOR SOLUTION INTRAMUSCULAR; INTRAVENOUS at 23:00

## 2017-04-08 RX ADMIN — POTASSIUM CHLORIDE 40 MEQ: 1500 TABLET, EXTENDED RELEASE ORAL at 09:19

## 2017-04-08 RX ADMIN — SODIUM CHLORIDE: 9 INJECTION, SOLUTION INTRAVENOUS at 22:30

## 2017-04-08 RX ADMIN — NICOTINE 7 MG: 7 PATCH TRANSDERMAL at 06:37

## 2017-04-08 RX ADMIN — OXYCODONE HYDROCHLORIDE 20 MG: 20 TABLET, FILM COATED, EXTENDED RELEASE ORAL at 09:18

## 2017-04-08 RX ADMIN — ACETAMINOPHEN 650 MG: 325 TABLET, FILM COATED ORAL at 12:04

## 2017-04-08 RX ADMIN — SODIUM CHLORIDE 500 ML: 9 INJECTION, SOLUTION INTRAVENOUS at 04:44

## 2017-04-08 RX ADMIN — AMIODARONE HYDROCHLORIDE 200 MG: 200 TABLET ORAL at 09:18

## 2017-04-08 RX ADMIN — FERROUS SULFATE TAB 325 MG (65 MG ELEMENTAL FE) 325 MG: 325 (65 FE) TAB at 06:36

## 2017-04-08 RX ADMIN — ACETAMINOPHEN 650 MG: 325 TABLET, FILM COATED ORAL at 06:36

## 2017-04-08 RX ADMIN — METHOCARBAMOL 500 MG: 500 TABLET ORAL at 15:40

## 2017-04-08 RX ADMIN — GABAPENTIN 600 MG: 300 CAPSULE ORAL at 09:18

## 2017-04-08 RX ADMIN — LACTOBACILLUS ACIDOPHILUS / LACTOBACILLUS BULGARICUS 1 PACKET: 100 MILLION CFU STRENGTH GRANULES at 17:17

## 2017-04-08 ASSESSMENT — ENCOUNTER SYMPTOMS
SHORTNESS OF BREATH: 0
HEADACHES: 0
BACK PAIN: 1
COUGH: 0
DIARRHEA: 0
CHILLS: 0
FEVER: 0
VOMITING: 1
VOMITING: 0
NAUSEA: 0
ABDOMINAL PAIN: 0

## 2017-04-08 ASSESSMENT — PAIN SCALES - GENERAL
PAINLEVEL_OUTOF10: 7
PAINLEVEL_OUTOF10: 7

## 2017-04-08 NOTE — PROGRESS NOTES
Hospital Medicine Progress Note, Adult, Complex               Author: Kane Moreira Date & Time created: 4/8/2017  12:59 PM     Interval History:  Admitted for with findings of bilateral toes osteomyelitis, dysphagia/achalasia, AAA endograft infection, hyponatremia, hypokalemia, transaminitis, cirrhosis, etc.  Leg swelling without change.    Review of Systems:  Review of Systems   Cardiovascular: Positive for leg swelling.   Gastrointestinal: Negative for nausea and vomiting.   Skin: Negative for rash.   All other systems reviewed and are negative.      Physical Exam:  Physical Exam  Nursing note and vitals reviewed.  Constitutional: He is oriented to person, place, and time. He appears well-developed and well-nourished overweight.   HENT:   Head: Normocephalic and atraumatic.   Right Ear: External ear normal.   Left Ear: External ear normal.   Nose: Nose normal.   Eyes: Conjunctivae and extraocular motions are normal.    Neck: Normal range of motion. Neck supple.   Cardiovascular: Normal rate.  +4 BLEE without change.  Pulmonary/Chest: Effort normal.   Abdominal: Soft. Bowel sounds are normal.   Musculoskeletal: Normal range of motion.   Neurological: He is alert and oriented to person, place, and time.   Skin: Skin is warm and dry.  R-foot dressing C/D/I.        Labs:        Invalid input(s): [id number]      Recent Labs      04/07/17   0005   SODIUM  133*   POTASSIUM  4.4   CHLORIDE  100   CO2  24   BUN  6*   CREATININE  1.04   CALCIUM  8.2*     Recent Labs      04/07/17   0005   ALTSGPT  56*   ASTSGOT  82*   ALKPHOSPHAT  105*   TBILIRUBIN  0.5   GLUCOSE  114*     Recent Labs      04/05/17   1650  04/07/17   0038  04/08/17   0426   RBC   --   2.26*  2.62*   HEMOGLOBIN  7.8*  7.1*  8.3*   HEMATOCRIT   --   22.0*  25.6*   PLATELETCT   --   188  197     Recent Labs      04/07/17   0005  04/07/17   0038  04/08/17   0426   WBC   --   7.7  7.9   NEUTSPOLYS   --   68.90  75.40*   LYMPHOCYTES   --   18.60*  16.40*    MONOCYTES   --   10.40  6.60   EOSINOPHILS   --   1.40  0.80   BASOPHILS   --   0.30  0.40   ASTSGOT  82*   --    --    ALTSGPT  56*   --    --    ALKPHOSPHAT  105*   --    --    TBILIRUBIN  0.5   --    --            Hemodynamics:  Temp (24hrs), Av.4 °C (97.6 °F), Min:36.3 °C (97.4 °F), Max:36.6 °C (97.8 °F)  Temperature: 36.4 °C (97.6 °F)  Pulse  Av.5  Min: 56  Max: 114   Blood Pressure : (!) 97/50 mmHg     Respiratory:    Respiration: 17, Pulse Oximetry: 94 %        RUL Breath Sounds: Clear, RML Breath Sounds: Diminished, RLL Breath Sounds: Diminished, SOLEDAD Breath Sounds: Clear, LLL Breath Sounds: Diminished  Fluids:    Intake/Output Summary (Last 24 hours) at 17 1259  Last data filed at 17 0650   Gross per 24 hour   Intake   1140 ml   Output      0 ml   Net   1140 ml        GI/Nutrition:  Orders Placed This Encounter   Procedures   • DIET ORDER     Standing Status: Standing      Number of Occurrences: 1      Standing Expiration Date:      Order Specific Question:  Diet:     Answer:  Full Liquid [11]     Medical Decision Making, by Problem:  Active Hospital Problems    Diagnosis   • Bacteremia [R78.81]/*Osteomyelitis of toe (CMS-[facility]) [M86.9]/?endograft infx - s/p amputation.  Cont Abx per ID.   • Back pain [M54.9] - judicious use of narcotics.   • Anemia [D64.9] - Hgb decreasing.  Transfuse if Hgb <7.  Borderline Fe deficiency - FeSO4 replacement.   • Hyponatremia [E87.1] - mild.  Follow c NS IVF.   • CHF (congestive heart failure) (CMS-[facility]) [I50.9] - await echo.   • Atrial fibrillation (CMS-[facility]) [I48.91] - recently cardioverted at [place].  Rate controlled.  CCR and follow.   • S/P AAA (abdominal aortic aneurysm) repair [Z98.890, Z86.79] - per Vas Surg.   • Noncompliance [Z91.19] - pt education and encouragement.   • Tobacco dependence [F17.200] - cessation ed.  Nicoderm.   • Hep C w/o coma, chronic (CMS-HCC) [B18.2] - outpatient follow up.   • Dysphagia [R13.10] /achalasia - s/p  EGD/injection.  Outpatient GI follow p.   Leg edema - SCD's.  Encourage leg elevation.  Hypomag - replete and follow.  Cirrhosis - avoid hepatotoxic med's.  EtOH - cessation ed.  Other - decrease KCl replacement rate.  Stable issues - med hx (CAD/PAD, presbycusis), hypokalemia,C diff, preventives.  Dispo - complex/guarded.      Labs reviewed and Medications reviewed  Art catheter: No Art      DVT Prophylaxis: Enoxaparin (Lovenox)    Ulcer prophylaxis: Not indicated  Antibiotics: Treating active infection/contamination beyond 24 hours perioperative coverage  Assessed for rehab: Patient was assess for and/or received rehabilitation services during this hospitalization

## 2017-04-08 NOTE — CARE PLAN
Problem: Communication  Goal: The ability to communicate needs accurately and effectively will improve  Outcome: PROGRESSING AS EXPECTED  Reoriented pt to environment as needed; white board updated with [facility] staff and return time. Pt notified of hourly rounding and unit routine.    Appropriate signs in place at doorway for pt. Pt allowed time to ask questions; no questions at this time.    Problem: Infection  Goal: Will remain free from infection  Outcome: PROGRESSING AS EXPECTED  Contact precautions in place. Performed hand washing before and after pt contact. Educated pt on contact precautions to help prevent spread of infection/germs. CHG bath given to pt by CNA. PICC line in place for long term ABX. No signs on infection present.

## 2017-04-08 NOTE — PROGRESS NOTES
Pt A&O x 4. Pt states 6/10 pain level; pain medication given as ordered. Pt on 3 L NC at 92% 02 saturations with no shortness of breath. R foot dressing changed and is dry, clean, and intact. L PICC line patent and flushing. Last BM on 4/6, Pt on isolation for C DIFF. Pt uses call light appropriately. Personal belongings and call light within reach. Discussed POC, safety, and mobility; pt has no questions at this time. Bed in lowest position with treaded socks on pt. Pt up independently with cane. Hourly rounding in place.

## 2017-04-08 NOTE — PROGRESS NOTES
Pt awake, walking around the room, denies pain at this time, pt on room air, oxygen saturation between 93-97%, right foot dressing intact, no drainage noted, PICC line intact, call light and fluids within reach, edema to BLE, hourly rounds in place.

## 2017-04-08 NOTE — PROGRESS NOTES
Infectious Disease Progress Note    Author: Kerrie Flores M.D. DOS & Time created: 4/8/2017  4:35 PM    Chief Complaint   Patient presents with   • Low Back Pain   FU for osteomyelitis, GAS bacteremia and CDI, suspected endograft infection    Interval History:  3/17 AF, WBC 12.9, pt c/o lower back pain, asking for morphine, poor insight into illness, denies any diarrhea  3/18 AF, WBC 13.3, sleeping and not arousable to voice, cultures here negative to date  3/19 AF, WBC 13.4, frustrated about lower back pain not adequately controlled on current pain regimen, plan for EGD with dilatation today  3/20 AF WBC 9.6 +pain-denies SE abx  3/21 AF WBC 10.5 no new complaints  3/22 AF WBC not done tolerating abx well  3/23 AF eye feels a little better-pain about the same  3/24 AF tolerating abx well-MRI confirm OM  3/25 AF WBC 9.5 s/p toe amp this am  3/26 AF WBC 10.1 deneies any new sxs-  3/27 AF, WBC 9.1, sleeping but arousable to voice, back pain stable  3/28 Tmax 99.9, WBC 7.7, not happy about soft foods, and wants to go back on regular diet, having normal BMs, back pain controlled on pain regimen  3/29 AF, WBC 5.7, transferred to ortho, happy he took a shower today, plan of care discussed with pain, states he was told he has arthritis of his back, back pain stable, no diarrhea  3/30 AF, WBC 8.5, had a loose BM this am follow by normal BM, no abd pain  3/31 AF, WBC 9, nausea and vomiting this am, had soft BM this am  4/1 WBC 13.7, still having emesis, states he continues to be thirsty despite drinking lots of water yesterday, diarrhea improving and stools forming  4/2 AF, WBC 11.6, had nose bleed overnight, being transfused blood for Hg 6.6, tolerating pills  4/3- denies any diarrhea. No fevers.   4/4- no fevers. No diarrhea. Back pain under control.   4/6- no diarrhea. No fevers. Still throws up everything  4/7- no fevers. No new issues. The pain issues being addressed.  4/8- no fevers. No new issues  Labs Reviewed,  Medications Reviewed, Radiology Reviewed and Wound Reviewed.    Review of Systems:  Review of Systems   Constitutional: Negative for fever and chills.   HENT: Negative for nosebleeds.    Respiratory: Negative for cough and shortness of breath.    Gastrointestinal: Positive for vomiting. Negative for nausea, abdominal pain and diarrhea.   Genitourinary: Negative for dysuria.   Musculoskeletal: Positive for back pain.        Stable   Neurological: Negative for headaches.   All other systems reviewed and are negative.      Hemodynamics:  Temp (24hrs), Av.4 °C (97.6 °F), Min:36.3 °C (97.4 °F), Max:36.6 °C (97.8 °F)  Temperature: 36.5 °C (97.7 °F)  Pulse  Av.4  Min: 56  Max: 114   Blood Pressure : 120/62 mmHg       Physical Exam:  Physical Exam   Constitutional: He is oriented to person, place, and time. He appears well-developed. No distress.   HENT:   Head: Normocephalic and atraumatic.   Eyes: EOM are normal. Pupils are equal, round, and reactive to light.   Neck: Neck supple.   Cardiovascular: Normal rate.    Murmur heard.  IRR   Pulmonary/Chest: Effort normal and breath sounds normal.   Abdominal: Soft. He exhibits no distension. There is no tenderness.   Musculoskeletal: Normal range of motion. He exhibits edema.   R great toe with slight edema but no drainage or erythema, medial callus. Erythema resolving    Left 3rd toe-suture in place. No drainage    RUE PICC nontender   Neurological: He is alert and oriented to person, place, and time.   Skin:   Chronic changes   Nursing note and vitals reviewed.      Labs:  Recent Labs      17   1650  17   0038  17   0426   WBC   --   7.7  7.9   RBC   --   2.26*  2.62*   HEMOGLOBIN  7.8*  7.1*  8.3*   HEMATOCRIT   --   22.0*  25.6*   MCV   --   97.3  97.7   MCH   --   31.4  31.7   RDW   --   55.8*  55.1*   PLATELETCT   --   188  197   MPV   --   8.6*  8.7*   NEUTSPOLYS   --   68.90  75.40*   LYMPHOCYTES   --   18.60*  16.40*   MONOCYTES   --   10.40   6.60   EOSINOPHILS   --   1.40  0.80   BASOPHILS   --   0.30  0.40     Recent Labs      04/07/17   0005   SODIUM  133*   POTASSIUM  4.4   CHLORIDE  100   CO2  24   GLUCOSE  114*   BUN  6*     Recent Labs      04/07/17   0005   ALBUMIN  2.4*   TBILIRUBIN  0.5   ALKPHOSPHAT  105*   TOTPROTEIN  7.4   ALTSGPT  56*   ASTSGOT  82*   CREATININE  1.04       BLOOD CULTURE   Date Value Ref Range Status   03/15/2017 No growth after 5 days of incubation.  Final    ':  Results     ** No results found for the last 168 hours. **          Imaging  3/17 MRI lumbar spine: There are changes secondary to the abdominal aortic aneurysm endograft. The aneurysm sac measures an approximately 7.6 x 6.7 cm in size. Abnormal contrast enhancement is noted within the wall of the aneurysmal sac. There is also abnormal soft tissue   contrast enhancement in the surrounding soft tissue. These findings are highly suspicious for infected thrombosed aneurysm sac. The possibility of endograft infection is more likely. There is irregular outpouching of the aneurysmal sac along the   posterior portion indenting the left psoas muscle.  2.  Free fluid in the pelvis  3.  There is mild-to-moderate left hydronephrosis likely representing pelviureteric junction obstruction.    3/16 R foot xray - +OM  3/16 L foot xray +OM    Assessment:  Active Hospital Problems    Diagnosis   • *Osteomyelitis of toe (CMS-HCC) [M86.9]   • Bacteremia [R78.81]   • Back pain [M54.9]   • CHF (congestive heart failure) (CMS-HCC) [I50.9]   • Tobacco dependence [F17.200]   • Atrial fibrillation (CMS-HCC) [I48.91]    Endovascular infection       Plan:  Right great toe and left 3rd osteomyelitis  +OM on radiographs-confirmed on repeat MRIs  3/9 OSH wound cx - GAS, MSSA (I confirmed with [place] microlab)  3/25 s/p 3rd toe amp. No purulence seen proximally to amp site  OR cx (left 3rd toe) - CoNS (S-unasyn) Vanco LINUS 2  Continue Unasyn  Anticipate 6 weeks of IV abx. 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  Appreciate vascular evaluation - surgery associated with high mortality  Abx per above followed by chronic abx suppression   Tagged WBC scan - no e/o endovascular infection  If truly a graft infection is suspected consider a PET scan as outpt      Clostridium difficile diarrhea  Continue oral vancomycin QID for 2 weeks stop date 03/29/17 then BID while on IV abx with stop date of 04/30/17    Esophageal dysmotility-concern for achalasia  H/o strictures with recent dilatation as [place]'s  Esophagram +distal esophageal obstruction  s/p EGD with dilatation  Needs GI re-eval for possible repeat EGD given hx of strictures      Leukocytosis. Resolved  Multifactorial  No new signs or symptoms of infection  Monitor    Back pain  Chronic per patient      Prognosis guarded    Dispo:  Awaiting SNF placement for long-term abx    DW IM/ vascular

## 2017-04-08 NOTE — PROGRESS NOTES
Spoke to hospitalist regarding Pt's trending low BPs of 85/45 at 0400 and 100/49 at 0000.  mL bolus ordered via telephone read back. Will continue to monitor.

## 2017-04-09 ENCOUNTER — APPOINTMENT (OUTPATIENT)
Dept: RADIOLOGY | Facility: MEDICAL CENTER | Age: 72
DRG: 504 | End: 2017-04-09
Attending: SURGERY
Payer: COMMERCIAL

## 2017-04-09 LAB
ANION GAP SERPL CALC-SCNC: 9 MMOL/L (ref 0–11.9)
BUN SERPL-MCNC: 8 MG/DL (ref 8–22)
CALCIUM SERPL-MCNC: 8.2 MG/DL (ref 8.5–10.5)
CHLORIDE SERPL-SCNC: 102 MMOL/L (ref 96–112)
CO2 SERPL-SCNC: 24 MMOL/L (ref 20–33)
CREAT SERPL-MCNC: 1.04 MG/DL (ref 0.5–1.4)
GFR SERPL CREATININE-BSD FRML MDRD: >60 ML/MIN/1.73 M 2
GLUCOSE SERPL-MCNC: 115 MG/DL (ref 65–99)
LV EJECT FRACT  99904: 55
MAGNESIUM SERPL-MCNC: 1.8 MG/DL (ref 1.5–2.5)
POTASSIUM SERPL-SCNC: 3.9 MMOL/L (ref 3.6–5.5)
SODIUM SERPL-SCNC: 135 MMOL/L (ref 135–145)

## 2017-04-09 PROCEDURE — A9579 GAD-BASE MR CONTRAST NOS,1ML: HCPCS | Performed by: SURGERY

## 2017-04-09 PROCEDURE — 700102 HCHG RX REV CODE 250 W/ 637 OVERRIDE(OP): Performed by: FAMILY MEDICINE

## 2017-04-09 PROCEDURE — 99232 SBSQ HOSP IP/OBS MODERATE 35: CPT | Performed by: HOSPITALIST

## 2017-04-09 PROCEDURE — 93306 TTE W/DOPPLER COMPLETE: CPT

## 2017-04-09 PROCEDURE — 770021 HCHG ROOM/CARE - ISO PRIVATE

## 2017-04-09 PROCEDURE — A9270 NON-COVERED ITEM OR SERVICE: HCPCS | Performed by: HOSPITALIST

## 2017-04-09 PROCEDURE — 700102 HCHG RX REV CODE 250 W/ 637 OVERRIDE(OP): Performed by: HOSPITALIST

## 2017-04-09 PROCEDURE — 700117 HCHG RX CONTRAST REV CODE 255: Performed by: SURGERY

## 2017-04-09 PROCEDURE — 93306 TTE W/DOPPLER COMPLETE: CPT | Mod: 26 | Performed by: INTERNAL MEDICINE

## 2017-04-09 PROCEDURE — 700105 HCHG RX REV CODE 258: Performed by: INTERNAL MEDICINE

## 2017-04-09 PROCEDURE — 700111 HCHG RX REV CODE 636 W/ 250 OVERRIDE (IP): Performed by: INTERNAL MEDICINE

## 2017-04-09 PROCEDURE — 80048 BASIC METABOLIC PNL TOTAL CA: CPT

## 2017-04-09 PROCEDURE — 700111 HCHG RX REV CODE 636 W/ 250 OVERRIDE (IP): Performed by: HOSPITALIST

## 2017-04-09 PROCEDURE — 74183 MRI ABD W/O CNTR FLWD CNTR: CPT

## 2017-04-09 PROCEDURE — A9270 NON-COVERED ITEM OR SERVICE: HCPCS | Performed by: FAMILY MEDICINE

## 2017-04-09 PROCEDURE — 72196 MRI PELVIS W/DYE: CPT

## 2017-04-09 PROCEDURE — 700101 HCHG RX REV CODE 250: Performed by: FAMILY MEDICINE

## 2017-04-09 PROCEDURE — 83735 ASSAY OF MAGNESIUM: CPT

## 2017-04-09 RX ADMIN — MAGNESIUM SULFATE IN DEXTROSE 1 G: 10 INJECTION, SOLUTION INTRAVENOUS at 08:54

## 2017-04-09 RX ADMIN — AMPICILLIN SODIUM AND SULBACTAM SODIUM 3 G: 2; 1 INJECTION, POWDER, FOR SOLUTION INTRAMUSCULAR; INTRAVENOUS at 12:07

## 2017-04-09 RX ADMIN — GABAPENTIN 600 MG: 300 CAPSULE ORAL at 07:34

## 2017-04-09 RX ADMIN — METHOCARBAMOL 500 MG: 500 TABLET ORAL at 15:11

## 2017-04-09 RX ADMIN — AMPICILLIN SODIUM AND SULBACTAM SODIUM 3 G: 2; 1 INJECTION, POWDER, FOR SOLUTION INTRAMUSCULAR; INTRAVENOUS at 05:31

## 2017-04-09 RX ADMIN — LACTOBACILLUS ACIDOPHILUS / LACTOBACILLUS BULGARICUS 1 PACKET: 100 MILLION CFU STRENGTH GRANULES at 18:25

## 2017-04-09 RX ADMIN — AMPICILLIN SODIUM AND SULBACTAM SODIUM 3 G: 2; 1 INJECTION, POWDER, FOR SOLUTION INTRAMUSCULAR; INTRAVENOUS at 22:41

## 2017-04-09 RX ADMIN — GABAPENTIN 600 MG: 300 CAPSULE ORAL at 20:47

## 2017-04-09 RX ADMIN — GABAPENTIN 600 MG: 300 CAPSULE ORAL at 15:11

## 2017-04-09 RX ADMIN — POTASSIUM CHLORIDE 40 MEQ: 1500 TABLET, EXTENDED RELEASE ORAL at 07:34

## 2017-04-09 RX ADMIN — OXYCODONE HYDROCHLORIDE 10 MG: 10 TABLET ORAL at 22:41

## 2017-04-09 RX ADMIN — LIDOCAINE 2 PATCH: 50 PATCH CUTANEOUS at 12:07

## 2017-04-09 RX ADMIN — OXYCODONE HYDROCHLORIDE 10 MG: 10 TABLET ORAL at 00:12

## 2017-04-09 RX ADMIN — AMIODARONE HYDROCHLORIDE 200 MG: 200 TABLET ORAL at 07:35

## 2017-04-09 RX ADMIN — AMPICILLIN SODIUM AND SULBACTAM SODIUM 3 G: 2; 1 INJECTION, POWDER, FOR SOLUTION INTRAMUSCULAR; INTRAVENOUS at 18:25

## 2017-04-09 RX ADMIN — OXYCODONE HYDROCHLORIDE 20 MG: 20 TABLET, FILM COATED, EXTENDED RELEASE ORAL at 20:47

## 2017-04-09 RX ADMIN — SIMVASTATIN 20 MG: 20 TABLET, FILM COATED ORAL at 20:47

## 2017-04-09 RX ADMIN — LACTOBACILLUS ACIDOPHILUS / LACTOBACILLUS BULGARICUS 1 PACKET: 100 MILLION CFU STRENGTH GRANULES at 07:34

## 2017-04-09 RX ADMIN — METHOCARBAMOL 500 MG: 500 TABLET ORAL at 07:34

## 2017-04-09 RX ADMIN — FERROUS SULFATE TAB 325 MG (65 MG ELEMENTAL FE) 325 MG: 325 (65 FE) TAB at 07:34

## 2017-04-09 RX ADMIN — METHOCARBAMOL 500 MG: 500 TABLET ORAL at 20:46

## 2017-04-09 RX ADMIN — OXYCODONE HYDROCHLORIDE 20 MG: 20 TABLET, FILM COATED, EXTENDED RELEASE ORAL at 07:34

## 2017-04-09 RX ADMIN — NICOTINE 7 MG: 7 PATCH TRANSDERMAL at 06:26

## 2017-04-09 RX ADMIN — ACETAMINOPHEN 650 MG: 325 TABLET, FILM COATED ORAL at 00:12

## 2017-04-09 RX ADMIN — ENOXAPARIN SODIUM 40 MG: 100 INJECTION SUBCUTANEOUS at 07:34

## 2017-04-09 RX ADMIN — OXYCODONE HYDROCHLORIDE 10 MG: 10 TABLET ORAL at 18:32

## 2017-04-09 RX ADMIN — GADODIAMIDE 17 ML: 287 INJECTION INTRAVENOUS at 17:54

## 2017-04-09 ASSESSMENT — ENCOUNTER SYMPTOMS
VOMITING: 0
COUGH: 0
DIARRHEA: 0
HEADACHES: 0
VOMITING: 1
ABDOMINAL PAIN: 0
BACK PAIN: 1
FEVER: 0
NAUSEA: 0
CHILLS: 0
SHORTNESS OF BREATH: 0

## 2017-04-09 ASSESSMENT — PAIN SCALES - GENERAL
PAINLEVEL_OUTOF10: 8
PAINLEVEL_OUTOF10: 7

## 2017-04-09 NOTE — PROGRESS NOTES
Vascular    VSS afeb    Patient sleeping and I did not wake him. Discussed the finding on MRI and CT scan with Dr. Flores.  Operative therapy for explantation of the stent graft carries a mortality of up to 50%, but non-operative therapy, 70%.  I again reviewed the CTsan and looked as well at the MRI.  I would like to repeat the MRI with attention to the aortic sac, to see if there has been change in the appearance since 3 weeks of IV abx.

## 2017-04-09 NOTE — CARE PLAN
Problem: Infection  Goal: Will remain free from infection  Outcome: PROGRESSING AS EXPECTED  Patient educated on safe use of PICC as well as keeping surgical incisions clean and dressed. Patient refuses CHG baths reporting they are cold and uncomfortable, prefer to take a shower.     Problem: Skin Integrity  Goal: Risk for impaired skin integrity will decrease  Outcome: PROGRESSING AS EXPECTED  Patient educated on changing positions frequently as well as elevating BLE to help and reduce edema (skin is tight and fragile on BLE). Patient allowing elevation and SCD use when laying in bed

## 2017-04-09 NOTE — PROGRESS NOTES
Hospital Medicine Progress Note, Adult, Complex               Author: Kane Moreira Date & Time created: 4/9/2017  12:42 PM     Interval History:  Admitted for with findings of bilateral toes osteomyelitis, dysphagia/achalasia, AAA endograft infection, hyponatremia, hypokalemia, transaminitis, cirrhosis, etc.  Leg swelling maybe better.  He states he's been urinating a lot.    Review of Systems:  Review of Systems   Cardiovascular: Positive for leg swelling.   Gastrointestinal: Negative for nausea and vomiting.   All other systems reviewed and are negative.      Physical Exam:  Physical Exam  Nursing note and vitals reviewed.  Constitutional: He is oriented to person, place, and time. He appears well-developed and well-nourished overweight.   HENT:   Head: Normocephalic and atraumatic.   Right Ear: External ear normal.   Left Ear: External ear normal.   Nose: Nose normal.   Eyes: Conjunctivae and extraocular motions are normal.    Neck: Normal range of motion. Neck supple.   Cardiovascular:  +4 BLEE - min change.  Pulmonary/Chest: Effort normal.   Abdominal: Bowel sounds are normal.   Musculoskeletal: Normal range of motion.   Neurological: He is alert and oriented to person, place, and time.   Skin: Skin is warm and dry.  R-foot dressing C/D/I.        Labs:        Invalid input(s): [id number]      Recent Labs      04/07/17   0005  04/09/17   0400   SODIUM  133*  135   POTASSIUM  4.4  3.9   CHLORIDE  100  102   CO2  24  24   BUN  6*  8   CREATININE  1.04  1.04   MAGNESIUM   --   1.8   CALCIUM  8.2*  8.2*     Recent Labs      04/07/17   0005  04/09/17   0400   ALTSGPT  56*   --    ASTSGOT  82*   --    ALKPHOSPHAT  105*   --    TBILIRUBIN  0.5   --    GLUCOSE  114*  115*     Recent Labs      04/07/17   0038  04/08/17   0426   RBC  2.26*  2.62*   HEMOGLOBIN  7.1*  8.3*   HEMATOCRIT  22.0*  25.6*   PLATELETCT  188  197     Recent Labs      04/07/17   0005  04/07/17 0038  04/08/17   0426   WBC   --   7.7  7.9    NEUTSPOLYS   --   68.90  75.40*   LYMPHOCYTES   --   18.60*  16.40*   MONOCYTES   --   10.40  6.60   EOSINOPHILS   --   1.40  0.80   BASOPHILS   --   0.30  0.40   ASTSGOT  82*   --    --    ALTSGPT  56*   --    --    ALKPHOSPHAT  105*   --    --    TBILIRUBIN  0.5   --    --            Hemodynamics:  Temp (24hrs), Av.3 °C (97.4 °F), Min:36.2 °C (97.1 °F), Max:36.7 °C (98.1 °F)  Temperature: 36.2 °C (97.2 °F)  Pulse  Av.1  Min: 56  Max: 114   Blood Pressure : (!) 94/57 mmHg     Respiratory:    Respiration: 16, Pulse Oximetry: 95 %     Work Of Breathing / Effort: Mild  RUL Breath Sounds: Clear, RML Breath Sounds: Diminished, RLL Breath Sounds: Diminished, SOLEDAD Breath Sounds: Clear, LLL Breath Sounds: Diminished  Fluids:    Intake/Output Summary (Last 24 hours) at 17 1242  Last data filed at 17 0900   Gross per 24 hour   Intake    240 ml   Output   2900 ml   Net  -2660 ml        GI/Nutrition:  Orders Placed This Encounter   Procedures   • DIET ORDER     Standing Status: Standing      Number of Occurrences: 1      Standing Expiration Date:      Order Specific Question:  Diet:     Answer:  Full Liquid [11]     Medical Decision Making, by Problem:  Active Hospital Problems    Diagnosis   • Bacteremia [R78.81]/*Osteomyelitis of toe (CMS-HCC) [M86.9]/?endograft infx - s/p amputation.  Cont Abx per ID.   • Back pain [M54.9] - judicious use of narcotics.   • Anemia [D64.9] - s/p xfusion.  Hgb increasing.  Transfuse if Hgb <7.  Borderline Fe deficiency - FeSO4 replacement.   • CHF (congestive heart failure) (CMS-HCC) [I50.9] - still await echo.   • Atrial fibrillation (CMS-HCC) [I48.91] - recently cardioverted at [place].  Rate controlled.  CCR and follow.   • S/P AAA (abdominal aortic aneurysm) repair [Z98.890, Z86.79] - per [facility].   • Noncompliance [Z91.19] - pt education and encouragement.   • Tobacco dependence [F17.200] - cessation ed.  Nicoderm.   • Hep C w/o coma, chronic (CMS-HCC) [B18.2] -  outpatient follow up.   • Dysphagia [R13.10] /achalasia - s/p EGD/injection.  Outpatient GI follow p.   Leg edema - slight improvement.  Cont SCD's and leg elevation.  Cirrhosis - avoid hepatotoxic med's.  EtOH - cessation ed.  Stable issues - med hx (CAD/PAD, presbycusis), hypokalemia,C diff, preventives, hyponatremia, hypomag.  Dispo - complex/guarded.  LTAC referral.      Labs reviewed and Medications reviewed  Art catheter: No Art      DVT Prophylaxis: Enoxaparin (Lovenox)    Ulcer prophylaxis: Not indicated  Antibiotics: Treating active infection/contamination beyond 24 hours perioperative coverage  Assessed for rehab: Patient was assess for and/or received rehabilitation services during this hospitalization

## 2017-04-09 NOTE — PROGRESS NOTES
L 3rd toe amp, sutures in place, dressing missing. Cleaned with ns and applied new dressing. Patient educated that he would need to elevate BLE and wear SCD's this evening, verbalized understanding. PICC dressing changed this evening as well. Patient continues to have difficulty keeping liquids down. Reporting loose stools. Pain to lower back, lidocaine patch in place. Patient refusing cold or warm packs after removal of lidocaine patches. Patient is calm and cooperative.

## 2017-04-10 PROBLEM — Z72.0 TOBACCO ABUSE: Status: ACTIVE | Noted: 2017-04-10

## 2017-04-10 PROBLEM — R74.01 TRANSAMINITIS: Status: ACTIVE | Noted: 2017-04-10

## 2017-04-10 PROBLEM — K74.60 CIRRHOSIS OF LIVER (HCC): Status: ACTIVE | Noted: 2017-04-10

## 2017-04-10 PROBLEM — K22.0 ACHALASIA: Status: ACTIVE | Noted: 2017-04-10

## 2017-04-10 LAB
CRP SERPL HS-MCNC: 9.37 MG/DL (ref 0–0.75)
PREALB SERPL-MCNC: 5 MG/DL (ref 18–38)

## 2017-04-10 PROCEDURE — 700101 HCHG RX REV CODE 250: Performed by: FAMILY MEDICINE

## 2017-04-10 PROCEDURE — 700102 HCHG RX REV CODE 250 W/ 637 OVERRIDE(OP): Performed by: HOSPITALIST

## 2017-04-10 PROCEDURE — 84134 ASSAY OF PREALBUMIN: CPT

## 2017-04-10 PROCEDURE — 700105 HCHG RX REV CODE 258

## 2017-04-10 PROCEDURE — 700111 HCHG RX REV CODE 636 W/ 250 OVERRIDE (IP): Performed by: INTERNAL MEDICINE

## 2017-04-10 PROCEDURE — A9270 NON-COVERED ITEM OR SERVICE: HCPCS | Performed by: HOSPITALIST

## 2017-04-10 PROCEDURE — 700102 HCHG RX REV CODE 250 W/ 637 OVERRIDE(OP): Performed by: ORTHOPAEDIC SURGERY

## 2017-04-10 PROCEDURE — 700105 HCHG RX REV CODE 258: Performed by: INTERNAL MEDICINE

## 2017-04-10 PROCEDURE — 770006 HCHG ROOM/CARE - MED/SURG/GYN SEMI*

## 2017-04-10 PROCEDURE — 86140 C-REACTIVE PROTEIN: CPT

## 2017-04-10 PROCEDURE — 700111 HCHG RX REV CODE 636 W/ 250 OVERRIDE (IP): Performed by: HOSPITALIST

## 2017-04-10 PROCEDURE — 99233 SBSQ HOSP IP/OBS HIGH 50: CPT | Performed by: HOSPITALIST

## 2017-04-10 PROCEDURE — 700102 HCHG RX REV CODE 250 W/ 637 OVERRIDE(OP): Performed by: FAMILY MEDICINE

## 2017-04-10 PROCEDURE — A9270 NON-COVERED ITEM OR SERVICE: HCPCS | Performed by: ORTHOPAEDIC SURGERY

## 2017-04-10 PROCEDURE — 700102 HCHG RX REV CODE 250 W/ 637 OVERRIDE(OP): Performed by: SURGERY

## 2017-04-10 PROCEDURE — A9270 NON-COVERED ITEM OR SERVICE: HCPCS | Performed by: FAMILY MEDICINE

## 2017-04-10 PROCEDURE — A9270 NON-COVERED ITEM OR SERVICE: HCPCS | Performed by: SURGERY

## 2017-04-10 RX ORDER — SODIUM CHLORIDE 9 MG/ML
INJECTION, SOLUTION INTRAVENOUS
Status: COMPLETED
Start: 2017-04-10 | End: 2017-04-10

## 2017-04-10 RX ORDER — FUROSEMIDE 20 MG/1
20 TABLET ORAL
Status: DISCONTINUED | OUTPATIENT
Start: 2017-04-10 | End: 2017-04-19 | Stop reason: HOSPADM

## 2017-04-10 RX ADMIN — LACTOBACILLUS ACIDOPHILUS / LACTOBACILLUS BULGARICUS 1 PACKET: 100 MILLION CFU STRENGTH GRANULES at 16:49

## 2017-04-10 RX ADMIN — AMPICILLIN SODIUM AND SULBACTAM SODIUM 3 G: 2; 1 INJECTION, POWDER, FOR SOLUTION INTRAMUSCULAR; INTRAVENOUS at 06:33

## 2017-04-10 RX ADMIN — NICOTINE 7 MG: 7 PATCH TRANSDERMAL at 06:33

## 2017-04-10 RX ADMIN — SODIUM CHLORIDE: 9 INJECTION, SOLUTION INTRAVENOUS at 04:30

## 2017-04-10 RX ADMIN — ACETAMINOPHEN 650 MG: 325 TABLET, FILM COATED ORAL at 11:37

## 2017-04-10 RX ADMIN — METHOCARBAMOL 500 MG: 500 TABLET ORAL at 07:54

## 2017-04-10 RX ADMIN — AMPICILLIN SODIUM AND SULBACTAM SODIUM 3 G: 2; 1 INJECTION, POWDER, FOR SOLUTION INTRAMUSCULAR; INTRAVENOUS at 11:37

## 2017-04-10 RX ADMIN — OXYCODONE HYDROCHLORIDE 20 MG: 20 TABLET, FILM COATED, EXTENDED RELEASE ORAL at 21:49

## 2017-04-10 RX ADMIN — OXYCODONE HYDROCHLORIDE 20 MG: 20 TABLET, FILM COATED, EXTENDED RELEASE ORAL at 07:53

## 2017-04-10 RX ADMIN — OXYCODONE HYDROCHLORIDE 10 MG: 10 TABLET ORAL at 21:49

## 2017-04-10 RX ADMIN — GABAPENTIN 600 MG: 300 CAPSULE ORAL at 14:20

## 2017-04-10 RX ADMIN — OXYCODONE HYDROCHLORIDE 10 MG: 10 TABLET ORAL at 07:08

## 2017-04-10 RX ADMIN — AMPICILLIN SODIUM AND SULBACTAM SODIUM 3 G: 2; 1 INJECTION, POWDER, FOR SOLUTION INTRAMUSCULAR; INTRAVENOUS at 16:49

## 2017-04-10 RX ADMIN — ACETAMINOPHEN 650 MG: 325 TABLET, FILM COATED ORAL at 16:49

## 2017-04-10 RX ADMIN — METHOCARBAMOL 500 MG: 500 TABLET ORAL at 21:49

## 2017-04-10 RX ADMIN — ACETAMINOPHEN 650 MG: 325 TABLET, FILM COATED ORAL at 01:32

## 2017-04-10 RX ADMIN — LACTOBACILLUS ACIDOPHILUS / LACTOBACILLUS BULGARICUS 1 PACKET: 100 MILLION CFU STRENGTH GRANULES at 11:37

## 2017-04-10 RX ADMIN — ENOXAPARIN SODIUM 40 MG: 100 INJECTION SUBCUTANEOUS at 07:56

## 2017-04-10 RX ADMIN — ACETAMINOPHEN 650 MG: 325 TABLET, FILM COATED ORAL at 06:33

## 2017-04-10 RX ADMIN — LACTOBACILLUS ACIDOPHILUS / LACTOBACILLUS BULGARICUS 1 PACKET: 100 MILLION CFU STRENGTH GRANULES at 07:55

## 2017-04-10 RX ADMIN — OXYCODONE HYDROCHLORIDE 10 MG: 10 TABLET ORAL at 02:48

## 2017-04-10 RX ADMIN — FUROSEMIDE 20 MG: 20 TABLET ORAL at 12:52

## 2017-04-10 RX ADMIN — FERROUS SULFATE TAB 325 MG (65 MG ELEMENTAL FE) 325 MG: 325 (65 FE) TAB at 07:53

## 2017-04-10 RX ADMIN — GABAPENTIN 600 MG: 300 CAPSULE ORAL at 21:49

## 2017-04-10 RX ADMIN — AMPICILLIN SODIUM AND SULBACTAM SODIUM 3 G: 2; 1 INJECTION, POWDER, FOR SOLUTION INTRAMUSCULAR; INTRAVENOUS at 21:50

## 2017-04-10 RX ADMIN — OXYCODONE HYDROCHLORIDE 10 MG: 10 TABLET ORAL at 16:52

## 2017-04-10 RX ADMIN — AMIODARONE HYDROCHLORIDE 200 MG: 200 TABLET ORAL at 07:54

## 2017-04-10 RX ADMIN — LIDOCAINE 2 PATCH: 50 PATCH CUTANEOUS at 12:52

## 2017-04-10 RX ADMIN — STANDARDIZED SENNA CONCENTRATE AND DOCUSATE SODIUM 1 TABLET: 8.6; 5 TABLET, FILM COATED ORAL at 21:49

## 2017-04-10 RX ADMIN — POTASSIUM CHLORIDE 40 MEQ: 1500 TABLET, EXTENDED RELEASE ORAL at 07:55

## 2017-04-10 RX ADMIN — METHOCARBAMOL 500 MG: 500 TABLET ORAL at 14:20

## 2017-04-10 RX ADMIN — SIMVASTATIN 20 MG: 20 TABLET, FILM COATED ORAL at 21:49

## 2017-04-10 RX ADMIN — GABAPENTIN 600 MG: 300 CAPSULE ORAL at 07:54

## 2017-04-10 ASSESSMENT — PAIN SCALES - GENERAL
PAINLEVEL_OUTOF10: 3
PAINLEVEL_OUTOF10: 4
PAINLEVEL_OUTOF10: 8
PAINLEVEL_OUTOF10: 3
PAINLEVEL_OUTOF10: 5
PAINLEVEL_OUTOF10: 7
PAINLEVEL_OUTOF10: 4

## 2017-04-10 ASSESSMENT — ENCOUNTER SYMPTOMS
BACK PAIN: 1
DIARRHEA: 0
VOMITING: 1
FEVER: 0
CONSTIPATION: 0
CHILLS: 0
HEADACHES: 0
VOMITING: 0
ABDOMINAL PAIN: 0
COUGH: 0
SHORTNESS OF BREATH: 0
NAUSEA: 0

## 2017-04-10 NOTE — PROGRESS NOTES
Vascular    Pain under control.  MRI reviewed and no significant change.  Impingement on left Psoas not severe.  No endoleak. Details in report but basically no significant change and no alarming findings.  Large diameter on MRI unchanged from prior and greater than CTA.  Severe edema bilateral. Lower extremities.  Will try lasix.  Encourage walking. Observe.

## 2017-04-10 NOTE — CARE PLAN
Problem: Pain Management  Goal: Pain level will decrease to patient’s comfort goal  Outcome: PROGRESSING AS EXPECTED  Pt complains about pain. Med per MAR.     Problem: Infection  Goal: Will remain free from infection  Outcome: PROGRESSING AS EXPECTED  IV abx.

## 2017-04-10 NOTE — PROGRESS NOTES
Assumed care of patient on 1845 on 4/9, patient oriented X4, very [place], painful in low back, medicated with prn oxycodone, up to edge of bed self, picc line draws blood well, dressing changed on RLE...brayden rn

## 2017-04-10 NOTE — DISCHARGE PLANNING
[facility]S received a call from [facility] Requesting the referral be re-sent for reevaluation.

## 2017-04-10 NOTE — DISCHARGE PLANNING
Care Transition Team Discharge Planning    Anticipated Discharge Information  Anticipated discharge disposition: Discharge needs currently unknown    Care Transition Team Interventions  Were Resources Provided?: Yes  Medical Referrals: SNF referral         Discharge Plan:  NAZARIO spoke to Radha ([phone]) at [facility] in regards to pt. Radha wants to know why the pt has not been transferred to SNF and NAZARIO updated Radha on the status. Radha spoke to SNF facilities about their denial. [facility] and [facility] are re-looking at pt. Radha stated that insurance is going to stop payment shortly for services if pt is not accepted/refuses to go to SNF; pt will need to be provided with an IMM letter.

## 2017-04-10 NOTE — PROGRESS NOTES
Hospital Medicine Progress Note, Adult, Complex               Author: Kane Moreira Date & Time created: 4/10/2017  2:47 PM     Interval History:  Admitted for with findings of bilateral toes osteomyelitis, dysphagia/achalasia, AAA endograft infection, hyponatremia, hypokalemia, transaminitis, cirrhosis, etc.  Leg swelling maybe better but now having L-leg rash.    Review of Systems:  Review of Systems   Cardiovascular: Positive for leg swelling.   Gastrointestinal: Negative for nausea and vomiting.   Skin: Positive for rash.   All other systems reviewed and are negative.      Physical Exam:  Physical Exam  Nursing note and vitals reviewed.  Constitutional: He is oriented to person, place, and time. He appears well-developed and well-nourished overweight.   HENT:   Head: Normocephalic and atraumatic.   Right Ear: External ear normal.   Left Ear: External ear normal.   Nose: Nose normal.   Eyes: Conjunctivae and extraocular motions are normal.    Neck: Normal range of motion. Neck supple.   Cardiovascular:  +4 BLEE - but c greater decrease in RLE.  Pulmonary/Chest: Effort normal.   Abdominal: Bowel sounds are normal.   Musculoskeletal: Normal range of motion.   Neurological: He is alert and oriented to person, place, and time.   Skin: Skin is warm and dry.  R-foot dressing C/D/I.  New macular rash on L-calf in the distribution of the SCD that blanches c palpation.        Labs:        Invalid input(s): [id number]      Recent Labs      04/09/17   0400   SODIUM  135   POTASSIUM  3.9   CHLORIDE  102   CO2  24   BUN  8   CREATININE  1.04   MAGNESIUM  1.8   CALCIUM  8.2*     Recent Labs      04/09/17   0400  04/10/17   0250   PREALBUMIN   --   5.0*   GLUCOSE  115*   --      Recent Labs      04/08/17   0426   RBC  2.62*   HEMOGLOBIN  8.3*   HEMATOCRIT  25.6*   PLATELETCT  197     Recent Labs      04/08/17   0426   WBC  7.9   NEUTSPOLYS  75.40*   LYMPHOCYTES  16.40*   MONOCYTES  6.60   EOSINOPHILS  0.80   BASOPHILS  0.40            Hemodynamics:  Temp (24hrs), Av.7 °C (98 °F), Min:36.4 °C (97.6 °F), Max:37 °C (98.6 °F)  Temperature: 36.6 °C (97.9 °F)  Pulse  Av.8  Min: 56  Max: 114   Blood Pressure : 100/47 mmHg     Respiratory:    Respiration: 18, Pulse Oximetry: 96 %     Work Of Breathing / Effort: Mild  RUL Breath Sounds: Clear, RML Breath Sounds: Diminished, RLL Breath Sounds: Diminished, SOLEDAD Breath Sounds: Clear, LLL Breath Sounds: Diminished  Fluids:    Intake/Output Summary (Last 24 hours) at 04/10/17 1447  Last data filed at 04/10/17 1200   Gross per 24 hour   Intake    580 ml   Output   1600 ml   Net  -1020 ml        GI/Nutrition:  Orders Placed This Encounter   Procedures   • DIET ORDER     Standing Status: Standing      Number of Occurrences: 1      Standing Expiration Date:      Order Specific Question:  Diet:     Answer:  Full Liquid [11]     Medical Decision Making, by Problem:  Active Hospital Problems    Diagnosis   • Bacteremia [R78.81]/*Osteomyelitis of toe (CMS-HCC) [M86.9]/?endograft infx - s/p amputation.  Cont Abx per ID.   • Back pain [M54.9] - judicious use of narcotics.   • Anemia [D64.9] - s/p xfusion.  Hgb increasing.  Transfuse if Hgb <7.  Borderline Fe deficiency - FeSO4 replacement.   • CHF (congestive heart failure) (CMS-HCC) [I50.9] - echo shows EF of 55% c mod MR and PHTN.   • Atrial fibrillation (CMS-HCC) [I48.91] - recently cardioverted at [place].  Rate controlled.  CCR and follow.   • S/P AAA (abdominal aortic aneurysm) repair [Z98.890, Z86.79] - per [facility].   • Noncompliance [Z91.19] - pt education and encouragement.   • Tobacco dependence [F17.200] - cessation ed.  Nicoderm.   • Hep C w/o coma, chronic (CMS-HCC) [B18.2] - outpatient follow up.   • Dysphagia [R13.10] /achalasia - s/p EGD/injection.  Outpatient GI follow p.   Leg edema - slight improvement.  Cont SCD's on RLE only and encourage LLEelevation.  LLE rash - likely traumatic from SCD.  Follow for now.  Cirrhosis - avoid  hepatotoxic med's.  EtOH - cessation ed.  Stable issues - med hx (CAD/PAD, presbycusis), hypokalemia,C diff, preventives, hyponatremia, hypomag.  Dispo - complex/guarded.  Await LTAC dispo.      Labs reviewed and Medications reviewed  Art catheter: No Art      DVT Prophylaxis: Enoxaparin (Lovenox)    Ulcer prophylaxis: Not indicated  Antibiotics: Treating active infection/contamination beyond 24 hours perioperative coverage  Assessed for rehab: Patient was assess for and/or received rehabilitation services during this hospitalization

## 2017-04-10 NOTE — PROGRESS NOTES
Infectious Disease Progress Note    Author: JESS Tran DOS & Time created: 4/10/2017  11:00 AM    Chief Complaint   Patient presents with   • Low Back Pain   FU for osteomyelitis, GAS bacteremia and CDI, suspected endograft infection    Interval History:  3/17 AF, WBC 12.9, pt c/o lower back pain, asking for morphine, poor insight into illness, denies any diarrhea  3/18 AF, WBC 13.3, sleeping and not arousable to voice, cultures here negative to date  3/19 AF, WBC 13.4, frustrated about lower back pain not adequately controlled on current pain regimen, plan for EGD with dilatation today  3/20 AF WBC 9.6 +pain-denies SE abx  3/21 AF WBC 10.5 no new complaints  3/22 AF WBC not done tolerating abx well  3/23 AF eye feels a little better-pain about the same  3/24 AF tolerating abx well-MRI confirm OM  3/25 AF WBC 9.5 s/p toe amp this am  3/26 AF WBC 10.1 deneies any new sxs-  3/27 AF, WBC 9.1, sleeping but arousable to voice, back pain stable  3/28 Tmax 99.9, WBC 7.7, not happy about soft foods, and wants to go back on regular diet, having normal BMs, back pain controlled on pain regimen  3/29 AF, WBC 5.7, transferred to ortho, happy he took a shower today, plan of care discussed with pain, states he was told he has arthritis of his back, back pain stable, no diarrhea  3/30 AF, WBC 8.5, had a loose BM this am follow by normal BM, no abd pain  3/31 AF, WBC 9, nausea and vomiting this am, had soft BM this am  4/1 WBC 13.7, still having emesis, states he continues to be thirsty despite drinking lots of water yesterday, diarrhea improving and stools forming  4/2 AF, WBC 11.6, had nose bleed overnight, being transfused blood for Hg 6.6, tolerating pills  4/3- denies any diarrhea. No fevers.   4/4- no fevers. No diarrhea. Back pain under control.   4/6- no diarrhea. No fevers. Still throws up everything  4/7- no fevers. No new issues. The pain issues being addressed.  4/8- no fevers. No new issues  4/9-  continues to regurgitate. No fevers.   4/10- AF, no CBC.  CRP 9.37.  Minimal lower back pain with pain medication regiment, 3/10.  Tolerating abx, no complaints.  Labs Reviewed, Medications Reviewed, Radiology Reviewed and Wound Reviewed.    Review of Systems:  Review of Systems   Constitutional: Negative for fever and chills.   HENT: Negative for nosebleeds.    Respiratory: Negative for cough and shortness of breath.    Cardiovascular: Negative for chest pain.   Gastrointestinal: Positive for vomiting. Negative for nausea, abdominal pain, diarrhea and constipation.   Genitourinary: Negative for dysuria.   Musculoskeletal: Positive for back pain.        Stable   Skin: Negative for rash.   Neurological: Negative for headaches.       Hemodynamics:  Temp (24hrs), Av.6 °C (97.9 °F), Min:36.2 °C (97.2 °F), Max:37 °C (98.6 °F)  Temperature: 36.6 °C (97.9 °F)  Pulse  Av.8  Min: 56  Max: 114   Blood Pressure : 100/47 mmHg       Physical Exam:  Physical Exam   Constitutional: He is oriented to person, place, and time. He appears well-developed. No distress.   HENT:   Head: Normocephalic and atraumatic.   Eyes: EOM are normal. Pupils are equal, round, and reactive to light.   Neck: Normal range of motion. Neck supple.   Cardiovascular: Normal rate.    Murmur heard.  IRR   Pulmonary/Chest: Effort normal and breath sounds normal. No respiratory distress.   Abdominal: Soft. Bowel sounds are normal. He exhibits no distension. There is no tenderness.   Musculoskeletal: Normal range of motion. He exhibits edema.   R great toe with slight edema- no drainage or erythema, medial callus.   Left 3rd toe-sutures in place. No drainage or erythema.  RUE PICC- non tender, no erythema.   Neurological: He is alert and oriented to person, place, and time.   Skin:   Chronic changes   Nursing note and vitals reviewed.      Labs:  Recent Labs      17   0426   WBC  7.9   RBC  2.62*   HEMOGLOBIN  8.3*   HEMATOCRIT  25.6*   MCV  97.7    MCH  31.7   RDW  55.1*   PLATELETCT  197   MPV  8.7*   NEUTSPOLYS  75.40*   LYMPHOCYTES  16.40*   MONOCYTES  6.60   EOSINOPHILS  0.80   BASOPHILS  0.40     Recent Labs      04/09/17   0400   SODIUM  135   POTASSIUM  3.9   CHLORIDE  102   CO2  24   GLUCOSE  115*   BUN  8     Recent Labs      04/09/17   0400   CREATININE  1.04       BLOOD CULTURE   Date Value Ref Range Status   03/15/2017 No growth after 5 days of incubation.  Final    ':  Results     ** No results found for the last 168 hours. **          Imaging    MR-ABDOMEN-WITH & W/O and MR-PELVIS WITH  4/9/2017  Impression        1. Evaluate for interval change in enhancement is limited between current MR and prior lumbar MR due to difference in technique but there may be slightly less soft tissue enhancement surrounding the wall.    2. The size of the thrombosed infrarenal abdominal aortic aneurysm is grossly unchanged. No evidence of endoleak.    3. Patent aortobiiliac endograft.    4. Unchanged indentation of the aneurysm sac on the left psoas muscle. No evidence of invasion.    5. Small focal low signal T2 area with peripheral enhancement in the right psoas muscle is of unclear etiology and could relate to scarring or heterotopic calcification.     ECHOCARDIOGRAM COMP W/O CONT  4/9/2017   CONCLUSIONS  Normal left ventricular size and systolic function.  Left ventricular ejection fraction is visually estimated to be 55%.  Severe aortic stenosis.  Moderate mitral regurgitation.  Right heart pressures are consistent with moderate pulmonary   hypertension.  Severely dilated left atrium.    MR-FOOT-WITH & W/O RIGHT  3/23/2017  Impression        1. Abnormal signal in the distal aspect of the first distal phalanx is consistent with osteomyelitis.    2.  No abscess is identified.    3.  Susceptibility artifact in the plantar soft tissues at the level of the fourth tarsometatarsal joint and base of the third proximal phalanx, possibly related to prior surgery.  Foreign body cannot be excluded.    4.  Diffuse edema in the forefoot.     3/17 MRI lumbar spine: There are changes secondary to the abdominal aortic aneurysm endograft. The aneurysm sac measures an approximately 7.6 x 6.7 cm in size. Abnormal contrast enhancement is noted within the wall of the aneurysmal sac. There is also abnormal soft tissue   contrast enhancement in the surrounding soft tissue. These findings are highly suspicious for infected thrombosed aneurysm sac. The possibility of endograft infection is more likely. There is irregular outpouching of the aneurysmal sac along the   posterior portion indenting the left psoas muscle.  2.  Free fluid in the pelvis  3.  There is mild-to-moderate left hydronephrosis likely representing pelviureteric junction obstruction.    3/16 R foot xray - +OM  3/16 L foot xray +OM    Assessment:  Active Hospital Problems    Diagnosis   • *Osteomyelitis of toe (CMS-HCC) [M86.9]   • Bacteremia [R78.81]   • Back pain [M54.9]   • CHF (congestive heart failure) (CMS-HCC) [I50.9]   • Tobacco dependence [F17.200]   • Atrial fibrillation (CMS-HCC) [I48.91]    Endovascular infection       Plan:  Right great toe and left 3rd osteomyelitis  +OM on radiographs-confirmed on repeat MRIs  OSH wound cx on 3/9 - GAS, MSSA (I confirmed with [place] microlab)  S/p 3rd toe amp on 3/25. No purulence seen proximally to amp site  OR cx (left 3rd toe) on 3/25 - MSSA (S-unasyn) Vanco LINUS 2, MRSE  Continue IV Unasyn  Anticipate 6 weeks of IV abx. 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  MRI on 4/9 (reviewed today)- Size of thrombosed infrarenal AAA grossly unchanged,  no evidence of endoleak.  IV abx as above, Follow it with po amoxicillin      Clostridium difficile diarrhea  Completed vancomycin QID for 2 weeks on 03/29/17.   Continue PO Vancomycin BID while on IV abx with 3 additional days, stop date of 04/30/17.    Esophageal dysmotility-concern for achalasia  H/o strictures with recent dilatation as [place]  Esophagram +distal esophageal obstruction  s/p EGD with dilatation  Needs GI re-eval for possible repeat EGD given hx of strictures    Leukocytosis. Resolved  Multifactorial  No new signs or symptoms of infection  Monitor    Back pain  Chronic per patient    Prognosis guarded    Dispo:  Awaiting SNF placement for long-term abx

## 2017-04-10 NOTE — CARE PLAN
Problem: Nutritional:  Goal: Achieve adequate nutritional intake  Patient will consume 50% of meals on new diet.   Outcome: PROGRESSING AS EXPECTED  -Barrier to adequate nutrition status:  Dysphagia/achalasia - s/p EGD/ injection for ongoing regurgitation; outpatient GI following, per MD progress note today  -SLP signed off on 3/31, as this is GI related issue     Recommend: please add daily liquid MVI to meet 100% RDI for vitamins/minerals, given prolonged need for full liquid diet - recommend Theravite.  Discussed with RN.

## 2017-04-11 PROBLEM — I35.0 SEVERE AORTIC STENOSIS: Status: ACTIVE | Noted: 2017-04-11

## 2017-04-11 PROCEDURE — 700102 HCHG RX REV CODE 250 W/ 637 OVERRIDE(OP): Performed by: HOSPITALIST

## 2017-04-11 PROCEDURE — 700111 HCHG RX REV CODE 636 W/ 250 OVERRIDE (IP): Performed by: HOSPITALIST

## 2017-04-11 PROCEDURE — 700102 HCHG RX REV CODE 250 W/ 637 OVERRIDE(OP): Performed by: INTERNAL MEDICINE

## 2017-04-11 PROCEDURE — A9270 NON-COVERED ITEM OR SERVICE: HCPCS | Performed by: INTERNAL MEDICINE

## 2017-04-11 PROCEDURE — A9270 NON-COVERED ITEM OR SERVICE: HCPCS | Performed by: HOSPITALIST

## 2017-04-11 PROCEDURE — 700111 HCHG RX REV CODE 636 W/ 250 OVERRIDE (IP): Performed by: INTERNAL MEDICINE

## 2017-04-11 PROCEDURE — A9270 NON-COVERED ITEM OR SERVICE: HCPCS | Performed by: SURGERY

## 2017-04-11 PROCEDURE — 700102 HCHG RX REV CODE 250 W/ 637 OVERRIDE(OP): Performed by: FAMILY MEDICINE

## 2017-04-11 PROCEDURE — 770021 HCHG ROOM/CARE - ISO PRIVATE

## 2017-04-11 PROCEDURE — 700102 HCHG RX REV CODE 250 W/ 637 OVERRIDE(OP): Performed by: SURGERY

## 2017-04-11 PROCEDURE — 99407 BEHAV CHNG SMOKING > 10 MIN: CPT | Performed by: INTERNAL MEDICINE

## 2017-04-11 PROCEDURE — A9270 NON-COVERED ITEM OR SERVICE: HCPCS | Performed by: FAMILY MEDICINE

## 2017-04-11 PROCEDURE — 700105 HCHG RX REV CODE 258: Performed by: INTERNAL MEDICINE

## 2017-04-11 PROCEDURE — 99233 SBSQ HOSP IP/OBS HIGH 50: CPT | Mod: 25 | Performed by: INTERNAL MEDICINE

## 2017-04-11 PROCEDURE — 700101 HCHG RX REV CODE 250: Performed by: FAMILY MEDICINE

## 2017-04-11 PROCEDURE — 700105 HCHG RX REV CODE 258

## 2017-04-11 RX ORDER — SODIUM CHLORIDE 9 MG/ML
INJECTION, SOLUTION INTRAVENOUS
Status: COMPLETED
Start: 2017-04-11 | End: 2017-04-11

## 2017-04-11 RX ADMIN — AMPICILLIN SODIUM AND SULBACTAM SODIUM 3 G: 2; 1 INJECTION, POWDER, FOR SOLUTION INTRAMUSCULAR; INTRAVENOUS at 06:06

## 2017-04-11 RX ADMIN — OXYCODONE HYDROCHLORIDE 10 MG: 10 TABLET ORAL at 20:22

## 2017-04-11 RX ADMIN — LACTOBACILLUS ACIDOPHILUS / LACTOBACILLUS BULGARICUS 1 PACKET: 100 MILLION CFU STRENGTH GRANULES at 11:17

## 2017-04-11 RX ADMIN — GABAPENTIN 600 MG: 300 CAPSULE ORAL at 14:46

## 2017-04-11 RX ADMIN — SODIUM CHLORIDE 1000 ML: 9 INJECTION, SOLUTION INTRAVENOUS at 06:07

## 2017-04-11 RX ADMIN — FERROUS SULFATE TAB 325 MG (65 MG ELEMENTAL FE) 325 MG: 325 (65 FE) TAB at 08:05

## 2017-04-11 RX ADMIN — ACETAMINOPHEN 650 MG: 325 TABLET, FILM COATED ORAL at 11:17

## 2017-04-11 RX ADMIN — OXYCODONE HYDROCHLORIDE 10 MG: 10 TABLET ORAL at 06:06

## 2017-04-11 RX ADMIN — VANCOMYCIN HYDROCHLORIDE 125 MG: 10 INJECTION, POWDER, LYOPHILIZED, FOR SOLUTION INTRAVENOUS at 11:17

## 2017-04-11 RX ADMIN — ENOXAPARIN SODIUM 40 MG: 100 INJECTION SUBCUTANEOUS at 08:05

## 2017-04-11 RX ADMIN — VANCOMYCIN HYDROCHLORIDE 125 MG: 10 INJECTION, POWDER, LYOPHILIZED, FOR SOLUTION INTRAVENOUS at 20:22

## 2017-04-11 RX ADMIN — OXYCODONE HYDROCHLORIDE 10 MG: 10 TABLET ORAL at 02:12

## 2017-04-11 RX ADMIN — GABAPENTIN 600 MG: 300 CAPSULE ORAL at 20:22

## 2017-04-11 RX ADMIN — OXYCODONE HYDROCHLORIDE 20 MG: 20 TABLET, FILM COATED, EXTENDED RELEASE ORAL at 20:22

## 2017-04-11 RX ADMIN — AMPICILLIN SODIUM AND SULBACTAM SODIUM 3 G: 2; 1 INJECTION, POWDER, FOR SOLUTION INTRAMUSCULAR; INTRAVENOUS at 11:15

## 2017-04-11 RX ADMIN — AMPICILLIN SODIUM AND SULBACTAM SODIUM 3 G: 2; 1 INJECTION, POWDER, FOR SOLUTION INTRAMUSCULAR; INTRAVENOUS at 17:24

## 2017-04-11 RX ADMIN — POTASSIUM CHLORIDE 40 MEQ: 1500 TABLET, EXTENDED RELEASE ORAL at 08:04

## 2017-04-11 RX ADMIN — METHOCARBAMOL 500 MG: 500 TABLET ORAL at 08:04

## 2017-04-11 RX ADMIN — ACETAMINOPHEN 650 MG: 325 TABLET, FILM COATED ORAL at 06:06

## 2017-04-11 RX ADMIN — LACTOBACILLUS ACIDOPHILUS / LACTOBACILLUS BULGARICUS 1 PACKET: 100 MILLION CFU STRENGTH GRANULES at 17:24

## 2017-04-11 RX ADMIN — LIDOCAINE 2 PATCH: 50 PATCH CUTANEOUS at 11:15

## 2017-04-11 RX ADMIN — GABAPENTIN 600 MG: 300 CAPSULE ORAL at 08:04

## 2017-04-11 RX ADMIN — ACETAMINOPHEN 650 MG: 325 TABLET, FILM COATED ORAL at 17:24

## 2017-04-11 RX ADMIN — NICOTINE 7 MG: 7 PATCH TRANSDERMAL at 06:06

## 2017-04-11 RX ADMIN — AMPICILLIN SODIUM AND SULBACTAM SODIUM 3 G: 2; 1 INJECTION, POWDER, FOR SOLUTION INTRAMUSCULAR; INTRAVENOUS at 23:00

## 2017-04-11 RX ADMIN — LACTOBACILLUS ACIDOPHILUS / LACTOBACILLUS BULGARICUS 1 PACKET: 100 MILLION CFU STRENGTH GRANULES at 08:05

## 2017-04-11 RX ADMIN — OXYCODONE HYDROCHLORIDE 20 MG: 20 TABLET, FILM COATED, EXTENDED RELEASE ORAL at 08:05

## 2017-04-11 RX ADMIN — METHOCARBAMOL 500 MG: 500 TABLET ORAL at 20:22

## 2017-04-11 RX ADMIN — FUROSEMIDE 20 MG: 20 TABLET ORAL at 08:05

## 2017-04-11 RX ADMIN — METHOCARBAMOL 500 MG: 500 TABLET ORAL at 14:47

## 2017-04-11 RX ADMIN — AMIODARONE HYDROCHLORIDE 200 MG: 200 TABLET ORAL at 08:05

## 2017-04-11 RX ADMIN — ACETAMINOPHEN 650 MG: 325 TABLET, FILM COATED ORAL at 00:32

## 2017-04-11 RX ADMIN — SIMVASTATIN 20 MG: 20 TABLET, FILM COATED ORAL at 20:22

## 2017-04-11 ASSESSMENT — ENCOUNTER SYMPTOMS
BACK PAIN: 1
NERVOUS/ANXIOUS: 1
DIARRHEA: 0
ABDOMINAL PAIN: 0
FEVER: 0
VOMITING: 0
SHORTNESS OF BREATH: 0
VOMITING: 1
COUGH: 0
HEADACHES: 0
NAUSEA: 0
CHILLS: 0
CONSTIPATION: 0

## 2017-04-11 ASSESSMENT — PAIN SCALES - GENERAL
PAINLEVEL_OUTOF10: 6
PAINLEVEL_OUTOF10: 0
PAINLEVEL_OUTOF10: 0
PAINLEVEL_OUTOF10: 4
PAINLEVEL_OUTOF10: 3
PAINLEVEL_OUTOF10: 6
PAINLEVEL_OUTOF10: 0
PAINLEVEL_OUTOF10: 0
PAINLEVEL_OUTOF10: ASSUMED PAIN PRESENT

## 2017-04-11 NOTE — PROGRESS NOTES
Pt awake and alert, sitting up at edge of bed, eating breakfast, denies pain, dressing to right foot CDI, redness and swelling noted to BLE, PICC line intact, call light and fluids placed within reach.

## 2017-04-11 NOTE — CARE PLAN
Received report from day RN, assumed care at 1915; Pt A&Ox4, sitting up on the edge of bed; Pt reports pain is 5-6/10 on his lower back; Swelling and noted on both feet/leg, redness on Left leg; Positive for vomiting; Pt is up w SA assist, stable; PICC RUE assessed and is patent, CDI, NS TKO; Pt is on RA w SaO2 >90%; Call light and personal possessions within reach, discussed safety interventions w pt; Reviewed recent notes, labs, diagnostics, MD orders; Hourly rounding in place        Problem: Safety  Goal: Will remain free from injury  Outcome: PROGRESSING AS EXPECTED  Place pt call light and belongings within reached, pt calls approriately; Instructed to call for assistance, Risk for fall education implemented; Bed lowered and locked, upper siderails up; Hourly rounding in place    Problem: Pain Management  Goal: Pain level will decrease to patient’s comfort goal  Outcome: PROGRESSING AS EXPECTED  Pt complain of pain 7-8/10 on his Rt arm/elbow and Rt leg. Medicated per MAR. Pt able to sleep. Pain decreases to 5-6/10.      Problem: Pain Management  Goal: Pain level will decrease to patient’s comfort goal  Outcome: PROGRESSING AS EXPECTED  Pt pain is well controlled with current pain regimen kept within 3-6 on his Lower back.       Problem: Discharge Barriers/Planning  Goal: Patient’s continuum of care needs will be met  Outcome: PROGRESSING AS EXPECTED  Awaits for SNF placement

## 2017-04-11 NOTE — PROGRESS NOTES
Vascular    Leg edema slightly less.  Back pain controlled at present.  Reviewing and discussing test results and reconsidering ,management options for AAA endograft.  Discussed with patient, sister, radiology.

## 2017-04-11 NOTE — CARE PLAN
Problem: Pain Management  Goal: Pain level will decrease to patient’s comfort goal  Outcome: PROGRESSING AS EXPECTED  Pain managed with prn and routine medications, tolerating well.

## 2017-04-11 NOTE — PROGRESS NOTES
Infectious Disease Progress Note    Author: JESS Tran DOS & Time created: 4/11/2017  10:16 AM    Chief Complaint   Patient presents with   • Low Back Pain   FU for osteomyelitis, GAS bacteremia and CDI, suspected endograft infection    Interval History:  3/17 AF, WBC 12.9, pt c/o lower back pain, asking for morphine, poor insight into illness, denies any diarrhea  3/18 AF, WBC 13.3, sleeping and not arousable to voice, cultures here negative to date  3/19 AF, WBC 13.4, frustrated about lower back pain not adequately controlled on current pain regimen, plan for EGD with dilatation today  3/20 AF WBC 9.6 +pain-denies SE abx  3/21 AF WBC 10.5 no new complaints  3/22 AF WBC not done tolerating abx well  3/23 AF eye feels a little better-pain about the same  3/24 AF tolerating abx well-MRI confirm OM  3/25 AF WBC 9.5 s/p toe amp this am  3/26 AF WBC 10.1 deneies any new sxs-  3/27 AF, WBC 9.1, sleeping but arousable to voice, back pain stable  3/28 Tmax 99.9, WBC 7.7, not happy about soft foods, and wants to go back on regular diet, having normal BMs, back pain controlled on pain regimen  3/29 AF, WBC 5.7, transferred to ortho, happy he took a shower today, plan of care discussed with pain, states he was told he has arthritis of his back, back pain stable, no diarrhea  3/30 AF, WBC 8.5, had a loose BM this am follow by normal BM, no abd pain  3/31 AF, WBC 9, nausea and vomiting this am, had soft BM this am  4/1 WBC 13.7, still having emesis, states he continues to be thirsty despite drinking lots of water yesterday, diarrhea improving and stools forming  4/2 AF, WBC 11.6, had nose bleed overnight, being transfused blood for Hg 6.6, tolerating pills  4/3- denies any diarrhea. No fevers.   4/4- no fevers. No diarrhea. Back pain under control.   4/6- no diarrhea. No fevers. Still throws up everything  4/7- no fevers. No new issues. The pain issues being addressed.  4/8- no fevers. No new issues  4/9-  continues to regurgitate. No fevers.   4/10- AF, no CBC.  CRP 9.37.  Minimal lower back pain with pain medication regiment, 3/10.  Tolerating abx, no complaints.  - AF, no CBC.  Walking around room, denies overall pain.  Tolerating abx without complaints.  Labs Reviewed, Medications Reviewed, Radiology Reviewed and Wound Reviewed.    Review of Systems:  Review of Systems   Constitutional: Negative for fever and chills.   Respiratory: Negative for cough and shortness of breath.    Cardiovascular: Negative for chest pain.   Gastrointestinal: Positive for vomiting. Negative for nausea, abdominal pain, diarrhea and constipation.   Genitourinary: Negative for dysuria.   Musculoskeletal: Positive for back pain.        Stable   Skin: Negative for rash.   Neurological: Negative for headaches.       Hemodynamics:  Temp (24hrs), Av.4 °C (97.5 °F), Min:36.2 °C (97.1 °F), Max:36.5 °C (97.7 °F)  Temperature: 36.5 °C (97.7 °F)  Pulse  Av.7  Min: 56  Max: 114   Blood Pressure : 103/64 mmHg       Physical Exam:  Physical Exam   Constitutional: He is oriented to person, place, and time. He appears well-developed. No distress.   HENT:   Head: Normocephalic and atraumatic.   Eyes: EOM are normal. Pupils are equal, round, and reactive to light.   Neck: Normal range of motion. Neck supple.   Cardiovascular: Normal rate.    Murmur heard.  IRR   Pulmonary/Chest: Effort normal and breath sounds normal. No respiratory distress.   Abdominal: Soft. Bowel sounds are normal. He exhibits no distension. There is no tenderness.   Musculoskeletal: Normal range of motion. He exhibits edema.   R great toe with slight edema- no drainage or erythema, medial callus.   Left 3rd toe-sutures in place. No drainage or erythema.  RUE PICC- non tender, no erythema.  BLE edema   Neurological: He is alert and oriented to person, place, and time.   Skin: Skin is warm and dry. No rash noted.   Chronic changes   Nursing note and vitals  reviewed.      Labs:  No results for input(s): WBC, RBC, HEMOGLOBIN, HEMATOCRIT, MCV, MCH, RDW, PLATELETCT, MPV, NEUTSPOLYS, LYMPHOCYTES, MONOCYTES, EOSINOPHILS, BASOPHILS, RBCMORPHOLO in the last 72 hours.  Recent Labs      04/09/17   0400   SODIUM  135   POTASSIUM  3.9   CHLORIDE  102   CO2  24   GLUCOSE  115*   BUN  8     Recent Labs      04/09/17   0400   CREATININE  1.04       BLOOD CULTURE   Date Value Ref Range Status   03/15/2017 No growth after 5 days of incubation.  Final    ':  Results     ** No results found for the last 168 hours. **          Imaging    MR-ABDOMEN-WITH & W/O and MR-PELVIS WITH  4/9/2017  Impression        1. Evaluate for interval change in enhancement is limited between current MR and prior lumbar MR due to difference in technique but there may be slightly less soft tissue enhancement surrounding the wall.    2. The size of the thrombosed infrarenal abdominal aortic aneurysm is grossly unchanged. No evidence of endoleak.    3. Patent aortobiiliac endograft.    4. Unchanged indentation of the aneurysm sac on the left psoas muscle. No evidence of invasion.    5. Small focal low signal T2 area with peripheral enhancement in the right psoas muscle is of unclear etiology and could relate to scarring or heterotopic calcification.     ECHOCARDIOGRAM COMP W/O CONT  4/9/2017   CONCLUSIONS  Normal left ventricular size and systolic function.  Left ventricular ejection fraction is visually estimated to be 55%.  Severe aortic stenosis.  Moderate mitral regurgitation.  Right heart pressures are consistent with moderate pulmonary   hypertension.  Severely dilated left atrium.    MR-FOOT-WITH & W/O RIGHT  3/23/2017  Impression        1. Abnormal signal in the distal aspect of the first distal phalanx is consistent with osteomyelitis.    2.  No abscess is identified.    3.  Susceptibility artifact in the plantar soft tissues at the level of the fourth tarsometatarsal joint and base of the third  proximal phalanx, possibly related to prior surgery. Foreign body cannot be excluded.    4.  Diffuse edema in the forefoot.     3/17 MRI lumbar spine: There are changes secondary to the abdominal aortic aneurysm endograft. The aneurysm sac measures an approximately 7.6 x 6.7 cm in size. Abnormal contrast enhancement is noted within the wall of the aneurysmal sac. There is also abnormal soft tissue   contrast enhancement in the surrounding soft tissue. These findings are highly suspicious for infected thrombosed aneurysm sac. The possibility of endograft infection is more likely. There is irregular outpouching of the aneurysmal sac along the   posterior portion indenting the left psoas muscle.  2.  Free fluid in the pelvis  3.  There is mild-to-moderate left hydronephrosis likely representing pelviureteric junction obstruction.    3/16 R foot xray - +OM  3/16 L foot xray +OM    Assessment:  Active Hospital Problems    Diagnosis   • *Osteomyelitis of toe (CMS-HCC) [M86.9]   • Bacteremia [R78.81]   • Back pain [M54.9]   • CHF (congestive heart failure) (CMS-HCC) [I50.9]   • Tobacco dependence [F17.200]   • Atrial fibrillation (CMS-HCC) [I48.91]    Endovascular infection       Plan:  Right great toe and left 3rd osteomyelitis  +OM on radiographs-confirmed on repeat MRIs  OSH wound cx on 3/9 - GAS, MSSA (I confirmed with [place] microlab)  S/p 3rd toe amp on 3/25. No purulence seen proximally to amp site  OR cx (left 3rd toe) on 3/25 - MSSA (S-unasyn) Vanco LINUS 2, MRSE  Continue IV Unasyn  Anticipate 6 weeks of IV abx. Stop date 04/27/17.    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  MRI on 4/9 (reviewed today)-  Size of thrombosed infrarenal AAA grossly unchanged, no evidence of endoleak.  IV abx as above, Follow it with po amoxicillin      Clostridium difficile diarrhea  Completed vancomycin QID for 2 weeks on 03/29/17.   Continue PO Vancomycin BID while on IV abx with 3 additional days, stop date of 04/30/17.    Esophageal dysmotility-concern for achalasia  H/o strictures with recent dilatation as [place]  Esophagram +distal esophageal obstruction  s/p EGD with dilatation  Needs GI re-eval for possible repeat EGD given hx of strictures    Leukocytosis. Resolved  Multifactorial  No new signs or symptoms of infection  Monitor    Back pain  Chronic per patient    Prognosis guarded    Dispo:  Awaiting SNF placement for long-term abx, being re-evaluated by [facility] and [facility].

## 2017-04-11 NOTE — PROGRESS NOTES
Hospital Medicine Progress Note, Adult, Complex               Author: Kyleigh Bhakta  Date & Time created: 4/11/2017  2:01 PM     CC: Back pain    Interval History:  72M hx AAA s/p repair, recent findings of bilateral toes osteomyelitis (left AMA from [place] prior to ortho eval and PICC placement), dysphagia/achalasia s/p dilation ([facility]); admitted w back pain found to have suggestion of AAA endograft infection, as well as hyponatremia, hypokalemia.  He was found to have MSSA bacteremia, with source being toe osteo  3/9: Outside hospital with positive blood cx (GAS)  3/19: EGD w esophageal dilation for achalasia/stricture.  Diet is liquid.  3/25: L 3td Toe amputation   4/9: Echo with no e/o vegetation  4/10: Left leg rash where SCD was  4/11: PO vanc resumed, vascular confirmed no plan for endograft removal    Review of Systems:  Review of Systems   Cardiovascular: Positive for leg swelling.   Gastrointestinal: Negative for nausea and vomiting.   Musculoskeletal: Positive for back pain.   Skin: Positive for rash.   Psychiatric/Behavioral: The patient is nervous/anxious.    All other systems reviewed and are negative.      Physical Exam:  Physical ExamNursing note and vitals reviewed.  Constitutional: He is oriented to person, place, and time. He appears well-developed and well-nourished overweight.   HENT: Poor dentition, no e/o exudate  Nose: Nose normal.   Eyes: Conjunctivae and extraocular motions are normal.    Neck: Normal range of motion. Neck supple.   Cardiovascular:  +2+ BLEE - R>L  Pulmonary/Chest: Effort normal, no accessory muscle use.   Abdominal: Bowel sounds are normal.   Musculoskeletal: Normal range of motion.   Neurological: He is alert and oriented to person, place, and time.   Skin: Skin is warm and dry.  R-foot dressing C/D/I.  Macular rash on L-calf in the distribution of the SCD that blanches c palpation.    Labs:        Invalid input(s): [id number]      Recent Labs       17   0400   SODIUM  135   POTASSIUM  3.9   CHLORIDE  102   CO2  24   BUN  8   CREATININE  1.04   MAGNESIUM  1.8   CALCIUM  8.2*     Recent Labs      17   0400  04/10/17   0250   PREALBUMIN   --   5.0*   GLUCOSE  115*   --      No results for input(s): RBC, HEMOGLOBIN, HEMATOCRIT, PLATELETCT, PROTHROMBTM, APTT, INR, IRON, FERRITIN, TOTIRONBC in the last 72 hours.            Hemodynamics:  Temp (24hrs), Av.4 °C (97.5 °F), Min:36.2 °C (97.1 °F), Max:36.5 °C (97.7 °F)  Temperature: 36.5 °C (97.7 °F)  Pulse  Av.7  Min: 56  Max: 114   Blood Pressure : 103/64 mmHg     Respiratory:    Respiration: 16, Pulse Oximetry: 90 %        RUL Breath Sounds: Clear, RML Breath Sounds: Diminished, RLL Breath Sounds: Diminished, SOLEDAD Breath Sounds: Clear, LLL Breath Sounds: Diminished  Fluids:    Intake/Output Summary (Last 24 hours) at 17 1401  Last data filed at 17 0600   Gross per 24 hour   Intake   1400 ml   Output   2400 ml   Net  -1000 ml        GI/Nutrition:  Orders Placed This Encounter   Procedures   • DIET ORDER     Standing Status: Standing      Number of Occurrences: 1      Standing Expiration Date:      Order Specific Question:  Diet:     Answer:  Full Liquid [11]     Medical Decision Making, by Problem:  Active Hospital Problems    Diagnosis   • Strep/MSSA Bacteremia - Bacteremia on cultures 3/9 from outside hospital.  *Osteomyelitis of 3rd R toe (CMS-HCC) s/p amputation on 3/25  -Unasyn through  --> PO Amoxicillin     • Back pain [M54.9] - judicious use of narcotics.  MRI imaging has been negative for compression fracture.  No neurologic deficit     • Anemia [D64.9] - s/p xfusion  On .  Hgb increasing.    -Transfuse if Hgb <7.    -Borderline Fe deficiency - FeSO4 replacement.     • Chronic systolic CHF (congestive heart failure) (CMS-HCC) [I50.9] - echo shows mildly reduced EF of 55% c mod MR and PHTN.  -DC IVF     • Atrial fibrillation (CMS-HCC) [I48.91] - recently cardioverted at  [facility].  Rate controlled.   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  Risk>benefit of removal  -No plan to remove endograft, per Vas Surg.     • Noncompliance [Z91.19] - pt education and encouragement.  -Has left AMA previous admissions     • Tobacco dependence [F17.200] -   -counseled for more than 10 minutes on tobacco cessation 37402   -Nicoderm.     • Hep C w/o coma, chronic with cirrhosis (CMS-HCC) [B18.2] - outpatient follow up.     • C Dif Diarrhea - needs PO vanc through IV abx  -PO vanc BID (per ID recs)     • Dysphagia [R13.10] /suspected achalasia - s/p EGD/injection.    -Outpatient GI follow p.       EtOH - cessation ed.    Dispo - complex/guarded.  Await LTAC dispo, consider resume Anticoag.  DW ID re: Abx plan and Vanc plan      Labs reviewed and Medications reviewed  Art catheter: No Art      DVT Prophylaxis: Enoxaparin (Lovenox)    Ulcer prophylaxis: Not indicated  Antibiotics: Treating active infection/contamination beyond 24 hours perioperative coverage  Assessed for rehab: Patient was assess for and/or received rehabilitation services during this hospitalization

## 2017-04-11 NOTE — CARE PLAN
Problem: Communication  Goal: The ability to communicate needs accurately and effectively will improve  Outcome: PROGRESSING AS EXPECTED  Able to verbalize needs to staff, call light within reach.

## 2017-04-12 LAB
ALBUMIN SERPL BCP-MCNC: 2.5 G/DL (ref 3.2–4.9)
BASOPHILS # BLD AUTO: 0.4 % (ref 0–1.8)
BASOPHILS # BLD: 0.03 K/UL (ref 0–0.12)
BNP SERPL-MCNC: 666 PG/ML (ref 0–100)
BUN SERPL-MCNC: 8 MG/DL (ref 8–22)
CALCIUM SERPL-MCNC: 8.4 MG/DL (ref 8.5–10.5)
CHLORIDE SERPL-SCNC: 98 MMOL/L (ref 96–112)
CO2 SERPL-SCNC: 26 MMOL/L (ref 20–33)
CREAT SERPL-MCNC: 1.21 MG/DL (ref 0.5–1.4)
EOSINOPHIL # BLD AUTO: 0.07 K/UL (ref 0–0.51)
EOSINOPHIL NFR BLD: 1 % (ref 0–6.9)
ERYTHROCYTE [DISTWIDTH] IN BLOOD BY AUTOMATED COUNT: 54.9 FL (ref 35.9–50)
GFR SERPL CREATININE-BSD FRML MDRD: 59 ML/MIN/1.73 M 2
GLUCOSE SERPL-MCNC: 101 MG/DL (ref 65–99)
HCT VFR BLD AUTO: 23 % (ref 42–52)
HGB BLD-MCNC: 7.5 G/DL (ref 14–18)
IMM GRANULOCYTES # BLD AUTO: 0.06 K/UL (ref 0–0.11)
IMM GRANULOCYTES NFR BLD AUTO: 0.8 % (ref 0–0.9)
LYMPHOCYTES # BLD AUTO: 1.38 K/UL (ref 1–4.8)
LYMPHOCYTES NFR BLD: 19.4 % (ref 22–41)
MCH RBC QN AUTO: 31.9 PG (ref 27–33)
MCHC RBC AUTO-ENTMCNC: 32.6 G/DL (ref 33.7–35.3)
MCV RBC AUTO: 97.9 FL (ref 81.4–97.8)
MONOCYTES # BLD AUTO: 0.65 K/UL (ref 0–0.85)
MONOCYTES NFR BLD AUTO: 9.1 % (ref 0–13.4)
NEUTROPHILS # BLD AUTO: 4.93 K/UL (ref 1.82–7.42)
NEUTROPHILS NFR BLD: 69.3 % (ref 44–72)
NRBC # BLD AUTO: 0 K/UL
NRBC BLD AUTO-RTO: 0 /100 WBC
PHOSPHATE SERPL-MCNC: 4.5 MG/DL (ref 2.5–4.5)
PLATELET # BLD AUTO: 220 K/UL (ref 164–446)
PMV BLD AUTO: 8.8 FL (ref 9–12.9)
POTASSIUM SERPL-SCNC: 3.9 MMOL/L (ref 3.6–5.5)
RBC # BLD AUTO: 2.35 M/UL (ref 4.7–6.1)
SODIUM SERPL-SCNC: 129 MMOL/L (ref 135–145)
WBC # BLD AUTO: 7.1 K/UL (ref 4.8–10.8)

## 2017-04-12 PROCEDURE — 85025 COMPLETE CBC W/AUTO DIFF WBC: CPT

## 2017-04-12 PROCEDURE — 700102 HCHG RX REV CODE 250 W/ 637 OVERRIDE(OP): Performed by: SURGERY

## 2017-04-12 PROCEDURE — 700101 HCHG RX REV CODE 250: Performed by: FAMILY MEDICINE

## 2017-04-12 PROCEDURE — 770021 HCHG ROOM/CARE - ISO PRIVATE

## 2017-04-12 PROCEDURE — 700102 HCHG RX REV CODE 250 W/ 637 OVERRIDE(OP): Performed by: INTERNAL MEDICINE

## 2017-04-12 PROCEDURE — A9270 NON-COVERED ITEM OR SERVICE: HCPCS | Performed by: HOSPITALIST

## 2017-04-12 PROCEDURE — 80069 RENAL FUNCTION PANEL: CPT

## 2017-04-12 PROCEDURE — 700102 HCHG RX REV CODE 250 W/ 637 OVERRIDE(OP): Performed by: HOSPITALIST

## 2017-04-12 PROCEDURE — A9270 NON-COVERED ITEM OR SERVICE: HCPCS | Performed by: INTERNAL MEDICINE

## 2017-04-12 PROCEDURE — 99233 SBSQ HOSP IP/OBS HIGH 50: CPT | Performed by: INTERNAL MEDICINE

## 2017-04-12 PROCEDURE — 700105 HCHG RX REV CODE 258: Performed by: INTERNAL MEDICINE

## 2017-04-12 PROCEDURE — A9270 NON-COVERED ITEM OR SERVICE: HCPCS | Performed by: SURGERY

## 2017-04-12 PROCEDURE — 700111 HCHG RX REV CODE 636 W/ 250 OVERRIDE (IP): Performed by: HOSPITALIST

## 2017-04-12 PROCEDURE — 700102 HCHG RX REV CODE 250 W/ 637 OVERRIDE(OP): Performed by: FAMILY MEDICINE

## 2017-04-12 PROCEDURE — A9270 NON-COVERED ITEM OR SERVICE: HCPCS | Performed by: FAMILY MEDICINE

## 2017-04-12 PROCEDURE — 83880 ASSAY OF NATRIURETIC PEPTIDE: CPT

## 2017-04-12 PROCEDURE — 700111 HCHG RX REV CODE 636 W/ 250 OVERRIDE (IP): Performed by: INTERNAL MEDICINE

## 2017-04-12 RX ORDER — OXYCODONE HYDROCHLORIDE 5 MG/1
15 TABLET ORAL EVERY 4 HOURS PRN
Status: DISCONTINUED | OUTPATIENT
Start: 2017-04-12 | End: 2017-04-19 | Stop reason: HOSPADM

## 2017-04-12 RX ORDER — OXYCODONE HYDROCHLORIDE 10 MG/1
10 TABLET ORAL EVERY 4 HOURS PRN
Status: DISCONTINUED | OUTPATIENT
Start: 2017-04-12 | End: 2017-04-19 | Stop reason: HOSPADM

## 2017-04-12 RX ADMIN — LIDOCAINE 2 PATCH: 50 PATCH CUTANEOUS at 11:39

## 2017-04-12 RX ADMIN — LACTOBACILLUS ACIDOPHILUS / LACTOBACILLUS BULGARICUS 1 PACKET: 100 MILLION CFU STRENGTH GRANULES at 11:36

## 2017-04-12 RX ADMIN — ENOXAPARIN SODIUM 40 MG: 100 INJECTION SUBCUTANEOUS at 08:54

## 2017-04-12 RX ADMIN — GABAPENTIN 600 MG: 300 CAPSULE ORAL at 21:07

## 2017-04-12 RX ADMIN — AMIODARONE HYDROCHLORIDE 200 MG: 200 TABLET ORAL at 08:54

## 2017-04-12 RX ADMIN — OXYCODONE HYDROCHLORIDE 10 MG: 5 TABLET ORAL at 21:07

## 2017-04-12 RX ADMIN — AMPICILLIN SODIUM AND SULBACTAM SODIUM 3 G: 2; 1 INJECTION, POWDER, FOR SOLUTION INTRAMUSCULAR; INTRAVENOUS at 11:39

## 2017-04-12 RX ADMIN — METHOCARBAMOL 500 MG: 500 TABLET ORAL at 14:41

## 2017-04-12 RX ADMIN — OXYCODONE HYDROCHLORIDE 5 MG: 5 TABLET ORAL at 07:41

## 2017-04-12 RX ADMIN — FERROUS SULFATE TAB 325 MG (65 MG ELEMENTAL FE) 325 MG: 325 (65 FE) TAB at 07:42

## 2017-04-12 RX ADMIN — GABAPENTIN 600 MG: 300 CAPSULE ORAL at 08:53

## 2017-04-12 RX ADMIN — AMPICILLIN SODIUM AND SULBACTAM SODIUM 3 G: 2; 1 INJECTION, POWDER, FOR SOLUTION INTRAMUSCULAR; INTRAVENOUS at 05:40

## 2017-04-12 RX ADMIN — POTASSIUM CHLORIDE 40 MEQ: 1500 TABLET, EXTENDED RELEASE ORAL at 08:54

## 2017-04-12 RX ADMIN — GABAPENTIN 600 MG: 300 CAPSULE ORAL at 14:41

## 2017-04-12 RX ADMIN — METHOCARBAMOL 500 MG: 500 TABLET ORAL at 08:54

## 2017-04-12 RX ADMIN — ACETAMINOPHEN 650 MG: 325 TABLET, FILM COATED ORAL at 16:57

## 2017-04-12 RX ADMIN — OXYCODONE HYDROCHLORIDE 20 MG: 20 TABLET, FILM COATED, EXTENDED RELEASE ORAL at 21:07

## 2017-04-12 RX ADMIN — SIMVASTATIN 20 MG: 20 TABLET, FILM COATED ORAL at 21:07

## 2017-04-12 RX ADMIN — VANCOMYCIN HYDROCHLORIDE 125 MG: 10 INJECTION, POWDER, LYOPHILIZED, FOR SOLUTION INTRAVENOUS at 21:07

## 2017-04-12 RX ADMIN — VANCOMYCIN HYDROCHLORIDE 125 MG: 10 INJECTION, POWDER, LYOPHILIZED, FOR SOLUTION INTRAVENOUS at 08:53

## 2017-04-12 RX ADMIN — AMPICILLIN SODIUM AND SULBACTAM SODIUM 3 G: 2; 1 INJECTION, POWDER, FOR SOLUTION INTRAMUSCULAR; INTRAVENOUS at 23:26

## 2017-04-12 RX ADMIN — OXYCODONE HYDROCHLORIDE 10 MG: 10 TABLET ORAL at 00:20

## 2017-04-12 RX ADMIN — METHOCARBAMOL 500 MG: 500 TABLET ORAL at 21:07

## 2017-04-12 RX ADMIN — AMPICILLIN SODIUM AND SULBACTAM SODIUM 3 G: 2; 1 INJECTION, POWDER, FOR SOLUTION INTRAMUSCULAR; INTRAVENOUS at 16:57

## 2017-04-12 RX ADMIN — OXYCODONE HYDROCHLORIDE 20 MG: 20 TABLET, FILM COATED, EXTENDED RELEASE ORAL at 08:54

## 2017-04-12 RX ADMIN — ACETAMINOPHEN 650 MG: 325 TABLET, FILM COATED ORAL at 11:36

## 2017-04-12 RX ADMIN — FUROSEMIDE 20 MG: 20 TABLET ORAL at 08:53

## 2017-04-12 RX ADMIN — ACETAMINOPHEN 650 MG: 325 TABLET, FILM COATED ORAL at 00:00

## 2017-04-12 RX ADMIN — LACTOBACILLUS ACIDOPHILUS / LACTOBACILLUS BULGARICUS 1 PACKET: 100 MILLION CFU STRENGTH GRANULES at 16:57

## 2017-04-12 RX ADMIN — NICOTINE 7 MG: 7 PATCH TRANSDERMAL at 05:41

## 2017-04-12 RX ADMIN — OXYCODONE HYDROCHLORIDE 10 MG: 10 TABLET ORAL at 11:36

## 2017-04-12 RX ADMIN — OXYCODONE HYDROCHLORIDE 10 MG: 5 TABLET ORAL at 17:04

## 2017-04-12 RX ADMIN — LACTOBACILLUS ACIDOPHILUS / LACTOBACILLUS BULGARICUS 1 PACKET: 100 MILLION CFU STRENGTH GRANULES at 07:42

## 2017-04-12 ASSESSMENT — PAIN SCALES - GENERAL
PAINLEVEL_OUTOF10: 8
PAINLEVEL_OUTOF10: 5
PAINLEVEL_OUTOF10: 8
PAINLEVEL_OUTOF10: 5
PAINLEVEL_OUTOF10: 8
PAINLEVEL_OUTOF10: 7
PAINLEVEL_OUTOF10: 6

## 2017-04-12 ASSESSMENT — ENCOUNTER SYMPTOMS
NAUSEA: 0
HEADACHES: 0
VOMITING: 1
NERVOUS/ANXIOUS: 1
ABDOMINAL PAIN: 0
BACK PAIN: 1
DIARRHEA: 0
CHILLS: 1
SHORTNESS OF BREATH: 0
CONSTIPATION: 0
FEVER: 0
VOMITING: 0
COUGH: 0

## 2017-04-12 NOTE — DISCHARGE PLANNING
Pt on IV antibiotics until 4/27. LTAC order received. Met with the pt at the bedside. Per pt choice of [facility] faxed to [facility], both [place] and [place] facilities.

## 2017-04-12 NOTE — CARE PLAN
Problem: Communication  Goal: The ability to communicate needs accurately and effectively will improve  Outcome: PROGRESSING AS EXPECTED  Pt uses call light when needed assistance,

## 2017-04-12 NOTE — CARE PLAN
Problem: Nutritional:  Goal: Achieve adequate nutritional intake  Patient will consume 50% of meals on new diet.   Outcome: MET Date Met:  04/12/17  Per ADL, pt eating 50-75% and % of last five documented meals. Pt on Full liquid for past 20 days; providing limited food options and pt requesting diet advancement (see SLP note). Plan/Rec: Discussed with Nutrition Representative, pt to be called daily (pt is in isolation room) for meal preferences to provide some more variety.  Advance diet as determined safe per SLP/GI MD. RD available PRN.

## 2017-04-12 NOTE — PROGRESS NOTES
Pt awake and alert, medicated with pain medication, hot pack applied to back, pt sitting up at edge of bed, dressing changed to left foot, no drainage noted, call light and fluids placed within reach, PICC line with blood return, hourly rounds in place.

## 2017-04-12 NOTE — PROGRESS NOTES
Hospital Medicine Progress Note, Adult, Complex               Author: Kyleigh Bhakta  Date & Time created: 4/12/2017  1:30 PM     CC: Back pain    Interval History:  72M hx AAA s/p repair, recent findings of bilateral toes osteomyelitis (left AMA from [place] prior to ortho eval and PICC placement), dysphagia/achalasia s/p dilation ([facility]); admitted w back pain found to have suggestion of AAA endograft infection, as well as hyponatremia, hypokalemia.  He was found to have MSSA bacteremia, with source being toe osteo  3/9: Outside hospital with positive blood cx (GAS)  3/19: EGD w esophageal dilation for achalasia/stricture.  Diet is liquid.  3/25: L 3td Toe amputation   4/9: Echo with no e/o vegetation  4/10: Left leg rash where SCD was  4/11: PO vanc resumed, vascular considering plan for endograft removal  4/12: Severe back pain after missing dose of narcotic, tolerating unasyn    Review of Systems:  Review of Systems   Cardiovascular: Positive for leg swelling.   Gastrointestinal: Negative for nausea and vomiting.   Musculoskeletal: Positive for back pain.   Skin: Positive for rash.   Psychiatric/Behavioral: The patient is nervous/anxious.    All other systems reviewed and are negative.      Physical Exam:  Physical ExamNursing note and vitals reviewed.  Constitutional: Acutely painful, sitting on side of the bed, bending forward.  He appears well-developed and well-nourished overweight.   HENT: Poor dentition, no e/o exudate  Eyes: Conjunctivae and extraocular motions are normal.    Neck: Normal range of motion. Neck supple.   Cardiovascular:  2+ BLEE - R>L, no murmurs  Pulmonary/Chest: Effort normal, no accessory muscle use.   Abdominal: Bowel sounds are normal.   Musculoskeletal: Normal range of motion.   Neurological: He is alert and oriented to person, place, and time.   Skin: Skin is warm and dry.  R-foot dressing C/D/I.  Macular rash on L-calf in the distribution of the SCD that blanches c  palpation.    Labs:        Invalid input(s): [id number]  Recent Labs      17   0010   BNPBTYPENAT  666*     Recent Labs      17   0010   SODIUM  129*   POTASSIUM  3.9   CHLORIDE  98   CO2  26   BUN  8   CREATININE  1.21   PHOSPHORUS  4.5   CALCIUM  8.4*     Recent Labs      04/10/17   0250  17   0010   PREALBUMIN  5.0*   --    GLUCOSE   --   101*     Recent Labs      17   0010   RBC  2.35*   HEMOGLOBIN  7.5*   HEMATOCRIT  23.0*   PLATELETCT  220     Recent Labs      17   0010   WBC  7.1   NEUTSPOLYS  69.30   LYMPHOCYTES  19.40*   MONOCYTES  9.10   EOSINOPHILS  1.00   BASOPHILS  0.40           Hemodynamics:  Temp (24hrs), Av.7 °C (98 °F), Min:36.6 °C (97.9 °F), Max:36.8 °C (98.2 °F)  Temperature: 36.8 °C (98.2 °F)  Pulse  Av.7  Min: 56  Max: 114   Blood Pressure : 105/67 mmHg     Respiratory:    Respiration: 18, Pulse Oximetry: 94 %        RUL Breath Sounds: Clear, RML Breath Sounds: Diminished, RLL Breath Sounds: Diminished, SOLEDAD Breath Sounds: Clear, LLL Breath Sounds: Diminished  Fluids:    Intake/Output Summary (Last 24 hours) at 17 1330  Last data filed at 17 0900   Gross per 24 hour   Intake    240 ml   Output    600 ml   Net   -360 ml        GI/Nutrition:  Orders Placed This Encounter   Procedures   • DIET ORDER     Standing Status: Standing      Number of Occurrences: 1      Standing Expiration Date:      Order Specific Question:  Diet:     Answer:  Full Liquid [11]     Medical Decision Making, by Problem:  Active Hospital Problems    Diagnosis   • Strep/MSSA Bacteremia - Bacteremia on cultures 3/9 from outside hospital.  *Osteomyelitis of 3rd R toe (CMS-[facility]) s/p amputation on 3/25  -Unasyn through  --> PO Amoxicillin     • Back pain [M54.9] - judicious use of narcotics.  MRI imaging has been negative for compression fracture.  No neurologic deficit     • Anemia [D64.9] - s/p xfusion  On .  Hgb increasing.    -Transfuse if Hgb <7.    -Borderline Fe  deficiency - FeSO4 replacement.     • Chronic systolic CHF (congestive heart failure) (CMS-HCC) [I50.9] - echo shows mildly reduced EF of 55% c mod MR and PHTN.  -DC IVF     • Atrial fibrillation (CMS-HCC) [I48.91] - recently cardioverted at [place].  Rate controlled.   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  Risk>benefit of removal  -No plan to remove endograft, per Vas Surg.     • Hyponatremia - mild  -Continue Lasix  -Repeat in AM     • Noncompliance [Z91.19] - pt education and encouragement.  -Has left AMA previous admissions     • Tobacco dependence [F17.200] -   -counseled for more than 10 minutes on tobacco cessation 09897   -Nicoderm.     • Hep C w/o coma, chronic with cirrhosis (CMS-HCC) [B18.2] - outpatient follow up.     • C Dif Diarrhea - needs PO vanc through IV abx  -PO vanc BID (per ID recs)     • Dysphagia [R13.10] /suspected achalasia - s/p EGD/injection.    -Outpatient GI follow p.       EtOH - cessation ed.    Dispo - complex/guarded.  Await LTAC dispo, consider resume Anticoag.  DW ID re: Abx plan and Vanc plan      Labs reviewed and Medications reviewed  Art catheter: No Art      DVT Prophylaxis: Enoxaparin (Lovenox)    Ulcer prophylaxis: Not indicated  Antibiotics: Treating active infection/contamination beyond 24 hours perioperative coverage  Assessed for rehab: Patient was assess for and/or received rehabilitation services during this hospitalization

## 2017-04-12 NOTE — CARE PLAN
Problem: Pain Management  Goal: Pain level will decrease to patient’s comfort goal  Outcome: PROGRESSING AS EXPECTED  Managing pain with pain medication, tolerating well.

## 2017-04-12 NOTE — PROGRESS NOTES
Infectious Disease Progress Note    Author: JESS Tran DOS & Time created: 4/12/2017  10:45 AM    Chief Complaint   Patient presents with   • Low Back Pain   FU for osteomyelitis, GAS bacteremia and CDI, suspected endograft infection    Interval History:  3/17 AF, WBC 12.9, pt c/o lower back pain, asking for morphine, poor insight into illness, denies any diarrhea  3/18 AF, WBC 13.3, sleeping and not arousable to voice, cultures here negative to date  3/19 AF, WBC 13.4, frustrated about lower back pain not adequately controlled on current pain regimen, plan for EGD with dilatation today  3/20 AF WBC 9.6 +pain-denies SE abx  3/21 AF WBC 10.5 no new complaints  3/22 AF WBC not done tolerating abx well  3/23 AF eye feels a little better-pain about the same  3/24 AF tolerating abx well-MRI confirm OM  3/25 AF WBC 9.5 s/p toe amp this am  3/26 AF WBC 10.1 deneies any new sxs-  3/27 AF, WBC 9.1, sleeping but arousable to voice, back pain stable  3/28 Tmax 99.9, WBC 7.7, not happy about soft foods, and wants to go back on regular diet, having normal BMs, back pain controlled on pain regimen  3/29 AF, WBC 5.7, transferred to ortho, happy he took a shower today, plan of care discussed with pain, states he was told he has arthritis of his back, back pain stable, no diarrhea  3/30 AF, WBC 8.5, had a loose BM this am follow by normal BM, no abd pain  3/31 AF, WBC 9, nausea and vomiting this am, had soft BM this am  4/1 WBC 13.7, still having emesis, states he continues to be thirsty despite drinking lots of water yesterday, diarrhea improving and stools forming  4/2 AF, WBC 11.6, had nose bleed overnight, being transfused blood for Hg 6.6, tolerating pills  4/3- denies any diarrhea. No fevers.   4/4- no fevers. No diarrhea. Back pain under control.   4/6- no diarrhea. No fevers. Still throws up everything  4/7- no fevers. No new issues. The pain issues being addressed.  4/8- no fevers. No new issues  4/9-  "continues to regurgitate. No fevers.   4/10- AF, no CBC.  CRP 9.37.  Minimal lower back pain with pain medication regiment, 3/10.  Tolerating abx, no complaints.  - AF, no CBC.  Walking around room, denies overall pain.  Tolerating abx without complaints.  - AF, WBC 7.1.  Laying in bed, states he is \"freezing.\"  Denies pain, tolerating abx.  Labs Reviewed, Medications Reviewed, Radiology Reviewed and Wound Reviewed.    Review of Systems:  Review of Systems   Constitutional: Positive for chills. Negative for fever.   Respiratory: Negative for cough and shortness of breath.    Cardiovascular: Negative for chest pain.   Gastrointestinal: Positive for vomiting. Negative for nausea, abdominal pain, diarrhea and constipation.   Genitourinary: Negative for dysuria.   Musculoskeletal: Positive for back pain.        Stable   Skin: Negative for rash.   Neurological: Negative for headaches.       Hemodynamics:  Temp (24hrs), Av.7 °C (98 °F), Min:36.6 °C (97.9 °F), Max:36.7 °C (98.1 °F)  Temperature: 36.7 °C (98 °F)  Pulse  Av.7  Min: 56  Max: 114   Blood Pressure : 114/60 mmHg       Physical Exam:  Physical Exam   Constitutional: He is oriented to person, place, and time. He appears well-developed. No distress.   HENT:   Head: Normocephalic and atraumatic.   Eyes: EOM are normal. Pupils are equal, round, and reactive to light.   Neck: Normal range of motion. Neck supple.   Cardiovascular: Normal rate.    Murmur heard.  IRR   Pulmonary/Chest: Effort normal and breath sounds normal. No respiratory distress.   Abdominal: Soft. Bowel sounds are normal. He exhibits no distension. There is no tenderness.   Musculoskeletal: Normal range of motion. He exhibits edema.   R great toe with slight edema- no drainage or erythema, medial callus.   Left 3rd toe-sutures in place. No drainage or erythema.  RUE PICC- non tender, no erythema.  +1 BLE edema   Neurological: He is alert and oriented to person, place, and time. "   Skin: Skin is warm and dry. No rash noted. There is erythema.   Chronic changes  LLE erythema- improving   Nursing note and vitals reviewed.      Labs:  Recent Labs      04/12/17   0010   WBC  7.1   RBC  2.35*   HEMOGLOBIN  7.5*   HEMATOCRIT  23.0*   MCV  97.9*   MCH  31.9   RDW  54.9*   PLATELETCT  220   MPV  8.8*   NEUTSPOLYS  69.30   LYMPHOCYTES  19.40*   MONOCYTES  9.10   EOSINOPHILS  1.00   BASOPHILS  0.40     Recent Labs      04/12/17   0010   SODIUM  129*   POTASSIUM  3.9   CHLORIDE  98   CO2  26   GLUCOSE  101*   BUN  8     Recent Labs      04/12/17   0010   ALBUMIN  2.5*   CREATININE  1.21       BLOOD CULTURE   Date Value Ref Range Status   03/15/2017 No growth after 5 days of incubation.  Final    ':  Results     ** No results found for the last 168 hours. **          Imaging    MR-ABDOMEN-WITH & W/O and MR-PELVIS WITH  4/9/2017  Impression        1. Evaluate for interval change in enhancement is limited between current MR and prior lumbar MR due to difference in technique but there may be slightly less soft tissue enhancement surrounding the wall.    2. The size of the thrombosed infrarenal abdominal aortic aneurysm is grossly unchanged. No evidence of endoleak.    3. Patent aortobiiliac endograft.    4. Unchanged indentation of the aneurysm sac on the left psoas muscle. No evidence of invasion.    5. Small focal low signal T2 area with peripheral enhancement in the right psoas muscle is of unclear etiology and could relate to scarring or heterotopic calcification.     ECHOCARDIOGRAM COMP W/O CONT  4/9/2017   CONCLUSIONS  Normal left ventricular size and systolic function.  Left ventricular ejection fraction is visually estimated to be 55%.  Severe aortic stenosis.  Moderate mitral regurgitation.  Right heart pressures are consistent with moderate pulmonary   hypertension.  Severely dilated left atrium.    MR-FOOT-WITH & W/O RIGHT  3/23/2017  Impression        1. Abnormal signal in the distal aspect of  the first distal phalanx is consistent with osteomyelitis.    2.  No abscess is identified.    3.  Susceptibility artifact in the plantar soft tissues at the level of the fourth tarsometatarsal joint and base of the third proximal phalanx, possibly related to prior surgery. Foreign body cannot be excluded.    4.  Diffuse edema in the forefoot.     3/17 MRI lumbar spine: There are changes secondary to the abdominal aortic aneurysm endograft. The aneurysm sac measures an approximately 7.6 x 6.7 cm in size. Abnormal contrast enhancement is noted within the wall of the aneurysmal sac. There is also abnormal soft tissue   contrast enhancement in the surrounding soft tissue. These findings are highly suspicious for infected thrombosed aneurysm sac. The possibility of endograft infection is more likely. There is irregular outpouching of the aneurysmal sac along the   posterior portion indenting the left psoas muscle.  2.  Free fluid in the pelvis  3.  There is mild-to-moderate left hydronephrosis likely representing pelviureteric junction obstruction.    3/16 R foot xray - +OM  3/16 L foot xray +OM    Assessment:  Active Hospital Problems    Diagnosis   • *Osteomyelitis of toe (CMS-HCC) [M86.9]   • Bacteremia [R78.81]   • Back pain [M54.9]   • CHF (congestive heart failure) (CMS-HCC) [I50.9]   • Tobacco dependence [F17.200]   • Atrial fibrillation (CMS-HCC) [I48.91]    Endovascular infection       Plan:  Right great toe and left 3rd osteomyelitis  +OM on radiographs-confirmed on repeat MRIs  OSH wound cx on 3/9 - GAS, MSSA (I confirmed with [place] microlab)  S/p 3rd toe amp on 3/25. No purulence seen proximally to amp site  OR cx (left 3rd toe) on 3/25 - MSSA (S-unasyn) Vanco LINUS 2, MRSE  Continue IV Unasyn  Anticipate 6 weeks of IV abx. Stop date 04/27/17.    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  MRI on 4/9 (reviewed today)- Size of thrombosed infrarenal AAA grossly unchanged, no evidence of endoleak.  IV abx as above, Follow it with po amoxicillin      Clostridium difficile diarrhea  Completed vancomycin QID for 2 weeks on 03/29/17.   Continue PO Vancomycin BID while on IV abx with 3 additional days, stop date of 04/30/17.    Esophageal dysmotility-concern for achalasia  H/o strictures with recent dilatation as [place]'s  Esophagram +distal esophageal obstruction  s/p EGD with dilatation  Needs GI re-eval for possible repeat EGD given hx of strictures    Leukocytosis. Resolved  Multifactorial  No new signs or symptoms of infection  Monitor    Back pain  Chronic per patient    Prognosis guarded    Dispo:  Awaiting SNF placement for long-term abx, being re-evaluated by [facility] care and [facility].

## 2017-04-13 LAB
ABO GROUP BLD: NORMAL
ALBUMIN SERPL BCP-MCNC: 2.2 G/DL (ref 3.2–4.9)
BARCODED ABORH UBTYP: 9500
BARCODED PRD CODE UBPRD: NORMAL
BARCODED UNIT NUM UBUNT: NORMAL
BASOPHILS # BLD AUTO: 0.1 % (ref 0–1.8)
BASOPHILS # BLD: 0.01 K/UL (ref 0–0.12)
BLD GP AB SCN SERPL QL: NORMAL
BUN SERPL-MCNC: 9 MG/DL (ref 8–22)
CALCIUM SERPL-MCNC: 8 MG/DL (ref 8.5–10.5)
CHLORIDE SERPL-SCNC: 101 MMOL/L (ref 96–112)
CO2 SERPL-SCNC: 26 MMOL/L (ref 20–33)
COMPONENT R 8504R: NORMAL
CREAT SERPL-MCNC: 1.16 MG/DL (ref 0.5–1.4)
EOSINOPHIL # BLD AUTO: 0.04 K/UL (ref 0–0.51)
EOSINOPHIL NFR BLD: 0.6 % (ref 0–6.9)
ERYTHROCYTE [DISTWIDTH] IN BLOOD BY AUTOMATED COUNT: 55 FL (ref 35.9–50)
ERYTHROCYTE [DISTWIDTH] IN BLOOD BY AUTOMATED COUNT: 55.2 FL (ref 35.9–50)
GFR SERPL CREATININE-BSD FRML MDRD: >60 ML/MIN/1.73 M 2
GLUCOSE SERPL-MCNC: 96 MG/DL (ref 65–99)
HCT VFR BLD AUTO: 20.3 % (ref 42–52)
HCT VFR BLD AUTO: 25.3 % (ref 42–52)
HGB BLD-MCNC: 6.5 G/DL (ref 14–18)
HGB BLD-MCNC: 8.3 G/DL (ref 14–18)
IMM GRANULOCYTES # BLD AUTO: 0.02 K/UL (ref 0–0.11)
IMM GRANULOCYTES NFR BLD AUTO: 0.3 % (ref 0–0.9)
LYMPHOCYTES # BLD AUTO: 1.01 K/UL (ref 1–4.8)
LYMPHOCYTES NFR BLD: 14.2 % (ref 22–41)
MCH RBC QN AUTO: 30.8 PG (ref 27–33)
MCH RBC QN AUTO: 31.6 PG (ref 27–33)
MCHC RBC AUTO-ENTMCNC: 32 G/DL (ref 33.7–35.3)
MCHC RBC AUTO-ENTMCNC: 32.8 G/DL (ref 33.7–35.3)
MCV RBC AUTO: 96.2 FL (ref 81.4–97.8)
MCV RBC AUTO: 96.2 FL (ref 81.4–97.8)
MONOCYTES # BLD AUTO: 0.73 K/UL (ref 0–0.85)
MONOCYTES NFR BLD AUTO: 10.3 % (ref 0–13.4)
NEUTROPHILS # BLD AUTO: 5.3 K/UL (ref 1.82–7.42)
NEUTROPHILS NFR BLD: 74.5 % (ref 44–72)
NRBC # BLD AUTO: 0 K/UL
NRBC BLD AUTO-RTO: 0 /100 WBC
PHOSPHATE SERPL-MCNC: 4.3 MG/DL (ref 2.5–4.5)
PLATELET # BLD AUTO: 182 K/UL (ref 164–446)
PLATELET # BLD AUTO: 209 K/UL (ref 164–446)
PMV BLD AUTO: 8.8 FL (ref 9–12.9)
PMV BLD AUTO: 8.9 FL (ref 9–12.9)
POTASSIUM SERPL-SCNC: 3.4 MMOL/L (ref 3.6–5.5)
PRODUCT TYPE UPROD: NORMAL
RBC # BLD AUTO: 2.11 M/UL (ref 4.7–6.1)
RBC # BLD AUTO: 2.63 M/UL (ref 4.7–6.1)
RH BLD: NORMAL
SODIUM SERPL-SCNC: 134 MMOL/L (ref 135–145)
UNIT STATUS USTAT: NORMAL
WBC # BLD AUTO: 10.4 K/UL (ref 4.8–10.8)
WBC # BLD AUTO: 7.1 K/UL (ref 4.8–10.8)

## 2017-04-13 PROCEDURE — 700101 HCHG RX REV CODE 250: Performed by: FAMILY MEDICINE

## 2017-04-13 PROCEDURE — 85025 COMPLETE CBC W/AUTO DIFF WBC: CPT

## 2017-04-13 PROCEDURE — 85027 COMPLETE CBC AUTOMATED: CPT

## 2017-04-13 PROCEDURE — 700102 HCHG RX REV CODE 250 W/ 637 OVERRIDE(OP): Performed by: FAMILY MEDICINE

## 2017-04-13 PROCEDURE — A9270 NON-COVERED ITEM OR SERVICE: HCPCS | Performed by: HOSPITALIST

## 2017-04-13 PROCEDURE — 700102 HCHG RX REV CODE 250 W/ 637 OVERRIDE(OP): Performed by: INTERNAL MEDICINE

## 2017-04-13 PROCEDURE — 700102 HCHG RX REV CODE 250 W/ 637 OVERRIDE(OP): Performed by: HOSPITALIST

## 2017-04-13 PROCEDURE — 86900 BLOOD TYPING SEROLOGIC ABO: CPT

## 2017-04-13 PROCEDURE — 86923 COMPATIBILITY TEST ELECTRIC: CPT

## 2017-04-13 PROCEDURE — G8980 MOBILITY D/C STATUS: HCPCS | Mod: CI

## 2017-04-13 PROCEDURE — G8979 MOBILITY GOAL STATUS: HCPCS | Mod: CH

## 2017-04-13 PROCEDURE — 86901 BLOOD TYPING SEROLOGIC RH(D): CPT

## 2017-04-13 PROCEDURE — 700102 HCHG RX REV CODE 250 W/ 637 OVERRIDE(OP): Performed by: SURGERY

## 2017-04-13 PROCEDURE — P9016 RBC LEUKOCYTES REDUCED: HCPCS

## 2017-04-13 PROCEDURE — 97535 SELF CARE MNGMENT TRAINING: CPT

## 2017-04-13 PROCEDURE — 700111 HCHG RX REV CODE 636 W/ 250 OVERRIDE (IP): Performed by: INTERNAL MEDICINE

## 2017-04-13 PROCEDURE — 36430 TRANSFUSION BLD/BLD COMPNT: CPT

## 2017-04-13 PROCEDURE — 700105 HCHG RX REV CODE 258

## 2017-04-13 PROCEDURE — A9270 NON-COVERED ITEM OR SERVICE: HCPCS | Performed by: SURGERY

## 2017-04-13 PROCEDURE — A9270 NON-COVERED ITEM OR SERVICE: HCPCS | Performed by: INTERNAL MEDICINE

## 2017-04-13 PROCEDURE — 86850 RBC ANTIBODY SCREEN: CPT

## 2017-04-13 PROCEDURE — 80069 RENAL FUNCTION PANEL: CPT

## 2017-04-13 PROCEDURE — 700111 HCHG RX REV CODE 636 W/ 250 OVERRIDE (IP): Performed by: HOSPITALIST

## 2017-04-13 PROCEDURE — 99233 SBSQ HOSP IP/OBS HIGH 50: CPT | Performed by: INTERNAL MEDICINE

## 2017-04-13 PROCEDURE — 700105 HCHG RX REV CODE 258: Performed by: INTERNAL MEDICINE

## 2017-04-13 PROCEDURE — 770021 HCHG ROOM/CARE - ISO PRIVATE

## 2017-04-13 PROCEDURE — A9270 NON-COVERED ITEM OR SERVICE: HCPCS | Performed by: FAMILY MEDICINE

## 2017-04-13 RX ORDER — SODIUM CHLORIDE 9 MG/ML
INJECTION, SOLUTION INTRAVENOUS
Status: COMPLETED
Start: 2017-04-13 | End: 2017-04-13

## 2017-04-13 RX ORDER — POTASSIUM CHLORIDE 20 MEQ/1
40 TABLET, EXTENDED RELEASE ORAL 2 TIMES DAILY
Status: DISCONTINUED | OUTPATIENT
Start: 2017-04-13 | End: 2017-04-19 | Stop reason: HOSPADM

## 2017-04-13 RX ADMIN — SODIUM CHLORIDE: 9 INJECTION, SOLUTION INTRAVENOUS at 03:00

## 2017-04-13 RX ADMIN — POTASSIUM CHLORIDE 40 MEQ: 1500 TABLET, EXTENDED RELEASE ORAL at 20:03

## 2017-04-13 RX ADMIN — AMPICILLIN SODIUM AND SULBACTAM SODIUM 3 G: 2; 1 INJECTION, POWDER, FOR SOLUTION INTRAMUSCULAR; INTRAVENOUS at 07:44

## 2017-04-13 RX ADMIN — VANCOMYCIN HYDROCHLORIDE 125 MG: 10 INJECTION, POWDER, LYOPHILIZED, FOR SOLUTION INTRAVENOUS at 08:20

## 2017-04-13 RX ADMIN — ENOXAPARIN SODIUM 40 MG: 100 INJECTION SUBCUTANEOUS at 08:20

## 2017-04-13 RX ADMIN — LIDOCAINE 2 PATCH: 50 PATCH CUTANEOUS at 12:14

## 2017-04-13 RX ADMIN — AMPICILLIN SODIUM AND SULBACTAM SODIUM 3 G: 2; 1 INJECTION, POWDER, FOR SOLUTION INTRAMUSCULAR; INTRAVENOUS at 12:14

## 2017-04-13 RX ADMIN — SIMVASTATIN 20 MG: 20 TABLET, FILM COATED ORAL at 20:02

## 2017-04-13 RX ADMIN — OXYCODONE HYDROCHLORIDE 15 MG: 5 TABLET ORAL at 16:05

## 2017-04-13 RX ADMIN — METHOCARBAMOL 500 MG: 500 TABLET ORAL at 16:03

## 2017-04-13 RX ADMIN — OXYCODONE HYDROCHLORIDE 15 MG: 5 TABLET ORAL at 12:25

## 2017-04-13 RX ADMIN — ACETAMINOPHEN 650 MG: 325 TABLET, FILM COATED ORAL at 17:03

## 2017-04-13 RX ADMIN — FUROSEMIDE 20 MG: 20 TABLET ORAL at 08:20

## 2017-04-13 RX ADMIN — LACTOBACILLUS ACIDOPHILUS / LACTOBACILLUS BULGARICUS 1 PACKET: 100 MILLION CFU STRENGTH GRANULES at 17:03

## 2017-04-13 RX ADMIN — OXYCODONE HYDROCHLORIDE 15 MG: 5 TABLET ORAL at 20:09

## 2017-04-13 RX ADMIN — NICOTINE 7 MG: 7 PATCH TRANSDERMAL at 05:12

## 2017-04-13 RX ADMIN — OXYCODONE HYDROCHLORIDE 10 MG: 5 TABLET ORAL at 01:23

## 2017-04-13 RX ADMIN — FERROUS SULFATE TAB 325 MG (65 MG ELEMENTAL FE) 325 MG: 325 (65 FE) TAB at 08:20

## 2017-04-13 RX ADMIN — GABAPENTIN 600 MG: 300 CAPSULE ORAL at 20:02

## 2017-04-13 RX ADMIN — ACETAMINOPHEN 650 MG: 325 TABLET, FILM COATED ORAL at 01:23

## 2017-04-13 RX ADMIN — OXYCODONE HYDROCHLORIDE 20 MG: 20 TABLET, FILM COATED, EXTENDED RELEASE ORAL at 08:20

## 2017-04-13 RX ADMIN — GABAPENTIN 600 MG: 300 CAPSULE ORAL at 16:03

## 2017-04-13 RX ADMIN — VANCOMYCIN HYDROCHLORIDE 125 MG: 10 INJECTION, POWDER, LYOPHILIZED, FOR SOLUTION INTRAVENOUS at 20:02

## 2017-04-13 RX ADMIN — METHOCARBAMOL 500 MG: 500 TABLET ORAL at 08:20

## 2017-04-13 RX ADMIN — ACETAMINOPHEN 650 MG: 325 TABLET, FILM COATED ORAL at 05:12

## 2017-04-13 RX ADMIN — OXYCODONE HYDROCHLORIDE 10 MG: 5 TABLET ORAL at 05:12

## 2017-04-13 RX ADMIN — METHOCARBAMOL 500 MG: 500 TABLET ORAL at 20:02

## 2017-04-13 RX ADMIN — OXYCODONE HYDROCHLORIDE 20 MG: 20 TABLET, FILM COATED, EXTENDED RELEASE ORAL at 20:03

## 2017-04-13 RX ADMIN — GABAPENTIN 600 MG: 300 CAPSULE ORAL at 08:20

## 2017-04-13 RX ADMIN — AMIODARONE HYDROCHLORIDE 200 MG: 200 TABLET ORAL at 08:20

## 2017-04-13 RX ADMIN — AMPICILLIN SODIUM AND SULBACTAM SODIUM 3 G: 2; 1 INJECTION, POWDER, FOR SOLUTION INTRAMUSCULAR; INTRAVENOUS at 17:02

## 2017-04-13 RX ADMIN — LACTOBACILLUS ACIDOPHILUS / LACTOBACILLUS BULGARICUS 1 PACKET: 100 MILLION CFU STRENGTH GRANULES at 12:14

## 2017-04-13 RX ADMIN — POTASSIUM CHLORIDE 40 MEQ: 1500 TABLET, EXTENDED RELEASE ORAL at 08:20

## 2017-04-13 RX ADMIN — ACETAMINOPHEN 650 MG: 325 TABLET, FILM COATED ORAL at 12:16

## 2017-04-13 ASSESSMENT — ENCOUNTER SYMPTOMS
BLOOD IN STOOL: 0
DIARRHEA: 0
ABDOMINAL PAIN: 0
VOMITING: 1
CHILLS: 0
CONSTIPATION: 0
NERVOUS/ANXIOUS: 1
COUGH: 0
SHORTNESS OF BREATH: 0
BACK PAIN: 1
VOMITING: 0
FEVER: 0
BRUISES/BLEEDS EASILY: 0
HEADACHES: 0
NAUSEA: 0

## 2017-04-13 ASSESSMENT — PAIN SCALES - GENERAL
PAINLEVEL_OUTOF10: 6
PAINLEVEL_OUTOF10: 6
PAINLEVEL_OUTOF10: 5
PAINLEVEL_OUTOF10: 4
PAINLEVEL_OUTOF10: 5
PAINLEVEL_OUTOF10: 6

## 2017-04-13 NOTE — PROGRESS NOTES
Hospital Medicine Progress Note, Adult, Complex               Author: Kyleigh Bhakta  Date & Time created: 4/13/2017  2:35 PM     CC: Back pain    Interval History:  72M hx AAA s/p repair, recent findings of bilateral toes osteomyelitis (left AMA from [place] prior to ortho eval and PICC placement), dysphagia/achalasia s/p dilation ([facility]); admitted w back pain found to have suggestion of AAA endograft infection, as well as hyponatremia, hypokalemia.  He was found to have MSSA bacteremia, with source being toe osteo  3/9: Outside hospital with positive blood cx (GAS)  3/19: EGD w esophageal dilation for achalasia/stricture.  Diet is liquid.  3/25: L 3td Toe amputation   4/9: Echo with no e/o vegetation  4/10: Left leg rash where SCD was  4/11: PO vanc resumed, vascular considering plan for endograft removal  4/12: Severe back pain after missing dose of narcotic, tolerating unasyn  4/13: Hgb dropped to 6.5, no bleeding. Back pain improved.    Review of Systems:  Review of Systems   Cardiovascular: Positive for leg swelling.   Gastrointestinal: Positive for vomiting (15min after eating, no nausea.). Negative for nausea, blood in stool and melena.   Musculoskeletal: Positive for back pain.   Skin: Positive for rash (LLE).   Endo/Heme/Allergies: Does not bruise/bleed easily.   Psychiatric/Behavioral: The patient is nervous/anxious.    All other systems reviewed and are negative.      Physical Exam:  Physical ExamNursing note and vitals reviewed.  Constitutional: Acutely painful, sitting on side of the bed, bending forward.  He appears well-developed and well-nourished overweight.   HENT: Poor dentition, no e/o exudate  Eyes: Conjunctivae and extraocular motions are normal.    Neck: Normal range of motion. Neck supple.   Cardiovascular:  2+ BLEE - R>L, no murmurs  Pulmonary/Chest: Effort normal, no accessory muscle use.   Abdominal: Bowel sounds are normal.   Musculoskeletal: Normal range of motion. No point  tenderness over spine  Neurological: He is alert and oriented to person, place, and time.   Skin: Skin is warm and dry.  R-foot dressing C/D/I.  Macular rash on L anterior leg in the distribution of the SCD that is non-blanching/petechial    Labs:        Invalid input(s): [id number]  Recent Labs      17   0010   BNPBTYPENAT  666*     Recent Labs      17   0010  17   0120   SODIUM  129*  134*   POTASSIUM  3.9  3.4*   CHLORIDE  98  101   CO2  26  26   BUN  8  9   CREATININE  1.21  1.16   PHOSPHORUS  4.5  4.3   CALCIUM  8.4*  8.0*     Recent Labs      170  17   012   GLUCOSE  101*  96     Recent Labs      17   0915   RBC  2.35*  2.11*  2.63*   HEMOGLOBIN  7.5*  6.5*  8.3*   HEMATOCRIT  23.0*  20.3*  25.3*   PLATELETCT  220  182  209     Recent Labs      17   0915   WBC  7.1  7.1  10.4   NEUTSPOLYS  69.30  74.50*   --    LYMPHOCYTES  19.40*  14.20*   --    MONOCYTES  9.10  10.30   --    EOSINOPHILS  1.00  0.60   --    BASOPHILS  0.40  0.10   --            Hemodynamics:  Temp (24hrs), Av.8 °C (98.2 °F), Min:36.3 °C (97.3 °F), Max:37.3 °C (99.1 °F)  Temperature: 37.3 °C (99.1 °F)  Pulse  Av.6  Min: 56  Max: 114   Blood Pressure : 106/47 mmHg     Respiratory:    Respiration: 16, Pulse Oximetry: 94 %     Work Of Breathing / Effort: Mild  RUL Breath Sounds: Clear, RML Breath Sounds: Diminished, RLL Breath Sounds: Diminished, SOLEDAD Breath Sounds: Clear, LLL Breath Sounds: Diminished  Fluids:    Intake/Output Summary (Last 24 hours) at 17 1435  Last data filed at 17 1200   Gross per 24 hour   Intake    540 ml   Output   1450 ml   Net   -910 ml        GI/Nutrition:  Orders Placed This Encounter   Procedures   • DIET ORDER     Standing Status: Standing      Number of Occurrences: 1      Standing Expiration Date:      Order Specific Question:  Diet:     Answer:  Full Liquid [11]     Medical  Decision Making, by Problem:  Active Hospital Problems    Diagnosis   • Strep/MSSA Bacteremia - Bacteremia on cultures 3/9 from outside hospital.  *Osteomyelitis of 3rd R toe (CMS-HCC) s/p amputation on 3/25  -Unasyn through 4/26 --> PO Amoxicillin     • Back pain [M54.9] - judicious use of narcotics.  MRI imaging has been negative for compression fracture.  No neurologic deficit     • Anemia [D64.9] - s/p xfusion on 4/2.  Hgb still labile, no observed blood loss and has been started on PO iron.  Dropped to 6.5 today  -Transfuse 1U    -Borderline Fe deficiency - FeSO4 replacement.  -Ok to continue lovenox unless e/o bleeding  -Hold off on therapeutic anticoagulation     • Chronic systolic CHF (congestive heart failure) (CMS-HCC) [I50.9] - echo shows mildly reduced EF of 55% c mod MR and PHTN.  -Lasix 20 PO daily     • Atrial fibrillation (CMS-HCC) [I48.91] - recently cardioverted at [place].  Rate controlled.   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  Risk>benefit of removal  -Awaiting final plan to remove or leave in place endograft, per [facility].     • Hyponatremia - mild  -Continue Lasix  -Repeat in AM     • Noncompliance [Z91.19] - pt education and encouragement.  -Has left AMA previous admissions     • Tobacco dependence [F17.200] -   -counseled  -Nicoderm.     • Hep C w/o coma, chronic with cirrhosis (CMS-HCC) [B18.2] - outpatient follow up.     • C Dif Diarrhea - needs PO vanc through IV abx  -PO vanc BID (per ID recs) through 4/30     • Dysphagia [R13.10] /suspected achalasia - s/p EGD/injection.    -Outpatient GI follow p.       EtOH - cessation ed.    Dispo - complex/guarded.  Await LTAC dispo, CBC in AM, if continues to drop, DC lovenox  Was [facility]      Labs reviewed and Medications reviewed  Kaelyn  catheter: No Art      DVT Prophylaxis: Enoxaparin (Lovenox)    Ulcer prophylaxis: Not indicated  Antibiotics: Treating active infection/contamination beyond 24 hours perioperative coverage  Assessed for rehab: Patient was assess for and/or received rehabilitation services during this hospitalization

## 2017-04-13 NOTE — PROGRESS NOTES
Blood transfusion started, education provided to pt on process and procedure, pt understands and consents.  Consent for blood transfusion in chart.

## 2017-04-13 NOTE — DISCHARGE PLANNING
I spoke with Lore from [facility] this morning and she said that they are waiting for the [employer] HMO to let them know if they will authorize LTAC for this patient.  She said it could take a few days but that she will keep us posted.

## 2017-04-13 NOTE — PROGRESS NOTES
Hgb is 6.5.  Call placed to Hospitalist, order to transfuse 1 unit of PRBCs received from Charles Cohen.

## 2017-04-13 NOTE — PROGRESS NOTES
Vascular  Patient has pain in the center of his low back at level top of the sacrum.  Radiology suggested I exam the Sacral iliac joints for tenderness since there is a mild suggestion on MRI of inflammation in this area.  These SI joints are not the area of pain and are not tender.  The center of his low back is not tender. The pain is a little deeper and centrally located.  Dr. French of radiology reviewed the 2 MRIs and the CT scan and does not see findings that he would consider represent infection.  He confirms AAA sac thrombosed and no endoleak.  Aortic wall enhances slightly consistent with normal blood supply.  Psoas abnormality on right does not have characteristics of inflammation. Large AAA by MRI is not supported by CT.  Margarita French and Tameka of radiology explain the the WBC scan is very sensitive and should show abnormal findings if an infection present.  There is no abnormal uptake present.  I conclude that there are no convincing findings of AAA endograft infection.  Source of his bacteremia not known unless it was osteo of left 3rd toe that has been amputated.  Urology Dr. Mooney to see patient in next few days to assess and treat if necessary the left hydronephrosis.  35 minutes with patient X 2 , discussion with radiologists and review of films and disccusion with urology.

## 2017-04-13 NOTE — PROGRESS NOTES
Pt awake and alert, pain is controlled with short and long acting pain medicaitons, tolerating well, PICC Line intact, flushing well, return blood noted, pt had 1 unit of RBC this am, tolerated well, no s/e noted, call light and fluids placed within reach, dressing changed to left foot, no drainage noted.

## 2017-04-13 NOTE — PROGRESS NOTES
Infectious Disease Progress Note    Author: JESS Tran DOS & Time created: 4/13/2017  10:45 AM    Chief Complaint   Patient presents with   • Low Back Pain   FU for osteomyelitis, GAS bacteremia and CDI, suspected endograft infection    Interval History:  3/17 AF, WBC 12.9, pt c/o lower back pain, asking for morphine, poor insight into illness, denies any diarrhea  3/18 AF, WBC 13.3, sleeping and not arousable to voice, cultures here negative to date  3/19 AF, WBC 13.4, frustrated about lower back pain not adequately controlled on current pain regimen, plan for EGD with dilatation today  3/20 AF WBC 9.6 +pain-denies SE abx  3/21 AF WBC 10.5 no new complaints  3/22 AF WBC not done tolerating abx well  3/23 AF eye feels a little better-pain about the same  3/24 AF tolerating abx well-MRI confirm OM  3/25 AF WBC 9.5 s/p toe amp this am  3/26 AF WBC 10.1 deneies any new sxs-  3/27 AF, WBC 9.1, sleeping but arousable to voice, back pain stable  3/28 Tmax 99.9, WBC 7.7, not happy about soft foods, and wants to go back on regular diet, having normal BMs, back pain controlled on pain regimen  3/29 AF, WBC 5.7, transferred to ortho, happy he took a shower today, plan of care discussed with pain, states he was told he has arthritis of his back, back pain stable, no diarrhea  3/30 AF, WBC 8.5, had a loose BM this am follow by normal BM, no abd pain  3/31 AF, WBC 9, nausea and vomiting this am, had soft BM this am  4/1 WBC 13.7, still having emesis, states he continues to be thirsty despite drinking lots of water yesterday, diarrhea improving and stools forming  4/2 AF, WBC 11.6, had nose bleed overnight, being transfused blood for Hg 6.6, tolerating pills  4/3- denies any diarrhea. No fevers.   4/4- no fevers. No diarrhea. Back pain under control.   4/6- no diarrhea. No fevers. Still throws up everything  4/7- no fevers. No new issues. The pain issues being addressed.  4/8- no fevers. No new issues  4/9-  "continues to regurgitate. No fevers.   4/10- AF, no CBC.  CRP 9.37.  Minimal lower back pain with pain medication regiment, 3/10.  Tolerating abx, no complaints.  - AF, no CBC.  Walking around room, denies overall pain.  Tolerating abx without complaints.  - AF, WBC 7.1.  Laying in bed, states he is \"freezing.\"  Denies pain, tolerating abx.  - AF, WBC 7.1.  H&H 6.5 & 20.3, received 1 unit PRBCs this morning.  Feeling better, especially since he had his pain pill this morning.  Denies overall pain, tolerating abx.  Labs Reviewed, Medications Reviewed, Radiology Reviewed and Wound Reviewed.    Review of Systems:  Review of Systems   Constitutional: Negative for fever and chills.   Respiratory: Negative for cough and shortness of breath.    Cardiovascular: Negative for chest pain.   Gastrointestinal: Negative for nausea, vomiting, abdominal pain, diarrhea and constipation.   Genitourinary: Negative for dysuria.   Musculoskeletal: Positive for back pain.        Stable   Skin: Negative for rash.   Neurological: Negative for headaches.       Hemodynamics:  Temp (24hrs), Av.7 °C (98.1 °F), Min:36.3 °C (97.3 °F), Max:37 °C (98.6 °F)  Temperature: 36.9 °C (98.5 °F)  Pulse  Av.6  Min: 56  Max: 114   Blood Pressure : 129/57 mmHg       Physical Exam:  Physical Exam   Constitutional: He is oriented to person, place, and time. He appears well-developed. No distress.   HENT:   Head: Normocephalic and atraumatic.   Eyes: EOM are normal. Pupils are equal, round, and reactive to light.   Neck: Neck supple.   Cardiovascular: Normal rate.    Murmur heard.  IRR   Pulmonary/Chest: Effort normal and breath sounds normal. No respiratory distress. He has no wheezes.   Abdominal: Soft. Bowel sounds are normal. He exhibits no distension. There is no tenderness.   Musculoskeletal: Normal range of motion. He exhibits edema.   R great toe with slight edema- no drainage or erythema, medial callus.   Left 3rd toe-sutures in " place. No drainage or erythema.  RUE PICC- non tender, no erythema.  +1 BLE edema- slightly improved   Neurological: He is alert and oriented to person, place, and time.   Skin: Skin is warm and dry. No rash noted. There is erythema.   Chronic changes  LLE erythema- improving   Nursing note and vitals reviewed.      Labs:  Recent Labs      04/12/17   0010  04/13/17   0120  04/13/17   0915   WBC  7.1  7.1  10.4   RBC  2.35*  2.11*  2.63*   HEMOGLOBIN  7.5*  6.5*  8.3*   HEMATOCRIT  23.0*  20.3*  25.3*   MCV  97.9*  96.2  96.2   MCH  31.9  30.8  31.6   RDW  54.9*  55.2*  55.0*   PLATELETCT  220  182  209   MPV  8.8*  8.8*  8.9*   NEUTSPOLYS  69.30  74.50*   --    LYMPHOCYTES  19.40*  14.20*   --    MONOCYTES  9.10  10.30   --    EOSINOPHILS  1.00  0.60   --    BASOPHILS  0.40  0.10   --      Recent Labs      04/12/17   0010  04/13/17   0120   SODIUM  129*  134*   POTASSIUM  3.9  3.4*   CHLORIDE  98  101   CO2  26  26   GLUCOSE  101*  96   BUN  8  9     Recent Labs      04/12/17   0010  04/13/17   0120   ALBUMIN  2.5*  2.2*   CREATININE  1.21  1.16       BLOOD CULTURE   Date Value Ref Range Status   03/15/2017 No growth after 5 days of incubation.  Final    ':  Results     ** No results found for the last 168 hours. **          Imaging    MR-ABDOMEN-WITH & W/O and MR-PELVIS WITH  4/9/2017  Impression        1. Evaluate for interval change in enhancement is limited between current MR and prior lumbar MR due to difference in technique but there may be slightly less soft tissue enhancement surrounding the wall.    2. The size of the thrombosed infrarenal abdominal aortic aneurysm is grossly unchanged. No evidence of endoleak.    3. Patent aortobiiliac endograft.    4. Unchanged indentation of the aneurysm sac on the left psoas muscle. No evidence of invasion.    5. Small focal low signal T2 area with peripheral enhancement in the right psoas muscle is of unclear etiology and could relate to scarring or heterotopic  calcification.     ECHOCARDIOGRAM COMP W/O CONT  4/9/2017   CONCLUSIONS  Normal left ventricular size and systolic function.  Left ventricular ejection fraction is visually estimated to be 55%.  Severe aortic stenosis.  Moderate mitral regurgitation.  Right heart pressures are consistent with moderate pulmonary   hypertension.  Severely dilated left atrium.    MR-FOOT-WITH & W/O RIGHT  3/23/2017  Impression        1. Abnormal signal in the distal aspect of the first distal phalanx is consistent with osteomyelitis.    2.  No abscess is identified.    3.  Susceptibility artifact in the plantar soft tissues at the level of the fourth tarsometatarsal joint and base of the third proximal phalanx, possibly related to prior surgery. Foreign body cannot be excluded.    4.  Diffuse edema in the forefoot.     3/17 MRI lumbar spine: There are changes secondary to the abdominal aortic aneurysm endograft. The aneurysm sac measures an approximately 7.6 x 6.7 cm in size. Abnormal contrast enhancement is noted within the wall of the aneurysmal sac. There is also abnormal soft tissue   contrast enhancement in the surrounding soft tissue. These findings are highly suspicious for infected thrombosed aneurysm sac. The possibility of endograft infection is more likely. There is irregular outpouching of the aneurysmal sac along the   posterior portion indenting the left psoas muscle.  2.  Free fluid in the pelvis  3.  There is mild-to-moderate left hydronephrosis likely representing pelviureteric junction obstruction.    3/16 R foot xray - +OM  3/16 L foot xray +OM    Assessment:  Active Hospital Problems    Diagnosis   • *Osteomyelitis of toe (CMS-HCC) [M86.9]   • Bacteremia [R78.81]   • Back pain [M54.9]   • CHF (congestive heart failure) (CMS-HCC) [I50.9]   • Tobacco dependence [F17.200]   • Atrial fibrillation (CMS-HCC) [I48.91]    Endovascular infection       Plan:  Right great toe and left 3rd osteomyelitis  +OM on  radiographs-confirmed on repeat MRIs  OSH wound cx on 3/9 - GAS, MSSA (I confirmed with [place]'s microlab)  S/p 3rd toe amp on 3/25. No purulence seen proximally to amp site  OR cx (left 3rd toe) on 3/25 - MSSA (S-unasyn) Vanco LINUS 2, MRSE  Continue IV Unasyn  Anticipate 6 weeks of IV abx. Stop date 04/27/17.    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  MRI on 4/9 (reviewed today)- Size of thrombosed infrarenal AAA grossly unchanged, no evidence of endoleak.  IV abx as above, Follow it with po amoxicillin      Clostridium difficile diarrhea  Completed vancomycin QID for 2 weeks on 03/29/17.   Continue PO Vancomycin BID while on IV abx with 3 additional days, stop date of 04/30/17.    Esophageal dysmotility-concern for achalasia  H/o strictures with recent dilatation as [place]'s  Esophagram +distal esophageal obstruction  s/p EGD with dilatation  Needs GI re-eval for possible repeat EGD given hx of strictures    Leukocytosis. Resolved  Multifactorial  No new signs or symptoms of infection  Monitor    Back pain  Chronic per patient    Prognosis guarded    Dispo:  Awaiting SNF placement for long-term abx, being re-evaluated by [facility] care and [facility].  Also being evaluated by [facility].    Discussed with Hospitalist- Dr. Bhakta.

## 2017-04-13 NOTE — CARE PLAN
Problem: Safety  Goal: Will remain free from injury  Outcome: PROGRESSING AS EXPECTED  Pt calls for assistance as needed, pt is ambulatory,steady on feet. Hourly rounds in place.     Problem: Infection  Goal: Will remain free from infection  Outcome: PROGRESSING AS EXPECTED  Pt on IV anitbiotics for infection control.

## 2017-04-13 NOTE — CARE PLAN
Problem: Pain Management  Goal: Pain level will decrease to patient’s comfort goal  Intervention: Follow pain managment plan developed in collaboration with patient and Interdisciplinary Team  Pt reports worsening of pain compared to previous days.  Pain controlled with oxy 10mg Q4 as well as oxycontin Q12.        Problem: Safety  Goal: Will remain free from injury  Intervention: Provide assistance with mobility  No injuries this shift, pt calls appropriately with needs.  Has steady gait when ambulating.        Problem: Skin Integrity  Goal: Risk for impaired skin integrity will decrease  Outcome: PROGRESSING AS EXPECTED

## 2017-04-14 LAB
ALBUMIN SERPL BCP-MCNC: 2.5 G/DL (ref 3.2–4.9)
BUN SERPL-MCNC: 11 MG/DL (ref 8–22)
CALCIUM SERPL-MCNC: 8.2 MG/DL (ref 8.5–10.5)
CHLORIDE SERPL-SCNC: 99 MMOL/L (ref 96–112)
CO2 SERPL-SCNC: 24 MMOL/L (ref 20–33)
CREAT SERPL-MCNC: 1.25 MG/DL (ref 0.5–1.4)
ERYTHROCYTE [DISTWIDTH] IN BLOOD BY AUTOMATED COUNT: 57 FL (ref 35.9–50)
GFR SERPL CREATININE-BSD FRML MDRD: 56 ML/MIN/1.73 M 2
GLUCOSE SERPL-MCNC: 94 MG/DL (ref 65–99)
HCT VFR BLD AUTO: 24.1 % (ref 42–52)
HGB BLD-MCNC: 7.9 G/DL (ref 14–18)
MCH RBC QN AUTO: 31.3 PG (ref 27–33)
MCHC RBC AUTO-ENTMCNC: 32.8 G/DL (ref 33.7–35.3)
MCV RBC AUTO: 95.6 FL (ref 81.4–97.8)
PHOSPHATE SERPL-MCNC: 3.7 MG/DL (ref 2.5–4.5)
PLATELET # BLD AUTO: 209 K/UL (ref 164–446)
PMV BLD AUTO: 9 FL (ref 9–12.9)
POTASSIUM SERPL-SCNC: 3.8 MMOL/L (ref 3.6–5.5)
RBC # BLD AUTO: 2.52 M/UL (ref 4.7–6.1)
SODIUM SERPL-SCNC: 131 MMOL/L (ref 135–145)
WBC # BLD AUTO: 8.9 K/UL (ref 4.8–10.8)

## 2017-04-14 PROCEDURE — 700102 HCHG RX REV CODE 250 W/ 637 OVERRIDE(OP): Performed by: ORTHOPAEDIC SURGERY

## 2017-04-14 PROCEDURE — 700102 HCHG RX REV CODE 250 W/ 637 OVERRIDE(OP): Performed by: FAMILY MEDICINE

## 2017-04-14 PROCEDURE — A9270 NON-COVERED ITEM OR SERVICE: HCPCS | Performed by: HOSPITALIST

## 2017-04-14 PROCEDURE — 700102 HCHG RX REV CODE 250 W/ 637 OVERRIDE(OP): Performed by: HOSPITALIST

## 2017-04-14 PROCEDURE — 770021 HCHG ROOM/CARE - ISO PRIVATE

## 2017-04-14 PROCEDURE — 700105 HCHG RX REV CODE 258: Performed by: INTERNAL MEDICINE

## 2017-04-14 PROCEDURE — 700102 HCHG RX REV CODE 250 W/ 637 OVERRIDE(OP): Performed by: SURGERY

## 2017-04-14 PROCEDURE — 80069 RENAL FUNCTION PANEL: CPT

## 2017-04-14 PROCEDURE — 85027 COMPLETE CBC AUTOMATED: CPT

## 2017-04-14 PROCEDURE — A9270 NON-COVERED ITEM OR SERVICE: HCPCS | Performed by: INTERNAL MEDICINE

## 2017-04-14 PROCEDURE — 700101 HCHG RX REV CODE 250: Performed by: FAMILY MEDICINE

## 2017-04-14 PROCEDURE — 700105 HCHG RX REV CODE 258

## 2017-04-14 PROCEDURE — A9270 NON-COVERED ITEM OR SERVICE: HCPCS | Performed by: SURGERY

## 2017-04-14 PROCEDURE — 99232 SBSQ HOSP IP/OBS MODERATE 35: CPT | Performed by: INTERNAL MEDICINE

## 2017-04-14 PROCEDURE — 700102 HCHG RX REV CODE 250 W/ 637 OVERRIDE(OP): Performed by: INTERNAL MEDICINE

## 2017-04-14 PROCEDURE — 700111 HCHG RX REV CODE 636 W/ 250 OVERRIDE (IP): Performed by: HOSPITALIST

## 2017-04-14 PROCEDURE — 700111 HCHG RX REV CODE 636 W/ 250 OVERRIDE (IP): Performed by: INTERNAL MEDICINE

## 2017-04-14 PROCEDURE — A9270 NON-COVERED ITEM OR SERVICE: HCPCS | Performed by: FAMILY MEDICINE

## 2017-04-14 PROCEDURE — A9270 NON-COVERED ITEM OR SERVICE: HCPCS | Performed by: ORTHOPAEDIC SURGERY

## 2017-04-14 RX ORDER — SODIUM CHLORIDE 9 MG/ML
INJECTION, SOLUTION INTRAVENOUS
Status: COMPLETED
Start: 2017-04-14 | End: 2017-04-14

## 2017-04-14 RX ADMIN — AMPICILLIN SODIUM AND SULBACTAM SODIUM 3 G: 2; 1 INJECTION, POWDER, FOR SOLUTION INTRAMUSCULAR; INTRAVENOUS at 23:20

## 2017-04-14 RX ADMIN — OXYCODONE HYDROCHLORIDE 15 MG: 5 TABLET ORAL at 20:14

## 2017-04-14 RX ADMIN — LACTOBACILLUS ACIDOPHILUS / LACTOBACILLUS BULGARICUS 1 PACKET: 100 MILLION CFU STRENGTH GRANULES at 10:55

## 2017-04-14 RX ADMIN — ACETAMINOPHEN 650 MG: 325 TABLET, FILM COATED ORAL at 23:20

## 2017-04-14 RX ADMIN — FERROUS SULFATE TAB 325 MG (65 MG ELEMENTAL FE) 325 MG: 325 (65 FE) TAB at 08:44

## 2017-04-14 RX ADMIN — GABAPENTIN 600 MG: 300 CAPSULE ORAL at 20:15

## 2017-04-14 RX ADMIN — AMPICILLIN SODIUM AND SULBACTAM SODIUM 3 G: 2; 1 INJECTION, POWDER, FOR SOLUTION INTRAMUSCULAR; INTRAVENOUS at 10:55

## 2017-04-14 RX ADMIN — NICOTINE 7 MG: 7 PATCH TRANSDERMAL at 04:21

## 2017-04-14 RX ADMIN — ACETAMINOPHEN 650 MG: 325 TABLET, FILM COATED ORAL at 00:04

## 2017-04-14 RX ADMIN — VANCOMYCIN HYDROCHLORIDE 125 MG: 10 INJECTION, POWDER, LYOPHILIZED, FOR SOLUTION INTRAVENOUS at 08:45

## 2017-04-14 RX ADMIN — ACETAMINOPHEN 650 MG: 325 TABLET, FILM COATED ORAL at 12:02

## 2017-04-14 RX ADMIN — AMPICILLIN SODIUM AND SULBACTAM SODIUM 3 G: 2; 1 INJECTION, POWDER, FOR SOLUTION INTRAMUSCULAR; INTRAVENOUS at 00:04

## 2017-04-14 RX ADMIN — GABAPENTIN 600 MG: 300 CAPSULE ORAL at 08:44

## 2017-04-14 RX ADMIN — SIMVASTATIN 20 MG: 20 TABLET, FILM COATED ORAL at 20:15

## 2017-04-14 RX ADMIN — ACETAMINOPHEN 650 MG: 325 TABLET, FILM COATED ORAL at 18:01

## 2017-04-14 RX ADMIN — OXYCODONE HYDROCHLORIDE 15 MG: 5 TABLET ORAL at 00:04

## 2017-04-14 RX ADMIN — LACTOBACILLUS ACIDOPHILUS / LACTOBACILLUS BULGARICUS 1 PACKET: 100 MILLION CFU STRENGTH GRANULES at 16:40

## 2017-04-14 RX ADMIN — METHOCARBAMOL 500 MG: 500 TABLET ORAL at 08:45

## 2017-04-14 RX ADMIN — METHOCARBAMOL 500 MG: 500 TABLET ORAL at 20:14

## 2017-04-14 RX ADMIN — AMPICILLIN SODIUM AND SULBACTAM SODIUM 3 G: 2; 1 INJECTION, POWDER, FOR SOLUTION INTRAMUSCULAR; INTRAVENOUS at 04:21

## 2017-04-14 RX ADMIN — POTASSIUM CHLORIDE 40 MEQ: 1500 TABLET, EXTENDED RELEASE ORAL at 20:15

## 2017-04-14 RX ADMIN — AMPICILLIN SODIUM AND SULBACTAM SODIUM 3 G: 2; 1 INJECTION, POWDER, FOR SOLUTION INTRAMUSCULAR; INTRAVENOUS at 16:40

## 2017-04-14 RX ADMIN — OXYCODONE HYDROCHLORIDE 20 MG: 20 TABLET, FILM COATED, EXTENDED RELEASE ORAL at 08:45

## 2017-04-14 RX ADMIN — GABAPENTIN 600 MG: 300 CAPSULE ORAL at 16:01

## 2017-04-14 RX ADMIN — ACETAMINOPHEN 650 MG: 325 TABLET, FILM COATED ORAL at 04:21

## 2017-04-14 RX ADMIN — FUROSEMIDE 20 MG: 20 TABLET ORAL at 08:45

## 2017-04-14 RX ADMIN — POTASSIUM CHLORIDE 40 MEQ: 1500 TABLET, EXTENDED RELEASE ORAL at 08:45

## 2017-04-14 RX ADMIN — LIDOCAINE 2 PATCH: 50 PATCH CUTANEOUS at 12:01

## 2017-04-14 RX ADMIN — LACTOBACILLUS ACIDOPHILUS / LACTOBACILLUS BULGARICUS 1 PACKET: 100 MILLION CFU STRENGTH GRANULES at 08:45

## 2017-04-14 RX ADMIN — METHOCARBAMOL 500 MG: 500 TABLET ORAL at 16:02

## 2017-04-14 RX ADMIN — OXYCODONE HYDROCHLORIDE 15 MG: 5 TABLET ORAL at 04:22

## 2017-04-14 RX ADMIN — SODIUM CHLORIDE: 9 INJECTION, SOLUTION INTRAVENOUS at 23:00

## 2017-04-14 RX ADMIN — OXYCODONE HYDROCHLORIDE 15 MG: 5 TABLET ORAL at 16:03

## 2017-04-14 RX ADMIN — VANCOMYCIN HYDROCHLORIDE 125 MG: 10 INJECTION, POWDER, LYOPHILIZED, FOR SOLUTION INTRAVENOUS at 20:14

## 2017-04-14 RX ADMIN — OXYCODONE HYDROCHLORIDE 20 MG: 20 TABLET, FILM COATED, EXTENDED RELEASE ORAL at 20:15

## 2017-04-14 RX ADMIN — ENOXAPARIN SODIUM 40 MG: 100 INJECTION SUBCUTANEOUS at 08:45

## 2017-04-14 RX ADMIN — AMIODARONE HYDROCHLORIDE 200 MG: 200 TABLET ORAL at 08:45

## 2017-04-14 ASSESSMENT — ENCOUNTER SYMPTOMS
SHORTNESS OF BREATH: 0
BRUISES/BLEEDS EASILY: 0
BACK PAIN: 1
DIARRHEA: 0
HEADACHES: 0
NAUSEA: 0
CHILLS: 0
COUGH: 0
BLOOD IN STOOL: 0
VOMITING: 0
FEVER: 0
ABDOMINAL PAIN: 0
CONSTIPATION: 0

## 2017-04-14 ASSESSMENT — PAIN SCALES - GENERAL
PAINLEVEL_OUTOF10: 5
PAINLEVEL_OUTOF10: 5
PAINLEVEL_OUTOF10: 4
PAINLEVEL_OUTOF10: 5
PAINLEVEL_OUTOF10: 8

## 2017-04-14 NOTE — PROGRESS NOTES
Infectious Disease Progress Note    Author: Yary Lujan M.D. DOS & Time created: 4/14/2017  1:46 PM    Chief Complaint   Patient presents with   • Low Back Pain   FU for osteomyelitis, GAS bacteremia and CDI, suspected endograft infection    Interval History:  3/17 AF, WBC 12.9, pt c/o lower back pain, asking for morphine, poor insight into illness, denies any diarrhea  3/18 AF, WBC 13.3, sleeping and not arousable to voice, cultures here negative to date  3/19 AF, WBC 13.4, frustrated about lower back pain not adequately controlled on current pain regimen, plan for EGD with dilatation today  3/20 AF WBC 9.6 +pain-denies SE abx  3/21 AF WBC 10.5 no new complaints  3/22 AF WBC not done tolerating abx well  3/23 AF eye feels a little better-pain about the same  3/24 AF tolerating abx well-MRI confirm OM  3/25 AF WBC 9.5 s/p toe amp this am  3/26 AF WBC 10.1 deneies any new sxs-  3/27 AF, WBC 9.1, sleeping but arousable to voice, back pain stable  3/28 Tmax 99.9, WBC 7.7, not happy about soft foods, and wants to go back on regular diet, having normal BMs, back pain controlled on pain regimen  3/29 AF, WBC 5.7, transferred to ortho, happy he took a shower today, plan of care discussed with pain, states he was told he has arthritis of his back, back pain stable, no diarrhea  3/30 AF, WBC 8.5, had a loose BM this am follow by normal BM, no abd pain  3/31 AF, WBC 9, nausea and vomiting this am, had soft BM this am  4/1 WBC 13.7, still having emesis, states he continues to be thirsty despite drinking lots of water yesterday, diarrhea improving and stools forming  4/2 AF, WBC 11.6, had nose bleed overnight, being transfused blood for Hg 6.6, tolerating pills  4/3- denies any diarrhea. No fevers.   4/4- no fevers. No diarrhea. Back pain under control.   4/6- no diarrhea. No fevers. Still throws up everything  4/7- no fevers. No new issues. The pain issues being addressed.  4/8- no fevers. No new issues  4/9- continues  "to regurgitate. No fevers.   4/10- AF, no CBC.  CRP 9.37.  Minimal lower back pain with pain medication regiment, 3/10.  Tolerating abx, no complaints.  - AF, no CBC.  Walking around room, denies overall pain.  Tolerating abx without complaints.  - AF, WBC 7.1.  Laying in bed, states he is \"freezing.\"  Denies pain, tolerating abx.  - AF, WBC 7.1.  H&H 6.5 & 20.3, received 1 unit PRBCs this morning.  Feeling better, especially since he had his pain pill this morning.  Denies overall pain, tolerating abx.   AF WBC 8.9 no new complaints  Labs Reviewed, Medications Reviewed, Radiology Reviewed and Wound Reviewed.    Review of Systems:  Review of Systems   Constitutional: Negative for fever and chills.   Respiratory: Negative for cough and shortness of breath.    Cardiovascular: Negative for chest pain.   Gastrointestinal: Negative for nausea, vomiting, abdominal pain, diarrhea and constipation.   Genitourinary: Negative for dysuria.   Musculoskeletal: Positive for back pain.        Stable   Skin: Negative for rash.   Neurological: Negative for headaches.       Hemodynamics:  Temp (24hrs), Av.9 °C (98.4 °F), Min:36.7 °C (98.1 °F), Max:37.1 °C (98.8 °F)  Temperature:  (no q4 vitals per RN)  Pulse  Av.4  Min: 56  Max: 114   Blood Pressure : 102/60 mmHg       Physical Exam:  Physical Exam   Constitutional: He is oriented to person, place, and time. He appears well-developed. No distress.   HENT:   Head: Normocephalic and atraumatic.   Eyes: EOM are normal. Pupils are equal, round, and reactive to light.   Neck: Neck supple.   Cardiovascular: Normal rate.    Murmur heard.  IRR   Pulmonary/Chest: Effort normal and breath sounds normal. No respiratory distress. He has no wheezes.   Abdominal: Soft. Bowel sounds are normal. He exhibits no distension. There is no tenderness.   Musculoskeletal: Normal range of motion. He exhibits edema.   R great toe with slight edema- no drainage or erythema, medial " callus.   Left 3rd toe- No drainage or erythema.  RUE PICC- non tender, no erythema.  +1 BLE edema- slightly improved   Neurological: He is alert and oriented to person, place, and time.   Skin: Skin is warm and dry. No rash noted. There is erythema.   Chronic changes  LLE erythema- improving   Nursing note and vitals reviewed.      Labs:  Recent Labs      04/12/17   0010  04/13/17   0120  04/13/17   0915  04/14/17   1300   WBC  7.1  7.1  10.4  8.9   RBC  2.35*  2.11*  2.63*  2.52*   HEMOGLOBIN  7.5*  6.5*  8.3*  7.9*   HEMATOCRIT  23.0*  20.3*  25.3*  24.1*   MCV  97.9*  96.2  96.2  95.6   MCH  31.9  30.8  31.6  31.3   RDW  54.9*  55.2*  55.0*  57.0*   PLATELETCT  220  182  209  209   MPV  8.8*  8.8*  8.9*  9.0   NEUTSPOLYS  69.30  74.50*   --    --    LYMPHOCYTES  19.40*  14.20*   --    --    MONOCYTES  9.10  10.30   --    --    EOSINOPHILS  1.00  0.60   --    --    BASOPHILS  0.40  0.10   --    --      Recent Labs      04/12/17   0010  04/13/17   0120 04/14/17   1300   SODIUM  129*  134*  131*   POTASSIUM  3.9  3.4*  3.8   CHLORIDE  98  101  99   CO2  26  26  24   GLUCOSE  101*  96  94   BUN  8  9  11     Recent Labs      04/12/17   0010  04/13/17   0120  04/14/17   1300   ALBUMIN  2.5*  2.2*  2.5*   CREATININE  1.21  1.16  1.25       BLOOD CULTURE   Date Value Ref Range Status   03/15/2017 No growth after 5 days of incubation.  Final    ':  Results     ** No results found for the last 168 hours. **          Imaging    MR-ABDOMEN-WITH & W/O and MR-PELVIS WITH  4/9/2017  Impression      1. Evaluate for interval change in enhancement is limited between current MR and prior lumbar MR due to difference in technique but there may be slightly less soft tissue enhancement surrounding the wall.  2. The size of the thrombosed infrarenal abdominal aortic aneurysm is grossly unchanged. No evidence of endoleak.  3. Patent aortobiiliac endograft.  4. Unchanged indentation of the aneurysm sac on the left psoas muscle. No  evidence of invasion.  5. Small focal low signal T2 area with peripheral enhancement in the right psoas muscle is of unclear etiology and could relate to scarring or heterotopic calcification.     ECHOCARDIOGRAM COMP W/O CONT  4/9/2017   CONCLUSIONS  Normal left ventricular size and systolic function.  Left ventricular ejection fraction is visually estimated to be 55%.  Severe aortic stenosis.  Moderate mitral regurgitation.  Right heart pressures are consistent with moderate pulmonary   hypertension.  Severely dilated left atrium.    MR-FOOT-WITH & W/O RIGHT  3/23/2017  Impression      1. Abnormal signal in the distal aspect of the first distal phalanx is consistent with osteomyelitis.  2.  No abscess is identified.  3.  Susceptibility artifact in the plantar soft tissues at the level of the fourth tarsometatarsal joint and base of the third proximal phalanx, possibly related to prior surgery. Foreign body cannot be excluded.  4.  Diffuse edema in the forefoot.     3/17 MRI lumbar spine: There are changes secondary to the abdominal aortic aneurysm endograft. The aneurysm sac measures an approximately 7.6 x 6.7 cm in size. Abnormal contrast enhancement is noted within the wall of the aneurysmal sac. There is also abnormal soft tissue   contrast enhancement in the surrounding soft tissue. These findings are highly suspicious for infected thrombosed aneurysm sac. The possibility of endograft infection is more likely. There is irregular outpouching of the aneurysmal sac along the   posterior portion indenting the left psoas muscle.  2.  Free fluid in the pelvis  3.  There is mild-to-moderate left hydronephrosis likely representing pelviureteric junction obstruction.    3/16 R foot xray - +OM  3/16 L foot xray +OM    Assessment:  Active Hospital Problems    Diagnosis   • *Osteomyelitis of toe (CMS-[facility]) [M86.9]   • Bacteremia [R78.81]   • Back pain [M54.9]   • CHF (congestive heart failure) (CMS-[facility]) [I50.9]   • Tobacco  dependence [F17.200]   • Atrial fibrillation (CMS-HCC) [I48.91]    Endovascular infection       Plan:  Right great toe and left 3rd osteomyelitis  +OM on radiographs-confirmed on repeat MRIs  OSH wound cx on 3/9 - GAS, MSSA (I confirmed with [facility] microlab)  S/p 3rd toe amp on 3/25. No purulence seen proximally to amp site  OR cx (left 3rd toe) on 3/25 - MSSA (S-unasyn) Vanco LINUS 2, MRSE  Continue IV Unasyn  Anticipate 6 weeks of IV abx. Stop date 04/27/17.    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  IV abx as above, follow it with po amoxicillin      Clostridium difficile diarrhea  Completed vancomycin QID for 2 weeks on 03/29/17.   Continue PO Vancomycin BID while on IV abx with 3 additional days, stop date of 04/30/17.    Esophageal dysmotility-concern for achalasia  H/o strictures with recent dilatation as [facility]  Esophagram +distal esophageal obstruction  s/p EGD with dilatation  Needs GI re-eval for possible repeat EGD given hx of strictures    Leukocytosis. Resolved  Multifactorial  No new signs or symptoms of infection  Monitor    Back pain  Chronic per patient    Prognosis guarded    Dispo:  Awaiting SNF placement for long-term abx, [facility]  Also being [facility] by [facility].    Discussed with Hospitalist- Dr. Bhakta.

## 2017-04-14 NOTE — PROGRESS NOTES
Aaox4, special contact isolation, up adlib in room, prn meds for pain per MAr, awaiting SNF, continue long term antibiotics as planned.

## 2017-04-14 NOTE — PROGRESS NOTES
73 yo with multiple medical problems found to have mild hydroureteronephrosis likely related to extrinsic compression near AAA. No flank pain or renal impairment. Plan will be for Mag3 scan with Lasix. Full consult dictated.

## 2017-04-14 NOTE — CARE PLAN
Problem: Pain Management  Goal: Pain level will decrease to patient’s comfort goal  Intervention: Follow pain managment plan developed in collaboration with patient and Interdisciplinary Team  Pt requesting oxy 15 mg Q4 this shift.  Reports pain much more controlled with oxy 15mg.        Problem: Safety  Goal: Will remain free from injury  Intervention: Provide assistance with mobility  No injuries this shift, pt up self in room ambulating with steady gait.  Calls appropriately with needs.

## 2017-04-14 NOTE — PROGRESS NOTES
Hospital Medicine Progress Note, Adult, Complex               Author: Kyleigh Bhakta  Date & Time created: 4/14/2017  2:51 PM     CC: Back pain    Interval History:  72M hx AAA s/p repair, recent findings of bilateral toes osteomyelitis (left AMA from [place] prior to ortho eval and PICC placement), dysphagia/achalasia s/p dilation ([facility]); admitted w back pain found to have suggestion of AAA endograft infection, as well as hyponatremia, hypokalemia.  He was found to have MSSA bacteremia, with source being toe osteo  3/9: Outside hospital with positive blood cx (GAS)  3/19: EGD w esophageal dilation for achalasia/stricture.  Diet is liquid.  3/25: L 3td Toe amputation   4/9: Echo with no e/o vegetation  4/10: Left leg rash where SCD was  4/11: PO vanc resumed, vascular considering plan for endograft removal  4/12: Severe back pain after missing dose of narcotic, tolerating unasyn  4/13: Hgb dropped to 6.5, no bleeding. Back pain improved.  4/14: Hgb stable.  Repeat SLP ordered. Vascular confirmed no need to remove endograft. OP vs IP urology eval.    Review of Systems:  Review of Systems   Cardiovascular: Positive for leg swelling.   Gastrointestinal: Negative for nausea, vomiting (15min after eating, spits up), blood in stool and melena.   Musculoskeletal: Positive for back pain (Improved) and joint pain.   Skin: Positive for rash (LLE).   Endo/Heme/Allergies: Does not bruise/bleed easily.   All other systems reviewed and are negative.      Physical Exam:  Physical ExamNursing note and vitals reviewed.  Constitutional: Sitting at edge of the bed.  He appears well-developed and well-nourished overweight.   HENT: Poor dentition, no e/o exudate, poor dentition  Eyes: Conjunctivae and extraocular motions are normal.    Neck: Normal range of motion. Neck supple.   Cardiovascular:  2+ BLEE - R>L, no murmurs  Pulmonary/Chest: Effort normal, no accessory muscle use.   Abdominal: Bowel sounds are normal.    Musculoskeletal: Normal range of motion. No point tenderness over spine  Neurological: He is alert and oriented to person, place, and time.   Skin: Skin is warm and dry.  R-foot dressing C/D/I.  Macular rash on L anterior leg in the distribution of the SCD that is non-blanching/petechial, not warm, non-tender    Labs:        Invalid input(s): [id number]  Recent Labs      17   BNPBTYPENAT  666*     Recent Labs      17   1300   SODIUM  129*  134*  131*   POTASSIUM  3.9  3.4*  3.8   CHLORIDE  98  101  99   CO2  26  26  24   BUN  8  9  11   CREATININE  1.21  1.16  1.25   PHOSPHORUS  4.5  4.3  3.7   CALCIUM  8.4*  8.0*  8.2*     Recent Labs      17   1300   GLUCOSE  101*  96  94     Recent Labs      17   1300   RBC  2.11*  2.63*  2.52*   HEMOGLOBIN  6.5*  8.3*  7.9*   HEMATOCRIT  20.3*  25.3*  24.1*   PLATELETCT  182  209  209     Recent Labs      17   1300   WBC  7.1  7.1  10.4  8.9   NEUTSPOLYS  69.30  74.50*   --    --    LYMPHOCYTES  19.40*  14.20*   --    --    MONOCYTES  9.10  10.30   --    --    EOSINOPHILS  1.00  0.60   --    --    BASOPHILS  0.40  0.10   --    --            Hemodynamics:  Temp (24hrs), Av.9 °C (98.4 °F), Min:36.7 °C (98.1 °F), Max:37.1 °C (98.8 °F)  Temperature:  (no q4 vitals per RN)  Pulse  Av.4  Min: 56  Max: 114   Blood Pressure : 102/60 mmHg     Respiratory:    Respiration: 20, Pulse Oximetry: 93 %     Work Of Breathing / Effort: Mild  RUL Breath Sounds: Clear, RML Breath Sounds: Diminished, RLL Breath Sounds: Diminished, SOLEDAD Breath Sounds: Clear, LLL Breath Sounds: Diminished  Fluids:    Intake/Output Summary (Last 24 hours) at 17 1451  Last data filed at 17 0400   Gross per 24 hour   Intake    260 ml   Output    200 ml   Net     60 ml        GI/Nutrition:  Orders Placed This  Encounter   Procedures   • DIET ORDER     Standing Status: Standing      Number of Occurrences: 1      Standing Expiration Date:      Order Specific Question:  Diet:     Answer:  Full Liquid [11]     Medical Decision Making, by Problem:  Active Hospital Problems    Diagnosis   • Strep/MSSA Bacteremia - Bacteremia on cultures 3/9 from outside hospital.  *Osteomyelitis of 3rd R toe (CMS-HCC) s/p amputation on 3/25  -Unasyn through 4/26 --> PO Amoxicillin     • Back pain [M54.9] - judicious use of narcotics.  MRI imaging has been negative for compression fracture.  No neurologic deficit     • Anemia [D64.9] - s/p xfusion on 4/2 and 4/13.  Hgb still labile, no observed blood loss and has been started on PO iron.    -Transfuse for Hgb <7  -Borderline Fe deficiency - FeSO4 replacement.  -Ok to continue lovenox unless e/o bleeding  -Hold off on therapeutic anticoagulation     • Chronic systolic CHF (congestive heart failure) (CMS-HCC) [I50.9] - echo shows mildly reduced EF of 55% c mod MR and PHTN.  -Lasix 20 PO daily     • Hydroureter - question on imaging, his Cr is normal  -Per vascular, urology may see the patient  -IP vs OP eval by urology  -Has good UOP     • Atrial fibrillation (CMS-HCC) [I48.91] - recently cardioverted at [place].  Rate controlled.   -Eliquis was discontinued for procedures and shannon-op  -Now w ongoing low hgb, risk >benefit  -Hold off on eliquis     • AAA endograft, he is S/P remote AAA (abdominal aortic aneurysm) repair and abnormality was seen on MRI.  Risk>benefit of removal and no evidence of infection on MRI/imaging.  -No surgical intervention needed     • Hyponatremia - mild  -Continue Lasix  -Repeat in AM     • Noncompliance [Z91.19] - pt education and encouragement.  -Has left AMA previous admissions     • Tobacco dependence [F17.200]  -counseled  -Nicoderm.     • Hep C w/o coma, chronic with cirrhosis (CMS-HCC) [B18.2] - outpatient follow up.     • C Dif Diarrhea - needs PO vanc through  IV abx  -PO vanc BID (per ID recs) through 4/30     • Dysphagia [R13.10] /suspected achalasia - s/p EGD/injection.  Per GI ok to advance, however SLP previously recommended modified diet due to spitting up  -Repeat SLP eval  -Outpatient GI follow up       EtOH - cessation ed.    Dispo - complex/guarded.  Await LTAC dispo, CBC in AM, if continues to drop, DC lovenox  Was accepted to [facility], he refuses to go      Labs reviewed and Medications reviewed  Art catheter: No Art      DVT Prophylaxis: Enoxaparin (Lovenox)    Ulcer prophylaxis: Not indicated  Antibiotics: Treating active infection/contamination beyond 24 hours perioperative coverage  Assessed for rehab: Patient was assess for and/or received rehabilitation services during this hospitalization

## 2017-04-14 NOTE — PROGRESS NOTES
Dr. Bhakta made aware of hgb level and concerns about diet, SLP ordered and spoke with SLP, no other recommendation at this time for diet since its not oropharyngeal or no swallowing issues noted but the regurgitations, advised to page GI for diet, GI paged.

## 2017-04-14 NOTE — DISCHARGE PLANNING
Pt pending auth on LTAC referral. Received message that [facility] pt is insurance denies LTAC placement.

## 2017-04-14 NOTE — CARE PLAN
Problem: Pain Management  Goal: Pain level will decrease to patient’s comfort goal  Intervention: Follow pain managment plan developed in collaboration with patient and Interdisciplinary Team  Prn meds per MAR      Problem: Knowledge Deficit  Goal: Knowledge of disease process/condition, treatment plan, diagnostic tests, and medications will improve  Intervention: Explain information regarding disease process/condition, treatment plan, diagnostic tests, and medications and document in education  Long term antibiotics per MD

## 2017-04-15 ENCOUNTER — APPOINTMENT (OUTPATIENT)
Dept: RADIOLOGY | Facility: MEDICAL CENTER | Age: 72
DRG: 504 | End: 2017-04-15
Attending: PHYSICIAN ASSISTANT
Payer: COMMERCIAL

## 2017-04-15 LAB
ALBUMIN SERPL BCP-MCNC: 2.5 G/DL (ref 3.2–4.9)
BUN SERPL-MCNC: 9 MG/DL (ref 8–22)
CALCIUM SERPL-MCNC: 8.3 MG/DL (ref 8.5–10.5)
CHLORIDE SERPL-SCNC: 102 MMOL/L (ref 96–112)
CO2 SERPL-SCNC: 23 MMOL/L (ref 20–33)
CREAT SERPL-MCNC: 1.22 MG/DL (ref 0.5–1.4)
ERYTHROCYTE [DISTWIDTH] IN BLOOD BY AUTOMATED COUNT: 57 FL (ref 35.9–50)
GFR SERPL CREATININE-BSD FRML MDRD: 58 ML/MIN/1.73 M 2
GLUCOSE SERPL-MCNC: 90 MG/DL (ref 65–99)
HCT VFR BLD AUTO: 24.2 % (ref 42–52)
HGB BLD-MCNC: 7.9 G/DL (ref 14–18)
MCH RBC QN AUTO: 31.6 PG (ref 27–33)
MCHC RBC AUTO-ENTMCNC: 32.6 G/DL (ref 33.7–35.3)
MCV RBC AUTO: 96.8 FL (ref 81.4–97.8)
PHOSPHATE SERPL-MCNC: 3.8 MG/DL (ref 2.5–4.5)
PLATELET # BLD AUTO: 219 K/UL (ref 164–446)
PMV BLD AUTO: 8.9 FL (ref 9–12.9)
POTASSIUM SERPL-SCNC: 4 MMOL/L (ref 3.6–5.5)
RBC # BLD AUTO: 2.5 M/UL (ref 4.7–6.1)
SODIUM SERPL-SCNC: 135 MMOL/L (ref 135–145)
WBC # BLD AUTO: 7.6 K/UL (ref 4.8–10.8)

## 2017-04-15 PROCEDURE — 80069 RENAL FUNCTION PANEL: CPT

## 2017-04-15 PROCEDURE — 700102 HCHG RX REV CODE 250 W/ 637 OVERRIDE(OP): Performed by: HOSPITALIST

## 2017-04-15 PROCEDURE — 700101 HCHG RX REV CODE 250: Performed by: FAMILY MEDICINE

## 2017-04-15 PROCEDURE — A9270 NON-COVERED ITEM OR SERVICE: HCPCS | Performed by: HOSPITALIST

## 2017-04-15 PROCEDURE — 770021 HCHG ROOM/CARE - ISO PRIVATE

## 2017-04-15 PROCEDURE — A9270 NON-COVERED ITEM OR SERVICE: HCPCS | Performed by: INTERNAL MEDICINE

## 2017-04-15 PROCEDURE — 700111 HCHG RX REV CODE 636 W/ 250 OVERRIDE (IP): Performed by: HOSPITALIST

## 2017-04-15 PROCEDURE — 700111 HCHG RX REV CODE 636 W/ 250 OVERRIDE (IP): Performed by: INTERNAL MEDICINE

## 2017-04-15 PROCEDURE — 99232 SBSQ HOSP IP/OBS MODERATE 35: CPT | Performed by: INTERNAL MEDICINE

## 2017-04-15 PROCEDURE — 78708 K FLOW/FUNCT IMAGE W/DRUG: CPT

## 2017-04-15 PROCEDURE — A9562 TC99M MERTIATIDE: HCPCS

## 2017-04-15 PROCEDURE — 85027 COMPLETE CBC AUTOMATED: CPT

## 2017-04-15 PROCEDURE — A9270 NON-COVERED ITEM OR SERVICE: HCPCS | Performed by: FAMILY MEDICINE

## 2017-04-15 PROCEDURE — 700105 HCHG RX REV CODE 258

## 2017-04-15 PROCEDURE — 700102 HCHG RX REV CODE 250 W/ 637 OVERRIDE(OP): Performed by: FAMILY MEDICINE

## 2017-04-15 PROCEDURE — 700105 HCHG RX REV CODE 258: Performed by: INTERNAL MEDICINE

## 2017-04-15 PROCEDURE — 700102 HCHG RX REV CODE 250 W/ 637 OVERRIDE(OP): Performed by: INTERNAL MEDICINE

## 2017-04-15 RX ORDER — SODIUM CHLORIDE 9 MG/ML
INJECTION, SOLUTION INTRAVENOUS
Status: COMPLETED
Start: 2017-04-15 | End: 2017-04-15

## 2017-04-15 RX ORDER — FUROSEMIDE 10 MG/ML
INJECTION INTRAMUSCULAR; INTRAVENOUS
Status: DISPENSED
Start: 2017-04-15 | End: 2017-04-16

## 2017-04-15 RX ADMIN — LACTOBACILLUS ACIDOPHILUS / LACTOBACILLUS BULGARICUS 1 PACKET: 100 MILLION CFU STRENGTH GRANULES at 09:33

## 2017-04-15 RX ADMIN — ACETAMINOPHEN 650 MG: 325 TABLET, FILM COATED ORAL at 04:57

## 2017-04-15 RX ADMIN — LACTOBACILLUS ACIDOPHILUS / LACTOBACILLUS BULGARICUS 1 PACKET: 100 MILLION CFU STRENGTH GRANULES at 11:14

## 2017-04-15 RX ADMIN — LIDOCAINE 2 PATCH: 50 PATCH CUTANEOUS at 11:15

## 2017-04-15 RX ADMIN — OXYCODONE HYDROCHLORIDE 20 MG: 20 TABLET, FILM COATED, EXTENDED RELEASE ORAL at 20:49

## 2017-04-15 RX ADMIN — AMPICILLIN SODIUM AND SULBACTAM SODIUM 3 G: 2; 1 INJECTION, POWDER, FOR SOLUTION INTRAMUSCULAR; INTRAVENOUS at 23:27

## 2017-04-15 RX ADMIN — AMPICILLIN SODIUM AND SULBACTAM SODIUM 3 G: 2; 1 INJECTION, POWDER, FOR SOLUTION INTRAMUSCULAR; INTRAVENOUS at 11:13

## 2017-04-15 RX ADMIN — ACETAMINOPHEN 650 MG: 325 TABLET, FILM COATED ORAL at 11:14

## 2017-04-15 RX ADMIN — AMIODARONE HYDROCHLORIDE 200 MG: 200 TABLET ORAL at 09:34

## 2017-04-15 RX ADMIN — SODIUM CHLORIDE 500 ML: 9 INJECTION, SOLUTION INTRAVENOUS at 23:25

## 2017-04-15 RX ADMIN — GABAPENTIN 600 MG: 300 CAPSULE ORAL at 14:26

## 2017-04-15 RX ADMIN — METHOCARBAMOL 500 MG: 500 TABLET ORAL at 09:33

## 2017-04-15 RX ADMIN — POTASSIUM CHLORIDE 40 MEQ: 1500 TABLET, EXTENDED RELEASE ORAL at 09:35

## 2017-04-15 RX ADMIN — OXYCODONE HYDROCHLORIDE 15 MG: 5 TABLET ORAL at 00:45

## 2017-04-15 RX ADMIN — ACETAMINOPHEN 650 MG: 325 TABLET, FILM COATED ORAL at 17:09

## 2017-04-15 RX ADMIN — ENOXAPARIN SODIUM 40 MG: 100 INJECTION SUBCUTANEOUS at 09:35

## 2017-04-15 RX ADMIN — METHOCARBAMOL 500 MG: 500 TABLET ORAL at 20:48

## 2017-04-15 RX ADMIN — AMPICILLIN SODIUM AND SULBACTAM SODIUM 3 G: 2; 1 INJECTION, POWDER, FOR SOLUTION INTRAMUSCULAR; INTRAVENOUS at 04:56

## 2017-04-15 RX ADMIN — OXYCODONE HYDROCHLORIDE 20 MG: 20 TABLET, FILM COATED, EXTENDED RELEASE ORAL at 09:34

## 2017-04-15 RX ADMIN — VANCOMYCIN HYDROCHLORIDE 125 MG: 10 INJECTION, POWDER, LYOPHILIZED, FOR SOLUTION INTRAVENOUS at 09:36

## 2017-04-15 RX ADMIN — AMPICILLIN SODIUM AND SULBACTAM SODIUM 3 G: 2; 1 INJECTION, POWDER, FOR SOLUTION INTRAMUSCULAR; INTRAVENOUS at 17:10

## 2017-04-15 RX ADMIN — LACTOBACILLUS ACIDOPHILUS / LACTOBACILLUS BULGARICUS 1 PACKET: 100 MILLION CFU STRENGTH GRANULES at 17:09

## 2017-04-15 RX ADMIN — GABAPENTIN 600 MG: 300 CAPSULE ORAL at 20:48

## 2017-04-15 RX ADMIN — POTASSIUM CHLORIDE 40 MEQ: 1500 TABLET, EXTENDED RELEASE ORAL at 20:48

## 2017-04-15 RX ADMIN — GABAPENTIN 600 MG: 300 CAPSULE ORAL at 09:33

## 2017-04-15 RX ADMIN — OXYCODONE HYDROCHLORIDE 15 MG: 5 TABLET ORAL at 14:28

## 2017-04-15 RX ADMIN — OXYCODONE HYDROCHLORIDE 15 MG: 5 TABLET ORAL at 17:14

## 2017-04-15 RX ADMIN — OXYCODONE HYDROCHLORIDE 15 MG: 5 TABLET ORAL at 04:57

## 2017-04-15 RX ADMIN — OXYCODONE HYDROCHLORIDE 15 MG: 5 TABLET ORAL at 23:31

## 2017-04-15 RX ADMIN — SIMVASTATIN 20 MG: 20 TABLET, FILM COATED ORAL at 20:48

## 2017-04-15 RX ADMIN — FERROUS SULFATE TAB 325 MG (65 MG ELEMENTAL FE) 325 MG: 325 (65 FE) TAB at 09:33

## 2017-04-15 RX ADMIN — NICOTINE 7 MG: 7 PATCH TRANSDERMAL at 04:57

## 2017-04-15 RX ADMIN — METHOCARBAMOL 500 MG: 500 TABLET ORAL at 14:26

## 2017-04-15 RX ADMIN — VANCOMYCIN HYDROCHLORIDE 125 MG: 10 INJECTION, POWDER, LYOPHILIZED, FOR SOLUTION INTRAVENOUS at 20:49

## 2017-04-15 ASSESSMENT — ENCOUNTER SYMPTOMS
SHORTNESS OF BREATH: 0
BRUISES/BLEEDS EASILY: 0
BACK PAIN: 1
CHILLS: 0
VOMITING: 0
HEADACHES: 0
FEVER: 0
FLANK PAIN: 0
ABDOMINAL PAIN: 0
NAUSEA: 0
BLOOD IN STOOL: 0
CONSTIPATION: 0
COUGH: 0
DIARRHEA: 0

## 2017-04-15 ASSESSMENT — PAIN SCALES - GENERAL
PAINLEVEL_OUTOF10: 5
PAINLEVEL_OUTOF10: 4
PAINLEVEL_OUTOF10: 5
PAINLEVEL_OUTOF10: 4

## 2017-04-15 NOTE — PROGRESS NOTES
Hospital Medicine Progress Note, Adult, Complex               Author: Kyleigh Bhakta  Date & Time created: 4/15/2017  12:57 PM     CC: Back pain    Interval History:  72M hx AAA s/p repair, recent findings of bilateral toes osteomyelitis (left AMA from [facility] prior to ortho eval and PICC placement), dysphagia/achalasia s/p dilation ([facility]); admitted w back pain found to have suggestion of AAA endograft infection, as well as hyponatremia, hypokalemia.  He was found to have MSSA bacteremia, with source being toe osteo  3/9: Outside hospital with positive blood cx (GAS)  3/19: EGD w esophageal dilation for achalasia/stricture.  Diet is liquid.  3/25: L 3td Toe amputation   4/9: Echo with no e/o vegetation  4/10: Left leg rash where SCD was  4/11: PO vanc resumed, vascular considering plan for endograft removal  4/12: Severe back pain after missing dose of narcotic, tolerating unasyn  4/13: Hgb dropped to 6.5, no bleeding. Back pain improved.  4/14: Hgb stable.  Repeat SLP ordered. Vascular confirmed no need to remove endograft.   4/15: Urology eval to assess need for L ureteral stent, diet advanced to gi soft    Review of Systems:  Review of Systems   Cardiovascular: Positive for leg swelling.   Gastrointestinal: Negative for nausea, vomiting, blood in stool and melena.   Musculoskeletal: Positive for back pain (Improved) and joint pain.   Skin: Positive for rash (LLE).   Endo/Heme/Allergies: Does not bruise/bleed easily.   All other systems reviewed and are negative.      Physical Exam:  Physical ExamNursing note and vitals reviewed.  Constitutional: Sitting at edge of the bed.  He appears well-developed and well-nourished overweight.   HENT: Poor dentition, no e/o exudate, poor dentition  Eyes: Conjunctivae and extraocular motions are normal.    Neck: Normal range of motion. Neck supple.   Cardiovascular:  2+ BLEE - R>L, no murmurs  Pulmonary/Chest: Effort normal, no accessory muscle use.   Abdominal: Bowel  sounds are normal.   Musculoskeletal: Normal range of motion. No point tenderness over spine  Neurological: He is alert and oriented to person, place, and time.   Skin: Skin is warm and dry.  R-foot dressing C/D/I.  Macular rash on L anterior leg, that is non-blanching/petechial, not warm, non-tender    Labs:        Invalid input(s): [id number]      Recent Labs      17   0120  17   1300  04/15/17   0515   SODIUM  134*  131*  135   POTASSIUM  3.4*  3.8  4.0   CHLORIDE  101  99  102   CO2  26  24  23   BUN  9  11  9   CREATININE  1.16  1.25  1.22   PHOSPHORUS  4.3  3.7  3.8   CALCIUM  8.0*  8.2*  8.3*     Recent Labs      17   1300  04/15/17   0515   GLUCOSE  96  94  90     Recent Labs      17   0915  17   1300  04/15/17   0515   RBC  2.63*  2.52*  2.50*   HEMOGLOBIN  8.3*  7.9*  7.9*   HEMATOCRIT  25.3*  24.1*  24.2*   PLATELETCT  209  209  219     Recent Labs      17   0915  17   1300  04/15/17   0515   WBC  7.1  10.4  8.9  7.6   NEUTSPOLYS  74.50*   --    --    --    LYMPHOCYTES  14.20*   --    --    --    MONOCYTES  10.30   --    --    --    EOSINOPHILS  0.60   --    --    --    BASOPHILS  0.10   --    --    --            Hemodynamics:  Temp (24hrs), Av.6 °C (97.9 °F), Min:36.4 °C (97.6 °F), Max:36.8 °C (98.3 °F)  Temperature: 36.6 °C (97.8 °F)  Pulse  Av.1  Min: 56  Max: 114   Blood Pressure : 115/52 mmHg     Respiratory:    Respiration: 16, Pulse Oximetry: 96 %     Work Of Breathing / Effort: Mild  RUL Breath Sounds: Clear, RML Breath Sounds: Clear, RLL Breath Sounds: Clear, SOLEDAD Breath Sounds: Clear, LLL Breath Sounds: Clear  Fluids:  No intake or output data in the 24 hours ending 04/15/17 1257     GI/Nutrition:  Orders Placed This Encounter   Procedures   • DIET ORDER     Standing Status: Standing      Number of Occurrences: 1      Standing Expiration Date:      Order Specific Question:  Diet:     Answer:  Regular [1]       Comments:  no cottage cheese please     Order Specific Question:  Texture/Fiber modifications:     Answer:  Dysphagia 3(Mechanical Soft)specify fluid consistency(question 6) [3]     Order Specific Question:  Consistency/Fluid modifications:     Answer:  Thin Liquids [3]     Medical Decision Making, by Problem:  Active Hospital Problems    Diagnosis   • Strep/MSSA Bacteremia - Bacteremia on cultures 3/9 from outside hospital.  *Osteomyelitis of 3rd R toe (CMS-HCC) s/p amputation on 3/25  -Unasyn through 4/26 --> PO Amoxicillin     • Back pain [M54.9] - judicious use of narcotics.  MRI imaging has been negative for compression fracture.  No neurologic deficit     • Anemia [D64.9] - s/p xfusion on 4/2 and 4/13.  Hgb still labile, no observed blood loss and has been started on PO iron.    -Transfuse for Hgb <7  -Borderline Fe deficiency - FeSO4 replacement.  -Ok to continue lovenox unless e/o bleeding  -Hold off on therapeutic anticoagulation     • Chronic systolic CHF (congestive heart failure) (CMS-HCC) [I50.9] - echo shows mildly reduced EF of 55% c mod MR and PHTN.  -Lasix 20 PO daily     • Hydroureter - question on imaging, his Cr is normal  -Urology evaluating need for possible L ureteral stent  -Has good UOP     • Atrial fibrillation (CMS-HCC) [I48.91] - recently cardioverted at [facility].  Rate controlled.   -Eliquis was discontinued for procedures and shannon-op  -Now w ongoing low hgb, risk >benefit  -Hold off on eliquis     • AAA endograft, he is S/P remote AAA (abdominal aortic aneurysm) repair and abnormality was seen on MRI.  Risk>benefit of removal and no evidence of infection on MRI/imaging.  -No surgical intervention needed     • Hyponatremia - mild  -Continue Lasix  -Repeat in AM     • Noncompliance [Z91.19] - pt education and encouragement.  -Has left AMA previous admissions     • Tobacco dependence [F17.200]  -counseled  -Nicoderm.     • Hep C w/o coma, chronic with cirrhosis (CMS-HCC) [B18.2] -  outpatient follow up.     • C Dif Diarrhea - needs PO vanc through IV abx  -PO vanc BID (per ID recs) through 4/30     • Dysphagia [R13.10] /suspected achalasia - s/p EGD/injection.  Per GI ok to advance, however SLP previously recommended modified diet due to spitting up  -Repeat SLP eval  -Outpatient GI follow up       EtOH - cessation ed.    Dispo - complex/guarded.  Await LTAC dispo, CBC in AM, if continues to drop, DC lovenox  Was accepted to [facility], he refuses to go  F/U Urology recs      Labs reviewed and Medications reviewed  Art catheter: No Art      DVT Prophylaxis: Enoxaparin (Lovenox)    Ulcer prophylaxis: Not indicated  Antibiotics: Treating active infection/contamination beyond 24 hours perioperative coverage  Assessed for rehab: Patient was assess for and/or received rehabilitation services during this hospitalization

## 2017-04-15 NOTE — PROGRESS NOTES
Infectious Disease Progress Note    Author: Yary Lujan M.D. DOS & Time created: 4/15/2017  3:04 PM    Chief Complaint   Patient presents with   • Low Back Pain   FU for osteomyelitis, GAS bacteremia and CDI, suspected endograft infection    Interval History:  3/17 AF, WBC 12.9, pt c/o lower back pain, asking for morphine, poor insight into illness, denies any diarrhea  3/18 AF, WBC 13.3, sleeping and not arousable to voice, cultures here negative to date  3/19 AF, WBC 13.4, frustrated about lower back pain not adequately controlled on current pain regimen, plan for EGD with dilatation today  3/20 AF WBC 9.6 +pain-denies SE abx  3/21 AF WBC 10.5 no new complaints  3/22 AF WBC not done tolerating abx well  3/23 AF eye feels a little better-pain about the same  3/24 AF tolerating abx well-MRI confirm OM  3/25 AF WBC 9.5 s/p toe amp this am  3/26 AF WBC 10.1 deneies any new sxs-  3/27 AF, WBC 9.1, sleeping but arousable to voice, back pain stable  3/28 Tmax 99.9, WBC 7.7, not happy about soft foods, and wants to go back on regular diet, having normal BMs, back pain controlled on pain regimen  3/29 AF, WBC 5.7, transferred to ortho, happy he took a shower today, plan of care discussed with pain, states he was told he has arthritis of his back, back pain stable, no diarrhea  3/30 AF, WBC 8.5, had a loose BM this am follow by normal BM, no abd pain  3/31 AF, WBC 9, nausea and vomiting this am, had soft BM this am  4/1 WBC 13.7, still having emesis, states he continues to be thirsty despite drinking lots of water yesterday, diarrhea improving and stools forming  4/2 AF, WBC 11.6, had nose bleed overnight, being transfused blood for Hg 6.6, tolerating pills  4/3- denies any diarrhea. No fevers.   4/4- no fevers. No diarrhea. Back pain under control.   4/6- no diarrhea. No fevers. Still throws up everything  4/7- no fevers. No new issues. The pain issues being addressed.  4/8- no fevers. No new issues  4/9- continues  "to regurgitate. No fevers.   4/10- AF, no CBC.  CRP 9.37.  Minimal lower back pain with pain medication regiment, 3/10.  Tolerating abx, no complaints.  - AF, no CBC.  Walking around room, denies overall pain.  Tolerating abx without complaints.  - AF, WBC 7.1.  Laying in bed, states he is \"freezing.\"  Denies pain, tolerating abx.  - AF, WBC 7.1.  H&H 6.5 & 20.3, received 1 unit PRBCs this morning.  Feeling better, especially since he had his pain pill this morning.  Denies overall pain, tolerating abx.   AF WBC 8.9 no new complaints  4/15 AF WBC 7.6 ambulating room-states \"they are going to do something with my kidney...stent\"  Labs Reviewed, Medications Reviewed, Radiology Reviewed and Wound Reviewed.    Review of Systems:  Review of Systems   Constitutional: Negative for fever and chills.   Respiratory: Negative for cough and shortness of breath.    Cardiovascular: Negative for chest pain.   Gastrointestinal: Negative for nausea, vomiting, abdominal pain, diarrhea and constipation.   Genitourinary: Negative for dysuria.   Musculoskeletal: Positive for back pain.        Stable   Skin: Negative for rash.   Neurological: Negative for headaches.       Hemodynamics:  Temp (24hrs), Av.6 °C (97.9 °F), Min:36.4 °C (97.6 °F), Max:36.8 °C (98.3 °F)  Temperature: 36.6 °C (97.8 °F)  Pulse  Av.1  Min: 56  Max: 114   Blood Pressure : 115/52 mmHg       Physical Exam:  Physical Exam   Constitutional: He is oriented to person, place, and time. He appears well-developed. No distress.   HENT:   Head: Normocephalic and atraumatic.   Eyes: EOM are normal. Pupils are equal, round, and reactive to light.   Neck: Neck supple.   Cardiovascular: Normal rate.    Murmur heard.  IRR   Pulmonary/Chest: Effort normal and breath sounds normal. No respiratory distress. He has no wheezes.   Abdominal: Soft. Bowel sounds are normal. He exhibits no distension. There is no tenderness.   Musculoskeletal: Normal range of " motion. He exhibits edema.   R great toe with slight edema- no drainage or erythema, medial callus.   Left 3rd toe- No drainage or erythema.  RUE PICC- non tender, no erythema.  +1 BLE edema- slightly improved   Neurological: He is alert and oriented to person, place, and time.   Skin: Skin is warm and dry. No rash noted. There is erythema.   Chronic changes  LLE erythema- improving   Nursing note and vitals reviewed.      Labs:  Recent Labs      04/13/17   0120  04/13/17   0915  04/14/17   1300  04/15/17   0515   WBC  7.1  10.4  8.9  7.6   RBC  2.11*  2.63*  2.52*  2.50*   HEMOGLOBIN  6.5*  8.3*  7.9*  7.9*   HEMATOCRIT  20.3*  25.3*  24.1*  24.2*   MCV  96.2  96.2  95.6  96.8   MCH  30.8  31.6  31.3  31.6   RDW  55.2*  55.0*  57.0*  57.0*   PLATELETCT  182  209  209  219   MPV  8.8*  8.9*  9.0  8.9*   NEUTSPOLYS  74.50*   --    --    --    LYMPHOCYTES  14.20*   --    --    --    MONOCYTES  10.30   --    --    --    EOSINOPHILS  0.60   --    --    --    BASOPHILS  0.10   --    --    --      Recent Labs      04/13/17   0120 04/14/17   1300  04/15/17   0515   SODIUM  134*  131*  135   POTASSIUM  3.4*  3.8  4.0   CHLORIDE  101  99  102   CO2  26  24  23   GLUCOSE  96  94  90   BUN  9  11  9     Recent Labs      04/13/17   0120  04/14/17   1300  04/15/17   0515   ALBUMIN  2.2*  2.5*  2.5*   CREATININE  1.16  1.25  1.22       BLOOD CULTURE   Date Value Ref Range Status   03/15/2017 No growth after 5 days of incubation.  Final    ':  Results     ** No results found for the last 168 hours. **          Imaging    MR-ABDOMEN-WITH & W/O and MR-PELVIS WITH  4/9/2017  Impression      1. Evaluate for interval change in enhancement is limited between current MR and prior lumbar MR due to difference in technique but there may be slightly less soft tissue enhancement surrounding the wall.  2. The size of the thrombosed infrarenal abdominal aortic aneurysm is grossly unchanged. No evidence of endoleak.  3. Patent aortobiiliac  endograft.  4. Unchanged indentation of the aneurysm sac on the left psoas muscle. No evidence of invasion.  5. Small focal low signal T2 area with peripheral enhancement in the right psoas muscle is of unclear etiology and could relate to scarring or heterotopic calcification.     ECHOCARDIOGRAM COMP W/O CONT  4/9/2017   CONCLUSIONS  Normal left ventricular size and systolic function.  Left ventricular ejection fraction is visually estimated to be 55%.  Severe aortic stenosis.  Moderate mitral regurgitation.  Right heart pressures are consistent with moderate pulmonary   hypertension.  Severely dilated left atrium.    MR-FOOT-WITH & W/O RIGHT  3/23/2017  Impression      1. Abnormal signal in the distal aspect of the first distal phalanx is consistent with osteomyelitis.  2.  No abscess is identified.  3.  Susceptibility artifact in the plantar soft tissues at the level of the fourth tarsometatarsal joint and base of the third proximal phalanx, possibly related to prior surgery. Foreign body cannot be excluded.  4.  Diffuse edema in the forefoot.     3/17 MRI lumbar spine: There are changes secondary to the abdominal aortic aneurysm endograft. The aneurysm sac measures an approximately 7.6 x 6.7 cm in size. Abnormal contrast enhancement is noted within the wall of the aneurysmal sac. There is also abnormal soft tissue   contrast enhancement in the surrounding soft tissue. These findings are highly suspicious for infected thrombosed aneurysm sac. The possibility of endograft infection is more likely. There is irregular outpouching of the aneurysmal sac along the   posterior portion indenting the left psoas muscle.  2.  Free fluid in the pelvis  3.  There is mild-to-moderate left hydronephrosis likely representing pelviureteric junction obstruction.    3/16 R foot xray - +OM  3/16 L foot xray +OM    Assessment:  Active Hospital Problems    Diagnosis   • *Osteomyelitis of toe (CMS-[facility]) [M86.9]   • Bacteremia [R78.81]    • Back pain [M54.9]   • CHF (congestive heart failure) (CMS-HCC) [I50.9]   • Tobacco dependence [F17.200]   • Atrial fibrillation (CMS-HCC) [I48.91]    Endovascular infection       Plan:  Right great toe and left 3rd osteomyelitis  +OM on radiographs-confirmed on repeat MRIs  OSH wound cx on 3/9 - GAS, MSSA (I confirmed with [place] microlab)  S/p 3rd toe amp on 3/25. No purulence seen proximally to amp site  OR cx (left 3rd toe) on 3/25 - MSSA (S-unasyn) Vanco LINUS 2, MRSE  Continue IV Unasyn  Anticipate 6 weeks of IV abx. Stop date 04/27/17.    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  IV abx as above, follow it with po amoxicillin      Clostridium difficile diarrhea  Completed vancomycin QID for 2 weeks on 03/29/17.   Continue PO Vancomycin BID while on IV abx with 3 additional days, stop date of 04/30/17.    Esophageal dysmotility-concern for achalasia  Stable    Leukocytosis. Resolved  Multifactorial  No new signs or symptoms of infection  Monitor    Back pain  Chronic per patient    Prognosis guarded    Dispo:  Awaiting SNF placement for long-term abx, being re-evaluated by [facility] care and [facility].  Also being evaluated by [facility].    Discussed with Hospitalist- Dr. Bhakta.

## 2017-04-15 NOTE — PROGRESS NOTES
Assumed care of pt @0700. Bedside report received. Pt AOX 4. Pt complains about pain. Med per MAR. PICC patent with positive blood return running IV abx. Good oral intake. Last BM this morning. Dressing to L foot clean and intact. Fall precaution in place. POC discussed with pt, all questions answered at this time. Pt makes needs known, call light within reach, hourly rounding in place.

## 2017-04-15 NOTE — PROGRESS NOTES
Surgical Progress Note    Author: Raudel Valdez Date & Time created: 4/15/2017   10:25 AM     Interval Events:    Patient seen and examined.  No overnight events. Awaiting renal scan.     Review of Systems   Constitutional: Negative for fever and chills.   Gastrointestinal: Negative for abdominal pain.   Genitourinary: Negative for hematuria and flank pain.   Musculoskeletal: Positive for back pain.     Hemodynamics:  Temp (24hrs), Av.6 °C (97.9 °F), Min:36.4 °C (97.6 °F), Max:36.8 °C (98.3 °F)  Temperature: 36.5 °C (97.7 °F)  Pulse  Av.2  Min: 56  Max: 114   Blood Pressure : 100/47 mmHg     Respiratory:    Respiration: 17, Pulse Oximetry: 94 %     Work Of Breathing / Effort: Mild  RUL Breath Sounds: Clear, RML Breath Sounds: Clear, RLL Breath Sounds: Clear, SOLEDAD Breath Sounds: Clear, LLL Breath Sounds: Clear  Neuro:  GCS       Fluids:  No intake or output data in the 24 hours ending 04/15/17 1025     Current Diet Order   Procedures   • DIET ORDER     Physical Exam   Constitutional: He is oriented to person, place, and time. He appears well-developed and well-nourished. No distress.   Pulmonary/Chest: Effort normal.   Abdominal: Soft. He exhibits no distension. There is no tenderness.   Neurological: He is alert and oriented to person, place, and time.   Skin: Skin is warm and dry.   Psychiatric: He has a normal mood and affect.   Nursing note and vitals reviewed.    Labs:  Recent Results (from the past 24 hour(s))   CBC WITHOUT DIFFERENTIAL    Collection Time: 17  1:00 PM   Result Value Ref Range    WBC 8.9 4.8 - 10.8 K/uL    RBC 2.52 (L) 4.70 - 6.10 M/uL    Hemoglobin 7.9 (L) 14.0 - 18.0 g/dL    Hematocrit 24.1 (L) 42.0 - 52.0 %    MCV 95.6 81.4 - 97.8 fL    MCH 31.3 27.0 - 33.0 pg    MCHC 32.8 (L) 33.7 - 35.3 g/dL    RDW 57.0 (H) 35.9 - 50.0 fL    Platelet Count 209 164 - 446 K/uL    MPV 9.0 9.0 - 12.9 fL   RENAL FUNCTION PANEL    Collection Time: 17  1:00 PM   Result Value Ref Range     Sodium 131 (L) 135 - 145 mmol/L    Potassium 3.8 3.6 - 5.5 mmol/L    Chloride 99 96 - 112 mmol/L    Co2 24 20 - 33 mmol/L    Glucose 94 65 - 99 mg/dL    Creatinine 1.25 0.50 - 1.40 mg/dL    Bun 11 8 - 22 mg/dL    Calcium 8.2 (L) 8.5 - 10.5 mg/dL    Phosphorus 3.7 2.5 - 4.5 mg/dL    Albumin 2.5 (L) 3.2 - 4.9 g/dL   ESTIMATED GFR    Collection Time: 04/14/17  1:00 PM   Result Value Ref Range    GFR If African American >60 >60 mL/min/1.73 m 2    GFR If Non  56 (A) >60 mL/min/1.73 m 2   RENAL FUNCTION PANEL    Collection Time: 04/15/17  5:15 AM   Result Value Ref Range    Sodium 135 135 - 145 mmol/L    Potassium 4.0 3.6 - 5.5 mmol/L    Chloride 102 96 - 112 mmol/L    Co2 23 20 - 33 mmol/L    Glucose 90 65 - 99 mg/dL    Creatinine 1.22 0.50 - 1.40 mg/dL    Bun 9 8 - 22 mg/dL    Calcium 8.3 (L) 8.5 - 10.5 mg/dL    Phosphorus 3.8 2.5 - 4.5 mg/dL    Albumin 2.5 (L) 3.2 - 4.9 g/dL   CBC WITHOUT DIFFERENTIAL    Collection Time: 04/15/17  5:15 AM   Result Value Ref Range    WBC 7.6 4.8 - 10.8 K/uL    RBC 2.50 (L) 4.70 - 6.10 M/uL    Hemoglobin 7.9 (L) 14.0 - 18.0 g/dL    Hematocrit 24.2 (L) 42.0 - 52.0 %    MCV 96.8 81.4 - 97.8 fL    MCH 31.6 27.0 - 33.0 pg    MCHC 32.6 (L) 33.7 - 35.3 g/dL    RDW 57.0 (H) 35.9 - 50.0 fL    Platelet Count 219 164 - 446 K/uL    MPV 8.9 (L) 9.0 - 12.9 fL   ESTIMATED GFR    Collection Time: 04/15/17  5:15 AM   Result Value Ref Range    GFR If African American >60 >60 mL/min/1.73 m 2    GFR If Non African American 58 (A) >60 mL/min/1.73 m 2     Medical Decision Making, by Problem:  Active Hospital Problems    Diagnosis   • C. difficile diarrhea [A04.7]     Priority: High   • Bacteremia [R78.81]     Priority: High   • Back pain [M54.9]     Priority: High   • Osteomyelitis of toe (CMS-HCC) [M86.9]     Priority: High   • Anemia [D64.9]     Priority: Medium   • Hyponatremia [E87.1]     Priority: Medium   • Macrocytosis [D75.89]     Priority: Medium   • Hypokalemia [E87.6]     Priority:  Medium   • CHF (congestive heart failure) (CMS-HCC) [I50.9]     Priority: Medium   • Atrial fibrillation (CMS-HCC) [I48.91]     Priority: Medium   • S/P AAA (abdominal aortic aneurysm) repair [Z98.890, Z86.79]     Priority: Medium   • Noncompliance [Z91.19]     Priority: Low   • Tobacco dependence [F17.200]     Priority: Low   • Severe aortic stenosis [I35.0]   • Cirrhosis of liver (CMS-HCC) [K74.60]   • Transaminitis [R74.0]   • Achalasia [K22.0]   • Hep C w/o coma, chronic (CMS-HCC) [B18.2]   • Dysphagia [R13.10]   • CAD (coronary artery disease) [I25.10]     Plan:  1. Left hydroureteronephrosis  2. Osteomyelitis with associated bacteremia status post toe amputation  3. Atrial Fibrillation  4. Anemia  5. AAA    Awaiting Lasix renal scan today  Discussed possibility of left ureteral stent placement    Quality Measures:  Labs reviewed, Medications reviewed and Radiology images reviewed  Art catheter: No Art                    Discussed patient condition with RN, Patient and urology-Dr. Whittington

## 2017-04-15 NOTE — CONSULTS
DATE OF SERVICE:  04/14/2017    REQUESTING PHYSICIAN:  Dr. Dinero.    CONSULTING PHYSICIAN:  Urology Raudel Quintero PA-C and Dr. Mihai Whittington.    CHIEF COMPLAINT:  Hydronephrosis.    HISTORY OF PRESENT ILLNESS:  The patient is a 72-year-old male with multiple   medical problems and quite complicated medical course over the past 4-6 weeks.    In early March, he was at [facility], diagnosed with new onset   atrial fibrillation with RVR.  He apparently was cardioverted at that time and   apparently found to have valve vegetations at that time and bacteremia and   apparently there is also concern about infection of his endograft.  He was   also found to have evidence of osteomyelitis of his toe; however, he left   against medical advice prior to any orthopedic intervention and shortly   afterwards presented to [facility] with chief complaint of   back pain.  He has since undergone extensive workup and has had multiple   consultations.  During his workup, imaging studies revealed some   hydroureteronephrosis on the left and thus urology was consulted.  He does   endorse to lower back pain; however, as long as he is on a good schedule with   his pain medications, this is not bothering him.  He is denying any specific   flank pain.  He has not had any renal function abnormalities, no nausea, no   vomiting, no recent fevers.  He denies any urinary dysfunction.    REVIEW OF SYSTEMS:  A full review of systems was completed and is negative   with the exception of that being reported in the HPI.    PAST MEDICAL HISTORY:  Atrial fibrillation, osteomyelitis, esophageal   strictures, coronary artery disease, hearing impaired, history of Clostridium   difficile colitis.    PAST SURGICAL HISTORY:  Cardiac stents, ORIF of the right inguinal, hernia   repair, abdominal aortic aneurysm repair with endograft.    FAMILY HISTORY:  None documented in the chart.    SOCIAL HISTORY:  He is a  tobacco user.  Drinks 2 alcoholic beverages per day   and no illicit drug use reported.    ALLERGIES:  No known drug allergies.    HOME MEDICATIONS:  Include Neurontin 300 mg 3 times daily, Zocor 20 mg daily.    PHYSICAL EXAMINATION:  VITAL SIGNS:  Temperature is 98.1, heart rate 63, respirations 20, O2   saturation is 93% on room air, blood pressure is 102/60.  GENERAL:  This is a well-appearing pleasant male who appears his stated age.    He is in no acute distress.  HEENT:  Atraumatic and normocephalic.  Pupils are equal, round and reactive to   light.  Sclera white, conjunctivae pink.  Nares is patent.  Throat is clear.  NECK:  Supple.  LUNGS:  Clear to auscultation bilaterally.  CARDIOVASCULAR:  Regular rate and rhythm.  Positive S1 and S2.  ABDOMEN:  Flat, bowel sounds in all 4 quadrants, soft and nontender.  EXTREMITIES:  There is a gauze wrap in place over his right foot, 1 positive   edema bilateral lower extremities.  Moves all extremities x4.  NEUROLOGIC:  No focal deficits.  SKIN:  Warm and dry.    DIAGNOSTICS:  White blood cell count 8.9, hemoglobin 7.9, hematocrit 24.1 and   platelet count 209, BUN is 11, creatinine is 1.25.    IMAGING:  A CT scan of the abdomen and pelvis on 03/17/2017 which shows mild   left pelvicaliectasis and dilatation of the proximal left ureter, infrarenal   abdominal aortic aneurysm, _____ external to the aneurysm, splenomegaly and   atherosclerotic changes.  MRI of the abdomen completed on 04/09/2017 showed   dilated left renal pelvis and redemonstration of the aortic aneurysm.    ASSESSMENT AND PLAN:  1.  Left hydroureteronephrosis.  2.  Abdominal aortic aneurysm.  3.  Osteomyelitis with associated bacteremia status post toe amputation.  4.  Atrial fibrillation.  5.  History of noncompliance.    RECOMMENDATIONS:  Plan will be to complete a MAG3 renal scan with Lasix   washout to assess the degree of obstruction in this left kidney, which does   appear to be affected at the  level of the aortic aneurysm.  He is not having   any flank pain.  His renal functions are normal and thus the hopes will be   that there is not significant obstruction which would warrant any intervention   in terms of ureteral stent placement or nephrostomy tube placement.  Urology   appreciates this consultation, Dr. Whittington is aware of this consult and has   directed this plan of care.       ____________________________________     ELIANA LONDON / KELLY    DD:  04/14/2017 14:58:38  DT:  04/14/2017 19:06:23    D#:  [id number]  Job#:  [id number]

## 2017-04-15 NOTE — PROGRESS NOTES
Assumed care of patient at 1845 on 4/14, patient oriented X4, independent in room, painful in low back, medicated with oxycodone, lungs cta on RA, no complaints....brayden capps

## 2017-04-15 NOTE — THERAPY
Speech Therapy Contact Note    Orders received and verified for a clinical swallow evaluation. Patient recently discharged from SLP services on 3/31. Per previous SLP note, patient with esophagus achalasia and concern for ascending aspiration d/t reflux and GI to follow. Spoke with RN, primary SLP to address order on Monday.

## 2017-04-16 ENCOUNTER — APPOINTMENT (OUTPATIENT)
Dept: RADIOLOGY | Facility: MEDICAL CENTER | Age: 72
DRG: 504 | End: 2017-04-16
Attending: UROLOGY
Payer: COMMERCIAL

## 2017-04-16 PROBLEM — N13.5 URETERAL OBSTRUCTION, LEFT: Status: ACTIVE | Noted: 2017-04-16

## 2017-04-16 LAB
ERYTHROCYTE [DISTWIDTH] IN BLOOD BY AUTOMATED COUNT: 55 FL (ref 35.9–50)
HCT VFR BLD AUTO: 25.7 % (ref 42–52)
HGB BLD-MCNC: 8.4 G/DL (ref 14–18)
MCH RBC QN AUTO: 31.3 PG (ref 27–33)
MCHC RBC AUTO-ENTMCNC: 32.7 G/DL (ref 33.7–35.3)
MCV RBC AUTO: 95.9 FL (ref 81.4–97.8)
PLATELET # BLD AUTO: 237 K/UL (ref 164–446)
PMV BLD AUTO: 8.7 FL (ref 9–12.9)
RBC # BLD AUTO: 2.68 M/UL (ref 4.7–6.1)
WBC # BLD AUTO: 7.2 K/UL (ref 4.8–10.8)

## 2017-04-16 PROCEDURE — A9270 NON-COVERED ITEM OR SERVICE: HCPCS | Performed by: INTERNAL MEDICINE

## 2017-04-16 PROCEDURE — 160035 HCHG PACU - 1ST 60 MINS PHASE I: Performed by: UROLOGY

## 2017-04-16 PROCEDURE — 700102 HCHG RX REV CODE 250 W/ 637 OVERRIDE(OP): Performed by: HOSPITALIST

## 2017-04-16 PROCEDURE — 0T778DZ DILATION OF LEFT URETER WITH INTRALUMINAL DEVICE, VIA NATURAL OR ARTIFICIAL OPENING ENDOSCOPIC: ICD-10-PCS | Performed by: UROLOGY

## 2017-04-16 PROCEDURE — A9270 NON-COVERED ITEM OR SERVICE: HCPCS | Performed by: FAMILY MEDICINE

## 2017-04-16 PROCEDURE — 99232 SBSQ HOSP IP/OBS MODERATE 35: CPT | Performed by: INTERNAL MEDICINE

## 2017-04-16 PROCEDURE — 500880 HCHG PACK, CYSTO W/SEP LEGGINGS: Performed by: UROLOGY

## 2017-04-16 PROCEDURE — A9270 NON-COVERED ITEM OR SERVICE: HCPCS | Performed by: HOSPITALIST

## 2017-04-16 PROCEDURE — 700105 HCHG RX REV CODE 258: Performed by: INTERNAL MEDICINE

## 2017-04-16 PROCEDURE — A4357 BEDSIDE DRAINAGE BAG: HCPCS | Performed by: UROLOGY

## 2017-04-16 PROCEDURE — BT1F1ZZ FLUOROSCOPY OF LEFT KIDNEY, URETER AND BLADDER USING LOW OSMOLAR CONTRAST: ICD-10-PCS | Performed by: UROLOGY

## 2017-04-16 PROCEDURE — 700102 HCHG RX REV CODE 250 W/ 637 OVERRIDE(OP): Performed by: FAMILY MEDICINE

## 2017-04-16 PROCEDURE — 700101 HCHG RX REV CODE 250

## 2017-04-16 PROCEDURE — 700111 HCHG RX REV CODE 636 W/ 250 OVERRIDE (IP)

## 2017-04-16 PROCEDURE — C2617 STENT, NON-COR, TEM W/O DEL: HCPCS | Performed by: UROLOGY

## 2017-04-16 PROCEDURE — 110382 HCHG SHELL REV 271: Performed by: UROLOGY

## 2017-04-16 PROCEDURE — 85027 COMPLETE CBC AUTOMATED: CPT

## 2017-04-16 PROCEDURE — 160002 HCHG RECOVERY MINUTES (STAT): Performed by: UROLOGY

## 2017-04-16 PROCEDURE — 501329 HCHG SET, CYSTO IRRIG Y TUR: Performed by: UROLOGY

## 2017-04-16 PROCEDURE — 700101 HCHG RX REV CODE 250: Performed by: FAMILY MEDICINE

## 2017-04-16 PROCEDURE — A9270 NON-COVERED ITEM OR SERVICE: HCPCS | Performed by: SURGERY

## 2017-04-16 PROCEDURE — 770001 HCHG ROOM/CARE - MED/SURG/GYN PRIV*

## 2017-04-16 PROCEDURE — C1769 GUIDE WIRE: HCPCS | Performed by: UROLOGY

## 2017-04-16 PROCEDURE — 502240 HCHG MISC OR SUPPLY RC 0272: Performed by: UROLOGY

## 2017-04-16 PROCEDURE — 160036 HCHG PACU - EA ADDL 30 MINS PHASE I: Performed by: UROLOGY

## 2017-04-16 PROCEDURE — 160028 HCHG SURGERY MINUTES - 1ST 30 MINS LEVEL 3: Performed by: UROLOGY

## 2017-04-16 PROCEDURE — 700102 HCHG RX REV CODE 250 W/ 637 OVERRIDE(OP): Performed by: INTERNAL MEDICINE

## 2017-04-16 PROCEDURE — 160039 HCHG SURGERY MINUTES - EA ADDL 1 MIN LEVEL 3: Performed by: UROLOGY

## 2017-04-16 PROCEDURE — 160048 HCHG OR STATISTICAL LEVEL 1-5: Performed by: UROLOGY

## 2017-04-16 PROCEDURE — 160009 HCHG ANES TIME/MIN: Performed by: UROLOGY

## 2017-04-16 PROCEDURE — 700111 HCHG RX REV CODE 636 W/ 250 OVERRIDE (IP): Performed by: INTERNAL MEDICINE

## 2017-04-16 PROCEDURE — 74420 UROGRAPHY RTRGR +-KUB: CPT

## 2017-04-16 PROCEDURE — A4606 OXYGEN PROBE USED W OXIMETER: HCPCS | Performed by: UROLOGY

## 2017-04-16 PROCEDURE — 700102 HCHG RX REV CODE 250 W/ 637 OVERRIDE(OP): Performed by: SURGERY

## 2017-04-16 DEVICE — STENT UROLOGICAL POLARIS 8X26  ULTRA: Type: IMPLANTABLE DEVICE | Status: FUNCTIONAL

## 2017-04-16 RX ADMIN — LACTOBACILLUS ACIDOPHILUS / LACTOBACILLUS BULGARICUS 1 PACKET: 100 MILLION CFU STRENGTH GRANULES at 16:54

## 2017-04-16 RX ADMIN — LIDOCAINE 2 PATCH: 50 PATCH CUTANEOUS at 11:02

## 2017-04-16 RX ADMIN — SIMVASTATIN 20 MG: 20 TABLET, FILM COATED ORAL at 21:27

## 2017-04-16 RX ADMIN — GABAPENTIN 600 MG: 300 CAPSULE ORAL at 21:27

## 2017-04-16 RX ADMIN — GABAPENTIN 600 MG: 300 CAPSULE ORAL at 14:19

## 2017-04-16 RX ADMIN — METHOCARBAMOL 500 MG: 500 TABLET ORAL at 14:19

## 2017-04-16 RX ADMIN — METHOCARBAMOL 500 MG: 500 TABLET ORAL at 21:27

## 2017-04-16 RX ADMIN — AMPICILLIN SODIUM AND SULBACTAM SODIUM 3 G: 2; 1 INJECTION, POWDER, FOR SOLUTION INTRAMUSCULAR; INTRAVENOUS at 16:54

## 2017-04-16 RX ADMIN — AMPICILLIN SODIUM AND SULBACTAM SODIUM 3 G: 2; 1 INJECTION, POWDER, FOR SOLUTION INTRAMUSCULAR; INTRAVENOUS at 11:08

## 2017-04-16 RX ADMIN — ACETAMINOPHEN 650 MG: 325 TABLET, FILM COATED ORAL at 00:00

## 2017-04-16 RX ADMIN — OXYCODONE HYDROCHLORIDE 20 MG: 20 TABLET, FILM COATED, EXTENDED RELEASE ORAL at 21:27

## 2017-04-16 RX ADMIN — LACTOBACILLUS ACIDOPHILUS / LACTOBACILLUS BULGARICUS 1 PACKET: 100 MILLION CFU STRENGTH GRANULES at 10:58

## 2017-04-16 RX ADMIN — POTASSIUM CHLORIDE 40 MEQ: 1500 TABLET, EXTENDED RELEASE ORAL at 11:00

## 2017-04-16 RX ADMIN — AMPICILLIN SODIUM AND SULBACTAM SODIUM 3 G: 2; 1 INJECTION, POWDER, FOR SOLUTION INTRAMUSCULAR; INTRAVENOUS at 07:15

## 2017-04-16 RX ADMIN — AMPICILLIN SODIUM AND SULBACTAM SODIUM 3 G: 2; 1 INJECTION, POWDER, FOR SOLUTION INTRAMUSCULAR; INTRAVENOUS at 23:33

## 2017-04-16 RX ADMIN — VANCOMYCIN HYDROCHLORIDE 125 MG: 10 INJECTION, POWDER, LYOPHILIZED, FOR SOLUTION INTRAVENOUS at 11:01

## 2017-04-16 RX ADMIN — NICOTINE 7 MG: 7 PATCH TRANSDERMAL at 06:03

## 2017-04-16 RX ADMIN — ACETAMINOPHEN 650 MG: 325 TABLET, FILM COATED ORAL at 10:59

## 2017-04-16 RX ADMIN — ACETAMINOPHEN 650 MG: 325 TABLET, FILM COATED ORAL at 06:03

## 2017-04-16 RX ADMIN — METHOCARBAMOL 500 MG: 500 TABLET ORAL at 11:01

## 2017-04-16 RX ADMIN — ACETAMINOPHEN 650 MG: 325 TABLET, FILM COATED ORAL at 16:54

## 2017-04-16 RX ADMIN — POTASSIUM CHLORIDE 40 MEQ: 1500 TABLET, EXTENDED RELEASE ORAL at 21:27

## 2017-04-16 RX ADMIN — OXYCODONE HYDROCHLORIDE 10 MG: 5 TABLET ORAL at 23:33

## 2017-04-16 RX ADMIN — OXYCODONE HYDROCHLORIDE 10 MG: 5 TABLET ORAL at 04:30

## 2017-04-16 RX ADMIN — OXYCODONE HYDROCHLORIDE 15 MG: 5 TABLET ORAL at 18:40

## 2017-04-16 RX ADMIN — FUROSEMIDE 20 MG: 20 TABLET ORAL at 10:59

## 2017-04-16 RX ADMIN — OXYCODONE HYDROCHLORIDE 20 MG: 20 TABLET, FILM COATED, EXTENDED RELEASE ORAL at 10:58

## 2017-04-16 RX ADMIN — ACETAMINOPHEN 650 MG: 325 TABLET, FILM COATED ORAL at 23:33

## 2017-04-16 RX ADMIN — AMIODARONE HYDROCHLORIDE 200 MG: 200 TABLET ORAL at 10:59

## 2017-04-16 RX ADMIN — VANCOMYCIN HYDROCHLORIDE 125 MG: 10 INJECTION, POWDER, LYOPHILIZED, FOR SOLUTION INTRAVENOUS at 21:27

## 2017-04-16 RX ADMIN — GABAPENTIN 600 MG: 300 CAPSULE ORAL at 10:58

## 2017-04-16 RX ADMIN — OXYCODONE HYDROCHLORIDE 15 MG: 5 TABLET ORAL at 14:19

## 2017-04-16 ASSESSMENT — ENCOUNTER SYMPTOMS
FEVER: 0
NAUSEA: 0
VOMITING: 0
CHILLS: 0
BLOOD IN STOOL: 0
BACK PAIN: 1
SHORTNESS OF BREATH: 0
DIARRHEA: 0
CONSTIPATION: 0
COUGH: 0
BRUISES/BLEEDS EASILY: 0
ABDOMINAL PAIN: 0
HEADACHES: 0

## 2017-04-16 ASSESSMENT — PAIN SCALES - GENERAL
PAINLEVEL_OUTOF10: 0
PAINLEVEL_OUTOF10: 5
PAINLEVEL_OUTOF10: 0
PAINLEVEL_OUTOF10: 6
PAINLEVEL_OUTOF10: 6
PAINLEVEL_OUTOF10: 0
PAINLEVEL_OUTOF10: 0
PAINLEVEL_OUTOF10: 5
PAINLEVEL_OUTOF10: 0
PAINLEVEL_OUTOF10: 5
PAINLEVEL_OUTOF10: 6
PAINLEVEL_OUTOF10: 4
PAINLEVEL_OUTOF10: 0
PAINLEVEL_OUTOF10: 6
PAINLEVEL_OUTOF10: 0
PAINLEVEL_OUTOF10: 2

## 2017-04-16 NOTE — CARE PLAN
Problem: Knowledge Deficit  Goal: Knowledge of disease process/condition, treatment plan, diagnostic tests, and medications will improve  RN had patient read and sign consent for surgery form. RN answered questions and concerns r/t POC to patients satisfaction.

## 2017-04-16 NOTE — PROGRESS NOTES
Vascular  Patient eating better and feeling better.  Had left ureteral stent placed today.  Leg swelling persists from chronic dependency.  He is aware.  On antibiotics for history of bacteremia and possible endograft infection.

## 2017-04-16 NOTE — CARE PLAN
Problem: Pain Management  Goal: Pain level will decrease to patient’s comfort goal  Outcome: PROGRESSING SLOWER THAN EXPECTED  Pt complains about pain. Med per MAR.     Problem: Safety  Goal: Will remain free from injury  Outcome: PROGRESSING AS EXPECTED  Bed alarm refused, A&O x4, slipper socks, bed locked and in low position, call light and personal belongings with reach, report given to CNA, appropriate signs outside door, hourly rounding in place.

## 2017-04-16 NOTE — CARE PLAN
Problem: Infection  Goal: Will remain free from infection  Intervention: Implement standard precautions and perform hand washing before and after patient contact  Patient in isolation for C-Diff. Staff members use PPE and [employer]ag[employer] when entering room, staff members wash hands with soap and water for 20 seconds when exiting room. Bleach wipes used to clean equipment. Patient refused chlorhexidine bath to PICC line, RN educated patient on reason why procedure is performed daily but patient continued to refuse. PICC line dressing and hub changed weekly using sterile technique. RN scrubs hub with alcohol wipe for 15 seconds before administering IV medications. Patient educated to wash hands with soap and water for 20 seconds after having BM.

## 2017-04-16 NOTE — PROGRESS NOTES
Infectious Disease Progress Note    Author: Yary Lujan M.D. DOS & Time created: 4/16/2017  12:02 PM    Chief Complaint   Patient presents with   • Low Back Pain   FU for osteomyelitis, GAS bacteremia and CDI, suspected endograft infection    Interval History:  3/17 AF, WBC 12.9, pt c/o lower back pain, asking for morphine, poor insight into illness, denies any diarrhea  3/18 AF, WBC 13.3, sleeping and not arousable to voice, cultures here negative to date  3/19 AF, WBC 13.4, frustrated about lower back pain not adequately controlled on current pain regimen, plan for EGD with dilatation today  3/20 AF WBC 9.6 +pain-denies SE abx  3/21 AF WBC 10.5 no new complaints  3/22 AF WBC not done tolerating abx well  3/23 AF eye feels a little better-pain about the same  3/24 AF tolerating abx well-MRI confirm OM  3/25 AF WBC 9.5 s/p toe amp this am  3/26 AF WBC 10.1 deneies any new sxs-  3/27 AF, WBC 9.1, sleeping but arousable to voice, back pain stable  3/28 Tmax 99.9, WBC 7.7, not happy about soft foods, and wants to go back on regular diet, having normal BMs, back pain controlled on pain regimen  3/29 AF, WBC 5.7, transferred to ortho, happy he took a shower today, plan of care discussed with pain, states he was told he has arthritis of his back, back pain stable, no diarrhea  3/30 AF, WBC 8.5, had a loose BM this am follow by normal BM, no abd pain  3/31 AF, WBC 9, nausea and vomiting this am, had soft BM this am  4/1 WBC 13.7, still having emesis, states he continues to be thirsty despite drinking lots of water yesterday, diarrhea improving and stools forming  4/2 AF, WBC 11.6, had nose bleed overnight, being transfused blood for Hg 6.6, tolerating pills  4/3- denies any diarrhea. No fevers.   4/4- no fevers. No diarrhea. Back pain under control.   4/6- no diarrhea. No fevers. Still throws up everything  4/7- no fevers. No new issues. The pain issues being addressed.  4/8- no fevers. No new issues  4/9-  "continues to regurgitate. No fevers.   4/10- AF, no CBC.  CRP 9.37.  Minimal lower back pain with pain medication regiment, 3/10.  Tolerating abx, no complaints.  - AF, no CBC.  Walking around room, denies overall pain.  Tolerating abx without complaints.  - AF, WBC 7.1.  Laying in bed, states he is \"freezing.\"  Denies pain, tolerating abx.  - AF, WBC 7.1.  H&H 6.5 & 20.3, received 1 unit PRBCs this morning.  Feeling better, especially since he had his pain pill this morning.  Denies overall pain, tolerating abx.   AF WBC 8.9 no new complaints  4/15 AF WBC 7.6 ambulating room-states \"they are going to do something with my kidney...stent\"   AF sleepy post-stent placement  Labs Reviewed, Medications Reviewed, Radiology Reviewed and Wound Reviewed.    Review of Systems:  Review of Systems   Constitutional: Negative for fever and chills.   Respiratory: Negative for cough and shortness of breath.    Cardiovascular: Negative for chest pain.   Gastrointestinal: Negative for nausea, vomiting, abdominal pain, diarrhea and constipation.   Genitourinary: Negative for dysuria.   Musculoskeletal: Positive for back pain.        Stable   Skin: Negative for rash.   Neurological: Negative for headaches.       Hemodynamics:  Temp (24hrs), Av.7 °C (98 °F), Min:36.3 °C (97.4 °F), Max:37 °C (98.6 °F)  Temperature: 36.3 °C (97.4 °F)  Pulse  Av.5  Min: 56  Max: 114Heart Rate (Monitored): 61  Blood Pressure : 100/57 mmHg, NIBP: 107/51 mmHg       Physical Exam:  Physical Exam   Constitutional: He appears well-developed. No distress.   HENT:   Head: Normocephalic and atraumatic.   Neck: Neck supple.   Cardiovascular: Normal rate.    Murmur heard.  IRR   Pulmonary/Chest: Effort normal and breath sounds normal. No respiratory distress. He has no wheezes.   Abdominal: Soft. Bowel sounds are normal. He exhibits no distension. There is no tenderness.   Musculoskeletal: Normal range of motion. He exhibits edema.   R " great toe with slight edema- no drainage or erythema, medial callus.   Left 3rd toe- No drainage or erythema.  RUE PICC- non tender, no erythema.  +1 BLE edema- slightly improved   Neurological:   somnolent   Skin: Skin is warm and dry. No rash noted. There is erythema.   Chronic changes  LLE erythema- improving   Nursing note and vitals reviewed.      Labs:  Recent Labs      04/14/17   1300  04/15/17   0515   WBC  8.9  7.6   RBC  2.52*  2.50*   HEMOGLOBIN  7.9*  7.9*   HEMATOCRIT  24.1*  24.2*   MCV  95.6  96.8   MCH  31.3  31.6   RDW  57.0*  57.0*   PLATELETCT  209  219   MPV  9.0  8.9*     Recent Labs      04/14/17   1300  04/15/17   0515   SODIUM  131*  135   POTASSIUM  3.8  4.0   CHLORIDE  99  102   CO2  24  23   GLUCOSE  94  90   BUN  11  9     Recent Labs      04/14/17   1300  04/15/17   0515   ALBUMIN  2.5*  2.5*   CREATININE  1.25  1.22       BLOOD CULTURE   Date Value Ref Range Status   03/15/2017 No growth after 5 days of incubation.  Final    ':  Results     ** No results found for the last 168 hours. **          Imaging    MR-ABDOMEN-WITH & W/O and MR-PELVIS WITH  4/9/2017  Impression      1. Evaluate for interval change in enhancement is limited between current MR and prior lumbar MR due to difference in technique but there may be slightly less soft tissue enhancement surrounding the wall.  2. The size of the thrombosed infrarenal abdominal aortic aneurysm is grossly unchanged. No evidence of endoleak.  3. Patent aortobiiliac endograft.  4. Unchanged indentation of the aneurysm sac on the left psoas muscle. No evidence of invasion.  5. Small focal low signal T2 area with peripheral enhancement in the right psoas muscle is of unclear etiology and could relate to scarring or heterotopic calcification.     ECHOCARDIOGRAM COMP W/O CONT  4/9/2017   CONCLUSIONS  Normal left ventricular size and systolic function.  Left ventricular ejection fraction is visually estimated to be 55%.  Severe aortic  stenosis.  Moderate mitral regurgitation.  Right heart pressures are consistent with moderate pulmonary   hypertension.  Severely dilated left atrium.    MR-FOOT-WITH & W/O RIGHT  3/23/2017  Impression      1. Abnormal signal in the distal aspect of the first distal phalanx is consistent with osteomyelitis.  2.  No abscess is identified.  3.  Susceptibility artifact in the plantar soft tissues at the level of the fourth tarsometatarsal joint and base of the third proximal phalanx, possibly related to prior surgery. Foreign body cannot be excluded.  4.  Diffuse edema in the forefoot.     3/17 MRI lumbar spine: There are changes secondary to the abdominal aortic aneurysm endograft. The aneurysm sac measures an approximately 7.6 x 6.7 cm in size. Abnormal contrast enhancement is noted within the wall of the aneurysmal sac. There is also abnormal soft tissue   contrast enhancement in the surrounding soft tissue. These findings are highly suspicious for infected thrombosed aneurysm sac. The possibility of endograft infection is more likely. There is irregular outpouching of the aneurysmal sac along the   posterior portion indenting the left psoas muscle.  2.  Free fluid in the pelvis  3.  There is mild-to-moderate left hydronephrosis likely representing pelviureteric junction obstruction.    3/16 R foot xray - +OM  3/16 L foot xray +OM    Assessment:  Active Hospital Problems    Diagnosis   • *Osteomyelitis of toe (CMS-HCC) [M86.9]   • Bacteremia [R78.81]   • Back pain [M54.9]   • CHF (congestive heart failure) (CMS-HCC) [I50.9]   • Tobacco dependence [F17.200]   • Atrial fibrillation (CMS-HCC) [I48.91]    Endovascular infection       Plan:  Right great toe and left 3rd osteomyelitis  +OM on radiographs-confirmed on repeat MRIs  OSH wound cx on 3/9 - GAS, MSSA (I confirmed with [facility] microlab)  S/p 3rd toe amp on 3/25. No purulence seen proximally to amp site  OR cx (left 3rd toe) on 3/25 - MSSA (S-unasyn) Vanco LINUS  2, MRSE  Continue IV Unasyn  Anticipate 6 weeks of IV abx. Stop date 04/27/17.    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  IV abx as above, follow it with po amoxicillin if tolerated    Clostridium difficile diarrhea  Completed vancomycin QID for 2 weeks on 03/29/17.   Continue PO Vancomycin BID while on IV abx with 3 additional days, stop date of 04/30/17.    Esophageal dysmotility-concern for achalasia  Stable    Leukocytosis. Resolved  Multifactorial  No new signs or symptoms of infection  Monitor    Back pain  Chronic per patient    Obstructive uropathy  S/p stent    Dispo:  Awaiting SNF placement for long-term abx, being re-evaluated by [facility] care and [facility].  Also being evaluated by [facility].

## 2017-04-16 NOTE — OR SURGEON
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    Anesthesiologist/Type of Anesthesia:  Anesthesiologist: Roger Doherty M.D./General    Surgical Staff:  Circulator: Carlos Chau R.N.  Scrub Person: Kartik Butcher    Specimen: NONE    Estimated Blood Loss: NONE    Findings: LEFT HYDRONEPHROSIS    Complications: none  Stable to PACU        4/16/2017 8:12 AM Mihai Whittington

## 2017-04-16 NOTE — OP REPORT
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    POSTOPERATIVE DIAGNOSES:  Left hydronephrosis due to left ureteral entrapment   from retroperitoneal fibrosis.    PROCEDURE:  Cystoscopy with left retrograde pyelogram and insertion of left   ureteral stent.    ANESTHESIA:  General.    ANESTHESIOLOGIST:  Roger Doherty MD    SURGEON:  Mihai Whittington MD    BRIEF HISTORY:  This 72-year-old male who has been in the hospital for   approximately the last 5 or 6 weeks, getting IV antibiotics.  He presented   with an infected toe and there was concern whether an aortic graft was   infected is the source of embolus for the infected toe.  He has been getting   IV antibiotics.  A CT scan and MRI of the abdomen did show a moderate left   hydronephrosis with a dilated ureter down to the lateral aspect of the   aneurysm.  Concern was whether this is ureter entrapped in a retroperitoneal   fibrosis.  He did undergo a nuclear medicine scan with Lasix washout that did   show fairly marked delay and drainage of the left kidney and decreased   function.  Because of this, he now presents for cystoscopy and insertion of   left ureteral stent.    REPORT OF OPERATION:  Under IV sedation with monitored anesthesia care, the   patient was placed in lithotomy position.  The genitalia were prepped and   draped in a sterile manner.  A 20 mL of 2% lidocaine jelly were instilled up   into the urethra for pain control.  The 21-Spanish cystoscope was introduced   into the bladder.  Urethra, prostatic urethra and bladder were normal.  A zip   wire was passed up to the left kidney through the left ureteral orifice.  To   size up the ureteral length to ensure we got the right stent, I went ahead and   passed an open-ended ureteral catheter over the wire up to the kidney.  The   wire was removed and at this point to ensure proper placement, I did instill    some IV contrast to delineate the collecting system.  This did show a proper   placement of the tube.  At this point, I elected to place an 8-Latvian x 26 cm   double pigtail stent.  At this point, the wire was replaced through this   open-ended stent and the stent was removed.  I passed the double pigtail stent   over the wire up to the kidney.  There did appear to be good placement with a   good curl in the kidney.  There is also a good curl in the bladder.  At this   point, I did get an alligator grasper to pull the stent down into the renal   pelvis easier.  At this point, the bladder was drained.  There was drainage of   contrast from this left kidney through the stent.  The patient was then woken   up and transferred to the recovery room in stable condition.  The _____ curl   in the kidney was right in the region of the renal pelvis.  The patient was   transferred to the recovery room in stable condition.       ____________________________________     MD TRICIA RODRÍGUEZ / KELLY    DD:  04/16/2017 08:19:39  DT:  04/16/2017 09:45:47    D#:  [id number]  Job#:  [id number]

## 2017-04-16 NOTE — PROGRESS NOTES
Hospital Medicine Progress Note, Adult, Complex               Author: Kyleigh Bhakta  Date & Time created: 4/16/2017  1:27 PM     CC: Back pain    Interval History:  72M hx AAA s/p repair, recent findings of bilateral toes osteomyelitis (left AMA from [place] prior to ortho eval and PICC placement), dysphagia/achalasia s/p dilation ([facility]); admitted w back pain found to have suggestion of AAA endograft infection, as well as hyponatremia, hypokalemia.  He was found to have MSSA bacteremia, with source being toe osteo  3/9: Outside hospital with positive blood cx ([facility])  3/19: EGD w esophageal dilation for achalasia/stricture.  Diet is liquid.  3/25: L 3td Toe amputation   4/9: Echo with no e/o vegetation  4/10: Left leg rash where SCD was  4/11: PO vanc resumed, vascular considering plan for endograft removal  4/12: Severe back pain after missing dose of narcotic, tolerating unasyn  4/13: Hgb dropped to 6.5, no bleeding. Back pain improved.  4/14: Hgb stable.  Repeat SLP ordered. Vascular confirmed no need to remove endograft.   4/16: Urology placed L ureteral stent, diet advanced to gi soft    Review of Systems:  Review of Systems   Cardiovascular: Positive for leg swelling.   Gastrointestinal: Negative for nausea, vomiting, blood in stool and melena.   Musculoskeletal: Positive for back pain (Improved) and joint pain.   Skin: Positive for rash (LLE).   Endo/Heme/Allergies: Does not bruise/bleed easily.   All other systems reviewed and are negative.      Physical Exam:  Physical Exam   Psychiatric:   Good spirits, very talkative   Nursing note and vitals reviewed.  Constitutional: Sitting at edge of the bed.  He appears well-developed and well-nourished overweight.   HENT: Poor dentition, no e/o exudate, poor dentition  Eyes: Conjunctivae and extraocular motions are normal.    Neck: Normal range of motion. Neck supple.   Cardiovascular:  2+ BLEE - R>L, no murmurs  Pulmonary/Chest: Effort normal, no accessory  muscle use.   Abdominal: Bowel sounds are normal.   Musculoskeletal: Normal range of motion. No point tenderness over spine  Neurological: He is alert and oriented to person, place, and time.   Skin: Skin is warm and dry.  R-foot dressing C/D/I.  Macular rash on L anterior leg, that is non-blanching/petechial, not warm, non-tender.  Improving    Labs:        Invalid input(s): [id number]      Recent Labs      17   1300  04/15/17   0515   SODIUM  131*  135   POTASSIUM  3.8  4.0   CHLORIDE  99  102   CO2  24  23   BUN  11  9   CREATININE  1.25  1.22   PHOSPHORUS  3.7  3.8   CALCIUM  8.2*  8.3*     Recent Labs      17   1300  04/15/17   0515   GLUCOSE  94  90     Recent Labs      17   1300  04/15/17   0515   RBC  2.52*  2.50*   HEMOGLOBIN  7.9*  7.9*   HEMATOCRIT  24.1*  24.2*   PLATELETCT  209  219     Recent Labs      17   1300  04/15/17   0515   WBC  8.9  7.6           Hemodynamics:  Temp (24hrs), Av.7 °C (98 °F), Min:36 °C (96.8 °F), Max:37 °C (98.6 °F)  Temperature: 36.9 °C (98.4 °F)  Pulse  Av.4  Min: 56  Max: 114Heart Rate (Monitored): 61  Blood Pressure : 111/63 mmHg, NIBP: 107/51 mmHg     Respiratory:    Respiration: 17, Pulse Oximetry: 90 %        RUL Breath Sounds: Clear, RML Breath Sounds: Clear, RLL Breath Sounds: Clear, SOLEDAD Breath Sounds: Clear, LLL Breath Sounds: Clear  Fluids:    Intake/Output Summary (Last 24 hours) at 17 1327  Last data filed at 17 1200   Gross per 24 hour   Intake    940 ml   Output    600 ml   Net    340 ml        GI/Nutrition:  Orders Placed This Encounter   Procedures   • DIET ORDER     Standing Status: Standing      Number of Occurrences: 1      Standing Expiration Date:      Order Specific Question:  Diet:     Answer:  Low Fiber(GI Soft) [2]     Order Specific Question:  Texture/Fiber modifications:     Answer:  Dysphagia 3(Mechanical Soft)specify fluid consistency(question 6) [3]     Order Specific Question:  Consistency/Fluid  modifications:     Answer:  Thin Liquids [3]     Medical Decision Making, by Problem:  Active Hospital Problems    Diagnosis   • Strep/MSSA Bacteremia - Bacteremia on cultures 3/9 from outside hospital.  *Osteomyelitis of 3rd R toe (CMS-HCC) s/p amputation on 3/25  -Unasyn through 4/26 --> PO Amoxicillin     • Back pain [M54.9] - judicious use of narcotics.  MRI imaging has been negative for compression fracture.  No neurologic deficit  -Stable  -Oxycontin     • Anemia [D64.9] - s/p xfusion on 4/2 and 4/13.  Hgb still labile, no observed blood loss and has been started on PO iron.    -Transfuse for Hgb <7  -Borderline Fe deficiency - FeSO4 replacement.  -Ok to continue lovenox unless e/o bleeding  -Hold off on therapeutic anticoagulation     • Chronic systolic CHF (congestive heart failure) (CMS-HCC) [I50.9] - echo shows mildly reduced EF of 55% c mod MR and PHTN.  -Lasix 20 PO daily     • Hydroureter - per urology due to fibrosis/scarring from AAA  -4/16 L ureteral stent placement  -Has good UOP     • Atrial fibrillation (CMS-HCC) [I48.91] - recently cardioverted at [facility].  Rate controlled.   -Eliquis was discontinued for procedures and shannon-op  -Now w ongoing low hgb, risk >benefit  -Hold off on eliquis (due to anemia)     • AAA endograft, he is S/P remote AAA (abdominal aortic aneurysm) repair and abnormality was seen on MRI.  Risk>benefit of removal and no evidence of infection on MRI/imaging.  -No surgical intervention needed     • Hyponatremia - mild  -Continue Lasix  -Repeat in AM     • Noncompliance [Z91.19] - pt education and encouragement.  -Has left AMA previous admissions     • Tobacco dependence [F17.200]  -counseled  -Nicoderm.     • Hep C w/o coma, chronic with cirrhosis (CMS-HCC) [B18.2] - outpatient follow up.     • C Dif Diarrhea - needs PO vanc through IV abx  -PO vanc BID (per ID recs) through 4/30     • Dysphagia [R13.10] /suspected achalasia - s/p EGD/injection.  Per GI ok to advance,  however SLP previously recommended modified diet due to spitting up  -Outpatient GI follow up       EtOH - cessation ed.    Dispo - complex/guarded.  Await LTAC dispo, CBC in AM, if continues to drop, DC lovenox  Was accepted to [facility], he refuses to go      Labs reviewed and Medications reviewed  Art catheter: No Art      DVT Prophylaxis: Enoxaparin (Lovenox)    Ulcer prophylaxis: Not indicated  Antibiotics: Treating active infection/contamination beyond 24 hours perioperative coverage  Assessed for rehab: Patient was assess for and/or received rehabilitation services during this hospitalization

## 2017-04-16 NOTE — PROGRESS NOTES
Urology    Nuc med renal scan with lasix shows marked delay in passage of isotope from L kidney.  Due to obstruction of ureter from AAA fibrosis.  Discussed with patient need for stent before procedure and did risks and benefit at that time.  Will make NPO at midnight and take to OR radha for L ureteral stnet.    Mihai Whittington MD

## 2017-04-16 NOTE — PROGRESS NOTES
Patient transported to OR via hospital bed and transport services. Hearing aids, glasses and dentures were removed before patient went down to OR. IV ampicillin not given at scheduled time due to medication not being on unit before patient went to surgery. Pharmacy contacted and medication tubed to ortho unit, RN tubed medication to OR so medication could be administered.

## 2017-04-16 NOTE — PROGRESS NOTES
Assumed care of pt @0700. Bedside report received. Pt AOX 4. Pt rates pain 6/10. Roxicodone 15 mg give. PICC patent with positive blood return running IV abx. Good oral intake. Dressing to L foot clean and intact and changed. Fall precaution in place. POC discussed with pt, all questions answered at this time. Pt makes needs known, call light within reach, hourly rounding in place.

## 2017-04-17 LAB
ERYTHROCYTE [DISTWIDTH] IN BLOOD BY AUTOMATED COUNT: 56.4 FL (ref 35.9–50)
HCT VFR BLD AUTO: 23.2 % (ref 42–52)
HGB BLD-MCNC: 7.4 G/DL (ref 14–18)
MCH RBC QN AUTO: 31 PG (ref 27–33)
MCHC RBC AUTO-ENTMCNC: 31.9 G/DL (ref 33.7–35.3)
MCV RBC AUTO: 97.1 FL (ref 81.4–97.8)
PLATELET # BLD AUTO: 223 K/UL (ref 164–446)
PMV BLD AUTO: 8.8 FL (ref 9–12.9)
RBC # BLD AUTO: 2.39 M/UL (ref 4.7–6.1)
WBC # BLD AUTO: 5.5 K/UL (ref 4.8–10.8)

## 2017-04-17 PROCEDURE — 700105 HCHG RX REV CODE 258: Performed by: INTERNAL MEDICINE

## 2017-04-17 PROCEDURE — 700102 HCHG RX REV CODE 250 W/ 637 OVERRIDE(OP): Performed by: FAMILY MEDICINE

## 2017-04-17 PROCEDURE — 700101 HCHG RX REV CODE 250: Performed by: FAMILY MEDICINE

## 2017-04-17 PROCEDURE — 700102 HCHG RX REV CODE 250 W/ 637 OVERRIDE(OP): Performed by: HOSPITALIST

## 2017-04-17 PROCEDURE — 85027 COMPLETE CBC AUTOMATED: CPT

## 2017-04-17 PROCEDURE — 700102 HCHG RX REV CODE 250 W/ 637 OVERRIDE(OP): Performed by: INTERNAL MEDICINE

## 2017-04-17 PROCEDURE — 700102 HCHG RX REV CODE 250 W/ 637 OVERRIDE(OP): Performed by: SURGERY

## 2017-04-17 PROCEDURE — 700111 HCHG RX REV CODE 636 W/ 250 OVERRIDE (IP): Performed by: INTERNAL MEDICINE

## 2017-04-17 PROCEDURE — 700102 HCHG RX REV CODE 250 W/ 637 OVERRIDE(OP): Performed by: ORTHOPAEDIC SURGERY

## 2017-04-17 PROCEDURE — 99232 SBSQ HOSP IP/OBS MODERATE 35: CPT | Performed by: INTERNAL MEDICINE

## 2017-04-17 PROCEDURE — 700111 HCHG RX REV CODE 636 W/ 250 OVERRIDE (IP): Performed by: HOSPITALIST

## 2017-04-17 PROCEDURE — A9270 NON-COVERED ITEM OR SERVICE: HCPCS | Performed by: SURGERY

## 2017-04-17 PROCEDURE — A9270 NON-COVERED ITEM OR SERVICE: HCPCS | Performed by: ORTHOPAEDIC SURGERY

## 2017-04-17 PROCEDURE — A9270 NON-COVERED ITEM OR SERVICE: HCPCS | Performed by: HOSPITALIST

## 2017-04-17 PROCEDURE — A9270 NON-COVERED ITEM OR SERVICE: HCPCS | Performed by: FAMILY MEDICINE

## 2017-04-17 PROCEDURE — 770001 HCHG ROOM/CARE - MED/SURG/GYN PRIV*

## 2017-04-17 PROCEDURE — A9270 NON-COVERED ITEM OR SERVICE: HCPCS | Performed by: INTERNAL MEDICINE

## 2017-04-17 RX ADMIN — AMPICILLIN SODIUM AND SULBACTAM SODIUM 3 G: 2; 1 INJECTION, POWDER, FOR SOLUTION INTRAMUSCULAR; INTRAVENOUS at 04:41

## 2017-04-17 RX ADMIN — ACETAMINOPHEN 650 MG: 325 TABLET, FILM COATED ORAL at 23:21

## 2017-04-17 RX ADMIN — OXYCODONE HYDROCHLORIDE 10 MG: 5 TABLET ORAL at 18:16

## 2017-04-17 RX ADMIN — ENOXAPARIN SODIUM 40 MG: 100 INJECTION SUBCUTANEOUS at 08:40

## 2017-04-17 RX ADMIN — LIDOCAINE 2 PATCH: 50 PATCH CUTANEOUS at 11:43

## 2017-04-17 RX ADMIN — VANCOMYCIN HYDROCHLORIDE 125 MG: 10 INJECTION, POWDER, LYOPHILIZED, FOR SOLUTION INTRAVENOUS at 21:49

## 2017-04-17 RX ADMIN — FERROUS SULFATE TAB 325 MG (65 MG ELEMENTAL FE) 325 MG: 325 (65 FE) TAB at 08:40

## 2017-04-17 RX ADMIN — AMPICILLIN SODIUM AND SULBACTAM SODIUM 3 G: 2; 1 INJECTION, POWDER, FOR SOLUTION INTRAMUSCULAR; INTRAVENOUS at 23:21

## 2017-04-17 RX ADMIN — VANCOMYCIN HYDROCHLORIDE 125 MG: 10 INJECTION, POWDER, LYOPHILIZED, FOR SOLUTION INTRAVENOUS at 08:40

## 2017-04-17 RX ADMIN — ACETAMINOPHEN 650 MG: 325 TABLET, FILM COATED ORAL at 06:05

## 2017-04-17 RX ADMIN — METHOCARBAMOL 500 MG: 500 TABLET ORAL at 14:05

## 2017-04-17 RX ADMIN — METHOCARBAMOL 500 MG: 500 TABLET ORAL at 08:40

## 2017-04-17 RX ADMIN — FUROSEMIDE 20 MG: 20 TABLET ORAL at 08:40

## 2017-04-17 RX ADMIN — SIMVASTATIN 20 MG: 20 TABLET, FILM COATED ORAL at 20:26

## 2017-04-17 RX ADMIN — POTASSIUM CHLORIDE 40 MEQ: 1500 TABLET, EXTENDED RELEASE ORAL at 08:40

## 2017-04-17 RX ADMIN — POTASSIUM CHLORIDE 40 MEQ: 1500 TABLET, EXTENDED RELEASE ORAL at 20:31

## 2017-04-17 RX ADMIN — OXYCODONE HYDROCHLORIDE 10 MG: 5 TABLET ORAL at 08:40

## 2017-04-17 RX ADMIN — AMPICILLIN SODIUM AND SULBACTAM SODIUM 3 G: 2; 1 INJECTION, POWDER, FOR SOLUTION INTRAMUSCULAR; INTRAVENOUS at 11:43

## 2017-04-17 RX ADMIN — ACETAMINOPHEN 650 MG: 325 TABLET, FILM COATED ORAL at 11:43

## 2017-04-17 RX ADMIN — OXYCODONE HYDROCHLORIDE 10 MG: 5 TABLET ORAL at 14:06

## 2017-04-17 RX ADMIN — GABAPENTIN 600 MG: 300 CAPSULE ORAL at 14:06

## 2017-04-17 RX ADMIN — NICOTINE 7 MG: 7 PATCH TRANSDERMAL at 06:05

## 2017-04-17 RX ADMIN — STANDARDIZED SENNA CONCENTRATE AND DOCUSATE SODIUM 1 TABLET: 8.6; 5 TABLET, FILM COATED ORAL at 20:25

## 2017-04-17 RX ADMIN — METHOCARBAMOL 500 MG: 500 TABLET ORAL at 20:26

## 2017-04-17 RX ADMIN — LACTOBACILLUS ACIDOPHILUS / LACTOBACILLUS BULGARICUS 1 PACKET: 100 MILLION CFU STRENGTH GRANULES at 08:40

## 2017-04-17 RX ADMIN — AMIODARONE HYDROCHLORIDE 200 MG: 200 TABLET ORAL at 08:40

## 2017-04-17 RX ADMIN — GABAPENTIN 600 MG: 300 CAPSULE ORAL at 08:40

## 2017-04-17 RX ADMIN — AMPICILLIN SODIUM AND SULBACTAM SODIUM 3 G: 2; 1 INJECTION, POWDER, FOR SOLUTION INTRAMUSCULAR; INTRAVENOUS at 18:16

## 2017-04-17 RX ADMIN — OXYCODONE HYDROCHLORIDE 20 MG: 20 TABLET, FILM COATED, EXTENDED RELEASE ORAL at 20:26

## 2017-04-17 RX ADMIN — OXYCODONE HYDROCHLORIDE 10 MG: 5 TABLET ORAL at 04:41

## 2017-04-17 RX ADMIN — GABAPENTIN 600 MG: 300 CAPSULE ORAL at 20:26

## 2017-04-17 RX ADMIN — OXYCODONE HYDROCHLORIDE 20 MG: 20 TABLET, FILM COATED, EXTENDED RELEASE ORAL at 08:40

## 2017-04-17 RX ADMIN — OXYCODONE HYDROCHLORIDE 10 MG: 5 TABLET ORAL at 23:19

## 2017-04-17 RX ADMIN — LACTOBACILLUS ACIDOPHILUS / LACTOBACILLUS BULGARICUS 1 PACKET: 100 MILLION CFU STRENGTH GRANULES at 18:16

## 2017-04-17 ASSESSMENT — ENCOUNTER SYMPTOMS
FEVER: 0
BRUISES/BLEEDS EASILY: 0
ABDOMINAL PAIN: 0
HEADACHES: 0
DIARRHEA: 0
COUGH: 0
BLOOD IN STOOL: 0
VOMITING: 0
SHORTNESS OF BREATH: 0
CHILLS: 0
CONSTIPATION: 0
NAUSEA: 0
BACK PAIN: 1

## 2017-04-17 ASSESSMENT — PAIN SCALES - GENERAL
PAINLEVEL_OUTOF10: 5
PAINLEVEL_OUTOF10: 5
PAINLEVEL_OUTOF10: 3
PAINLEVEL_OUTOF10: 5
PAINLEVEL_OUTOF10: 6

## 2017-04-17 NOTE — THERAPY
"Speech Therapy contact note:    Clinical swallow evaluation reorders received for \"diet advancement.\" SLP DCd pt from therapy services 3/31 as pt with no oropharyngeal dysphagia. Pt with significant esophageal dysphagia r/t achalasia. Diet recs per GI. RN reporting no concern for descending aspiration however pt still regurgitating food. Acute SLP re-evaluation not indicated at this time. RN to discuss with MD and cancel order if appropriate.     Sb Goodman CCC-SLP, numeric pager #[phone]  "

## 2017-04-17 NOTE — PROGRESS NOTES
Hospital Medicine Progress Note, Adult, Complex               Author: Kyleigh Bhakta  Date & Time created: 4/17/2017  2:44 PM     CC: Back pain    Interval History:  72M hx AAA s/p repair, recent findings of bilateral toes osteomyelitis (left AMA from [place] prior to ortho eval and PICC placement), dysphagia/achalasia s/p dilation ([facility]); admitted w back pain found to have suggestion of AAA endograft infection, as well as hyponatremia, hypokalemia.  He was found to have MSSA bacteremia, with source being toe osteo  3/9: Outside hospital with positive blood cx ([facility])  3/19: EGD w esophageal dilation for achalasia/stricture.  Diet is liquid.  3/25: L 3td Toe amputation   4/9: Echo with no e/o vegetation  4/10: Left leg rash where SCD was  4/11: PO vanc resumed, vascular considering plan for endograft removal  4/12: Severe back pain after missing dose of narcotic, tolerating unasyn  4/13: Hgb dropped to 6.5, no bleeding. Back pain improved.  4/14: Hgb stable.  Repeat SLP ordered. Vascular confirmed no need to remove endograft.   4/16: Urology placed L ureteral stent.    Review of Systems:  Review of Systems   Cardiovascular: Positive for leg swelling.   Gastrointestinal: Negative for nausea, vomiting, blood in stool and melena.   Genitourinary: Positive for hematuria.   Musculoskeletal: Positive for back pain (Improved) and joint pain.   Skin: Positive for rash (LLE).   Endo/Heme/Allergies: Does not bruise/bleed easily.   All other systems reviewed and are negative.      Physical Exam:  Physical Exam   Psychiatric:   Good spirits, very talkative   Nursing note and vitals reviewed.  Constitutional: Sitting at edge of the bed.  He appears well-developed and well-nourished overweight.   HENT: Poor dentition, no e/o exudate, poor dentition  Eyes: Conjunctivae and extraocular motions are normal.    Neck: Normal range of motion. Neck supple.   Cardiovascular:  2+ BLEE - R>L, no murmurs  Pulmonary/Chest: Effort  normal, no accessory muscle use.   Abdominal: Bowel sounds are normal.   Musculoskeletal: Normal range of motion. No point tenderness over spine  Neurological: He is alert and oriented to person, place, and time.   Skin: Skin is warm and dry.  R-foot dressing C/D/I.  Macular rash on L anterior leg, that is non-blanching/petechial, not warm, non-tender.  Improving    Labs:        Invalid input(s): [id number]      Recent Labs      04/15/17   0515   SODIUM  135   POTASSIUM  4.0   CHLORIDE  102   CO2  23   BUN  9   CREATININE  1.22   PHOSPHORUS  3.8   CALCIUM  8.3*     Recent Labs      04/15/17   0515   GLUCOSE  90     Recent Labs      04/15/17   0515  17   1415  17   0245   RBC  2.50*  2.68*  2.39*   HEMOGLOBIN  7.9*  8.4*  7.4*   HEMATOCRIT  24.2*  25.7*  23.2*   PLATELETCT  219  237  223     Recent Labs      04/15/17   0515  04/16/17   1415  17   0245   WBC  7.6  7.2  5.5           Hemodynamics:  Temp (24hrs), Av.8 °C (98.2 °F), Min:36.2 °C (97.1 °F), Max:37.4 °C (99.3 °F)  Temperature: 37 °C (98.6 °F)  Pulse  Av.2  Min: 56  Max: 114   Blood Pressure : 106/58 mmHg     Respiratory:    Respiration: 18, Pulse Oximetry: 95 %     Work Of Breathing / Effort: Mild  RUL Breath Sounds: Clear, RML Breath Sounds: Clear, RLL Breath Sounds: Clear, SOLEDAD Breath Sounds: Clear, LLL Breath Sounds: Clear  Fluids:    Intake/Output Summary (Last 24 hours) at 17 1444  Last data filed at 17 0600   Gross per 24 hour   Intake    900 ml   Output   1350 ml   Net   -450 ml        GI/Nutrition:  Orders Placed This Encounter   Procedures   • DIET ORDER     Standing Status: Standing      Number of Occurrences: 1      Standing Expiration Date:      Order Specific Question:  Diet:     Answer:  Low Fiber(GI Soft) [2]     Order Specific Question:  Texture/Fiber modifications:     Answer:  Dysphagia 3(Mechanical Soft)specify fluid consistency(question 6) [3]     Order Specific Question:  Consistency/Fluid  modifications:     Answer:  Thin Liquids [3]     Medical Decision Making, by Problem:  Active Hospital Problems    Diagnosis   • Strep/MSSA Bacteremia - Bacteremia on cultures 3/9 from outside hospital.  *Osteomyelitis of 3rd R toe (CMS-HCC) s/p amputation on 3/25  -Unasyn through 4/26 --> PO Amoxicillin     • Back pain [M54.9] - judicious use of narcotics.  MRI imaging has been negative for compression fracture.  No neurologic deficit  -Stable  -Oxycontin     • Anemia [D64.9] - s/p xfusion on 4/2 and 4/13.  Hgb still labile, no observed blood loss and has been started on PO iron.    -Transfuse for Hgb <7  -Borderline Fe deficiency - FeSO4 replacement.  -Ok to continue lovenox unless e/o bleeding  -Hold off on therapeutic anticoagulation     • Chronic systolic CHF (congestive heart failure) (CMS-HCC) [I50.9] - echo shows mildly reduced EF of 55% c mod MR and PHTN.  -Lasix 20 PO daily     • Hydroureter - per urology due to fibrosis/scarring from AAA  -4/16 L ureteral stent placement  -Has good UOP, hematuria is expected per urology     • Atrial fibrillation (CMS-HCC) [I48.91] - recently cardioverted at [place].  Rate controlled.   -Eliquis was discontinued for procedures and shannon-op  -Now w ongoing low hgb, risk >benefit  -Hold off on eliquis (due to anemia)     • AAA endograft, he is S/P remote AAA (abdominal aortic aneurysm) repair and abnormality was seen on MRI.  Risk>benefit of removal and no evidence of infection on MRI/imaging.  -No surgical intervention needed     • Hyponatremia - mild  -Continue Lasix  -Repeat in AM     • Noncompliance [Z91.19] - pt education and encouragement.  -Has left AMA previous admissions     • Tobacco dependence [F17.200]  -counseled  -Nicoderm.     • Hep C w/o coma, chronic with cirrhosis (CMS-HCC) [B18.2] - outpatient follow up.     • C Dif Diarrhea - needs PO vanc through IV abx  -PO vanc BID (per ID recs) through 4/30     • Dysphagia [R13.10] /suspected achalasia - s/p  EGD/injection.  Per GI ok to advance, however SLP previously recommended modified diet due to spitting up  -Outpatient GI follow up       EtOH - cessation ed.    Dispo - complex/guarded.  Await LTAC dispo, CBC in AM, if continues to drop, DC lovenox  Was accepted to [facility], he refuses to go      Labs reviewed and Medications reviewed  Art catheter: No Art      DVT Prophylaxis: Enoxaparin (Lovenox)    Ulcer prophylaxis: Not indicated  Antibiotics: Treating active infection/contamination beyond 24 hours perioperative coverage  Assessed for rehab: Patient was assess for and/or received rehabilitation services during this hospitalization

## 2017-04-17 NOTE — CARE PLAN
Problem: Pain Management  Goal: Pain level will decrease to patient’s comfort goal  Intervention: Follow pain managment plan developed in collaboration with patient and Interdisciplinary Team  RN uses 0-10 and non verbal pain scale to assess pain. Patient c/o chronic pain to lower back 6/10; scheduled Oxycontin 20mg given BID and PRN oxycodone 10mg ordered Q4H for breakthrough pain. RN educated patient on non-pharmacologic measures such as rest, warm/cold pack and distraction to help with pain relief.      Problem: Skin Integrity  Goal: Risk for impaired skin integrity will decrease  Patient is independent with walking, bed mobility and using bathroom. Patient is able to reposition self frequently and is at decreased risk for pressure ulcers or skin breakdown.

## 2017-04-17 NOTE — CARE PLAN
Problem: Venous Thromboembolism (VTW)/Deep Vein Thrombosis (DVT) Prevention:  Goal: Patient will participate in Venous Thrombosis (VTE)/Deep Vein Thrombosis (DVT)Prevention Measures  Outcome: PROGRESSING SLOWER THAN EXPECTED

## 2017-04-17 NOTE — PROGRESS NOTES
"Urology Progress Note    73 y/o male s/p left ureteral stent placement for retroperitoneal fibrosis POD #1    Overnight Events: None    S: Patient is doing well.  No flank pain.  No fever or chills. He has a good stream with dysuria and hematuria without clots.  No N/V/D.  No CP/HA/SOB.  He is ambulating independently.  He is tolerating a regular diet.    All other systems were reviewed and are negative except for above.    O:   Blood pressure 106/58, pulse 72, temperature 37 °C (98.6 °F), resp. rate 18, height 1.778 m (5' 10\"), weight 77 kg (169 lb 12.1 oz), SpO2 95 %.  Recent Labs      04/15/17   0515   SODIUM  135   POTASSIUM  4.0   CHLORIDE  102   CO2  23   GLUCOSE  90   BUN  9   CREATININE  1.22   CALCIUM  8.3*     Recent Labs      04/15/17   0515  04/16/17   1415  04/17/17   0245   WBC  7.6  7.2  5.5   RBC  2.50*  2.68*  2.39*   HEMOGLOBIN  7.9*  8.4*  7.4*   HEMATOCRIT  24.2*  25.7*  23.2*   MCV  96.8  95.9  97.1   MCH  31.6  31.3  31.0   MCHC  32.6*  32.7*  31.9*   RDW  57.0*  55.0*  56.4*   PLATELETCT  219  237  223   MPV  8.9*  8.7*  8.8*         Intake/Output Summary (Last 24 hours) at 04/17/17 1318  Last data filed at 04/17/17 0600   Gross per 24 hour   Intake    900 ml   Output   1350 ml   Net   -450 ml       Exam:  A & O x 3, NAD   Abdomen soft, NTTP x 4.  No rebound or guarding   No CVA TTP bilaterally.  No suprapubic distention or tenderness to palpation.        A/P:    Active Hospital Problems    Diagnosis   • C. difficile diarrhea [A04.7]     Priority: High   • Bacteremia [R78.81]     Priority: High   • Back pain [M54.9]     Priority: High   • Osteomyelitis of toe (CMS-HCC) [M86.9]     Priority: High   • Anemia [D64.9]     Priority: Medium   • Hyponatremia [E87.1]     Priority: Medium   • Macrocytosis [D75.89]     Priority: Medium   • Hypokalemia [E87.6]     Priority: Medium   • CHF (congestive heart failure) (CMS-[facility]) [I50.9]     Priority: Medium   • Atrial fibrillation (CMS-[facility]) [I48.91]     " Priority: Medium   • S/P AAA (abdominal aortic aneurysm) repair [Z98.890, Z86.79]     Priority: Medium   • Noncompliance [Z91.19]     Priority: Low   • Tobacco dependence [F17.200]     Priority: Low   • Ureteral obstruction, left- s/p L ureteral stent [N13.5]   • Severe aortic stenosis [I35.0]   • Cirrhosis of liver (CMS-HCC) [K74.60]   • Transaminitis [R74.0]   • Achalasia [K22.0]   • Hep C w/o coma, chronic (CMS-HCC) [B18.2]   • Dysphagia [R13.10]   • CAD (coronary artery disease) [I25.10]       Impression    1) Left Hydronephrosis secondary to left retroperitoneal fibrosis s/p left ureteral stent placement POD #1  2) Osteomyelitis and bacteremia s/p toe amputation    Plan    1) Patient is doing well.   2) His hematuria is expected with the stent and will decrease over time.  3) Home when stable as per primary team.  4) F/U in 1 month after discharge for follow up and arrangement of future stent changes with Dr. Whittington.  5) No further acute  intervention necessary at this time, please call with questions.

## 2017-04-17 NOTE — PROGRESS NOTES
Infectious Disease Progress Note    Author: JESS Tran DOS & Time created: 4/17/2017  1:33 PM    Chief Complaint   Patient presents with   • Low Back Pain   FU for osteomyelitis, GAS bacteremia and CDI, suspected endograft infection    Interval History:  3/17 AF, WBC 12.9, pt c/o lower back pain, asking for morphine, poor insight into illness, denies any diarrhea  3/18 AF, WBC 13.3, sleeping and not arousable to voice, cultures here negative to date  3/19 AF, WBC 13.4, frustrated about lower back pain not adequately controlled on current pain regimen, plan for EGD with dilatation today  3/20 AF WBC 9.6 +pain-denies SE abx  3/21 AF WBC 10.5 no new complaints  3/22 AF WBC not done tolerating abx well  3/23 AF eye feels a little better-pain about the same  3/24 AF tolerating abx well-MRI confirm OM  3/25 AF WBC 9.5 s/p toe amp this am  3/26 AF WBC 10.1 deneies any new sxs-  3/27 AF, WBC 9.1, sleeping but arousable to voice, back pain stable  3/28 Tmax 99.9, WBC 7.7, not happy about soft foods, and wants to go back on regular diet, having normal BMs, back pain controlled on pain regimen  3/29 AF, WBC 5.7, transferred to ortho, happy he took a shower today, plan of care discussed with pain, states he was told he has arthritis of his back, back pain stable, no diarrhea  3/30 AF, WBC 8.5, had a loose BM this am follow by normal BM, no abd pain  3/31 AF, WBC 9, nausea and vomiting this am, had soft BM this am  4/1 WBC 13.7, still having emesis, states he continues to be thirsty despite drinking lots of water yesterday, diarrhea improving and stools forming  4/2 AF, WBC 11.6, had nose bleed overnight, being transfused blood for Hg 6.6, tolerating pills  4/3- denies any diarrhea. No fevers.   4/4- no fevers. No diarrhea. Back pain under control.   4/6- no diarrhea. No fevers. Still throws up everything  4/7- no fevers. No new issues. The pain issues being addressed.  4/8- no fevers. No new issues  4/9-  "continues to regurgitate. No fevers.   4/10- AF, no CBC.  CRP 9.37.  Minimal lower back pain with pain medication regiment, 3/10.  Tolerating abx, no complaints.  - AF, no CBC.  Walking around room, denies overall pain.  Tolerating abx without complaints.  - AF, WBC 7.1.  Laying in bed, states he is \"freezing.\"  Denies pain, tolerating abx.  - AF, WBC 7.1.  H&H 6.5 & 20.3, received 1 unit PRBCs this morning.  Feeling better, especially since he had his pain pill this morning.  Denies overall pain, tolerating abx.   AF WBC 8.9 no new complaints  4/15 AF WBC 7.6 ambulating room-states \"they are going to do something with my kidney...stent\"   AF sleepy post-stent placement   Tm 99.3, WBC 5.5.  Feeling great as long as he gets his pain meds for his back.  Tolerating abx without SE.  Labs Reviewed, Medications Reviewed, Radiology Reviewed and Wound Reviewed.    Review of Systems:  Review of Systems   Constitutional: Negative for fever and chills.   Respiratory: Negative for cough and shortness of breath.    Cardiovascular: Negative for chest pain.   Gastrointestinal: Negative for nausea, vomiting, abdominal pain, diarrhea and constipation.   Genitourinary: Negative for dysuria.   Musculoskeletal: Positive for back pain. Negative for joint pain.        Stable   Skin: Negative for rash.   Neurological: Negative for headaches.       Hemodynamics:  Temp (24hrs), Av.8 °C (98.2 °F), Min:36.2 °C (97.1 °F), Max:37.4 °C (99.3 °F)  Temperature: 37 °C (98.6 °F)  Pulse  Av.2  Min: 56  Max: 114   Blood Pressure : 106/58 mmHg       Physical Exam:  Physical Exam   Constitutional: He is oriented to person, place, and time. He appears well-developed. No distress.   HENT:   Head: Normocephalic and atraumatic.   Eyes: EOM are normal. Pupils are equal, round, and reactive to light.   Neck: Normal range of motion. Neck supple.   Cardiovascular: Normal rate.    Murmur heard.  IRR   Pulmonary/Chest: Effort " normal and breath sounds normal. No respiratory distress. He has no wheezes.   Abdominal: Soft. Bowel sounds are normal. He exhibits no distension. There is no tenderness.   Musculoskeletal: Normal range of motion. He exhibits edema.   R great toe- minimal edema, no drainage or erythema, medial callus present.   Left 3rd toe amp site- Sutures in place, no drainage or erythema.  RUE PICC- non tender, no erythema.  +1-2 BLE edema   Neurological: He is alert and oriented to person, place, and time.   Skin: Skin is warm and dry. No rash noted. There is erythema.   Chronic changes  LLE erythema- improving   Nursing note and vitals reviewed.      Labs:  Recent Labs      04/15/17   0515  04/16/17   1415  04/17/17   0245   WBC  7.6  7.2  5.5   RBC  2.50*  2.68*  2.39*   HEMOGLOBIN  7.9*  8.4*  7.4*   HEMATOCRIT  24.2*  25.7*  23.2*   MCV  96.8  95.9  97.1   MCH  31.6  31.3  31.0   RDW  57.0*  55.0*  56.4*   PLATELETCT  219  237  223   MPV  8.9*  8.7*  8.8*     Recent Labs      04/15/17   0515   SODIUM  135   POTASSIUM  4.0   CHLORIDE  102   CO2  23   GLUCOSE  90   BUN  9     Recent Labs      04/15/17   0515   ALBUMIN  2.5*   CREATININE  1.22       BLOOD CULTURE   Date Value Ref Range Status   03/15/2017 No growth after 5 days of incubation.  Final    ':  Results     ** No results found for the last 168 hours. **          Imaging    DX-PYELOGRAM, RETROGRADE  4/16/2017  Impression      Left ureteral stent with loop formed over the renal pelvis  Aortic aneurysm with endograft     NM-RENAL WITH DIURETIC WASHOUT  4/15/2017   Impression      Findings consistent with at least partial obstruction of the left ureter likely related to scarring or fibrosis related to abdominal aortic aneurysm as noted on CT.     MR-ABDOMEN-WITH & W/O and MR-PELVIS WITH  4/9/2017  Impression      1. Evaluate for interval change in enhancement is limited between current MR and prior lumbar MR due to difference in technique but there may be slightly less  soft tissue enhancement surrounding the wall.  2. The size of the thrombosed infrarenal abdominal aortic aneurysm is grossly unchanged. No evidence of endoleak.  3. Patent aortobiiliac endograft.  4. Unchanged indentation of the aneurysm sac on the left psoas muscle. No evidence of invasion.  5. Small focal low signal T2 area with peripheral enhancement in the right psoas muscle is of unclear etiology and could relate to scarring or heterotopic calcification.     ECHOCARDIOGRAM COMP W/O CONT  4/9/2017   CONCLUSIONS  Normal left ventricular size and systolic function.  Left ventricular ejection fraction is visually estimated to be 55%.  Severe aortic stenosis.  Moderate mitral regurgitation.  Right heart pressures are consistent with moderate pulmonary   hypertension.  Severely dilated left atrium.    MR-FOOT-WITH & W/O RIGHT  3/23/2017  Impression      1. Abnormal signal in the distal aspect of the first distal phalanx is consistent with osteomyelitis.  2.  No abscess is identified.  3.  Susceptibility artifact in the plantar soft tissues at the level of the fourth tarsometatarsal joint and base of the third proximal phalanx, possibly related to prior surgery. Foreign body cannot be excluded.  4.  Diffuse edema in the forefoot.     3/17 MRI lumbar spine: There are changes secondary to the abdominal aortic aneurysm endograft. The aneurysm sac measures an approximately 7.6 x 6.7 cm in size. Abnormal contrast enhancement is noted within the wall of the aneurysmal sac. There is also abnormal soft tissue   contrast enhancement in the surrounding soft tissue. These findings are highly suspicious for infected thrombosed aneurysm sac. The possibility of endograft infection is more likely. There is irregular outpouching of the aneurysmal sac along the   posterior portion indenting the left psoas muscle.  2.  Free fluid in the pelvis  3.  There is mild-to-moderate left hydronephrosis likely representing pelviureteric junction  obstruction.    3/16 R foot xray - +OM  3/16 L foot xray +OM    Assessment:  Active Hospital Problems    Diagnosis   • *Osteomyelitis of toe (CMS-HCC) [M86.9]   • Bacteremia [R78.81]   • Back pain [M54.9]   • CHF (congestive heart failure) (CMS-HCC) [I50.9]   • Tobacco dependence [F17.200]   • Atrial fibrillation (CMS-HCC) [I48.91]    Endovascular infection       Plan:  Right great toe and left 3rd osteomyelitis  +OM on radiographs-confirmed on repeat MRIs  OSH wound cx on 3/9 - GAS, MSSA (I confirmed with [place] microlab)  S/p 3rd toe amp on 3/25. No purulence seen proximally to amp site  OR cx (left 3rd toe) on 3/25 - MSSA (S-unasyn) Vanco LINUS 2, MRSE  Continue IV Unasyn  Anticipate 6 weeks of IV abx. Stop date 04/27/17.    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  IV abx as above, follow it with PO Amoxicillin.    If pt tolerates Amoxicillin, he will need for lifelong suppression.    Clostridium difficile diarrhea  Completed vancomycin QID for 2 weeks on 03/29/17.   Continue PO Vancomycin BID while on IV abx with 3 additional days, stop date of 04/30/17.    Esophageal dysmotility-concern for achalasia  Stable    Leukocytosis. Resolved  Multifactorial  No new signs or symptoms of infection  Monitor    Back pain  Chronic per patient    Obstructive uropathy  S/p L ureteral stent on 4/16 with Dr. Whittington    Dispo:  Awaiting SNF placement for long-term abx, being re-evaluated by [facility] care and [facility].  Also being evaluated by [facility].

## 2017-04-17 NOTE — DISCHARGE PLANNING
Per ZCAHARY Gutierrez request, spoke with Lore at [facility], they have requested insurance Auth.

## 2017-04-18 LAB
ERYTHROCYTE [DISTWIDTH] IN BLOOD BY AUTOMATED COUNT: 56.8 FL (ref 35.9–50)
HCT VFR BLD AUTO: 26.6 % (ref 42–52)
HGB BLD-MCNC: 8.4 G/DL (ref 14–18)
MCH RBC QN AUTO: 30.8 PG (ref 27–33)
MCHC RBC AUTO-ENTMCNC: 31.6 G/DL (ref 33.7–35.3)
MCV RBC AUTO: 97.4 FL (ref 81.4–97.8)
PLATELET # BLD AUTO: 285 K/UL (ref 164–446)
PMV BLD AUTO: 8.9 FL (ref 9–12.9)
RBC # BLD AUTO: 2.73 M/UL (ref 4.7–6.1)
WBC # BLD AUTO: 8.1 K/UL (ref 4.8–10.8)

## 2017-04-18 PROCEDURE — 700111 HCHG RX REV CODE 636 W/ 250 OVERRIDE (IP): Performed by: HOSPITALIST

## 2017-04-18 PROCEDURE — 99232 SBSQ HOSP IP/OBS MODERATE 35: CPT | Performed by: HOSPITALIST

## 2017-04-18 PROCEDURE — A9270 NON-COVERED ITEM OR SERVICE: HCPCS | Performed by: SURGERY

## 2017-04-18 PROCEDURE — 770001 HCHG ROOM/CARE - MED/SURG/GYN PRIV*

## 2017-04-18 PROCEDURE — 700102 HCHG RX REV CODE 250 W/ 637 OVERRIDE(OP): Performed by: SURGERY

## 2017-04-18 PROCEDURE — 700102 HCHG RX REV CODE 250 W/ 637 OVERRIDE(OP): Performed by: HOSPITALIST

## 2017-04-18 PROCEDURE — 85027 COMPLETE CBC AUTOMATED: CPT

## 2017-04-18 PROCEDURE — A9270 NON-COVERED ITEM OR SERVICE: HCPCS | Performed by: INTERNAL MEDICINE

## 2017-04-18 PROCEDURE — A9270 NON-COVERED ITEM OR SERVICE: HCPCS | Performed by: HOSPITALIST

## 2017-04-18 PROCEDURE — A9270 NON-COVERED ITEM OR SERVICE: HCPCS | Performed by: FAMILY MEDICINE

## 2017-04-18 PROCEDURE — 700105 HCHG RX REV CODE 258

## 2017-04-18 PROCEDURE — 700102 HCHG RX REV CODE 250 W/ 637 OVERRIDE(OP): Performed by: FAMILY MEDICINE

## 2017-04-18 PROCEDURE — 80069 RENAL FUNCTION PANEL: CPT

## 2017-04-18 PROCEDURE — 700105 HCHG RX REV CODE 258: Performed by: INTERNAL MEDICINE

## 2017-04-18 PROCEDURE — 700101 HCHG RX REV CODE 250: Performed by: FAMILY MEDICINE

## 2017-04-18 PROCEDURE — 700102 HCHG RX REV CODE 250 W/ 637 OVERRIDE(OP): Performed by: INTERNAL MEDICINE

## 2017-04-18 PROCEDURE — 700111 HCHG RX REV CODE 636 W/ 250 OVERRIDE (IP): Performed by: INTERNAL MEDICINE

## 2017-04-18 RX ORDER — SODIUM CHLORIDE 9 MG/ML
INJECTION, SOLUTION INTRAVENOUS
Status: COMPLETED
Start: 2017-04-18 | End: 2017-04-18

## 2017-04-18 RX ADMIN — LACTOBACILLUS ACIDOPHILUS / LACTOBACILLUS BULGARICUS 1 PACKET: 100 MILLION CFU STRENGTH GRANULES at 07:21

## 2017-04-18 RX ADMIN — METHOCARBAMOL 500 MG: 500 TABLET ORAL at 20:46

## 2017-04-18 RX ADMIN — FERROUS SULFATE TAB 325 MG (65 MG ELEMENTAL FE) 325 MG: 325 (65 FE) TAB at 07:21

## 2017-04-18 RX ADMIN — SIMVASTATIN 20 MG: 20 TABLET, FILM COATED ORAL at 20:46

## 2017-04-18 RX ADMIN — AMIODARONE HYDROCHLORIDE 200 MG: 200 TABLET ORAL at 07:21

## 2017-04-18 RX ADMIN — FUROSEMIDE 20 MG: 20 TABLET ORAL at 07:21

## 2017-04-18 RX ADMIN — VANCOMYCIN HYDROCHLORIDE 125 MG: 10 INJECTION, POWDER, LYOPHILIZED, FOR SOLUTION INTRAVENOUS at 09:40

## 2017-04-18 RX ADMIN — OXYCODONE HYDROCHLORIDE 20 MG: 20 TABLET, FILM COATED, EXTENDED RELEASE ORAL at 20:46

## 2017-04-18 RX ADMIN — GABAPENTIN 600 MG: 300 CAPSULE ORAL at 07:21

## 2017-04-18 RX ADMIN — OXYCODONE HYDROCHLORIDE 15 MG: 5 TABLET ORAL at 07:21

## 2017-04-18 RX ADMIN — ACETAMINOPHEN 650 MG: 325 TABLET, FILM COATED ORAL at 06:24

## 2017-04-18 RX ADMIN — GABAPENTIN 600 MG: 300 CAPSULE ORAL at 20:46

## 2017-04-18 RX ADMIN — VANCOMYCIN HYDROCHLORIDE 125 MG: 10 INJECTION, POWDER, LYOPHILIZED, FOR SOLUTION INTRAVENOUS at 20:46

## 2017-04-18 RX ADMIN — SODIUM CHLORIDE: 9 INJECTION, SOLUTION INTRAVENOUS at 03:19

## 2017-04-18 RX ADMIN — AMPICILLIN SODIUM AND SULBACTAM SODIUM 3 G: 2; 1 INJECTION, POWDER, FOR SOLUTION INTRAMUSCULAR; INTRAVENOUS at 16:51

## 2017-04-18 RX ADMIN — OXYCODONE HYDROCHLORIDE 15 MG: 5 TABLET ORAL at 03:05

## 2017-04-18 RX ADMIN — AMPICILLIN SODIUM AND SULBACTAM SODIUM 3 G: 2; 1 INJECTION, POWDER, FOR SOLUTION INTRAMUSCULAR; INTRAVENOUS at 22:53

## 2017-04-18 RX ADMIN — ENOXAPARIN SODIUM 40 MG: 100 INJECTION SUBCUTANEOUS at 07:21

## 2017-04-18 RX ADMIN — LIDOCAINE 2 PATCH: 50 PATCH CUTANEOUS at 11:51

## 2017-04-18 RX ADMIN — OXYCODONE HYDROCHLORIDE 15 MG: 5 TABLET ORAL at 16:52

## 2017-04-18 RX ADMIN — AMPICILLIN SODIUM AND SULBACTAM SODIUM 3 G: 2; 1 INJECTION, POWDER, FOR SOLUTION INTRAMUSCULAR; INTRAVENOUS at 11:51

## 2017-04-18 RX ADMIN — POTASSIUM CHLORIDE 40 MEQ: 1500 TABLET, EXTENDED RELEASE ORAL at 20:46

## 2017-04-18 RX ADMIN — GABAPENTIN 600 MG: 300 CAPSULE ORAL at 14:42

## 2017-04-18 RX ADMIN — OXYCODONE HYDROCHLORIDE 15 MG: 5 TABLET ORAL at 11:53

## 2017-04-18 RX ADMIN — POTASSIUM CHLORIDE 40 MEQ: 1500 TABLET, EXTENDED RELEASE ORAL at 07:21

## 2017-04-18 RX ADMIN — AMPICILLIN SODIUM AND SULBACTAM SODIUM 3 G: 2; 1 INJECTION, POWDER, FOR SOLUTION INTRAMUSCULAR; INTRAVENOUS at 05:04

## 2017-04-18 RX ADMIN — NICOTINE 7 MG: 7 PATCH TRANSDERMAL at 06:24

## 2017-04-18 RX ADMIN — OXYCODONE HYDROCHLORIDE 20 MG: 20 TABLET, FILM COATED, EXTENDED RELEASE ORAL at 07:21

## 2017-04-18 RX ADMIN — OXYCODONE HYDROCHLORIDE 15 MG: 5 TABLET ORAL at 22:53

## 2017-04-18 RX ADMIN — METHOCARBAMOL 500 MG: 500 TABLET ORAL at 14:42

## 2017-04-18 RX ADMIN — LACTOBACILLUS ACIDOPHILUS / LACTOBACILLUS BULGARICUS 1 PACKET: 100 MILLION CFU STRENGTH GRANULES at 16:52

## 2017-04-18 RX ADMIN — METHOCARBAMOL 500 MG: 500 TABLET ORAL at 07:21

## 2017-04-18 ASSESSMENT — ENCOUNTER SYMPTOMS
BACK PAIN: 1
FEVER: 0
HEADACHES: 0
BLOOD IN STOOL: 0
PALPITATIONS: 0
NAUSEA: 0
SHORTNESS OF BREATH: 0
VOMITING: 0
ABDOMINAL PAIN: 0
COUGH: 0
CONSTIPATION: 0
CHILLS: 0
DIARRHEA: 0
BRUISES/BLEEDS EASILY: 0

## 2017-04-18 ASSESSMENT — PAIN SCALES - GENERAL
PAINLEVEL_OUTOF10: 7
PAINLEVEL_OUTOF10: 3
PAINLEVEL_OUTOF10: 6
PAINLEVEL_OUTOF10: 7
PAINLEVEL_OUTOF10: 5
PAINLEVEL_OUTOF10: 7
PAINLEVEL_OUTOF10: 4
PAINLEVEL_OUTOF10: 6
PAINLEVEL_OUTOF10: 6
PAINLEVEL_OUTOF10: 3

## 2017-04-18 NOTE — DISCHARGE PLANNING
Spoke to Lakshmi at [facility], they are still waiting for authorization from insurance. She will contact me once she received a response from the insurance.

## 2017-04-18 NOTE — CARE PLAN
Problem: Venous Thromboembolism (VTW)/Deep Vein Thrombosis (DVT) Prevention:  Goal: Patient will participate in Venous Thrombosis (VTE)/Deep Vein Thrombosis (DVT)Prevention Measures  Intervention: Assess and monitor for anticoagulation complications  VTE protocol in place, patient ambulates ad maranda and RN administer Enoxaparin 40mg/ml SQ daily. Patient educated on s/s of bleeding and clotting.      Problem: Fluid Volume:  Goal: Will maintain balanced intake and output  Patient drinking fluids throughout day but has several intermittent episodes of vomiting due to esophageal problems. IV maintenance fluids running at 15ml/hr to keep PICC line patent and keep patient hydrated.

## 2017-04-18 NOTE — PROGRESS NOTES
Hospital Medicine Progress Note, Adult, Complex               Author: Jennie Barbosa  Date & Time created: 4/18/2017  2:42 PM     CC: Back pain    Interval History:  72M hx AAA s/p repair, recent findings of bilateral toes osteomyelitis (left AMA from [facility] prior to ortho eval and PICC placement), dysphagia/achalasia s/p dilation [facility]); admitted w back pain found to have suggestion of AAA endograft infection, as well as hyponatremia, hypokalemia.  He was found to have MSSA bacteremia, with source being toe osteo    4/18: Patient doing well, able to stand and walk. H/H stable     Review of Systems:  Review of Systems   Constitutional: Negative for fever and chills.   Cardiovascular: Negative for chest pain and palpitations.   Gastrointestinal: Negative for nausea, vomiting, blood in stool and melena.   Musculoskeletal: Positive for back pain (Improved) and joint pain.   Skin: Positive for rash (LLE).   Endo/Heme/Allergies: Does not bruise/bleed easily.   All other systems reviewed and are negative.      Physical Exam:  Physical Exam   Psychiatric:   Good spirits, very talkative   Nursing note and vitals reviewed.  Constitutional: He appears well-developed and well-nourished   HENT: Poor dentition, no e/o exudate, poor dentition  Eyes: Conjunctivae and extraocular motions are normal.    Neck: Normal range of motion. Neck supple.   Cardiovascular:  Normal rate and rhythm, systolic murmur +  Pulmonary/Chest: Effort normal, no accessory muscle use.   Abdominal: Bowel sounds are normal.   Musculoskeletal: Normal range of motion. No point tenderness over spine  Neurological: He is alert and oriented to person, place, and time.   Skin: Skin is warm and dry.  R-foot dressing C/D/I.  Macular rash on L anterior leg, that is non-blanching/petechial, not warm, non-tender.  Improving    Labs:        Invalid input(s): [id number]      No results for input(s): SODIUM, POTASSIUM, CHLORIDE, CO2, BUN, CREATININE,  MAGNESIUM, PHOSPHORUS, CALCIUM in the last 72 hours.  No results for input(s): ALTSGPT, ASTSGOT, ALKPHOSPHAT, TBILIRUBIN, DBILIRUBIN, GAMMAGT, AMYLASE, LIPASE, ALB, PREALBUMIN, GLUCOSE in the last 72 hours.  Recent Labs      17   1415  17   0245  17   0300   RBC  2.68*  2.39*  2.73*   HEMOGLOBIN  8.4*  7.4*  8.4*   HEMATOCRIT  25.7*  23.2*  26.6*   PLATELETCT  237  223  285     Recent Labs      17   1415  17   0245  17   0300   WBC  7.2  5.5  8.1           Hemodynamics:  Temp (24hrs), Av.9 °C (98.5 °F), Min:36.8 °C (98.3 °F), Max:37.2 °C (98.9 °F)  Temperature: 36.8 °C (98.3 °F)  Pulse  Av.2  Min: 56  Max: 114   Blood Pressure : 103/52 mmHg     Respiratory:    Respiration: 17, Pulse Oximetry: 95 %     Work Of Breathing / Effort: Mild  RUL Breath Sounds: Clear, RML Breath Sounds: Clear, RLL Breath Sounds: Clear, SOLEDAD Breath Sounds: Clear, LLL Breath Sounds: Clear  Fluids:    Intake/Output Summary (Last 24 hours) at 17 1442  Last data filed at 17 0500   Gross per 24 hour   Intake    500 ml   Output    450 ml   Net     50 ml        GI/Nutrition:  Orders Placed This Encounter   Procedures   • DIET ORDER     Standing Status: Standing      Number of Occurrences: 1      Standing Expiration Date:      Order Specific Question:  Diet:     Answer:  Low Fiber(GI Soft) [2]     Order Specific Question:  Texture/Fiber modifications:     Answer:  Dysphagia 3(Mechanical Soft)specify fluid consistency(question 6) [3]     Order Specific Question:  Consistency/Fluid modifications:     Answer:  Thin Liquids [3]     Medical Decision Making, by Problem:  Active Hospital Problems    Diagnosis   • Strep/MSSA Bacteremia - Bacteremia on cultures 3/9 from outside hospital.  *Osteomyelitis of 3rd R toe (CMS-HCC) s/p amputation on 3/25  -Unasyn through  --> PO Amoxicillin afterwards      • Back pain [M54.9] - judicious use of narcotics.  MRI imaging has been negative for compression  fracture.  No neurologic deficit  -Stable  -Oxycontin     • Anemia [D64.9] - s/p transfusion 4/2 and 4/13.  Hgb more stable, no observed blood loss and has been started on PO iron.    -Transfuse for Hgb <7  -Borderline Fe deficiency - FeSO4 replacement.  -Ok to continue lovenox unless e/o bleeding  -Hold off on therapeutic anticoagulation     • Chronic systolic CHF (congestive heart failure) (CMS-HCC) [I50.9] - echo shows mildly reduced EF of 55% c mod MR and PHTN.  -Lasix 20 PO daily     • Hydroureter - per urology due to fibrosis/scarring from AAA  -4/16 L ureteral stent placement  -Has good UOP, hematuria is expected per urology     • Atrial fibrillation (CMS-HCC) [I48.91] - recently cardioverted at [place].  Rate controlled.   -Eliquis was discontinued for procedures and shannon-op  -Now with ongoing low hgb, risk >benefit  -Hold off on eliquis (due to anemia) until more stable      • AAA endograft, he is S/P remote AAA (abdominal aortic aneurysm) repair and abnormality was seen on MRI.  Risk>benefit of removal and no evidence of infection on MRI/imaging.  -No surgical intervention needed     • Hyponatremia - resolved    • Noncompliance [Z91.19] - pt education and encouragement.  -Has left AMA previous admissions     • Tobacco dependence [F17.200]  -counseled  -Nicoderm     • Hep C w/o coma, chronic with cirrhosis (CMS-HCC) [B18.2] - outpatient follow up.     • C Dif Diarrhea - needs PO vanc through IV abx  -PO vanc BID (per ID recs) through 4/30     • Dysphagia [R13.10] /suspected achalasia - s/p EGD/injection.  Per GI ok to advance, however SLP previously recommended modified diet due to spitting up  -Outpatient GI follow up       EtOH - cessation education     Dispo - complex/guarded.  Await LTAC dispo  Was accepted to [facility], he refuses to go      Labs reviewed and Medications reviewed  Art catheter: No Art      DVT Prophylaxis: Enoxaparin (Lovenox)    Ulcer prophylaxis: Not indicated  Antibiotics:  Treating active infection/contamination beyond 24 hours perioperative coverage  Assessed for rehab: Patient was assess for and/or received rehabilitation services during this hospitalization

## 2017-04-18 NOTE — PROGRESS NOTES
Infectious Disease Progress Note    Author: JESS Tran DOS & Time created: 4/18/2017  10:04 AM    Chief Complaint   Patient presents with   • Low Back Pain   FU for osteomyelitis, GAS bacteremia and CDI, suspected endograft infection    Interval History:  3/17 AF, WBC 12.9, pt c/o lower back pain, asking for morphine, poor insight into illness, denies any diarrhea  3/18 AF, WBC 13.3, sleeping and not arousable to voice, cultures here negative to date  3/19 AF, WBC 13.4, frustrated about lower back pain not adequately controlled on current pain regimen, plan for EGD with dilatation today  3/20 AF WBC 9.6 +pain-denies SE abx  3/21 AF WBC 10.5 no new complaints  3/22 AF WBC not done tolerating abx well  3/23 AF eye feels a little better-pain about the same  3/24 AF tolerating abx well-MRI confirm OM  3/25 AF WBC 9.5 s/p toe amp this am  3/26 AF WBC 10.1 deneies any new sxs-  3/27 AF, WBC 9.1, sleeping but arousable to voice, back pain stable  3/28 Tmax 99.9, WBC 7.7, not happy about soft foods, and wants to go back on regular diet, having normal BMs, back pain controlled on pain regimen  3/29 AF, WBC 5.7, transferred to ortho, happy he took a shower today, plan of care discussed with pain, states he was told he has arthritis of his back, back pain stable, no diarrhea  3/30 AF, WBC 8.5, had a loose BM this am follow by normal BM, no abd pain  3/31 AF, WBC 9, nausea and vomiting this am, had soft BM this am  4/1 WBC 13.7, still having emesis, states he continues to be thirsty despite drinking lots of water yesterday, diarrhea improving and stools forming  4/2 AF, WBC 11.6, had nose bleed overnight, being transfused blood for Hg 6.6, tolerating pills  4/3- denies any diarrhea. No fevers.   4/4- no fevers. No diarrhea. Back pain under control.   4/6- no diarrhea. No fevers. Still throws up everything  4/7- no fevers. No new issues. The pain issues being addressed.  4/8- no fevers. No new issues  4/9-  "continues to regurgitate. No fevers.   4/10- AF, no CBC.  CRP 9.37.  Minimal lower back pain with pain medication regiment, 3/10.  Tolerating abx, no complaints.  - AF, no CBC.  Walking around room, denies overall pain.  Tolerating abx without complaints.  - AF, WBC 7.1.  Laying in bed, states he is \"freezing.\"  Denies pain, tolerating abx.  - AF, WBC 7.1.  H&H 6.5 & 20.3, received 1 unit PRBCs this morning.  Feeling better, especially since he had his pain pill this morning.  Denies overall pain, tolerating abx.   AF WBC 8.9 no new complaints  4/15 AF WBC 7.6 ambulating room-states \"they are going to do something with my kidney...stent\"   AF sleepy post-stent placement   Tm 99.3, WBC 5.5.  Feeling great as long as he gets his pain meds for his back.  Tolerating abx without SE.   AF, WBC 8.1.  Chronic lower back pain, 4/10 with pain meds.  Multiple questions regarding lifelong suppressive abx answered.  Labs Reviewed, Medications Reviewed, Radiology Reviewed and Wound Reviewed.    Review of Systems:  Review of Systems   Constitutional: Negative for fever and chills.   Respiratory: Negative for cough and shortness of breath.    Cardiovascular: Negative for chest pain.   Gastrointestinal: Negative for nausea, vomiting, abdominal pain, diarrhea and constipation.   Genitourinary: Negative for dysuria.   Musculoskeletal: Positive for back pain. Negative for joint pain.        Chronic, stable   Skin: Negative for rash.   Neurological: Negative for headaches.       Hemodynamics:  Temp (24hrs), Av.9 °C (98.5 °F), Min:36.8 °C (98.3 °F), Max:37.2 °C (98.9 °F)  Temperature: 36.8 °C (98.3 °F)  Pulse  Av.2  Min: 56  Max: 114   Blood Pressure : 103/52 mmHg       Physical Exam:  Physical Exam   Constitutional: He is oriented to person, place, and time. He appears well-developed. No distress.   HENT:   Head: Normocephalic and atraumatic.   Eyes: EOM are normal. Pupils are equal, round, and " reactive to light.   Neck: Normal range of motion. Neck supple.   Cardiovascular: Normal rate.    Murmur heard.  IRR   Pulmonary/Chest: Effort normal and breath sounds normal. No respiratory distress. He has no wheezes.   Abdominal: Soft. Bowel sounds are normal. He exhibits no distension. There is no tenderness.   Musculoskeletal: Normal range of motion. He exhibits edema.   Left 3rd toe amp site- Sutures in place, no drainage or erythema.  RUE PICC- non tender, no erythema.  +1 BLE edema   Neurological: He is alert and oriented to person, place, and time.   Skin: Skin is warm and dry. No rash noted. There is erythema.   Chronic changes  LLE erythema- improving   Nursing note and vitals reviewed.      Labs:  Recent Labs      04/16/17   1415  04/17/17   0245  04/18/17   0300   WBC  7.2  5.5  8.1   RBC  2.68*  2.39*  2.73*   HEMOGLOBIN  8.4*  7.4*  8.4*   HEMATOCRIT  25.7*  23.2*  26.6*   MCV  95.9  97.1  97.4   MCH  31.3  31.0  30.8   RDW  55.0*  56.4*  56.8*   PLATELETCT  237  223  285   MPV  8.7*  8.8*  8.9*     No results for input(s): SODIUM, POTASSIUM, CHLORIDE, CO2, GLUCOSE, BUN, CPKTOTAL in the last 72 hours.  No results for input(s): ALBUMIN, TBILIRUBIN, ALKPHOSPHAT, TOTPROTEIN, ALTSGPT, ASTSGOT, CREATININE in the last 72 hours.    BLOOD CULTURE   Date Value Ref Range Status   03/15/2017 No growth after 5 days of incubation.  Final    ':  Results     ** No results found for the last 168 hours. **          Imaging    DX-PYELOGRAM, RETROGRADE  4/16/2017  Impression      Left ureteral stent with loop formed over the renal pelvis  Aortic aneurysm with endograft     NM-RENAL WITH DIURETIC WASHOUT  4/15/2017   Impression      Findings consistent with at least partial obstruction of the left ureter likely related to scarring or fibrosis related to abdominal aortic aneurysm as noted on CT.     MR-ABDOMEN-WITH & W/O and MR-PELVIS WITH  4/9/2017  Impression      1. Evaluate for interval change in enhancement is  limited between current MR and prior lumbar MR due to difference in technique but there may be slightly less soft tissue enhancement surrounding the wall.  2. The size of the thrombosed infrarenal abdominal aortic aneurysm is grossly unchanged. No evidence of endoleak.  3. Patent aortobiiliac endograft.  4. Unchanged indentation of the aneurysm sac on the left psoas muscle. No evidence of invasion.  5. Small focal low signal T2 area with peripheral enhancement in the right psoas muscle is of unclear etiology and could relate to scarring or heterotopic calcification.     ECHOCARDIOGRAM COMP W/O CONT  4/9/2017   CONCLUSIONS  Normal left ventricular size and systolic function.  Left ventricular ejection fraction is visually estimated to be 55%.  Severe aortic stenosis.  Moderate mitral regurgitation.  Right heart pressures are consistent with moderate pulmonary   hypertension.  Severely dilated left atrium.    MR-FOOT-WITH & W/O RIGHT  3/23/2017  Impression      1. Abnormal signal in the distal aspect of the first distal phalanx is consistent with osteomyelitis.  2.  No abscess is identified.  3.  Susceptibility artifact in the plantar soft tissues at the level of the fourth tarsometatarsal joint and base of the third proximal phalanx, possibly related to prior surgery. Foreign body cannot be excluded.  4.  Diffuse edema in the forefoot.     3/17 MRI lumbar spine: There are changes secondary to the abdominal aortic aneurysm endograft. The aneurysm sac measures an approximately 7.6 x 6.7 cm in size. Abnormal contrast enhancement is noted within the wall of the aneurysmal sac. There is also abnormal soft tissue   contrast enhancement in the surrounding soft tissue. These findings are highly suspicious for infected thrombosed aneurysm sac. The possibility of endograft infection is more likely. There is irregular outpouching of the aneurysmal sac along the   posterior portion indenting the left psoas muscle.  2.  Free  fluid in the pelvis  3.  There is mild-to-moderate left hydronephrosis likely representing pelviureteric junction obstruction.    3/16 R foot xray - +OM  3/16 L foot xray +OM    Assessment:  Active Hospital Problems    Diagnosis   • *Osteomyelitis of toe (CMS-HCC) [M86.9]   • Bacteremia [R78.81]   • Back pain [M54.9]   • CHF (congestive heart failure) (CMS-HCC) [I50.9]   • Tobacco dependence [F17.200]   • Atrial fibrillation (CMS-HCC) [I48.91]    Endovascular infection       Plan:  Right great toe and left 3rd osteomyelitis  +OM on radiographs-confirmed on repeat MRIs  OSH wound cx on 3/9 - GAS, MSSA (I confirmed with [place] microlab)  S/p 3rd toe amp on 3/25. No purulence seen proximally to amp site  OR cx (left 3rd toe) on 3/25 - MSSA (S-unasyn) Vanco LINUS 2, MRSE  Continue IV Unasyn  Anticipate 6 weeks of IV abx. Stop date 04/27/17.    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  IV abx as above, follow it with PO Amoxicillin.    If pt tolerates Amoxicillin, he will need for lifelong suppression.  Had lengthy discussion regarding lifelong abx.    Clostridium difficile diarrhea  Completed vancomycin QID for 2 weeks on 03/29/17.   Continue PO Vancomycin BID while on IV abx with 3 additional days, stop date of 04/30/17.    Esophageal dysmotility-concern for achalasia  Stable    Leukocytosis. Resolved  Multifactorial  No new signs or symptoms of infection  Monitor    Back pain  Chronic per patient    Obstructive uropathy  S/p L ureteral stent on 4/16 with Dr. Whittington    Dispo:  Awaiting SNF placement for long-term abx, being re-evaluated by [facility] care and [facility].  Also being evaluated by [facility].

## 2017-04-19 VITALS
HEART RATE: 68 BPM | SYSTOLIC BLOOD PRESSURE: 109 MMHG | WEIGHT: 169.75 LBS | HEIGHT: 70 IN | DIASTOLIC BLOOD PRESSURE: 47 MMHG | OXYGEN SATURATION: 95 % | RESPIRATION RATE: 16 BRPM | TEMPERATURE: 97.4 F | BODY MASS INDEX: 24.3 KG/M2

## 2017-04-19 PROBLEM — D75.89 MACROCYTOSIS: Status: RESOLVED | Noted: 2017-03-21 | Resolved: 2017-04-19

## 2017-04-19 PROBLEM — E87.1 HYPONATREMIA: Status: RESOLVED | Noted: 2017-03-21 | Resolved: 2017-04-19

## 2017-04-19 PROBLEM — E87.6 HYPOKALEMIA: Status: RESOLVED | Noted: 2017-03-15 | Resolved: 2017-04-19

## 2017-04-19 PROBLEM — R78.81 BACTEREMIA: Status: RESOLVED | Noted: 2017-03-16 | Resolved: 2017-04-19

## 2017-04-19 LAB
ALBUMIN SERPL BCP-MCNC: 2.3 G/DL (ref 3.2–4.9)
BUN SERPL-MCNC: 9 MG/DL (ref 8–22)
CALCIUM SERPL-MCNC: 8 MG/DL (ref 8.5–10.5)
CHLORIDE SERPL-SCNC: 100 MMOL/L (ref 96–112)
CO2 SERPL-SCNC: 26 MMOL/L (ref 20–33)
CREAT SERPL-MCNC: 1.47 MG/DL (ref 0.5–1.4)
ERYTHROCYTE [DISTWIDTH] IN BLOOD BY AUTOMATED COUNT: 55.8 FL (ref 35.9–50)
GFR SERPL CREATININE-BSD FRML MDRD: 47 ML/MIN/1.73 M 2
GLUCOSE SERPL-MCNC: 114 MG/DL (ref 65–99)
HCT VFR BLD AUTO: 22.6 % (ref 42–52)
HGB BLD-MCNC: 7.2 G/DL (ref 14–18)
MCH RBC QN AUTO: 30.8 PG (ref 27–33)
MCHC RBC AUTO-ENTMCNC: 31.9 G/DL (ref 33.7–35.3)
MCV RBC AUTO: 96.6 FL (ref 81.4–97.8)
PHOSPHATE SERPL-MCNC: 3.7 MG/DL (ref 2.5–4.5)
PLATELET # BLD AUTO: 204 K/UL (ref 164–446)
PMV BLD AUTO: 9 FL (ref 9–12.9)
POTASSIUM SERPL-SCNC: 3.7 MMOL/L (ref 3.6–5.5)
RBC # BLD AUTO: 2.34 M/UL (ref 4.7–6.1)
SODIUM SERPL-SCNC: 135 MMOL/L (ref 135–145)
WBC # BLD AUTO: 8.8 K/UL (ref 4.8–10.8)

## 2017-04-19 PROCEDURE — A9270 NON-COVERED ITEM OR SERVICE: HCPCS | Performed by: FAMILY MEDICINE

## 2017-04-19 PROCEDURE — 99239 HOSP IP/OBS DSCHRG MGMT >30: CPT | Performed by: HOSPITALIST

## 2017-04-19 PROCEDURE — A9270 NON-COVERED ITEM OR SERVICE: HCPCS | Performed by: HOSPITALIST

## 2017-04-19 PROCEDURE — 700111 HCHG RX REV CODE 636 W/ 250 OVERRIDE (IP): Performed by: HOSPITALIST

## 2017-04-19 PROCEDURE — 700105 HCHG RX REV CODE 258: Performed by: INTERNAL MEDICINE

## 2017-04-19 PROCEDURE — 700102 HCHG RX REV CODE 250 W/ 637 OVERRIDE(OP): Performed by: FAMILY MEDICINE

## 2017-04-19 PROCEDURE — A9270 NON-COVERED ITEM OR SERVICE: HCPCS | Performed by: SURGERY

## 2017-04-19 PROCEDURE — 700102 HCHG RX REV CODE 250 W/ 637 OVERRIDE(OP): Performed by: HOSPITALIST

## 2017-04-19 PROCEDURE — A9270 NON-COVERED ITEM OR SERVICE: HCPCS | Performed by: INTERNAL MEDICINE

## 2017-04-19 PROCEDURE — 700102 HCHG RX REV CODE 250 W/ 637 OVERRIDE(OP): Performed by: SURGERY

## 2017-04-19 PROCEDURE — 700111 HCHG RX REV CODE 636 W/ 250 OVERRIDE (IP): Performed by: INTERNAL MEDICINE

## 2017-04-19 PROCEDURE — 700102 HCHG RX REV CODE 250 W/ 637 OVERRIDE(OP): Performed by: INTERNAL MEDICINE

## 2017-04-19 PROCEDURE — 700101 HCHG RX REV CODE 250: Performed by: FAMILY MEDICINE

## 2017-04-19 PROCEDURE — 85027 COMPLETE CBC AUTOMATED: CPT

## 2017-04-19 RX ORDER — FUROSEMIDE 20 MG/1
20 TABLET ORAL DAILY
Qty: 60 TAB
Start: 2017-04-19 | End: 2018-04-06

## 2017-04-19 RX ORDER — FERROUS SULFATE 325(65) MG
325 TABLET ORAL
Qty: 30 TAB
Start: 2017-04-19 | End: 2018-04-06

## 2017-04-19 RX ORDER — L. ACIDOPHILUS/L.BULGARICUS 100MM CELL
1 GRANULES IN PACKET (EA) ORAL
Start: 2017-04-19 | End: 2018-04-06

## 2017-04-19 RX ORDER — OXYCODONE HYDROCHLORIDE 10 MG/1
10 TABLET ORAL EVERY 4 HOURS PRN
Qty: 28 TAB
Start: 2017-04-19 | End: 2018-04-06

## 2017-04-19 RX ORDER — LIDOCAINE 50 MG/G
2 PATCH TOPICAL EVERY 24 HOURS
Qty: 10 PATCH
Start: 2017-04-19 | End: 2018-04-06

## 2017-04-19 RX ORDER — METHOCARBAMOL 500 MG/1
500 TABLET, FILM COATED ORAL 3 TIMES DAILY PRN
Start: 2017-04-19 | End: 2018-04-06

## 2017-04-19 RX ORDER — AMOXICILLIN 250 MG
1 CAPSULE ORAL DAILY
Qty: 30 TAB | Refills: 0
Start: 2017-04-19 | End: 2018-04-06

## 2017-04-19 RX ORDER — ACETAMINOPHEN 325 MG/1
650 TABLET ORAL EVERY 6 HOURS
Qty: 30 TAB | Refills: 0
Start: 2017-04-19 | End: 2018-04-06

## 2017-04-19 RX ORDER — OXYCODONE HCL 20 MG/1
20 TABLET, FILM COATED, EXTENDED RELEASE ORAL EVERY 12 HOURS
Qty: 60 EACH
Start: 2017-04-19 | End: 2018-04-06

## 2017-04-19 RX ORDER — POTASSIUM CHLORIDE 20 MEQ/1
40 TABLET, EXTENDED RELEASE ORAL 2 TIMES DAILY
Qty: 60 TAB | Refills: 11
Start: 2017-04-19 | End: 2018-04-06

## 2017-04-19 RX ORDER — AMIODARONE HYDROCHLORIDE 200 MG/1
200 TABLET ORAL DAILY
Qty: 30 TAB | Refills: 0
Start: 2017-04-19 | End: 2018-04-06

## 2017-04-19 RX ADMIN — LACTOBACILLUS ACIDOPHILUS / LACTOBACILLUS BULGARICUS 1 PACKET: 100 MILLION CFU STRENGTH GRANULES at 11:13

## 2017-04-19 RX ADMIN — AMPICILLIN SODIUM AND SULBACTAM SODIUM 3 G: 2; 1 INJECTION, POWDER, FOR SOLUTION INTRAMUSCULAR; INTRAVENOUS at 11:13

## 2017-04-19 RX ADMIN — VANCOMYCIN HYDROCHLORIDE 125 MG: 10 INJECTION, POWDER, LYOPHILIZED, FOR SOLUTION INTRAVENOUS at 08:35

## 2017-04-19 RX ADMIN — LACTOBACILLUS ACIDOPHILUS / LACTOBACILLUS BULGARICUS 1 PACKET: 100 MILLION CFU STRENGTH GRANULES at 08:35

## 2017-04-19 RX ADMIN — OXYCODONE HYDROCHLORIDE 15 MG: 5 TABLET ORAL at 06:46

## 2017-04-19 RX ADMIN — ACETAMINOPHEN 650 MG: 325 TABLET, FILM COATED ORAL at 05:29

## 2017-04-19 RX ADMIN — OXYCODONE HYDROCHLORIDE 10 MG: 5 TABLET ORAL at 11:13

## 2017-04-19 RX ADMIN — NICOTINE 7 MG: 7 PATCH TRANSDERMAL at 05:28

## 2017-04-19 RX ADMIN — GABAPENTIN 600 MG: 300 CAPSULE ORAL at 08:35

## 2017-04-19 RX ADMIN — ACETAMINOPHEN 650 MG: 325 TABLET, FILM COATED ORAL at 11:13

## 2017-04-19 RX ADMIN — ENOXAPARIN SODIUM 40 MG: 100 INJECTION SUBCUTANEOUS at 08:36

## 2017-04-19 RX ADMIN — AMIODARONE HYDROCHLORIDE 200 MG: 200 TABLET ORAL at 08:35

## 2017-04-19 RX ADMIN — METHOCARBAMOL 500 MG: 500 TABLET ORAL at 14:31

## 2017-04-19 RX ADMIN — OXYCODONE HYDROCHLORIDE 20 MG: 20 TABLET, FILM COATED, EXTENDED RELEASE ORAL at 08:35

## 2017-04-19 RX ADMIN — OXYCODONE HYDROCHLORIDE 15 MG: 5 TABLET ORAL at 03:10

## 2017-04-19 RX ADMIN — METHOCARBAMOL 500 MG: 500 TABLET ORAL at 08:35

## 2017-04-19 RX ADMIN — LIDOCAINE 2 PATCH: 50 PATCH CUTANEOUS at 11:13

## 2017-04-19 RX ADMIN — OXYCODONE HYDROCHLORIDE 10 MG: 5 TABLET ORAL at 14:31

## 2017-04-19 RX ADMIN — FERROUS SULFATE TAB 325 MG (65 MG ELEMENTAL FE) 325 MG: 325 (65 FE) TAB at 08:35

## 2017-04-19 RX ADMIN — FUROSEMIDE 20 MG: 20 TABLET ORAL at 08:35

## 2017-04-19 RX ADMIN — GABAPENTIN 600 MG: 300 CAPSULE ORAL at 14:31

## 2017-04-19 RX ADMIN — POTASSIUM CHLORIDE 40 MEQ: 1500 TABLET, EXTENDED RELEASE ORAL at 08:35

## 2017-04-19 RX ADMIN — AMPICILLIN SODIUM AND SULBACTAM SODIUM 3 G: 2; 1 INJECTION, POWDER, FOR SOLUTION INTRAMUSCULAR; INTRAVENOUS at 04:42

## 2017-04-19 ASSESSMENT — ENCOUNTER SYMPTOMS
HEADACHES: 0
NAUSEA: 0
SHORTNESS OF BREATH: 0
SENSORY CHANGE: 0
COUGH: 0
CHILLS: 0
FEVER: 0
CONSTIPATION: 0
ABDOMINAL PAIN: 0
BACK PAIN: 1
DIARRHEA: 0
VOMITING: 0

## 2017-04-19 ASSESSMENT — PAIN SCALES - GENERAL
PAINLEVEL_OUTOF10: 6
PAINLEVEL_OUTOF10: 6
PAINLEVEL_OUTOF10: 7
PAINLEVEL_OUTOF10: 6

## 2017-04-19 NOTE — DISCHARGE PLANNING
Patient has been accepted by [facility] per Sobeida. QUINTEN Gutierrez notified.

## 2017-04-19 NOTE — DISCHARGE PLANNING
Transport arranged with Ellen. PAtient will leave today @ 1530 via [facility] going to [facility]. NAZARIO Gonzalez notified.

## 2017-04-19 NOTE — DISCHARGE SUMMARY
CHIEF COMPLAINT ON ADMISSION  Chief Complaint   Patient presents with   • Low Back Pain       CODE STATUS  Full Code    HPI & HOSPITAL COURSE  This is a 72 y.o. year old male here admitted on 03/16/17 after he recently left [facility] against medical advice. During hospitalization and [facility] from March 9 to March 14, patient was found to have new onset A. fib with RVR for which he was cardioverted after a transesophageal echocardiogram. He was also found to have bacteremia with group A strep, for which he was supposed to be discharged with PICC line on long-term antibiotics. He was found to have osteomyelitis of right big toe, however was not evaluated by orthopedics prior to leaving [place]. Patient's hospital course at [facility] was complicated by dysphagia, for which GI was consulted and he underwent distal esophageal dilation. Patient was also noted to have C. difficile colitis. He initially presented to the hospital with low back pain, for which an initial x-ray was done consistent with osteoarthritis. Patient was started on IV Unasyn and PO vancomycin and ID was consulted for group A strep bacteremia, MSSA right great toe osteomyelitis and C. difficile colitis. Her back pain and an MRI of lumbar spine was done which showed possibility of infection and abdominal aortic aneurysm endograft which was done about 5 years ago for which vascular surgery was consulted. No intervention was done and patient was continued on IV antibiotics as high mortality related to explantation of endograft.     Orthopedic physician was consulted for right big toe and the left third toe osteomyelitis. Patient was found to have an ulcer and osteomyelitis of the left third toe, however not in right great toe. Limb preservation service was consulted and vascular studies performed. Patient eventually was taken to the OR on 3/25 and amputation was done of left third toe.    GI was consulted for patient's dysphagia, for which he had an EGD  consistent with distal esophageal obstruction, manometry was done and patient was found to have achalasia. Botox injection was done during second EGD.    Patient was also found to have chronic CHF. He was initially started on IV Lasix 20 mg twice a day. He was continued on [place] for each of fibrillation post status cardioversion. He was started on Lopressor and amiodarone as per cardiology recommendations from [place].     On imaging, patient was found to have hydroureteronephrosis on the left for which urology was consulted. MAG3 renal scan was done. Patient had left ureteral stent placed on 4/16. He continues to have good urine output afterwards.    Patient was found to have anemia, for which she received blood transfusion twice. Hemoglobin has been stable since and his continued on iron supplementation. Hemoglobin continues to be at baseline of 7-8 g/dL. We'll recommend to resume Eliquis when Hb more stable and close to his baseline of 11 gm or no signs/symptoms of bleeding.     Patient is now stable for discharge to a skilled facility for continued IV antibiotics through 4/27/17. Per ID patient should continue PO amoxicillin 500 mg BID for chronic suppression given graft in setting of bacteremia.He was evaluated by speech therapy while inpatient and recommended diet as tolerated per GI. He was evaluated by PT while inpatient and was recommended discharge to a care facility for continued skilled, however refused further reevaluation.      SPECIFIC OUTPATIENT FOLLOW-UP  Urology- ureteral stent replacement every 6 months  Orthopedic surgery  Gastroenterology  Primary care physician  Cardiology   Infectious disease 1 month    DISCHARGE PROBLEM LIST  Principal Problem:    Osteomyelitis of toe (CMS-HCC) POA: Unknown  Active Problems:    Back pain POA: Unknown    C. difficile diarrhea POA: Unknown    S/P AAA (abdominal aortic aneurysm) repair POA: Yes    Atrial fibrillation (CMS-HCC) POA: Yes    CHF (congestive  heart failure) (CMS-HCC) POA: Unknown    Anemia POA: Unknown    Tobacco dependence POA: Unknown    Noncompliance POA: Unknown    Hep C w/o coma, chronic (CMS-HCC) POA: Unknown    Dysphagia POA: Unknown    CAD (coronary artery disease) POA: Unknown    Cirrhosis of liver (CMS-HCC) POA: Unknown    Transaminitis POA: Unknown    Achalasia POA: Unknown    Severe aortic stenosis POA: Unknown    Ureteral obstruction, left- s/p L ureteral stent POA: Unknown  Resolved Problems:    Bacteremia POA: Unknown    Hypokalemia POA: Unknown    Hyponatremia POA: Unknown    Macrocytosis POA: Unknown    Cirrhosis (CMS-HCC) POA: Unknown    AAA (abdominal aortic aneurysm) (CMS-HCC) POA: Unknown      FOLLOW UP  Needs follow up with infectious disease in 1 month   Pcp Not In Computer          Pcp Not In Computer          Wm Cottrell M.D.  [address] [place] [address]  [phone]    Schedule an appointment as soon as possible for a visit in 2 weeks        MEDICATIONS ON DISCHARGE     Medication List      START taking these medications       Instructions    acetaminophen 325 MG Tabs   Last time this was given:  650 mg on 4/19/2017 11:13 AM   Commonly known as:  TYLENOL    Take 2 Tabs by mouth every 6 hours.   Dose:  650 mg       amiodarone 200 MG Tabs   Last time this was given:  200 mg on 4/19/2017  8:35 AM   Commonly known as:  CORDARONE    Take 1 Tab by mouth every day.   Dose:  200 mg       amoxicillin 500 MG Caps   Commonly known as:  AMOXIL    Doctor's comments:    - Until further follow up with Infectious disease    - Start 4/28/17   Take 1 Cap by mouth 2 times a day.   Dose:  500 mg       ferrous sulfate 325 (65 FE) MG tablet   Last time this was given:  325 mg on 4/19/2017  8:35 AM    Take 1 Tab by mouth every morning with breakfast.   Dose:  325 mg       furosemide 20 MG Tabs   Last time this was given:  20 mg on 4/19/2017  8:35 AM   Commonly known as:  LASIX    Take 1 Tab by mouth every day.   Dose:  20 mg        lactobacillus granules Pack   Last time this was given:  1 Packet on 4/19/2017 11:13 AM    Take 1 Packet by mouth 3 times a day, with meals.   Dose:  1 Packet       lidocaine 5 % Ptch   Last time this was given:  2 Patches on 4/19/2017 11:13 AM   Commonly known as:  LIDODERM    Apply 2 Patches to skin as directed every 24 hours.   Dose:  2 Patch       methocarbamol 500 MG Tabs   Last time this was given:  500 mg on 4/19/2017  2:31 PM   Commonly known as:  ROBAXIN    Take 1 Tab by mouth 3 times a day as needed (muscle pain/spasm).   Dose:  500 mg       nicotine 7 MG/24HR Pt24   Last time this was given:  7 mg on 4/19/2017  5:28 AM   Commonly known as:  NICODERM    Apply 1 Patch to skin as directed every 24 hours.   Dose:  1 Patch       NS SOLN 100 mL with ampicillin/sulbactam 3 (2-1) G SOLR 3 g   Last time this was given:  3 g on 4/19/2017 11:13 AM    Doctor's comments:  Continue through 4/27/27   3 g by Intravenous route every 6 hours.   Dose:  3 g       * oxyCODONE CR 20 MG T12a   Last time this was given:  20 mg on 4/19/2017  8:35 AM   Commonly known as:  OXYCONTIN    Take 1 Tab by mouth every 12 hours.   Dose:  20 mg       * oxycodone immediate release 10 MG immediate release tablet   Last time this was given:  10 mg on 4/19/2017  2:31 PM   Commonly known as:  ROXICODONE    Take 1 Tab by mouth every four hours as needed for Severe Pain.   Dose:  10 mg       potassium chloride SA 20 MEQ Tbcr   Last time this was given:  40 mEq on 4/19/2017  8:35 AM   Commonly known as:  Kdur    Take 2 Tabs by mouth 2 Times a Day.   Dose:  40 mEq       senna-docusate 8.6-50 MG Tabs   Last time this was given:  1 Tab on 4/17/2017  8:25 PM   Commonly known as:  PERICOLACE or SENOKOT S    Take 1 Tab by mouth every day.   Dose:  1 Tab       vancomycin 50 mg/mL 50 mg/mL Soln   Last time this was given:  125 mg on 4/19/2017  8:35 AM    Doctor's comments:  Continue till 4/30/17   Take 2.5 mL by mouth 2 Times a Day.   Dose:  125 mg       *  Notice:  This list has 2 medication(s) that are the same as other medications prescribed for you. Read the directions carefully, and ask your doctor or other care provider to review them with you.      CONTINUE taking these medications       Instructions    gabapentin 300 MG Caps   Last time this was given:  600 mg on 4/19/2017  2:31 PM   Commonly known as:  NEURONTIN    Take 300 mg by mouth 3 times a day.   Dose:  300 mg       simvastatin 20 MG Tabs   Last time this was given:  20 mg on 4/18/2017  8:46 PM   Commonly known as:  ZOCOR    Take 20 mg by mouth every evening.   Dose:  20 mg                 DIET  Orders Placed This Encounter   Procedures   • DIET ORDER     Standing Status: Standing      Number of Occurrences: 1      Standing Expiration Date:      Order Specific Question:  Diet:     Answer:  Low Fiber(GI Soft) [2]     Order Specific Question:  Texture/Fiber modifications:     Answer:  Dysphagia 3(Mechanical Soft)specify fluid consistency(question 6) [3]     Order Specific Question:  Consistency/Fluid modifications:     Answer:  Thin Liquids [3]       ACTIVITY  As tolerated and directed by skilled nursing.  Class 1 - no symptoms of any kind, and for whom ordinary physical activity does not cause fatigue, palpitations, dyspnea, or anginal pain.    LINES, DRAINS, AND WOUNDS  This is an automated list. Peripheral IVs will be removed prior to discharge.  Central Line Group 2 (B) Right;Basilic Single Lumen;PICC 4 (Active)   Line Length (cm) 41 cm 3/24/2017  9:30 AM   Line Secured Securing Device;Transparent 4/18/2017  8:00 PM   Patency and Function Check Performed at Beginning of Shift 4/18/2017  8:00 PM   Line Necessity Assessed Antibiotic Therapy Greater than 7 Days 4/18/2017  8:00 PM   Consider Removal of Femoral Line Not Applicable 4/18/2017  8:00 PM   Closed Tubing Set Up Yes 4/18/2017  8:00 PM   Hand Washing / Gloves Prior to Every Access Yes 4/18/2017  8:00 PM   Next Daily Chlorhexidine Bath Due (Regional  ONLY) 04/19/17 4/18/2017  8:00 PM   Port Access  Scrub the Hub Prior to Access 4/18/2017  8:00 PM   Site Condition / Description Assessed 4/18/2017  8:00 PM   Signs and Symptoms of Infection None Apparent at this Time 4/18/2017  8:00 PM   Dressing Type / Description Antimicrobial Patch (BioPatch) 4/18/2017  8:00 PM   Dressing Status Changed with Sterile Field 4/18/2017  8:00 PM   Next Dressing Change  04/22/17 4/18/2017  8:00 PM   Date Primary Tubing Changed 04/18/17 4/18/2017  8:00 PM   Date Secondary Tubing Changed 04/18/17 4/18/2017  8:00 PM   NEXT Primary Tubing Change  04/21/17 4/18/2017  8:00 PM   NEXT Secondary Tubing Change  04/19/17 4/18/2017  8:00 PM   Date IV Connector(s) Changed 04/22/17 4/18/2017  8:00 PM   NEXT IV Connector(s) Change Date 04/15/17 4/10/2017 10:00 PM       Surgical Incision  Incision Left Toes to Tourniquet (Active)   Wound Bed Other (comment) 4/17/2017  8:00 PM   Drainage  None 4/18/2017  7:20 AM   Periwound Skin Other (Comments) 4/18/2017  7:20 AM   Daily - Wound Closure Not Assessed 4/18/2017  7:20 AM   Dressing Options Dry Gauze 4/18/2017  7:20 AM   Dressing Status / Change Dry;Intact;Observed 4/18/2017  7:20 AM   Daily - Dressing Change Observed 4/18/2017  7:20 AM   Dressing Change Frequency Daily 4/18/2017  7:20 AM   Next Dressing Change  04/19/17 4/18/2017  7:20 AM       Incision  Right Groin (Active)       Incision  Left Groin (Active)                  MENTAL STATUS ON TRANSFER  Level of Consciousness: Alert  Orientation : Oriented x 4  Speech: Speech Clear    CONSULTATIONS  Orthopedic surgery  Urology  Gastroenterology  Infectious disease    PROCEDURES  Left ureteral stent placement  Left 3rd toe amputation  EGD with botox       LABORATORY  Lab Results   Component Value Date/Time    SODIUM 135 04/18/2017 01:30 AM    POTASSIUM 3.7 04/18/2017 01:30 AM    CHLORIDE 100 04/18/2017 01:30 AM    CO2 26 04/18/2017 01:30 AM    GLUCOSE 114* 04/18/2017 01:30 AM    BUN 9 04/18/2017 01:30 AM     CREATININE 1.47* 04/18/2017 01:30 AM        Lab Results   Component Value Date/Time    WBC 8.8 04/19/2017 01:15 AM    HEMOGLOBIN 7.2* 04/19/2017 01:15 AM    HEMATOCRIT 22.6* 04/19/2017 01:15 AM    PLATELET COUNT 204 04/19/2017 01:15 AM        Total time of the discharge process exceeds 45 minutes.

## 2017-04-19 NOTE — DISCHARGE PLANNING
Radha at [facility] needs call back at  regarding approvals given for transfers ( [facility] and [facility])  Please contact today.

## 2017-04-19 NOTE — CARE PLAN
Problem: Pain Management  Goal: Pain level will decrease to patient’s comfort goal  Intervention: Follow pain managment plan developed in collaboration with patient and Interdisciplinary Team  Patient continues to c/o back pain, RN administers scheduled and PRN pain medications as ordered by MD. RN educates on alternate pain measures such as distraction, walking and cold pack.      Problem: Skin Integrity  Goal: Risk for impaired skin integrity will decrease  Intervention: Assess and monitor skin integrity, appearance and/or temperature  Skin intact; patient able to transfer, reposition and ambulate independently. Patients appetite is good and he is eating regular meals.

## 2017-04-19 NOTE — DISCHARGE PLANNING
Medical Social Work    Discharge Plan: Pt is medically cleared to transfer to SNF today. Pt has been accepted to [facility]. Transport arranged for 1530. . SW notified the nurse of the transport.    Number to call for report: [phone]    Care Transition Team Final Discharge Disposition    Actual Discharge Information  Actual Discharge Date: 04/19/17  Care Transitions Team Assisting with Transportation: Yes  Method of Transportation: Van  Scheduled Transportation Date: 04/19/17  Scheduled Transportation Time: 1530

## 2017-04-19 NOTE — DISCHARGE PLANNING
Medical Social Work    SW spoke with Lore from [facility] this morning who reported that they are still pending insurance auth.

## 2017-04-19 NOTE — PROGRESS NOTES
Vascular  Patient feeling better.  Has left ureteral stent to be changed q 6 months.  Continues on antibiotics per ID.  Still has some regurgitation.  Back pain controlled. Edema bilateral legs, dependent.  AAA endograft, no changes apparent.    Continue to follow

## 2017-04-19 NOTE — DISCHARGE INSTRUCTIONS
Discharge Instructions    Discharged to other by [facility] with escort. Discharged via wheelchair, hospital escort: Yes.  Special equipment needed: Not Applicable    Be sure to schedule a follow-up appointment with your primary care doctor or any specialists as instructed.     Discharge Plan:   Influenza Vaccine Indication: Not indicated: Previously immunized this influenza season and > 8 years of age    I understand that a diet low in cholesterol, fat, and sodium is recommended for good health. Unless I have been given specific instructions below for another diet, I accept this instruction as my diet prescription.   Other diet: regular    Special Instructions: None    · Is patient discharged on Warfarin / Coumadin?   No     · Is patient Post Blood Transfusion?  No    Sutured Wound Care  Sutures are stitches that can be used to close wounds. Taking care of your wound properly can help prevent pain and infection. It can also help your wound to heal more quickly.  HOW TO CARE FOR YOUR SUTURED WOUND  Wound Care   · Keep the wound clean and dry.  · If you were given a bandage (dressing), change it at least one time per day or as told by your doctor. You should also change it if it gets wet or dirty.  · Keep the wound completely dry for the first 24 hours or as told by your doctor. After that time, you may shower or bathe. However, make sure that the wound is not soaked in water until the sutures have been removed.  · Clean the wound one time each day or as told by your doctor.  ¨ Wash the wound with soap and water.  ¨ Rinse the wound with water to remove all soap.  ¨ Pat the wound dry with a clean towel. Do not rub the wound.  · After cleaning the wound, put a thin layer of antibiotic ointment on it as told your doctor. This ointment:  ¨ Helps to prevent infection.  ¨ Keeps the bandage from sticking to the wound.  · Have the sutures removed as told by your doctor.  General Instructions  · Take or apply medicines only  as told by your doctor.  · To help prevent scarring, make sure to cover your wound with sunscreen whenever you are outside after the sutures are removed and the wound is healed. Make sure to wear a sunscreen of at least 30 SPF.  · If you were prescribed an antibiotic medicine or ointment, finish all of it even if you start to feel better.  · Do not scratch or pick at the wound.  · Keep all follow-up visits as told by your doctor. This is important.  · Check your wound every day for signs of infection. Watch for:  ¨ Redness, swelling, or pain.  ¨ Fluid, blood, or pus.  · Raise (elevate) the injured area above the level of your heart while you are sitting or lying down, if possible.  · Avoid stretching your wound.  · Drink enough fluids to keep your pee (urine) clear or pale yellow.  GET HELP IF:  · You were given a tetanus shot and you have any of these where the needle went in:  ¨ Swelling.  ¨ Very bad pain.  ¨ Redness.  ¨ Bleeding.  · You have a fever.  · A wound that was closed breaks open.  · You notice a bad smell coming from the wound.  · You notice something coming out of the wound, such as wood or glass.  · Medicine does not help your pain.  · You have any of these at the site of the wound.  ¨ More redness.  ¨ More swelling.  ¨ More pain.  · You have any of these coming from the wound.  ¨ Fluid.  ¨ Blood.  ¨ Pus.  · You notice a change in the color of your skin near the wound.  · You need to change the bandage often due to fluid, blood, or pus coming from the wound.  · You have a new rash.  · You have numbness around the wound.  GET HELP RIGHT AWAY IF:  · You have very bad swelling around the wound.  · Your pain suddenly gets worse and is very bad.  · You have painful lumps near the wound or on skin that is anywhere on your body.  · You have a red streak going away from the wound.  · The wound is on your hand or foot and you cannot move a finger or toe like normal.  · The wound is on your hand or foot and you  notice that your fingers or toes look pale or bluish.     This information is not intended to replace advice given to you by your health care provider. Make sure you discuss any questions you have with your health care provider.     Document Released: 06/05/2009 Document Revised: 05/03/2016 Document Reviewed: 12/14/2015  [employer] Interactive Patient Education ©2016 [employer]      Depression / Suicide Risk    As you are discharged from this [facility] facility, it is important to learn how to keep safe from harming yourself.    Recognize the warning signs:  · Abrupt changes in personality, positive or negative- including increase in energy   · Giving away possessions  · Change in eating patterns- significant weight changes-  positive or negative  · Change in sleeping patterns- unable to sleep or sleeping all the time   · Unwillingness or inability to communicate  · Depression  · Unusual sadness, discouragement and loneliness  · Talk of wanting to die  · Neglect of personal appearance   · Rebelliousness- reckless behavior  · Withdrawal from people/activities they love  · Confusion- inability to concentrate     If you or a loved one observes any of these behaviors or has concerns about self-harm, here's what you can do:  · Talk about it- your feelings and reasons for harming yourself  · Remove any means that you might use to hurt yourself (examples: pills, rope, extension cords, firearm)  · Get professional help from the community (Mental Health, Substance Abuse, psychological counseling)  · Do not be alone:Call your Safe Contact- someone whom you trust who will be there for you.  · Call your local CRISIS HOTLINE 341-2952 or 029-732-7062  · Call your local Children's Mobile Crisis Response Team [place] (759) 436-9082 or www.[employer]  · Call the toll free National Suicide Prevention Hotlines   · National Suicide Prevention Lifeline 845-320-HFSE (2234)  · National Hope Line Network 800-SUICIDE  (828-4018)  Managing Your High Blood Pressure  Blood pressure is a measurement of how forceful your blood is pressing against the walls of the arteries. Arteries are muscular tubes within the circulatory system. Blood pressure does not stay the same. Blood pressure rises when you are active, excited, or nervous; and it lowers during sleep and relaxation. If the numbers measuring your blood pressure stay above normal most of the time, you are at risk for health problems. High blood pressure (hypertension) is a long-term (chronic) condition in which blood pressure is elevated.  A blood pressure reading is recorded as two numbers, such as 120 over 80 (or 120/80). The first, higher number is called the systolic pressure. It is a measure of the pressure in your arteries as the heart beats. The second, lower number is called the diastolic pressure. It is a measure of the pressure in your arteries as the heart relaxes between beats.   Keeping your blood pressure in a normal range is important to your overall health and prevention of health problems, such as heart disease and stroke. When your blood pressure is uncontrolled, your heart has to work harder than normal. High blood pressure is a very common condition in adults because blood pressure tends to rise with age. Men and women are equally likely to have hypertension but at different times in life. Before age 45, men are more likely to have hypertension. After 65 years of age, women are more likely to have it. Hypertension is especially common in  Americans. This condition often has no signs or symptoms. The cause of the condition is usually not known. Your caregiver can help you come up with a plan to keep your blood pressure in a normal, healthy range.  BLOOD PRESSURE STAGES  Blood pressure is classified into four stages: normal, prehypertension, stage 1, and stage 2. Your blood pressure reading will be used to determine what type of treatment, if any, is  necessary. Appropriate treatment options are tied to these four stages:   Normal  Systolic pressure (mm Hg): below 120.  Diastolic pressure (mm Hg): below 80.  Prehypertension  Systolic pressure (mm Hg): 120 to 139.  Diastolic pressure (mm Hg): 80 to 89.  Stage 1  Systolic pressure (mm Hg): 140 to 159.  Diastolic pressure (mm Hg): 90 to 99.  Stage 2  Systolic pressure (mm Hg): 160 or above.  Diastolic pressure (mm Hg): 100 or above.  RISKS RELATED TO HIGH BLOOD PRESSURE  Managing your blood pressure is an important responsibility. Uncontrolled high blood pressure can lead to:  A heart attack.  A stroke.  A weakened blood vessel (aneurysm).  Heart failure.  Kidney damage.  Eye damage.  Metabolic syndrome.  Memory and concentration problems.  HOW TO MANAGE YOUR BLOOD PRESSURE  Blood pressure can be managed effectively with lifestyle changes and medicines (if needed). Your caregiver will help you come up with a plan to bring your blood pressure within a normal range. Your plan should include the following:  Education  Read all information provided by your caregivers about how to control blood pressure.  Educate yourself on the latest guidelines and treatment recommendations. New research is always being done to further define the risks and treatments for high blood pressure.  Lifestyle changes  Control your weight.  Avoid smoking.  Stay physically active.  Reduce the amount of salt in your diet.  Reduce stress.  Control any chronic conditions, such as high cholesterol or diabetes.  Reduce your alcohol intake.  Medicines  Several medicines (antihypertensive medicines) are available, if needed, to bring blood pressure within a normal range.  Communication  Review all the medicines you take with your caregiver because there may be side effects or interactions.  Talk with your caregiver about your diet, exercise habits, and other lifestyle factors that may be contributing to high blood pressure.  See your caregiver  regularly. Your caregiver can help you create and adjust your plan for managing high blood pressure.  RECOMMENDATIONS FOR TREATMENT AND FOLLOW-UP   The following recommendations are based on current guidelines for managing high blood pressure in nonpregnant adults. Use these recommendations to identify the proper follow-up period or treatment option based on your blood pressure reading. You can discuss these options with your caregiver.  Systolic pressure of 120 to 139 or diastolic pressure of 80 to 89: Follow up with your caregiver as directed.  Systolic pressure of 140 to 160 or diastolic pressure of 90 to 100: Follow up with your caregiver within 2 months.  Systolic pressure above 160 or diastolic pressure above 100: Follow up with your caregiver within 1 month.  Systolic pressure above 180 or diastolic pressure above 110: Consider antihypertensive therapy; follow up with your caregiver within 1 week.  Systolic pressure above 200 or diastolic pressure above 120: Begin antihypertensive therapy; follow up with your caregiver within 1 week.     This information is not intended to replace advice given to you by your health care provider. Make sure you discuss any questions you have with your health care provider.     Document Released: 09/11/2013 Document Reviewed: 09/11/2013  [employer] Interactive Patient Education ©2016 [employer]

## 2017-04-19 NOTE — PROGRESS NOTES
Infectious Disease Progress Note    Author: JESS Tran DOS & Time created: 4/19/2017  2:29 PM    Chief Complaint   Patient presents with   • Low Back Pain   FU for osteomyelitis, GAS bacteremia and CDI, suspected endograft infection    Interval History:  3/17 AF, WBC 12.9, pt c/o lower back pain, asking for morphine, poor insight into illness, denies any diarrhea  3/18 AF, WBC 13.3, sleeping and not arousable to voice, cultures here negative to date  3/19 AF, WBC 13.4, frustrated about lower back pain not adequately controlled on current pain regimen, plan for EGD with dilatation today  3/20 AF WBC 9.6 +pain-denies SE abx  3/21 AF WBC 10.5 no new complaints  3/22 AF WBC not done tolerating abx well  3/23 AF eye feels a little better-pain about the same  3/24 AF tolerating abx well-MRI confirm OM  3/25 AF WBC 9.5 s/p toe amp this am  3/26 AF WBC 10.1 deneies any new sxs-  3/27 AF, WBC 9.1, sleeping but arousable to voice, back pain stable  3/28 Tmax 99.9, WBC 7.7, not happy about soft foods, and wants to go back on regular diet, having normal BMs, back pain controlled on pain regimen  3/29 AF, WBC 5.7, transferred to ortho, happy he took a shower today, plan of care discussed with pain, states he was told he has arthritis of his back, back pain stable, no diarrhea  3/30 AF, WBC 8.5, had a loose BM this am follow by normal BM, no abd pain  3/31 AF, WBC 9, nausea and vomiting this am, had soft BM this am  4/1 WBC 13.7, still having emesis, states he continues to be thirsty despite drinking lots of water yesterday, diarrhea improving and stools forming  4/2 AF, WBC 11.6, had nose bleed overnight, being transfused blood for Hg 6.6, tolerating pills  4/3- denies any diarrhea. No fevers.   4/4- no fevers. No diarrhea. Back pain under control.   4/6- no diarrhea. No fevers. Still throws up everything  4/7- no fevers. No new issues. The pain issues being addressed.  4/8- no fevers. No new issues  4/9-  "continues to regurgitate. No fevers.   4/10- AF, no CBC.  CRP 9.37.  Minimal lower back pain with pain medication regiment, 3/10.  Tolerating abx, no complaints.  - AF, no CBC.  Walking around room, denies overall pain.  Tolerating abx without complaints.  - AF, WBC 7.1.  Laying in bed, states he is \"freezing.\"  Denies pain, tolerating abx.  - AF, WBC 7.1.  H&H 6.5 & 20.3, received 1 unit PRBCs this morning.  Feeling better, especially since he had his pain pill this morning.  Denies overall pain, tolerating abx.   AF WBC 8.9 no new complaints  4/15 AF WBC 7.6 ambulating room-states \"they are going to do something with my kidney...stent\"   AF sleepy post-stent placement   Tm 99.3, WBC 5.5.  Feeling great as long as he gets his pain meds for his back.  Tolerating abx without SE.   AF, WBC 8.1.  Chronic lower back pain, /10 with pain meds.  Multiple questions regarding lifelong suppressive abx answered.   AF, WBC 8.8.  Continues to have chronic lower back pain, 3/10 improved with scheduled pain meds.  Tolerating abx.   Labs Reviewed, Medications Reviewed, Radiology Reviewed and Wound Reviewed.    Review of Systems:  Review of Systems   Constitutional: Negative for fever and chills.   Respiratory: Negative for cough and shortness of breath.    Cardiovascular: Negative for chest pain.   Gastrointestinal: Negative for nausea, vomiting, abdominal pain, diarrhea and constipation.   Genitourinary: Negative for dysuria.   Musculoskeletal: Positive for back pain. Negative for joint pain.        Chronic, stable   Skin: Negative for rash.   Neurological: Negative for sensory change and headaches.       Hemodynamics:  Temp (24hrs), Av.6 °C (97.9 °F), Min:36.3 °C (97.4 °F), Max:37.2 °C (98.9 °F)  Temperature: 36.3 °C (97.4 °F)  Pulse  Av.2  Min: 56  Max: 114   Blood Pressure : 109/47 mmHg       Physical Exam:  Physical Exam   Constitutional: He is oriented to person, place, and time. He " appears well-developed. No distress.   HENT:   Head: Normocephalic and atraumatic.   Eyes: EOM are normal. Pupils are equal, round, and reactive to light.   Neck: Normal range of motion. Neck supple.   Cardiovascular: Normal rate.    Murmur heard.  IRR   Pulmonary/Chest: Effort normal and breath sounds normal. No respiratory distress.   Abdominal: Soft. Bowel sounds are normal. He exhibits no distension. There is no tenderness.   Musculoskeletal: Normal range of motion. He exhibits edema. He exhibits no tenderness.   Left 3rd toe amp site- Sutures in place, no drainage or erythema.  RUE PICC- non tender, no erythema.  +1 BLE edema   Neurological: He is alert and oriented to person, place, and time.   Skin: Skin is warm and dry. No rash noted. There is erythema.   Chronic changes  LLE erythema- improving   Nursing note and vitals reviewed.      Labs:  Recent Labs      04/17/17   0245  04/18/17   0300  04/19/17   0115   WBC  5.5  8.1  8.8   RBC  2.39*  2.73*  2.34*   HEMOGLOBIN  7.4*  8.4*  7.2*   HEMATOCRIT  23.2*  26.6*  22.6*   MCV  97.1  97.4  96.6   MCH  31.0  30.8  30.8   RDW  56.4*  56.8*  55.8*   PLATELETCT  223  285  204   MPV  8.8*  8.9*  9.0     Recent Labs      04/18/17   0130   SODIUM  135   POTASSIUM  3.7   CHLORIDE  100   CO2  26   GLUCOSE  114*   BUN  9     Recent Labs      04/18/17   0130   ALBUMIN  2.3*   CREATININE  1.47*       BLOOD CULTURE   Date Value Ref Range Status   03/15/2017 No growth after 5 days of incubation.  Final    ':  Results     ** No results found for the last 168 hours. **          Imaging    DX-PYELOGRAM, RETROGRADE  4/16/2017  Impression      Left ureteral stent with loop formed over the renal pelvis  Aortic aneurysm with endograft     NM-RENAL WITH DIURETIC WASHOUT  4/15/2017   Impression      Findings consistent with at least partial obstruction of the left ureter likely related to scarring or fibrosis related to abdominal aortic aneurysm as noted on CT.     MR-ABDOMEN-WITH &  W/O and MR-PELVIS WITH  4/9/2017  Impression      1. Evaluate for interval change in enhancement is limited between current MR and prior lumbar MR due to difference in technique but there may be slightly less soft tissue enhancement surrounding the wall.  2. The size of the thrombosed infrarenal abdominal aortic aneurysm is grossly unchanged. No evidence of endoleak.  3. Patent aortobiiliac endograft.  4. Unchanged indentation of the aneurysm sac on the left psoas muscle. No evidence of invasion.  5. Small focal low signal T2 area with peripheral enhancement in the right psoas muscle is of unclear etiology and could relate to scarring or heterotopic calcification.     ECHOCARDIOGRAM COMP W/O CONT  4/9/2017   CONCLUSIONS  Normal left ventricular size and systolic function.  Left ventricular ejection fraction is visually estimated to be 55%.  Severe aortic stenosis.  Moderate mitral regurgitation.  Right heart pressures are consistent with moderate pulmonary   hypertension.  Severely dilated left atrium.    MR-FOOT-WITH & W/O RIGHT  3/23/2017  Impression      1. Abnormal signal in the distal aspect of the first distal phalanx is consistent with osteomyelitis.  2.  No abscess is identified.  3.  Susceptibility artifact in the plantar soft tissues at the level of the fourth tarsometatarsal joint and base of the third proximal phalanx, possibly related to prior surgery. Foreign body cannot be excluded.  4.  Diffuse edema in the forefoot.     3/17 MRI lumbar spine: There are changes secondary to the abdominal aortic aneurysm endograft. The aneurysm sac measures an approximately 7.6 x 6.7 cm in size. Abnormal contrast enhancement is noted within the wall of the aneurysmal sac. There is also abnormal soft tissue   contrast enhancement in the surrounding soft tissue. These findings are highly suspicious for infected thrombosed aneurysm sac. The possibility of endograft infection is more likely. There is irregular outpouching  of the aneurysmal sac along the   posterior portion indenting the left psoas muscle.  2.  Free fluid in the pelvis  3.  There is mild-to-moderate left hydronephrosis likely representing pelviureteric junction obstruction.    3/16 R foot xray - +OM  3/16 L foot xray +OM    Assessment:  Active Hospital Problems    Diagnosis   • *Osteomyelitis of toe (CMS-HCC) [M86.9]   • Bacteremia [R78.81]   • Back pain [M54.9]   • CHF (congestive heart failure) (CMS-HCC) [I50.9]   • Tobacco dependence [F17.200]   • Atrial fibrillation (CMS-HCC) [I48.91]    Endovascular infection       Plan:  Right great toe and left 3rd osteomyelitis  +OM on radiographs-confirmed on repeat MRIs  OSH wound cx on 3/9 - GAS, MSSA (I confirmed with [place] microlab)  S/p 3rd toe amp on 3/25. No purulence seen proximally to amp site  OR cx (left 3rd toe) on 3/25 - MSSA (S-unasyn) Vanco LINUS 2, MRSE  Continue IV Unasyn  Anticipate 6 weeks of IV abx. Stop date 04/27/17.  Sutures to be removed 14-21 days post-op per Edgar Sumner PA-C to Dr. Samayoa.    Surgery was 3/25, sutures need to be removed.    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  IV abx as above, follow it with PO Amoxicillin.    If pt tolerates Amoxicillin, he will need for lifelong suppression.  Had lengthy discussion regarding lifelong abx.    Clostridium difficile diarrhea  Completed vancomycin QID for 2 weeks on 03/29/17.   Continue PO Vancomycin BID while on IV abx with 3 additional days, stop date of 04/30/17.    Esophageal dysmotility-concern for achalasia  Stable    Leukocytosis. Resolved  Multifactorial  No new signs or symptoms of infection  Monitor    Back pain  Chronic per patient    Obstructive  uropathy  S/p L ureteral stent on 4/16 with Dr. Whittington.  Stent will need to be changed Q6 months per Dr. Dinero.    Pt being discharged to [facility] today, ok from ID standpoint to transfer.

## 2017-04-19 NOTE — PROGRESS NOTES
Sutures removed from foot,small amount of bleeding- sutures were embedded. Dressing applied with vaseline gauze, kerlex. Report called to joe.

## 2017-04-20 RX ORDER — AMOXICILLIN 500 MG/1
500 CAPSULE ORAL 2 TIMES DAILY
Start: 2017-04-20 | End: 2017-04-20

## 2017-04-20 RX ORDER — AMOXICILLIN 500 MG/1
500 CAPSULE ORAL 2 TIMES DAILY
Qty: 30 CAP
Start: 2017-04-20 | End: 2017-05-26 | Stop reason: SDUPTHER

## 2017-04-20 NOTE — DISCHARGE PLANNING
Medical Social Work    SW faxed over addendum D/C summary to [facility] at [phone].

## 2017-04-26 ENCOUNTER — OFFICE VISIT (OUTPATIENT)
Dept: INFECTIOUS DISEASES | Facility: MEDICAL CENTER | Age: 72
End: 2017-04-26
Payer: COMMERCIAL

## 2017-04-26 VITALS
HEART RATE: 67 BPM | SYSTOLIC BLOOD PRESSURE: 92 MMHG | OXYGEN SATURATION: 96 % | HEIGHT: 70 IN | DIASTOLIC BLOOD PRESSURE: 58 MMHG | TEMPERATURE: 97.7 F | WEIGHT: 161.1 LBS | BODY MASS INDEX: 23.06 KG/M2

## 2017-04-26 DIAGNOSIS — G89.29 CHRONIC BILATERAL LOW BACK PAIN WITHOUT SCIATICA: ICD-10-CM

## 2017-04-26 DIAGNOSIS — M86.9 OSTEOMYELITIS OF TOE (HCC): ICD-10-CM

## 2017-04-26 DIAGNOSIS — R78.81 BACTEREMIA DUE TO STREPTOCOCCUS: ICD-10-CM

## 2017-04-26 DIAGNOSIS — N13.5 URETERAL OBSTRUCTION, LEFT: ICD-10-CM

## 2017-04-26 DIAGNOSIS — Z98.890 S/P AAA (ABDOMINAL AORTIC ANEURYSM) REPAIR: ICD-10-CM

## 2017-04-26 DIAGNOSIS — Z86.79 S/P AAA (ABDOMINAL AORTIC ANEURYSM) REPAIR: ICD-10-CM

## 2017-04-26 DIAGNOSIS — M54.50 CHRONIC BILATERAL LOW BACK PAIN WITHOUT SCIATICA: ICD-10-CM

## 2017-04-26 DIAGNOSIS — T82.7XXD INFECTION OF AORTIC GRAFT, SUBSEQUENT ENCOUNTER: ICD-10-CM

## 2017-04-26 DIAGNOSIS — B95.5 BACTEREMIA DUE TO STREPTOCOCCUS: ICD-10-CM

## 2017-04-26 DIAGNOSIS — Z89.422 S/P AMPUTATION OF LESSER TOE, LEFT (HCC): ICD-10-CM

## 2017-04-26 DIAGNOSIS — A04.72 CLOSTRIDIUM DIFFICILE DIARRHEA: ICD-10-CM

## 2017-04-26 PROCEDURE — 1101F PT FALLS ASSESS-DOCD LE1/YR: CPT | Mod: 8P | Performed by: NURSE PRACTITIONER

## 2017-04-26 PROCEDURE — 99215 OFFICE O/P EST HI 40 MIN: CPT | Performed by: NURSE PRACTITIONER

## 2017-04-26 PROCEDURE — G8598 ASA/ANTIPLAT THER USED: HCPCS | Performed by: NURSE PRACTITIONER

## 2017-04-26 PROCEDURE — 4004F PT TOBACCO SCREEN RCVD TLK: CPT | Mod: 8P | Performed by: NURSE PRACTITIONER

## 2017-04-26 PROCEDURE — 3017F COLORECTAL CA SCREEN DOC REV: CPT | Mod: 8P | Performed by: NURSE PRACTITIONER

## 2017-04-26 PROCEDURE — G8432 DEP SCR NOT DOC, RNG: HCPCS | Performed by: NURSE PRACTITIONER

## 2017-04-26 PROCEDURE — 1111F DSCHRG MED/CURRENT MED MERGE: CPT | Performed by: NURSE PRACTITIONER

## 2017-04-26 PROCEDURE — 4040F PNEUMOC VAC/ADMIN/RCVD: CPT | Mod: 8P | Performed by: NURSE PRACTITIONER

## 2017-04-26 PROCEDURE — G8420 CALC BMI NORM PARAMETERS: HCPCS | Performed by: NURSE PRACTITIONER

## 2017-04-26 RX ORDER — AMOXICILLIN 500 MG/1
500 CAPSULE ORAL 2 TIMES DAILY
Qty: 60 CAP | Refills: 6 | Status: ON HOLD
Start: 2017-04-26 | End: 2018-11-14

## 2017-04-26 ASSESSMENT — PAIN SCALES - GENERAL: PAINLEVEL: 6=MODERATE PAIN

## 2017-04-26 NOTE — MR AVS SNAPSHOT
"        Nicola Jimenez   2017 10:30 AM   Office Visit   MRN: [id number]    Department:  [facility]   Dept Phone:  [phone]    Description:  Male : 1945   Provider:  MORRIS Carrillo           Reason for Visit     Hospital Follow-up           Allergies as of 2017     No Known Allergies      Vital Signs     Blood Pressure Pulse Temperature Height Weight Body Mass Index    92/58 mmHg 67 36.5 °C (97.7 °F) 1.778 m (5' 10\") 73.074 kg (161 lb 1.6 oz) 23.12 kg/m2    Oxygen Saturation Smoking Status                96% Current Every Day Smoker          Basic Information     Date Of Birth Sex Race Ethnicity Preferred Language    1945 Male White Non- English      Your appointments     May 26, 2017  1:00 PM   Follow Up Visit with MORRIS Carrillo   [address])    [address] [place] [address] [id number] [phone]           You will be receiving a confirmation call a few days before your appointment from our automated call confirmation system.              Problem List              ICD-10-CM Priority Class Noted - Resolved    Whiplash injury syndrome S13.4XXA   2014 - Present    Coronary atherosclerosis of native coronary artery I25.10   2014 - Present    Undiagnosed cardiac murmurs R01.1   2014 - Present    Syncope and collapse R55   2014 - Present    S/P AAA (abdominal aortic aneurysm) repair Z98.890, Z86.79 Medium  2014 - Present    Atrial fibrillation (CMS-HCC) I48.91 Medium  2014 - Present    Aortic stenosis I35.0   2014 - Present    History of percutaneous coronary intervention Z98.890   2014 - Present    H/O cardiac arrest Z86.74   2014 - Present    Chronic obstructive lung disease (CMS-HCC) J44.9   2014 - Present    Back pain M54.9 High  3/15/2017 - Present    CHF (congestive heart failure) (CMS-HCC) I50.9 Medium  3/15/2017 - Present    Tobacco dependence F17.200 Low  3/15/2017 - Present   " Hard of hearing H91.90   3/15/2017 - Present    Osteomyelitis of toe (CMS-HCC) M86.9 High  3/15/2017 - Present    Noncompliance Z91.19 Low  3/21/2017 - Present    C. difficile diarrhea A04.7 High  3/21/2017 - Present    Hep C w/o coma, chronic (CMS-HCC) B18.2   3/29/2017 - Present    Dysphagia R13.10   3/29/2017 - Present    CAD (coronary artery disease) I25.10   3/29/2017 - Present    Anemia D64.9 Medium  3/29/2017 - Present    Cirrhosis of liver (CMS-HCC) K74.60   4/10/2017 - Present    Transaminitis R74.0   4/10/2017 - Present    Achalasia K22.0   4/10/2017 - Present    Severe aortic stenosis I35.0   4/11/2017 - Present    Ureteral obstruction, left- s/p L ureteral stent N13.5   4/16/2017 - Present      Health Maintenance        Date Due Completion Dates    IMM DTaP/Tdap/Td Vaccine (1 - Tdap) 1/29/1962 ---    COLONOSCOPY 1/29/1993 ---    IMM ZOSTER VACCINE 1/29/2003 ---    IMM PNEUMOCOCCAL 65+ (ADULT) LOW/MEDIUM RISK SERIES (1 of 2 - PCV13) 1/29/2008 ---            Current Immunizations     Influenza TIV (IM) 10/30/2011    Influenza Vaccine Quad Inj (Pf) 10/26/2016      Below and/or attached are the medications your provider expects you to take. Review all of your home medications and newly ordered medications with your provider and/or pharmacist. Follow medication instructions as directed by your provider and/or pharmacist. Please keep your medication list with you and share with your provider. Update the information when medications are discontinued, doses are changed, or new medications (including over-the-counter products) are added; and carry medication information at all times in the event of emergency situations     Allergies:  No Known Allergies          Medications  Valid as of: April 26, 2017 - 11:05 AM    Generic Name Brand Name Tablet Size Instructions for use    Acetaminophen (Tab) TYLENOL 325 MG Take 2 Tabs by mouth every 6 hours.        Amiodarone HCl (Tab) CORDARONE 200 MG Take 1 Tab by mouth  every day.        Amoxicillin (Cap) AMOXIL 500 MG Take 1 Cap by mouth 2 times a day.        Ferrous Sulfate (Tab) ferrous sulfate 325 (65 FE) MG Take 1 Tab by mouth every morning with breakfast.        Furosemide (Tab) LASIX 20 MG Take 1 Tab by mouth every day.        Gabapentin (Cap) NEURONTIN 300 MG Take 300 mg by mouth 3 times a day.        Lactobacillus (Pack) LACTINEX/FLORANEX  Take 1 Packet by mouth 3 times a day, with meals.        Lidocaine (Patch) LIDODERM 5 % Apply 2 Patches to skin as directed every 24 hours.        Methocarbamol (Tab) ROBAXIN 500 MG Take 1 Tab by mouth 3 times a day as needed (muscle pain/spasm).        Nicotine (PATCH 24 HR) NICODERM 7 MG/24HR Apply 1 Patch to skin as directed every 24 hours.        NS SOLN 100 mL with ampicillin/sulbactam 3 (2-1) G SOLR 3 g   3 g by Intravenous route every 6 hours.        OxyCODONE HCl (Tablet Extended Release 12 hour Abuse-Deterrent) OXYCONTIN 20 MG Take 1 Tab by mouth every 12 hours.        OxyCODONE HCl (Tab) ROXICODONE 10 MG Take 1 Tab by mouth every four hours as needed for Severe Pain.        Potassium Chloride Polly CR (Tab CR) Kdur 20 MEQ Take 2 Tabs by mouth 2 Times a Day.        Sennosides-Docusate Sodium (Tab) PERICOLACE or SENOKOT S 8.6-50 MG Take 1 Tab by mouth every day.        Simvastatin (Tab) ZOCOR 20 MG Take 20 mg by mouth every evening.        Vancomycin HCl (VANCOMYCIN 50 MG/ML) 50 mg/mL Solution (Solution) vancomycin 50 mg/mL 50 mg/mL Take 2.5 mL by mouth 2 Times a Day.        .                 Medicines prescribed today were sent to:     [facility] #[id number] - [place] - [address]    [address] [place] [address]    Phone: [phone] Fax: [phone]    Open 24 Hours?: No      Medication refill instructions:       If your prescription bottle indicates you have medication refills left, it is not necessary to call your provider’s office. Please contact your pharmacy and they will refill your  medication.    If your prescription bottle indicates you do not have any refills left, you may request refills at any time through one of the following ways: The online Alignable system (except Urgent Care), by calling your provider’s office, or by asking your pharmacy to contact your provider’s office with a refill request. Medication refills are processed only during regular business hours and may not be available until the next business day. Your provider may request additional information or to have a follow-up visit with you prior to refilling your medication.   *Please Note: Medication refills are assigned a new Rx number when refilled electronically. Your pharmacy may indicate that no refills were authorized even though a new prescription for the same medication is available at the pharmacy. Please request the medicine by name with the pharmacy before contacting your provider for a refill.           Alignable Access Code: [email or web address]  Expires: 5/19/2017  2:42 PM    Alignable  A secure, online tool to manage your health information     [employer]’s Alignable® is a secure, online tool that connects you to your personalized health information from the privacy of your home -- day or night - making it very easy for you to manage your healthcare. Once the activation process is completed, you can even access your medical information using the Alignable jaymie, which is available for free in the Apple Jaymie store or Google Play store.     Alignable provides the following levels of access (as shown below):   My Chart Features   [facility]  Specialists [facility] [place]  Urgent  Care Non-[facility]  Primary Care  Doctor   Email your healthcare team securely and privately 24/7 X X X    Manage appointments: schedule your next appointment; view details of past/upcoming appointments X      Request prescription refills. X      View recent personal medical records, including lab and immunizations X X X X   View health  record, including health history, allergies, medications X X X X   Read reports about your outpatient visits, procedures, consult and ER notes X X X X   See your discharge summary, which is a recap of your hospital and/or ER visit that includes your diagnosis, lab results, and care plan. X X       How to register for [employer]:  1. Go to  [email or web address].  2. Click on the Sign Up Now box, which takes you to the New Member Sign Up page. You will need to provide the following information:  a. Enter your Duo Security Access Code exactly as it appears at the top of this page. (You will not need to use this code after you’ve completed the sign-up process. If you do not sign up before the expiration date, you must request a new code.)   b. Enter your date of birth.   c. Enter your home email address.   d. Click Submit, and follow the next screen’s instructions.  3. Create a Duo Security ID. This will be your Duo Security login ID and cannot be changed, so think of one that is secure and easy to remember.  4. Create a Du[employer] Security password. You can change your password at any time.  5. Enter your Password Reset Question and Answer. This can be used at a later time if you forget your password.   6. Enter your e-mail address. This allows you to receive e-mail notifications when new information is available in Duo Security.  7. Click Sign Up. You can now view your health information.    For assistance activating your Duo Security account, call [phone]        Quit Tobacco Information     Do you want to quit using tobacco?    Quitting tobacco decreases risks of cancer, heart and lung disease, increases life expectancy, improves sense of taste and smell, and increases spending money, among other benefits.    If you are thinking about quitting, we can help.  • [facility] Quit Tobacco Program: 974.403.5602  o Program occurs weekly for four weeks and includes pharmacist consultation on products to support quitting smoking or chewing tobacco. A  provider referral is needed for pharmacist consultation.  • Tobacco Users Help Hotline: 0-219-QUIT-NOW (541-8814) or https://[place].quitlogix.org/  o Free, confidential telephone and online coaching for [place] residents. Sessions are designed on a schedule that is convenient for you. Eligible clients receive free nicotine replacement therapy.  • Nationally: www.smokefree.gov  o Information and professional assistance to support both immediate and long-term needs as you become, and remain, a non-smoker. Smokefree.gov allows you to choose the help that best fits your needs.

## 2017-04-27 LAB
FUNGUS SPEC CULT: NORMAL
SIGNIFICANT IND 70042: NORMAL
SITE SITE: NORMAL
SOURCE SOURCE: NORMAL

## 2017-05-26 ENCOUNTER — OFFICE VISIT (OUTPATIENT)
Dept: INFECTIOUS DISEASES | Facility: MEDICAL CENTER | Age: 72
End: 2017-05-26
Payer: COMMERCIAL

## 2017-05-26 VITALS
TEMPERATURE: 97.2 F | WEIGHT: 158.8 LBS | BODY MASS INDEX: 22.73 KG/M2 | HEIGHT: 70 IN | SYSTOLIC BLOOD PRESSURE: 100 MMHG | DIASTOLIC BLOOD PRESSURE: 64 MMHG | HEART RATE: 80 BPM | OXYGEN SATURATION: 97 %

## 2017-05-26 DIAGNOSIS — Z89.422 STATUS POST AMPUTATION OF LESSER TOE OF LEFT FOOT (HCC): ICD-10-CM

## 2017-05-26 DIAGNOSIS — T82.7XXD INFECTION OF AORTIC GRAFT, SUBSEQUENT ENCOUNTER: ICD-10-CM

## 2017-05-26 DIAGNOSIS — Z98.890 S/P AAA (ABDOMINAL AORTIC ANEURYSM) REPAIR: ICD-10-CM

## 2017-05-26 DIAGNOSIS — N13.5 URETERAL OBSTRUCTION, LEFT: ICD-10-CM

## 2017-05-26 DIAGNOSIS — Z86.79 S/P AAA (ABDOMINAL AORTIC ANEURYSM) REPAIR: ICD-10-CM

## 2017-05-26 DIAGNOSIS — M86.9 OSTEOMYELITIS OF TOE (HCC): ICD-10-CM

## 2017-05-26 PROCEDURE — 99214 OFFICE O/P EST MOD 30 MIN: CPT | Performed by: NURSE PRACTITIONER

## 2017-05-26 PROCEDURE — 1101F PT FALLS ASSESS-DOCD LE1/YR: CPT | Mod: 8P | Performed by: NURSE PRACTITIONER

## 2017-05-26 PROCEDURE — 4040F PNEUMOC VAC/ADMIN/RCVD: CPT | Mod: 8P | Performed by: NURSE PRACTITIONER

## 2017-05-26 PROCEDURE — 4004F PT TOBACCO SCREEN RCVD TLK: CPT | Performed by: NURSE PRACTITIONER

## 2017-05-26 PROCEDURE — G8420 CALC BMI NORM PARAMETERS: HCPCS | Performed by: NURSE PRACTITIONER

## 2017-05-26 PROCEDURE — G8598 ASA/ANTIPLAT THER USED: HCPCS | Performed by: NURSE PRACTITIONER

## 2017-05-26 PROCEDURE — G8432 DEP SCR NOT DOC, RNG: HCPCS | Performed by: NURSE PRACTITIONER

## 2017-05-26 PROCEDURE — 3017F COLORECTAL CA SCREEN DOC REV: CPT | Mod: 8P | Performed by: NURSE PRACTITIONER

## 2017-05-26 RX ORDER — AMOXICILLIN 500 MG/1
500 CAPSULE ORAL 2 TIMES DAILY
Qty: 60 CAP | Refills: 6 | Status: SHIPPED | OUTPATIENT
Start: 2017-05-26 | End: 2017-06-25

## 2017-05-26 NOTE — MR AVS SNAPSHOT
"        Nicola Ruggiero Tony   2017 1:00 PM   Office Visit   MRN: [id number]    Department:  [facility]   Dept Phone:  [phone]    Description:  Male : 1945   Provider:  MORRIS Carrillo           Reason for Visit     Follow-Up LT toe infection (toe Amputated) Bacteremia due to Streptococcu      Allergies as of 2017     No Known Allergies      You were diagnosed with     Osteomyelitis of toe (CMS-HCC)   [657080]       S/P AAA (abdominal aortic aneurysm) repair   [813299]       Infection of aortic graft, subsequent encounter   [649570]       Ureteral obstruction, left   [905530]         Vital Signs     Blood Pressure Pulse Temperature Height Weight Body Mass Index    100/64 mmHg 80 36.2 °C (97.2 °F) 1.778 m (5' 10\") 72.031 kg (158 lb 12.8 oz) 22.79 kg/m2    Oxygen Saturation Smoking Status                97% Current Every Day Smoker          Basic Information     Date Of Birth Sex Race Ethnicity Preferred Language    1945 Male White Non- English      Problem List              ICD-10-CM Priority Class Noted - Resolved    Whiplash injury syndrome S13.4XXA   2014 - Present    Coronary atherosclerosis of native coronary artery I25.10   2014 - Present    Undiagnosed cardiac murmurs R01.1   2014 - Present    Syncope and collapse R55   2014 - Present    S/P AAA (abdominal aortic aneurysm) repair Z98.890, Z86.79 Medium  2014 - Present    Atrial fibrillation (CMS-HCC) I48.91 Medium  2014 - Present    Aortic stenosis I35.0   2014 - Present    History of percutaneous coronary intervention Z98.890   2014 - Present    H/O cardiac arrest Z86.74   2014 - Present    Chronic obstructive lung disease (CMS-HCC) J44.9   2014 - Present    Back pain M54.9 High  3/15/2017 - Present    CHF (congestive heart failure) (CMS-HCC) I50.9 Medium  3/15/2017 - Present    Tobacco dependence F17.200 Low  3/15/2017 - Present    Hard of hearing H91.90   3/15/2017 " - Present    Osteomyelitis of toe (CMS-HCC) M86.9 High  3/15/2017 - Present    Noncompliance Z91.19 Low  3/21/2017 - Present    C. difficile diarrhea A04.7 High  3/21/2017 - Present    Hep C w/o coma, chronic (CMS-HCC) B18.2   3/29/2017 - Present    Dysphagia R13.10   3/29/2017 - Present    CAD (coronary artery disease) I25.10   3/29/2017 - Present    Anemia D64.9 Medium  3/29/2017 - Present    Cirrhosis of liver (CMS-HCC) K74.60   4/10/2017 - Present    Transaminitis R74.0   4/10/2017 - Present    Achalasia K22.0   4/10/2017 - Present    Severe aortic stenosis I35.0   4/11/2017 - Present    Ureteral obstruction, left- s/p L ureteral stent N13.5   4/16/2017 - Present      Health Maintenance        Date Due Completion Dates    IMM DTaP/Tdap/Td Vaccine (1 - Tdap) 1/29/1962 ---    COLONOSCOPY 1/29/1993 ---    IMM ZOSTER VACCINE 1/29/2003 ---    IMM PNEUMOCOCCAL 65+ (ADULT) LOW/MEDIUM RISK SERIES (1 of 2 - PCV13) 1/29/2008 ---            Current Immunizations     Influenza TIV (IM) 10/30/2011    Influenza Vaccine Quad Inj (Pf) 10/26/2016      Below and/or attached are the medications your provider expects you to take. Review all of your home medications and newly ordered medications with your provider and/or pharmacist. Follow medication instructions as directed by your provider and/or pharmacist. Please keep your medication list with you and share with your provider. Update the information when medications are discontinued, doses are changed, or new medications (including over-the-counter products) are added; and carry medication information at all times in the event of emergency situations     Allergies:  No Known Allergies          Medications  Valid as of: May 26, 2017 -  1:48 PM    Generic Name Brand Name Tablet Size Instructions for use    Acetaminophen (Tab) TYLENOL 325 MG Take 2 Tabs by mouth every 6 hours.        Amiodarone HCl (Tab) CORDARONE 200 MG Take 1 Tab by mouth every day.        Amoxicillin (Cap)  AMOXIL 500 MG Take 1 Cap by mouth 2 times a day.        Amoxicillin (Cap) AMOXIL 500 MG Take 1 Cap by mouth 2 times a day for 30 days.        Ferrous Sulfate (Tab) ferrous sulfate 325 (65 FE) MG Take 1 Tab by mouth every morning with breakfast.        Furosemide (Tab) LASIX 20 MG Take 1 Tab by mouth every day.        Gabapentin (Cap) NEURONTIN 300 MG Take 300 mg by mouth 3 times a day.        Lactobacillus (Pack) LACTINEX/FLORANEX  Take 1 Packet by mouth 3 times a day, with meals.        Lidocaine (Patch) LIDODERM 5 % Apply 2 Patches to skin as directed every 24 hours.        Methocarbamol (Tab) ROBAXIN 500 MG Take 1 Tab by mouth 3 times a day as needed (muscle pain/spasm).        Nicotine (PATCH 24 HR) NICODERM 7 MG/24HR Apply 1 Patch to skin as directed every 24 hours.        OxyCODONE HCl (Tablet Extended Release 12 hour Abuse-Deterrent) OXYCONTIN 20 MG Take 1 Tab by mouth every 12 hours.        OxyCODONE HCl (Tab) ROXICODONE 10 MG Take 1 Tab by mouth every four hours as needed for Severe Pain.        Potassium Chloride Polly CR (Tab CR) Kdur 20 MEQ Take 2 Tabs by mouth 2 Times a Day.        Sennosides-Docusate Sodium (Tab) PERICOLACE or SENOKOT S 8.6-50 MG Take 1 Tab by mouth every day.        Simvastatin (Tab) ZOCOR 20 MG Take 20 mg by mouth every evening.        Vancomycin HCl (VANCOMYCIN 50 MG/ML) 50 mg/mL Solution (Solution) vancomycin 50 mg/mL 50 mg/mL Take 2.5 mL by mouth 2 Times a Day.        .                 Medicines prescribed today were sent to:     [id number] - [place], [place] - [address] [place] [address]    Phone: [phone] Fax: [phone]    Open 24 Hours?: No      Medication refill instructions:       If your prescription bottle indicates you have medication refills left, it is not necessary to call your provider’s office. Please contact your pharmacy and they will refill your medication.    If your prescription bottle indicates you do not have any refills  left, you may request refills at any time through one of the following ways: The online "[employer]"hart system (except Urgent Care), by calling your provider’s office, or by asking your pharmacy to contact your provider’s office with a refill request. Medication refills are processed only during regular business hours and may not be available until the next business day. Your provider may request additional information or to have a follow-up visit with you prior to refilling your medication.   *Please Note: Medication refills are assigned a new Rx number when refilled electronically. Your pharmacy may indicate that no refills were authorized even though a new prescription for the same medication is available at the pharmacy. Please request the medicine by name with the pharmacy before contacting your provider for a refill.           MyChart Status: Patient Declined        Quit Tobacco Information     Do you want to quit using tobacco?    Quitting tobacco decreases risks of cancer, heart and lung disease, increases life expectancy, improves sense of taste and smell, and increases spending money, among other benefits.    If you are thinking about quitting, we can help.  • [facility] Quit Tobacco Program: 572.216.8206  o Program occurs weekly for four weeks and includes pharmacist consultation on products to support quitting smoking or chewing tobacco. A provider referral is needed for pharmacist consultation.  • Tobacco Users Help Hotline: 3-579-QUIT-NOW (755-4620) or https://[place].quitlogix.org/  o Free, confidential telephone and online coaching for [place] residents. Sessions are designed on a schedule that is convenient for you. Eligible clients receive free nicotine replacement therapy.  • Nationally: www.smokefree.gov  o Information and professional assistance to support both immediate and long-term needs as you become, and remain, a non-smoker. Smokefree.gov allows you to choose the help that best fits your needs.

## 2017-05-26 NOTE — PROGRESS NOTES
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 PCP: [facility] luly. PMH +CAD with MI, aortic coarctation stent, PAD, AAA s/p graft, esophageal strictures s/p dilatation.      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 Prior to admission patient was hospitalized at [facility] and left A (admitted at [facility] 3/9/17, left [facility] 3/14/17). While at [facility] he was  found to have afib with AVR. He underwent a JEANETH with cardioversion. No vegetation was noted. He was also diagnosed with group A streptococcal bacteremia and R great toe osteomyelitis via MRI. Dr. Powell was consulted but the patient had left prior to being evaluated. In addition, he was complaining of vomiting and was evaluated by GI who performed an esophageal dilatation. He was also diagnosed with C diff colitis. He left against medical advice because he states his pain was not adequately controlled.    [facility] wound cx on 3/9 - GAS, MSSA   S/p 3rd toe amp on 3/25. No purulence seen proximally to amp site  OR cx (left 3rd toe) on 3/25 - MSSA (S-unasyn) Vanco LINUS 2, MRSE  [facility] Bcx 3/9  - GAS  [facility] Bcx 3/14  - NGTD  S/p L ureteral stent on 4/16 with Dr. Whittington due to obstructive uropathy  Discharged to [facility] on 6 weeks IV Unasyn. Stop date 4/27/17 to be followed with PO Amoxicillin for lifelong suppression. He was also discharged on PO Vanco BID while on IV abx with 3 additional days, stop date of 04/30/17.  Pt completed as planned and was discharged home from [facility] on lifelong suppressive amoxicillin.  Last seen in clinic  "4/26/17.      Today 5/26/17: Patient reports feeling well. He states his chronic LBP is well controlled- no current concern regarding pan. Denies pain, saddle paresthesia. No bowel/bladder incontinence.  Pt stating that the left 3rd toe amp site is healed. Denies drainage, pungent odor, redness, toe/foot pain. Denies feeling generally ill, fevers/chills, general malaise, headache, n/v/d. Pt stating that he is tolerating the po amoxicillin without adverse effect.  He is inquiring about FU plan and if his PCP can prescribe the lifelong amoxicillin.       Past Medical History   Diagnosis Date   • MI (myocardial infarction) (CMS-[facility])    • Status post aortic coarctation stent placement    • H/O heart artery stent      x2   • Arrhythmia    • Indigestion    • High cholesterol    • Atherosclerosis of aorta (CMS-HCC)    • Carotid artery disease (CMS-HCC)    • PAD (peripheral artery disease)    • History of AAA (abdominal aortic aneurysm) repair 01/30/2012     Graft repair with    • [place] (hard of hearing)    • Esophageal dilatation    • Hernia, inguinal, bilateral    • Umbilical hernia      repair \"years ago\"   • Arthritis    • Myocardial infarct (CMS-[facility])    • MI, old      3 X's late 1980's with cardioversion & coronary stent placement       Allergies: Review of patient's allergies indicates no known allergies.    Current medications and problem list reviewed with patient and updated in EPIC.    ROS  As documented above in my HPI       Objective:   Blood pressure 100/64, pulse 80, temperature 36.2 °C (97.2 °F), height 1.778 m (5' 10\"), weight 72.031 kg (158 lb 12.8 oz), SpO2 97 %.    Vital signs reviewed  Constitutional: patient is oriented to person, place, and time. He appears well-developed and well-nourished. No distress  Eyes: Conjunctivae normal and EOM are normal. Pupils are equal, round, and reactive to light.   Mouth/Throat: Lips without lesions, good dentition, oropharynx is clear and moist.  Neck: Trachea " midline. Normal range of motion. Neck supple. No masses  Cardiovascular: Normal rate, regular rhythm, normal heart sounds and intact distal pulses. No murmur, gallop, or friction rub. No edema.  Pulmonary/Chest: No respiratory distress. Unlabored respiratory effort, lungs clear to auscultation. No wheezes or rales.   Abdominal: Soft, non tender. BS + x 4. No masses or hepatosplenomegaly.   Musculoskeletal: Spine: Normal range of motion. No tenderness, swelling, erythema, deformity noted.  Neurological: He is alert and oriented to person, place, and time. No cranial nerve deficit. Coordination normal.   Skin: Skin is warm and dry. Good turgor. No rashes visable.  Left 3rd toe amp site well healed. Skin intact, well approximated. No erythema.  Psychiatric: He has a normal mood and affect. His behavior is normal.     No labs available since hospital DC    Assessment and Plan:   The following treatment plan was discussed with patient at length  1. Infection of aortic graft, subsequent encounter  amoxicillin (AMOXIL) 500 MG Cap   2. S/P AAA (abdominal aortic aneurysm) repair     3. Ureteral obstruction, left  s/p L ureteral stent : Stent will need to be changed Q6 months per Dr. Dienro.   4. Osteomyelitis of toe (CMS-HCC)  amoxicillin (AMOXIL) 500 MG Cap   5. 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 Discussed dosing/side effects. Pt instructed to call clinic or PCP with any adverse effects  He will be on lifelong suppression- this was discussed with patient at length as well as the importance of being compliant with this and not missing doses. Call with any questions, concerns, or adverse effects  Will reach out to PCP to see if they are comfortable taking over the Amoxicillin.   Follow up: PRN FU with PCP for ongoing chronic medical conditions.     CC: [facility]

## 2017-08-23 ENCOUNTER — OFFICE VISIT (OUTPATIENT)
Dept: CARDIOLOGY | Facility: MEDICAL CENTER | Age: 72
End: 2017-08-23
Payer: COMMERCIAL

## 2017-08-23 VITALS
HEART RATE: 56 BPM | HEIGHT: 70 IN | WEIGHT: 177 LBS | OXYGEN SATURATION: 95 % | BODY MASS INDEX: 25.34 KG/M2 | SYSTOLIC BLOOD PRESSURE: 98 MMHG | DIASTOLIC BLOOD PRESSURE: 56 MMHG

## 2017-08-23 DIAGNOSIS — K74.60 CIRRHOSIS OF LIVER WITHOUT ASCITES, UNSPECIFIED HEPATIC CIRRHOSIS TYPE (HCC): ICD-10-CM

## 2017-08-23 DIAGNOSIS — Z86.79 S/P AAA (ABDOMINAL AORTIC ANEURYSM) REPAIR: ICD-10-CM

## 2017-08-23 DIAGNOSIS — Z98.890 S/P AAA (ABDOMINAL AORTIC ANEURYSM) REPAIR: ICD-10-CM

## 2017-08-23 DIAGNOSIS — I48.91 ATRIAL FIBRILLATION, UNSPECIFIED TYPE (HCC): ICD-10-CM

## 2017-08-23 DIAGNOSIS — Z86.79 HISTORY OF ENDOCARDITIS: ICD-10-CM

## 2017-08-23 LAB — EKG IMPRESSION: NORMAL

## 2017-08-23 PROCEDURE — 93000 ELECTROCARDIOGRAM COMPLETE: CPT | Performed by: INTERNAL MEDICINE

## 2017-08-23 PROCEDURE — 99204 OFFICE O/P NEW MOD 45 MIN: CPT | Performed by: INTERNAL MEDICINE

## 2017-08-23 RX ORDER — CYCLOBENZAPRINE HCL 10 MG
10 TABLET ORAL
Status: ON HOLD | COMMUNITY
End: 2018-11-14

## 2017-08-23 NOTE — MR AVS SNAPSHOT
"        Niocla Ruggiero Tony   2017 3:45 PM   Office Visit   MRN: [id number]    Department:  [facility]   Dept Phone:  [phone]    Description:  Male : 1945   Provider:  Idania Vasquez M.D.           Reason for Visit     New Patient           Allergies as of 2017     No Known Allergies      You were diagnosed with     Atrial fibrillation, unspecified type (CMS-HCC)   [[id number]]         Vital Signs     Blood Pressure Pulse Height Weight Body Mass Index Oxygen Saturation    98/56 mmHg 56 1.778 m (5' 10\") 80.287 kg (177 lb) 25.40 kg/m2 95%    Smoking Status                   Current Every Day Smoker           Basic Information     Date Of Birth Sex Race Ethnicity Preferred Language    1945 Male White Non- English      Problem List              ICD-10-CM Priority Class Noted - Resolved    Whiplash injury syndrome S13.4XXA   2014 - Present    Coronary atherosclerosis of native coronary artery I25.10   2014 - Present    Undiagnosed cardiac murmurs R01.1   2014 - Present    Syncope and collapse R55   2014 - Present    S/P AAA (abdominal aortic aneurysm) repair Z98.890, Z86.79 Medium  2014 - Present    Atrial fibrillation (CMS-HCC) I48.91 Medium  2014 - Present    Aortic stenosis I35.0   2014 - Present    History of percutaneous coronary intervention Z98.890   2014 - Present    H/O cardiac arrest Z86.74   2014 - Present    Chronic obstructive lung disease (CMS-HCC) J44.9   2014 - Present    Back pain M54.9 High  3/15/2017 - Present    CHF (congestive heart failure) (CMS-HCC) I50.9 Medium  3/15/2017 - Present    Tobacco dependence F17.200 Low  3/15/2017 - Present    Hard of hearing H91.90   3/15/2017 - Present    Osteomyelitis of toe (CMS-HCC) M86.9 High  3/15/2017 - Present    Noncompliance Z91.19 Low  3/21/2017 - Present    C. difficile diarrhea A04.7 High  3/21/2017 - Present    Hep C w/o coma, chronic (CMS-HCC) B18.2   3/29/2017 - " Present    Dysphagia R13.10   3/29/2017 - Present    CAD (coronary artery disease) I25.10   3/29/2017 - Present    Anemia D64.9 Medium  3/29/2017 - Present    Cirrhosis of liver (CMS-HCC) K74.60   4/10/2017 - Present    Transaminitis R74.0   4/10/2017 - Present    Achalasia K22.0   4/10/2017 - Present    Severe aortic stenosis I35.0   4/11/2017 - Present    Ureteral obstruction, left- s/p L ureteral stent N13.5   4/16/2017 - Present      Health Maintenance        Date Due Completion Dates    IMM DTaP/Tdap/Td Vaccine (1 - Tdap) 1/29/1962 ---    COLONOSCOPY 1/29/1993 ---    IMM ZOSTER VACCINE 1/29/2003 ---    IMM PNEUMOCOCCAL 65+ (ADULT) LOW/MEDIUM RISK SERIES (1 of 2 - PCV13) 1/29/2008 ---    IMM INFLUENZA (1) 9/1/2017 10/26/2016, 10/30/2011            Results       Current Immunizations     Influenza TIV (IM) 10/30/2011    Influenza Vaccine Quad Inj (Pf) 10/26/2016      Below and/or attached are the medications your provider expects you to take. Review all of your home medications and newly ordered medications with your provider and/or pharmacist. Follow medication instructions as directed by your provider and/or pharmacist. Please keep your medication list with you and share with your provider. Update the information when medications are discontinued, doses are changed, or new medications (including over-the-counter products) are added; and carry medication information at all times in the event of emergency situations     Allergies:  No Known Allergies          Medications  Valid as of: August 23, 2017 -  4:28 PM    Generic Name Brand Name Tablet Size Instructions for use    Acetaminophen (Tab) TYLENOL 325 MG Take 2 Tabs by mouth every 6 hours.        Amiodarone HCl (Tab) CORDARONE 200 MG Take 1 Tab by mouth every day.        Amoxicillin (Cap) AMOXIL 500 MG Take 1 Cap by mouth 2 times a day.        Cyclobenzaprine HCl (Tab) FLEXERIL 10 MG Take 10 mg by mouth 3 times a day as needed.        Ferrous Sulfate (Tab)  ferrous sulfate 325 (65 Fe) MG Take 1 Tab by mouth every morning with breakfast.        Furosemide (Tab) LASIX 20 MG Take 1 Tab by mouth every day.        Gabapentin (Cap) NEURONTIN 300 MG Take 300 mg by mouth 3 times a day.        Lactobacillus (Pack) LACTINEX/FLORANEX  Take 1 Packet by mouth 3 times a day, with meals.        Lidocaine (Patch) LIDODERM 5 % Apply 2 Patches to skin as directed every 24 hours.        Methocarbamol (Tab) ROBAXIN 500 MG Take 1 Tab by mouth 3 times a day as needed (muscle pain/spasm).        Nicotine (PATCH 24 HR) NICODERM 7 MG/24HR Apply 1 Patch to skin as directed every 24 hours.        OxyCODONE HCl (Tablet Extended Release 12 hour Abuse-Deterrent) OXYCONTIN 20 MG Take 1 Tab by mouth every 12 hours.        OxyCODONE HCl (Tab) ROXICODONE 10 MG Take 1 Tab by mouth every four hours as needed for Severe Pain.        Potassium Chloride Polly CR (Tab CR) Kdur 20 MEQ Take 2 Tabs by mouth 2 Times a Day.        Sennosides-Docusate Sodium (Tab) PERICOLACE or SENOKOT S 8.6-50 MG Take 1 Tab by mouth every day.        Simvastatin (Tab) ZOCOR 20 MG Take 20 mg by mouth every evening.        Vancomycin HCl (VANCOMYCIN 50 MG/ML) 50 mg/mL Solution (Solution) vancomycin 50 mg/mL 50 mg/mL Take 2.5 mL by mouth 2 Times a Day.        .                 Medicines prescribed today were sent to:     [id number] - [place], [place] - [address] [address]    Phone: [phone] Fax: [phone]    Open 24 Hours?: No      Medication refill instructions:       If your prescription bottle indicates you have medication refills left, it is not necessary to call your provider’s office. Please contact your pharmacy and they will refill your medication.    If your prescription bottle indicates you do not have any refills left, you may request refills at any time through one of the following ways: The online [employer] system (except Urgent Care), by calling your provider’s office, or  by asking your pharmacy to contact your provider’s office with a refill request. Medication refills are processed only during regular business hours and may not be available until the next business day. Your provider may request additional information or to have a follow-up visit with you prior to refilling your medication.   *Please Note: Medication refills are assigned a new Rx number when refilled electronically. Your pharmacy may indicate that no refills were authorized even though a new prescription for the same medication is available at the pharmacy. Please request the medicine by name with the pharmacy before contacting your provider for a refill.           MyChart Status: Patient Declined        Quit Tobacco Information     Do you want to quit using tobacco?    Quitting tobacco decreases risks of cancer, heart and lung disease, increases life expectancy, improves sense of taste and smell, and increases spending money, among other benefits.    If you are thinking about quitting, we can help.  • [facility] Quit Tobacco Program: 915.215.8223  o Program occurs weekly for four weeks and includes pharmacist consultation on products to support quitting smoking or chewing tobacco. A provider referral is needed for pharmacist consultation.  • Tobacco Users Help Hotline: 7-411-QUIT-NOW (093-3685) or https://[place].quitlogix.org/  o Free, confidential telephone and online coaching for [place] residents. Sessions are designed on a schedule that is convenient for you. Eligible clients receive free nicotine replacement therapy.  • Nationally: www.smokefree.gov  o Information and professional assistance to support both immediate and long-term needs as you become, and remain, a non-smoker. Smokefree.gov allows you to choose the help that best fits your needs.

## 2017-08-23 NOTE — LETTER
"     [facility]   [address]  [place] [address]  Phone: [phone]  Fax: [phone]              Nicola Jimenez  1945    Encounter Date: 8/23/2017    Idania Vasquez M.D.          PROGRESS NOTE:  Subjective:   Nicola Jimenez is a 72 y.o. male who presents today For cardiac care and evaluation due to prior history of aortic valve endocarditis. Patient finished IV antibiotics course. He is doing okay. No symptoms of chest pain or shortness of breath.    He is a poor historian. But per chart review, patient did have history of AAA repair, coronary stent placement with unknown distribution.    Patient also has liver cirrhosis.  Past Medical History:   Diagnosis Date   • History of AAA (abdominal aortic aneurysm) repair 01/30/2012    Graft repair with    • Arrhythmia    • Arthritis    • Atherosclerosis of aorta (CMS-HCC)    • Carotid artery disease (CMS-HCC)    • Esophageal dilatation    • H/O heart artery stent     x2   • Hernia, inguinal, bilateral    • High cholesterol    • [place] (hard of hearing)    • Indigestion    • MI (myocardial infarction) (CMS-HCC)    • MI, old     3 X's late 1980's with cardioversion & coronary stent placement   • Myocardial infarct (CMS-HCC)    • PAD (peripheral artery disease)    • Status post aortic coarctation stent placement    • Umbilical hernia     repair \"years ago\"     Past Surgical History:   Procedure Laterality Date   • CYSTOSCOPY STENT PLACEMENT  4/16/2017    Procedure: CYSTOSCOPY (l) RETROGRADE PYLOGRAM (L) URETERAL STENT PLACEMENT ;  Surgeon: Mihai Whittington M.D.;  Location: SURGERY [facility];  Service:    • TOE AMPUTATION Left 3/25/2017    Procedure: TOE AMPUTATION -  3RD  ;  Surgeon: Edson Samayoa M.D.;  Location: SURGERY [facility];  Service:    • GASTROSCOPY WITH BOTOX INJECTION N/A 3/23/2017    Procedure: GASTROSCOPY WITH BOTOX INJECTION;  Surgeon: Wm Cottrell M.D.;  " Location: ENDOSCOPY [facility];  Service:    • ESOPHAGEAL MOTILITY OR MANOMETRY  3/21/2017    Procedure: ESOPHAGEAL MOTILITY OR MANOMETRY;  Surgeon: Wm Cottrell M.D.;  Location: ENDOSCOPY [facility];  Service:    • GASTROSCOPY-ENDO  3/19/2017    Procedure: GASTROSCOPY-ENDO;  Surgeon: Tod Adrian D.O.;  Location: ENDOSCOPY [facility];  Service:    • ESOPHAGEAL MOTILITY OR MANOMETRY  3/27/2012    Performed by CATHY OCHOA at ENDOSCOPY [facility]   • AAA WITH STENT GRAFT  1/30/2012    Performed by SHAGUFTA TRIPP at SURGERY [facility]     History reviewed. No pertinent family history.  History   Smoking Status   • Current Every Day Smoker   • Packs/day: 1.00   • Types: Cigarettes   Smokeless Tobacco   • Never Used     No Known Allergies  Outpatient Encounter Prescriptions as of 8/23/2017   Medication Sig Dispense Refill   • cyclobenzaprine (FLEXERIL) 10 MG Tab Take 10 mg by mouth 3 times a day as needed.     • amoxicillin (AMOXIL) 500 MG Cap Take 1 Cap by mouth 2 times a day. 60 Cap 6   • gabapentin (NEURONTIN) 300 MG CAPS Take 300 mg by mouth 3 times a day.     • acetaminophen (TYLENOL) 325 MG Tab Take 2 Tabs by mouth every 6 hours. 30 Tab 0   • amiodarone (CORDARONE) 200 MG Tab Take 1 Tab by mouth every day. 30 Tab 0   • ferrous sulfate 325 (65 FE) MG tablet Take 1 Tab by mouth every morning with breakfast. 30 Tab    • furosemide (LASIX) 20 MG Tab Take 1 Tab by mouth every day. 60 Tab    • lactobacillus granules (LACTINEX/FLORANEX) Pack Take 1 Packet by mouth 3 times a day, with meals.     • lidocaine (LIDODERM) 5 % Patch Apply 2 Patches to skin as directed every 24 hours. 10 Patch    • methocarbamol (ROBAXIN) 500 MG Tab Take 1 Tab by mouth 3 times a day as needed (muscle pain/spasm).     • nicotine (NICODERM) 7 MG/24HR PATCH 24 HR Apply 1 Patch to skin as directed every 24 hours. 30 Patch    • oxyCODONE CR (OXYCONTIN) 20 MG Tablet Extended Release 12 hour Abuse-Deterrent  "Take 1 Tab by mouth every 12 hours. 60 Each    • oxycodone immediate release (ROXICODONE) 10 MG immediate release tablet Take 1 Tab by mouth every four hours as needed for Severe Pain. 28 Tab    • Vancomycin HCl (VANCOMYCIN 50 MG/ML) 50 mg/mL Solution Take 2.5 mL by mouth 2 Times a Day.     • potassium chloride SA (KDUR) 20 MEQ Tab CR Take 2 Tabs by mouth 2 Times a Day. 60 Tab 11   • senna-docusate (PERICOLACE OR SENOKOT S) 8.6-50 MG Tab Take 1 Tab by mouth every day. 30 Tab 0   • simvastatin (ZOCOR) 20 MG TABS Take 20 mg by mouth every evening.       Facility-Administered Encounter Medications as of 8/23/2017   Medication Dose Route Frequency Provider Last Rate Last Dose   • iohexol (OMNIPAQUE) 240 mg/mL    Intra-Op Once PRN Mihai Whittington M.D.   16 mL at 05/26/17 1522     Review of Systems   Constitutional: Negative for chills, fever, malaise/fatigue and weight loss.   HENT: Negative for ear discharge, ear pain, hearing loss and nosebleeds.    Eyes: Negative for blurred vision, double vision, pain and discharge.   Respiratory: Negative for cough and shortness of breath.    Cardiovascular: Negative for chest pain, palpitations, orthopnea, claudication, leg swelling and PND.   Gastrointestinal: Negative for abdominal pain, blood in stool, melena, nausea and vomiting.   Genitourinary: Negative for dysuria and hematuria.   Musculoskeletal: Negative for falls, joint pain and myalgias.   Skin: Negative for itching and rash.   Neurological: Negative for dizziness, sensory change, speech change, loss of consciousness and headaches.   Endo/Heme/Allergies: Negative for environmental allergies. Does not bruise/bleed easily.   Psychiatric/Behavioral: Negative for depression, hallucinations and suicidal ideas.        Objective:   BP (!) 98/56   Pulse (!) 56   Ht 1.778 m (5' 10\")   Wt 80.3 kg (177 lb)   SpO2 95%   BMI 25.40 kg/m²      Physical Exam   Constitutional: He is oriented to person, place, and time. He " appears well-developed and well-nourished.   HENT:   Head: Normocephalic and atraumatic.   Eyes: EOM are normal.   Neck: Normal range of motion. No JVD present.   Cardiovascular: Normal rate, normal heart sounds and intact distal pulses.  Exam reveals no gallop and no friction rub.    No murmur heard.  There is presence of an irregularly irregular heartbeats.     Pulmonary/Chest: No respiratory distress. He has no wheezes. He has no rales. He exhibits no tenderness.   Abdominal: Soft. Bowel sounds are normal. There is no tenderness. There is no rebound and no guarding.   The is no presence of abdominal bruits   Musculoskeletal: Normal range of motion.   Neurological: He is alert and oriented to person, place, and time.   Skin: Skin is warm and dry.   Psychiatric: He has a normal mood and affect.       Assessment:     1. Atrial fibrillation, unspecified type (CMS-HCC)  EKG   2. History of endocarditis         Medical Decision Making:  Today's Assessment / Status / Plan:     JEANETH to further assess Aortic Valve.  Patient with multiple comorbidities.  No changes in medical therapy today.      MORRIS Alexis  [address]  [place] [address]  VIA Facsimile: [phone]

## 2017-08-24 ENCOUNTER — TELEPHONE (OUTPATIENT)
Dept: CARDIOLOGY | Facility: MEDICAL CENTER | Age: 72
End: 2017-08-24

## 2017-08-24 NOTE — PROGRESS NOTES
"Subjective:   Nicola Jimenez is a 72 y.o. male who presents today For cardiac care and evaluation due to prior history of aortic valve endocarditis. Patient finished IV antibiotics course. He is doing okay. No symptoms of chest pain or shortness of breath.    He is a poor historian. But per chart review, patient did have history of AAA repair, coronary stent placement with unknown distribution.    Patient also has liver cirrhosis.  Past Medical History:   Diagnosis Date   • History of AAA (abdominal aortic aneurysm) repair 01/30/2012    Graft repair with    • Arrhythmia    • Arthritis    • Atherosclerosis of aorta (CMS-HCC)    • Carotid artery disease (CMS-HCC)    • Esophageal dilatation    • H/O heart artery stent     x2   • Hernia, inguinal, bilateral    • High cholesterol    • [place] (hard of hearing)    • Indigestion    • MI (myocardial infarction) (CMS-HCC)    • MI, old     3 X's late 1980's with cardioversion & coronary stent placement   • Myocardial infarct (CMS-HCC)    • PAD (peripheral artery disease)    • Status post aortic coarctation stent placement    • Umbilical hernia     repair \"years ago\"     Past Surgical History:   Procedure Laterality Date   • CYSTOSCOPY STENT PLACEMENT  4/16/2017    Procedure: CYSTOSCOPY (l) RETROGRADE PYLOGRAM (L) URETERAL STENT PLACEMENT ;  Surgeon: Mihai Whittington M.D.;  Location: SURGERY [facility];  Service:    • TOE AMPUTATION Left 3/25/2017    Procedure: TOE AMPUTATION -  3RD  ;  Surgeon: Edson Samayoa M.D.;  Location: SURGERY [facility];  Service:    • GASTROSCOPY WITH BOTOX INJECTION N/A 3/23/2017    Procedure: GASTROSCOPY WITH BOTOX INJECTION;  Surgeon: Wm Cottrell M.D.;  Location: ENDOSCOPY [facility];  Service:    • ESOPHAGEAL MOTILITY OR MANOMETRY  3/21/2017    Procedure: ESOPHAGEAL MOTILITY OR MANOMETRY;  Surgeon: Wm Cottrell M.D.;  Location: ENDOSCOPY [facility];  Service:    • GASTROSCOPY-ENDO  3/19/2017    " Procedure: GASTROSCOPY-ENDO;  Surgeon: Tod Adrian D.O.;  Location: ENDOSCOPY [facility];  Service:    • ESOPHAGEAL MOTILITY OR MANOMETRY  3/27/2012    Performed by CATHY OCHOA at ENDOSCOPY [facility]   • AAA WITH STENT GRAFT  1/30/2012    Performed by SHAGUFTA TRIPP at SURGERY [facility]     History reviewed. No pertinent family history.  History   Smoking Status   • Current Every Day Smoker   • Packs/day: 1.00   • Types: Cigarettes   Smokeless Tobacco   • Never Used     No Known Allergies  Outpatient Encounter Prescriptions as of 8/23/2017   Medication Sig Dispense Refill   • cyclobenzaprine (FLEXERIL) 10 MG Tab Take 10 mg by mouth 3 times a day as needed.     • amoxicillin (AMOXIL) 500 MG Cap Take 1 Cap by mouth 2 times a day. 60 Cap 6   • gabapentin (NEURONTIN) 300 MG CAPS Take 300 mg by mouth 3 times a day.     • acetaminophen (TYLENOL) 325 MG Tab Take 2 Tabs by mouth every 6 hours. 30 Tab 0   • amiodarone (CORDARONE) 200 MG Tab Take 1 Tab by mouth every day. 30 Tab 0   • ferrous sulfate 325 (65 FE) MG tablet Take 1 Tab by mouth every morning with breakfast. 30 Tab    • furosemide (LASIX) 20 MG Tab Take 1 Tab by mouth every day. 60 Tab    • lactobacillus granules (LACTINEX/FLORANEX) Pack Take 1 Packet by mouth 3 times a day, with meals.     • lidocaine (LIDODERM) 5 % Patch Apply 2 Patches to skin as directed every 24 hours. 10 Patch    • methocarbamol (ROBAXIN) 500 MG Tab Take 1 Tab by mouth 3 times a day as needed (muscle pain/spasm).     • nicotine (NICODERM) 7 MG/24HR PATCH 24 HR Apply 1 Patch to skin as directed every 24 hours. 30 Patch    • oxyCODONE CR (OXYCONTIN) 20 MG Tablet Extended Release 12 hour Abuse-Deterrent Take 1 Tab by mouth every 12 hours. 60 Each    • oxycodone immediate release (ROXICODONE) 10 MG immediate release tablet Take 1 Tab by mouth every four hours as needed for Severe Pain. 28 Tab    • Vancomycin HCl (VANCOMYCIN 50 MG/ML) 50 mg/mL Solution Take 2.5  "mL by mouth 2 Times a Day.     • potassium chloride SA (KDUR) 20 MEQ Tab CR Take 2 Tabs by mouth 2 Times a Day. 60 Tab 11   • senna-docusate (PERICOLACE OR SENOKOT S) 8.6-50 MG Tab Take 1 Tab by mouth every day. 30 Tab 0   • simvastatin (ZOCOR) 20 MG TABS Take 20 mg by mouth every evening.       Facility-Administered Encounter Medications as of 8/23/2017   Medication Dose Route Frequency Provider Last Rate Last Dose   • iohexol (OMNIPAQUE) 240 mg/mL    Intra-Op Once PRN Mihai Whittington M.D.   16 mL at 05/26/17 1522     Review of Systems   Constitutional: Negative for chills, fever, malaise/fatigue and weight loss.   HENT: Negative for ear discharge, ear pain, hearing loss and nosebleeds.    Eyes: Negative for blurred vision, double vision, pain and discharge.   Respiratory: Negative for cough and shortness of breath.    Cardiovascular: Negative for chest pain, palpitations, orthopnea, claudication, leg swelling and PND.   Gastrointestinal: Negative for abdominal pain, blood in stool, melena, nausea and vomiting.   Genitourinary: Negative for dysuria and hematuria.   Musculoskeletal: Negative for falls, joint pain and myalgias.   Skin: Negative for itching and rash.   Neurological: Negative for dizziness, sensory change, speech change, loss of consciousness and headaches.   Endo/Heme/Allergies: Negative for environmental allergies. Does not bruise/bleed easily.   Psychiatric/Behavioral: Negative for depression, hallucinations and suicidal ideas.        Objective:   BP (!) 98/56   Pulse (!) 56   Ht 1.778 m (5' 10\")   Wt 80.3 kg (177 lb)   SpO2 95%   BMI 25.40 kg/m²      Physical Exam   Constitutional: He is oriented to person, place, and time. He appears well-developed and well-nourished.   HENT:   Head: Normocephalic and atraumatic.   Eyes: EOM are normal.   Neck: Normal range of motion. No JVD present.   Cardiovascular: Normal rate, normal heart sounds and intact distal pulses.  Exam reveals no gallop " and no friction rub.    No murmur heard.  There is presence of an irregularly irregular heartbeats.     Pulmonary/Chest: No respiratory distress. He has no wheezes. He has no rales. He exhibits no tenderness.   Abdominal: Soft. Bowel sounds are normal. There is no tenderness. There is no rebound and no guarding.   The is no presence of abdominal bruits   Musculoskeletal: Normal range of motion.   Neurological: He is alert and oriented to person, place, and time.   Skin: Skin is warm and dry.   Psychiatric: He has a normal mood and affect.       Assessment:     1. Atrial fibrillation, unspecified type (CMS-[facility])  EKG   2. History of endocarditis         Medical Decision Making:  Today's Assessment / Status / Plan:     JEANETH to further assess Aortic Valve.  Patient with multiple comorbidities.  No changes in medical therapy today.

## 2017-08-24 NOTE — TELEPHONE ENCOUNTER
Called and LM on patient's voicemail. [facility] is OON with his insurance. In my message I did explain that he does not have OON benefits per Meka in authorizations. He will have to have the cardioversion done at [place], if he wants his insurance to cover it. I asked that the patient call back to discuss.

## 2017-08-28 ASSESSMENT — ENCOUNTER SYMPTOMS
COUGH: 0
LOSS OF CONSCIOUSNESS: 0
EYE DISCHARGE: 0
FALLS: 0
HALLUCINATIONS: 0
WEIGHT LOSS: 0
CHILLS: 0
SHORTNESS OF BREATH: 0
DOUBLE VISION: 0
DIZZINESS: 0
PND: 0
SENSORY CHANGE: 0
EYE PAIN: 0
VOMITING: 0
BLOOD IN STOOL: 0
FEVER: 0
ORTHOPNEA: 0
CLAUDICATION: 0
ABDOMINAL PAIN: 0
NAUSEA: 0
PALPITATIONS: 0
SPEECH CHANGE: 0
MYALGIAS: 0
HEADACHES: 0
BLURRED VISION: 0
BRUISES/BLEEDS EASILY: 0
DEPRESSION: 0

## 2017-08-30 ENCOUNTER — TELEPHONE (OUTPATIENT)
Dept: CARDIOLOGY | Facility: MEDICAL CENTER | Age: 72
End: 2017-08-30

## 2017-08-30 NOTE — TELEPHONE ENCOUNTER
Due to patients insurance pt has to go to [facility] for JEANETH. Sent all orders and info to Zehra at [facility] cardiology to get patient scheduled.

## 2017-09-01 ENCOUNTER — TELEPHONE (OUTPATIENT)
Dept: CARDIOLOGY | Facility: MEDICAL CENTER | Age: 72
End: 2017-09-01

## 2017-09-01 NOTE — TELEPHONE ENCOUNTER
Patient is scheduled on 9-12-17 for JEANETH at [facility] with [facility] cardiology.

## 2017-12-13 NOTE — PROGRESS NOTES
Hospital Medicine Progress Note, Adult, Complex               Author: William Jimenez Date & Time created: 3/24/2017  1:04 PM     Interval History:  Admitted for Osteomyelitis of toe, Bacteremia, Clostridium difficile diarrhea, recently discharged AMA from outside hospital.    OM - MRI shows R big toe OM  Bacteremia - culture showed Grp A Streptococcus from outside hospital, rpt cult neg  C diff - diarrhea has slowed down  AAA endograft - possible infection on incidental finding on MRI  Afib - in and out of Afib, cardioverted at outside hospital  CHF - echo done at outside hospital  Dysphagia - EGD with botox done     Review of Systems:  Review of Systems   Constitutional: Positive for malaise/fatigue. Negative for fever and chills.   HENT: Negative for sore throat.    Eyes: Negative for blurred vision.   Respiratory: Negative for cough, shortness of breath and wheezing.    Cardiovascular: Negative for chest pain.   Gastrointestinal: Negative for heartburn, nausea, vomiting, abdominal pain and diarrhea.   Genitourinary: Negative for dysuria.   Musculoskeletal: Positive for back pain.   Neurological: Positive for weakness. Negative for dizziness, focal weakness and headaches.       Physical Exam:  Physical Exam   Constitutional: He is oriented to person, place, and time. He appears well-developed and well-nourished.   HENT:   Head: Normocephalic and atraumatic.   Eyes: Conjunctivae are normal. Pupils are equal, round, and reactive to light.   Neck: No tracheal deviation present. No thyromegaly present.   Cardiovascular: Normal rate and regular rhythm.    Pulmonary/Chest: Effort normal and breath sounds normal.   Abdominal: Soft. Bowel sounds are normal. He exhibits no distension. There is no tenderness.   Musculoskeletal: He exhibits edema.   L 3rd toe erythema and swelling   Lymphadenopathy:     He has no cervical adenopathy.   Neurological: He is alert and oriented to person, place, and time.   Nursing note and  vitals reviewed.      Labs:        Invalid input(s): [id number]  Recent Labs      17   BNPBTYPENAT  434*     Recent Labs      17   SODIUM  131*  128*  129*   POTASSIUM  3.6  4.0  4.0   CHLORIDE  96  96  98   CO2  24  22  24   BUN  19  23*  20   CREATININE  0.91  0.87  0.98   MAGNESIUM  2.0   --   2.1   PHOSPHORUS  3.7   --    --    CALCIUM  8.9  8.7  8.7     Recent Labs      17   ALTSGPT  90*  131*  93*   ASTSGOT  98*  119*  65*   ALKPHOSPHAT  140*  161*  136*   TBILIRUBIN  0.7  0.8  0.7   GLUCOSE  111*  116*  97     Recent Labs      17   PROTHROMBTM  17.6*   INR  1.40*     Recent Labs      17   ASTSGOT  98*  119*  65*   ALTSGPT  90*  131*  93*   ALKPHOSPHAT  140*  161*  136*   TBILIRUBIN  0.7  0.8  0.7           Hemodynamics:  Temp (24hrs), Av.9 °C (98.4 °F), Min:36.4 °C (97.6 °F), Max:37.4 °C (99.3 °F)  Temperature: 37.1 °C (98.8 °F)  Pulse  Av  Min: 63  Max: 114   Blood Pressure : (!) 90/68 mmHg     Respiratory:    Respiration: 16, Pulse Oximetry: 96 %        RUL Breath Sounds: Clear, RML Breath Sounds: Clear, RLL Breath Sounds: Diminished, SOLEDAD Breath Sounds: Clear, LLL Breath Sounds: Diminished  Fluids:    Intake/Output Summary (Last 24 hours) at 17 1304  Last data filed at 17 1000   Gross per 24 hour   Intake      0 ml   Output    400 ml   Net   -400 ml     Weight: 60.1 kg (132 lb 7.9 oz)  GI/Nutrition:  Orders Placed This Encounter   Procedures   • DIET ORDER     Standing Status: Standing      Number of Occurrences: 1      Standing Expiration Date:      Order Specific Question:  Diet:     Answer:  Full Liquid [11]     Medical Decision Making, by Problem:  Active Hospital Problems    Diagnosis   • *Osteomyelitis of toe (CMS-HCC) [M86.9]       IV Unasyn, discuss with Vascular or Ortho if he needs surgery/amputation of toe    • C. difficile diarrhea [A04.7]      oral Vancomycin    • Bacteremia [R78.81]         IV Unasyn   • Back pain [M54.9]         Lidocaine patch, Oxycodone   • Hyponatremia [E87.1]         follow bmp   • Noncompliance [Z91.19]    counseling   • Macrocytosis [D75.89]       B12, folic wnl   • Hypokalemia [E87.6]     stop Kdur, follow bmp    • Aortic Stenosis, moderate.   Echo from outside hospital reviewed, no CHF, will stop Lasix    • Atrial fibrillation (CMS-HCC) [I48.91]     Amiodarone, decrease Metoprolol, holding Eliquis   • S/P AAA (abdominal aortic aneurysm) repair [Z98.890, Z86.79]     possible Endograft infection?, for tagged WBC scan   • Tobacco dependence [F17.200]      Nicotine patch        Dysphagia.     Full liquid, speech to follow       Coagulopathy.   follow INR       Hepatitis C.      follow cmp       Dyslipidemia.   hold Zocor       Achalasia.        s/p EGD with botox injection, Full liquid diet    Medications reviewed, EKG reviewed, Labs reviewed and Radiology images reviewed  Art catheter: No Art      DVT Prophylaxis: Contraindicated - High bleeding risk  DVT prophylaxis - mechanical: Contra-indicated  Ulcer prophylaxis: No  Antibiotics: Treating active infection/contamination beyond 24 hours perioperative coverage           no

## 2018-03-23 ENCOUNTER — PATIENT OUTREACH (OUTPATIENT)
Dept: HEALTH INFORMATION MANAGEMENT | Facility: OTHER | Age: 73
End: 2018-03-23

## 2018-04-06 DIAGNOSIS — Z01.810 PRE-OPERATIVE CARDIOVASCULAR EXAMINATION: ICD-10-CM

## 2018-04-06 DIAGNOSIS — Z01.812 PRE-OPERATIVE LABORATORY EXAMINATION: ICD-10-CM

## 2018-04-06 LAB
ALBUMIN SERPL BCP-MCNC: 4.1 G/DL (ref 3.2–4.9)
ALBUMIN/GLOB SERPL: 1.2 G/DL
ALP SERPL-CCNC: 80 U/L (ref 30–99)
ALT SERPL-CCNC: 15 U/L (ref 2–50)
ANION GAP SERPL CALC-SCNC: 9 MMOL/L (ref 0–11.9)
APPEARANCE UR: CLEAR
AST SERPL-CCNC: 25 U/L (ref 12–45)
BACTERIA #/AREA URNS HPF: NEGATIVE /HPF
BASOPHILS # BLD AUTO: 0.6 % (ref 0–1.8)
BASOPHILS # BLD: 0.04 K/UL (ref 0–0.12)
BILIRUB SERPL-MCNC: 0.6 MG/DL (ref 0.1–1.5)
BILIRUB UR QL STRIP.AUTO: NEGATIVE
BUN SERPL-MCNC: 19 MG/DL (ref 8–22)
CALCIUM SERPL-MCNC: 9.1 MG/DL (ref 8.5–10.5)
CHLORIDE SERPL-SCNC: 108 MMOL/L (ref 96–112)
CO2 SERPL-SCNC: 20 MMOL/L (ref 20–33)
COLOR UR: YELLOW
CREAT SERPL-MCNC: 1.08 MG/DL (ref 0.5–1.4)
EKG IMPRESSION: NORMAL
EOSINOPHIL # BLD AUTO: 0.07 K/UL (ref 0–0.51)
EOSINOPHIL NFR BLD: 1 % (ref 0–6.9)
EPI CELLS #/AREA URNS HPF: NEGATIVE /HPF
ERYTHROCYTE [DISTWIDTH] IN BLOOD BY AUTOMATED COUNT: 54.5 FL (ref 35.9–50)
GLOBULIN SER CALC-MCNC: 3.5 G/DL (ref 1.9–3.5)
GLUCOSE SERPL-MCNC: 120 MG/DL (ref 65–99)
GLUCOSE UR STRIP.AUTO-MCNC: NEGATIVE MG/DL
HCT VFR BLD AUTO: 37.9 % (ref 42–52)
HGB BLD-MCNC: 12.7 G/DL (ref 14–18)
HYALINE CASTS #/AREA URNS LPF: ABNORMAL /LPF
IMM GRANULOCYTES # BLD AUTO: 0.04 K/UL (ref 0–0.11)
IMM GRANULOCYTES NFR BLD AUTO: 0.6 % (ref 0–0.9)
KETONES UR STRIP.AUTO-MCNC: NEGATIVE MG/DL
LEUKOCYTE ESTERASE UR QL STRIP.AUTO: ABNORMAL
LYMPHOCYTES # BLD AUTO: 2.26 K/UL (ref 1–4.8)
LYMPHOCYTES NFR BLD: 33.4 % (ref 22–41)
MCH RBC QN AUTO: 34.1 PG (ref 27–33)
MCHC RBC AUTO-ENTMCNC: 33.5 G/DL (ref 33.7–35.3)
MCV RBC AUTO: 101.9 FL (ref 81.4–97.8)
MICRO URNS: ABNORMAL
MONOCYTES # BLD AUTO: 0.68 K/UL (ref 0–0.85)
MONOCYTES NFR BLD AUTO: 10.1 % (ref 0–13.4)
NEUTROPHILS # BLD AUTO: 3.67 K/UL (ref 1.82–7.42)
NEUTROPHILS NFR BLD: 54.3 % (ref 44–72)
NITRITE UR QL STRIP.AUTO: NEGATIVE
NRBC # BLD AUTO: 0 K/UL
NRBC BLD-RTO: 0 /100 WBC
PH UR STRIP.AUTO: 6 [PH]
PLATELET # BLD AUTO: 129 K/UL (ref 164–446)
PMV BLD AUTO: 9.6 FL (ref 9–12.9)
POTASSIUM SERPL-SCNC: 3.9 MMOL/L (ref 3.6–5.5)
PROT SERPL-MCNC: 7.6 G/DL (ref 6–8.2)
PROT UR QL STRIP: NEGATIVE MG/DL
RBC # BLD AUTO: 3.72 M/UL (ref 4.7–6.1)
RBC # URNS HPF: ABNORMAL /HPF
RBC UR QL AUTO: ABNORMAL
SODIUM SERPL-SCNC: 137 MMOL/L (ref 135–145)
SP GR UR STRIP.AUTO: 1.01
UROBILINOGEN UR STRIP.AUTO-MCNC: 0.2 MG/DL
WBC # BLD AUTO: 6.8 K/UL (ref 4.8–10.8)
WBC #/AREA URNS HPF: ABNORMAL /HPF

## 2018-04-06 PROCEDURE — 36415 COLL VENOUS BLD VENIPUNCTURE: CPT

## 2018-04-06 PROCEDURE — 85025 COMPLETE CBC W/AUTO DIFF WBC: CPT

## 2018-04-06 PROCEDURE — 93005 ELECTROCARDIOGRAM TRACING: CPT

## 2018-04-06 PROCEDURE — 81001 URINALYSIS AUTO W/SCOPE: CPT

## 2018-04-06 PROCEDURE — 87086 URINE CULTURE/COLONY COUNT: CPT

## 2018-04-06 PROCEDURE — 80053 COMPREHEN METABOLIC PANEL: CPT

## 2018-04-06 PROCEDURE — 93010 ELECTROCARDIOGRAM REPORT: CPT | Performed by: INTERNAL MEDICINE

## 2018-04-06 RX ORDER — OXYBUTYNIN CHLORIDE 5 MG/1
5 TABLET, EXTENDED RELEASE ORAL 2 TIMES DAILY
COMMUNITY
End: 2018-11-04

## 2018-04-08 LAB
BACTERIA UR CULT: NORMAL
SIGNIFICANT IND 70042: NORMAL
SITE SITE: NORMAL
SOURCE SOURCE: NORMAL

## 2018-04-09 ENCOUNTER — HOSPITAL ENCOUNTER (OUTPATIENT)
Facility: MEDICAL CENTER | Age: 73
End: 2018-04-09
Attending: UROLOGY | Admitting: UROLOGY
Payer: MEDICARE

## 2018-04-09 ENCOUNTER — APPOINTMENT (OUTPATIENT)
Dept: RADIOLOGY | Facility: MEDICAL CENTER | Age: 73
End: 2018-04-09
Attending: UROLOGY
Payer: MEDICARE

## 2018-04-09 VITALS
RESPIRATION RATE: 18 BRPM | SYSTOLIC BLOOD PRESSURE: 125 MMHG | BODY MASS INDEX: 27.87 KG/M2 | OXYGEN SATURATION: 100 % | HEIGHT: 70 IN | HEART RATE: 75 BPM | TEMPERATURE: 97.7 F | DIASTOLIC BLOOD PRESSURE: 66 MMHG | WEIGHT: 194.67 LBS

## 2018-04-09 PROCEDURE — C1769 GUIDE WIRE: HCPCS | Performed by: UROLOGY

## 2018-04-09 PROCEDURE — 160025 RECOVERY II MINUTES (STATS): Performed by: UROLOGY

## 2018-04-09 PROCEDURE — 501329 HCHG SET, CYSTO IRRIG Y TUR: Performed by: UROLOGY

## 2018-04-09 PROCEDURE — 160046 HCHG PACU - 1ST 60 MINS PHASE II: Performed by: UROLOGY

## 2018-04-09 PROCEDURE — 500880 HCHG PACK, CYSTO W/SEP LEGGINGS: Performed by: UROLOGY

## 2018-04-09 PROCEDURE — C2617 STENT, NON-COR, TEM W/O DEL: HCPCS | Performed by: UROLOGY

## 2018-04-09 PROCEDURE — 160039 HCHG SURGERY MINUTES - EA ADDL 1 MIN LEVEL 3: Performed by: UROLOGY

## 2018-04-09 PROCEDURE — 160035 HCHG PACU - 1ST 60 MINS PHASE I: Performed by: UROLOGY

## 2018-04-09 PROCEDURE — 700102 HCHG RX REV CODE 250 W/ 637 OVERRIDE(OP): Performed by: UROLOGY

## 2018-04-09 PROCEDURE — A9270 NON-COVERED ITEM OR SERVICE: HCPCS | Performed by: UROLOGY

## 2018-04-09 PROCEDURE — 160009 HCHG ANES TIME/MIN: Performed by: UROLOGY

## 2018-04-09 PROCEDURE — 160036 HCHG PACU - EA ADDL 30 MINS PHASE I: Performed by: UROLOGY

## 2018-04-09 PROCEDURE — 160048 HCHG OR STATISTICAL LEVEL 1-5: Performed by: UROLOGY

## 2018-04-09 PROCEDURE — 160002 HCHG RECOVERY MINUTES (STAT): Performed by: UROLOGY

## 2018-04-09 PROCEDURE — 700111 HCHG RX REV CODE 636 W/ 250 OVERRIDE (IP)

## 2018-04-09 PROCEDURE — 160028 HCHG SURGERY MINUTES - 1ST 30 MINS LEVEL 3: Performed by: UROLOGY

## 2018-04-09 DEVICE — STENT UROLOGICAL POLARIS 8X26  ULTRA: Type: IMPLANTABLE DEVICE | Site: URETER | Status: FUNCTIONAL

## 2018-04-09 RX ORDER — LIDOCAINE HYDROCHLORIDE 10 MG/ML
0.5 INJECTION, SOLUTION INFILTRATION; PERINEURAL
Status: DISCONTINUED | OUTPATIENT
Start: 2018-04-09 | End: 2018-04-09

## 2018-04-09 RX ORDER — ACETAMINOPHEN 325 MG/1
650 TABLET ORAL EVERY 6 HOURS
Status: DISCONTINUED | OUTPATIENT
Start: 2018-04-09 | End: 2018-04-09 | Stop reason: HOSPADM

## 2018-04-09 RX ORDER — SODIUM CHLORIDE, SODIUM LACTATE, POTASSIUM CHLORIDE, CALCIUM CHLORIDE 600; 310; 30; 20 MG/100ML; MG/100ML; MG/100ML; MG/100ML
INJECTION, SOLUTION INTRAVENOUS CONTINUOUS
Status: DISCONTINUED | OUTPATIENT
Start: 2018-04-09 | End: 2018-04-09

## 2018-04-09 RX ORDER — TAMSULOSIN HYDROCHLORIDE 0.4 MG/1
0.4 CAPSULE ORAL DAILY
Qty: 30 CAP | Refills: 12 | Status: ON HOLD | OUTPATIENT
Start: 2018-04-09 | End: 2019-06-13

## 2018-04-09 RX ORDER — OXYCODONE HYDROCHLORIDE 5 MG/1
5 TABLET ORAL
Status: DISCONTINUED | OUTPATIENT
Start: 2018-04-09 | End: 2018-04-09 | Stop reason: HOSPADM

## 2018-04-09 RX ORDER — OXYCODONE HYDROCHLORIDE 10 MG/1
10 TABLET ORAL
Status: DISCONTINUED | OUTPATIENT
Start: 2018-04-09 | End: 2018-04-09 | Stop reason: HOSPADM

## 2018-04-09 RX ORDER — SODIUM CHLORIDE, SODIUM LACTATE, POTASSIUM CHLORIDE, CALCIUM CHLORIDE 600; 310; 30; 20 MG/100ML; MG/100ML; MG/100ML; MG/100ML
INJECTION, SOLUTION INTRAVENOUS ONCE
Status: DISCONTINUED | OUTPATIENT
Start: 2018-04-09 | End: 2018-04-09 | Stop reason: HOSPADM

## 2018-04-09 RX ADMIN — SODIUM CHLORIDE, SODIUM LACTATE, POTASSIUM CHLORIDE, CALCIUM CHLORIDE: 600; 310; 30; 20 INJECTION, SOLUTION INTRAVENOUS at 13:10

## 2018-04-09 ASSESSMENT — PAIN SCALES - GENERAL
PAINLEVEL_OUTOF10: 0

## 2018-04-09 NOTE — OR SURGEON
Immediate Post OP Note    PreOp Diagnosis: Left hydronephrosis    PostOp Diagnosis: same    Procedure(s):  CYSTOSCOPY STENT PLACEMENT- FOR EXCHANGE    Surgeon(s):  Mihai Whittington M.D.    Anesthesiologist/Type of Anesthesia:  Anesthesiologist: Fernando Ramirez M.D./* No anesthesia type entered *    Surgical Staff:  * No surgical staff found *    Specimens removed if any:  * No specimens in log *    Estimated Blood Loss: none    Findings: L ureteral stent    Complications: None.  To PACU stable        4/9/2018 4:32 PM Mihai Whittington M.D.

## 2018-04-10 NOTE — OR NURSING
Pt ready and anxious to go home. Pt demanding to have his IV taken out. VSS, pt saturating above 93% on RA. D/c orders received. IV dc'd. Pt changed into clothing with assistance. Discharge instructions given; pt and family verbalized understanding and questions answered. Pt voided twice prior to d/c. Prescriptions with pt's daughter. Pt dc'd in w/c with RN in stable condition.

## 2018-04-10 NOTE — OP REPORT
DATE OF SERVICE:  04/09/2018    PREOPERATIVE DIAGNOSIS:  Left chronic hydronephrosis secondary to ureteral   entrapment from abdominal aortic aneurysm that has been repaired.    POSTOPERATIVE DIAGNOSIS:  Left chronic hydronephrosis secondary to ureteral   entrapment from abdominal aortic aneurysm that has been repaired.    PROCEDURE:  Cystoscopy with exchange of left ureteral stent.    ANESTHESIA:  General.    ANESTHESIOLOGIST:  Fernando Ramirez MD    SURGEON:  Mihai Whittington MD    BRIEF HISTORY:  This 73-year-old male who presented with left hydronephrosis   _____.  He did have an abdominal aortic aneurysm repaired in the past and it   was felt that ureters were trapped and inflammation associated with that.  He   had a stent placed approximately 6 months ago.    He now presents back for stent exchange.    REPORT OF OPERATION:  Under general anesthesia with the patient in the   lithotomy position, the genitalia were prepped and draped in the usual manner.    The 20-Italian cystoscope was passed into the bladder under direct vision.    Anterior urethra was unremarkable.  Prostatic urethra did show moderate   lateral lobe hypertrophy with a small median lobe.  Bladder had trabeculation   but no tumor, stones or other abnormalities.  The stent was identified.  It   was then grasped with an alligator grasper and pulled down to the orifice.  At   this point, an attempt to pass a Zip wire up through it was unsuccessful and   so the entire stent was removed.  Cystoscope was reintroduced into the bladder   and I was able to engage the zip wire up to the ureter and up to the kidney   under fluoroscopic guidance.  There was a good curl within the kidney.  At   this point, I replaced the stent with a 8-Italian x 26 cm long double pigtail   stent.  This went over the wire easily.  There was good curl with stent in the   kidney and in the bladder on direct vision and fluoroscopy.  At this point,   the bladder was drained,  the cystoscope removed, and the patient woken up and   transferred to the recovery room in stable condition.       ____________________________________     MD TRICIA RODRÍGUEZ / KELLY    DD:  04/09/2018 16:37:24  DT:  04/09/2018 18:08:09    D#:  [id number]  Job#:  [id number]

## 2018-04-10 NOTE — DISCHARGE INSTRUCTIONS
ACTIVITY: Rest and take it easy for the first 24 hours.  A responsible adult is recommended to remain with you during that time.  It is normal to feel sleepy.  We encourage you to not do anything that requires balance, judgment or coordination.    MILD FLU-LIKE SYMPTOMS ARE NORMAL. YOU MAY EXPERIENCE GENERALIZED MUSCLE ACHES, THROAT IRRITATION, HEADACHE AND/OR SOME NAUSEA.    FOR 24 HOURS DO NOT:  Drive, operate machinery or run household appliances.  Drink beer or alcoholic beverages.   Make important decisions or sign legal documents.    SPECIAL INSTRUCTIONS: Cystoscopy, Care After  Refer to this sheet in the next few weeks. These instructions provide you with information on caring for yourself after your procedure. Your caregiver may also give you more specific instructions. Your treatment has been planned according to current medical practices, but problems sometimes occur. Call your caregiver if you have any problems or questions after your procedure.  HOME CARE INSTRUCTIONS   Things you can do to ease any discomfort after your procedure include:  · Drinking enough water and fluids to keep your urine clear or pale yellow.  · Taking a warm bath to relieve any burning feelings.  SEEK IMMEDIATE MEDICAL CARE IF:   · You have an increase in blood in your urine.  · You notice blood clots in your urine.  · You have difficulty passing urine.   · You have the chills.  · You have abdominal pain.  · You have a fever or persistent symptoms for more than 2-3 days.  · You have a fever and your symptoms suddenly get worse.  MAKE SURE YOU:   · Understand these instructions.  · Will watch your condition.  · Will get help right away if you are not doing well or get worse.     This information is not intended to replace advice given to you by your health care provider. Make sure you discuss any questions you have with your health care provider.     Document Released: 07/07/2006 Document Revised: 01/08/2016 Document Reviewed:  06/10/2013  [employer] Patient Education ©2016 [employer]      DIET: To avoid nausea, slowly advance diet as tolerated, avoiding spicy or greasy foods for the first day.  Add more substantial food to your diet according to your physician's instructions.  Babies can be fed formula or breast milk as soon as they are hungry.  INCREASE FLUIDS AND FIBER TO AVOID CONSTIPATION.    SURGICAL DRESSING/BATHING: Follow MD instruction    FOLLOW-UP APPOINTMENT:  A follow-up appointment should be arranged with your doctor in 1-2 weeks; call to schedule.    You should CALL YOUR PHYSICIAN if you develop:  Fever greater than 101 degrees F.  Pain not relieved by medication, or persistent nausea or vomiting.  Excessive bleeding (blood soaking through dressing) or unexpected drainage from the wound.  Extreme redness or swelling around the incision site, drainage of pus or foul smelling drainage.  Inability to urinate or empty your bladder within 8 hours.  Problems with breathing or chest pain.    You should call 911 if you develop problems with breathing or chest pain.  If you are unable to contact your doctor or surgical center, you should go to the nearest emergency room or urgent care center.  Physician's telephone #: Dr. Whittington [phone]    If any questions arise, call your doctor.  If your doctor is not available, please feel free to call the Surgical Center at [phone].  The Center is open Monday through Friday from 7AM to 7PM.  You can also call the [facility]OT[facility] open 24 hours/day, 7 days/week and speak to a nurse at [phone], or toll free at [phone].    A registered nurse may call you a few days after your surgery to see how you are doing after your procedure.    MEDICATIONS: Resume taking daily medication.  Take prescribed pain medication with food.  If no medication is prescribed, you may take non-aspirin pain medication if needed.  PAIN MEDICATION CAN BE VERY CONSTIPATING.  Take a stool  softener or laxative such as senokot, pericolace, or milk of magnesia if needed.    Prescription given for Flomax.  You may take your next pain medication as needed.    If your physician has prescribed pain medication that includes Acetaminophen (Tylenol), do not take additional Acetaminophen (Tylenol) while taking the prescribed medication.    Depression / Suicide Risk    As you are discharged from this [facility] facility, it is important to learn how to keep safe from harming yourself.    Recognize the warning signs:  · Abrupt changes in personality, positive or negative- including increase in energy   · Giving away possessions  · Change in eating patterns- significant weight changes-  positive or negative  · Change in sleeping patterns- unable to sleep or sleeping all the time   · Unwillingness or inability to communicate  · Depression  · Unusual sadness, discouragement and loneliness  · Talk of wanting to die  · Neglect of personal appearance   · Rebelliousness- reckless behavior  · Withdrawal from people/activities they love  · Confusion- inability to concentrate     If you or a loved one observes any of these behaviors or has concerns about self-harm, here's what you can do:  · Talk about it- your feelings and reasons for harming yourself  · Remove any means that you might use to hurt yourself (examples: pills, rope, extension cords, firearm)  · Get professional help from the community (Mental Health, Substance Abuse, psychological counseling)  · Do not be alone:Call your Safe Contact- someone whom you trust who will be there for you.  · Call your local CRISIS HOTLINE 776-9059 or 680-727-4983  · Call your local Children's Mobile Crisis Response Team [place] (282) 493-5414 or www.Tip Network  · Call the toll free National Suicide Prevention Hotlines   · National Suicide Prevention Lifeline 092-789-KIYJ (8798)  · National Hope Line Network 800-SUICIDE (244-5349)

## 2018-04-10 NOTE — OR NURSING
Pt A&OX4. VSS. Pt on room air. Pt has no complaints of pain or nausea during recovery. Pt received fluids TKO, 700 cc of LR. Script for Flomax on chart. Pt's friend, Mai, given update and picking pt up from d/c. Report called to RAKESH Lambert. Pt out of PACU via deangelo to Phase II.

## 2018-09-14 ENCOUNTER — PATIENT OUTREACH (OUTPATIENT)
Dept: HEALTH INFORMATION MANAGEMENT | Facility: OTHER | Age: 73
End: 2018-09-14

## 2018-11-04 ENCOUNTER — HOSPITAL ENCOUNTER (EMERGENCY)
Facility: MEDICAL CENTER | Age: 73
End: 2018-11-04
Attending: EMERGENCY MEDICINE
Payer: MEDICARE

## 2018-11-04 VITALS
BODY MASS INDEX: 26.39 KG/M2 | RESPIRATION RATE: 16 BRPM | TEMPERATURE: 97.8 F | HEART RATE: 73 BPM | OXYGEN SATURATION: 98 % | SYSTOLIC BLOOD PRESSURE: 125 MMHG | HEIGHT: 70 IN | WEIGHT: 184.3 LBS | DIASTOLIC BLOOD PRESSURE: 72 MMHG

## 2018-11-04 DIAGNOSIS — L20.82 FLEXURAL ECZEMA: ICD-10-CM

## 2018-11-04 PROCEDURE — 99284 EMERGENCY DEPT VISIT MOD MDM: CPT

## 2018-11-04 PROCEDURE — 700111 HCHG RX REV CODE 636 W/ 250 OVERRIDE (IP): Mod: JG | Performed by: EMERGENCY MEDICINE

## 2018-11-04 PROCEDURE — 96372 THER/PROPH/DIAG INJ SC/IM: CPT

## 2018-11-04 RX ORDER — TRIAMCINOLONE ACETONIDE 40 MG/ML
40 INJECTION, SUSPENSION INTRA-ARTICULAR; INTRAMUSCULAR ONCE
Status: COMPLETED | OUTPATIENT
Start: 2018-11-04 | End: 2018-11-04

## 2018-11-04 RX ORDER — CLOBETASOL PROPIONATE 0.5 MG/G
1 CREAM TOPICAL 2 TIMES DAILY
Qty: 1 TUBE | Refills: 0 | Status: SHIPPED | OUTPATIENT
Start: 2018-11-04 | End: 2018-11-11

## 2018-11-04 RX ADMIN — TRIAMCINOLONE ACETONIDE 40 MG: 40 INJECTION, SUSPENSION INTRA-ARTICULAR; INTRAMUSCULAR at 14:35

## 2018-11-04 ASSESSMENT — LIFESTYLE VARIABLES: DO YOU DRINK ALCOHOL: NO

## 2018-11-04 NOTE — ED NOTES
Patient was educated on discharge instructions.  Patient was informed about diagnosis, symptom management, risks, and home care instructions.  Patient verbalized understanding and signed discharge instructions. Prescription handed to patient. Copy of discharge instructions in chart.  Patient ambulated out with steady gait.  Patient has personal belongings.

## 2018-11-04 NOTE — ED TRIAGE NOTES
".  Chief Complaint   Patient presents with   • Eczema     X3 days, all over legs, abdomen, back, arms; pt states he has been diagnosed in the past with this, stating he normally gets a shot of kenalog and a cream      Patient ambulatory to triage for above complaints; A&O X4; NAD observed in triage, pt states \"I get this every year and I just need my shot and my cream\".    Patient placed in lobby and educated to notify staff of new or worsening symptoms.     "

## 2018-11-04 NOTE — ED PROVIDER NOTES
ED Provider Note    ER PROVIDER NOTE          CHIEF COMPLAINT  Chief Complaint   Patient presents with   • Eczema     X3 days, all over legs, abdomen, back, arms; pt states he has been diagnosed in the past with this, stating he normally gets a shot of kenalog and a cream        HPI  Nicola Jimenez is a 73 y.o. male who presents to the emergency department complaining of Rash.  He reports of the last 3 days has had rash on the back of his knees as well as forearms.  The rash is itchy, it is not painful.  He has had no known new exposures, medications, foods or recent travel.  No fevers or chills.  Denies any swelling or difficulty breathing.  Patient has had similar episode in the past requiring steroids    REVIEW OF SYSTEMS  Pertinent positives include rash. Pertinent negatives include no fever. See HPI for details. All other systems reviewed and are negative.    PAST MEDICAL HISTORY   has a past medical history of Arrhythmia; Arthritis; Atherosclerosis of aorta ([facility]); Blood clotting disorder ([facility]) (1988); Carotid artery disease ([facility]); Cataract; Dental disorder; Esophageal dilatation; H/O heart artery stent; Hernia, inguinal, bilateral; High cholesterol; History of AAA (abdominal aortic aneurysm) repair (01/30/2012); [place] (hard of hearing); Indigestion; MI (myocardial infarction) ([facility]) (1988); MI, old; Myocardial infarct ([facility]); PAD (peripheral artery disease); Status post aortic coarctation stent placement; Umbilical hernia; and Urinary incontinence.    SOCIAL HISTORY  Social History   Substance Use Topics   • Smoking status: Current Every Day Smoker     Packs/day: 1.00     Types: Cigarettes, Pipe   • Smokeless tobacco: Never Used   • Alcohol use Yes      Comment: 2 per day       SURGICAL HISTORY   has a past surgical history that includes aaa with stent graft (1/30/2012); esophageal motility or manometry (3/27/2012); gastroscopy-endo (3/19/2017); esophageal motility or manometry (3/21/2017); gastroscopy with  "botox injection (N/A, 3/23/2017); toe amputation (Left, 3/25/2017); cystoscopy stent placement (4/16/2017); and cystoscopy stent placement (Left, 4/9/2018).    CURRENT MEDICATIONS  Home Medications     Reviewed by Malou Leonard R.N. (Registered Nurse) on 11/04/18 at 1327  Med List Status: Complete   Medication Last Dose Status   amoxicillin (AMOXIL) 500 MG Cap 4/9/2018 Active   cyclobenzaprine (FLEXERIL) 10 MG Tab 4/8/2018 Active   gabapentin (NEURONTIN) 300 MG CAPS 4/9/2018 Active   simvastatin (ZOCOR) 20 MG TABS 4/8/2018 Active   tamsulosin (FLOMAX) 0.4 MG capsule  Active                ALLERGIES  No Known Allergies    PHYSICAL EXAM  VITAL SIGNS: /53   Pulse 85   Temp 36.6 °C (97.8 °F)   Resp 18   Ht 1.778 m (5' 10\")   Wt 83.6 kg (184 lb 4.9 oz)   SpO2 94%   BMI 26.44 kg/m²   Pulse ox interpretation: I interpret this pulse ox as normal.    Constitutional: Alert.  In no apparent distress.  HENT: Normocephalic, Atraumatic, Bilateral external ears normal. Nose normal.   Eyes: Pupils are equal and reactive. Conjunctiva normal, non-icteric.   Heart: Regular rate and rhythm, no murmurs.    Lungs: Clear to auscultation bilaterally.  Skin: Warm, Dry, No erythema, macular papular rash with excoriation over flexural surface of bilateral knees and upper extremities, there is no skin breakdown or sloughing, no surrounding erythema, no petechiae or purpura  Musculoskeletal: No tenderness or major deformities noted. No edema.  Neurologic: Alert, Grossly non-focal.   Psychiatric: Affect normal, Judgment normal, Mood normal, Appears appropriate and not intoxicated.     DIAGNOSTIC STUDIES / PROCEDURES      COURSE & MEDICAL DECISION MAKING  Nursing notes, VS, PMSFHx reviewed in chart.    1:38 PM - Patient seen and examined at bedside. Patient will be treated with Kenalog.     Decision Making:  This is a 73 y.o. male presented with rash.  On exam this is consistent with eczema there is no evidence of concomitant " superimposed infection, serious systemic illness or toxic cause for his rash.  He has improved in the past with Kenalog and clobetasol, will prescribe the same, primary care follow-up     The patient will return for new or worsening symptoms and is stable at the time of discharge.    The patient is referred to a primary physician for blood pressure management, diabetic screening, and for all other preventative health concerns.        DISPOSITION:  Patient will be discharged home in stable condition.    FOLLOW UP:  MORRIS Alexis  [address] [place] [address] [place] [address]  [phone]    In 1 week        OUTPATIENT MEDICATIONS:  New Prescriptions    CLOBETASOL (TEMOVATE) 0.05 % CREAM    Apply 1 Application to affected area(s) 2 times a day for 7 days.         FINAL IMPRESSION  1. Flexural eczema        The note accurately reflects work and decisions made by me.  Ric Scott  11/4/2018  1:50 PM

## 2018-11-05 ENCOUNTER — PATIENT OUTREACH (OUTPATIENT)
Dept: HEALTH INFORMATION MANAGEMENT | Facility: OTHER | Age: 73
End: 2018-11-05

## 2018-11-06 ENCOUNTER — HOSPITAL ENCOUNTER (EMERGENCY)
Facility: MEDICAL CENTER | Age: 73
End: 2018-11-06
Attending: EMERGENCY MEDICINE
Payer: MEDICARE

## 2018-11-06 VITALS
BODY MASS INDEX: 27.43 KG/M2 | HEIGHT: 70 IN | HEART RATE: 71 BPM | TEMPERATURE: 98.4 F | WEIGHT: 191.58 LBS | RESPIRATION RATE: 16 BRPM | SYSTOLIC BLOOD PRESSURE: 133 MMHG | DIASTOLIC BLOOD PRESSURE: 71 MMHG | OXYGEN SATURATION: 98 %

## 2018-11-06 DIAGNOSIS — L29.9 ITCHING: ICD-10-CM

## 2018-11-06 PROCEDURE — 99283 EMERGENCY DEPT VISIT LOW MDM: CPT

## 2018-11-06 RX ORDER — HYDROXYZINE HYDROCHLORIDE 25 MG/1
25 TABLET, FILM COATED ORAL EVERY 6 HOURS PRN
Qty: 16 TAB | Refills: 1 | Status: SHIPPED | OUTPATIENT
Start: 2018-11-06 | End: 2018-11-06

## 2018-11-06 RX ORDER — COLLOIDAL OATMEAL
PACKET (EA) TOPICAL
Qty: 300 G | Refills: 1 | Status: ON HOLD | OUTPATIENT
Start: 2018-11-06 | End: 2018-11-14

## 2018-11-06 RX ORDER — HYDROXYZINE HYDROCHLORIDE 25 MG/1
25 TABLET, FILM COATED ORAL EVERY 6 HOURS PRN
Qty: 16 TAB | Refills: 1 | Status: SHIPPED | OUTPATIENT
Start: 2018-11-06 | End: 2018-11-10

## 2018-11-06 NOTE — DISCHARGE INSTRUCTIONS
For continued itching as your eczema heals, use the medications we prescribed.  You do not need a prescription for the oatmeal baths, and you can buy more without a prescription if you want to.  If the Atarax is not affordable, use 25 mg of oral Benadryl every 6 hours instead.

## 2018-11-06 NOTE — ED TRIAGE NOTES
"Pt ambulatory to triage c/o eczema to leg. Pt states had shot of kenalog 2 days ago and was given rx for a cream however continues to itch and states \"i can't even sleep its so bad\" nad  "

## 2018-11-06 NOTE — ED PROVIDER NOTES
ED Provider Note    Scribed for Gerson Melendez M.D. by Gerson Melendez. 11/6/2018  9:07 AM    CHIEF COMPLAINT  No chief complaint on file.      HPI  Nicola Jimenez is a 73 y.o. male who presents to the Emergency Room for itching into areas of his legs where eczema is healing.  He was seen here recently for an acute exacerbation of chronic intermittent eczema.  He reports that the treatment given has helped the rash start to clear up very quickly, and is quite happy with his improvement, but is still having significant itching, and he says is making it difficult for him to sleep.  He denies bleeding or drainage, and fevers or chills.    REVIEW OF SYSTEMS  See HPI for further details.    PAST MEDICAL HISTORY   has a past medical history of Arrhythmia; Arthritis; Atherosclerosis of aorta ([facility]); Blood clotting disorder ([facility]) (1988); Carotid artery disease ([facility]); Cataract; Dental disorder; Esophageal dilatation; H/O heart artery stent; Hernia, inguinal, bilateral; High cholesterol; History of AAA (abdominal aortic aneurysm) repair (01/30/2012); [place] (hard of hearing); Indigestion; MI (myocardial infarction) ([facility]) (1988); MI, old; Myocardial infarct ([facility]); PAD (peripheral artery disease); Status post aortic coarctation stent placement; Umbilical hernia; and Urinary incontinence.    SOCIAL HISTORY  Social History     Social History Main Topics   • Smoking status: Current Every Day Smoker     Packs/day: 1.00     Types: Cigarettes, Pipe   • Smokeless tobacco: Never Used   • Alcohol use Yes      Comment: 2 per day   • Drug use: No   • Sexual activity: Not on file       SURGICAL HISTORY   has a past surgical history that includes aaa with stent graft (1/30/2012); esophageal motility or manometry (3/27/2012); gastroscopy-endo (3/19/2017); esophageal motility or manometry (3/21/2017); gastroscopy with botox injection (N/A, 3/23/2017); toe amputation (Left, 3/25/2017); cystoscopy stent placement (4/16/2017); and  "cystoscopy stent placement (Left, 4/9/2018).    CURRENT MEDICATIONS  Home Medications     Reviewed by Fanny Lria R.N. (Registered Nurse) on 11/06/18 at 0825  Med List Status: Partial   Medication Last Dose Status   amoxicillin (AMOXIL) 500 MG Cap 4/9/2018 Active   clobetasol (TEMOVATE) 0.05 % Cream  Active   cyclobenzaprine (FLEXERIL) 10 MG Tab 4/8/2018 Active   gabapentin (NEURONTIN) 300 MG CAPS 4/9/2018 Active   simvastatin (ZOCOR) 20 MG TABS 4/8/2018 Active   tamsulosin (FLOMAX) 0.4 MG capsule  Active                ALLERGIES  No Known Allergies    PHYSICAL EXAM  VITAL SIGNS: /81   Pulse 75   Temp 36.3 °C (97.4 °F) (Temporal)   Resp 16   Ht 1.778 m (5' 10\")   Wt 86.9 kg (191 lb 9.3 oz)   SpO2 98%   BMI 27.49 kg/m²   Pulse ox interpretation: I interpret this pulse ox as normal.  Constitutional: Alert in no apparent distress.  HENT: Normocephalic, Atraumatic, Bilateral external ears normal. Nose normal.   Eyes: Conjunctiva normal, non-icteric.   Heart: Regular rate and rythm, no murmurs.    Lungs: Clear to auscultation bilaterally.  Skin: Areas of healing eczema rash with some superficial scabs on his bilateral lower extremities, no signs of cellulitis or other infection.  No drainage.  Neurologic: Alert, Grossly non-focal.   Psychiatric: Affect normal, Judgment normal, Mood normal, Appears appropriate and not intoxicated.     COURSE & MEDICAL DECISION MAKING  The patient's VS, Nurses notes reviewed. (See chart for details)    9:07 AM Patient seen and examined at bedside.  His eczema is improving, but he still having continued itching, and this is his only complaint.  I am going to prescribe Atarax and oatmeal baths, and I recommended that he follow-up with his primary care doctor.     The patient will return for new or worsening symptoms and is stable at the time of discharge.    The patient is referred to a primary physician for blood pressure management, diabetic screening, and for all other " preventative health concerns.      DISPOSITION:  Patient will be discharged home in stable condition.    FOLLOW UP:  MORRIS Alexis  [address] [place] [address]  [place] [address] [phone]    Call today        OUTPATIENT MEDICATIONS:  New Prescriptions    HYDROXYZINE HCL (ATARAX) 25 MG TAB    Take 1 Tab by mouth every 6 hours as needed for Itching for up to 4 days.    POWDERS ([employer] OATMEAL BATH) POWDER    Follow package directions.         FINAL IMPRESSION  1. Itching Active

## 2018-11-07 ENCOUNTER — PATIENT OUTREACH (OUTPATIENT)
Dept: HEALTH INFORMATION MANAGEMENT | Facility: OTHER | Age: 73
End: 2018-11-07

## 2018-11-07 NOTE — PROGRESS NOTES
Placed discharge outreach phone call to pt s/p ER discharge 11/6/18.  Left voicemail providing my contact information and instructions to call with any questions or concerns.

## 2018-11-14 ENCOUNTER — HOSPITAL ENCOUNTER (OUTPATIENT)
Facility: MEDICAL CENTER | Age: 73
End: 2018-11-14
Attending: UROLOGY | Admitting: UROLOGY
Payer: MEDICARE

## 2018-11-14 ENCOUNTER — APPOINTMENT (OUTPATIENT)
Dept: RADIOLOGY | Facility: MEDICAL CENTER | Age: 73
End: 2018-11-14
Attending: UROLOGY
Payer: MEDICARE

## 2018-11-14 VITALS
OXYGEN SATURATION: 90 % | TEMPERATURE: 98.4 F | RESPIRATION RATE: 16 BRPM | SYSTOLIC BLOOD PRESSURE: 126 MMHG | WEIGHT: 177.69 LBS | BODY MASS INDEX: 25.44 KG/M2 | HEIGHT: 70 IN | HEART RATE: 70 BPM | DIASTOLIC BLOOD PRESSURE: 58 MMHG

## 2018-11-14 LAB
ALBUMIN SERPL BCP-MCNC: 4.2 G/DL (ref 3.2–4.9)
ALBUMIN/GLOB SERPL: 1.6 G/DL
ALP SERPL-CCNC: 60 U/L (ref 30–99)
ALT SERPL-CCNC: 15 U/L (ref 2–50)
ANION GAP SERPL CALC-SCNC: 9 MMOL/L (ref 0–11.9)
AST SERPL-CCNC: 16 U/L (ref 12–45)
BILIRUB SERPL-MCNC: 0.7 MG/DL (ref 0.1–1.5)
BUN SERPL-MCNC: 34 MG/DL (ref 8–22)
CALCIUM SERPL-MCNC: 8.7 MG/DL (ref 8.5–10.5)
CHLORIDE SERPL-SCNC: 105 MMOL/L (ref 96–112)
CO2 SERPL-SCNC: 20 MMOL/L (ref 20–33)
CREAT SERPL-MCNC: 1.32 MG/DL (ref 0.5–1.4)
EKG IMPRESSION: NORMAL
ERYTHROCYTE [DISTWIDTH] IN BLOOD BY AUTOMATED COUNT: 56.8 FL (ref 35.9–50)
GLOBULIN SER CALC-MCNC: 2.7 G/DL (ref 1.9–3.5)
GLUCOSE SERPL-MCNC: 113 MG/DL (ref 65–99)
HCT VFR BLD AUTO: 35 % (ref 42–52)
HGB BLD-MCNC: 11.8 G/DL (ref 14–18)
MCH RBC QN AUTO: 34.9 PG (ref 27–33)
MCHC RBC AUTO-ENTMCNC: 33.7 G/DL (ref 33.7–35.3)
MCV RBC AUTO: 103.6 FL (ref 81.4–97.8)
PLATELET # BLD AUTO: 81 K/UL (ref 164–446)
PMV BLD AUTO: 9.9 FL (ref 9–12.9)
POTASSIUM SERPL-SCNC: 4.2 MMOL/L (ref 3.6–5.5)
PROT SERPL-MCNC: 6.9 G/DL (ref 6–8.2)
RBC # BLD AUTO: 3.38 M/UL (ref 4.7–6.1)
SODIUM SERPL-SCNC: 134 MMOL/L (ref 135–145)
WBC # BLD AUTO: 7.1 K/UL (ref 4.8–10.8)

## 2018-11-14 PROCEDURE — 160036 HCHG PACU - EA ADDL 30 MINS PHASE I: Performed by: UROLOGY

## 2018-11-14 PROCEDURE — 80053 COMPREHEN METABOLIC PANEL: CPT

## 2018-11-14 PROCEDURE — 160025 RECOVERY II MINUTES (STATS): Performed by: UROLOGY

## 2018-11-14 PROCEDURE — 160009 HCHG ANES TIME/MIN: Performed by: UROLOGY

## 2018-11-14 PROCEDURE — 160048 HCHG OR STATISTICAL LEVEL 1-5: Performed by: UROLOGY

## 2018-11-14 PROCEDURE — A9270 NON-COVERED ITEM OR SERVICE: HCPCS | Performed by: UROLOGY

## 2018-11-14 PROCEDURE — C1769 GUIDE WIRE: HCPCS | Performed by: UROLOGY

## 2018-11-14 PROCEDURE — 500880 HCHG PACK, CYSTO W/SEP LEGGINGS: Performed by: UROLOGY

## 2018-11-14 PROCEDURE — 93010 ELECTROCARDIOGRAM REPORT: CPT | Performed by: INTERNAL MEDICINE

## 2018-11-14 PROCEDURE — 160028 HCHG SURGERY MINUTES - 1ST 30 MINS LEVEL 3: Performed by: UROLOGY

## 2018-11-14 PROCEDURE — C2617 STENT, NON-COR, TEM W/O DEL: HCPCS | Performed by: UROLOGY

## 2018-11-14 PROCEDURE — 93005 ELECTROCARDIOGRAM TRACING: CPT | Performed by: UROLOGY

## 2018-11-14 PROCEDURE — 700101 HCHG RX REV CODE 250

## 2018-11-14 PROCEDURE — 501329 HCHG SET, CYSTO IRRIG Y TUR: Performed by: UROLOGY

## 2018-11-14 PROCEDURE — 160035 HCHG PACU - 1ST 60 MINS PHASE I: Performed by: UROLOGY

## 2018-11-14 PROCEDURE — 85027 COMPLETE CBC AUTOMATED: CPT

## 2018-11-14 PROCEDURE — 700102 HCHG RX REV CODE 250 W/ 637 OVERRIDE(OP): Performed by: UROLOGY

## 2018-11-14 PROCEDURE — 160046 HCHG PACU - 1ST 60 MINS PHASE II: Performed by: UROLOGY

## 2018-11-14 PROCEDURE — 160002 HCHG RECOVERY MINUTES (STAT): Performed by: UROLOGY

## 2018-11-14 PROCEDURE — 700111 HCHG RX REV CODE 636 W/ 250 OVERRIDE (IP)

## 2018-11-14 PROCEDURE — 160039 HCHG SURGERY MINUTES - EA ADDL 1 MIN LEVEL 3: Performed by: UROLOGY

## 2018-11-14 DEVICE — STENT UROLOGICAL POLARIS 8X26  ULTRA: Type: IMPLANTABLE DEVICE | Site: URETHRA | Status: FUNCTIONAL

## 2018-11-14 RX ORDER — OXYCODONE HYDROCHLORIDE 5 MG/1
10 TABLET ORAL
Status: DISCONTINUED | OUTPATIENT
Start: 2018-11-14 | End: 2018-11-14 | Stop reason: HOSPADM

## 2018-11-14 RX ORDER — HYDROMORPHONE HYDROCHLORIDE 1 MG/ML
0.1 INJECTION, SOLUTION INTRAMUSCULAR; INTRAVENOUS; SUBCUTANEOUS
Status: DISCONTINUED | OUTPATIENT
Start: 2018-11-14 | End: 2018-11-14 | Stop reason: HOSPADM

## 2018-11-14 RX ORDER — ONDANSETRON 2 MG/ML
4 INJECTION INTRAMUSCULAR; INTRAVENOUS
Status: DISCONTINUED | OUTPATIENT
Start: 2018-11-14 | End: 2018-11-14 | Stop reason: HOSPADM

## 2018-11-14 RX ORDER — HYDROMORPHONE HYDROCHLORIDE 1 MG/ML
0.4 INJECTION, SOLUTION INTRAMUSCULAR; INTRAVENOUS; SUBCUTANEOUS
Status: DISCONTINUED | OUTPATIENT
Start: 2018-11-14 | End: 2018-11-14 | Stop reason: HOSPADM

## 2018-11-14 RX ORDER — SODIUM CHLORIDE, SODIUM LACTATE, POTASSIUM CHLORIDE, CALCIUM CHLORIDE 600; 310; 30; 20 MG/100ML; MG/100ML; MG/100ML; MG/100ML
INJECTION, SOLUTION INTRAVENOUS ONCE
Status: DISCONTINUED | OUTPATIENT
Start: 2018-11-14 | End: 2018-11-14 | Stop reason: HOSPADM

## 2018-11-14 RX ORDER — HALOPERIDOL 5 MG/ML
1 INJECTION INTRAMUSCULAR
Status: DISCONTINUED | OUTPATIENT
Start: 2018-11-14 | End: 2018-11-14 | Stop reason: HOSPADM

## 2018-11-14 RX ORDER — OXYCODONE HCL 5 MG/5 ML
10 SOLUTION, ORAL ORAL
Status: DISCONTINUED | OUTPATIENT
Start: 2018-11-14 | End: 2018-11-14 | Stop reason: HOSPADM

## 2018-11-14 RX ORDER — OXYCODONE HYDROCHLORIDE 5 MG/1
5 TABLET ORAL
Status: DISCONTINUED | OUTPATIENT
Start: 2018-11-14 | End: 2018-11-14 | Stop reason: HOSPADM

## 2018-11-14 RX ORDER — PHENAZOPYRIDINE HYDROCHLORIDE 100 MG/1
200 TABLET, FILM COATED ORAL
Status: COMPLETED | OUTPATIENT
Start: 2018-11-14 | End: 2018-11-14

## 2018-11-14 RX ORDER — IBUPROFEN 200 MG
400 TABLET ORAL EVERY 8 HOURS PRN
Status: ON HOLD | COMMUNITY
End: 2019-06-13

## 2018-11-14 RX ORDER — DIPHENHYDRAMINE HYDROCHLORIDE 50 MG/ML
12.5 INJECTION INTRAMUSCULAR; INTRAVENOUS
Status: DISCONTINUED | OUTPATIENT
Start: 2018-11-14 | End: 2018-11-14 | Stop reason: HOSPADM

## 2018-11-14 RX ORDER — MEPERIDINE HYDROCHLORIDE 25 MG/ML
6.25 INJECTION INTRAMUSCULAR; INTRAVENOUS; SUBCUTANEOUS
Status: DISCONTINUED | OUTPATIENT
Start: 2018-11-14 | End: 2018-11-14 | Stop reason: HOSPADM

## 2018-11-14 RX ORDER — HYDROMORPHONE HYDROCHLORIDE 1 MG/ML
0.2 INJECTION, SOLUTION INTRAMUSCULAR; INTRAVENOUS; SUBCUTANEOUS
Status: DISCONTINUED | OUTPATIENT
Start: 2018-11-14 | End: 2018-11-14 | Stop reason: HOSPADM

## 2018-11-14 RX ORDER — SODIUM CHLORIDE, SODIUM LACTATE, POTASSIUM CHLORIDE, CALCIUM CHLORIDE 600; 310; 30; 20 MG/100ML; MG/100ML; MG/100ML; MG/100ML
INJECTION, SOLUTION INTRAVENOUS CONTINUOUS
Status: DISCONTINUED | OUTPATIENT
Start: 2018-11-14 | End: 2018-11-14 | Stop reason: HOSPADM

## 2018-11-14 RX ORDER — AMOXICILLIN 500 MG/1
500 CAPSULE ORAL 2 TIMES DAILY
COMMUNITY
End: 2020-09-22

## 2018-11-14 RX ORDER — LABETALOL HYDROCHLORIDE 5 MG/ML
5 INJECTION, SOLUTION INTRAVENOUS
Status: DISCONTINUED | OUTPATIENT
Start: 2018-11-14 | End: 2018-11-14 | Stop reason: HOSPADM

## 2018-11-14 RX ORDER — OXYCODONE HCL 5 MG/5 ML
5 SOLUTION, ORAL ORAL
Status: DISCONTINUED | OUTPATIENT
Start: 2018-11-14 | End: 2018-11-14 | Stop reason: HOSPADM

## 2018-11-14 RX ADMIN — PHENAZOPYRIDINE 200 MG: 100 TABLET ORAL at 17:15

## 2018-11-14 ASSESSMENT — PAIN SCALES - GENERAL
PAINLEVEL_OUTOF10: 0

## 2018-11-14 NOTE — PROGRESS NOTES
Med rec updated and complete  Allergies reviewed  Pt reports no vitamins.  Pt reports that he is  AMOXICILLIN 500MG 1 tablet twice a day for the rest of his life, per pt.

## 2018-11-14 NOTE — OR NURSING
Notified Dr. Whittington that pt unable to provide urine specimen as ordered d/t incontinence, wears brief. Order received to discontinue order.

## 2018-11-14 NOTE — OR NURSING
Pt refused to remove bilateral hearing aids and dentures when heading to the OR. OR nurses and this RN educated pt on reasons for removal and that don't want hearing aids to be lost while in OR. He continued to refuse to remove them.

## 2018-11-15 NOTE — DISCHARGE INSTRUCTIONS
ACTIVITY: Rest and take it easy for the first 24 hours.  A responsible adult is recommended to remain with you during that time.  It is normal to feel sleepy.  We encourage you to not do anything that requires balance, judgment or coordination.    MILD FLU-LIKE SYMPTOMS ARE NORMAL. YOU MAY EXPERIENCE GENERALIZED MUSCLE ACHES, THROAT IRRITATION, HEADACHE AND/OR SOME NAUSEA.    FOR 24 HOURS DO NOT:  Drive, operate machinery or run household appliances.  Drink beer or alcoholic beverages.   Make important decisions or sign legal documents.    SPECIAL INSTRUCTIONS: Follow surgeon's instructions.    DIET: To avoid nausea, slowly advance diet as tolerated, avoiding spicy or greasy foods for the first day.  Add more substantial food to your diet according to your physician's instructions.  INCREASE FLUIDS AND FIBER TO AVOID CONSTIPATION.    SURGICAL DRESSING/BATHING: Ok to shower post op day 1.    FOLLOW-UP APPOINTMENT:  A follow-up appointment should be arranged with your doctor in 1-2 weeks; call to schedule.    You should CALL YOUR PHYSICIAN if you develop:  Fever greater than 101 degrees F.  Pain not relieved by medication, or persistent nausea or vomiting.  Excessive bleeding (blood soaking through dressing) or unexpected drainage from the wound.  Extreme redness or swelling around the incision site, drainage of pus or foul smelling drainage.  Inability to urinate or empty your bladder within 8 hours.  Problems with breathing or chest pain.    You should call 911 if you develop problems with breathing or chest pain.  If you are unable to contact your doctor or surgical center, you should go to the nearest emergency room or urgent care center.  Physician's telephone #: Dr. Dr. Macario-Vinicio  [phone].    If any questions arise, call your doctor.  If your doctor is not available, please feel free to call the Surgical Center at [phone].  The Center is open Monday through Friday from 7AM to 7PM.  You can also  call the HEALTH HOTLINE open 24 hours/day, 7 days/week and speak to a nurse at [phone], or toll free at [phone].    A registered nurse may call you a few days after your surgery to see how you are doing after your procedure.    MEDICATIONS: Resume taking daily medication.  Take prescribed pain medication with food.  If no medication is prescribed, you may take non-aspirin pain medication if needed.  PAIN MEDICATION CAN BE VERY CONSTIPATING.  Take a stool softener or laxative such as senokot, pericolace, or milk of magnesia if needed.    Prescription given: none.  Last pain medication given: none.    If your physician has prescribed pain medication that includes Acetaminophen (Tylenol), do not take additional Acetaminophen (Tylenol) while taking the prescribed medication.    Depression / Suicide Risk    As you are discharged from this [facility] facility, it is important to learn how to keep safe from harming yourself.    Recognize the warning signs:  · Abrupt changes in personality, positive or negative- including increase in energy   · Giving away possessions  · Change in eating patterns- significant weight changes-  positive or negative  · Change in sleeping patterns- unable to sleep or sleeping all the time   · Unwillingness or inability to communicate  · Depression  · Unusual sadness, discouragement and loneliness  · Talk of wanting to die  · Neglect of personal appearance   · Rebelliousness- reckless behavior  · Withdrawal from people/activities they love  · Confusion- inability to concentrate     If you or a loved one observes any of these behaviors or has concerns about self-harm, here's what you can do:  · Talk about it- your feelings and reasons for harming yourself  · Remove any means that you might use to hurt yourself (examples: pills, rope, extension cords, firearm)  · Get professional help from the community (Mental Health, Substance Abuse, psychological counseling)  · Do not be  alone:Call your Safe Contact- someone whom you trust who will be there for you.  · Call your local CRISIS HOTLINE 517-1208 or 628-414-9125  · Call your local Children's Mobile Crisis Response Team [place] (797) 119-5254 or www.[employer]  · Call the toll free National Suicide Prevention Hotlines   · National Suicide Prevention Lifeline 798-699-DBYL (7179)  · National [employer] Line Network 800-SUICIDE (489-1509)

## 2018-11-15 NOTE — OP REPORT
DATE OF SERVICE:  11/14/2018    PROCEDURE:  Cystoscopy with exchange of left ureteral stent.    ANESTHESIA:  General.    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  The patient had an abdominal aortic aneurysm repaired several years   ago and it was felt that the obstruction and retroperitoneal fibrosis is   secondary to that surgery.  The patient has also severe aortic stenosis.    Hydronephrosis is dealt with frequent stent exchanges.  He now presents for a   6-month stent exchange.    REPORT OF OPERATION:  Under general anesthesia with the patient in the   lithotomy position, the genitalia were prepped and draped in the usual manner.    The 21-Upper sorbian cystoscope was introduced into the bladder.  Anterior urethra   and prostatic urethra were fairly unremarkable.  Bladder had moderate   trabeculation.  The old stent was identified.  It was grasped with an   alligator grasper and pulled down outside the urethra.  I attempted to pass a   zip wire up the stent, but it did not go through the stent completely.  At   this point, the stent was removed.  Next, using the cystoscope, the zip wire   was passed up the left ureter without difficulty.  Fluoroscopy was used to   locate positioning of the stent and wire.  Next, an 8-Upper sorbian x 26 cm long   double pigtail stent was passed over the wire.  This was passed up to the   kidney without difficulty.  The wire was removed and there was a good curl of   the stent in the bladder and in the kidney on fluoroscopy.  At this point, the   bladder was drained and partially refilled to hasten voiding in PACU.  He   then was cleaned up, woken up, and transferred to the PACU in stable   condition.       ____________________________________     MD TRICIA RODRÍGUEZ / KELLY    DD:   11/14/2018 16:43:45  DT:  11/14/2018 16:58:47    D#:  [id number]  Job#:  [id number]

## 2018-11-15 NOTE — OR NURSING
Patient arrived in phase 2 reporting no pain and motivated for discharge. Patient was pleasant but anxious to leave. Patient got dressed and had his ride waiting downstairs. Patient refused to stay long enough to listen to discharge instructions but signed the copy. He refused to take his instructions home. IV removed at 1753, second set of vital signs taken and patient discharged at 1755. Patient walked, escorted by A to [facility] to meet his ride.

## 2018-11-15 NOTE — OR SURGEON
Immediate Post OP Note    PreOp Diagnosis: Chronic Left hydronephrosis    PostOp Diagnosis: same    Procedure(s):  CYSTOSCOPY STENT PLACEMENT - EXCHANGE - Wound Class: Clean Contaminated    Surgeon(s):  Mihai Whittington M.D.    Anesthesiologist/Type of Anesthesia:  Anesthesiologist: Tim Diaz D.O./General    Surgical Staff:  Circulator: Lisa Sommers R.N.; Ludivina Loera R.N.  Scrub Person: Zurdo Rodriguez R.N.; Zoey Rader    Specimens removed if any:  * No specimens in log *    Estimated Blood Loss: none    Findings: normal anatomy    Complications: none.  Stable to PACU        11/14/2018 4:39 PM Mihai Whittington M.D.

## 2018-11-15 NOTE — OR NURSING
Pt doing well in PACU. Denies pain at this time. Tolerating po well. Attempting to void in urinal.

## 2019-02-04 ENCOUNTER — HOSPITAL ENCOUNTER (EMERGENCY)
Facility: MEDICAL CENTER | Age: 74
End: 2019-02-05
Attending: EMERGENCY MEDICINE
Payer: MEDICARE

## 2019-02-04 VITALS
BODY MASS INDEX: 26.83 KG/M2 | OXYGEN SATURATION: 97 % | WEIGHT: 187.39 LBS | RESPIRATION RATE: 18 BRPM | TEMPERATURE: 97.5 F | HEART RATE: 121 BPM | SYSTOLIC BLOOD PRESSURE: 113 MMHG | DIASTOLIC BLOOD PRESSURE: 69 MMHG | HEIGHT: 70 IN

## 2019-02-04 DIAGNOSIS — S81.812A LACERATION OF LEFT LOWER EXTREMITY, INITIAL ENCOUNTER: ICD-10-CM

## 2019-02-04 PROCEDURE — 303747 HCHG EXTRA SUTURE

## 2019-02-04 PROCEDURE — 304217 HCHG IRRIGATION SYSTEM

## 2019-02-04 PROCEDURE — 303485 HCHG DRESSING MEDIUM

## 2019-02-04 PROCEDURE — 99283 EMERGENCY DEPT VISIT LOW MDM: CPT

## 2019-02-04 PROCEDURE — 304999 HCHG REPAIR-SIMPLE/INTERMED LEVEL 1

## 2019-02-04 RX ORDER — LIDOCAINE HYDROCHLORIDE AND EPINEPHRINE 10; 10 MG/ML; UG/ML
5 INJECTION, SOLUTION INFILTRATION; PERINEURAL ONCE
Status: COMPLETED | OUTPATIENT
Start: 2019-02-05 | End: 2019-02-05

## 2019-02-05 PROCEDURE — 304999 HCHG REPAIR-SIMPLE/INTERMED LEVEL 1

## 2019-02-05 PROCEDURE — 700101 HCHG RX REV CODE 250: Performed by: EMERGENCY MEDICINE

## 2019-02-05 RX ADMIN — LIDOCAINE HYDROCHLORIDE,EPINEPHRINE BITARTRATE 5 ML: 10; .01 INJECTION, SOLUTION INFILTRATION; PERINEURAL at 00:00

## 2019-02-05 NOTE — ED TRIAGE NOTES
"Nicola Jimenez  74 y.o. male  Chief Complaint   Patient presents with   • Leg Laceration     pt states he scratched his leg this afternoon causing significant bleeding.  -blood thinners     Pt amb to triage with steady gait for above complaint.  Pt has had several alcoholic drinks, but denies taking blood thinner.   Pt is alert and oriented, speaking in full sentences, follows commands and responds appropriately to questions. NAD. Resp are even and unlabored.  Pt placed in lobby. Pt educated on triage process. Pt encouraged to alert staff for any changes.  /69   Pulse (!) 121   Temp 36.4 °C (97.5 °F) (Temporal)   Resp 18   Ht 1.778 m (5' 10\")   Wt 85 kg (187 lb 6.3 oz)   SpO2 97%   BMI 26.89 kg/m²     "

## 2019-02-05 NOTE — ED PROVIDER NOTES
ED Provider Note    CHIEF COMPLAINT  Chief Complaint   Patient presents with   • Leg Laceration     pt states he scratched his leg this afternoon causing significant bleeding.  -blood thinners       HPI  [place] Bah[place] Jimenez is a 74 y.o. male who presents to the emergency department chief complaint of a leg laceration.  The patient states that this afternoon he just reached down and scratched his left leg and then noticed that he had a huge clot and he was bleeding.  Patient has multiple medical problems but denies any blood thinning medications.  He denies hitting or running into anything he denies cutting himself with a knife he denies getting cut with glass.  He states the bleeding was hard to stop so he wrapped his own leg several times with towels and tape.  He denies any history of easy bleeding or bruising    REVIEW OF SYSTEMS  Positives as above. Pertinent negatives include easy bleeding or bruising weakness numbness tingling  All other review of systems are negative    PAST MEDICAL HISTORY   has a past medical history of Arrhythmia; Arthritis; Atherosclerosis of aorta ([facility]); Blood clotting disorder ([facility]) (1988); Carotid artery disease ([facility]); Cataract; Dental disorder; Esophageal dilatation; H/O heart artery stent; Hernia, inguinal, bilateral; High cholesterol; History of AAA (abdominal aortic aneurysm) repair (01/30/2012); [place] (hard of hearing); Indigestion; MI (myocardial infarction) ([facility]) (1988); MI, old; Myocardial infarct ([facility]); PAD (peripheral artery disease); Status post aortic coarctation stent placement; Umbilical hernia; and Urinary incontinence.    SOCIAL HISTORY  Social History     Social History Main Topics   • Smoking status: Current Every Day Smoker     Packs/day: 1.00     Types: Cigarettes, Pipe   • Smokeless tobacco: Never Used   • Alcohol use Yes      Comment: 2 per day   • Drug use: Yes     Types: Inhaled      Comment: recreational marijuana   • Sexual activity: Not on file       SURGICAL  "HISTORY   has a past surgical history that includes aaa with stent graft (1/30/2012); esophageal motility or manometry (3/27/2012); gastroscopy-endo (3/19/2017); esophageal motility or manometry (3/21/2017); gastroscopy with botox injection (N/A, 3/23/2017); toe amputation (Left, 3/25/2017); cystoscopy stent placement (4/16/2017); cystoscopy stent placement (Left, 4/9/2018); and cystoscopy stent placement (Left, 11/14/2018).    CURRENT MEDICATIONS  Home Medications     Reviewed by Isidro Padilla R.N. (Registered Nurse) on 02/04/19 at 2231  Med List Status: <None>   Medication Last Dose Status   amoxicillin (AMOXIL) 500 MG Cap  Active   gabapentin (NEURONTIN) 300 MG CAPS  Active   ibuprofen (MOTRIN) 200 MG Tab  Active   simvastatin (ZOCOR) 20 MG TABS  Active   tamsulosin (FLOMAX) 0.4 MG capsule  Active                ALLERGIES  No Known Allergies    PHYSICAL EXAM  VITAL SIGNS: /69   Pulse (!) 121   Temp 36.4 °C (97.5 °F) (Temporal)   Resp 18   Ht 1.778 m (5' 10\")   Wt 85 kg (187 lb 6.3 oz)   SpO2 97%   BMI 26.89 kg/m²    Pulse ox interpretation: I interpret this pulse ox as normal.  Constitutional: Alert in no apparent distress.  HENT: Normocephalic, Atraumatic, MMM  Eyes: PERound. Conjunctiva normal, non-icteric.   Heart: Regular rate and rhythm, no murmurs.    Lungs: Clear to auscultation bilaterally. No resp distress, breath sounds equal  Abdomen: Non-tender, non-distended, normal bowel sounds  EXT: LLE  - outer lateral aspect large 15cm laceration with exposed muscle and multiple small arterioles bleeding, distal cap refill < 3 sec  Skin: Warm, Dry, No erythema, No rash.   Neurologic: Alert and oriented, Grossly non-focal.       DIFFERENTIAL DIAGNOSIS AND WORK UP PLAN    This is a 74 y.o. male who presents with very large laceration of the left lower leg with fascia exposed but no muscle injury no tendinous injury.  He has some small bleeding arterials but a co-band with pressure was applied.  We " "will update the patient's tetanus and perform laceration repair.    Pertinent Lab Findings  Labs Reviewed - No data to display    Radiology  No orders to display     The radiologist's interpretation of all radiological studies have been reviewed by me.    LACERATION REPAIR PROCEDURE NOTE  The patient's identification was confirmed and consent was obtained.  Site: L lower leg  Sterile procedures observed  Anesthetic used (type and amt): lidocaine 1% with epi  Suture type/size: 4-0 nylon and prolene  Length: 15 cm  # of Sutures: 24   Technique: simple interrupted  Complexity simple  Antibx ointment applied  Tetanus ordered  Site anesthetized, irrigated with NS, explored without evidence of foreign body, wound well approximated, site covered with dry, sterile dressing. Patient tolerated procedure well without complications. Instructions for care discussed verbally and patient provided with additional written instructions for homecare and f/u.      COURSE & MEDICAL DECISION MAKING  Pertinent Labs & Imaging studies reviewed. (See chart for details)    Patient tolerated laceration repair without difficulty bleeding was easily controlled the wound was washed out thoroughly.  He is already taking Augmentin prophylactically for history of severe sepsis.  He otherwise is well-appearing we discussed the need to get sutures removed in 7-10 days.  He understands feels comfortable going home    The patient left prior to being able to repeat his vital signs and without his discharge paperwork stating \"I know what I need to do\".  He was not tachycardic on my exam prior to discharge    The patient will return for new or worsening symptoms and is stable at the time of discharge.    The patient is referred to a primary physician for blood pressure management, diabetic screening, and for all other preventative health concerns.    DISPOSITION:  Patient will be discharged home in stable condition.    FOLLOW UP:  Melina Trejo, " A.P.N.  [address] [facility] [address]  [place] [address]  [phone]      7-10 days for suture removal    [facility], Emergency Dept  [address] [facility]  [place] [id number] [phone]    If symptoms worsen - signs of symptoms of infection      OUTPATIENT MEDICATIONS:  New Prescriptions    No medications on file           FINAL IMPRESSION  1. Laceration of left lower extremity, initial encounter                 Electronically signed by: Raya Grande, 2/4/2019 11:20 PM    This dictation has been created using voice recognition software and/or scribes. The accuracy of the dictation is limited by the abilities of the software and the expertise of the scribes. I expect there may be some errors of grammar and possibly content. I made every attempt to manually correct the errors within my dictation. However, errors related to voice recognition software and/or scribes may still exist and should be interpreted within the appropriate context.

## 2019-02-05 NOTE — DISCHARGE INSTRUCTIONS
Return to the emergency department for any signs of infection such as redness swelling warmth or fevers.  Please get the sutures taken out in 7-10 days.

## 2019-02-05 NOTE — ED NOTES
Pt left without getting his Tetanus shot, stated that he wanted to go and did not want to wait for anything. I was in a room with another patient, tech tried to stop him but he did not wait.

## 2019-06-05 ENCOUNTER — HOSPITAL ENCOUNTER (OUTPATIENT)
Dept: LAB | Facility: MEDICAL CENTER | Age: 74
End: 2019-06-05
Attending: UROLOGY
Payer: MEDICARE

## 2019-06-05 LAB
ALBUMIN SERPL BCP-MCNC: 4.3 G/DL (ref 3.2–4.9)
ALBUMIN/GLOB SERPL: 1.8 G/DL
ALP SERPL-CCNC: 59 U/L (ref 30–99)
ALT SERPL-CCNC: 18 U/L (ref 2–50)
ANION GAP SERPL CALC-SCNC: 12 MMOL/L (ref 0–11.9)
ANISOCYTOSIS BLD QL SMEAR: ABNORMAL
AST SERPL-CCNC: 38 U/L (ref 12–45)
BASOPHILS # BLD AUTO: 0 % (ref 0–1.8)
BASOPHILS # BLD: 0 K/UL (ref 0–0.12)
BILIRUB SERPL-MCNC: 0.7 MG/DL (ref 0.1–1.5)
BUN SERPL-MCNC: 18 MG/DL (ref 8–22)
CALCIUM SERPL-MCNC: 8.8 MG/DL (ref 8.5–10.5)
CHLORIDE SERPL-SCNC: 105 MMOL/L (ref 96–112)
CO2 SERPL-SCNC: 24 MMOL/L (ref 20–33)
CREAT SERPL-MCNC: 1.33 MG/DL (ref 0.5–1.4)
EOSINOPHIL # BLD AUTO: 0.14 K/UL (ref 0–0.51)
EOSINOPHIL NFR BLD: 2.6 % (ref 0–6.9)
ERYTHROCYTE [DISTWIDTH] IN BLOOD BY AUTOMATED COUNT: 66.2 FL (ref 35.9–50)
GLOBULIN SER CALC-MCNC: 2.4 G/DL (ref 1.9–3.5)
GLUCOSE SERPL-MCNC: 111 MG/DL (ref 65–99)
HCT VFR BLD AUTO: 35.6 % (ref 42–52)
HGB BLD-MCNC: 11.5 G/DL (ref 14–18)
LYMPHOCYTES # BLD AUTO: 1.82 K/UL (ref 1–4.8)
LYMPHOCYTES NFR BLD: 33 % (ref 22–41)
MACROCYTES BLD QL SMEAR: ABNORMAL
MANUAL DIFF BLD: NORMAL
MCH RBC QN AUTO: 35 PG (ref 27–33)
MCHC RBC AUTO-ENTMCNC: 32.3 G/DL (ref 33.7–35.3)
MCV RBC AUTO: 108.2 FL (ref 81.4–97.8)
MONOCYTES # BLD AUTO: 0.19 K/UL (ref 0–0.85)
MONOCYTES NFR BLD AUTO: 3.5 % (ref 0–13.4)
MORPHOLOGY BLD-IMP: NORMAL
NEUTROPHILS # BLD AUTO: 3.35 K/UL (ref 1.82–7.42)
NEUTROPHILS NFR BLD: 60.9 % (ref 44–72)
NRBC # BLD AUTO: 0 K/UL
NRBC BLD-RTO: 0 /100 WBC
PLATELET # BLD AUTO: 69 K/UL (ref 164–446)
PLATELET BLD QL SMEAR: NORMAL
PMV BLD AUTO: 10.7 FL (ref 9–12.9)
POTASSIUM SERPL-SCNC: 3.8 MMOL/L (ref 3.6–5.5)
PROT SERPL-MCNC: 6.7 G/DL (ref 6–8.2)
RBC # BLD AUTO: 3.29 M/UL (ref 4.7–6.1)
RBC BLD AUTO: PRESENT
SODIUM SERPL-SCNC: 141 MMOL/L (ref 135–145)
WBC # BLD AUTO: 5.5 K/UL (ref 4.8–10.8)

## 2019-06-05 PROCEDURE — 80053 COMPREHEN METABOLIC PANEL: CPT

## 2019-06-05 PROCEDURE — 85007 BL SMEAR W/DIFF WBC COUNT: CPT

## 2019-06-05 PROCEDURE — 85027 COMPLETE CBC AUTOMATED: CPT

## 2019-06-13 ENCOUNTER — ANESTHESIA (OUTPATIENT)
Dept: SURGERY | Facility: MEDICAL CENTER | Age: 74
End: 2019-06-13
Payer: MEDICARE

## 2019-06-13 ENCOUNTER — HOSPITAL ENCOUNTER (OUTPATIENT)
Facility: MEDICAL CENTER | Age: 74
End: 2019-06-13
Attending: UROLOGY | Admitting: UROLOGY
Payer: MEDICARE

## 2019-06-13 ENCOUNTER — APPOINTMENT (OUTPATIENT)
Dept: RADIOLOGY | Facility: MEDICAL CENTER | Age: 74
End: 2019-06-13
Attending: UROLOGY
Payer: MEDICARE

## 2019-06-13 ENCOUNTER — ANESTHESIA EVENT (OUTPATIENT)
Dept: SURGERY | Facility: MEDICAL CENTER | Age: 74
End: 2019-06-13
Payer: MEDICARE

## 2019-06-13 VITALS
WEIGHT: 183.64 LBS | OXYGEN SATURATION: 95 % | BODY MASS INDEX: 26.29 KG/M2 | HEART RATE: 69 BPM | SYSTOLIC BLOOD PRESSURE: 109 MMHG | DIASTOLIC BLOOD PRESSURE: 44 MMHG | HEIGHT: 70 IN | RESPIRATION RATE: 15 BRPM | TEMPERATURE: 97.7 F

## 2019-06-13 LAB — EKG IMPRESSION: NORMAL

## 2019-06-13 PROCEDURE — 160039 HCHG SURGERY MINUTES - EA ADDL 1 MIN LEVEL 3: Performed by: UROLOGY

## 2019-06-13 PROCEDURE — 160036 HCHG PACU - EA ADDL 30 MINS PHASE I: Performed by: UROLOGY

## 2019-06-13 PROCEDURE — 160002 HCHG RECOVERY MINUTES (STAT): Performed by: UROLOGY

## 2019-06-13 PROCEDURE — 700105 HCHG RX REV CODE 258: Performed by: UROLOGY

## 2019-06-13 PROCEDURE — 160009 HCHG ANES TIME/MIN: Performed by: UROLOGY

## 2019-06-13 PROCEDURE — C1769 GUIDE WIRE: HCPCS | Performed by: UROLOGY

## 2019-06-13 PROCEDURE — 160025 RECOVERY II MINUTES (STATS): Performed by: UROLOGY

## 2019-06-13 PROCEDURE — C2617 STENT, NON-COR, TEM W/O DEL: HCPCS | Performed by: UROLOGY

## 2019-06-13 PROCEDURE — 501329 HCHG SET, CYSTO IRRIG Y TUR: Performed by: UROLOGY

## 2019-06-13 PROCEDURE — 93010 ELECTROCARDIOGRAM REPORT: CPT | Performed by: INTERNAL MEDICINE

## 2019-06-13 PROCEDURE — 160028 HCHG SURGERY MINUTES - 1ST 30 MINS LEVEL 3: Performed by: UROLOGY

## 2019-06-13 PROCEDURE — 160046 HCHG PACU - 1ST 60 MINS PHASE II: Performed by: UROLOGY

## 2019-06-13 PROCEDURE — 700101 HCHG RX REV CODE 250: Performed by: ANESTHESIOLOGY

## 2019-06-13 PROCEDURE — 700105 HCHG RX REV CODE 258: Performed by: ANESTHESIOLOGY

## 2019-06-13 PROCEDURE — 160035 HCHG PACU - 1ST 60 MINS PHASE I: Performed by: UROLOGY

## 2019-06-13 PROCEDURE — 700101 HCHG RX REV CODE 250: Performed by: UROLOGY

## 2019-06-13 PROCEDURE — 160048 HCHG OR STATISTICAL LEVEL 1-5: Performed by: UROLOGY

## 2019-06-13 PROCEDURE — 700111 HCHG RX REV CODE 636 W/ 250 OVERRIDE (IP): Performed by: ANESTHESIOLOGY

## 2019-06-13 PROCEDURE — 93005 ELECTROCARDIOGRAM TRACING: CPT | Performed by: UROLOGY

## 2019-06-13 PROCEDURE — 500880 HCHG PACK, CYSTO W/SEP LEGGINGS: Performed by: UROLOGY

## 2019-06-13 DEVICE — STENT UROLOGICAL POLARIS 8X24  ULTRA: Type: IMPLANTABLE DEVICE | Site: BLADDER | Status: FUNCTIONAL

## 2019-06-13 RX ORDER — SODIUM CHLORIDE, SODIUM LACTATE, POTASSIUM CHLORIDE, CALCIUM CHLORIDE 600; 310; 30; 20 MG/100ML; MG/100ML; MG/100ML; MG/100ML
INJECTION, SOLUTION INTRAVENOUS CONTINUOUS
Status: DISCONTINUED | OUTPATIENT
Start: 2019-06-13 | End: 2019-06-13 | Stop reason: HOSPADM

## 2019-06-13 RX ORDER — HYDROMORPHONE HYDROCHLORIDE 1 MG/ML
0.2 INJECTION, SOLUTION INTRAMUSCULAR; INTRAVENOUS; SUBCUTANEOUS
Status: DISCONTINUED | OUTPATIENT
Start: 2019-06-13 | End: 2019-06-13 | Stop reason: HOSPADM

## 2019-06-13 RX ORDER — HALOPERIDOL 5 MG/ML
1 INJECTION INTRAMUSCULAR
Status: DISCONTINUED | OUTPATIENT
Start: 2019-06-13 | End: 2019-06-13 | Stop reason: HOSPADM

## 2019-06-13 RX ORDER — METOCLOPRAMIDE HYDROCHLORIDE 5 MG/ML
INJECTION INTRAMUSCULAR; INTRAVENOUS PRN
Status: DISCONTINUED | OUTPATIENT
Start: 2019-06-13 | End: 2019-06-13 | Stop reason: SURG

## 2019-06-13 RX ORDER — HYDROMORPHONE HYDROCHLORIDE 1 MG/ML
0.4 INJECTION, SOLUTION INTRAMUSCULAR; INTRAVENOUS; SUBCUTANEOUS
Status: DISCONTINUED | OUTPATIENT
Start: 2019-06-13 | End: 2019-06-13 | Stop reason: HOSPADM

## 2019-06-13 RX ORDER — OXYCODONE HCL 5 MG/5 ML
5 SOLUTION, ORAL ORAL
Status: DISCONTINUED | OUTPATIENT
Start: 2019-06-13 | End: 2019-06-13 | Stop reason: HOSPADM

## 2019-06-13 RX ORDER — DIPHENHYDRAMINE HYDROCHLORIDE 50 MG/ML
12.5 INJECTION INTRAMUSCULAR; INTRAVENOUS
Status: DISCONTINUED | OUTPATIENT
Start: 2019-06-13 | End: 2019-06-13 | Stop reason: HOSPADM

## 2019-06-13 RX ORDER — CEFAZOLIN SODIUM 1 G/3ML
INJECTION, POWDER, FOR SOLUTION INTRAMUSCULAR; INTRAVENOUS PRN
Status: DISCONTINUED | OUTPATIENT
Start: 2019-06-13 | End: 2019-06-13 | Stop reason: SURG

## 2019-06-13 RX ORDER — HYDROMORPHONE HYDROCHLORIDE 1 MG/ML
0.1 INJECTION, SOLUTION INTRAMUSCULAR; INTRAVENOUS; SUBCUTANEOUS
Status: DISCONTINUED | OUTPATIENT
Start: 2019-06-13 | End: 2019-06-13 | Stop reason: HOSPADM

## 2019-06-13 RX ORDER — OXYCODONE HCL 5 MG/5 ML
10 SOLUTION, ORAL ORAL
Status: DISCONTINUED | OUTPATIENT
Start: 2019-06-13 | End: 2019-06-13 | Stop reason: HOSPADM

## 2019-06-13 RX ORDER — MEPERIDINE HYDROCHLORIDE 25 MG/ML
12.5 INJECTION INTRAMUSCULAR; INTRAVENOUS; SUBCUTANEOUS
Status: DISCONTINUED | OUTPATIENT
Start: 2019-06-13 | End: 2019-06-13 | Stop reason: HOSPADM

## 2019-06-13 RX ORDER — ONDANSETRON 2 MG/ML
4 INJECTION INTRAMUSCULAR; INTRAVENOUS
Status: DISCONTINUED | OUTPATIENT
Start: 2019-06-13 | End: 2019-06-13 | Stop reason: HOSPADM

## 2019-06-13 RX ADMIN — LIDOCAINE HYDROCHLORIDE 0.5 ML: 10 INJECTION, SOLUTION EPIDURAL; INFILTRATION; INTRACAUDAL; PERINEURAL at 08:28

## 2019-06-13 RX ADMIN — SODIUM CHLORIDE, POTASSIUM CHLORIDE, SODIUM LACTATE AND CALCIUM CHLORIDE: 600; 310; 30; 20 INJECTION, SOLUTION INTRAVENOUS at 08:28

## 2019-06-13 RX ADMIN — FENTANYL CITRATE 50 MCG: 50 INJECTION, SOLUTION INTRAMUSCULAR; INTRAVENOUS at 09:15

## 2019-06-13 RX ADMIN — PROPOFOL 170 MG: 10 INJECTION, EMULSION INTRAVENOUS at 09:22

## 2019-06-13 RX ADMIN — FENTANYL CITRATE 50 MCG: 50 INJECTION, SOLUTION INTRAMUSCULAR; INTRAVENOUS at 09:22

## 2019-06-13 RX ADMIN — MIDAZOLAM HYDROCHLORIDE 2 MG: 1 INJECTION, SOLUTION INTRAMUSCULAR; INTRAVENOUS at 09:15

## 2019-06-13 RX ADMIN — CEFAZOLIN 2 G: 330 INJECTION, POWDER, FOR SOLUTION INTRAMUSCULAR; INTRAVENOUS at 09:11

## 2019-06-13 RX ADMIN — SODIUM CHLORIDE, POTASSIUM CHLORIDE, SODIUM LACTATE AND CALCIUM CHLORIDE: 600; 310; 30; 20 INJECTION, SOLUTION INTRAVENOUS at 09:11

## 2019-06-13 RX ADMIN — SODIUM CHLORIDE, POTASSIUM CHLORIDE, SODIUM LACTATE AND CALCIUM CHLORIDE 1000 ML: 600; 310; 30; 20 INJECTION, SOLUTION INTRAVENOUS at 09:59

## 2019-06-13 RX ADMIN — METOCLOPRAMIDE 10 MG: 5 INJECTION, SOLUTION INTRAMUSCULAR; INTRAVENOUS at 09:18

## 2019-06-13 ASSESSMENT — PAIN SCALES - GENERAL: PAIN_LEVEL: 2

## 2019-06-13 NOTE — OR NURSING
0943: received to PACU via gurney with LMA. Respirations spontaneous and unlabored.  0957: patient awaken. LMA dc'd without problem or difficulty.  1045: patient incontinent of urine. Sips of water given. Tolerated well. Denies any pan or nausea.  1100: report called to Carmen CAMERON  1103: transported via gurney to PACU 2. Stable.

## 2019-06-13 NOTE — ANESTHESIA PROCEDURE NOTES
Airway  Date/Time: 6/13/2019 9:22 AM  Performed by: CHAPARRO HUSAIN  Authorized by: CHAPARRO HUSAIN     Location:  OR  Urgency:  Elective  Indications for Airway Management:  Anesthesia  Spontaneous Ventilation: absent    Sedation Level:  Deep  Preoxygenated: Yes    Final Airway Type:  Supraglottic airway  Final Supraglottic Airway:  Standard LMA  SGA Size:  4  Number of Attempts at Approach:  1

## 2019-06-13 NOTE — OR NURSING
Pt aaox4, vitals stable. Pt ambulating, had incontinent void in PACU. Home by cab, rn accompanied pt to cab.

## 2019-06-13 NOTE — OR NURSING
Patient states he is unable to urinate. Discussed need for urinalysis. Patient refused to attempt to urinate. Will clarify with Dr. Whittington if the order can be discontinued.    Patient also states he does not have a ride home nor does he have anyone to call for a ride. Asked the patient if he could use a cab or uber and he refused stating he will drive himself home.

## 2019-06-13 NOTE — OR SURGEON
Immediate Post OP Note    PreOp Diagnosis: Chronic left hydronephrosis    PostOp Diagnosis: same    Procedure(s):  CYSTOSCOPY, WITH LEFT URETERAL STENT INSERTION - EXCHANGE - Wound Class: Clean Contaminated    Surgeon(s):  Mihai Whittington M.D.    Anesthesiologist/Type of Anesthesia:  Anesthesiologist: Daniel Mcelroy M.D./General    Surgical Staff:  Circulator: Brenda Griffith R.N.  Relief Circulator: Rena Segundo  Relief Scrub: Liz Justice  Scrub Person: Randy Chapman    Specimens removed if any:  * No specimens in log *    Estimated Blood Loss: none    Findings: As above    Complications: none.  To PACU stable.        6/13/2019 9:51 AM Mihai Whittington M.D.

## 2019-06-13 NOTE — ANESTHESIA TIME REPORT
Anesthesia Start and Stop Event Times     Date Time Event    6/13/2019 0911 Anesthesia Start     0945 Anesthesia Stop        Responsible Staff  06/13/19    Name Role Begin End    Daniel Mcelroy M.D. Anesth 0911 0945        Preop Diagnosis (Free Text):  Pre-op Diagnosis     HYDRONEPHROSIS        Preop Diagnosis (Codes):  Diagnosis Information     Diagnosis Code(s):         Post op Diagnosis  Stricture, ureter      Premium Reason  Non-Premium    Comments:

## 2019-06-13 NOTE — ANESTHESIA PREPROCEDURE EVALUATION
Relevant Problems   (+) Aortic stenosis   (+) Atrial fibrillation (CMS-HCC)   (+) CAD (coronary artery disease)   (+) CHF (congestive heart failure) ([facility])   (+) Chronic obstructive lung disease (HCC)   (+) Coronary atherosclerosis of native coronary artery   (+) Hep C w/o coma, chronic (HCC)   (+) Severe aortic stenosis   (+) Undiagnosed cardiac murmurs       Physical Exam    Airway   Mallampati: II  TM distance: >3 FB  Neck ROM: full       Cardiovascular - normal exam  Rhythm: regular  Rate: normal  (-) murmur     Dental - normal exam         Pulmonary - normal exam  Breath sounds clear to auscultation     Abdominal   Abdomen: soft  Bowel sounds: normal   Neurological - normal exam                 Anesthesia Plan    ASA 3   ASA physical status 3 criteria: CAD/stents (> 3 months)    Plan - general       Airway plan will be LMA        Induction: intravenous          Informed Consent:    Anesthetic plan and risks discussed with patient.

## 2019-06-13 NOTE — DISCHARGE INSTRUCTIONS
ACTIVITY: Rest and take it easy for the first 24 hours.  A responsible adult is recommended to remain with you during that time.  It is normal to feel sleepy.  We encourage you to not do anything that requires balance, judgment or coordination.    MILD FLU-LIKE SYMPTOMS ARE NORMAL. YOU MAY EXPERIENCE GENERALIZED MUSCLE ACHES, THROAT IRRITATION, HEADACHE AND/OR SOME NAUSEA.    FOR 24 HOURS DO NOT:  Drive, operate machinery or run household appliances.  Drink beer or alcoholic beverages.   Make important decisions or sign legal documents.    SPECIAL INSTRUCTIONS:   Cystoscopy  Cystoscopy is a procedure that is used to help diagnose and sometimes treat conditions that affect that lower urinary tract. The lower urinary tract includes the bladder and the tube that drains urine from the bladder out of the body (urethra). Cystoscopy is performed with a thin, tube-shaped instrument with a light and camera at the end (cystoscope). The cystoscope may be hard (rigid) or flexible, depending on the goal of the procedure. The cystoscope is inserted through the urethra, into the bladder.  Cystoscopy may be recommended if you have:  · Urinary tractinfections that keep coming back (recurring).  · Blood in the urine (hematuria).  · Loss of bladder control (urinary incontinence) or an overactive bladder.  · Unusual cells found in a urine sample.  · A blockage in the urethra.  · Painful urination.  · An abnormality in the bladder found during an intravenous pyelogram (IVP) or CT scan.  Cystoscopy may also be done to remove a sample of tissue to be examined under a microscope (biopsy).  Tell a health care provider about:  · Any allergies you have.  · All medicines you are taking, including vitamins, herbs, eye drops, creams, and over-the-counter medicines.  · Any problems you or family members have had with anesthetic medicines.  · Any blood disorders you have.  · Any surgeries you have had.  · Any medical conditions you  have.  · Whether you are pregnant or may be pregnant.  What are the risks?  Generally, this is a safe procedure. However, problems may occur, including:  · Infection.  · Bleeding.  · Allergic reactions to medicines.  · Damage to other structures or organs.  What happens before the procedure?  · Ask your health care provider about:  ¨ Changing or stopping your regular medicines. This is especially important if you are taking diabetes medicines or blood thinners.  ¨ Taking medicines such as aspirin and ibuprofen. These medicines can thin your blood. Do not take these medicines before your procedure if your health care provider instructs you not to.  · Follow instructions from your health care provider about eating or drinking restrictions.  · You may be given antibiotic medicine to help prevent infection.  · You may have an exam or testing, such as X-rays of the bladder, urethra, or kidneys.  · You may have urine tests to check for signs of infection.  · Plan to have someone take you home after the procedure.  What happens during the procedure?  · To reduce your risk of infection, your health care team will wash or sanitize their hands.  · You will be given one or more of the following:  ¨ A medicine to help you relax (sedative).  ¨ A medicine to numb the area (local anesthetic).  · The area around the opening of your urethra will be cleaned.  · The cystoscope will be passed through your urethra into your bladder.  · Germ-free (sterile) fluid will flow through the cystoscope to fill your bladder. The fluid will stretch your bladder so that your surgeon can clearly examine your bladder walls.  · The cystoscope will be removed and your bladder will be emptied.  The procedure may vary among health care providers and hospitals.  What happens after the procedure?  · You may have some soreness or pain in your abdomen and urethra. Medicines will be available to help you.  · You may have some blood in your urine.  · Do not  drive for 24 hours if you received a sedative.  This information is not intended to replace advice given to you by your health care provider. Make sure you discuss any questions you have with your health care provider.        DIET: To avoid nausea, slowly advance diet as tolerated, avoiding spicy or greasy foods for the first day.  Add more substantial food to your diet according to your physician's instructions.  Babies can be fed formula or breast milk as soon as they are hungry.  INCREASE FLUIDS AND FIBER TO AVOID CONSTIPATION.    SURGICAL DRESSING/BATHING: May shower. No hot tubs, tub baths or pools until cleared by MD.    FOLLOW-UP APPOINTMENT:  A follow-up appointment should be arranged with your doctor in 1-2 weeks; call to schedule.    You should CALL YOUR PHYSICIAN if you develop:  Fever greater than 101 degrees F.  Pain not relieved by medication, or persistent nausea or vomiting.  Excessive bleeding (blood soaking through dressing) or unexpected drainage from the wound.  Extreme redness or swelling around the incision site, drainage of pus or foul smelling drainage.  Inability to urinate or empty your bladder within 8 hours.  Problems with breathing or chest pain.    You should call 911 if you develop problems with breathing or chest pain.  If you are unable to contact your doctor or surgical center, you should go to the nearest emergency room or urgent care center.  Physician's telephone #: [phone] Dr Whittington    If any questions arise, call your doctor.  If your doctor is not available, please feel free to call the Surgical Center at [phone].  The Center is open Monday through Friday from 7AM to 7PM.  You can also call the [employer] HOTLINE open 24 hours/day, 7 days/week and speak to a nurse at [phone], or toll free at [phone].    A registered nurse may call you a few days after your surgery to see how you are doing after your procedure.    MEDICATIONS: Resume taking daily  medication.  Take prescribed pain medication with food.  If no medication is prescribed, you may take non-aspirin pain medication if needed.  PAIN MEDICATION CAN BE VERY CONSTIPATING.  Take a stool softener or laxative such as senokot, pericolace, or milk of magnesia if needed.    Prescription given for NONE.  Last pain medication given at NONE.    If your physician has prescribed pain medication that includes Acetaminophen (Tylenol), do not take additional Acetaminophen (Tylenol) while taking the prescribed medication.    Depression / Suicide Risk    As you are discharged from this [facility] facility, it is important to learn how to keep safe from harming yourself.    Recognize the warning signs:  · Abrupt changes in personality, positive or negative- including increase in energy   · Giving away possessions  · Change in eating patterns- significant weight changes-  positive or negative  · Change in sleeping patterns- unable to sleep or sleeping all the time   · Unwillingness or inability to communicate  · Depression  · Unusual sadness, discouragement and loneliness  · Talk of wanting to die  · Neglect of personal appearance   · Rebelliousness- reckless behavior  · Withdrawal from people/activities they love  · Confusion- inability to concentrate     If you or a loved one observes any of these behaviors or has concerns about self-harm, here's what you can do:  · Talk about it- your feelings and reasons for harming yourself  · Remove any means that you might use to hurt yourself (examples: pills, rope, extension cords, firearm)  · Get professional help from the community (Mental Health, Substance Abuse, psychological counseling)  · Do not be alone:Call your Safe Contact- someone whom you trust who will be there for you.  · Call your local CRISIS HOTLINE 195-8007 or 347-867-1567  · Call your local Children's Mobile Crisis Response Team [place] (927) 271-9590 or www.[employer]  · Call the toll free  National Suicide Prevention Hotlines   · National Suicide Prevention Lifeline 412-491-QVZO (3920)  · National Hope Line Network 800-SUICIDE (660-0327)

## 2019-06-13 NOTE — OP REPORT
DATE OF SERVICE:  06/13/2019    PREOPERATIVE DIAGNOSIS:  Chronic left hydronephrosis.    POSTOPERATIVE DIAGNOSIS:  Chronic left hydronephrosis.    PROCEDURE:  Cystoscopy with exchange of left ureteral stent.    ANESTHESIA:  General.    ANESTHESIOLOGIST:  Daniel Mcelroy MD    SURGEON:  Anneliese Leslie MD    BRIEF HISTORY:  This 74-year-old male with multiple medical problems,   underwent repair of an abdominal aortic aneurysm several years ago.  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  Since then, he has been managed   with ureteral stents exchanged every 6 months.  He now presents for exchange   of his left ureteral stent, which was changed back in 11/2018.    REPORT OF OPERATION:  Under general anesthesia with the patient in the   lithotomy position, the genitalia were prepped and draped in usual manner.  A   22-Luxembourger cystoscope was introduced into the bladder.  Urethra and prostatic   urethra were unremarkable.  In the bladder, no tumors or abnormalities were   seen, but there was a large length of stent in the bladder.  Using an   alligator grasper, this was grasped and pulled out into the urethral meatus.    I then slipped a ZIPwire back through the stent up to the left kidney under   fluoroscopic guidance.  The old stent was removed.  The cystoscope was   backloaded onto this wire and passed into the bladder.  I then passed an   8-Luxembourger x 24 cm long double pigtail stent without a string on the distal end   up to the kidney.  There was good curl of stent in the kidney and in the   bladder.  At this point, after position was checked, the bladder was drained,   the cystoscope removed.  The patient was cleaned up, woken up, and transferred   to the PACU in stable condition.       ____________________________________     ANNELIESE LESLIE MD    JSG / NTS    DD:  06/13/2019 09:56:23  DT:  06/13/2019 10:19:10    D#:   [id number]  Job#:  [id number]

## 2019-06-13 NOTE — ANESTHESIA POSTPROCEDURE EVALUATION
Patient: Nicola Jimenez    Procedure Summary     Date:  06/13/19 Room / Location:  [place] [address] / SURGERY [facility]    Anesthesia Start:  0911 Anesthesia Stop:  0945    Procedure:  CYSTOSCOPY, WITH URETERAL STENT INSERTION - EXCHANGE (Left Bladder) Diagnosis:  (HYDRONEPHROSIS)    Surgeon:  Mihai Whittington M.D. Responsible Provider:  Daniel Mcelroy M.D.    Anesthesia Type:  general ASA Status:  3          Final Anesthesia Type: general  Last vitals  BP   Blood Pressure : 109/44, NIBP: (!) 79/45    Temp   (P) 36.7 °C (98.1 °F)    Pulse   Pulse: 77, Heart Rate (Monitored): 99   Resp   (P) 16    SpO2   90 %      Anesthesia Post Evaluation    Patient location during evaluation: PACU  Patient participation: complete - patient participated  Level of consciousness: awake and alert  Pain score: 2    Airway patency: patent  Anesthetic complications: no  Cardiovascular status: hemodynamically stable  Respiratory status: acceptable  Hydration status: euvolemic    PONV: none           Nurse Pain Score: 5 (NPRS)

## 2020-01-13 ENCOUNTER — HOSPITAL ENCOUNTER (OUTPATIENT)
Dept: LAB | Facility: MEDICAL CENTER | Age: 75
End: 2020-01-13
Attending: UROLOGY
Payer: MEDICARE

## 2020-01-13 LAB
BASOPHILS # BLD AUTO: 0.5 % (ref 0–1.8)
BASOPHILS # BLD: 0.03 K/UL (ref 0–0.12)
EOSINOPHIL # BLD AUTO: 0.04 K/UL (ref 0–0.51)
EOSINOPHIL NFR BLD: 0.6 % (ref 0–6.9)
ERYTHROCYTE [DISTWIDTH] IN BLOOD BY AUTOMATED COUNT: 62.1 FL (ref 35.9–50)
HCT VFR BLD AUTO: 37 % (ref 42–52)
HGB BLD-MCNC: 12.7 G/DL (ref 14–18)
IMM GRANULOCYTES # BLD AUTO: 0.04 K/UL (ref 0–0.11)
IMM GRANULOCYTES NFR BLD AUTO: 0.6 % (ref 0–0.9)
LYMPHOCYTES # BLD AUTO: 1.85 K/UL (ref 1–4.8)
LYMPHOCYTES NFR BLD: 28.3 % (ref 22–41)
MCH RBC QN AUTO: 37.5 PG (ref 27–33)
MCHC RBC AUTO-ENTMCNC: 34.3 G/DL (ref 33.7–35.3)
MCV RBC AUTO: 109.1 FL (ref 81.4–97.8)
MONOCYTES # BLD AUTO: 1.34 K/UL (ref 0–0.85)
MONOCYTES NFR BLD AUTO: 20.5 % (ref 0–13.4)
NEUTROPHILS # BLD AUTO: 3.24 K/UL (ref 1.82–7.42)
NEUTROPHILS NFR BLD: 49.5 % (ref 44–72)
NRBC # BLD AUTO: 0 K/UL
NRBC BLD-RTO: 0 /100 WBC
PLATELET # BLD AUTO: 53 K/UL (ref 164–446)
PMV BLD AUTO: 11.1 FL (ref 9–12.9)
RBC # BLD AUTO: 3.39 M/UL (ref 4.7–6.1)
WBC # BLD AUTO: 6.5 K/UL (ref 4.8–10.8)

## 2020-01-13 PROCEDURE — 80053 COMPREHEN METABOLIC PANEL: CPT

## 2020-01-13 PROCEDURE — 87086 URINE CULTURE/COLONY COUNT: CPT

## 2020-01-13 PROCEDURE — 85025 COMPLETE CBC W/AUTO DIFF WBC: CPT

## 2020-01-14 LAB
ALBUMIN SERPL BCP-MCNC: 4.8 G/DL (ref 3.2–4.9)
ALBUMIN/GLOB SERPL: 1.8 G/DL
ALP SERPL-CCNC: 80 U/L (ref 30–99)
ALT SERPL-CCNC: 27 U/L (ref 2–50)
ANION GAP SERPL CALC-SCNC: 18 MMOL/L (ref 0–11.9)
AST SERPL-CCNC: 44 U/L (ref 12–45)
BILIRUB SERPL-MCNC: 0.9 MG/DL (ref 0.1–1.5)
BUN SERPL-MCNC: 43 MG/DL (ref 8–22)
CALCIUM SERPL-MCNC: 10.4 MG/DL (ref 8.5–10.5)
CHLORIDE SERPL-SCNC: 96 MMOL/L (ref 96–112)
CO2 SERPL-SCNC: 21 MMOL/L (ref 20–33)
CREAT SERPL-MCNC: 2.37 MG/DL (ref 0.5–1.4)
GLOBULIN SER CALC-MCNC: 2.6 G/DL (ref 1.9–3.5)
GLUCOSE SERPL-MCNC: 124 MG/DL (ref 65–99)
POTASSIUM SERPL-SCNC: 3.9 MMOL/L (ref 3.6–5.5)
PROT SERPL-MCNC: 7.4 G/DL (ref 6–8.2)
SODIUM SERPL-SCNC: 135 MMOL/L (ref 135–145)

## 2020-01-16 LAB
BACTERIA UR CULT: NORMAL
SIGNIFICANT IND 70042: NORMAL
SITE SITE: NORMAL
SOURCE SOURCE: NORMAL

## 2020-03-30 ENCOUNTER — HOSPITAL ENCOUNTER (OUTPATIENT)
Dept: LAB | Facility: MEDICAL CENTER | Age: 75
End: 2020-03-30
Attending: NURSE PRACTITIONER
Payer: MEDICARE

## 2020-03-30 LAB
ALBUMIN SERPL BCP-MCNC: 4.1 G/DL (ref 3.2–4.9)
ALBUMIN/GLOB SERPL: 1.4 G/DL
ALP SERPL-CCNC: 72 U/L (ref 30–99)
ALT SERPL-CCNC: 14 U/L (ref 2–50)
ANION GAP SERPL CALC-SCNC: 14 MMOL/L (ref 7–16)
AST SERPL-CCNC: 21 U/L (ref 12–45)
BILIRUB SERPL-MCNC: 0.7 MG/DL (ref 0.1–1.5)
BUN SERPL-MCNC: 16 MG/DL (ref 8–22)
CALCIUM SERPL-MCNC: 9.1 MG/DL (ref 8.5–10.5)
CHLORIDE SERPL-SCNC: 102 MMOL/L (ref 96–112)
CHOLEST SERPL-MCNC: 193 MG/DL (ref 100–199)
CO2 SERPL-SCNC: 23 MMOL/L (ref 20–33)
CREAT SERPL-MCNC: 1.05 MG/DL (ref 0.5–1.4)
GLOBULIN SER CALC-MCNC: 2.9 G/DL (ref 1.9–3.5)
GLUCOSE SERPL-MCNC: 88 MG/DL (ref 65–99)
HDLC SERPL-MCNC: 45 MG/DL
LDLC SERPL CALC-MCNC: 121 MG/DL
POTASSIUM SERPL-SCNC: 3.9 MMOL/L (ref 3.6–5.5)
PROT SERPL-MCNC: 7 G/DL (ref 6–8.2)
SODIUM SERPL-SCNC: 139 MMOL/L (ref 135–145)
TRIGL SERPL-MCNC: 134 MG/DL (ref 0–149)

## 2020-03-30 PROCEDURE — 80053 COMPREHEN METABOLIC PANEL: CPT

## 2020-03-30 PROCEDURE — 80061 LIPID PANEL: CPT

## 2020-06-08 NOTE — PROGRESS NOTES
Vascular  Left third toe amputated today for osteo.  Tagged WBC pending.  Endograft infection suspected.  Aorta without endoleak with size 5.4 X 5.7.  Will consider allograft if need to repair.   Provider Procedure Text (A): After obtaining clear surgical margins the defect was repaired by another provider.

## 2020-09-22 ENCOUNTER — APPOINTMENT (OUTPATIENT)
Dept: RADIOLOGY | Facility: MEDICAL CENTER | Age: 75
DRG: 392 | End: 2020-09-22
Attending: STUDENT IN AN ORGANIZED HEALTH CARE EDUCATION/TRAINING PROGRAM
Payer: MEDICARE

## 2020-09-22 ENCOUNTER — APPOINTMENT (OUTPATIENT)
Dept: RADIOLOGY | Facility: MEDICAL CENTER | Age: 75
DRG: 392 | End: 2020-09-22
Attending: EMERGENCY MEDICINE
Payer: MEDICARE

## 2020-09-22 ENCOUNTER — HOSPITAL ENCOUNTER (INPATIENT)
Facility: MEDICAL CENTER | Age: 75
LOS: 4 days | DRG: 392 | End: 2020-09-26
Attending: EMERGENCY MEDICINE | Admitting: HOSPITALIST
Payer: MEDICARE

## 2020-09-22 DIAGNOSIS — R13.19 ESOPHAGEAL DYSPHAGIA: ICD-10-CM

## 2020-09-22 DIAGNOSIS — R53.1 WEAKNESS: ICD-10-CM

## 2020-09-22 DIAGNOSIS — E87.20 LACTIC ACIDOSIS: ICD-10-CM

## 2020-09-22 LAB
ALBUMIN SERPL BCP-MCNC: 4.5 G/DL (ref 3.2–4.9)
ALBUMIN/GLOB SERPL: 1.3 G/DL
ALP SERPL-CCNC: 97 U/L (ref 30–99)
ALT SERPL-CCNC: 22 U/L (ref 2–50)
AMMONIA PLAS-SCNC: <10 UMOL/L (ref 11–45)
ANION GAP SERPL CALC-SCNC: 24 MMOL/L (ref 7–16)
APPEARANCE UR: CLEAR
AST SERPL-CCNC: 49 U/L (ref 12–45)
BACTERIA #/AREA URNS HPF: NEGATIVE /HPF
BASOPHILS # BLD AUTO: 0.2 % (ref 0–1.8)
BASOPHILS # BLD: 0.01 K/UL (ref 0–0.12)
BILIRUB SERPL-MCNC: 1.1 MG/DL (ref 0.1–1.5)
BILIRUB UR QL STRIP.AUTO: NEGATIVE
BUN SERPL-MCNC: 13 MG/DL (ref 8–22)
CALCIUM SERPL-MCNC: 9.6 MG/DL (ref 8.5–10.5)
CHLORIDE SERPL-SCNC: 88 MMOL/L (ref 96–112)
CO2 SERPL-SCNC: 22 MMOL/L (ref 20–33)
COLOR UR: YELLOW
COMMENT 1642: NORMAL
CREAT SERPL-MCNC: 0.84 MG/DL (ref 0.5–1.4)
EKG IMPRESSION: NORMAL
EOSINOPHIL # BLD AUTO: 0.02 K/UL (ref 0–0.51)
EOSINOPHIL NFR BLD: 0.5 % (ref 0–6.9)
EPI CELLS #/AREA URNS HPF: NEGATIVE /HPF
ERYTHROCYTE [DISTWIDTH] IN BLOOD BY AUTOMATED COUNT: 70.9 FL (ref 35.9–50)
GLOBULIN SER CALC-MCNC: 3.4 G/DL (ref 1.9–3.5)
GLUCOSE SERPL-MCNC: 93 MG/DL (ref 65–99)
GLUCOSE UR STRIP.AUTO-MCNC: NEGATIVE MG/DL
HCT VFR BLD AUTO: 23.9 % (ref 42–52)
HGB BLD-MCNC: 8.3 G/DL (ref 14–18)
HYALINE CASTS #/AREA URNS LPF: ABNORMAL /LPF
IMM GRANULOCYTES # BLD AUTO: 0.02 K/UL (ref 0–0.11)
IMM GRANULOCYTES NFR BLD AUTO: 0.5 % (ref 0–0.9)
KETONES UR STRIP.AUTO-MCNC: NEGATIVE MG/DL
LACTATE BLD-SCNC: 2.4 MMOL/L (ref 0.5–2)
LACTATE BLD-SCNC: 4.2 MMOL/L (ref 0.5–2)
LACTATE BLD-SCNC: 4.7 MMOL/L (ref 0.5–2)
LEUKOCYTE ESTERASE UR QL STRIP.AUTO: NEGATIVE
LYMPHOCYTES # BLD AUTO: 1.37 K/UL (ref 1–4.8)
LYMPHOCYTES NFR BLD: 34.1 % (ref 22–41)
MCH RBC QN AUTO: 35.2 PG (ref 27–33)
MCHC RBC AUTO-ENTMCNC: 34.7 G/DL (ref 33.7–35.3)
MCV RBC AUTO: 101.3 FL (ref 81.4–97.8)
MICRO URNS: ABNORMAL
MONOCYTES # BLD AUTO: 0.32 K/UL (ref 0–0.85)
MONOCYTES NFR BLD AUTO: 8 % (ref 0–13.4)
MORPHOLOGY BLD-IMP: NORMAL
NEUTROPHILS # BLD AUTO: 2.28 K/UL (ref 1.82–7.42)
NEUTROPHILS NFR BLD: 56.7 % (ref 44–72)
NITRITE UR QL STRIP.AUTO: NEGATIVE
NRBC # BLD AUTO: 0 K/UL
NRBC BLD-RTO: 0 /100 WBC
PH UR STRIP.AUTO: 6 [PH] (ref 5–8)
PLATELET # BLD AUTO: 126 K/UL (ref 164–446)
PMV BLD AUTO: 9.6 FL (ref 9–12.9)
POTASSIUM SERPL-SCNC: 3.3 MMOL/L (ref 3.6–5.5)
PROT SERPL-MCNC: 7.9 G/DL (ref 6–8.2)
PROT UR QL STRIP: NEGATIVE MG/DL
RBC # BLD AUTO: 2.36 M/UL (ref 4.7–6.1)
RBC # URNS HPF: ABNORMAL /HPF
RBC UR QL AUTO: ABNORMAL
SODIUM SERPL-SCNC: 134 MMOL/L (ref 135–145)
SP GR UR STRIP.AUTO: 1.02
TROPONIN T SERPL-MCNC: 33 NG/L (ref 6–19)
UROBILINOGEN UR STRIP.AUTO-MCNC: 1 MG/DL
WBC # BLD AUTO: 4 K/UL (ref 4.8–10.8)
WBC #/AREA URNS HPF: ABNORMAL /HPF

## 2020-09-22 PROCEDURE — 99285 EMERGENCY DEPT VISIT HI MDM: CPT

## 2020-09-22 PROCEDURE — 81001 URINALYSIS AUTO W/SCOPE: CPT

## 2020-09-22 PROCEDURE — 82140 ASSAY OF AMMONIA: CPT

## 2020-09-22 PROCEDURE — 82746 ASSAY OF FOLIC ACID SERUM: CPT

## 2020-09-22 PROCEDURE — 93005 ELECTROCARDIOGRAM TRACING: CPT | Performed by: EMERGENCY MEDICINE

## 2020-09-22 PROCEDURE — 80053 COMPREHEN METABOLIC PANEL: CPT

## 2020-09-22 PROCEDURE — 700105 HCHG RX REV CODE 258: Performed by: EMERGENCY MEDICINE

## 2020-09-22 PROCEDURE — 51798 US URINE CAPACITY MEASURE: CPT

## 2020-09-22 PROCEDURE — 82607 VITAMIN B-12: CPT

## 2020-09-22 PROCEDURE — 71045 X-RAY EXAM CHEST 1 VIEW: CPT

## 2020-09-22 PROCEDURE — 87040 BLOOD CULTURE FOR BACTERIA: CPT | Mod: 91

## 2020-09-22 PROCEDURE — 85025 COMPLETE CBC W/AUTO DIFF WBC: CPT

## 2020-09-22 PROCEDURE — 700111 HCHG RX REV CODE 636 W/ 250 OVERRIDE (IP): Performed by: STUDENT IN AN ORGANIZED HEALTH CARE EDUCATION/TRAINING PROGRAM

## 2020-09-22 PROCEDURE — 770020 HCHG ROOM/CARE - TELE (206)

## 2020-09-22 PROCEDURE — 87086 URINE CULTURE/COLONY COUNT: CPT

## 2020-09-22 PROCEDURE — 700105 HCHG RX REV CODE 258: Performed by: STUDENT IN AN ORGANIZED HEALTH CARE EDUCATION/TRAINING PROGRAM

## 2020-09-22 PROCEDURE — 84484 ASSAY OF TROPONIN QUANT: CPT

## 2020-09-22 PROCEDURE — 83605 ASSAY OF LACTIC ACID: CPT | Mod: 91

## 2020-09-22 PROCEDURE — 700117 HCHG RX CONTRAST REV CODE 255: Performed by: EMERGENCY MEDICINE

## 2020-09-22 PROCEDURE — 74177 CT ABD & PELVIS W/CONTRAST: CPT

## 2020-09-22 RX ORDER — SODIUM CHLORIDE, SODIUM LACTATE, POTASSIUM CHLORIDE, CALCIUM CHLORIDE 600; 310; 30; 20 MG/100ML; MG/100ML; MG/100ML; MG/100ML
INJECTION, SOLUTION INTRAVENOUS CONTINUOUS
Status: DISCONTINUED | OUTPATIENT
Start: 2020-09-22 | End: 2020-09-22

## 2020-09-22 RX ORDER — POLYETHYLENE GLYCOL 3350 17 G/17G
1 POWDER, FOR SOLUTION ORAL
Status: DISCONTINUED | OUTPATIENT
Start: 2020-09-22 | End: 2020-09-26 | Stop reason: HOSPADM

## 2020-09-22 RX ORDER — NICOTINE 21 MG/24HR
14 PATCH, TRANSDERMAL 24 HOURS TRANSDERMAL
Status: DISCONTINUED | OUTPATIENT
Start: 2020-09-23 | End: 2020-09-26 | Stop reason: HOSPADM

## 2020-09-22 RX ORDER — SODIUM CHLORIDE, SODIUM LACTATE, POTASSIUM CHLORIDE, AND CALCIUM CHLORIDE .6; .31; .03; .02 G/100ML; G/100ML; G/100ML; G/100ML
30 INJECTION, SOLUTION INTRAVENOUS
Status: COMPLETED | OUTPATIENT
Start: 2020-09-22 | End: 2020-09-22

## 2020-09-22 RX ORDER — BISACODYL 10 MG
10 SUPPOSITORY, RECTAL RECTAL
Status: DISCONTINUED | OUTPATIENT
Start: 2020-09-22 | End: 2020-09-26 | Stop reason: HOSPADM

## 2020-09-22 RX ORDER — AMOXICILLIN 250 MG
2 CAPSULE ORAL 2 TIMES DAILY
Status: DISCONTINUED | OUTPATIENT
Start: 2020-09-22 | End: 2020-09-26 | Stop reason: HOSPADM

## 2020-09-22 RX ORDER — POTASSIUM CHLORIDE 7.45 MG/ML
10 INJECTION INTRAVENOUS
Status: DISPENSED | OUTPATIENT
Start: 2020-09-22 | End: 2020-09-23

## 2020-09-22 RX ADMIN — IOHEXOL 100 ML: 350 INJECTION, SOLUTION INTRAVENOUS at 19:07

## 2020-09-22 RX ADMIN — POTASSIUM CHLORIDE: 149 INJECTION, SOLUTION, CONCENTRATE INTRAVENOUS at 23:15

## 2020-09-22 RX ADMIN — SODIUM CHLORIDE, POTASSIUM CHLORIDE, SODIUM LACTATE AND CALCIUM CHLORIDE 2313 ML: 600; 310; 30; 20 INJECTION, SOLUTION INTRAVENOUS at 18:13

## 2020-09-22 ASSESSMENT — COGNITIVE AND FUNCTIONAL STATUS - GENERAL
TOILETING: A LITTLE
SUGGESTED CMS G CODE MODIFIER DAILY ACTIVITY: CK
MOVING FROM LYING ON BACK TO SITTING ON SIDE OF FLAT BED: A LOT
DRESSING REGULAR UPPER BODY CLOTHING: A LOT
MOBILITY SCORE: 15
WALKING IN HOSPITAL ROOM: A LOT
SUGGESTED CMS G CODE MODIFIER MOBILITY: CK
CLIMB 3 TO 5 STEPS WITH RAILING: A LOT
HELP NEEDED FOR BATHING: A LOT
DRESSING REGULAR LOWER BODY CLOTHING: A LITTLE
STANDING UP FROM CHAIR USING ARMS: A LOT
MOVING TO AND FROM BED TO CHAIR: A LITTLE
DAILY ACTIVITIY SCORE: 18

## 2020-09-22 ASSESSMENT — LIFESTYLE VARIABLES
TOTAL SCORE: 0
EVER FELT BAD OR GUILTY ABOUT YOUR DRINKING: NO
CONSUMPTION TOTAL: POSITIVE
HOW MANY TIMES IN THE PAST YEAR HAVE YOU HAD 5 OR MORE DRINKS IN A DAY: 0
TOTAL SCORE: 0
ALCOHOL_USE: YES
DOES PATIENT WANT TO STOP DRINKING: NO
HAVE YOU EVER FELT YOU SHOULD CUT DOWN ON YOUR DRINKING: NO
TOTAL SCORE: 0
DO YOU DRINK ALCOHOL: NO
ON A TYPICAL DAY WHEN YOU DRINK ALCOHOL HOW MANY DRINKS DO YOU HAVE: 7
HAVE PEOPLE ANNOYED YOU BY CRITICIZING YOUR DRINKING: NO
AVERAGE NUMBER OF DAYS PER WEEK YOU HAVE A DRINK CONTAINING ALCOHOL: 7
EVER HAD A DRINK FIRST THING IN THE MORNING TO STEADY YOUR NERVES TO GET RID OF A HANGOVER: NO

## 2020-09-22 ASSESSMENT — PATIENT HEALTH QUESTIONNAIRE - PHQ9
2. FEELING DOWN, DEPRESSED, IRRITABLE, OR HOPELESS: NOT AT ALL
SUM OF ALL RESPONSES TO PHQ9 QUESTIONS 1 AND 2: 0
1. LITTLE INTEREST OR PLEASURE IN DOING THINGS: NOT AT ALL

## 2020-09-22 ASSESSMENT — FIBROSIS 4 INDEX: FIB4 SCORE: 8.15

## 2020-09-22 NOTE — ED TRIAGE NOTES
"Chief Complaint   Patient presents with   • Weakness     BIBA by ems for above complaint. Per EMS patient states he has had weakness and trouble walking for the last month. [place] due to being bombed for cockroaches. Patient is A+ox4. Patient is hard of hearing.     Blood Pressure : 120/57, Pulse: 94, Respiration: 18, Temperature: (!) 35.8 °C (96.4 °F), Height: 177.8 cm (5' 10\"), Weight: 77.1 kg (170 lb), BMI (Calculated): 24.39, BSA (Calculated): 2, Pulse Oximetry: 99 %, O2 (LPM): 0, O2 Delivery Device: None - Room Air      "

## 2020-09-22 NOTE — ED PROVIDER NOTES
ED Provider Note    Scribed for Edson Fletcher M.D. by Bailey Unger. 9/22/2020, 4:50 PM.    Primary care provider: MORRIS Alexis  Means of arrival: Ambulance  History obtained from: patient   History limited by: none    CHIEF COMPLAINT  Chief Complaint   Patient presents with   • Weakness       HPI  Nicola Jimenez is a 75 y.o. male who presents to the Emergency Department via EMS for weakness onset one month ago. He describes general weakness and has some difficulty walking. He has associated decreased energy and shortness of breath. Denies nausea, vomiting, diarrhea, sore throat, headache, dysuria, melena, hematochezia, chest pain, palpations, fevers, or chills. Patient notes he has lost 30-40 lbs in the last three months. He endorses smoking and drinking regularly.    REVIEW OF SYSTEMS  Pertinent positives include weakness, difficulty walking, decreased energy, and shortness of breath. Pertinent negatives include nausea, vomiting, diarrhea, sore throat, headache, dysuria, melena, hematochezia, chest pain, palpations, fevers, or chills. All other systems negative.    PAST MEDICAL HISTORY   has a past medical history of Arrhythmia, Arthritis, Atherosclerosis of aorta ([facility]ly [facility]), Blood clotting disorder (Formerly [facility]) (1988), Carotid artery disease (Formerly [facility]), Cataract, Dental disorder, Esophageal dilatation, H/O heart artery stent, Hernia, inguinal, bilateral, High cholesterol, History of AAA (abdominal aortic aneurysm) repair (01/30/2012), [place] (hard of hearing), Indigestion, MI (myocardial infarction) (Formerly [facility]) (1988), MI, old, Myocardial infarct (Formerly [facility]), PAD (peripheral artery disease), Status post aortic coarctation stent placement, Umbilical hernia, and Urinary incontinence.    SURGICAL HISTORY   has a past surgical history that includes aaa with stent graft (1/30/2012); esophageal motility or manometry (3/27/2012); gastroscopy-endo (3/19/2017); esophageal motility or manometry (3/21/2017); gastroscopy with  "botox injection (N/A, 3/23/2017); toe amputation (Left, 3/25/2017); cystoscopy stent placement (4/16/2017); cystoscopy stent placement (Left, 4/9/2018); cystoscopy stent placement (Left, 11/14/2018); and cystoscopy,insert ureteral stent (Left, 6/13/2019).    SOCIAL HISTORY  Social History     Tobacco Use   • Smoking status: Current Every Day Smoker     Packs/day: 1.00     Types: Cigarettes, Pipe   • Smokeless tobacco: Never Used   Substance Use Topics   • Alcohol use: Yes     Comment: 2 per day   • Drug use: Yes     Types: Inhaled     Comment: recreational marijuana      Social History     Substance and Sexual Activity   Drug Use Yes   • Types: Inhaled    Comment: recreational marijuana       FAMILY HISTORY  No family history on file.    CURRENT MEDICATIONS  Home Medications     Reviewed by Braden Pinto (Pharmacy Tech) on 09/22/20 at 7074  Med List Status: Complete   Medication Last Dose Status        Patient Rob Taking any Medications                       ALLERGIES  No Known Allergies    PHYSICAL EXAM  VITAL SIGNS: /57   Pulse 94   Temp (!) 35.8 °C (96.4 °F) (Temporal)   Resp 18   Ht 1.778 m (5' 10\")   Wt 77.1 kg (170 lb)   SpO2 99%   BMI 24.39 kg/m²     Constitutional: Well developed, no distress.   HENT: Normocephalic, Atraumatic, Oropharynx moist.   Eyes: Conjunctiva normal, No discharge.   Neck: Supple, No stridor.   Cardiovascular: Normal heart rate, Normal rhythm, No murmurs, equal pulses.   Pulmonary: Normal breath sounds, No respiratory distress, No wheezing, No rales, No rhonchi.  Chest: No chest wall tenderness or deformity.   Abdomen:Soft, No tenderness, No masses, no rebound, no guarding.   Back: No CVA tenderness.   Musculoskeletal: No major deformities noted, No tenderness.   Skin: Warm, Dry, No erythema, No rash.   Neurologic: Alert & oriented x 3, Normal motor function, slightly decreased strength in upper and lower extremities bilaterally, no facial droop.  Psychiatric: " Affect normal, Judgment normal, Mood normal.     LABS  Results for orders placed or performed during the hospital encounter of 09/22/20   CBC WITH DIFFERENTIAL   Result Value Ref Range    WBC 4.0 (L) 4.8 - 10.8 K/uL    RBC 2.36 (L) 4.70 - 6.10 M/uL    Hemoglobin 8.3 (L) 14.0 - 18.0 g/dL    Hematocrit 23.9 (L) 42.0 - 52.0 %    .3 (H) 81.4 - 97.8 fL    MCH 35.2 (H) 27.0 - 33.0 pg    MCHC 34.7 33.7 - 35.3 g/dL    RDW 70.9 (H) 35.9 - 50.0 fL    Platelet Count 126 (L) 164 - 446 K/uL    MPV 9.6 9.0 - 12.9 fL    Neutrophils-Polys 56.70 44.00 - 72.00 %    Lymphocytes 34.10 22.00 - 41.00 %    Monocytes 8.00 0.00 - 13.40 %    Eosinophils 0.50 0.00 - 6.90 %    Basophils 0.20 0.00 - 1.80 %    Immature Granulocytes 0.50 0.00 - 0.90 %    Nucleated RBC 0.00 /100 WBC    Neutrophils (Absolute) 2.28 1.82 - 7.42 K/uL    Lymphs (Absolute) 1.37 1.00 - 4.80 K/uL    Monos (Absolute) 0.32 0.00 - 0.85 K/uL    Eos (Absolute) 0.02 0.00 - 0.51 K/uL    Baso (Absolute) 0.01 0.00 - 0.12 K/uL    Immature Granulocytes (abs) 0.02 0.00 - 0.11 K/uL    NRBC (Absolute) 0.00 K/uL   COMP METABOLIC PANEL   Result Value Ref Range    Sodium 134 (L) 135 - 145 mmol/L    Potassium 3.3 (L) 3.6 - 5.5 mmol/L    Chloride 88 (L) 96 - 112 mmol/L    Co2 22 20 - 33 mmol/L    Anion Gap 24.0 (H) 7.0 - 16.0    Glucose 93 65 - 99 mg/dL    Bun 13 8 - 22 mg/dL    Creatinine 0.84 0.50 - 1.40 mg/dL    Calcium 9.6 8.5 - 10.5 mg/dL    AST(SGOT) 49 (H) 12 - 45 U/L    ALT(SGPT) 22 2 - 50 U/L    Alkaline Phosphatase 97 30 - 99 U/L    Total Bilirubin 1.1 0.1 - 1.5 mg/dL    Albumin 4.5 3.2 - 4.9 g/dL    Total Protein 7.9 6.0 - 8.2 g/dL    Globulin 3.4 1.9 - 3.5 g/dL    A-G Ratio 1.3 g/dL   TROPONIN   Result Value Ref Range    Troponin T 33 (H) 6 - 19 ng/L   LACTIC ACID   Result Value Ref Range    Lactic Acid 4.2 (HH) 0.5 - 2.0 mmol/L   URINALYSIS (UA)    Specimen: Urine   Result Value Ref Range    Color Yellow     Character Clear     Specific Gravity 1.023 <1.035    Ph 6.0 5.0  - 8.0    Glucose Negative Negative mg/dL    Ketones Negative Negative mg/dL    Protein Negative Negative mg/dL    Bilirubin Negative Negative    Urobilinogen, Urine 1.0 Negative    Nitrite Negative Negative    Leukocyte Esterase Negative Negative    Occult Blood Moderate (A) Negative    Micro Urine Req Microscopic    LACTIC ACID   Result Value Ref Range    Lactic Acid 4.7 (HH) 0.5 - 2.0 mmol/L   LACTIC ACID   Result Value Ref Range    Lactic Acid 2.4 (H) 0.5 - 2.0 mmol/L   ESTIMATED GFR   Result Value Ref Range    GFR If African American >60 >60 mL/min/1.73 m 2    GFR If Non African American >60 >60 mL/min/1.73 m 2   AMMONIA   Result Value Ref Range    Ammonia <10 (L) 11 - 45 umol/L   PERIPHERAL SMEAR REVIEW   Result Value Ref Range    Peripheral Smear Review see below    DIFFERENTIAL COMMENT   Result Value Ref Range    Comments-Diff see below    URINE MICROSCOPIC (W/UA)   Result Value Ref Range    WBC 0-2 (A) /hpf    RBC 20-50 (A) /hpf    Bacteria Negative None /hpf    Epithelial Cells Negative /hpf    Hyaline Cast 0-2 /lpf   EKG   Result Value Ref Range    Report       [facility] Emergency Dept.    Test Date:  2020  Pt Name:    HONORIO STRICKLAND                 Department: ER  MRN:        [id number]                      Room:       [facility]  Gender:     Male                         Technician: [id number]  :        1945                   Requested By:ER TRIAGE PROTOCOL  Order #:    [id number]                    Reading MD: JAELYN PRADO MD    Measurements  Intervals                                Axis  Rate:       91                           P:          70  MA:         148                          QRS:        3  QRSD:       90                           T:          184  QT:         368  QTc:        453    Interpretive Statements  SINUS RHYTHM  PROBABLE LVH WITH SECONDARY REPOL ABNRM  Compared to ECG 2019 07:33:00  New twave inversions in V3-V6, and I possible ischemic  changes  Electronically Signed On 9- 17:05:43 PDT by JAELYN PRADO MD       All labs reviewed by me.    EKG  12 Lead EKG interpreted by me as above.    RADIOLOGY  CT-CHEST,ABDOMEN,PELVIS WITH   Final Result      1.  Diffuse esophageal wall thickening, likely inflammatory.  Neoplasm is not entirely excluded.   2.  Wedge-shaped low density at the anterior aspect of the spleen, most concerning for infarct.   3.  Fatty infiltration of liver.   4.  Abdominal aortic aneurysm with stent graft in place.   5.  LEFT ureteral stent present.   6.  Postoperative change of lower abdominal wall.      DX-CHEST-PORTABLE (1 VIEW)   Final Result      Left basilar atelectasis or scarring.      Nodular density in the right lung base likely representing nipple shadow. Recommend repeat chest x-ray with nipple markers.      EC-ECHOCARDIOGRAM COMPLETE W/O CONT    (Results Pending)   US-ABDOMEN COMPLETE SURVEY    (Results Pending)     The radiologist's interpretation of all radiological studies have been reviewed by me.    COURSE & MEDICAL DECISION MAKING  Pertinent Labs & Imaging studies reviewed. (See chart for details)    4:50 PM - Patient seen and examined at bedside. I discussed that we will obtain labs and imaging to further evaluate for a cause of his symptoms. Ordered chest x-ray, UA, ammonia, CBC w/ diff, CMP, troponin, lactic acid, blood culture, and EKG to evaluate his symptoms. The differential diagnoses include but are not limited to: Cancer, sepsis, myocardial infarction, electrolyte abnormality, dehydration, anemia    5:58 PM - Patient was reevaluated at bedside. I informed him that we are still waiting for his test results. Nursing altered that the patient has a lactic acid of 4.2. He will be resuscitated with IV fluids for sepsis. Ordered CT chest abdomen pelvis, lactic acid, and urine culture.    8:15 PM - Patients lactic acid continues to elevate. Reviewed radiology results. Paged hospitalist and GI.    8:27  PM - I discussed the patient's case and the above findings with Dr. Quintanilla (Hospitalist) who agreed to evaluate patient for hospitalization. Patients care will be transferred at this time.     8:32 PM - I discussed the patient's case and the above findings with Dr. Avila (GI) who will consult on the patient.     9:08 PM - Preformed bladder scan on the patient as he has been unable to provide a urine sample. Review of past medical record shows history of ureteral shunt done by Dr. Mooney. Upon reevaluation of the patient he was able to provide urine.    9:14 PM - Nursing alerted that the patient has been throwing up at least 1/2 of the meals he eats, which was not previously mentioned.     Medical Decision Making: Patient presents with 30 pounds weight loss over 3 months with worsening weakness.  He did have an elevated lactic acid I was concerned about possible cancer given the weight loss therefore CT of the chest abdomen pelvis was done with IV contrast.  This demonstrates the patient has an esophageal wall thickening I am concerned this could be esophageal cancer with some bleeding causing his anemia.  Despite CTs and labs I do not find the exact cause of patient's lactic acidosis.  He is being given IV fluids for this.  It does not appear that the patient is septic.  GI has been consulted for possible EGD    DISPOSITION:  Patient will be hospitalized by Dr. Steen in guarded condition.      FINAL IMPRESSION  1. Weakness    2. Lactic acidosis    3. Esophageal dysphagia          Bailey CABRERA (Noreen), am scribing for, and in the presence of, Edson Fletcher M.D.    Electronically signed by: Bailey Unger (Noreen), 9/22/2020    Edson CABRERA M.D. personally performed the services described in this documentation, as scribed by Bailey Unger in my presence, and it is both accurate and complete. C    The note accurately reflects work and decisions made by me.  Edson Fletcher M.D.  9/22/2020   10:57 PM

## 2020-09-23 ENCOUNTER — APPOINTMENT (OUTPATIENT)
Dept: RADIOLOGY | Facility: MEDICAL CENTER | Age: 75
DRG: 392 | End: 2020-09-23
Attending: STUDENT IN AN ORGANIZED HEALTH CARE EDUCATION/TRAINING PROGRAM
Payer: MEDICARE

## 2020-09-23 PROBLEM — R79.89 TROPONIN LEVEL ELEVATED: Status: ACTIVE | Noted: 2020-09-23

## 2020-09-23 PROBLEM — R76.8 HEPATITIS C ANTIBODY POSITIVE IN BLOOD: Status: ACTIVE | Noted: 2017-03-29

## 2020-09-23 PROBLEM — K22.9 ESOPHAGEAL ABNORMALITY: Status: ACTIVE | Noted: 2020-09-23

## 2020-09-23 PROBLEM — E87.20 LACTIC ACID ACIDOSIS: Status: ACTIVE | Noted: 2020-09-23

## 2020-09-23 LAB
ABO GROUP BLD: NORMAL
ALBUMIN SERPL BCP-MCNC: 3.7 G/DL (ref 3.2–4.9)
ALBUMIN/GLOB SERPL: 1.4 G/DL
ALP SERPL-CCNC: 77 U/L (ref 30–99)
ALT SERPL-CCNC: 18 U/L (ref 2–50)
ANION GAP SERPL CALC-SCNC: 12 MMOL/L (ref 7–16)
ANISOCYTOSIS BLD QL SMEAR: ABNORMAL
APTT PPP: 27.5 SEC (ref 24.7–36)
AST SERPL-CCNC: 31 U/L (ref 12–45)
BASOPHILS # BLD AUTO: 0.3 % (ref 0–1.8)
BASOPHILS # BLD: 0.01 K/UL (ref 0–0.12)
BILIRUB SERPL-MCNC: 1.1 MG/DL (ref 0.1–1.5)
BLD GP AB SCN SERPL QL: NORMAL
BUN SERPL-MCNC: 12 MG/DL (ref 8–22)
CALCIUM SERPL-MCNC: 8.9 MG/DL (ref 8.5–10.5)
CHLORIDE SERPL-SCNC: 95 MMOL/L (ref 96–112)
CO2 SERPL-SCNC: 26 MMOL/L (ref 20–33)
COMMENT 1642: NORMAL
COVID ORDER STATUS COVID19: NORMAL
CREAT SERPL-MCNC: 0.59 MG/DL (ref 0.5–1.4)
DACRYOCYTES BLD QL SMEAR: NORMAL
EKG IMPRESSION: NORMAL
EOSINOPHIL # BLD AUTO: 0.02 K/UL (ref 0–0.51)
EOSINOPHIL NFR BLD: 0.6 % (ref 0–6.9)
ERYTHROCYTE [DISTWIDTH] IN BLOOD BY AUTOMATED COUNT: 70.9 FL (ref 35.9–50)
FERRITIN SERPL-MCNC: 954 NG/ML (ref 22–322)
FOLATE SERPL-MCNC: 3.6 NG/ML
GLOBULIN SER CALC-MCNC: 2.7 G/DL (ref 1.9–3.5)
GLUCOSE SERPL-MCNC: 97 MG/DL (ref 65–99)
HCT VFR BLD AUTO: 18.5 % (ref 42–52)
HCT VFR BLD AUTO: 18.5 % (ref 42–52)
HGB BLD-MCNC: 6.3 G/DL (ref 14–18)
HGB BLD-MCNC: 6.3 G/DL (ref 14–18)
IMM GRANULOCYTES # BLD AUTO: 0.02 K/UL (ref 0–0.11)
IMM GRANULOCYTES NFR BLD AUTO: 0.6 % (ref 0–0.9)
INR PPP: 1.07 (ref 0.87–1.13)
IRON SATN MFR SERPL: 92 % (ref 15–55)
IRON SERPL-MCNC: 187 UG/DL (ref 50–180)
LYMPHOCYTES # BLD AUTO: 0.93 K/UL (ref 1–4.8)
LYMPHOCYTES NFR BLD: 29.1 % (ref 22–41)
MAGNESIUM SERPL-MCNC: 1.3 MG/DL (ref 1.5–2.5)
MCH RBC QN AUTO: 34.4 PG (ref 27–33)
MCHC RBC AUTO-ENTMCNC: 34.1 G/DL (ref 33.7–35.3)
MCV RBC AUTO: 101.1 FL (ref 81.4–97.8)
MONOCYTES # BLD AUTO: 0.38 K/UL (ref 0–0.85)
MONOCYTES NFR BLD AUTO: 11.9 % (ref 0–13.4)
MORPHOLOGY BLD-IMP: NORMAL
NEUTROPHILS # BLD AUTO: 1.84 K/UL (ref 1.82–7.42)
NEUTROPHILS NFR BLD: 57.5 % (ref 44–72)
NRBC # BLD AUTO: 0 K/UL
NRBC BLD-RTO: 0 /100 WBC
PLATELET # BLD AUTO: 75 K/UL (ref 164–446)
PLATELET BLD QL SMEAR: NORMAL
PMV BLD AUTO: 9.3 FL (ref 9–12.9)
POIKILOCYTOSIS BLD QL SMEAR: NORMAL
POTASSIUM SERPL-SCNC: 3.5 MMOL/L (ref 3.6–5.5)
PROT SERPL-MCNC: 6.4 G/DL (ref 6–8.2)
PROTHROMBIN TIME: 14.3 SEC (ref 12–14.6)
RBC # BLD AUTO: 1.83 M/UL (ref 4.7–6.1)
RBC BLD AUTO: PRESENT
RH BLD: NORMAL
SODIUM SERPL-SCNC: 133 MMOL/L (ref 135–145)
TIBC SERPL-MCNC: 204 UG/DL (ref 250–450)
TRANSFERRIN SERPL-MCNC: 154 MG/DL (ref 200–370)
TROPONIN T SERPL-MCNC: 69 NG/L (ref 6–19)
TROPONIN T SERPL-MCNC: 75 NG/L (ref 6–19)
TSH SERPL DL<=0.005 MIU/L-ACNC: 4.31 UIU/ML (ref 0.38–5.33)
UIBC SERPL-MCNC: <17 UG/DL (ref 110–370)
VIT B12 SERPL-MCNC: 885 PG/ML (ref 211–911)
WBC # BLD AUTO: 3.2 K/UL (ref 4.8–10.8)

## 2020-09-23 PROCEDURE — 86923 COMPATIBILITY TEST ELECTRIC: CPT

## 2020-09-23 PROCEDURE — 36415 COLL VENOUS BLD VENIPUNCTURE: CPT

## 2020-09-23 PROCEDURE — 770020 HCHG ROOM/CARE - TELE (206)

## 2020-09-23 PROCEDURE — 700105 HCHG RX REV CODE 258: Performed by: STUDENT IN AN ORGANIZED HEALTH CARE EDUCATION/TRAINING PROGRAM

## 2020-09-23 PROCEDURE — 93010 ELECTROCARDIOGRAM REPORT: CPT | Performed by: INTERNAL MEDICINE

## 2020-09-23 PROCEDURE — 700101 HCHG RX REV CODE 250: Performed by: HOSPITALIST

## 2020-09-23 PROCEDURE — 97165 OT EVAL LOW COMPLEX 30 MIN: CPT

## 2020-09-23 PROCEDURE — 76700 US EXAM ABDOM COMPLETE: CPT

## 2020-09-23 PROCEDURE — 700105 HCHG RX REV CODE 258: Performed by: HOSPITALIST

## 2020-09-23 PROCEDURE — 86900 BLOOD TYPING SEROLOGIC ABO: CPT

## 2020-09-23 PROCEDURE — 83540 ASSAY OF IRON: CPT

## 2020-09-23 PROCEDURE — 99223 1ST HOSP IP/OBS HIGH 75: CPT | Mod: AI,GC | Performed by: HOSPITALIST

## 2020-09-23 PROCEDURE — 85027 COMPLETE CBC AUTOMATED: CPT

## 2020-09-23 PROCEDURE — 93005 ELECTROCARDIOGRAM TRACING: CPT | Performed by: STUDENT IN AN ORGANIZED HEALTH CARE EDUCATION/TRAINING PROGRAM

## 2020-09-23 PROCEDURE — 83550 IRON BINDING TEST: CPT

## 2020-09-23 PROCEDURE — 86901 BLOOD TYPING SEROLOGIC RH(D): CPT

## 2020-09-23 PROCEDURE — 80053 COMPREHEN METABOLIC PANEL: CPT

## 2020-09-23 PROCEDURE — 85018 HEMOGLOBIN: CPT

## 2020-09-23 PROCEDURE — U0003 INFECTIOUS AGENT DETECTION BY NUCLEIC ACID (DNA OR RNA); SEVERE ACUTE RESPIRATORY SYNDROME CORONAVIRUS 2 (SARS-COV-2) (CORONAVIRUS DISEASE [COVID-19]), AMPLIFIED PROBE TECHNIQUE, MAKING USE OF HIGH THROUGHPUT TECHNOLOGIES AS DESCRIBED BY CMS-2020-01-R: HCPCS

## 2020-09-23 PROCEDURE — 84484 ASSAY OF TROPONIN QUANT: CPT | Mod: 91

## 2020-09-23 PROCEDURE — 700111 HCHG RX REV CODE 636 W/ 250 OVERRIDE (IP): Performed by: STUDENT IN AN ORGANIZED HEALTH CARE EDUCATION/TRAINING PROGRAM

## 2020-09-23 PROCEDURE — 85730 THROMBOPLASTIN TIME PARTIAL: CPT

## 2020-09-23 PROCEDURE — 83735 ASSAY OF MAGNESIUM: CPT

## 2020-09-23 PROCEDURE — C9113 INJ PANTOPRAZOLE SODIUM, VIA: HCPCS | Performed by: HOSPITALIST

## 2020-09-23 PROCEDURE — 700111 HCHG RX REV CODE 636 W/ 250 OVERRIDE (IP): Performed by: HOSPITALIST

## 2020-09-23 PROCEDURE — 86850 RBC ANTIBODY SCREEN: CPT

## 2020-09-23 PROCEDURE — 84466 ASSAY OF TRANSFERRIN: CPT

## 2020-09-23 PROCEDURE — C9803 HOPD COVID-19 SPEC COLLECT: HCPCS | Performed by: STUDENT IN AN ORGANIZED HEALTH CARE EDUCATION/TRAINING PROGRAM

## 2020-09-23 PROCEDURE — 85610 PROTHROMBIN TIME: CPT

## 2020-09-23 PROCEDURE — 97162 PT EVAL MOD COMPLEX 30 MIN: CPT

## 2020-09-23 PROCEDURE — 30233N1 TRANSFUSION OF NONAUTOLOGOUS RED BLOOD CELLS INTO PERIPHERAL VEIN, PERCUTANEOUS APPROACH: ICD-10-PCS | Performed by: HOSPITALIST

## 2020-09-23 PROCEDURE — 84443 ASSAY THYROID STIM HORMONE: CPT

## 2020-09-23 PROCEDURE — 85025 COMPLETE CBC W/AUTO DIFF WBC: CPT

## 2020-09-23 PROCEDURE — P9016 RBC LEUKOCYTES REDUCED: HCPCS

## 2020-09-23 PROCEDURE — 82728 ASSAY OF FERRITIN: CPT

## 2020-09-23 PROCEDURE — 36430 TRANSFUSION BLD/BLD COMPNT: CPT

## 2020-09-23 PROCEDURE — 92610 EVALUATE SWALLOWING FUNCTION: CPT

## 2020-09-23 PROCEDURE — 85014 HEMATOCRIT: CPT

## 2020-09-23 RX ORDER — PANTOPRAZOLE SODIUM 40 MG/10ML
40 INJECTION, POWDER, LYOPHILIZED, FOR SOLUTION INTRAVENOUS 2 TIMES DAILY
Status: DISCONTINUED | OUTPATIENT
Start: 2020-09-23 | End: 2020-09-26

## 2020-09-23 RX ORDER — SODIUM CHLORIDE 9 MG/ML
INJECTION, SOLUTION INTRAVENOUS
Status: COMPLETED
Start: 2020-09-23 | End: 2020-09-23

## 2020-09-23 RX ORDER — FOLIC ACID 1 MG/1
1 TABLET ORAL DAILY
Status: DISCONTINUED | OUTPATIENT
Start: 2020-09-24 | End: 2020-09-25

## 2020-09-23 RX ORDER — POTASSIUM CHLORIDE 20 MEQ/1
20 TABLET, EXTENDED RELEASE ORAL ONCE
Status: DISCONTINUED | OUTPATIENT
Start: 2020-09-23 | End: 2020-09-23

## 2020-09-23 RX ORDER — SODIUM CHLORIDE 9 MG/ML
INJECTION, SOLUTION INTRAVENOUS CONTINUOUS
Status: ACTIVE | OUTPATIENT
Start: 2020-09-23 | End: 2020-09-24

## 2020-09-23 RX ORDER — SODIUM CHLORIDE 9 MG/ML
INJECTION, SOLUTION INTRAVENOUS
Status: ACTIVE
Start: 2020-09-23 | End: 2020-09-23

## 2020-09-23 RX ADMIN — POTASSIUM CHLORIDE 10 MEQ: 10 INJECTION, SOLUTION INTRAVENOUS at 00:00

## 2020-09-23 RX ADMIN — POTASSIUM CHLORIDE: 149 INJECTION, SOLUTION, CONCENTRATE INTRAVENOUS at 22:33

## 2020-09-23 RX ADMIN — FOLIC ACID 1 MG: 5 INJECTION, SOLUTION INTRAMUSCULAR; INTRAVENOUS; SUBCUTANEOUS at 13:10

## 2020-09-23 RX ADMIN — POTASSIUM CHLORIDE 10 MEQ: 10 INJECTION, SOLUTION INTRAVENOUS at 00:01

## 2020-09-23 RX ADMIN — PANTOPRAZOLE SODIUM 40 MG: 40 INJECTION, POWDER, LYOPHILIZED, FOR SOLUTION INTRAVENOUS at 12:02

## 2020-09-23 RX ADMIN — THIAMINE HYDROCHLORIDE 100 MG: 100 INJECTION, SOLUTION INTRAMUSCULAR; INTRAVENOUS at 17:02

## 2020-09-23 RX ADMIN — PANTOPRAZOLE SODIUM 40 MG: 40 INJECTION, POWDER, LYOPHILIZED, FOR SOLUTION INTRAVENOUS at 17:02

## 2020-09-23 ASSESSMENT — ENCOUNTER SYMPTOMS
SPUTUM PRODUCTION: 0
SHORTNESS OF BREATH: 0
TINGLING: 0
DOUBLE VISION: 0
TREMORS: 0
BRUISES/BLEEDS EASILY: 0
BACK PAIN: 0
ABDOMINAL PAIN: 0
WEIGHT LOSS: 1
SINUS PAIN: 0
NECK PAIN: 0
HEMOPTYSIS: 0
MEMORY LOSS: 0
WHEEZING: 0
HEADACHES: 0
SORE THROAT: 0
CONSTIPATION: 0
NERVOUS/ANXIOUS: 0
WEAKNESS: 0
PALPITATIONS: 0
COUGH: 0
DEPRESSION: 0
FEVER: 0
NAUSEA: 0
VOMITING: 0
CHILLS: 0
PHOTOPHOBIA: 0
DIZZINESS: 0
DIARRHEA: 0

## 2020-09-23 ASSESSMENT — GAIT ASSESSMENTS
GAIT LEVEL OF ASSIST: MODERATE ASSIST
DEVIATION: SHUFFLED GAIT;BRADYKINETIC;OTHER (COMMENT)
DISTANCE (FEET): 4
ASSISTIVE DEVICE: HAND HELD ASSIST

## 2020-09-23 ASSESSMENT — COGNITIVE AND FUNCTIONAL STATUS - GENERAL
MOVING FROM LYING ON BACK TO SITTING ON SIDE OF FLAT BED: UNABLE
CLIMB 3 TO 5 STEPS WITH RAILING: TOTAL
TOILETING: A LOT
SUGGESTED CMS G CODE MODIFIER DAILY ACTIVITY: CK
DAILY ACTIVITIY SCORE: 16
TURNING FROM BACK TO SIDE WHILE IN FLAT BAD: A LITTLE
WALKING IN HOSPITAL ROOM: A LOT
STANDING UP FROM CHAIR USING ARMS: A LOT
HELP NEEDED FOR BATHING: A LOT
PERSONAL GROOMING: A LITTLE
SUGGESTED CMS G CODE MODIFIER MOBILITY: CL
DRESSING REGULAR UPPER BODY CLOTHING: A LITTLE
MOVING TO AND FROM BED TO CHAIR: A LOT
DRESSING REGULAR LOWER BODY CLOTHING: A LOT
MOBILITY SCORE: 11

## 2020-09-23 ASSESSMENT — LIFESTYLE VARIABLES: SUBSTANCE_ABUSE: 0

## 2020-09-23 ASSESSMENT — PAIN DESCRIPTION - PAIN TYPE
TYPE: CHRONIC PAIN
TYPE: CHRONIC PAIN

## 2020-09-23 ASSESSMENT — ACTIVITIES OF DAILY LIVING (ADL): TOILETING: INDEPENDENT

## 2020-09-23 NOTE — ASSESSMENT & PLAN NOTE
- Hg on admission 8.3, dropped to 6.3. patient received 2 units of PRBCs.  - patient underwent EGD on 09/24/2020 which showed tight lower esophageal sphincter, gastritis and duodenitis but no evidence of active bleeding.  - cardiology was consulted to see if patient is a candidate for TAVR as his labs showed findings suggestive of extravascular hemolysis from severe aortic stenosis.  - retics count imm reticulocytes 17.8, direct/indirect bili WNL, . Haptoglobin WNL.  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  - colonoscopy today  - IV protonix.  - folate, IV thiamine.

## 2020-09-23 NOTE — SENIOR ADMIT NOTE
Senior Admit Note    Nicola Jimenez is a 75 y.o. male with a history of coronary artery disease, status post ureteral stent, AAA s/p stent, history of esophageal dysphagia for the past 10+ years who presented to the hospital with 1 month history of generalized weakness, weight loss and esophageal dysphagia.     In the ED, patient was noted to be hemodynamically stable. He was noted to have a lactic acid of 4.2. CBC revealed pancytopenia. He also was found to have a potassium of 3.3, elevated anion gap and troponin of 33. EKG showed some inverted T waves in V2-V6 but patient denied any chest pain. CXR showed left basilar atelectasis. Pan CT revealed esophageal wall thickening concerning for malignancy and splenic infarct. Patient received contrast and 1L LR bolus.     Pertinent physical exam:   General: Frail elderly male very hard of hearing, disheveled   Mouth: Dry mucous membranes   Heart: Regular rate and rhythm no murmurs  Abdomen: soft, non-tender to palpation   Extremities: no lower extremity edema     Assessment and Plan :    # Esophageal wall thickening concerning for malignancy   Patient has history of chronic tobacco and alcohol use which increases his pre-test probability for esophageal malignancy as seen with the thickening on the pan CT.   - GI on board   - Bedside swallow evaluation   - NPO at midnight     # Elevated lactic acid   Not infection/hypoperfusion related and likely related to splenic infarct and/or malignancy. No signs of intraabdominal pathology on CT.   - IV fluids   - Trended lactic acid and has returned to 2.4    # Elevated troponin   Troponin 33 on admission likely related to demand with some mild TWI on EKG which is new from previous EKG. But patient does not endorse any chest pain.   - Trend troponin and EKG      # Splenic infarct   Unclear etiology. Seen on CT. Possible that this may be related to hypercoagulable state or cardio embolic source or infection.   - Check coags   -  Echocardiogram   - Abdominal ultrasound with splenic artery/vein dopplers     # Hypokalemia   Secondary to GI losses and poor oral intake   - Replete as needed   - IV fluids containing potassium   - IV potassium     # Pancytopenia   Likely secondary to malignancy but there are no bony mets seen on the pan CT   - Continue to monitor, currently stable   - May need to consider bone marrow biopsy if EGD is unrevealing    Please see Dr. Gonzalez's H&P for full detals    Benito Osei D.O., [facility] Resident

## 2020-09-23 NOTE — CARE PLAN
Problem: Nutritional:  Goal: Achieve adequate nutritional intake  Description: Patient will consume >50% of meals and snacks.  Outcome: NOT MET

## 2020-09-23 NOTE — THERAPY
Physical Therapy   Initial Evaluation     Patient Name: Nicola Jimenez  Age:  75 y.o., Sex:  male  Medical Record #: [id number]  Today's Date: 9/23/2020     Precautions: Fall Risk  Comments: Very [place]    Assessment  Patient is 75 y.o. male with weakness, significant weight loss, increased troponin levels, and splenic infarct. PMH includes [place], MI, PAD. Pt living in ground level apartment alone and using SPC when ambulating. Pt reported limited social support. Pt required mod assist for STS, bed<>chair transfer, and for ~4ft of ambulating. Pt impulsive throughout requiring cues for safety. Pt is limited in functional mobility due to weakness, balance deficits, decreased activity tolerance. PT will cont while in acute care setting.     Plan    Recommend Physical Therapy 3 times per week until therapy goals are met for the following treatments:  Bed Mobility, Community Re-integration, Gait Training, Neuro Re-Education / Balance, Self Care/Home Evaluation, Therapeutic Activities and Therapeutic Exercises    DC Equipment Recommendations: Unable to determine at this time  Discharge Recommendations: Recommend post-acute placement for additional physical therapy services prior to discharge home          09/23/20 1152   Prior Living Situation   Prior Services None;Home-Independent   Housing / Facility 1 Story Apartment / Condo   Steps Into Home 0   Steps In Home 0   Equipment Owned Single Point Cane   Lives with - Patient's Self Care Capacity Alone and Able to Care For Self   Comments no social support   Prior Level of Functional Mobility   Bed Mobility Independent   Transfer Status Independent   Ambulation Independent   Distance Ambulation (Feet)   (community)   Assistive Devices Used Single Point Cane   Comments independent prior   Cognition    Cognition / Consciousness X   Speech/ Communication Delayed Responses;Hard of Hearing   Safety Awareness Impaired;Impulsive   Comments questionable historian, very [place], cooperative    Gait Analysis   Gait Level Of Assist Moderate Assist   Assistive Device Hand Held Assist   Distance (Feet) 4   # of Times Distance was Traveled 2   Deviation Shuffled Gait;Bradykinetic;Other (Comment)  (significant kyphosis)   # of Stairs Climbed 0   Weight Bearing Status no restrictions   Comments pt reaching for furniture throughout and impulsive   Bed Mobility    Supine to Sit Minimal Assist   Sit to Supine   (NT in chair)   Scooting Supervised   Functional Mobility   Sit to Stand Minimal Assist   Bed, Chair, Wheelchair Transfer Moderate Assist   Toilet Transfers Moderate Assist   Mobility in room    Short Term Goals    Short Term Goal # 1 Pt will be able to complete bed mobility with HOB flat and SPV in 6tx in order to return home   Short Term Goal # 2 Pt will be able to complete STS transfers with SPC and SPV in 6tx in order to return to baseline   Short Term Goal # 3 Pt will be able to ambulate >50ft with SPC and SPV in 6tx in order to return home   Anticipated Discharge Equipment and Recommendations   DC Equipment Recommendations Unable to determine at this time   Discharge Recommendations Recommend post-acute placement for additional physical therapy services prior to discharge home

## 2020-09-23 NOTE — PROGRESS NOTES
2 RN skin check complete with Belem CAMERON  Devices in place, none.  Skin assessed under devices, N/A.  Confirmed pressure ulcers found on, N/A  New potential pressure ulcers noted on N/A. Wound consult placed N/A.  The following interventions in place:   Patient has clean dry sheets, no slip socks, call bell and belongings within reach. Pt turns self from side to side.     Ears: pink and blanching  Elbows: pink and blanching   Sacrum: pink and blanching  Heels and Toes:dry and cracked

## 2020-09-23 NOTE — CONSULTS
"GASTROENTEROLOGY CONSULTATION      DATE OF SERVICE:   9/23/2020       REFERRING PHYSICIAN:   BRIDGETT Sanchez M.D.       REASON FOR CONSULTATION:    Dysphagia, abnormal CT scan       HISTORY OF PRESENT ILLNESS:    75-year-old male admitted with worsening dysphagia associated with poor appetite and a 30-40 pund weight loss over the past several months. PMH is significant for achalasia diagnosed on manometry and requiring periodic endoscopic balloon dilation, chronic macrocytic anemia and GERD. The patient is a poor historian. He reports occasional difficulty with swallowing. He denies heart burn, chest pain and shortness of breath. ROS is positive for reduced PO intake and a 30-40 pound weight loss over the past several months, as well as occasional diarrhea and stool incontinence. ROS is negative for melena, abdominal pain and hematochezia. GI was consulted for further evaluation and management of esophageal thickening on CT scan concerning for malignancy. The patient is hemodynamically stable. His hospital course is significant for an acute drop in Hgb from 8.3 at the time of admission on 9/22/2020 to 6.3 today.       PAST MEDICAL HISTORY:    Arrhythmia, arthritis, atherosclerosis of aorta, blood clotting disorder 1988 with IVC in place, carotid artery disease, achalasia, abdominal aortic aneurysm status post repair 1/30/2012, myocardial infarction 1988 status post stent placement, peripheral artery disease, status post aortic coarctation stent placement      PAST SURGICAL HISTORY:    Multiple EGDs with balloon dilation  EGD with botox injection 3/23/2017  Esophageal manometry 3/27/2012, 3/21/2017  Cystoscopy with L stent placement 4/16/2017, 4/9/2018, 11/14/2018, 6/13/2019  Left toe amputation 3/25/2017  AAA with stent graft 1/30/2012      ALLERGIES:    Patient has no known allergies.         SOCIAL HISTORY:    Reports smoking 0.5 ppd since age 12, drinking 2 \"brandys\" daily for the past 15 years approximately, " and smoking a joint of marijuana daily since age 23        FAMILY HISTORY:   Denies family history of GI malignancy        REVIEW OF SYSTEMS:    Constitutional: Negative for chills and fatigue.  HEN: Negative for sinus pain and headache.  Throat: Negative for dysphagia and sore throat.  Eyes: Negative for change in vision and abnormal discharge.   Respiratory: Negative for cough and shortness of breath.  Cardiovascular: Negative for chest pain and palpitations.  Gastrointestinal: Negative for constipation, diarrhea, nausea and vomiting.   Genitourinary: Negative for dysuria and hematuria.    Extremities: Negative for pain and swelling.  Neurological: Negative for dizziness, loss of consciousness, numbness and tingling.  Hematologic: Negative for abnormal bleeding and bruising.   Psychiatric: Negative for substance abuse and suicidal thoughts.      VITAL SIGNS:   Vitals:    09/22/20 2340 09/23/20 0352 09/23/20 0900 09/23/20 1251   BP: 118/56 128/62 123/63 102/53   Pulse: 97 88 82 95   Resp: 18 18 14 18   Temp: 36.1 °C (97 °F) 36.5 °C (97.7 °F) 36.1 °C (96.9 °F) 36 °C (96.8 °F)   TempSrc: Temporal Temporal Temporal Temporal   SpO2: 94% 95% 96% 95%   Weight:       Height:            PHYSICAL EXAMINATION:    General: No apparent distress, pleasant, cooperative  HEN: Normocephalic, atraumatic, bilateral hearing loss    Throat: Moist, oropharynx clear, dentures in place   Eyes: EOMI, sclerae anicteric  Throat: Oropharynx clear, oral mucosa moist  Neck: Supple, no lymphadenopathy    Heart: Regular rhythm, normal rate, no murmurs  Lung: Normal work of breathing, clear to auscultation bilaterally   Abdomen: Soft, bowel sounds present, non-tender, no organomegaly appreciated, no peritoneal signs  Extremities: No cyanosis, no edema   Skin: Warm, dry, intact, no suspicious rashes or lesions on visualized skin      LABORATORY DATA:      Recent Labs     09/22/20  1630 09/23/20  0823   SODIUM 134* 133*   POTASSIUM 3.3* 3.5*    CHLORIDE 88* 95*   CO2 22 26   BUN 13 12   CREATININE 0.84 0.59   MAGNESIUM  --  1.3*   CALCIUM 9.6 8.9     Recent Labs     09/22/20  1630 09/23/20  0823   ALTSGPT 22 18   ASTSGOT 49* 31   ALKPHOSPHAT 97 77   TBILIRUBIN 1.1 1.1   GLUCOSE 93 97     Recent Labs     09/22/20  1630 09/23/20  0823   RBC 2.36* 1.83*   HEMOGLOBIN 8.3* 6.3*   HEMATOCRIT 23.9* 18.5*   PLATELETCT 126* 75*   PROTHROMBTM  --  14.3   APTT  --  27.5   INR  --  1.07     Recent Labs     09/22/20  1630 09/23/20  0823   WBC 4.0* 3.2*   NEUTSPOLYS 56.70 57.50   LYMPHOCYTES 34.10 29.10   MONOCYTES 8.00 11.90   EOSINOPHILS 0.50 0.60   BASOPHILS 0.20 0.30   ASTSGOT 49* 31   ALTSGPT 22 18   ALKPHOSPHAT 97 77   TBILIRUBIN 1.1 1.1       IMAGING DATA:    CT ABD-PELVIS WITH CONTRAST  Diffuse esophageal wall thickening, likely inflammatory, neoplasm is not entirely excluded.  Wedge-shaped low density at the anterior aspect of the spleen, most concerning for infarct.  Fatty infiltration of liver.  Abdominal aortic aneurysm with stent graft in place.  Left ureteral stent present.  Postoperative change of lower abdominal wall.    US-ABDOMEN COMPLETE SURVEY  9/23/2020 4:10 AM  FINDINGS:  Hepatic steatosis, no evidence of solid mass lesion in the liver, liver measures 17.45 cm.  Gallbladder contains tumefactive sludge or a hypovascular mass, no shadowing stones, there is no pericholecystic fluid, gallbladder wall thickness measures 1.90 mm, the common duct measures 2.80 mm.  Visualized pancreas is unremarkable.  Intrahepatic IVC is patent.  Portal vein is patent with hepatopetal flow, MPV measures 0.87 cm.  Spleen measures 14.19 cm maximally.  No ascites.      IMPRESSION AND RECOMMENDATIONS:      1. Achalasia - chronic condition, status post multiple EGDs with dilation   2. Esophageal wall thickening on CT scan - concerning for malignancy   3. Acute on chronic macrocytic anemia - differential includes GI bleed, bone marrow disorder, malignancy    4. Thrombocytopenia  5. Pancytopenia  6. Isolated Hep C antibody positive without viral load, possible due to prior exposure with spontaneous clearance    · Regular diet no reds today and NPO at midnight for EGD tomorrow   · Monitor CBC, maintain Hgb > 7 and platelet count > 40    · Order remaining anemia workup, e.g. thyroid function testing

## 2020-09-23 NOTE — ASSESSMENT & PLAN NOTE
- Reports patient  lost 30 pounds in the past month; Reports to have an esophageal dysphagia (liquids/solids) for >10 years that is also at its baseline. CT chest abdomen/pelvis- showed esophageal wall thickening concerning for malignancy, but also possibly inflammation.   - Pt has a pancytopenia which is potentially secondary to malignancy.  @Plan:-  - Per gastroenterology:-  1- Await histopathology  2- Colonoscopy since no significant pathology identified to account for severe anemia. Patient did now finish bowel prep today and colonoscopy was canceled, rescheduled for tomorrow. Bowel prep ordered, clear liquid diet, NPO after midnight.  3- If patient has significant dysphagia, consider balloon dilatation or referral for Heller's myotomy for management of previously diagnosed achalasia cardia.

## 2020-09-23 NOTE — ASSESSMENT & PLAN NOTE
- Most likely secondary to imbalance between supply and demand as patient's ischemic EKG findings disappeared and  troponin trended down after blood transfusion.   - CTM and transfuse if Hb<7.5.

## 2020-09-23 NOTE — ED NOTES
Tech from Lab called with critical result of lactic acid at 2000. Critical lab result read back to tech.   Dr. Fletcher notified of critical lab result at 2000.  Critical lab result read back by Dr. Fletcher.

## 2020-09-23 NOTE — NON-PROVIDER
Internal Medicine Daily Progress Note  Note Author: Ines Palacio, Third Year Medical Student    Date of Service: 2020  Date of Admission: 2020   Primary Team: [facility] IM White Team   Attending: BRIDGETT Sanchez MD   Senior Resident: Dr. Stephenie JimenezNicola     1945   Age/Sex 75 y.o. male   MRN [id number]         Reason for interval visit  Generalized weakness and weight loss    SUBJECTIVE:  Nicola is a 75 y.o. male with PMH of coronary artery disease, esophageal dysphagia, and hypercholesterolemia who presented on 2020 with concerns of 30lb unintentional weight loss over the past three months, increased generalized weakness over the past month, and difficulty with swallowing solids and liquids for the past ten years. The pt reports he has had increased difficulty walking due to the generalized weakness.     Interval Update: Pt reports lightheadedness and shortness of breath currently. He denies any chest pain or abdominal pain. He was initially refusing labs this morning, but then consented. Hemoglobin reported at 6.3.     Consultants/Specialty: GI consulted on admission    ROS:   Constitutional: Positive for weight loss.   HENT: Positive for difficulty hearing.   Eyes: Negative for eye pain.  Respiratory: Positive for shortness of breath.   Cardiovascular: Positive for lightheadedness. Negative for chest pain.  Gastrointestinal: Negative for abdominal pain.  Genitourinary: Negative for dysuria.   Musculoskeletal: Negative for neck pain.  Skin: Negative for bruises, rashes and discoloration  Neurological: Positive for generalized weakness.       Vitals   Temp:  [35.8 °C (96.4 °F)-36.5 °C (97.7 °F)] 36.3 °C (97.3 °F)  Pulse:  [] 86  Resp:  [14-18] 18  BP: ()/(51-68) 104/52  SpO2:  [94 %-99 %] 99 %    Body mass index is 24.39 kg/m².    Physical Exam     General: Pt answering questions by nodding head. Lying on right side of body, not wanting to change position.       Appearance: Appears pale, thin, and sleepy.  HENT:      Head: Normocephalic and atraumatic.  Eyes:      General: No scleral icterus.  Neck:      Musculoskeletal: Normal range of motion.   Cardiovascular:      Rate and Rhythm: Regular rate and regular rhythm.      Pulses: Radial pulses and dorsalis pedis pulses present.      Heart sounds: Murmur present.   Pulmonary:      Effort: Pulmonary effort is normal. No respiratory distress.      Breath sounds: Lungs clear to auscultation.  Abdominal:      General: There is no distension.      Palpations: Abdomen is soft.      Tenderness: There is no abdominal tenderness to palpation. There is no guarding.   Musculoskeletal:         General: Moves all extremities x4.     Right lower leg: No edema.      Left lower leg: No edema. Left third toe amputated.  Skin:     Findings: No bruises or rashes present. No discoloration.   Neurological:      Mental status, orientation: Alert, but appears tired.       Labs/Imaging:  Recent/pertinent lab results & imaging reviewed.  Lab Results   Component Value Date/Time    WBC 3.2 (L) 09/23/2020 08:23 AM    RBC 1.83 (L) 09/23/2020 08:23 AM    HEMOGLOBIN 6.3 (L) 09/23/2020 08:23 AM    HEMATOCRIT 18.5 (L) 09/23/2020 08:23 AM    .1 (H) 09/23/2020 08:23 AM    MCH 34.4 (H) 09/23/2020 08:23 AM    MCHC 34.1 09/23/2020 08:23 AM    MPV 9.3 09/23/2020 08:23 AM    NEUTSPOLYS 57.50 09/23/2020 08:23 AM    LYMPHOCYTES 29.10 09/23/2020 08:23 AM    MONOCYTES 11.90 09/23/2020 08:23 AM    EOSINOPHILS 0.60 09/23/2020 08:23 AM    BASOPHILS 0.30 09/23/2020 08:23 AM    ANISOCYTOSIS 1+ 09/23/2020 08:23 AM      Lab Results   Component Value Date/Time    SODIUM 133 (L) 09/23/2020 08:23 AM    POTASSIUM 3.5 (L) 09/23/2020 08:23 AM    CHLORIDE 95 (L) 09/23/2020 08:23 AM    CO2 26 09/23/2020 08:23 AM    GLUCOSE 97 09/23/2020 08:23 AM    BUN 12 09/23/2020 08:23 AM    CREATININE 0.59 09/23/2020 08:23 AM    CREATININE 1.1 05/25/2007 12:00 PM           US-ABDOMEN  COMPLETE SURVEY   Final Result      1.  Hepatic steatosis.   2.  Gallbladder mass versus tumefactive sludge. Follow-up is recommended.   3.   Additional findings as detailed.         CT-CHEST,ABDOMEN,PELVIS WITH   Final Result      1.  Diffuse esophageal wall thickening, likely inflammatory.  Neoplasm is not entirely excluded.   2.  Wedge-shaped low density at the anterior aspect of the spleen, most concerning for infarct.   3.  Fatty infiltration of liver.   4.  Abdominal aortic aneurysm with stent graft in place.   5.  LEFT ureteral stent present.   6.  Postoperative change of lower abdominal wall.      DX-CHEST-PORTABLE (1 VIEW)   Final Result      Left basilar atelectasis or scarring.      Nodular density in the right lung base likely representing nipple shadow. Recommend repeat chest x-ray with nipple markers.      EC-ECHOCARDIOGRAM COMPLETE W/O CONT    (Results Pending)       Assessment/Plan   Pt is a 75 y.o. male with PMH notable for coronary artery disease, esophageal dysphagia, and hypercholesterolemia c/o generalized weakness, difficulty swallowing solids and liquids, and weight loss.    * Anemia  Assessment & Plan       Assessment:       Unknown etiology of anemia. Could be due to esophagitis or esophageal malignancy, given CT read and history of esophageal dysphagia with weight loss. Hemoglobin 6.3 with .1. Folate is low at 3.6. B12 within normal limits.     Plan:  -Given packed red blood cells x1 unit.  -Repeat CBC to monitor Hemoglobin and Hematocrit.  -IV Protonix 40mg BID ordered.   -Replete Folate and give Thiamine.   -GI consulted.     Esophagitis vs Esophageal Malignancy  Assessment & Plan  Assessment:  CT shows esophageal wall thickening, likely inflammatory, but neoplasm unable to be ruled out.    Plan:  -GI consulted.  -Replete Potassium.      Elevated Troponin  Assessment & Plan  Assessment:  Pt with known coronary artery disease with elevated troponin of 33. Troponin was 75 on repeat  and most recently 69. EKG concerning with T wave inversion present on admission. Pt has no chest pain at this time.      Plan:  -Continue repeat Troponin and EKG.     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 Abdominal US does not show a splenic infarct. PT, INR, PTT within normal limits. Lactic acid 2.4, down from 4.2 on admission.    Plan:  -Echo ordered.

## 2020-09-23 NOTE — THERAPY
"Speech Language Pathology   Initial Assessment     Patient Name: Nicola Jimenez  AGE:  75 y.o., SEX:  male  Medical Record #: [id number]  Today's Date: 9/23/2020          Assessment    Patient is 75 y.o. male with a diagnosis of generalized weakness, 30 pound weight loss within last month, increased troponin levels, lactic acidosis, incidental splenic infarct finding, esophageal abnormality.  Additional factors influencing patient status/progress: [place], [place], PAD s/p stenting, esophageal motility manometry 2012, 2017, gastroscopy with botox injections 2017, multiple cystoscopy stents, 2 brandys/1marijuana joint/1/2 PPD daily.    Patient seen for BSE and has been terse with bedside care pending 'getting some food.'  Pt with functional oral motor exam, but with unexplained wt loss over the past month.  When reviewing his medical history, pt endorses esophageal hx however he did not recall prior dilatation and offered only that he 'chews his food up real good.' Pt tolerated small bites and sips of mildly thick liquids, thin liquids, purees, moist and minced, soft and dry solids without s/s of aspiration.  Minimal cough with clear vocal quality post solid swallows.  Post evaluation, SLP aware of audible coughing 15 minutes post evaluation; upon return to pt's room, pt now endorses, \"Oh yeah, it always happens; I eat and then regurgitate my meal, that's the way it goes.\"  Additional probing reveals pt only tolerating small amounts of food/liquids due to s/s consistent with esophageal dysphagia.  Pt declines any change in diet texture and appears reluctant to additional diagnostics (VFSS) with SLP, however GI consult is pending.  Pt needs reinforcement with education.       Plan    Recommend 1) GI consult, 2) Reg/EC7-thin liquid diet with small meals/snacks pending GI consult, 3) ongoing collaboration with pt regarding POC 4) hold meals with any overt s/s of difficulty during meals 5) upright 30 minutes post meals/snacks, " pending GI consult, SLP available to complete VFSS if indicated and if pt agrees to additional evaluation.  .  Recommend Speech Therapy 3 times per week until therapy goals are met for the following treatments:  Dysphagia Training and Patient / Family / Caregiver Education.    Discharge Recommendations: (P) Recommend home health for continued speech therapy services       Objective       09/23/20 1114   Prior Living Situation   Comments Query reliability of self reported history; does not recall 'esophageal dilatation'   Prior Level Of Function   Hearing Impaired Both Ears   Hearing Aid None  (has aide at home)   Oral Food Presentation   Regular-Easy to Chew (7) Minimal  (15 minutes post swallow)   Dysphagia Strategies / Recommendations   Strategies / Interventions Recommended (Yes / No) Yes   Compensatory Strategies Monitor During Meals;Liquids Via Cup;Reduce Bolus Size to 1 Teaspoon;Alternate Solids / Liquids   Diet / Liquid Recommendation Regular - Easy to Chew (7);Thin (0)   Medication Administration  Float Whole with Puree   Therapy Interventions Dysphagia Therapy By Speech Language Pathologist   Follow Up SLP Evaluation may need a VFSS given esophageal hx and current s/s. Recommend 1) GI consult, 2) Reg/EC7-thin liquid diet with small meals/snacks pending GI consult, 3) ongoing collaboration with pt regarding POC 4) hold meals with any overt s/s of difficulty during meals 5) upright 30 minutes post meals/snacks     Dysphagia Rating   Nutritional Liquid Intake Rating Scale Non thickened beverages   Nutritional Food Intake Rating Scale Total oral diet with multiple consistencies without special preparation but with specific food limitations   Short Term Goals   Short Term Goal # 1 Patient will consume small amounts of a Reg-EC7/thin liquid diet with minimal to absent s/s of dysphagia with monior during meals.   Short Term Goal # 2 Patient will verbalize understanding of strategies to aide in toleration of po  diet given s/s of esophageal dysmotility with 75% accuracy and moderate cues  (pt independent-minded, gruff regarding addtl tests, educatio)   Education Group   Additional Comments Pt resistant to education, withheld 'regurgitation post eating' until SLP heard coughing and returned to room.  Resistant to intervention   Interdisciplinary Plan of Care Collaboration   Collaboration Comments Regular diet with monitor during, and at end of small meals.   Session Information   Date / Session Number 1, 9/23/20 (1/3-9/29 CW)  (may need vfss; ck GI results, pt [place])   Priority 4  (3x. 1 mo weak/wt loss/hx esoph dysph.EC7/TN0;ck GIpending)

## 2020-09-23 NOTE — RESPIRATORY CARE
COPD EDUCATION by COPD CLINICAL EDUCATOR  9/23/2020 at 11:32 AM by Meche Alcantara, RRT     Patient interviewed by COPD education team. Patient refused COPD/Smoking Cessation information He has no desire to quit.

## 2020-09-23 NOTE — PROGRESS NOTES
Assumed care of pt. Bedside report received from day RN Georges. Pt appears to be sleeping. Call light, phone and personal belongings within reach. Bed alarm on and working appropriately.

## 2020-09-23 NOTE — DIETARY
"Nutrition services: Day 1 of admit.  Nicola Jimenez is a 75 y.o. male with admitting DX of Esophageal abnormality.  Consult received for MST score of 4 per nutrition screen for unplanned weight loss of 24-33 lb x 1 month with poor PO intake.    Assessment:  Height: 177.8 cm (5' 10\")  Weight: 77.1 kg (170 lb) stated weight.  Body mass index is 24.39 kg/m²., BMI classification: Normal  Diet/Intake: Cardiac, Level 7 - easy to chew, thin liquid, 6 small meals.    Evaluation:   1. Pt very unhappy at time of visit because he wanted to eat and mostly focused on getting a meal. Was able to discuss weight history with pt. He stated he weighed 190 lb about 3 months ago. States not sure why he lost weight. He states he has been eating normally.  2. Admit weight stated. Discussed with RN. RN will attempt to obtain weight today. Noted pt has refused some things today including labs.  3. If reported weights accurate, pt with 20 lb (10.5%) weight loss x 3 months which is severe.  4. Per chart review, no recent weight available. Weight over one year ago on 6/13/19 = 83.3 kg/183 lb.  5. Unable to complete full nutrition focused physical exam.   6. Per SLP evaluation, pt started on Level 7 easy to chew diet with thin liquid. Per SLP, pt only tolerates small amounts of food. 6 small meals ordered.  7. GI consult pending for esophageal dysphagia.  8. No skin breakdown noted.  9. Medications include folic acid and thiamine.    Malnutrition Risk: Potential malnutrition, but unable to complete full assessment. Pt with stated weight loss of 10.5% x 3 months which is severe, but no measured weight available.    Recommendations/Plan:  1. Diet per SLP with 6 small meals.   2. Encourage intake of meals and snacks.  3. Consider addition of oral nutrition supplement if needed.  4. Document intake of all meals and snacks as % taken in ADL's to provide interdisciplinary communication across all shifts.   5. Monitor weight.  6. Nutrition rep " will continue to see patient for ongoing meal and snack preferences.     RD following.

## 2020-09-23 NOTE — PROGRESS NOTES
Daily Progress Note:     Date of Service: 9/23/2020  Primary Team: [facility] IM White Team   Attending: BRIDGETT Sanchez M.D.   Senior Resident: Dr. Casiano  Intern: Dr. Morales  Contact:  [phone]    Chief Complaint:   Weakness/fatigue for one month    Subjective:  Patient seen and examined today, alert oriented x4.  Patient had refused labs but agreed to labs this morning.  Troponin elevated on recheck at 75, checked again, now 69.    Hemoglobin 6.3 this morning compared to 8.3 on admission, 1 unit packed red blood cells ordered.   H&H 2 hours after transfusion finishes again 6.3, another unit ordered.  Dr. Adan DAVE on call for [facility] - scheduled for EGD tomorrow.  Consultants/Specialty:  Gastroenterology  Review of Systems:   Review of Systems   Constitutional: Positive for malaise/fatigue and weight loss. Negative for chills and fever.   HENT: Negative for congestion, ear discharge, ear pain, sinus pain and sore throat.    Respiratory: Negative for cough, shortness of breath and wheezing.    Cardiovascular: Negative for chest pain, palpitations and leg swelling.   Gastrointestinal: Negative for abdominal pain, constipation, diarrhea, nausea and vomiting.   Genitourinary: Negative for dysuria, frequency, hematuria and urgency.   Musculoskeletal: Negative for back pain, joint pain and neck pain.   Skin: Negative for itching and rash.   Neurological: Negative for dizziness, tingling, tremors, weakness and headaches.   Endo/Heme/Allergies: Does not bruise/bleed easily.   Psychiatric/Behavioral: Negative for depression and memory loss. The patient is not nervous/anxious.        Objective Data:   Physical Exam:   Vitals:   Temp:  [35.8 °C (96.4 °F)-36.5 °C (97.7 °F)] 36.1 °C (96.9 °F)  Pulse:  [] 70  Resp:  [14-18] 18  BP: ()/(51-68) 105/53  SpO2:  [94 %-99 %] 97 %     Physical Exam  Constitutional:       General: He is not in acute distress.     Appearance: Normal appearance.   HENT:      Head: Normocephalic  and atraumatic.      Right Ear: External ear normal.      Left Ear: External ear normal.      Nose: Nose normal.   Eyes:      Extraocular Movements: Extraocular movements intact.      Pupils: Pupils are equal, round, and reactive to light.   Neck:      Musculoskeletal: Normal range of motion. No neck rigidity or muscular tenderness.   Cardiovascular:      Rate and Rhythm: Normal rate and regular rhythm.      Pulses: Normal pulses.      Heart sounds: Murmur present. No friction rub. No gallop.    Pulmonary:      Effort: Pulmonary effort is normal.      Breath sounds: Normal breath sounds.   Abdominal:      General: There is no distension.      Palpations: There is no mass.      Tenderness: There is no abdominal tenderness.      Hernia: No hernia is present.   Skin:     Capillary Refill: Capillary refill takes less than 2 seconds.      Findings: No bruising, erythema, lesion or rash.   Neurological:      General: No focal deficit present.      Mental Status: He is alert and oriented to person, place, and time.      Motor: No weakness.   Psychiatric:         Mood and Affect: Mood normal.         Behavior: Behavior normal.         Thought Content: Thought content normal.         Judgment: Judgment normal.           Labs:   Recent Results (from the past 24 hour(s))   CBC WITH DIFFERENTIAL    Collection Time: 09/22/20  4:30 PM   Result Value Ref Range    WBC 4.0 (L) 4.8 - 10.8 K/uL    RBC 2.36 (L) 4.70 - 6.10 M/uL    Hemoglobin 8.3 (L) 14.0 - 18.0 g/dL    Hematocrit 23.9 (L) 42.0 - 52.0 %    .3 (H) 81.4 - 97.8 fL    MCH 35.2 (H) 27.0 - 33.0 pg    MCHC 34.7 33.7 - 35.3 g/dL    RDW 70.9 (H) 35.9 - 50.0 fL    Platelet Count 126 (L) 164 - 446 K/uL    MPV 9.6 9.0 - 12.9 fL    Neutrophils-Polys 56.70 44.00 - 72.00 %    Lymphocytes 34.10 22.00 - 41.00 %    Monocytes 8.00 0.00 - 13.40 %    Eosinophils 0.50 0.00 - 6.90 %    Basophils 0.20 0.00 - 1.80 %    Immature Granulocytes 0.50 0.00 - 0.90 %    Nucleated RBC 0.00 /100  WBC    Neutrophils (Absolute) 2.28 1.82 - 7.42 K/uL    Lymphs (Absolute) 1.37 1.00 - 4.80 K/uL    Monos (Absolute) 0.32 0.00 - 0.85 K/uL    Eos (Absolute) 0.02 0.00 - 0.51 K/uL    Baso (Absolute) 0.01 0.00 - 0.12 K/uL    Immature Granulocytes (abs) 0.02 0.00 - 0.11 K/uL    NRBC (Absolute) 0.00 K/uL   COMP METABOLIC PANEL    Collection Time: 09/22/20  4:30 PM   Result Value Ref Range    Sodium 134 (L) 135 - 145 mmol/L    Potassium 3.3 (L) 3.6 - 5.5 mmol/L    Chloride 88 (L) 96 - 112 mmol/L    Co2 22 20 - 33 mmol/L    Anion Gap 24.0 (H) 7.0 - 16.0    Glucose 93 65 - 99 mg/dL    Bun 13 8 - 22 mg/dL    Creatinine 0.84 0.50 - 1.40 mg/dL    Calcium 9.6 8.5 - 10.5 mg/dL    AST(SGOT) 49 (H) 12 - 45 U/L    ALT(SGPT) 22 2 - 50 U/L    Alkaline Phosphatase 97 30 - 99 U/L    Total Bilirubin 1.1 0.1 - 1.5 mg/dL    Albumin 4.5 3.2 - 4.9 g/dL    Total Protein 7.9 6.0 - 8.2 g/dL    Globulin 3.4 1.9 - 3.5 g/dL    A-G Ratio 1.3 g/dL   TROPONIN    Collection Time: 09/22/20  4:30 PM   Result Value Ref Range    Troponin T 33 (H) 6 - 19 ng/L   ESTIMATED GFR    Collection Time: 09/22/20  4:30 PM   Result Value Ref Range    GFR If African American >60 >60 mL/min/1.73 m 2    GFR If Non African American >60 >60 mL/min/1.73 m 2   PERIPHERAL SMEAR REVIEW    Collection Time: 09/22/20  4:30 PM   Result Value Ref Range    Peripheral Smear Review see below    DIFFERENTIAL COMMENT    Collection Time: 09/22/20  4:30 PM   Result Value Ref Range    Comments-Diff see below    VITAMIN B12    Collection Time: 09/22/20  4:30 PM   Result Value Ref Range    Vitamin B12 -True Cobalamin 885 211 - 911 pg/mL   FOLATE    Collection Time: 09/22/20  4:30 PM   Result Value Ref Range    Folate -Folic Acid 3.6 (A) >4.0 ng/mL   BLOOD CULTURE    Collection Time: 09/22/20  4:32 PM    Specimen: Peripheral; Blood   Result Value Ref Range    Significant Indicator NEG     Source BLD     Site PERIPHERAL     Culture Result       No Growth  Note: Blood cultures are incubated  for 5 days and  are monitored continuously.Positive blood cultures  are called to the RN and reported as soon as  they are identified.     EKG    Collection Time: 20  4:45 PM   Result Value Ref Range    Report       [facility] Emergency Dept.    Test Date:  2020  Pt Name:    HONORIO STRICKLAND                 Department: ER  MRN:        [id number]                      Room:       [facility]  Gender:     Male                         Technician: [id number]  :        1945                   Requested By:ER TRIAGE PROTOCOL  Order #:    [id number]                    Reading MD: JAELYN PRADO MD    Measurements  Intervals                                Axis  Rate:       91                           P:          70  GA:         148                          QRS:        3  QRSD:       90                           T:          184  QT:         368  QTc:        453    Interpretive Statements  SINUS RHYTHM  PROBABLE LVH WITH SECONDARY REPOL ABNRM  Compared to ECG 2019 07:33:00  New twave inversions in V3-V6, and I possible ischemic changes  Electronically Signed On 2020 17:05:43 PDT by JAELYN PRADO MD     LACTIC ACID    Collection Time: 20  5:30 PM   Result Value Ref Range    Lactic Acid 4.2 (HH) 0.5 - 2.0 mmol/L   BLOOD CULTURE    Collection Time: 20  5:30 PM    Specimen: Peripheral; Blood   Result Value Ref Range    Significant Indicator NEG     Source BLD     Site PERIPHERAL     Culture Result       No Growth  Note: Blood cultures are incubated for 5 days and  are monitored continuously.Positive blood cultures  are called to the RN and reported as soon as  they are identified.     LACTIC ACID    Collection Time: 20  7:47 PM   Result Value Ref Range    Lactic Acid 4.7 (HH) 0.5 - 2.0 mmol/L   AMMONIA    Collection Time: 20  8:46 PM   Result Value Ref Range    Ammonia <10 (L) 11 - 45 umol/L   URINALYSIS (UA)    Collection Time: 20  9:12 PM    Specimen:  Urine   Result Value Ref Range    Color Yellow     Character Clear     Specific Gravity 1.023 <1.035    Ph 6.0 5.0 - 8.0    Glucose Negative Negative mg/dL    Ketones Negative Negative mg/dL    Protein Negative Negative mg/dL    Bilirubin Negative Negative    Urobilinogen, Urine 1.0 Negative    Nitrite Negative Negative    Leukocyte Esterase Negative Negative    Occult Blood Moderate (A) Negative    Micro Urine Req Microscopic    URINE MICROSCOPIC (W/UA)    Collection Time: 20  9:12 PM   Result Value Ref Range    WBC 0-2 (A) /hpf    RBC 20-50 (A) /hpf    Bacteria Negative None /hpf    Epithelial Cells Negative /hpf    Hyaline Cast 0-2 /lpf   LACTIC ACID    Collection Time: 20 10:03 PM   Result Value Ref Range    Lactic Acid 2.4 (H) 0.5 - 2.0 mmol/L   EKG    Collection Time: 20  1:31 AM   Result Value Ref Range    Report       [facility]    Test Date:  2020  Pt Name:    HONORIO STRICKLAND                 Department: [facility]  MRN:        [id number]                      Room:       [facility]  Gender:     Male                         Technician: NESTOR  :        1945                   Requested By:KARINA ODOM  Order #:    [id number]                    Reading MD: Nandini Fowler MD    Measurements  Intervals                                Axis  Rate:       84                           P:          69  AZ:         166                          QRS:        -2  QRSD:       80                           T:          107  QT:         358  QTc:        424    Interpretive Statements  SINUS RHYTHM  LVH WITH SECONDARY REPOLARIZATION ABNORMALITY  ARTIFACT IN LEAD(S) II,III,aVR,aVL,aVF,V1,V2,V3,V4,V5,V6  Compared to ECG 2020 16:45:33  No significant change noted  Electronically Signed On 2020 9:21:30 PDT by Nandini Fowler MD     CBC with Differential    Collection Time: 20  8:23 AM   Result Value Ref Range    WBC 3.2 (L) 4.8 - 10.8 K/uL    RBC 1.83 (L) 4.70 - 6.10 M/uL    Hemoglobin 6.3 (L) 14.0 - 18.0  g/dL    Hematocrit 18.5 (L) 42.0 - 52.0 %    .1 (H) 81.4 - 97.8 fL    MCH 34.4 (H) 27.0 - 33.0 pg    MCHC 34.1 33.7 - 35.3 g/dL    RDW 70.9 (H) 35.9 - 50.0 fL    Platelet Count 75 (L) 164 - 446 K/uL    MPV 9.3 9.0 - 12.9 fL    Neutrophils-Polys 57.50 44.00 - 72.00 %    Lymphocytes 29.10 22.00 - 41.00 %    Monocytes 11.90 0.00 - 13.40 %    Eosinophils 0.60 0.00 - 6.90 %    Basophils 0.30 0.00 - 1.80 %    Immature Granulocytes 0.60 0.00 - 0.90 %    Nucleated RBC 0.00 /100 WBC    Neutrophils (Absolute) 1.84 1.82 - 7.42 K/uL    Lymphs (Absolute) 0.93 (L) 1.00 - 4.80 K/uL    Monos (Absolute) 0.38 0.00 - 0.85 K/uL    Eos (Absolute) 0.02 0.00 - 0.51 K/uL    Baso (Absolute) 0.01 0.00 - 0.12 K/uL    Immature Granulocytes (abs) 0.02 0.00 - 0.11 K/uL    NRBC (Absolute) 0.00 K/uL    Anisocytosis 1+    Comp Metabolic Panel (CMP)    Collection Time: 09/23/20  8:23 AM   Result Value Ref Range    Sodium 133 (L) 135 - 145 mmol/L    Potassium 3.5 (L) 3.6 - 5.5 mmol/L    Chloride 95 (L) 96 - 112 mmol/L    Co2 26 20 - 33 mmol/L    Anion Gap 12.0 7.0 - 16.0    Glucose 97 65 - 99 mg/dL    Bun 12 8 - 22 mg/dL    Creatinine 0.59 0.50 - 1.40 mg/dL    Calcium 8.9 8.5 - 10.5 mg/dL    AST(SGOT) 31 12 - 45 U/L    ALT(SGPT) 18 2 - 50 U/L    Alkaline Phosphatase 77 30 - 99 U/L    Total Bilirubin 1.1 0.1 - 1.5 mg/dL    Albumin 3.7 3.2 - 4.9 g/dL    Total Protein 6.4 6.0 - 8.2 g/dL    Globulin 2.7 1.9 - 3.5 g/dL    A-G Ratio 1.4 g/dL   Magnesium    Collection Time: 09/23/20  8:23 AM   Result Value Ref Range    Magnesium 1.3 (L) 1.5 - 2.5 mg/dL   APTT    Collection Time: 09/23/20  8:23 AM   Result Value Ref Range    APTT 27.5 24.7 - 36.0 sec   Prothrombin Time    Collection Time: 09/23/20  8:23 AM   Result Value Ref Range    PT 14.3 12.0 - 14.6 sec    INR 1.07 0.87 - 1.13   TROPONIN    Collection Time: 09/23/20  8:23 AM   Result Value Ref Range    Troponin T 75 (H) 6 - 19 ng/L   ESTIMATED GFR    Collection Time: 09/23/20  8:23 AM   Result  Value Ref Range    GFR If African American >60 >60 mL/min/1.73 m 2    GFR If Non African American >60 >60 mL/min/1.73 m 2   PERIPHERAL SMEAR REVIEW    Collection Time: 09/23/20  8:23 AM   Result Value Ref Range    Peripheral Smear Review see below    PLATELET ESTIMATE    Collection Time: 09/23/20  8:23 AM   Result Value Ref Range    Plt Estimation #CAC    MORPHOLOGY    Collection Time: 09/23/20  8:23 AM   Result Value Ref Range    RBC Morphology Present     Poikilocytosis 1+     Tear Drop Cells 1+    DIFFERENTIAL COMMENT    Collection Time: 09/23/20  8:23 AM   Result Value Ref Range    Comments-Diff see below    COD - Adult (Type and Screen)    Collection Time: 09/23/20 10:34 AM   Result Value Ref Range    ABO Grouping Only O     Rh Grouping Only POS     Antibody Screen-Cod NEG    COMPONENT CELLULAR    Collection Time: 09/23/20 10:34 AM   Result Value Ref Range    Component R       R99                 Red Cells, LR       [id number]   issued       09/23/20   12:43      Product Type R99     Dispense Status issued     Unit Number (Barcoded) [id number]     Product Code (Barcoded) U0702L23     Blood Type (Barcoded) 5100    TROPONIN    Collection Time: 09/23/20 12:33 PM   Result Value Ref Range    Troponin T 69 (H) 6 - 19 ng/L     Imaging:   US-ABDOMEN COMPLETE SURVEY   Final Result      1.  Hepatic steatosis.   2.  Gallbladder mass versus tumefactive sludge. Follow-up is recommended.   3.   Additional findings as detailed.         CT-CHEST,ABDOMEN,PELVIS WITH   Final Result      1.  Diffuse esophageal wall thickening, likely inflammatory.  Neoplasm is not entirely excluded.   2.  Wedge-shaped low density at the anterior aspect of the spleen, most concerning for infarct.   3.  Fatty infiltration of liver.   4.  Abdominal aortic aneurysm with stent graft in place.   5.  LEFT ureteral stent present.   6.  Postoperative change of lower abdominal wall.      DX-CHEST-PORTABLE (1 VIEW)   Final Result      Left basilar  atelectasis or scarring.      Nodular density in the right lung base likely representing nipple shadow. Recommend repeat chest x-ray with nipple markers.      EC-ECHOCARDIOGRAM COMPLETE W/O CONT    (Results Pending)       Problem Representation: Mr. Jimenez is a 75-year-old male who presents with weakness/fatigue for the past month.  He had an elevated troponin and possible EKG changes suggestive of anterior infarct.  Repeat troponin trended down.  CT chest abdomen pelvis suggestive of esophageal inflammation or carcinoma.  Hemoglobin down to 6.3 compared to 8.3 on admission, suggestive of GI bleed, GI consulted.     * Anemia- (present on admission)  Assessment & Plan  Hg on admission 8.3, dropped to 6.3. No obvious source of bleed, although GI source likely, history suggestive of esophageal bleed.  - GI [facility] consulted in ED, Dr. Arellano on call today, updated on patient's condition, appreciate recommendations. Scheduled for EGD tomorrow.  - Per GI - obtained iron panel, TSH for anemia workup. B12 WNL this admission.  - 1 unit pRBCs transfused, recheck H&H 6.3 2 hours later, another unit released.  - Monitor H/H every 8 hours, transfuse if <7.5.  - IV protonix  - folate, IV thiamine    Esophageal abnormality concerning for malignancy  Assessment & Plan  Reports patient  lost 30 pounds in the past month; Reports to have an esophageal dysphagia (liquids/solids) for >10 years that is also at its baseline. CT chest abdomen/pelvis- showed esophageal wall thickening concerning for malignancy, but also possibly inflammation. Pt has a pancytopenia which is potentially secondary to malignancy.    Plan:  -diet: per SLP - Cardiac, Level 7 - easy to chew, thin liquid, 6 small meals.  -GI consulted; EGD tomorrow  -CTM and replete potassium (suspect to be hypokalemic d/t GI losses and PO intake decreased due to esophageal dysphagia)  -pt has a pancytopenia which is potentially secondary to malignancy; CTM  ; bx consideration if EGD inconclusive    Troponin level elevated  Assessment & Plan  -Troponin elevated (33) in setting of hx of CAD; denies chest pain, has chronic exertional dyspnea without acute symptoms. Repeat troponin 75, 2nd repeat 67. Likely demand ischemia due to anemia (last Hg 6.3). EKG suggestive of anterior ischemia with St depression in V5-V6, T wave inversions V3-V6.    - CTM for chest pain, denies current chest pain    Aortic stenosis- (present on admission)  Assessment & Plan  Murmurs noted on physical exam. Last echo 2017 showed severe aortic stenosis, moderate mitral regurgitation, right heart pressures are consistent with moderate pulmonary   Hypertension, severely dilated left atrium.    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 Abd. U/s with doppler of splenic artery and vein - no splenic infarct noted. PT/INR unremarkable.      Plan:  -MIVF - LR KCl 40 mEQ 83 ml/hr currently  -echocardiogram

## 2020-09-23 NOTE — H&P
History & Physical Note    Date of Admission: 9/23/2020  Admission Status: Inpatient  [facility] Team: [facility] White Team  Attending: Dr. Sanchez  Senior Resident: Dr. Casiano  Intern: Dr. Morales  Contact Number: [phone]    Chief Complaint: Weakness       History of Present Illness (HPI): Patient is a 74 y/o male with a PMH of CAD, esophageal dilatation, and AAA s/p stent presenting to the ED with a 1 month hx of generalized weakness.Does endorse difficulty to walk d/t strength and decreased energy. Sleep habits are unchanged. He has experienced shortness of breath with exertion for years and is not more dyspneic than his baseline.  He reports that in spite of no dietary changes, he has lost 30 pounds in the past month. Reports to have an esophageal dysphagia (liquids/solids) for >10 years that is also at its baseline.   Denies fever, chills, nausea, vomit, chest pain.    ED course- 96.4 temp, 97 pulse, 18 RR, 120/57 BP, 96% o2 on room air    Cmp- k 3.3, AG 24, cl 88, ast 49  Ammonia-<10  Cbc- hb 8.3, wbc 4.0, plt 126  Troponin- 33  Lactic acid- 4.2>2.4 on repeat at 10:03  Bcx-   U/a-moderate occult blood, nitrite and LE negative  Ucx- pending    Imaging-    CT chest abdomen/pelvis-esophageal wall thickening, wedge-shaped density at anterior spleen concerning for infarct    Cxr-Nodular density in the right lung base likely representing nipple shadow. Recommend repeat chest x-ray with nipple markers    Ekg-sinus, LVH with secondary repol abn.      Review of Systems: Review of Systems   Constitutional: Positive for malaise/fatigue. Negative for chills and fever.   HENT: Negative for ear pain and tinnitus.    Eyes: Negative for double vision and photophobia.   Respiratory: Negative for hemoptysis and sputum production.    Cardiovascular: Negative for chest pain and palpitations.   Gastrointestinal: Negative for abdominal pain, nausea and vomiting.   Genitourinary: Negative for frequency and urgency.   Musculoskeletal:  Negative for back pain and neck pain.   Skin: Negative for itching and rash.   Neurological: Negative for dizziness, tingling, tremors and headaches.   Endo/Heme/Allergies: Negative for environmental allergies. Does not bruise/bleed easily.   Psychiatric/Behavioral: Negative for substance abuse and suicidal ideas.        Past Medical History:    has a past medical history of Arrhythmia, Arthritis, Atherosclerosis of aorta ([facility]), Blood clotting disorder ([facility]) (1988), Carotid artery disease ([facility]), Cataract, Dental disorder, Esophageal dilatation, H/O heart artery stent, Hernia, inguinal, bilateral, High cholesterol, History of AAA (abdominal aortic aneurysm) repair (01/30/2012), [place] (hard of hearing), Indigestion, MI (myocardial infarction) ([facility]) (1988), MI, old, Myocardial infarct ([facility]), PAD (peripheral artery disease), Status post aortic coarctation stent placement, Umbilical hernia, and Urinary incontinence.    Past Surgical History:  has a past surgical history that includes aaa with stent graft (1/30/2012); esophageal motility or manometry (3/27/2012); gastroscopy-endo (3/19/2017); esophageal motility or manometry (3/21/2017); gastroscopy with botox injection (N/A, 3/23/2017); toe amputation (Left, 3/25/2017); cystoscopy stent placement (4/16/2017); cystoscopy stent placement (Left, 4/9/2018); cystoscopy stent placement (Left, 11/14/2018); and pr cystoscopy,insert ureteral stent (Left, 6/13/2019).    Medications:   None        Allergies: No Known Allergies    Family History:   Father- passed of MI; mother- unknown PMH    Social History:   Drinks 2 ike's per day for past 15-20 years; 1/2 PPD since was 11 y/o; 1 marijuana joint PD since was 21 y/o    Primary Care Provider:  MORRIS Alexis      Vitals:  Temp:  [35.8 °C (96.4 °F)-36.1 °C (97 °F)] 36.1 °C (97 °F)  Pulse:  [] 97  Resp:  [18] 18  BP: ()/(51-68) 118/56  SpO2:  [94 %-99 %] 94 %    Physical Exam  Constitutional:        Appearance: Normal appearance.   HENT:      Head: Normocephalic and atraumatic.      Right Ear: Tympanic membrane normal.      Left Ear: Tympanic membrane normal.      Nose: Nose normal.      Mouth/Throat:      Mouth: Mucous membranes are moist.      Pharynx: Oropharynx is clear.   Eyes:      Extraocular Movements: Extraocular movements intact.      Conjunctiva/sclera: Conjunctivae normal.      Pupils: Pupils are equal, round, and reactive to light.   Neck:      Musculoskeletal: No neck rigidity or muscular tenderness.   Cardiovascular:      Rate and Rhythm: Tachycardia present.      Heart sounds: No murmur. No gallop.    Pulmonary:      Effort: No respiratory distress.      Breath sounds: No stridor. No wheezing.   Abdominal:      General: There is no distension.      Tenderness: There is no abdominal tenderness. There is no rebound.   Musculoskeletal:         General: No deformity or signs of injury.   Skin:     Coloration: Skin is not jaundiced or pale.   Neurological:      Mental Status: He is alert.      Motor: No weakness.      Coordination: Coordination normal.         Labs:   Results for orders placed or performed during the hospital encounter of 09/22/20   CBC WITH DIFFERENTIAL   Result Value Ref Range    WBC 4.0 (L) 4.8 - 10.8 K/uL    RBC 2.36 (L) 4.70 - 6.10 M/uL    Hemoglobin 8.3 (L) 14.0 - 18.0 g/dL    Hematocrit 23.9 (L) 42.0 - 52.0 %    .3 (H) 81.4 - 97.8 fL    MCH 35.2 (H) 27.0 - 33.0 pg    MCHC 34.7 33.7 - 35.3 g/dL    RDW 70.9 (H) 35.9 - 50.0 fL    Platelet Count 126 (L) 164 - 446 K/uL    MPV 9.6 9.0 - 12.9 fL    Neutrophils-Polys 56.70 44.00 - 72.00 %    Lymphocytes 34.10 22.00 - 41.00 %    Monocytes 8.00 0.00 - 13.40 %    Eosinophils 0.50 0.00 - 6.90 %    Basophils 0.20 0.00 - 1.80 %    Immature Granulocytes 0.50 0.00 - 0.90 %    Nucleated RBC 0.00 /100 WBC    Neutrophils (Absolute) 2.28 1.82 - 7.42 K/uL    Lymphs (Absolute) 1.37 1.00 - 4.80 K/uL    Monos (Absolute) 0.32 0.00 - 0.85 K/uL     Eos (Absolute) 0.02 0.00 - 0.51 K/uL    Baso (Absolute) 0.01 0.00 - 0.12 K/uL    Immature Granulocytes (abs) 0.02 0.00 - 0.11 K/uL    NRBC (Absolute) 0.00 K/uL   COMP METABOLIC PANEL   Result Value Ref Range    Sodium 134 (L) 135 - 145 mmol/L    Potassium 3.3 (L) 3.6 - 5.5 mmol/L    Chloride 88 (L) 96 - 112 mmol/L    Co2 22 20 - 33 mmol/L    Anion Gap 24.0 (H) 7.0 - 16.0    Glucose 93 65 - 99 mg/dL    Bun 13 8 - 22 mg/dL    Creatinine 0.84 0.50 - 1.40 mg/dL    Calcium 9.6 8.5 - 10.5 mg/dL    AST(SGOT) 49 (H) 12 - 45 U/L    ALT(SGPT) 22 2 - 50 U/L    Alkaline Phosphatase 97 30 - 99 U/L    Total Bilirubin 1.1 0.1 - 1.5 mg/dL    Albumin 4.5 3.2 - 4.9 g/dL    Total Protein 7.9 6.0 - 8.2 g/dL    Globulin 3.4 1.9 - 3.5 g/dL    A-G Ratio 1.3 g/dL   TROPONIN   Result Value Ref Range    Troponin T 33 (H) 6 - 19 ng/L   LACTIC ACID   Result Value Ref Range    Lactic Acid 4.2 (HH) 0.5 - 2.0 mmol/L   URINALYSIS (UA)    Specimen: Urine   Result Value Ref Range    Color Yellow     Character Clear     Specific Gravity 1.023 <1.035    Ph 6.0 5.0 - 8.0    Glucose Negative Negative mg/dL    Ketones Negative Negative mg/dL    Protein Negative Negative mg/dL    Bilirubin Negative Negative    Urobilinogen, Urine 1.0 Negative    Nitrite Negative Negative    Leukocyte Esterase Negative Negative    Occult Blood Moderate (A) Negative    Micro Urine Req Microscopic    LACTIC ACID   Result Value Ref Range    Lactic Acid 4.7 (HH) 0.5 - 2.0 mmol/L   LACTIC ACID   Result Value Ref Range    Lactic Acid 2.4 (H) 0.5 - 2.0 mmol/L   ESTIMATED GFR   Result Value Ref Range    GFR If African American >60 >60 mL/min/1.73 m 2    GFR If Non African American >60 >60 mL/min/1.73 m 2   AMMONIA   Result Value Ref Range    Ammonia <10 (L) 11 - 45 umol/L   PERIPHERAL SMEAR REVIEW   Result Value Ref Range    Peripheral Smear Review see below    DIFFERENTIAL COMMENT   Result Value Ref Range    Comments-Diff see below    URINE MICROSCOPIC (W/UA)   Result Value  Ref Range    WBC 0-2 (A) /hpf    RBC 20-50 (A) /hpf    Bacteria Negative None /hpf    Epithelial Cells Negative /hpf    Hyaline Cast 0-2 /lpf   VITAMIN B12   Result Value Ref Range    Vitamin B12 -True Cobalamin 885 211 - 911 pg/mL   FOLATE   Result Value Ref Range    Folate -Folic Acid 3.6 (A) >4.0 ng/mL   EKG   Result Value Ref Range    Report       [facility] Emergency Dept[facility]    Test Date:  2020  Pt Name:    HONORIO STRICKLAND                 Department: ER  MRN:        [id number]                      Room:        28  Gender:     Male                         Technician: [id number]  :        1945                   Requested By:ER TRIAGE PROTOCOL  Order #:    [id number]                    Reading MD: JAELYN PRADO MD    Measurements  Intervals                                Axis  Rate:       91                           P:          70  ME:         148                          QRS:        3  QRSD:       90                           T:          184  QT:         368  QTc:        453    Interpretive Statements  SINUS RHYTHM  PROBABLE LVH WITH SECONDARY REPOL ABNRM  Compared to ECG 2019 07:33:00  New twave inversions in V3-V6, and I possible ischemic changes  Electronically Signed On 2020 17:05:43 PDT by JAELYN PRADO MD     EKG   Result Value Ref Range    Report       [facility]    Test Date:  2020  Pt Name:    HONORIO STRICKLAND                 Department: 183  MRN:        [id number]                      Room:       T814  Gender:     Male                         Technician: [id number]  :        1945                   Requested By:KARINA ODOM  Order #:    [id number]                    Reading MD:    Measurements  Intervals                                Axis  Rate:       84                           P:          69  ME:         166                          QRS:        -2  QRSD:       80                           T:          107  QT:         358  QTc:         424    Interpretive Statements  POOR QUALITY DATA, INTERPRETATION MAY BE AFFECTED  SINUS RHYTHM  LVH WITH SECONDARY REPOLARIZATION ABNORMALITY  INFERIOR INFARCT, ACUTE (LCX)  ARTIFACT IN LEAD(S) II,III,aVR,aVL,aVF,V1,V2,V3,V4,V5,V6  Compared to ECG 2020 16:45:33  Myocardial infarct finding now present          EKG:   Results for orders placed or performed during the hospital encounter of 20   EKG   Result Value Ref Range    Report       [facility] Emergency Dept[facility]    Test Date:  2020  Pt Name:    HONORIO STRICKLAND                 Department: ER  MRN:        [id number]                      Room:        28  Gender:     Male                         Technician: [id number]  :        1945                   Requested By:ER TRIAGE PROTOCOL  Order #:    [id number]                    Reading MD: JAELYN PRADO MD    Measurements  Intervals                                Axis  Rate:       91                           P:          70  PA:         148                          QRS:        3  QRSD:       90                           T:          184  QT:         368  QTc:        453    Interpretive Statements  SINUS RHYTHM  PROBABLE LVH WITH SECONDARY REPOL ABNRM  Compared to ECG 2019 07:33:00  New twave inversions in V3-V6, and I possible ischemic changes  Electronically Signed On 2020 17:05:43 PDT by JAELYN PRADO MD     EKG   Result Value Ref Range    Report       [facility]    Test Date:  2020  Pt Name:    HONORIO STRICKLAND                 Department: 183  MRN:        [id number]                      Room:       T814  Gender:     Male                         Technician: [id number]  :        1945                   Requested By:IRTQA  ILENE  Order #:    [id number]                    Reading MD:    Measurements  Intervals                                Axis  Rate:       84                           P:          69  PA:         166                          QRS:         -2  QRSD:       80                           T:          107  QT:         358  QTc:        424    Interpretive Statements  POOR QUALITY DATA, INTERPRETATION MAY BE AFFECTED  SINUS RHYTHM  LVH WITH SECONDARY REPOLARIZATION ABNORMALITY  INFERIOR INFARCT, ACUTE (LCX)  ARTIFACT IN LEAD(S) II,III,aVR,aVL,aVF,V1,V2,V3,V4,V5,V6  Compared to ECG 09/22/2020 16:45:33  Myocardial infarct finding now present          Imaging:   CT-CHEST,ABDOMEN,PELVIS WITH   Final Result      1.  Diffuse esophageal wall thickening, likely inflammatory.  Neoplasm is not entirely excluded.   2.  Wedge-shaped low density at the anterior aspect of the spleen, most concerning for infarct.   3.  Fatty infiltration of liver.   4.  Abdominal aortic aneurysm with stent graft in place.   5.  LEFT ureteral stent present.   6.  Postoperative change of lower abdominal wall.      DX-CHEST-PORTABLE (1 VIEW)   Final Result      Left basilar atelectasis or scarring.      Nodular density in the right lung base likely representing nipple shadow. Recommend repeat chest x-ray with nipple markers.      EC-ECHOCARDIOGRAM COMPLETE W/O CONT    (Results Pending)   US-ABDOMEN COMPLETE SURVEY    (Results Pending)        Previous Data Review: Reviewed    Esophageal abnormality concerning for malignancy  Assessment & Plan  Patient is a 74 y/o male with a PMH of CAD, esophageal dilatation, and AAA s/p stent presenting to the ED with a 1 month hx of generalized weakness and a 30 pound wt loss concerning for malignancy    Sx:  difficulty to walk d/t strength and decreased energy; 1 month duration; in spite of the absence of dietary changes, he has lost 30 pounds in the past month; reports to have an esophageal dysphagia (liquids/solids) for >10 years that is also at its baseline.     Findings:     96.4 temp, 97 pulse, 18 RR, 120/57 BP, 96% o2 on room air  Cmp on admission- k 3.3, AG 24, chloride 88    CT chest abdomen/pelvis-esophageal wall thickening, wedge-shaped density at  anterior spleen concerning for infarct    Cxr-Nodular density in the right lung base likely representing nipple shadow. Recommend repeat chest x-ray with nipple markers    Plan:  -diet per bedside swallow eval on admission, with NPO at 12am and MIVF  -GI consulted; follow for recs  -CTM and replete potassium (suspect to be hypokalemic d/t GI losses and PO intake decreased due to esophageal dysphagia)  -pt has a pancytopenia which is potentially secondary to malignancy; CTM ; bx consideration in EGD inconclusive    Troponin level elevated  Assessment & Plan  -Troponin elevated (33) in setting of hx of CAD; denies chest pain, has chronic exertional dyspnea without acute symptoms  -follow for troponin repeat  -follow for ekg repeat    Lactic acid acidosis in the setting of incidental splenic infarct  Assessment & Plan  Lactic acid 4.2>2.4 on repeat  Given that patient afebrile, hemodynamically stable, with wbc 4.0, no signs or symptoms of  infection in history nor PEx & CT findings concerning for splenic infarct, potentially lactic acidosis could pertain to the splenic infarct      Plan:  -MIVF  -follow for coag panel result  -echocardiogram  -abd. U/s with doppler of splenic artery and vein

## 2020-09-23 NOTE — PROGRESS NOTES
Patient up from ED, report received. Patient placed on tele monitor, monitor room notified. Pt is SR and 99 on the monitor. Physical assessment performed. Patient is A&O X 4, pain level 0.  Patient oriented to room and unit routine. Patient educated on plan for the night and educated on use of call light.

## 2020-09-23 NOTE — PROGRESS NOTES
Pt. refused labs. Pt was educated on the importance of labs but continued to refuse. [facility] debbie made aware of situation. No new orders at this time.

## 2020-09-23 NOTE — THERAPY
"Occupational Therapy   Initial Evaluation     Patient Name: Nicola Jimenez  Age:  75 y.o., Sex:  male  Medical Record #: [id number]  Today's Date: 9/23/2020     Precautions  Precautions: (P) Fall Risk  Comments: (P) very [place]    Assessment  Patient is 75 y.o. male who presents to acute due to generalized weakness, 30 lb wieght loss in the last month, increased troponin levels, and splenic infarct. PMH includes [place], MI, PAD s/p stenting. Pt reports he lives alone and is independent w/ BADLs and IADLs w/ no social supports. Pt demo'd impaired balance, functional mobility, activity tolerance and generalized weakness impacting functional independence. Will continue to follow for Acute OT services.     Plan    Recommend Occupational Therapy 3 times per week until therapy goals are met for the following treatments:  Adaptive Equipment, Neuro Re-Education / Balance, Self Care/Activities of Daily Living, Therapeutic Activities and Therapeutic Exercises.    DC Equipment Recommendations: (P) Unable to determine at this time  Discharge Recommendations: (P) Recommend post-acute placement for additional occupational therapy services prior to discharge home     Subjective    \"I just want to eat, why didn't they give me breakfast\",  \"You put my pants on, I cant wipe\"      Objective       09/23/20 1204   Total Time Spent   Total Time Spent (Mins) 30   Charge Group   OT Evaluation OT Evaluation Low   Initial Contact Note    Initial Contact Note Order Received and Verified, Occupational Therapy Evaluation in Progress with Full Report to Follow.   Prior Living Situation   Prior Services None;Home-Independent   Housing / Facility 1 Story Apartment / Condo   Bathroom Set up Walk In Shower;Shower Chair   Equipment Owned Single Point Cane;Tub / Shower Seat   Lives with - Patient's Self Care Capacity Alone and Able to Care For Self   Comments Reports no social supports   Prior Level of ADL Function   Self Feeding Independent   Grooming / " Hygiene Independent   Bathing Independent   Dressing Independent   Toileting Independent   Prior Level of IADL Function   Medication Management Independent   Laundry Independent   Kitchen Mobility Independent   Finances Independent   Home Management Independent   Shopping Independent   Prior Level Of Mobility Independent With Device in Community;Independent With Device in Home   Driving / Transportation Driving Independent   Occupation (Pre-Hospital Vocational) Retired Due To Age   Precautions   Precautions Fall Risk   Comments very [place]   Vitals   O2 (LPM) 0   O2 Delivery Device None - Room Air   Pain 0 - 10 Group   Therapist Pain Assessment Post Activity Pain Same as Prior to Activity;Nurse Notified  (no c/o pain during session)   Cognition    Cognition / Consciousness X   Speech/ Communication Delayed Responses;Hard of Hearing   Safety Awareness Impaired;Impulsive   Comments questionable historian, very [place], overall cooperative   Active ROM Upper Body   Active ROM Upper Body  WDL   Strength Upper Body   Upper Body Strength  WDL   Coordination Upper Body   Coordination WDL   Balance Assessment   Sitting Balance (Static) Fair +   Sitting Balance (Dynamic) Fair   Standing Balance (Static) Fair -   Standing Balance (Dynamic) Poor +   Weight Shift Sitting Good   Weight Shift Standing Fair   Comments w/ unilateral UE support, reaches for walls    Bed Mobility    Supine to Sit Minimal Assist   Sit to Supine   (NT in chair post)   Scooting Supervised   ADL Assessment   Upper Body Dressing Minimal Assist   Lower Body Dressing Moderate Assist  (pants)   Toileting Maximal Assist  (BM in commode)   How much help from another person does the patient currently need...   Putting on and taking off regular lower body clothing? 2   Bathing (including washing, rinsing, and drying)? 2   Toileting, which includes using a toilet, bedpan, or urinal? 2   Putting on and taking off regular upper body clothing? 3   Taking care of personal  grooming such as brushing teeth? 3   Eating meals? 4   6 Clicks Daily Activity Score 16   Functional Mobility   Sit to Stand Minimal Assist   Bed, Chair, Wheelchair Transfer Moderate Assist  (increased A for safety)   Toilet Transfers Moderate Assist  (BSC)   Mobility SPT EOB >Chair<>BSC   Visual Perception   Visual Perception  Not Tested   Edema / Skin Assessment   Edema / Skin  Not Assessed   Activity Tolerance   Sitting in Chair 10 min    Sitting Edge of Bed 6 min   Standing 3 min total   Patient / Family Goals   Patient / Family Goal #1 To eat food   Short Term Goals   Short Term Goal # 1 Pt will demo LB dressing w/ SPV   Short Term Goal # 2 Pt will demo toileting w/ SPV   Short Term Goal # 3 Pt will demo toilet txf w SPV   Short Term Goal # 4 Pt will demo standing grooming w/ SPV   Education Group   Role of Occupational Therapist Patient Response Patient;Acceptance;Explanation;Demonstration;Verbal Demonstration;Action Demonstration   Problem List   Problem List Decreased Active Daily Living Skills;Decreased Homemaking Skills;Decreased Functional Mobility;Decreased Activity Tolerance;Impaired Postural Control / Balance;Safety Awareness Deficits / Cognition   Anticipated Discharge Equipment and Recommendations   DC Equipment Recommendations Unable to determine at this time   Discharge Recommendations Recommend post-acute placement for additional occupational therapy services prior to discharge home   Interdisciplinary Plan of Care Collaboration   IDT Collaboration with  Nursing;Physical Therapist   Patient Position at End of Therapy Seated;Chair Alarm On;Call Light within Reach;Tray Table within Reach;Phone within Reach   Collaboration Comments report given   Session Information   Date / Session Number  9/23, 1 (1/3, 9/29)    Priority 2

## 2020-09-24 ENCOUNTER — ANESTHESIA (OUTPATIENT)
Dept: SURGERY | Facility: MEDICAL CENTER | Age: 75
DRG: 392 | End: 2020-09-24
Payer: MEDICARE

## 2020-09-24 ENCOUNTER — ANESTHESIA EVENT (OUTPATIENT)
Dept: SURGERY | Facility: MEDICAL CENTER | Age: 75
DRG: 392 | End: 2020-09-24
Payer: MEDICARE

## 2020-09-24 ENCOUNTER — APPOINTMENT (OUTPATIENT)
Dept: CARDIOLOGY | Facility: MEDICAL CENTER | Age: 75
DRG: 392 | End: 2020-09-24
Attending: STUDENT IN AN ORGANIZED HEALTH CARE EDUCATION/TRAINING PROGRAM
Payer: MEDICARE

## 2020-09-24 LAB
ALBUMIN SERPL BCP-MCNC: 3.2 G/DL (ref 3.2–4.9)
ALBUMIN/GLOB SERPL: 1.2 G/DL
ALP SERPL-CCNC: 66 U/L (ref 30–99)
ALT SERPL-CCNC: 14 U/L (ref 2–50)
ANION GAP SERPL CALC-SCNC: 10 MMOL/L (ref 7–16)
AST SERPL-CCNC: 28 U/L (ref 12–45)
BASOPHILS # BLD AUTO: 0.6 % (ref 0–1.8)
BASOPHILS # BLD: 0.02 K/UL (ref 0–0.12)
BILIRUB CONJ SERPL-MCNC: 0.4 MG/DL (ref 0.1–0.5)
BILIRUB SERPL-MCNC: 1.8 MG/DL (ref 0.1–1.5)
BUN SERPL-MCNC: 11 MG/DL (ref 8–22)
CALCIUM SERPL-MCNC: 8.5 MG/DL (ref 8.5–10.5)
CHLORIDE SERPL-SCNC: 97 MMOL/L (ref 96–112)
CO2 SERPL-SCNC: 25 MMOL/L (ref 20–33)
CREAT SERPL-MCNC: 0.64 MG/DL (ref 0.5–1.4)
EKG IMPRESSION: NORMAL
EOSINOPHIL # BLD AUTO: 0.03 K/UL (ref 0–0.51)
EOSINOPHIL NFR BLD: 0.8 % (ref 0–6.9)
ERYTHROCYTE [DISTWIDTH] IN BLOOD BY AUTOMATED COUNT: 68.2 FL (ref 35.9–50)
ERYTHROCYTE [DISTWIDTH] IN BLOOD BY AUTOMATED COUNT: 69.9 FL (ref 35.9–50)
GLOBULIN SER CALC-MCNC: 2.6 G/DL (ref 1.9–3.5)
GLUCOSE SERPL-MCNC: 102 MG/DL (ref 65–99)
HCT VFR BLD AUTO: 22.8 % (ref 42–52)
HCT VFR BLD AUTO: 23 % (ref 42–52)
HCT VFR BLD AUTO: 26.1 % (ref 42–52)
HGB BLD-MCNC: 7.7 G/DL (ref 14–18)
HGB BLD-MCNC: 7.8 G/DL (ref 14–18)
HGB BLD-MCNC: 8.9 G/DL (ref 14–18)
HGB RETIC QN AUTO: 23.8 PG/CELL (ref 29–35)
IMM GRANULOCYTES # BLD AUTO: 0.02 K/UL (ref 0–0.11)
IMM GRANULOCYTES NFR BLD AUTO: 0.6 % (ref 0–0.9)
IMM RETICS NFR: 17.8 % (ref 9.3–17.4)
LDH SERPL L TO P-CCNC: 263 U/L (ref 107–266)
LYMPHOCYTES # BLD AUTO: 1 K/UL (ref 1–4.8)
LYMPHOCYTES NFR BLD: 28 % (ref 22–41)
MAGNESIUM SERPL-MCNC: 1.2 MG/DL (ref 1.5–2.5)
MCH RBC QN AUTO: 32.4 PG (ref 27–33)
MCH RBC QN AUTO: 32.5 PG (ref 27–33)
MCHC RBC AUTO-ENTMCNC: 33.8 G/DL (ref 33.7–35.3)
MCHC RBC AUTO-ENTMCNC: 33.9 G/DL (ref 33.7–35.3)
MCV RBC AUTO: 95.4 FL (ref 81.4–97.8)
MCV RBC AUTO: 96.2 FL (ref 81.4–97.8)
MONOCYTES # BLD AUTO: 0.51 K/UL (ref 0–0.85)
MONOCYTES NFR BLD AUTO: 14.3 % (ref 0–13.4)
NEUTROPHILS # BLD AUTO: 1.99 K/UL (ref 1.82–7.42)
NEUTROPHILS NFR BLD: 55.7 % (ref 44–72)
NRBC # BLD AUTO: 0 K/UL
NRBC BLD-RTO: 0 /100 WBC
PATHOLOGY CONSULT NOTE: NORMAL
PHOSPHATE SERPL-MCNC: 1.8 MG/DL (ref 2.5–4.5)
PLATELET # BLD AUTO: 70 K/UL (ref 164–446)
PLATELET # BLD AUTO: 81 K/UL (ref 164–446)
PMV BLD AUTO: 10.2 FL (ref 9–12.9)
PMV BLD AUTO: 9.7 FL (ref 9–12.9)
POTASSIUM SERPL-SCNC: 3.8 MMOL/L (ref 3.6–5.5)
PROT SERPL-MCNC: 5.8 G/DL (ref 6–8.2)
RBC # BLD AUTO: 2.37 M/UL (ref 4.7–6.1)
RBC # BLD AUTO: 2.41 M/UL (ref 4.7–6.1)
RETICS # AUTO: 0.04 M/UL (ref 0.04–0.06)
RETICS/RBC NFR: 1.3 % (ref 0.8–2.1)
SARS-COV-2 RNA RESP QL NAA+PROBE: NOTDETECTED
SODIUM SERPL-SCNC: 132 MMOL/L (ref 135–145)
SPECIMEN SOURCE: NORMAL
TROPONIN T SERPL-MCNC: 52 NG/L (ref 6–19)
WBC # BLD AUTO: 3.6 K/UL (ref 4.8–10.8)
WBC # BLD AUTO: 3.7 K/UL (ref 4.8–10.8)

## 2020-09-24 PROCEDURE — 700105 HCHG RX REV CODE 258: Performed by: STUDENT IN AN ORGANIZED HEALTH CARE EDUCATION/TRAINING PROGRAM

## 2020-09-24 PROCEDURE — 88305 TISSUE EXAM BY PATHOLOGIST: CPT | Mod: 59

## 2020-09-24 PROCEDURE — 700102 HCHG RX REV CODE 250 W/ 637 OVERRIDE(OP): Performed by: INTERNAL MEDICINE

## 2020-09-24 PROCEDURE — 700111 HCHG RX REV CODE 636 W/ 250 OVERRIDE (IP): Performed by: HOSPITALIST

## 2020-09-24 PROCEDURE — 700111 HCHG RX REV CODE 636 W/ 250 OVERRIDE (IP): Performed by: STUDENT IN AN ORGANIZED HEALTH CARE EDUCATION/TRAINING PROGRAM

## 2020-09-24 PROCEDURE — 0DB98ZX EXCISION OF DUODENUM, VIA NATURAL OR ARTIFICIAL OPENING ENDOSCOPIC, DIAGNOSTIC: ICD-10-PCS | Performed by: INTERNAL MEDICINE

## 2020-09-24 PROCEDURE — 36415 COLL VENOUS BLD VENIPUNCTURE: CPT

## 2020-09-24 PROCEDURE — A9270 NON-COVERED ITEM OR SERVICE: HCPCS | Performed by: HOSPITALIST

## 2020-09-24 PROCEDURE — C9113 INJ PANTOPRAZOLE SODIUM, VIA: HCPCS | Performed by: HOSPITALIST

## 2020-09-24 PROCEDURE — 99232 SBSQ HOSP IP/OBS MODERATE 35: CPT | Performed by: INTERNAL MEDICINE

## 2020-09-24 PROCEDURE — A9270 NON-COVERED ITEM OR SERVICE: HCPCS | Performed by: INTERNAL MEDICINE

## 2020-09-24 PROCEDURE — 160048 HCHG OR STATISTICAL LEVEL 1-5: Performed by: INTERNAL MEDICINE

## 2020-09-24 PROCEDURE — 84484 ASSAY OF TROPONIN QUANT: CPT

## 2020-09-24 PROCEDURE — 700102 HCHG RX REV CODE 250 W/ 637 OVERRIDE(OP): Performed by: HOSPITALIST

## 2020-09-24 PROCEDURE — 83735 ASSAY OF MAGNESIUM: CPT

## 2020-09-24 PROCEDURE — 99233 SBSQ HOSP IP/OBS HIGH 50: CPT | Mod: GC | Performed by: HOSPITALIST

## 2020-09-24 PROCEDURE — 160002 HCHG RECOVERY MINUTES (STAT): Performed by: INTERNAL MEDICINE

## 2020-09-24 PROCEDURE — 93005 ELECTROCARDIOGRAM TRACING: CPT | Performed by: STUDENT IN AN ORGANIZED HEALTH CARE EDUCATION/TRAINING PROGRAM

## 2020-09-24 PROCEDURE — 500066 HCHG BITE BLOCK, ECT: Performed by: INTERNAL MEDICINE

## 2020-09-24 PROCEDURE — 82248 BILIRUBIN DIRECT: CPT

## 2020-09-24 PROCEDURE — 84100 ASSAY OF PHOSPHORUS: CPT

## 2020-09-24 PROCEDURE — 700101 HCHG RX REV CODE 250: Performed by: ANESTHESIOLOGY

## 2020-09-24 PROCEDURE — 85046 RETICYTE/HGB CONCENTRATE: CPT

## 2020-09-24 PROCEDURE — 88312 SPECIAL STAINS GROUP 1: CPT

## 2020-09-24 PROCEDURE — 85025 COMPLETE CBC W/AUTO DIFF WBC: CPT

## 2020-09-24 PROCEDURE — 160035 HCHG PACU - 1ST 60 MINS PHASE I: Performed by: INTERNAL MEDICINE

## 2020-09-24 PROCEDURE — 700105 HCHG RX REV CODE 258: Performed by: ANESTHESIOLOGY

## 2020-09-24 PROCEDURE — 700111 HCHG RX REV CODE 636 W/ 250 OVERRIDE (IP): Performed by: ANESTHESIOLOGY

## 2020-09-24 PROCEDURE — 93010 ELECTROCARDIOGRAM REPORT: CPT | Performed by: INTERNAL MEDICINE

## 2020-09-24 PROCEDURE — 770020 HCHG ROOM/CARE - TELE (206)

## 2020-09-24 PROCEDURE — 85018 HEMOGLOBIN: CPT

## 2020-09-24 PROCEDURE — 160009 HCHG ANES TIME/MIN: Performed by: INTERNAL MEDICINE

## 2020-09-24 PROCEDURE — 160202 HCHG ENDO MINUTES - 1ST 30 MINS LEVEL 3: Performed by: INTERNAL MEDICINE

## 2020-09-24 PROCEDURE — 80053 COMPREHEN METABOLIC PANEL: CPT

## 2020-09-24 PROCEDURE — 83010 ASSAY OF HAPTOGLOBIN QUANT: CPT

## 2020-09-24 PROCEDURE — 83615 LACTATE (LD) (LDH) ENZYME: CPT

## 2020-09-24 PROCEDURE — 85014 HEMATOCRIT: CPT

## 2020-09-24 RX ORDER — SODIUM CHLORIDE, SODIUM LACTATE, POTASSIUM CHLORIDE, CALCIUM CHLORIDE 600; 310; 30; 20 MG/100ML; MG/100ML; MG/100ML; MG/100ML
INJECTION, SOLUTION INTRAVENOUS
Status: DISCONTINUED | OUTPATIENT
Start: 2020-09-24 | End: 2020-09-24 | Stop reason: SURG

## 2020-09-24 RX ORDER — LIDOCAINE HYDROCHLORIDE 20 MG/ML
INJECTION, SOLUTION EPIDURAL; INFILTRATION; INTRACAUDAL; PERINEURAL PRN
Status: DISCONTINUED | OUTPATIENT
Start: 2020-09-24 | End: 2020-09-24 | Stop reason: SURG

## 2020-09-24 RX ORDER — PEG-3350, SODIUM SULFATE, SODIUM CHLORIDE, POTASSIUM CHLORIDE, SODIUM ASCORBATE AND ASCORBIC ACID 7.5-2.691G
100 KIT ORAL 2 TIMES DAILY
Status: DISPENSED | OUTPATIENT
Start: 2020-09-24 | End: 2020-09-25

## 2020-09-24 RX ORDER — MAGNESIUM SULFATE HEPTAHYDRATE 40 MG/ML
4 INJECTION, SOLUTION INTRAVENOUS ONCE
Status: COMPLETED | OUTPATIENT
Start: 2020-09-24 | End: 2020-09-24

## 2020-09-24 RX ORDER — ONDANSETRON 2 MG/ML
4 INJECTION INTRAMUSCULAR; INTRAVENOUS
Status: DISCONTINUED | OUTPATIENT
Start: 2020-09-24 | End: 2020-09-24 | Stop reason: HOSPADM

## 2020-09-24 RX ORDER — SODIUM CHLORIDE, SODIUM LACTATE, POTASSIUM CHLORIDE, CALCIUM CHLORIDE 600; 310; 30; 20 MG/100ML; MG/100ML; MG/100ML; MG/100ML
INJECTION, SOLUTION INTRAVENOUS CONTINUOUS
Status: DISCONTINUED | OUTPATIENT
Start: 2020-09-24 | End: 2020-09-24 | Stop reason: HOSPADM

## 2020-09-24 RX ADMIN — PROPOFOL 50 MG: 10 INJECTION, EMULSION INTRAVENOUS at 13:03

## 2020-09-24 RX ADMIN — THIAMINE HYDROCHLORIDE 100 MG: 100 INJECTION, SOLUTION INTRAMUSCULAR; INTRAVENOUS at 06:26

## 2020-09-24 RX ADMIN — MAGNESIUM SULFATE IN WATER 4 G: 40 INJECTION, SOLUTION INTRAVENOUS at 11:43

## 2020-09-24 RX ADMIN — SODIUM CHLORIDE, POTASSIUM CHLORIDE, SODIUM LACTATE AND CALCIUM CHLORIDE: 600; 310; 30; 20 INJECTION, SOLUTION INTRAVENOUS at 12:54

## 2020-09-24 RX ADMIN — MIDAZOLAM 1 MG: 1 INJECTION INTRAMUSCULAR; INTRAVENOUS at 12:56

## 2020-09-24 RX ADMIN — LIDOCAINE HYDROCHLORIDE 4 ML: 20 INJECTION, SOLUTION EPIDURAL; INFILTRATION; INTRACAUDAL at 12:59

## 2020-09-24 RX ADMIN — PANTOPRAZOLE SODIUM 40 MG: 40 INJECTION, POWDER, LYOPHILIZED, FOR SOLUTION INTRAVENOUS at 06:26

## 2020-09-24 RX ADMIN — POTASSIUM CHLORIDE: 149 INJECTION, SOLUTION, CONCENTRATE INTRAVENOUS at 14:44

## 2020-09-24 RX ADMIN — PROPOFOL 50 MG: 10 INJECTION, EMULSION INTRAVENOUS at 12:59

## 2020-09-24 RX ADMIN — FOLIC ACID 1 MG: 1 TABLET ORAL at 06:26

## 2020-09-24 RX ADMIN — POLYETHYLENE GLYCOL 3350, SODIUM SULFATE, SODIUM CHLORIDE, POTASSIUM CHLORIDE, ASCORBIC ACID, SODIUM ASCORBATE: KIT at 17:18

## 2020-09-24 RX ADMIN — PANTOPRAZOLE SODIUM 40 MG: 40 INJECTION, POWDER, LYOPHILIZED, FOR SOLUTION INTRAVENOUS at 17:17

## 2020-09-24 ASSESSMENT — ENCOUNTER SYMPTOMS
WEAKNESS: 0
NERVOUS/ANXIOUS: 0
TINGLING: 0
HEADACHES: 0
DEPRESSION: 0
NAUSEA: 1
WHEEZING: 0
NAUSEA: 0
DIARRHEA: 0
BACK PAIN: 0
MEMORY LOSS: 0
BRUISES/BLEEDS EASILY: 0
NECK PAIN: 0
VOMITING: 0
SINUS PAIN: 0
DIZZINESS: 0
WEIGHT LOSS: 1
PALPITATIONS: 0
TREMORS: 0
CHILLS: 0
CONSTIPATION: 0
COUGH: 0
FEVER: 0
SHORTNESS OF BREATH: 0
VOMITING: 1
ABDOMINAL PAIN: 0
SORE THROAT: 0

## 2020-09-24 ASSESSMENT — COGNITIVE AND FUNCTIONAL STATUS - GENERAL
STANDING UP FROM CHAIR USING ARMS: A LOT
MOVING TO AND FROM BED TO CHAIR: A LITTLE
CLIMB 3 TO 5 STEPS WITH RAILING: A LOT
WALKING IN HOSPITAL ROOM: A LOT
TOILETING: A LOT
PERSONAL GROOMING: A LOT
SUGGESTED CMS G CODE MODIFIER DAILY ACTIVITY: CK
MOBILITY SCORE: 15
TURNING FROM BACK TO SIDE WHILE IN FLAT BAD: A LITTLE
HELP NEEDED FOR BATHING: A LOT
MOVING FROM LYING ON BACK TO SITTING ON SIDE OF FLAT BED: A LITTLE
DRESSING REGULAR LOWER BODY CLOTHING: A LOT
DRESSING REGULAR UPPER BODY CLOTHING: A LOT
SUGGESTED CMS G CODE MODIFIER MOBILITY: CK
DAILY ACTIVITIY SCORE: 14

## 2020-09-24 ASSESSMENT — PAIN SCALES - GENERAL: PAIN_LEVEL: 0

## 2020-09-24 NOTE — ANESTHESIA QCDR
2019 [facility] Clinical Data Registry (for Quality Improvement)     Postoperative nausea/vomiting risk protocol (Adult = 18 yrs and Pediatric 3-17 yrs)- (430 and 463)  General inhalation anesthetic (NOT TIVA) with PONV risk factors: No  Provision of anti-emetic therapy with at least 2 different classes of agents: N/A  Patient DID NOT receive anti-emetic therapy and reason is documented in Medical Record: N/A    Multimodal Pain Management- (477)  Non-emergent surgery AND patient age >= 18: Yes  Use of Multimodal Pain Management, two or more drugs and/or interventions, NOT including systemic opioids: No  Exception: Documented allergy to multiple classes of analgesics: No       Smoking Abstinence (404)  Patient is current smoker (cigarette, pipe, e-cig, marijuanna): Yes  Elective Surgery: No  Abstinence instructions provided prior to day of surgery:   Patient abstained from smoking on day of surgery:     Pre-Op Beta-Blocker in Isolated CABG (44)  Isolated CABG AND patient age >= 18: No  Beta-blocker admin within 24 hours of surgical incision:   Exception:of medical reason(s) for not administering beta blocker within 24 hours prior to surgical incision (e.g., not  indicated,other medical reason):     PACU assessment of acute postoperative pain prior to Anesthesia Care End- Applies to Patients Age = 18- (ABG7)  Initial PACU pain score is which of the following: < 7/10  Patient unable to report pain score: N/A    Post-anesthetic transfer of care checklist/protocol to PACU/ICU- (426 and 427)  Upon conclusion of case, patient transferred to which of the following locations: PACU/Non-ICU  Use of transfer checklist/protocol: Yes  Exclusion: Service Performed in Patient Hospital Room (and thus did not require transfer): N/A  Unplanned admission to ICU related to anesthesia service up through end of PACU care- (MD51)  Unplanned admission to ICU (not initially anticipated at anesthesia start time): No

## 2020-09-24 NOTE — THERAPY
Missed Therapy     Patient Name: Nicola Jimenez  Age:  75 y.o., Sex:  male  Medical Record #: [id number]  Today's Date: 9/24/2020    Discussed missed therapy with RN.    Pt is currently NPO for a procedure (EGD). SLP will hold dysphagia tx today.

## 2020-09-24 NOTE — ANESTHESIA PREPROCEDURE EVALUATION
76 yo w/weight loss, anemia and thickened esophagus    Relevant Problems   ANESTHESIA (within normal limits)      PULMONARY   (+) Chronic obstructive lung disease (HCC)      NEURO   (+) H/O cardiac arrest      CARDIAC   (+) Aortic stenosis   (+) Atrial fibrillation (CMS-HCC)   (+) CAD (coronary artery disease)   (+) CHF (congestive heart failure) (HCC)   (+) Coronary atherosclerosis of native coronary artery   (+) Severe aortic stenosis   (+) Undiagnosed cardiac murmurs         (+) Cirrhosis of liver (HCC)      Other   (+) Anemia   (+) Esophageal abnormality concerning for malignancy   (+) History of percutaneous coronary intervention   (+) Tobacco dependence   (+) Troponin level elevated       Physical Exam    Airway   Mallampati: II  TM distance: >3 FB  Neck ROM: full       Cardiovascular   Rhythm: regular  Rate: normal  (+) murmur     Dental   (+) upper dentures, lower dentures           Pulmonary   Breath sounds clear to auscultation     Abdominal    Neurological - normal exam                 Anesthesia Plan    ASA 4   ASA physical status 4 criteria: severe valve dysfunction    Plan - MAC       (Discussed at length risk of anesthesia given severe AS.  Patient wishes to proceed.  Does not wish to lift DNR/DNI for procedure)      Induction: intravenous      Pertinent diagnostic labs and testing reviewed    Informed Consent:    Anesthetic plan and risks discussed with patient.

## 2020-09-24 NOTE — ANESTHESIA TIME REPORT
Anesthesia Start and Stop Event Times     Date Time Event    9/24/2020 1244 Ready for Procedure     1254 Anesthesia Start     1312 Anesthesia Stop        Responsible Staff  09/24/20    Name Role Begin End    Chloe Gonzalez M.D. Anesth 1254 1312        Preop Diagnosis (Free Text):  Pre-op Diagnosis     DYSPHAGIA,ABNORMAL CT SCAN        Preop Diagnosis (Codes):    Post op Diagnosis  Dysphagia  weight loss    Premium Reason  Non-Premium    Comments:

## 2020-09-24 NOTE — PROGRESS NOTES
Received report from Loren, at pt bedside. Pt A/O x 4 currently Npo for EGD, hbg 7.7 orders to transfuse below 7.5 will continue to monitor, POC discussed. Call light and belongings within reach. Bed locked and in low position. Alarm on and fall precautions in place.

## 2020-09-24 NOTE — ASSESSMENT & PLAN NOTE
Murmurs noted on physical exam. Last echo 2017 showed severe aortic stenosis, moderate mitral regurgitation, right heart pressures are consistent with moderate pulmonary   Hypertension, severely dilated left atrium. LVEF 55%  @Plan:-  - Repeat echocardiogram ordered. - LVEF 60%, aortic stenosis: Vmax is 4.6  m/s, MG 48 mmHg, RYAN 0.9 cm2.  Moderate aortic insufficiency, RVSP 30 mmHg, mild tricuspid regurgitation, no mitral stenosis or regurgitation  - Cardiology on board, plan is for TAVR once hemodynamically stable.

## 2020-09-24 NOTE — PROGRESS NOTES
Daily Progress Note:     Date of Service: 9/24/2020  Primary Team: [facility] White Team   Attending: BRIDGETT Sanchez M.D.   Senior Resident:   Intern: Dr. Morales.  Contact:  [phone]    Chief Complaint:   Weakness/fatigue for one month.      Patient ID:-  Mr. Jimenez is a 75-year-old male with history of CAD ,severe AS & moderate MR based on echo done in 2017 who presents with weakness/fatigue for the past month.  He had an elevated troponin and possible EKG changes suggestive of anterior infarct.  Repeat troponin trended down.  CT chest abdomen pelvis suggestive of esophageal inflammation or carcinoma. Hemoglobin down to 6.3 compared to 8.3 on admission requiring blood transfusion.patient labs remarkable for increased serum bilirubin possibly secondary to extravascular hemolysis.    Subjective:-  - Patient repeated Hb post-transfusion was low at 6.6 => patient received additional unit of blood.  - Labs remarkable for Hb of 7.8 this morning and elevated T.Bili at 1.8 ,mag of 1.2 , phosphorus of 1.8 and trending down in troponin.  - EKG with evidence of resolution of T wave depression and inversion in V3, changes in previous EKG most likely secondary to imbalance between supply and demand form anemia.  - patient underwent EGD this morning , which showed tight lower esophageal sphincter, gastritis and duodenitis.  - cardiology() consulted to see if the patient is a candidate for TAVR as his anaemia might be related to extravascular hemolysis from severe aortic valve stenosis.  - Pending echocardiography.     Consultants/Specialty:  Gastroenterology.  Cardiology.    Review of Systems:   Review of Systems   Constitutional: Positive for malaise/fatigue and weight loss. Negative for chills and fever.   HENT: Negative for congestion, ear discharge, ear pain, sinus pain and sore throat.    Respiratory: Negative for cough, shortness of breath and wheezing.    Cardiovascular: Negative for chest pain,  palpitations and leg swelling.   Gastrointestinal: Negative for abdominal pain, constipation, diarrhea, nausea and vomiting.   Genitourinary: Negative for dysuria, frequency, hematuria and urgency.   Musculoskeletal: Negative for back pain, joint pain and neck pain.   Skin: Negative for itching and rash.   Neurological: Negative for dizziness, tingling, tremors, weakness and headaches.   Endo/Heme/Allergies: Does not bruise/bleed easily.   Psychiatric/Behavioral: Negative for depression and memory loss. The patient is not nervous/anxious.        Objective Data:   Physical Exam:   Vitals:   Temp:  [36.1 °C (97 °F)-37 °C (98.6 °F)] 36.7 °C (98.1 °F)  Pulse:  [71-99] 77  Resp:  [16-20] 18  BP: (104-133)/(52-67) 108/60  SpO2:  [93 %-100 %] 98 %     Physical Exam  Constitutional:       General: He is not in acute distress.     Appearance: Normal appearance.   HENT:      Head: Normocephalic and atraumatic.      Right Ear: External ear normal.      Left Ear: External ear normal.      Nose: Nose normal.   Eyes:      Extraocular Movements: Extraocular movements intact.      Pupils: Pupils are equal, round, and reactive to light.   Neck:      Musculoskeletal: Normal range of motion. No neck rigidity or muscular tenderness.   Cardiovascular:      Rate and Rhythm: Normal rate and regular rhythm.      Pulses: Normal pulses.      Heart sounds: Murmur present. No friction rub. No gallop.    Pulmonary:      Effort: Pulmonary effort is normal.      Breath sounds: Normal breath sounds.   Abdominal:      General: There is no distension.      Palpations: There is no mass.      Tenderness: There is no abdominal tenderness.      Hernia: No hernia is present.   Skin:     Capillary Refill: Capillary refill takes less than 2 seconds.      Findings: No bruising, erythema, lesion or rash.   Neurological:      General: No focal deficit present.      Mental Status: He is alert and oriented to person, place, and time.      Motor: No weakness.    Psychiatric:         Mood and Affect: Mood normal.         Behavior: Behavior normal.         Thought Content: Thought content normal.         Judgment: Judgment normal.           Labs:   Recent Labs     09/22/20 1630 09/23/20 0823 09/24/20  0742   SODIUM 134* 133* 132*   POTASSIUM 3.3* 3.5* 3.8   CHLORIDE 88* 95* 97   CO2 22 26 25   BUN 13 12 11   CREATININE 0.84 0.59 0.64   MAGNESIUM  --  1.3* 1.2*   PHOSPHORUS  --   --  1.8*   CALCIUM 9.6 8.9 8.5       Recent Labs     09/22/20 1630 09/23/20 0823 09/24/20  0742 09/24/20  1641   ALTSGPT 22 18 14  --    ASTSGOT 49* 31 28  --    ALKPHOSPHAT 97 77 66  --    TBILIRUBIN 1.1 1.1 1.8*  --    DBILIRUBIN  --   --   --  0.4   GLUCOSE 93 97 102*  --        Recent Labs     09/23/20 0823 09/23/20 1837 09/23/20 2321 09/24/20  0742   RBC 1.83*  --  2.37* 2.41*   HEMOGLOBIN 6.3* 6.3* 7.7* 7.8*   HEMATOCRIT 18.5* 18.5* 22.8* 23.0*   PLATELETCT 75*  --  81* 70*   PROTHROMBTM 14.3  --   --   --    APTT 27.5  --   --   --    INR 1.07  --   --   --    IRON  --  187*  --   --    FERRITIN  --  954.0*  --   --    TOTIRONBC  --  204*  --   --        Recent Labs     09/22/20 1630 09/23/20 0823 09/23/20 2321 09/24/20  0742   WBC 4.0* 3.2* 3.7* 3.6*   NEUTSPOLYS 56.70 57.50  --  55.70   LYMPHOCYTES 34.10 29.10  --  28.00   MONOCYTES 8.00 11.90  --  14.30*   EOSINOPHILS 0.50 0.60  --  0.80   BASOPHILS 0.20 0.30  --  0.60   ASTSGOT 49* 31  --  28   ALTSGPT 22 18  --  14   ALKPHOSPHAT 97 77  --  66   TBILIRUBIN 1.1 1.1  --  1.8*       Imaging:   US-ABDOMEN COMPLETE SURVEY   Final Result      1.  Hepatic steatosis.   2.  Gallbladder mass versus tumefactive sludge. Follow-up is recommended.   3.   Additional findings as detailed.         CT-CHEST,ABDOMEN,PELVIS WITH   Final Result      1.  Diffuse esophageal wall thickening, likely inflammatory.  Neoplasm is not entirely excluded.   2.  Wedge-shaped low density at the anterior aspect of the spleen, most concerning for infarct.   3.   Fatty infiltration of liver.   4.  Abdominal aortic aneurysm with stent graft in place.   5.  LEFT ureteral stent present.   6.  Postoperative change of lower abdominal wall.      DX-CHEST-PORTABLE (1 VIEW)   Final Result      Left basilar atelectasis or scarring.      Nodular density in the right lung base likely representing nipple shadow. Recommend repeat chest x-ray with nipple markers.      EC-ECHOCARDIOGRAM COMPLETE W/O CONT    (Results Pending)       Problem Representation:   Mr. Jimenez is a 75-year-old male with history of CAD ,severe AS & moderate MR based on echo done in 2017 who presents with weakness/fatigue for the past month.  He had an elevated troponin and possible EKG changes suggestive of anterior infarct.  Repeat troponin trended down.  CT chest abdomen pelvis suggestive of esophageal inflammation or carcinoma. Hemoglobin down to 6.3 compared to 8.3 on admission requiring blood transfusion.patient labs remarkable for increased serum bilirubin possibly secondary to extravascular hemolysis.    * Anemia- (present on admission)  Assessment & Plan  - Hg on admission 8.3, dropped to 6.3. patient received 2 units of PRBCs.  - patient underwent EGD on 09/24/2020 which showed tight lower esophageal sphincter, gastritis and duodenitis but no evidence of active bleeding.  - cardiology was consulted to see if patient is a candidate for TAVR as his labs showed findings suggestive of extravascular hemolysis from severe aortic stenosis.   @Plan:-  - Monitor H/H every 8 hours, transfuse if <7.5.  - IV protonix.  - folate, IV thiamine.  - pending retics count,direct/indirect bili, martha test,Haptoglobin and LDH.      Esophageal abnormality concerning for malignancy  Assessment & Plan  - Reports patient  lost 30 pounds in the past month; Reports to have an esophageal dysphagia (liquids/solids) for >10 years that is also at its baseline. CT chest abdomen/pelvis- showed esophageal wall thickening concerning for  malignancy, but also possibly inflammation.   - Pt has a pancytopenia which is potentially secondary to malignancy.  @Plan:-  - Per gastroenterology:-  1- Await histopathology  2- Colonoscopy since no significant pathology identified to account for severe anemia  3- If patient has significant dysphagia, consider balloon dilatation or referral for Heller's myotomy for management of previously diagnosed achalasia cardia.        Troponin level elevated  Assessment & Plan  - Most likely secondary to imbalance between supply and demand as patient's ischemic EKG findings disappeared and  troponin trended down after blood transfusion.   - CTM and transfuse if Hb<7.5.    Aortic stenosis- (present on admission)  Assessment & Plan  Murmurs noted on physical exam. Last echo 2017 showed severe aortic stenosis, moderate mitral regurgitation, right heart pressures are consistent with moderate pulmonary   Hypertension, severely dilated left atrium. LVEF 55%  @Plan:-  - Repeat echocardiogram ordered.  - Cardiology on board, plan is for TAVR when the source of anaemia has been identified.    Lactic acid acidosis in the setting of incidental splenic infarct  Assessment & Plan  Resolved with IV fluids.

## 2020-09-24 NOTE — OR NURSING
1312-Received from OR via [employer]. S/P-gastroscopy  Bedside report received from RN. And Anesthesiologist.    1330-awake, talking.....talking...talking. denies pain.     1340-Report called to Enio Sneed. Pt. Declines water. Denies pain or nausea. vss per baseline.    1354-to room in stable condition via [facility] with monitor. Trans. By VIKKI Oneill Rn.

## 2020-09-24 NOTE — CONSULTS
Cardiology Initial Consultation    Date of Service  9/24/2020    Referring Physician  BRIDGETT Sanchez M.D.    Reason for Consultation  Severe aortic stenosis.    History of Presenting Illness  Nicola Jimenez is a 75 y.o. male with a past medical history of endocarditis and aortic stenosis, abdominal aortic aneurysm repair who presented 9/22/2020 with weakness and anemia. He underwent EGD apparently showing no significant source of bleed. He is actively vomiting.     Review of Systems  Review of Systems   Unable to perform ROS: Other   Gastrointestinal: Positive for nausea and vomiting.       Past Medical History   has a past medical history of Arrhythmia, Arthritis, Atherosclerosis of aorta ([facility]), Blood clotting disorder ([facility]) (1988), Carotid artery disease ([facility]), Cataract, Dental disorder, Esophageal dilatation, H/O heart artery stent, Hernia, inguinal, bilateral, High cholesterol, History of AAA (abdominal aortic aneurysm) repair (01/30/2012), [place] (hard of hearing), Indigestion, MI (myocardial infarction) ([facility]) (1988), MI, old, Myocardial infarct ([facility]), PAD (peripheral artery disease), Status post aortic coarctation stent placement, Umbilical hernia, and Urinary incontinence.    Surgical History   has a past surgical history that includes aaa with stent graft (1/30/2012); esophageal motility or manometry (3/27/2012); gastroscopy-endo (3/19/2017); esophageal motility or manometry (3/21/2017); gastroscopy with botox injection (N/A, 3/23/2017); toe amputation (Left, 3/25/2017); cystoscopy stent placement (4/16/2017); cystoscopy stent placement (Left, 4/9/2018); cystoscopy stent placement (Left, 11/14/2018); and pr cystoscopy,insert ureteral stent (Left, 6/13/2019).    Family History  family history is not on file.    Social History   reports that he has been smoking cigarettes and pipe. He has been smoking about 1.00 pack per day. He has never used smokeless tobacco. He reports current alcohol use. He  reports current drug use. Drug: Inhaled.    Medications  None       Allergies  No Known Allergies    Vital signs in last 24 hours  Temp:  [36.1 °C (97 °F)-37 °C (98.6 °F)] 36.1 °C (97 °F)  Pulse:  [75-99] 75  Resp:  [16-18] 16  BP: (104-133)/(52-67) 112/55  SpO2:  [93 %-100 %] 95 %    Physical Exam  Physical Exam  Constitutional:       Appearance: He is well-developed.   HENT:      Head: Normocephalic and atraumatic.   Eyes:      Conjunctiva/sclera: Conjunctivae normal.      Pupils: Pupils are equal, round, and reactive to light.   Neck:      Musculoskeletal: Normal range of motion and neck supple.   Musculoskeletal: Normal range of motion.   Skin:     General: Skin is warm and dry.   Neurological:      Mental Status: He is alert and oriented to person, place, and time.         Lab Review  Lab Results   Component Value Date/Time    WBC 3.6 (L) 09/24/2020 07:42 AM    RBC 2.41 (L) 09/24/2020 07:42 AM    HEMOGLOBIN 7.8 (L) 09/24/2020 07:42 AM    HEMATOCRIT 23.0 (L) 09/24/2020 07:42 AM    MCV 95.4 09/24/2020 07:42 AM    MCH 32.4 09/24/2020 07:42 AM    MCHC 33.9 09/24/2020 07:42 AM    MPV 10.2 09/24/2020 07:42 AM      Lab Results   Component Value Date/Time    SODIUM 132 (L) 09/24/2020 07:42 AM    POTASSIUM 3.8 09/24/2020 07:42 AM    CHLORIDE 97 09/24/2020 07:42 AM    CO2 25 09/24/2020 07:42 AM    GLUCOSE 102 (H) 09/24/2020 07:42 AM    BUN 11 09/24/2020 07:42 AM    CREATININE 0.64 09/24/2020 07:42 AM    CREATININE 1.1 05/25/2007 12:00 PM      Lab Results   Component Value Date/Time    ASTSGOT 28 09/24/2020 07:42 AM    ALTSGPT 14 09/24/2020 07:42 AM     Lab Results   Component Value Date/Time    CHOLSTRLTOT 193 03/30/2020 12:40 PM     (H) 03/30/2020 12:40 PM    HDL 45 03/30/2020 12:40 PM    TRIGLYCERIDE 134 03/30/2020 12:40 PM    TROPONINT 52 (H) 09/24/2020 07:42 AM       No results for input(s): NTPROBNP in the last 72 hours.  (Above labs reviewed.)       Current Facility-Administered Medications:   •  magnesium  sulfate IVPB premix 4 g, 4 g, Intravenous, Once, Kenji Casiano M.D., Last Rate: 25 mL/hr at 09/24/20 1143, 4 g at 09/24/20 1143  •  phosphorus (K-Phos-Neutral) per tablet 250 mg, 250 mg, Oral, Once, Kenji Casiano M.D.  •  peg-electrolyte solution (MOVIPREP) package 100 g, 100 g, Oral, BID, Jerad Arellano M.D.  •  pantoprazole (PROTONIX) injection 40 mg, 40 mg, Intravenous, BID, BRIDGETT Sanchez M.D., 40 mg at 09/24/20 0626  •  folic acid (FOLVITE) tablet 1 mg, 1 mg, Oral, DAILY, BRIDGETT Sanchez M.D., 1 mg at 09/24/20 0626  •  thiamine (B-1) IVPB 100 mg in 100 mL D5W (premix), 100 mg, Intravenous, DAILY, Ric Morales M.D., Stopped at 09/24/20 0656  •  senna-docusate (PERICOLACE or SENOKOT S) 8.6-50 MG per tablet 2 Tab, 2 Tab, Oral, BID **AND** polyethylene glycol/lytes (MIRALAX) PACKET 1 Packet, 1 Packet, Oral, QDAY PRN **AND** magnesium hydroxide (MILK OF MAGNESIA) suspension 30 mL, 30 mL, Oral, QDAY PRN **AND** bisacodyl (DULCOLAX) suppository 10 mg, 10 mg, Rectal, QDAY PRN, Elizabet Gonzalez M.D.  •  nicotine (NICODERM) 14 MG/24HR 14 mg, 14 mg, Transdermal, Daily-0600 **AND** Nicotine Replacement Patient Education Materials, , , Once **AND** nicotine polacrilex (NICORETTE) 2 MG piece 2 mg, 2 mg, Oral, Q HOUR PRN, Elizabet Gonzalez M.D.  •  potassium chloride 40 mEq in LR 1,000 mL infusion, , Intravenous, Continuous, Kenji Casiano M.D., Last Rate: 50 mL/hr at 09/24/20 1444  (Medications reviewed.)    Cardiac Imaging and Procedures Review  CARDIAC STUDIES/PROCEDURES:    ECHOCARDIOGRAM CONCLUSIONS (04/09/17)  Normal left ventricular size and systolic function.  Left ventricular ejection fraction is visually estimated to be 55%.  Severe aortic stenosis.  Moderate mitral regurgitation.  Right heart pressures are consistent with moderate pulmonary hypertension.  Severely dilated left atrium.  (study result reviewed)    EKG performed on (09/24/20) was reviewed: EKG personally interpreted shows sinus  rhythm.    Assessment/Plan  1. Severe aortic stenosis: He is a 75 y.o. male with a past medical history of endocarditis and aortic stenosis, abdominal aortic aneurysm repair.  His aortic stenosis was already at severe status in 2017. We will consider TAVR when his anemia origin has been identified and stabelized.  2. History of abdominal aortic aneurysm with repair  3. Anemia, under evaluation.    Thank you for allowing me to participate in the care of this patient.    I will continue to follow this patient    Please contact me with any questions.    Seun Blue M.D.   Cardiologist, [facility]  [phone]

## 2020-09-24 NOTE — CARE PLAN
Problem: Communication  Goal: The ability to communicate needs accurately and effectively will improve  Outcome: PROGRESSING AS EXPECTED     Problem: Bowel/Gastric:  Goal: Normal bowel function is maintained or improved  Outcome: PROGRESSING AS EXPECTED     Problem: Knowledge Deficit  Goal: Knowledge of disease process/condition, treatment plan, diagnostic tests, and medications will improve  Outcome: PROGRESSING AS EXPECTED

## 2020-09-24 NOTE — ANESTHESIA POSTPROCEDURE EVALUATION
Patient: Nicola Jimenez    Procedure Summary     Date: 09/24/20 Room / Location: [facility] ROOM 26 / SURGERY SAME DAY [facility]    Anesthesia Start: 1254 Anesthesia Stop: 1312    Procedure: GASTROSCOPY (N/A Esophagus) Diagnosis: (DUODENITIS)    Surgeon: Jerad Arellano M.D. Responsible Provider: Chloe Gonzalez M.D.    Anesthesia Type: MAC ASA Status: 4          Final Anesthesia Type: MAC  Last vitals  BP   Blood Pressure : 112/55    Temp   36.1 °C (97 °F)    Pulse   Pulse: 75   Resp   16    SpO2   95 %      Anesthesia Post Evaluation    Patient location during evaluation: PACU  Patient participation: complete - patient participated  Level of consciousness: awake  Pain score: 0    Airway patency: patent  Anesthetic complications: no  Cardiovascular status: adequate  Respiratory status: acceptable  Hydration status: acceptable    PONV: none           Nurse Pain Score: 0 (NPRS)

## 2020-09-24 NOTE — NON-PROVIDER
"      Internal Medicine Daily Progress Note  Note Author: Ines Palacio, Third Year Medical Student    Date of Service: 2020  Date of Admission: 2020   Primary Team: [facility] IM White Team   Attending: BRIDGETT Sanchez MD   Senior Resident: Dr. Stephenie JimenezNicola     1945   Age/Sex 75 y.o. male   MRN [id number]         Reason for interval visit  Anemia    SUBJECTIVE:  Nicola is a 75 y.o. male with PMH of coronary artery disease, esophageal dysphagia, and hypercholesterolemia who presented on 2020 with concerns of 30lb unintentional weight loss over the past three months, increased generalized weakness over the past month, and difficulty with swallowing solids and liquids for the past ten years. The pt reports he has had increased difficulty walking due to the generalized weakness.     Overnight: When asked if pt had any complaints overnight, he says \"I don't know.\" When asked specifically , he replies \"yes\" to feeling weak and denies chest pain and shortness of breath. He says that he slept well. Pt's Hemoglobin was still 6.3 overnight, so he received packed red blood cells x1 overnight.     Consultants/Specialty: GI    ROS:  Constitutional: Positive for weight loss  HENT: Positive for difficulty hearing.   Eyes: Negative for eye pain.   Respiratory: Negative for shortness of breath.   Cardiovascular: Negative for chest pain.  Gastrointestinal: Positive for trouble swallowing solids and liquids.  Genitourinary: Negative for flank pain.  Musculoskeletal: Negative for back pain.  Skin: Negative for bruises, rashes.  Neurological: Positive for generalized weakness.       Vitals   Temp:  [36 °C (96.8 °F)-37 °C (98.6 °F)] 36.6 °C (97.8 °F)  Pulse:  [70-99] 90  Resp:  [14-18] 18  BP: (102-133)/(52-67) 122/58  SpO2:  [95 %-99 %] 96 %    Body mass index is 24.39 kg/m².    Physical Exam     General: Not in acute distress. Sleeping in bed.     Appearance: Not toxic appearing. Appears frail and " pallor is present.   HENT:      Head: Normocephalic and atraumatic.     Ears: Trouble hearing.      Mouth: Dry mucous membranes.   Eyes:      General: No scleral icterus.  Neck:      Musculoskeletal: Normal range of motion.   Cardiovascular:      Rate and Rhythm: Regular rate and rhythm.      Pulses: Radial pulses 2+ bilaterally. Dorsalis pedis pulses 2+ bilaterally.      Heart sounds: Aortic stenosis murmur present with radiation to the carotids. Tricuspid regurgitation murmur present. Mitral regurgitation murmur present.   Pulmonary:      Effort: Pulmonary effort is normal. No respiratory distress.      Breath sounds: Lungs clear to auscultation bilaterally. No wheezes or crackles.   Abdominal:      General: There is no distension.      Palpations: Abdomen is soft.      Tenderness: There is no abdominal tenderness to palpation. There is no guarding.   Musculoskeletal:         General: No signs of trauma.       Range of motion: Moves all extremities x4.      Right lower leg: No edema.      Left lower leg: No edema. Left 3rd toe amputated.  Skin:     Findings: No bruises or rashes present. Pallor present.   Neurological:      Mental status, orientation: Alert but appears sleepy.       Speech and language: speech is clear and fluent.     MMSE score 22/30.    Labs/Imaging:  Recent/pertinent lab results & imaging reviewed.  Lab Results   Component Value Date/Time    WBC 3.7 (L) 09/23/2020 11:21 PM    RBC 2.37 (L) 09/23/2020 11:21 PM    HEMOGLOBIN 7.7 (L) 09/23/2020 11:21 PM    HEMATOCRIT 22.8 (L) 09/23/2020 11:21 PM    MCV 96.2 09/23/2020 11:21 PM    MCH 32.5 09/23/2020 11:21 PM    MCHC 33.8 09/23/2020 11:21 PM    MPV 9.7 09/23/2020 11:21 PM    NEUTSPOLYS 57.50 09/23/2020 08:23 AM    LYMPHOCYTES 29.10 09/23/2020 08:23 AM    MONOCYTES 11.90 09/23/2020 08:23 AM    EOSINOPHILS 0.60 09/23/2020 08:23 AM    BASOPHILS 0.30 09/23/2020 08:23 AM    ANISOCYTOSIS 1+ 09/23/2020 08:23 AM      Lab Results   Component Value Date/Time     SODIUM 133 (L) 09/23/2020 08:23 AM    POTASSIUM 3.5 (L) 09/23/2020 08:23 AM    CHLORIDE 95 (L) 09/23/2020 08:23 AM    CO2 26 09/23/2020 08:23 AM    GLUCOSE 97 09/23/2020 08:23 AM    BUN 12 09/23/2020 08:23 AM    CREATININE 0.59 09/23/2020 08:23 AM    CREATININE 1.1 05/25/2007 12:00 PM      Lab Results   Component Value Date/Time    PROTHROMBTM 14.3 09/23/2020 08:23 AM    INR 1.07 09/23/2020 08:23 AM        US-ABDOMEN COMPLETE SURVEY   Final Result      1.  Hepatic steatosis.   2.  Gallbladder mass versus tumefactive sludge. Follow-up is recommended.   3.   Additional findings as detailed.         CT-CHEST,ABDOMEN,PELVIS WITH   Final Result      1.  Diffuse esophageal wall thickening, likely inflammatory.  Neoplasm is not entirely excluded.   2.  Wedge-shaped low density at the anterior aspect of the spleen, most concerning for infarct.   3.  Fatty infiltration of liver.   4.  Abdominal aortic aneurysm with stent graft in place.   5.  LEFT ureteral stent present.   6.  Postoperative change of lower abdominal wall.      DX-CHEST-PORTABLE (1 VIEW)   Final Result      Left basilar atelectasis or scarring.      Nodular density in the right lung base likely representing nipple shadow. Recommend repeat chest x-ray with nipple markers.      EC-ECHOCARDIOGRAM COMPLETE W/O CONT    (Results Pending)       Assessment/Plan   Pt is a 75 y.o. male with PMH notable for coronary artery disease, esophageal dysphagia, and hypercholesterolemia c/o generalized weakness, difficulty swallowing solids and liquids, and weight loss.    * Anemia  Assessment & Plan       Assessment:       Anemia likely due to anemia of chronic disease as indicated by high iron of 187. Anemia also due to likely hemolysis from aortic stenosis and/or GI blood loss. 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 GI did not shows a sight of bleeding in EGD, but showed gastritis and duodenitis with possible achalasia.  Hemoglobin  7.8. Folate was low at 3.6. B12 within normal limits. Ferritin high at 954. Iron high at 187. Transferrin TSH normal.      Plan:  -Given packed red blood cells x2 units.  -Decrease fluids to avoid volume overload.   -Order LDH, Haptoglobin, Direct martha test, reticulocyte count, direct bilirubin, and indirect bilirubin.  -Replete Magnesium and Phosphorus.   -Continue to repeat CBC to monitor Hemoglobin and Hematocrit.  -IV Protonix 40mg BID given.   -Repleted Folate and give Thiamine.   -Consult cardiology to assess if patient would be a good candidate for TAVIR.      Esophageal dysphagia  Assessment & Plan  Assessment:  CT shows esophageal wall thickening, likely inflammatory, but neoplasm unable to be ruled out. EGD shows duodenitis and gastritis as well as possible achalasia. Recommends colonoscopy.     Plan:  -Colonoscopy.  -Repleted Potassium.      Elevated Troponin  Assessment & Plan  Assessment:  Pt with known coronary artery disease with elevated troponin of 33. Troponin was most recently 52. EKG concerning with T wave inversion present on admission. Pt has no chest pain at this time. EKG shows changes today. There is no T wave inversion now, suggesting that pt likely had an ischemic event which improved with treatment of anemia.      Plan:  -Continue repeat Troponin and EKG.     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 Abdominal US does not show a splenic infarct. PT, INR, PTT within normal limits. Lactic acid 2.4, down from 4.2 on admission.     Plan:  -Echo.

## 2020-09-24 NOTE — DISCHARGE PLANNING
Anticipated Discharge Disposition: TBD    Action: PT/OT able to evaluate pt on 9/23 and recommending post acute. Pt was staffed with [facility] Resident, Stephenie. Stephenie informed LSW that PT/OT to reevaluate when appropriate after procedure. LSW requested to hold off on working on placement at this time.     Barriers to Discharge: None     Plan: LSW to continue to follow, LSW to assist as needed

## 2020-09-24 NOTE — CARE PLAN
Problem: Communication  Goal: The ability to communicate needs accurately and effectively will improve  Outcome: PROGRESSING AS EXPECTED  Note: POC discussed with patients and all questions answered. No further needs at this time. Call light is within reach.      Problem: Safety  Goal: Will remain free from injury  Outcome: PROGRESSING AS EXPECTED  Note: Patient educated on use of call light if assistance is needed. Call light is within reach.   Goal: Will remain free from falls  Outcome: PROGRESSING AS EXPECTED  Note: Bed is locked and in lowest position. All other universal fall precautions and hourly rounding in place.

## 2020-09-24 NOTE — PROCEDURES
Procedure: EGD (Esophagogstroduodenoscopy)    Date of procedure:9/24/20    Enodscopist: Jerad Arellano M.D.    Anesthesia: MAC    Anesthesiologist: Dr Chloe Gonzalez M.D.    Pre-op diagnosis:  Anemia  Abnormal CT scan  Dysphagia    Post-op diagnosis:  Normal esophagus  Tight lower esophageal sphincter, possible achalasia  Gastritis  Duodenitis    Complications: None    Estimated blood loss: Less than 5 cc    Indications:  Patient presented with weakness due to acute on chronic anemia.  CT scan revealed diffuse esophageal wall thickening.      Description of procedure:  After discussion of indications,risks and benefits including the risks of perforation and bleeding informed consent was signed by the patient. Sedation was administered and documented by anesthesia. Time out was performed.  Patient was placed in the left lateral position and Olympus Video gastroscope was placed in the oral cavity and the esophagus was intubated under direct visualization. The endoscope was then advanced through the lumen of the esophagus into the stomach and passed through to the third portion of duodenum. Upon careful withdrawal of the scope, mucosa  was visualized carefully. Gastric cardia and fundus visualized by retroflexion. Findings and maneuvers as listed below. Hemostasis was confirmed from biopsy sites. After deflating the stomach the endoscope was removed and procedure was terminated. Patient tolerated procedure well.    Findings:    Hypopharynx: Normal    Esophagus:  Normal mucosa throughout.  Lower esophageal sphincter is tight but could be passed with gentle pressure.  Retroflexed visualization of the cardioesophageal junction revealed no other abnormalities.  Normal peristalsis noted in the body of the esophagus.    Stomach:  Mild nonerosive gastropathy in the antrum and body of the stomach.  Biopsies taken to rule out H. pylori infection.    Duodenum:   Moderate erythema and edema in the duodenal bulb consistent with  duodenitis.  Mucosa in the second and third portion of the duodenum appeared normal.  Biopsies taken for histopathology      Plan:  1.  Await histopathology  2.  Colonoscopy since no significant pathology identified to account for severe anemia  3.  If patient has significant dysphagia, consider balloon dilatation or referral for Heller's myotomy for management of previously diagnosed achalasia cardia.

## 2020-09-24 NOTE — PROGRESS NOTES
Bedside report received. Bed locked and in low position bed alarm on, call light within reach. Hourly rounding in place. No further needs at this time.

## 2020-09-25 ENCOUNTER — APPOINTMENT (OUTPATIENT)
Dept: CARDIOLOGY | Facility: MEDICAL CENTER | Age: 75
DRG: 392 | End: 2020-09-25
Attending: STUDENT IN AN ORGANIZED HEALTH CARE EDUCATION/TRAINING PROGRAM
Payer: MEDICARE

## 2020-09-25 LAB
ALBUMIN SERPL BCP-MCNC: 3.2 G/DL (ref 3.2–4.9)
ALBUMIN/GLOB SERPL: 1.4 G/DL
ALP SERPL-CCNC: 67 U/L (ref 30–99)
ALT SERPL-CCNC: 12 U/L (ref 2–50)
ANION GAP SERPL CALC-SCNC: 12 MMOL/L (ref 7–16)
AST SERPL-CCNC: 30 U/L (ref 12–45)
BACTERIA UR CULT: NORMAL
BASOPHILS # BLD AUTO: 0.3 % (ref 0–1.8)
BASOPHILS # BLD: 0.02 K/UL (ref 0–0.12)
BILIRUB SERPL-MCNC: 1.4 MG/DL (ref 0.1–1.5)
BUN SERPL-MCNC: 8 MG/DL (ref 8–22)
CALCIUM SERPL-MCNC: 8.4 MG/DL (ref 8.5–10.5)
CHLORIDE SERPL-SCNC: 98 MMOL/L (ref 96–112)
CO2 SERPL-SCNC: 23 MMOL/L (ref 20–33)
CREAT SERPL-MCNC: 0.74 MG/DL (ref 0.5–1.4)
CRP SERPL HS-MCNC: 2.2 MG/DL (ref 0–0.75)
EOSINOPHIL # BLD AUTO: 0.03 K/UL (ref 0–0.51)
EOSINOPHIL NFR BLD: 0.5 % (ref 0–6.9)
ERYTHROCYTE [DISTWIDTH] IN BLOOD BY AUTOMATED COUNT: 70.9 FL (ref 35.9–50)
ERYTHROCYTE [SEDIMENTATION RATE] IN BLOOD BY WESTERGREN METHOD: 25 MM/HOUR (ref 0–20)
GLOBULIN SER CALC-MCNC: 2.3 G/DL (ref 1.9–3.5)
GLUCOSE SERPL-MCNC: 99 MG/DL (ref 65–99)
HCT VFR BLD AUTO: 22.1 % (ref 42–52)
HCT VFR BLD AUTO: 22.6 % (ref 42–52)
HCT VFR BLD AUTO: 25.7 % (ref 42–52)
HGB BLD-MCNC: 7.7 G/DL (ref 14–18)
HGB BLD-MCNC: 7.8 G/DL (ref 14–18)
HGB BLD-MCNC: 8.6 G/DL (ref 14–18)
IMM GRANULOCYTES # BLD AUTO: 0.04 K/UL (ref 0–0.11)
IMM GRANULOCYTES NFR BLD AUTO: 0.7 % (ref 0–0.9)
LV EJECT FRACT  99904: 60
LV EJECT FRACT MOD 2C 99903: 51.74
LV EJECT FRACT MOD 4C 99902: 41.69
LV EJECT FRACT MOD BP 99901: 45.61
LYMPHOCYTES # BLD AUTO: 1.15 K/UL (ref 1–4.8)
LYMPHOCYTES NFR BLD: 19.7 % (ref 22–41)
MAGNESIUM SERPL-MCNC: 1.9 MG/DL (ref 1.5–2.5)
MCH RBC QN AUTO: 32.8 PG (ref 27–33)
MCHC RBC AUTO-ENTMCNC: 34.5 G/DL (ref 33.7–35.3)
MCV RBC AUTO: 95 FL (ref 81.4–97.8)
MONOCYTES # BLD AUTO: 0.76 K/UL (ref 0–0.85)
MONOCYTES NFR BLD AUTO: 13 % (ref 0–13.4)
NEUTROPHILS # BLD AUTO: 3.85 K/UL (ref 1.82–7.42)
NEUTROPHILS NFR BLD: 65.8 % (ref 44–72)
NRBC # BLD AUTO: 0 K/UL
NRBC BLD-RTO: 0 /100 WBC
PHOSPHATE SERPL-MCNC: 1.7 MG/DL (ref 2.5–4.5)
PLATELET # BLD AUTO: 66 K/UL (ref 164–446)
PMV BLD AUTO: 9.9 FL (ref 9–12.9)
POTASSIUM SERPL-SCNC: 4 MMOL/L (ref 3.6–5.5)
PROT SERPL-MCNC: 5.5 G/DL (ref 6–8.2)
RBC # BLD AUTO: 2.38 M/UL (ref 4.7–6.1)
SIGNIFICANT IND 70042: NORMAL
SITE SITE: NORMAL
SODIUM SERPL-SCNC: 133 MMOL/L (ref 135–145)
SOURCE SOURCE: NORMAL
WBC # BLD AUTO: 5.9 K/UL (ref 4.8–10.8)

## 2020-09-25 PROCEDURE — 85652 RBC SED RATE AUTOMATED: CPT

## 2020-09-25 PROCEDURE — C9113 INJ PANTOPRAZOLE SODIUM, VIA: HCPCS | Performed by: HOSPITALIST

## 2020-09-25 PROCEDURE — 99231 SBSQ HOSP IP/OBS SF/LOW 25: CPT | Performed by: INTERNAL MEDICINE

## 2020-09-25 PROCEDURE — 99232 SBSQ HOSP IP/OBS MODERATE 35: CPT | Mod: GC | Performed by: HOSPITALIST

## 2020-09-25 PROCEDURE — 700102 HCHG RX REV CODE 250 W/ 637 OVERRIDE(OP): Performed by: STUDENT IN AN ORGANIZED HEALTH CARE EDUCATION/TRAINING PROGRAM

## 2020-09-25 PROCEDURE — A9270 NON-COVERED ITEM OR SERVICE: HCPCS | Performed by: HOSPITALIST

## 2020-09-25 PROCEDURE — 700102 HCHG RX REV CODE 250 W/ 637 OVERRIDE(OP): Performed by: HOSPITALIST

## 2020-09-25 PROCEDURE — 85025 COMPLETE CBC W/AUTO DIFF WBC: CPT

## 2020-09-25 PROCEDURE — 93306 TTE W/DOPPLER COMPLETE: CPT

## 2020-09-25 PROCEDURE — 86140 C-REACTIVE PROTEIN: CPT

## 2020-09-25 PROCEDURE — 85014 HEMATOCRIT: CPT | Mod: 91

## 2020-09-25 PROCEDURE — A9270 NON-COVERED ITEM OR SERVICE: HCPCS | Performed by: STUDENT IN AN ORGANIZED HEALTH CARE EDUCATION/TRAINING PROGRAM

## 2020-09-25 PROCEDURE — 700111 HCHG RX REV CODE 636 W/ 250 OVERRIDE (IP): Performed by: STUDENT IN AN ORGANIZED HEALTH CARE EDUCATION/TRAINING PROGRAM

## 2020-09-25 PROCEDURE — 83735 ASSAY OF MAGNESIUM: CPT

## 2020-09-25 PROCEDURE — 700111 HCHG RX REV CODE 636 W/ 250 OVERRIDE (IP): Performed by: HOSPITALIST

## 2020-09-25 PROCEDURE — 770020 HCHG ROOM/CARE - TELE (206)

## 2020-09-25 PROCEDURE — 36415 COLL VENOUS BLD VENIPUNCTURE: CPT

## 2020-09-25 PROCEDURE — 93306 TTE W/DOPPLER COMPLETE: CPT | Mod: 26 | Performed by: INTERNAL MEDICINE

## 2020-09-25 PROCEDURE — 84100 ASSAY OF PHOSPHORUS: CPT

## 2020-09-25 PROCEDURE — 80053 COMPREHEN METABOLIC PANEL: CPT

## 2020-09-25 PROCEDURE — 85018 HEMOGLOBIN: CPT

## 2020-09-25 RX ORDER — THIAMINE MONONITRATE (VIT B1) 100 MG
100 TABLET ORAL DAILY
Status: DISCONTINUED | OUTPATIENT
Start: 2020-09-25 | End: 2020-09-26 | Stop reason: HOSPADM

## 2020-09-25 RX ORDER — PEG-3350, SODIUM SULFATE, SODIUM CHLORIDE, POTASSIUM CHLORIDE, SODIUM ASCORBATE AND ASCORBIC ACID 7.5-2.691G
100 KIT ORAL 2 TIMES DAILY
Status: DISCONTINUED | OUTPATIENT
Start: 2020-09-25 | End: 2020-09-26 | Stop reason: HOSPADM

## 2020-09-25 RX ORDER — FOLIC ACID 1 MG/1
5 TABLET ORAL DAILY
Status: DISCONTINUED | OUTPATIENT
Start: 2020-09-25 | End: 2020-09-26 | Stop reason: HOSPADM

## 2020-09-25 RX ADMIN — Medication 100 MG: at 08:50

## 2020-09-25 RX ADMIN — PANTOPRAZOLE SODIUM 40 MG: 40 INJECTION, POWDER, LYOPHILIZED, FOR SOLUTION INTRAVENOUS at 04:38

## 2020-09-25 RX ADMIN — PANTOPRAZOLE SODIUM 40 MG: 40 INJECTION, POWDER, LYOPHILIZED, FOR SOLUTION INTRAVENOUS at 20:40

## 2020-09-25 RX ADMIN — FOLIC ACID 1 MG: 1 TABLET ORAL at 04:38

## 2020-09-25 RX ADMIN — FOLIC ACID 5 MG: 1 TABLET ORAL at 08:49

## 2020-09-25 RX ADMIN — POLYETHYLENE GLYCOL 3350, SODIUM SULFATE, SODIUM CHLORIDE, POTASSIUM CHLORIDE, ASCORBIC ACID, SODIUM ASCORBATE 100 G: KIT at 18:00

## 2020-09-25 RX ADMIN — DIBASIC SODIUM PHOSPHATE, MONOBASIC POTASSIUM PHOSPHATE AND MONOBASIC SODIUM PHOSPHATE 250 MG: 852; 155; 130 TABLET ORAL at 08:50

## 2020-09-25 RX ADMIN — THIAMINE HYDROCHLORIDE 100 MG: 100 INJECTION, SOLUTION INTRAMUSCULAR; INTRAVENOUS at 04:38

## 2020-09-25 ASSESSMENT — ENCOUNTER SYMPTOMS
WEIGHT LOSS: 1
SORE THROAT: 0
NECK PAIN: 0
MEMORY LOSS: 0
FEVER: 0
CONSTIPATION: 0
DIARRHEA: 0
ABDOMINAL PAIN: 0
COUGH: 0
BACK PAIN: 0
CHILLS: 0
WHEEZING: 0
CARDIOVASCULAR NEGATIVE: 1
DIZZINESS: 0
SINUS PAIN: 0
TINGLING: 0
TREMORS: 0
PALPITATIONS: 0
PSYCHIATRIC NEGATIVE: 1
RESPIRATORY NEGATIVE: 1
NAUSEA: 0
BRUISES/BLEEDS EASILY: 0
EYES NEGATIVE: 1
VOMITING: 0
DEPRESSION: 0
CONSTITUTIONAL NEGATIVE: 1
SHORTNESS OF BREATH: 0
MYALGIAS: 0
GASTROINTESTINAL NEGATIVE: 1
WEAKNESS: 0
NERVOUS/ANXIOUS: 0
MUSCULOSKELETAL NEGATIVE: 1
HEADACHES: 0
NEUROLOGICAL NEGATIVE: 1

## 2020-09-25 ASSESSMENT — FIBROSIS 4 INDEX: FIB4 SCORE: 8.23

## 2020-09-25 NOTE — CARE PLAN
Problem: Nutritional:  Goal: Achieve adequate nutritional intake  Description: Patient will consume >50% of meals and snacks.  Outcome: PROGRESSING SLOWER THAN EXPECTED   Recorded PO intake of meals <25% to 25-50%. Pt has been off and on NPO for EGD and now colonoscopy. Encourage PO intake. RD following.

## 2020-09-25 NOTE — NON-PROVIDER
Internal Medicine Daily Progress Note  Note Author: Ines Palacio, Third Year Medical Student    Date of Service: 2020  Date of Admission: 2020   Primary Team: [facility] IM White Team   Attending: BRIDGETT Sanchez MD   Senior Resident: Dr. Stephenie JimenezNicola     1945   Age/Sex 75 y.o. male   MRN [id number]         Reason for interval visit  Anemia    SUBJECTIVE:  Nicola is a 75 y.o. male with PMH of coronary artery disease, esophageal dysphagia, and hypercholesterolemia who presented on 2020 with concerns of 30lb unintentional weight loss over the past three months, increased generalized weakness over the past month, and difficulty with swallowing solids and liquids for the past ten years. The pt reports he has had increased difficulty walking due to the generalized weakness.     Overnight: Pt reports continued lightheadedness and weakness. Hemoglobin 7.8 with platelets 66 today. Was 8.8 last night. EGD showed gastritis, duodenitis, and possible achalasia. Colonoscopy scheduled for today, but patient has not finished his bowel prep.  Cardiology consulted and said they would consider the TAVR once the source of anemia has been identified. MMSE done yesterday was .     Consultants/Specialty: Gastroenterology, Cardiology    ROS:  Constitutional: Positive for weight loss.   HENT: Positive for difficulty hearing.   Eyes: Negative for eye pain.   Respiratory: Negative for shortness of breath.   Cardiovascular: Negative for chest pain. Positive for lightheadedness.  Gastrointestinal: Positive for trouble swallowing solids and liquids.  Genitourinary: Negative for flank pain.  Musculoskeletal: Negative for back pain.  Skin: Negative for bruises, rashes.  Neurological: Positive for generalized weakness.     Vitals   Temp:  [36.5 °C (97.7 °F)-36.7 °C (98.1 °F)] 36.7 °C (98.1 °F)  Pulse:  [69-98] 98  Resp:  [16-20] 16  BP: (102-151)/(55-84) 102/66  SpO2:  [91 %-98 %] 98 %    Body mass  index is 21.95 kg/m².    Physical Exam     General: Not in acute distress. ECHO being performed on pt. Pt requesting chocolate cake.     Appearance: Not toxic appearing. Appears frail and mild pallor is present.   HENT:      Head: Normocephalic and atraumatic.     Ears: Trouble hearing.   Eyes:      General: No scleral icterus.  Neck:      Musculoskeletal: Normal range of motion.   Cardiovascular:      Rate and Rhythm: Regular rate and rhythm.      Pulses: Radial pulses 2+ bilaterally. Dorsalis pedis pulses 2+ bilaterally.      Heart sounds: Aortic stenosis murmur present with radiation to the carotids. Tricuspid regurgitation murmur present. Mitral regurgitation murmur present.   Pulmonary:      Effort: Pulmonary effort is normal. No respiratory distress. Mild cough present on exam, non-productive.     Breath sounds: Lungs clear to auscultation bilaterally. No wheezes or crackles.   Abdominal:      General: There is no distension.      Palpations: Abdomen is soft.      Tenderness: There is no abdominal tenderness to palpation. There is no guarding.   Musculoskeletal:         General: No signs of trauma.       Range of motion: Moves all extremities x4.      Right lower leg: No edema.      Left lower leg: No edema. Left 3rd toe amputated.  Skin:     Findings: No bruises or rashes present. Pallor present.   Neurological:      Mental status, orientation: Alert.      Speech and language: speech is clear and fluent.     MMSE score 22/25.    Labs/Imaging:  Recent/pertinent lab results & imaging reviewed.  Lab Results   Component Value Date/Time    WBC 5.9 09/25/2020 02:42 AM    RBC 2.38 (L) 09/25/2020 02:42 AM    HEMOGLOBIN 7.7 (L) 09/25/2020 09:36 AM    HEMATOCRIT 22.1 (L) 09/25/2020 09:36 AM    MCV 95.0 09/25/2020 02:42 AM    MCH 32.8 09/25/2020 02:42 AM    MCHC 34.5 09/25/2020 02:42 AM    MPV 9.9 09/25/2020 02:42 AM    NEUTSPOLYS 65.80 09/25/2020 02:42 AM    LYMPHOCYTES 19.70 (L) 09/25/2020 02:42 AM    MONOCYTES 13.00  09/25/2020 02:42 AM    EOSINOPHILS 0.50 09/25/2020 02:42 AM    BASOPHILS 0.30 09/25/2020 02:42 AM    ANISOCYTOSIS 1+ 09/23/2020 08:23 AM      Lab Results   Component Value Date/Time    SODIUM 133 (L) 09/25/2020 02:42 AM    POTASSIUM 4.0 09/25/2020 02:42 AM    CHLORIDE 98 09/25/2020 02:42 AM    CO2 23 09/25/2020 02:42 AM    GLUCOSE 99 09/25/2020 02:42 AM    BUN 8 09/25/2020 02:42 AM    CREATININE 0.74 09/25/2020 02:42 AM    CREATININE 1.1 05/25/2007 12:00 PM      Lab Results   Component Value Date/Time    PROTHROMBTM 14.3 09/23/2020 08:23 AM    INR 1.07 09/23/2020 08:23 AM        EC-ECHOCARDIOGRAM COMPLETE W/O CONT   Final Result      US-ABDOMEN COMPLETE SURVEY   Final Result      1.  Hepatic steatosis.   2.  Gallbladder mass versus tumefactive sludge. Follow-up is recommended.   3.   Additional findings as detailed.         CT-CHEST,ABDOMEN,PELVIS WITH   Final Result      1.  Diffuse esophageal wall thickening, likely inflammatory.  Neoplasm is not entirely excluded.   2.  Wedge-shaped low density at the anterior aspect of the spleen, most concerning for infarct.   3.  Fatty infiltration of liver.   4.  Abdominal aortic aneurysm with stent graft in place.   5.  LEFT ureteral stent present.   6.  Postoperative change of lower abdominal wall.      DX-CHEST-PORTABLE (1 VIEW)   Final Result      Left basilar atelectasis or scarring.      Nodular density in the right lung base likely representing nipple shadow. Recommend repeat chest x-ray with nipple markers.          Assessment/Plan   Pt is a 75 y.o. male with PMH notable for coronary artery disease, esophageal dysphagia, and hypercholesterolemia c/o generalized weakness, difficulty swallowing solids and liquids, and weight loss.    * Anemia  Assessment & Plan       Assessment:       Anemia likely due to anemia of chronic disease as indicated by high iron of 187. Anemia also due to likely hemolysis from aortic stenosis and/or GI blood loss. 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 ECHO shows severe aortic stenosis with left ventricular hypertrophy of 1.4cm. LVEF 60%. Right ventricle systolic pressure ~30. GI did not shows a sight of bleeding in EGD, but showed gastritis and duodenitis with possible achalasia.  Colonoscopy planned for today. Hemoglobin 7.8. Folate was low at 3.6. B12 within normal limits. Ferritin high at 954. Iron high at 187. TSH normal. LDH high end of normal.      Plan:  -Given packed red blood cells x2 units.  -LR 50ml/hr with KCl 40meq  -Haptoglobin ordered.  -Continue to replete Magnesium and Phosphorus.   -Continue to repeat CBC to monitor Hemoglobin and Hematocrit.  -IV Protonix 40mg BID ordered.   -Ordered Folate and Thiamine.   -Consult cardiology for TAVR procedure, as aortic stenosis is likely cause of anemia.   -Colonoscopy planned for today.     Esophageal dysphagia  Assessment & Plan  Assessment:  CT shows esophageal wall thickening, likely inflammatory, but neoplasm unable to be ruled out. EGD shows duodenitis and gastritis as well as possible achalasia. Recommends colonoscopy.     Plan:  -Colonoscopy.     Elevated Troponin  Assessment & Plan  Assessment:  Pt with known coronary artery disease with elevated troponin of 33. Troponin was most recently 52. EKG concerning with T wave inversion present on admission. Pt has no chest pain at this time. Repeat EKG shows changes. There is no T wave inversion now, suggesting that pt likely had an ischemic event which improved with treatment of anemia.      Plan:  -Continue repeat EKG.     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 Abdominal US does not show a splenic infarct. PT, INR, PTT within normal limits. Lactic acid 2.4, down from 4.2 on admission. ECHO shows severe aortic stenosis with left ventricle hypertrophy of 1.4cm. LVEF 60%. Right ventricle systolic pressure ~30.       Plan:  -Consult cardiology for TAVR procedure.     Gastritis, Duodenitis  Assessment & Plan  Assessment:  Pt had EGD showing gastritis, duodenitis, and possible achalasia.     Plan:  -Continue Protonix.

## 2020-09-25 NOTE — PROGRESS NOTES
Patient refusing to drink Movi Prep for colonoscopy tomorrow. Patient education on the need for it. MD notified and stated she will be at bedside later tonight.

## 2020-09-25 NOTE — PROGRESS NOTES
Daily Progress Note:     Date of Service: 9/25/2020  Primary Team: [facility] White Team   Attending: BRIDGETT Sanchez M.D.   Senior Resident:   Intern: Dr. Morales  Contact:  [phone]    Chief Complaint:   Weakness/fatigue for one month.    Subjective:-  - LHb of 7.8 this morning, down from 8.9 1852 yesterday. T.Bili at 1.4.  - EGD showed tight lower esophageal sphincter, gastritis and duodenitis. Plan according to GI is colonoscopy scheduled for tomorrow, Patient only finished 20% of bowel prep overnight, colonoscopy cancelled today.  - cardiology() consulted to see if the patient is a candidate for TAVR as his anaemia might be related to extravascular hemolysis from severe aortic valve stenosis - signed off - outpatient follow up.  - Pending echocardiography.     Consultants/Specialty:  Gastroenterology.  Cardiology.    Review of Systems:   Review of Systems   Constitutional: Positive for malaise/fatigue and weight loss. Negative for chills and fever.   HENT: Negative for congestion, ear discharge, ear pain, sinus pain and sore throat.    Respiratory: Negative for cough, shortness of breath and wheezing.    Cardiovascular: Negative for chest pain, palpitations and leg swelling.   Gastrointestinal: Negative for abdominal pain, constipation, diarrhea, nausea and vomiting.   Genitourinary: Negative for dysuria, frequency, hematuria and urgency.   Musculoskeletal: Negative for back pain, joint pain and neck pain.   Skin: Negative for itching and rash.   Neurological: Negative for dizziness, tingling, tremors, weakness and headaches.   Endo/Heme/Allergies: Does not bruise/bleed easily.   Psychiatric/Behavioral: Negative for depression and memory loss. The patient is not nervous/anxious.        Objective Data:   Physical Exam:   Vitals:   Temp:  [36.1 °C (97 °F)-36.7 °C (98.1 °F)] 36.5 °C (97.7 °F)  Pulse:  [71-91] 82  Resp:  [16-20] 16  BP: (104-151)/(55-84) 106/65  SpO2:  [91 %-100 %] 93 %      Physical Exam  Constitutional:       General: He is not in acute distress.     Appearance: Normal appearance.   HENT:      Head: Normocephalic and atraumatic.      Right Ear: External ear normal.      Left Ear: External ear normal.      Nose: Nose normal.   Eyes:      Extraocular Movements: Extraocular movements intact.      Pupils: Pupils are equal, round, and reactive to light.   Neck:      Musculoskeletal: Normal range of motion. No neck rigidity or muscular tenderness.   Cardiovascular:      Rate and Rhythm: Normal rate and regular rhythm.      Pulses: Normal pulses.      Heart sounds: Murmur present. No friction rub. No gallop.    Pulmonary:      Effort: Pulmonary effort is normal.      Breath sounds: Normal breath sounds.   Abdominal:      General: There is no distension.      Palpations: There is no mass.      Tenderness: There is no abdominal tenderness.      Hernia: No hernia is present.   Skin:     Capillary Refill: Capillary refill takes less than 2 seconds.      Findings: No bruising, erythema, lesion or rash.   Neurological:      General: No focal deficit present.      Mental Status: He is alert and oriented to person, place, and time.      Motor: No weakness.   Psychiatric:         Mood and Affect: Mood normal.         Behavior: Behavior normal.         Thought Content: Thought content normal.         Judgment: Judgment normal.           Labs:   Recent Labs     09/23/20  0823 09/24/20  0742 09/25/20  0242   SODIUM 133* 132* 133*   POTASSIUM 3.5* 3.8 4.0   CHLORIDE 95* 97 98   CO2 26 25 23   BUN 12 11 8   CREATININE 0.59 0.64 0.74   MAGNESIUM 1.3* 1.2* 1.9   PHOSPHORUS  --  1.8* 1.7*   CALCIUM 8.9 8.5 8.4*       Recent Labs     09/23/20  0823 09/24/20  0742 09/24/20  1641 09/25/20  0242   ALTSGPT 18 14  --  12   ASTSGOT 31 28  --  30   ALKPHOSPHAT 77 66  --  67   TBILIRUBIN 1.1 1.8*  --  1.4   DBILIRUBIN  --   --  0.4  --    GLUCOSE 97 102*  --  99       Recent Labs     09/23/20  0823 09/23/20  1837  09/23/20 2321 09/24/20 0742 09/24/20  1852 09/25/20  0242   RBC 1.83*  --  2.37* 2.41*  --  2.38*   HEMOGLOBIN 6.3* 6.3* 7.7* 7.8* 8.9* 7.8*   HEMATOCRIT 18.5* 18.5* 22.8* 23.0* 26.1* 22.6*   PLATELETCT 75*  --  81* 70*  --  66*   PROTHROMBTM 14.3  --   --   --   --   --    APTT 27.5  --   --   --   --   --    INR 1.07  --   --   --   --   --    IRON  --  187*  --   --   --   --    FERRITIN  --  954.0*  --   --   --   --    TOTIRONBC  --  204*  --   --   --   --        Recent Labs     09/23/20 0823 09/23/20 2321 09/24/20 0742 09/25/20  0242   WBC 3.2* 3.7* 3.6* 5.9   NEUTSPOLYS 57.50  --  55.70 65.80   LYMPHOCYTES 29.10  --  28.00 19.70*   MONOCYTES 11.90  --  14.30* 13.00   EOSINOPHILS 0.60  --  0.80 0.50   BASOPHILS 0.30  --  0.60 0.30   ASTSGOT 31  --  28 30   ALTSGPT 18  --  14 12   ALKPHOSPHAT 77  --  66 67   TBILIRUBIN 1.1  --  1.8* 1.4       Imaging:   US-ABDOMEN COMPLETE SURVEY   Final Result      1.  Hepatic steatosis.   2.  Gallbladder mass versus tumefactive sludge. Follow-up is recommended.   3.   Additional findings as detailed.         CT-CHEST,ABDOMEN,PELVIS WITH   Final Result      1.  Diffuse esophageal wall thickening, likely inflammatory.  Neoplasm is not entirely excluded.   2.  Wedge-shaped low density at the anterior aspect of the spleen, most concerning for infarct.   3.  Fatty infiltration of liver.   4.  Abdominal aortic aneurysm with stent graft in place.   5.  LEFT ureteral stent present.   6.  Postoperative change of lower abdominal wall.      DX-CHEST-PORTABLE (1 VIEW)   Final Result      Left basilar atelectasis or scarring.      Nodular density in the right lung base likely representing nipple shadow. Recommend repeat chest x-ray with nipple markers.      EC-ECHOCARDIOGRAM COMPLETE W/O CONT    (Results Pending)       Problem Representation:   Mr. Jimenez is a 75-year-old male with history of CAD ,severe AS & moderate MR based on echo done in 2017 who presents with weakness/fatigue  for the past month.  He had an elevated troponin and possible EKG changes suggestive of anterior infarct.  Repeat troponin trended down.  CT chest abdomen pelvis suggestive of esophageal inflammation or carcinoma. Hemoglobin down to 6.3 compared to 8.3 on admission requiring blood transfusion.patient labs remarkable for increased serum bilirubin possibly secondary to extravascular hemolysis.    * Anemia- (present on admission)  Assessment & Plan  - Hg on admission 8.3, dropped to 6.3. patient received 2 units of PRBCs.  - patient underwent EGD on 09/24/2020 which showed tight lower esophageal sphincter, gastritis and duodenitis but no evidence of active bleeding.  - cardiology was consulted to see if patient is a candidate for TAVR as his labs showed findings suggestive of extravascular hemolysis from severe aortic stenosis.  - retics count imm reticulocytes 17.8, direct/indirect bili WNL, .  @Plan:-  - Monitor H/H every 8 hours, transfuse if <7.5.  - IV protonix.  - folate, IV thiamine.  - pending martha test,Haptoglobin.      Esophageal abnormality concerning for malignancy  Assessment & Plan  - Reports patient  lost 30 pounds in the past month; Reports to have an esophageal dysphagia (liquids/solids) for >10 years that is also at its baseline. CT chest abdomen/pelvis- showed esophageal wall thickening concerning for malignancy, but also possibly inflammation.   - Pt has a pancytopenia which is potentially secondary to malignancy.  @Plan:-  - Per gastroenterology:-  1- Await histopathology  2- Colonoscopy since no significant pathology identified to account for severe anemia. Patient did now finish bowel prep today and colonoscopy was canceled, rescheduled for tomorrow. Bowel prep ordered, clear liquid diet, NPO after midnight.  3- If patient has significant dysphagia, consider balloon dilatation or referral for Heller's myotomy for management of previously diagnosed achalasia cardia.        Troponin level  elevated  Assessment & Plan  - Most likely secondary to imbalance between supply and demand as patient's ischemic EKG findings disappeared and  troponin trended down after blood transfusion.   - CTM and transfuse if Hb<7.5.    Aortic stenosis- (present on admission)  Assessment & Plan  Murmurs noted on physical exam. Last echo 2017 showed severe aortic stenosis, moderate mitral regurgitation, right heart pressures are consistent with moderate pulmonary   Hypertension, severely dilated left atrium. LVEF 55%  @Plan:-  - Repeat echocardiogram ordered. - LVEF 60%, aortic stenosis: Vmax is 4.6  m/s, MG 48 mmHg, RYAN 0.9 cm2.  Moderate aortic insufficiency, RVSP 30 mmHg, mild tricuspid regurgitation, no mitral stenosis or regurgitation  - Cardiology on board, plan is for TAVR when the source of anaemia has been identified - outpatient follow up    Lactic acid acidosis in the setting of incidental splenic infarct  Assessment & Plan  Resolved with IV fluids.

## 2020-09-25 NOTE — OR NURSING
Pt was scheduled for procedure at 1230, but per report from RAKESH Vela, patient is having difficulty keeping prep down and has only completed approximately 1/4 of the prep with no bowel movements. In addition the patient has questions as to why the procedure is needed and declined prep during night shift unlit about 1700 on 9/24/20. Dr. Bliss notified, pt will need to be rescheduled, and follow-up with patient as to questions and further prep orders. Update sent via thinkingphones to RAKESH Vela providing pt's care.

## 2020-09-25 NOTE — THERAPY
Attempted therapy follow up session. Pt declining activity right now stating he is eating lunch and it can't get cold. Despite encouragement he continued to decline activity. Will follow up as able.

## 2020-09-25 NOTE — PROGRESS NOTES
[facility] at bedside. Educated patient on the need to finish Moviprep for procedure this AM. Patient agreed to continue working on finishing the first dose. Will continue to educate and encourage.

## 2020-09-25 NOTE — PROGRESS NOTES
Cardiology Follow Up Progress Note    Date of Service  9/25/2020    Attending Physician  BRIDGETT Sanchez M.D.    Chief Complaint   Severe aortic stenosis, history of endocarditis and aortic stenosis, abdominal aortic aneurysm repair, and anemia.    HPI  Nicola Jimenez is a 75 y.o. male admitted 9/22/2020 with above.    Interim Events  He underwent EGD yesterday.    Review of Systems  Review of Systems   Constitutional: Negative.  Negative for chills and fever.   HENT: Negative.  Negative for hearing loss.    Eyes: Negative.    Respiratory: Negative.  Negative for cough and shortness of breath.    Cardiovascular: Negative.  Negative for chest pain, palpitations and leg swelling.   Gastrointestinal: Negative.  Negative for abdominal pain, nausea and vomiting.   Genitourinary: Negative.  Negative for dysuria and urgency.   Musculoskeletal: Negative.  Negative for myalgias.   Skin: Negative.  Negative for rash.   Neurological: Negative.  Negative for dizziness, weakness and headaches.   Hematological: Does not bruise/bleed easily.   Psychiatric/Behavioral: Negative.  The patient is not nervous/anxious.        Vital signs in last 24 hours  Temp:  [36.1 °C (97 °F)-36.7 °C (98.1 °F)] 36.7 °C (98.1 °F)  Pulse:  [69-91] 69  Resp:  [16-20] 16  BP: (104-151)/(55-84) 109/71  SpO2:  [91 %-100 %] 95 %    Physical Exam  Physical Exam  Constitutional:       Appearance: He is well-developed.   HENT:      Head: Normocephalic and atraumatic.   Eyes:      Conjunctiva/sclera: Conjunctivae normal.      Pupils: Pupils are equal, round, and reactive to light.   Neck:      Musculoskeletal: Normal range of motion and neck supple.   Cardiovascular:      Rate and Rhythm: Normal rate and regular rhythm.      Heart sounds: Murmur present.   Pulmonary:      Effort: Pulmonary effort is normal.      Breath sounds: Normal breath sounds.   Abdominal:      General: Bowel sounds are normal.      Palpations: Abdomen is soft.   Musculoskeletal:  Normal range of motion.   Skin:     General: Skin is warm and dry.   Neurological:      Mental Status: He is alert and oriented to person, place, and time.         Lab Review  Lab Results   Component Value Date/Time    WBC 5.9 09/25/2020 02:42 AM    RBC 2.38 (L) 09/25/2020 02:42 AM    HEMOGLOBIN 7.7 (L) 09/25/2020 09:36 AM    HEMATOCRIT 22.1 (L) 09/25/2020 09:36 AM    MCV 95.0 09/25/2020 02:42 AM    MCH 32.8 09/25/2020 02:42 AM    MCHC 34.5 09/25/2020 02:42 AM    MPV 9.9 09/25/2020 02:42 AM      Lab Results   Component Value Date/Time    SODIUM 133 (L) 09/25/2020 02:42 AM    POTASSIUM 4.0 09/25/2020 02:42 AM    CHLORIDE 98 09/25/2020 02:42 AM    CO2 23 09/25/2020 02:42 AM    GLUCOSE 99 09/25/2020 02:42 AM    BUN 8 09/25/2020 02:42 AM    CREATININE 0.74 09/25/2020 02:42 AM    CREATININE 1.1 05/25/2007 12:00 PM      Lab Results   Component Value Date/Time    ASTSGOT 30 09/25/2020 02:42 AM    ALTSGPT 12 09/25/2020 02:42 AM     Lab Results   Component Value Date/Time    CHOLSTRLTOT 193 03/30/2020 12:40 PM     (H) 03/30/2020 12:40 PM    HDL 45 03/30/2020 12:40 PM    TRIGLYCERIDE 134 03/30/2020 12:40 PM    TROPONINT 52 (H) 09/24/2020 07:42 AM       No results for input(s): NTPROBNP in the last 72 hours.  (Above labs reviewed.)       Current Facility-Administered Medications:   •  PROPOFOL 10 MG/ML IV EMUL, , , ,   •  folic acid (FOLVITE) tablet 5 mg, 5 mg, Oral, DAILY, Kenji Casiano M.D., 5 mg at 09/25/20 0849  •  thiamine tablet 100 mg, 100 mg, Oral, DAILY, Kenji Casiano M.D., 100 mg at 09/25/20 0850  •  peg-electrolyte solution (MOVIPREP) package 100 g, 100 g, Oral, BID, Sumodh PAT Arellano M.D.  •  pantoprazole (PROTONIX) injection 40 mg, 40 mg, Intravenous, BID, TElder Sanchez M.D., 40 mg at 09/25/20 0438  •  senna-docusate (PERICOLACE or SENOKOT S) 8.6-50 MG per tablet 2 Tab, 2 Tab, Oral, BID **AND** polyethylene glycol/lytes (MIRALAX) PACKET 1 Packet, 1 Packet, Oral, QDAY PRN **AND** magnesium  hydroxide (MILK OF MAGNESIA) suspension 30 mL, 30 mL, Oral, QDAY PRN **AND** bisacodyl (DULCOLAX) suppository 10 mg, 10 mg, Rectal, QDAY PRN, Elizabet Gonzalez M.D.  •  nicotine (NICODERM) 14 MG/24HR 14 mg, 14 mg, Transdermal, Daily-0600 **AND** Nicotine Replacement Patient Education Materials, , , Once **AND** nicotine polacrilex (NICORETTE) 2 MG piece 2 mg, 2 mg, Oral, Q HOUR PRN, Elizabet Gonzalez M.D.  •  potassium chloride 40 mEq in LR 1,000 mL infusion, , Intravenous, Continuous, Kenji Casiano M.D., Last Rate: 50 mL/hr at 09/24/20 1444  (Medications reviewed.)    Cardiac Imaging and Procedures Review  CARDIAC STUDIES/PROCEDURES:    ECHOCARDIOGRAM CONCLUSIONS (09/25/20)  Left ventricular ejection fraction is visually estimated to be 60%.  Moderate concentric left ventricular hypertrophy, 1.4 cm.  Grade II diastolic dysfunction.  Severely dilated left atrium.  Functionally bicuspid aortic valve, fusion of L/R cusps with severe   aortic stenosis: Vmax is 4.6  m/s, MG 48 mmHg, RYAN 0.9 cm2.  Moderate aortic insufficiency.  Right ventricular systolic pressure is estimated to be 30 mmHg.     Compared to the images of the prior study done 04/09/2017, AS still   severe, now moderate LVH, likely due to AS.  (study result reviewed)     ECHOCARDIOGRAM CONCLUSIONS (04/09/17)  Normal left ventricular size and systolic function.  Left ventricular ejection fraction is visually estimated to be 55%.  Severe aortic stenosis.  Moderate mitral regurgitation.  Right heart pressures are consistent with moderate pulmonary hypertension.  Severely dilated left atrium.  (study result reviewed)     EKG performed on (09/24/20) EKGpersonally interpreted shows sinus rhythm.    Assessment/Plan  1. Severe aortic stenosis: His aortic stenosis has been severe. We will further discuss option of TAVR as outpatient when his anemia has been stabilized.  2. History of abdominal aortic aneurysm with repair  3. Anemia, under evaluation.    Thank you  for allowing me to participate in the care of this patient.  Cardiology will sign off on this patient    Please contact me with any questions.    Seun Blue M.D.   Cardiologist, [facility]  [phone]

## 2020-09-25 NOTE — PROGRESS NOTES
Assumed care at 0700, bedside report received from Loren CAMERON. Pt is SR on the monitor. Initial assessment completed, orders reviewed, call light with reach, bed alarm is in use, and hourly rounding in place. POC addressed with patient, no additional questions at this time.

## 2020-09-26 ENCOUNTER — ANESTHESIA EVENT (OUTPATIENT)
Dept: SURGERY | Facility: MEDICAL CENTER | Age: 75
DRG: 392 | End: 2020-09-26
Payer: MEDICARE

## 2020-09-26 ENCOUNTER — ANESTHESIA (OUTPATIENT)
Dept: SURGERY | Facility: MEDICAL CENTER | Age: 75
DRG: 392 | End: 2020-09-26
Payer: MEDICARE

## 2020-09-26 VITALS
HEART RATE: 62 BPM | HEIGHT: 70 IN | BODY MASS INDEX: 21.9 KG/M2 | SYSTOLIC BLOOD PRESSURE: 106 MMHG | RESPIRATION RATE: 20 BRPM | TEMPERATURE: 97 F | OXYGEN SATURATION: 96 % | DIASTOLIC BLOOD PRESSURE: 63 MMHG | WEIGHT: 153 LBS

## 2020-09-26 LAB
ALBUMIN SERPL BCP-MCNC: 3.6 G/DL (ref 3.2–4.9)
ALBUMIN/GLOB SERPL: 1.3 G/DL
ALP SERPL-CCNC: 76 U/L (ref 30–99)
ALT SERPL-CCNC: 21 U/L (ref 2–50)
ANION GAP SERPL CALC-SCNC: 15 MMOL/L (ref 7–16)
AST SERPL-CCNC: 44 U/L (ref 12–45)
BARCODED ABORH UBTYP: 5100
BARCODED PRD CODE UBPRD: NORMAL
BARCODED UNIT NUM UBUNT: NORMAL
BILIRUB SERPL-MCNC: 2.2 MG/DL (ref 0.1–1.5)
BUN SERPL-MCNC: 7 MG/DL (ref 8–22)
CALCIUM SERPL-MCNC: 8.9 MG/DL (ref 8.5–10.5)
CHLORIDE SERPL-SCNC: 98 MMOL/L (ref 96–112)
CO2 SERPL-SCNC: 19 MMOL/L (ref 20–33)
COMPONENT R 8504R: NORMAL
CREAT SERPL-MCNC: 0.74 MG/DL (ref 0.5–1.4)
GLOBULIN SER CALC-MCNC: 2.7 G/DL (ref 1.9–3.5)
GLUCOSE SERPL-MCNC: 101 MG/DL (ref 65–99)
HAPTOGLOB SERPL-MCNC: 83 MG/DL (ref 30–200)
HCT VFR BLD AUTO: 21.7 % (ref 42–52)
HGB BLD-MCNC: 7.3 G/DL (ref 14–18)
MAGNESIUM SERPL-MCNC: 1.8 MG/DL (ref 1.5–2.5)
PHOSPHATE SERPL-MCNC: 3.1 MG/DL (ref 2.5–4.5)
POTASSIUM SERPL-SCNC: 4 MMOL/L (ref 3.6–5.5)
PRODUCT TYPE UPROD: NORMAL
PROT SERPL-MCNC: 6.3 G/DL (ref 6–8.2)
SODIUM SERPL-SCNC: 132 MMOL/L (ref 135–145)
UNIT STATUS USTAT: NORMAL

## 2020-09-26 PROCEDURE — C9113 INJ PANTOPRAZOLE SODIUM, VIA: HCPCS | Performed by: HOSPITALIST

## 2020-09-26 PROCEDURE — 700105 HCHG RX REV CODE 258: Performed by: STUDENT IN AN ORGANIZED HEALTH CARE EDUCATION/TRAINING PROGRAM

## 2020-09-26 PROCEDURE — A9270 NON-COVERED ITEM OR SERVICE: HCPCS | Performed by: STUDENT IN AN ORGANIZED HEALTH CARE EDUCATION/TRAINING PROGRAM

## 2020-09-26 PROCEDURE — 85014 HEMATOCRIT: CPT

## 2020-09-26 PROCEDURE — 700102 HCHG RX REV CODE 250 W/ 637 OVERRIDE(OP): Performed by: STUDENT IN AN ORGANIZED HEALTH CARE EDUCATION/TRAINING PROGRAM

## 2020-09-26 PROCEDURE — 84100 ASSAY OF PHOSPHORUS: CPT

## 2020-09-26 PROCEDURE — 86923 COMPATIBILITY TEST ELECTRIC: CPT

## 2020-09-26 PROCEDURE — 0DJD8ZZ INSPECTION OF LOWER INTESTINAL TRACT, VIA NATURAL OR ARTIFICIAL OPENING ENDOSCOPIC: ICD-10-PCS | Performed by: INTERNAL MEDICINE

## 2020-09-26 PROCEDURE — 700111 HCHG RX REV CODE 636 W/ 250 OVERRIDE (IP): Performed by: ANESTHESIOLOGY

## 2020-09-26 PROCEDURE — 160035 HCHG PACU - 1ST 60 MINS PHASE I: Performed by: INTERNAL MEDICINE

## 2020-09-26 PROCEDURE — 36430 TRANSFUSION BLD/BLD COMPNT: CPT

## 2020-09-26 PROCEDURE — 160002 HCHG RECOVERY MINUTES (STAT): Performed by: INTERNAL MEDICINE

## 2020-09-26 PROCEDURE — 99238 HOSP IP/OBS DSCHRG MGMT 30/<: CPT | Mod: GC | Performed by: HOSPITALIST

## 2020-09-26 PROCEDURE — 160048 HCHG OR STATISTICAL LEVEL 1-5: Performed by: INTERNAL MEDICINE

## 2020-09-26 PROCEDURE — 80053 COMPREHEN METABOLIC PANEL: CPT

## 2020-09-26 PROCEDURE — 36415 COLL VENOUS BLD VENIPUNCTURE: CPT

## 2020-09-26 PROCEDURE — 700105 HCHG RX REV CODE 258

## 2020-09-26 PROCEDURE — 700111 HCHG RX REV CODE 636 W/ 250 OVERRIDE (IP): Performed by: STUDENT IN AN ORGANIZED HEALTH CARE EDUCATION/TRAINING PROGRAM

## 2020-09-26 PROCEDURE — 160203 HCHG ENDO MINUTES - 1ST 30 MINS LEVEL 4: Performed by: INTERNAL MEDICINE

## 2020-09-26 PROCEDURE — 160009 HCHG ANES TIME/MIN: Performed by: INTERNAL MEDICINE

## 2020-09-26 PROCEDURE — 83735 ASSAY OF MAGNESIUM: CPT

## 2020-09-26 PROCEDURE — 700111 HCHG RX REV CODE 636 W/ 250 OVERRIDE (IP): Performed by: HOSPITALIST

## 2020-09-26 PROCEDURE — P9016 RBC LEUKOCYTES REDUCED: HCPCS

## 2020-09-26 PROCEDURE — 85018 HEMOGLOBIN: CPT

## 2020-09-26 PROCEDURE — 700105 HCHG RX REV CODE 258: Performed by: ANESTHESIOLOGY

## 2020-09-26 RX ORDER — DIPHENHYDRAMINE HYDROCHLORIDE 50 MG/ML
12.5 INJECTION INTRAMUSCULAR; INTRAVENOUS
Status: DISCONTINUED | OUTPATIENT
Start: 2020-09-26 | End: 2020-09-26 | Stop reason: HOSPADM

## 2020-09-26 RX ORDER — HALOPERIDOL 5 MG/ML
1 INJECTION INTRAMUSCULAR
Status: DISCONTINUED | OUTPATIENT
Start: 2020-09-26 | End: 2020-09-26 | Stop reason: HOSPADM

## 2020-09-26 RX ORDER — SODIUM CHLORIDE, SODIUM LACTATE, POTASSIUM CHLORIDE, CALCIUM CHLORIDE 600; 310; 30; 20 MG/100ML; MG/100ML; MG/100ML; MG/100ML
INJECTION, SOLUTION INTRAVENOUS CONTINUOUS
Status: DISCONTINUED | OUTPATIENT
Start: 2020-09-26 | End: 2020-09-26 | Stop reason: HOSPADM

## 2020-09-26 RX ORDER — LABETALOL HYDROCHLORIDE 5 MG/ML
5 INJECTION, SOLUTION INTRAVENOUS
Status: DISCONTINUED | OUTPATIENT
Start: 2020-09-26 | End: 2020-09-26 | Stop reason: HOSPADM

## 2020-09-26 RX ORDER — SODIUM CHLORIDE, SODIUM LACTATE, POTASSIUM CHLORIDE, CALCIUM CHLORIDE 600; 310; 30; 20 MG/100ML; MG/100ML; MG/100ML; MG/100ML
INJECTION, SOLUTION INTRAVENOUS
Status: DISCONTINUED | OUTPATIENT
Start: 2020-09-26 | End: 2020-09-26 | Stop reason: SURG

## 2020-09-26 RX ORDER — OMEPRAZOLE 20 MG/1
20 CAPSULE, DELAYED RELEASE ORAL 2 TIMES DAILY
Status: DISCONTINUED | OUTPATIENT
Start: 2020-09-26 | End: 2020-09-26 | Stop reason: HOSPADM

## 2020-09-26 RX ORDER — SODIUM CHLORIDE 9 MG/ML
INJECTION, SOLUTION INTRAVENOUS
Status: COMPLETED
Start: 2020-09-26 | End: 2020-09-26

## 2020-09-26 RX ORDER — SODIUM CHLORIDE 9 MG/ML
INJECTION, SOLUTION INTRAVENOUS CONTINUOUS
Status: ACTIVE | OUTPATIENT
Start: 2020-09-26 | End: 2020-09-26

## 2020-09-26 RX ORDER — ONDANSETRON 2 MG/ML
4 INJECTION INTRAMUSCULAR; INTRAVENOUS
Status: DISCONTINUED | OUTPATIENT
Start: 2020-09-26 | End: 2020-09-26 | Stop reason: HOSPADM

## 2020-09-26 RX ADMIN — Medication 100 MG: at 06:10

## 2020-09-26 RX ADMIN — SODIUM CHLORIDE: 9 INJECTION, SOLUTION INTRAVENOUS at 02:30

## 2020-09-26 RX ADMIN — NICOTINE 14 MG: 14 PATCH TRANSDERMAL at 06:11

## 2020-09-26 RX ADMIN — SODIUM CHLORIDE, POTASSIUM CHLORIDE, SODIUM LACTATE AND CALCIUM CHLORIDE: 600; 310; 30; 20 INJECTION, SOLUTION INTRAVENOUS at 12:42

## 2020-09-26 RX ADMIN — FOLIC ACID 5 MG: 1 TABLET ORAL at 06:10

## 2020-09-26 RX ADMIN — PANTOPRAZOLE SODIUM 40 MG: 40 INJECTION, POWDER, LYOPHILIZED, FOR SOLUTION INTRAVENOUS at 06:11

## 2020-09-26 RX ADMIN — PROPOFOL 100 MG: 10 INJECTION, EMULSION INTRAVENOUS at 12:49

## 2020-09-26 RX ADMIN — SODIUM CHLORIDE: 9 INJECTION, SOLUTION INTRAVENOUS at 02:45

## 2020-09-26 RX ADMIN — DIBASIC SODIUM PHOSPHATE, MONOBASIC POTASSIUM PHOSPHATE AND MONOBASIC SODIUM PHOSPHATE 250 MG: 852; 155; 130 TABLET ORAL at 08:26

## 2020-09-26 RX ADMIN — POTASSIUM CHLORIDE: 149 INJECTION, SOLUTION, CONCENTRATE INTRAVENOUS at 08:26

## 2020-09-26 ASSESSMENT — ENCOUNTER SYMPTOMS
HEADACHES: 0
WHEEZING: 0
COUGH: 0
SHORTNESS OF BREATH: 0
TREMORS: 0
NERVOUS/ANXIOUS: 0
WEAKNESS: 0
FEVER: 0
WEIGHT LOSS: 1
PALPITATIONS: 0
DEPRESSION: 0
NECK PAIN: 0
NAUSEA: 0
ABDOMINAL PAIN: 0
CONSTIPATION: 0
VOMITING: 0
BACK PAIN: 0
BRUISES/BLEEDS EASILY: 0
DIZZINESS: 0
SORE THROAT: 0
TINGLING: 0
CHILLS: 0
SINUS PAIN: 0
DIARRHEA: 0
MEMORY LOSS: 0

## 2020-09-26 NOTE — PROGRESS NOTES
Daily Progress Note:     Date of Service: 9/26/2020  Primary Team: [facility] White Team   Attending: BRIDGETT Sanchez M.D.   Senior Resident:   Intern: Dr. Morales  Contact:  [phone]    Chief Complaint:   Weakness/fatigue for one month.    Subjective:-  - Last Hb of 7.3 0128 morning, down from 8.6 1724 yesterday. Transfused 1 unit of blood, repeat CBC still pending. T.Bili at 2.2.  - Colonoscopy this afternoon.  - cardiology() consulted to see if the patient is a candidate for TAVR as his anaemia might be related to extravascular hemolysis from severe aortic valve stenosis. Will discuss with cardiology pending colonoscopy results.  - Echocardiography yesterday. LVEF 60%, RYAN 0.9, RVSP 30 mmHg.     Consultants/Specialty:  Gastroenterology  Cardiology    Review of Systems:   Review of Systems   Constitutional: Positive for malaise/fatigue and weight loss. Negative for chills and fever.   HENT: Negative for congestion, ear discharge, ear pain, sinus pain and sore throat.    Respiratory: Negative for cough, shortness of breath and wheezing.    Cardiovascular: Negative for chest pain, palpitations and leg swelling.   Gastrointestinal: Negative for abdominal pain, constipation, diarrhea, nausea and vomiting.   Genitourinary: Negative for dysuria, frequency, hematuria and urgency.   Musculoskeletal: Negative for back pain, joint pain and neck pain.   Skin: Negative for itching and rash.   Neurological: Negative for dizziness, tingling, tremors, weakness and headaches.   Endo/Heme/Allergies: Does not bruise/bleed easily.   Psychiatric/Behavioral: Negative for depression and memory loss. The patient is not nervous/anxious.        Objective Data:   Physical Exam:   Vitals:   Temp:  [36.2 °C (97.1 °F)-37.1 °C (98.7 °F)] 36.3 °C (97.3 °F)  Pulse:  [70-98] 71  Resp:  [16-18] 18  BP: (102-122)/(42-75) 122/70  SpO2:  [92 %-99 %] 92 %     Physical Exam  Constitutional:       General: He is not in acute distress.      Appearance: Normal appearance.   HENT:      Head: Normocephalic and atraumatic.      Right Ear: External ear normal.      Left Ear: External ear normal.      Nose: Nose normal.   Eyes:      Extraocular Movements: Extraocular movements intact.      Pupils: Pupils are equal, round, and reactive to light.   Neck:      Musculoskeletal: Normal range of motion. No neck rigidity or muscular tenderness.   Cardiovascular:      Rate and Rhythm: Normal rate and regular rhythm.      Pulses: Normal pulses.      Heart sounds: Murmur present. No friction rub. No gallop.    Pulmonary:      Effort: Pulmonary effort is normal.      Breath sounds: Normal breath sounds.   Abdominal:      General: There is no distension.      Palpations: There is no mass.      Tenderness: There is no abdominal tenderness.      Hernia: No hernia is present.   Skin:     Capillary Refill: Capillary refill takes less than 2 seconds.      Findings: No bruising, erythema, lesion or rash.   Neurological:      General: No focal deficit present.      Mental Status: He is alert and oriented to person, place, and time.      Motor: No weakness.   Psychiatric:         Mood and Affect: Mood normal.         Behavior: Behavior normal.         Thought Content: Thought content normal.         Judgment: Judgment normal.           Labs:   Recent Labs     09/24/20  0742 09/25/20  0242 09/26/20  1008   SODIUM 132* 133* 132*   POTASSIUM 3.8 4.0 4.0   CHLORIDE 97 98 98   CO2 25 23 19*   BUN 11 8 7*   CREATININE 0.64 0.74 0.74   MAGNESIUM 1.2* 1.9 1.8   PHOSPHORUS 1.8* 1.7* 3.1   CALCIUM 8.5 8.4* 8.9       Recent Labs     09/24/20  0742 09/24/20  1641 09/25/20  0242 09/26/20  1008   ALTSGPT 14  --  12 21   ASTSGOT 28  --  30 44   ALKPHOSPHAT 66  --  67 76   TBILIRUBIN 1.8*  --  1.4 2.2*   DBILIRUBIN  --  0.4  --   --    GLUCOSE 102*  --  99 101*       Recent Labs     09/23/20  1837 09/23/20  2321 09/24/20  0742  09/25/20  0242 09/25/20  0936 09/25/20  1724 09/26/20  0128    RBC  --  2.37* 2.41*  --  2.38*  --   --   --    HEMOGLOBIN 6.3* 7.7* 7.8*   < > 7.8* 7.7* 8.6* 7.3*   HEMATOCRIT 18.5* 22.8* 23.0*   < > 22.6* 22.1* 25.7* 21.7*   PLATELETCT  --  81* 70*  --  66*  --   --   --    IRON 187*  --   --   --   --   --   --   --    FERRITIN 954.0*  --   --   --   --   --   --   --    TOTIRONBC 204*  --   --   --   --   --   --   --     < > = values in this interval not displayed.       Recent Labs     09/23/20 2321 09/24/20  0742 09/25/20  0242 09/26/20  1008   WBC 3.7* 3.6* 5.9  --    NEUTSPOLYS  --  55.70 65.80  --    LYMPHOCYTES  --  28.00 19.70*  --    MONOCYTES  --  14.30* 13.00  --    EOSINOPHILS  --  0.80 0.50  --    BASOPHILS  --  0.60 0.30  --    ASTSGOT  --  28 30 44   ALTSGPT  --  14 12 21   ALKPHOSPHAT  --  66 67 76   TBILIRUBIN  --  1.8* 1.4 2.2*       Imaging:   EC-ECHOCARDIOGRAM COMPLETE W/O CONT   Final Result      US-ABDOMEN COMPLETE SURVEY   Final Result      1.  Hepatic steatosis.   2.  Gallbladder mass versus tumefactive sludge. Follow-up is recommended.   3.   Additional findings as detailed.         CT-CHEST,ABDOMEN,PELVIS WITH   Final Result      1.  Diffuse esophageal wall thickening, likely inflammatory.  Neoplasm is not entirely excluded.   2.  Wedge-shaped low density at the anterior aspect of the spleen, most concerning for infarct.   3.  Fatty infiltration of liver.   4.  Abdominal aortic aneurysm with stent graft in place.   5.  LEFT ureteral stent present.   6.  Postoperative change of lower abdominal wall.      DX-CHEST-PORTABLE (1 VIEW)   Final Result      Left basilar atelectasis or scarring.      Nodular density in the right lung base likely representing nipple shadow. Recommend repeat chest x-ray with nipple markers.          Problem Representation:   Mr. Jimenez is a 75-year-old male with history of CAD ,severe AS & moderate MR based on echo done in 2017 who presents with weakness/fatigue for the past month.  He had an elevated troponin and  possible EKG changes suggestive of anterior infarct.  Repeat troponin trended down.  CT chest abdomen pelvis suggestive of esophageal inflammation or carcinoma. Hemoglobin down to 6.3 compared to 8.3 on admission requiring blood transfusion.patient labs remarkable for increased serum bilirubin possibly secondary to extravascular hemolysis.    * Anemia- (present on admission)  Assessment & Plan  - Hg on admission 8.3, dropped to 6.3. patient received 2 units of PRBCs.  - patient underwent EGD on 09/24/2020 which showed tight lower esophageal sphincter, gastritis and duodenitis but no evidence of active bleeding.  - cardiology was consulted to see if patient is a candidate for TAVR as his labs showed findings suggestive of extravascular hemolysis from severe aortic stenosis.  - retics count imm reticulocytes 17.8, direct/indirect bili WNL, . Haptoglobin WNL.  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  - colonoscopy today  - IV protonix.  - folate, IV thiamine.      Esophageal abnormality concerning for malignancy  Assessment & Plan  - Reports patient  lost 30 pounds in the past month; Reports to have an esophageal dysphagia (liquids/solids) for >10 years that is also at its baseline. CT chest abdomen/pelvis- showed esophageal wall thickening concerning for malignancy, but also possibly inflammation.   - Pt has a pancytopenia which is potentially secondary to malignancy.  @Plan:-  - Per gastroenterology:-  1- Await histopathology  2- Colonoscopy since no significant pathology identified to account for severe anemia. Patient did now finish bowel prep today and colonoscopy was canceled, rescheduled for tomorrow. Bowel prep ordered, clear liquid diet, NPO after midnight.  3- If patient has significant dysphagia, consider balloon dilatation or referral for Heller's myotomy for management of previously diagnosed achalasia cardia.        Troponin  level elevated  Assessment & Plan  - Most likely secondary to imbalance between supply and demand as patient's ischemic EKG findings disappeared and  troponin trended down after blood transfusion.   - CTM and transfuse if Hb<7.5.    Aortic stenosis- (present on admission)  Assessment & Plan  Murmurs noted on physical exam. Last echo 2017 showed severe aortic stenosis, moderate mitral regurgitation, right heart pressures are consistent with moderate pulmonary   Hypertension, severely dilated left atrium. LVEF 55%  @Plan:-  - Repeat echocardiogram ordered. - LVEF 60%, aortic stenosis: Vmax is 4.6  m/s, MG 48 mmHg, RYAN 0.9 cm2.  Moderate aortic insufficiency, RVSP 30 mmHg, mild tricuspid regurgitation, no mitral stenosis or regurgitation  - Cardiology on board, plan is for TAVR once hemodynamically stable.    Lactic acid acidosis in the setting of incidental splenic infarct  Assessment & Plan  Resolved with IV fluids.

## 2020-09-26 NOTE — PROGRESS NOTES
Pt refused assessment. Able to listen to lungs, heart, and bowel sounds. Refused to answer A&O questions or give name and date of birth. Pt does not seem to be aware of what bowel prep plan is for the evening, will continue to encourage to drink before midnight. Will update [facility] if pt continues to refuse.

## 2020-09-26 NOTE — OP REPORT
DATE OF SERVICE:  09/26/2020    PROCEDURE PERFORMED:  Colonoscopy.    PHYSICIAN:  Dane Bliss MD    ANESTHESIOLOGIST:  Marcy Gonzalez MD    MEDICATIONS:  Deep sedation.    PREPROCEDURE DIAGNOSIS:  Anemia.    POSTPROCEDURE DIAGNOSIS:  Normal colonoscopy.    CONSENT:  Procedure, risks and benefits reviewed thoroughly with the patient.    Risks including but not limited to bleeding, perforation, side effects of   medication were informed.  Patient voiced understanding and agreed to proceed.    PROCEDURE:  Patient was placed in left lateral decubitus position.  Rectal   examination revealed no palpable abnormalities and the colonoscope introduced   into the rectum, advanced to cecum and well-prepped colon.  Appendiceal   orifice and ileocecal valve were well visualized and imaged.  Upon careful   retraction, no polyps, masses or lesions were appreciated.  Retroflex view of   the rectum otherwise normal.  Rectum was desufflated and scope was removed.    COMPLICATIONS:  None.    BLOOD LOSS:  None.    SPECIMENS:  None.    RECOMMENDATIONS:  Patient with prior negative EGD and now a negative   colonoscopy.  Hemoglobin is stable.  Patient may be discharged to home from GI   perspective.  Patient will be scheduled for an outpatient capsule endoscopy   to rule out small bowel bleeding etiology.  The above was reviewed thoroughly   with the patient, who voiced understanding and agreed to proceed.    Thank you for the consultation and opportunity to provide care of this   patient.  Please call with any further questions or concerns.  Email has been   sent to [facility] for scheduling of the above.       ____________________________________     Dane Bliss MD    SMN / NTS    DD:  09/26/2020 13:18:45  DT:  09/26/2020 13:44:28    D#:  [id number]  Job#:  [id number]

## 2020-09-26 NOTE — PROGRESS NOTES
"Patient refusing bowel prep again tonight.Educated him on importance of further work-up inpatient, however he kept repeating \"So what? Just let me die. I want to be left to die.\"  " Please note that yesterday's documentation (Attending progress note 11/3/18) was done on paper and placed in the paper chart. This occurred due to computer downtime.     I saw and examined pt with mother at bedside. He remained in-pt due to episodic resp distress requiring some oxygen yesterday and inadequate PO intake as well. Today, he is doing much better, He took 8oz of formula in past 6 hrs, no vomiting. He is more comfortable, cough now slightly productive, no resp distress today. Urine output excellent.  Sating 96& on room air currently.   Vital Signs Last 24 Hrs  T(C): 35.7 (04 Nov 2018 11:30), Max: 36.9 (03 Nov 2018 15:51)  T(F): 96.2 (04 Nov 2018 11:30), Max: 98.4 (03 Nov 2018 15:51)  HR: 118 (04 Nov 2018 11:30) (90 - 130)  BP: 100/55 (04 Nov 2018 07:20) (100/55 - 100/55)  RR: 32 (04 Nov 2018 11:30) (28 - 40)  SpO2: 99% (04 Nov 2018 11:30) (95% - 100%)  PE: The Patient appears well, is active, playful, well hydrated with no increased WOB   Skin: warm and moist  AFOSF, Perrla, sclera clear, moist mucous membranes  Neck supple, FROM, no LAD  Lungs: no tachypnea, no retractions, interval decrease in the coarseness of breath sounds, some scattered fine crackles, good air entry, no wheeze   Cor: RRR, S1 S2 wnl, no murmur  Abd: Soft, non tender, non distended, normal active bowel sounds  Ext: Warm, well perfused, moving all ext equally, IV site clean and dry with no edema or erythema  Neuro: Alert, nl tone, nl activity level   Genitalia: nl male, circ’d, very well healed small incision sites x 3, testes palpable in scrotum, subtle soft edema right groin same as yesterday, no palpable hernia.   Labs: + RSV  Assessment and Plan:  3 m old M with RSV  bronchiolitis, improving, currently doing well off oxygen, feeding well this morning.  -Resp/O2 sat spot checks today  - IVF only at KVO, Po ad kaley, monitor strict i/o  -Gentle nasal suctioning   -If pt does well, maintaining 02> 90% consistently off oxygen and without distress, and if pt can tolerate PO at maintenance needs of appox 1oz/hr, will discharge later today. If pt requires oxygen of unable to take adequate PO, will continue inpt supportive care.   -S/p hernia repair: incisions healing very well, continues to have some mild edema in the surgical area, recommend f/u with surgeon as out pt.   -F/u with PMD 1-2 days after discharge

## 2020-09-26 NOTE — CARE PLAN
Problem: Safety  Goal: Will remain free from injury  Outcome: PROGRESSING AS EXPECTED  Goal: Will remain free from falls  Outcome: PROGRESSING AS EXPECTED   Fall precautions in place. Bed alarm on and active. Call bell within reach. Hourly rounding.   Problem: Bowel/Gastric:  Goal: Normal bowel function is maintained or improved  Outcome: PROGRESSING SLOWER THAN EXPECTED  Goal: Will not experience complications related to bowel motility  Outcome: PROGRESSING SLOWER THAN EXPECTED   Pt attempting bowel prep, needs frequent encouragement. Requesting a meal each hour, MD notified. 3+ BM this shift.

## 2020-09-26 NOTE — ANESTHESIA PREPROCEDURE EVALUATION
76 yo w/weight loss, anemia and thickened esophagus; EGD negative. Here for colonoscopy    Hg 7.3  RYAN on JEANETH 0.9 cm2    Relevant Problems   ANESTHESIA (within normal limits)      PULMONARY   (+) Chronic obstructive lung disease (HCC)      NEURO   (+) H/O cardiac arrest      CARDIAC   (+) Aortic stenosis   (+) Atrial fibrillation (CMS-HCC)   (+) CAD (coronary artery disease)   (+) CHF (congestive heart failure) (HCC)   (+) Coronary atherosclerosis of native coronary artery   (+) Severe aortic stenosis   (+) Undiagnosed cardiac murmurs         (+) Cirrhosis of liver (HCC)      Other   (+) Hepatitis C antibody positive in blood   (+) Osteomyelitis of toe (HCC)   (+) S/P AAA (abdominal aortic aneurysm) repair       Physical Exam    Airway   Mallampati: II  TM distance: >3 FB  Neck ROM: full       Cardiovascular - normal exam  Rhythm: regular  Rate: normal  (+) murmur     Dental   (+) upper dentures, lower dentures           Pulmonary - normal exam  Breath sounds clear to auscultation     Abdominal    Neurological - normal exam               Anesthesia Plan    ASA 4   ASA physical status 4 criteria: severe valve dysfunction    Plan - general       Airway plan will be natural airway  (Discussed at length risk of anesthesia given severe AS.  Patient wishes to proceed.  Does not wish to lift DNR/DNI for procedure)      Induction: intravenous    Postoperative Plan: Postoperative administration of opioids is intended.    Pertinent diagnostic labs and testing reviewed    Informed Consent:    Anesthetic plan and risks discussed with patient.    Use of blood products discussed with: patient whom consented to blood products.

## 2020-09-26 NOTE — ANESTHESIA QCDR
2019 [facility] Clinical Data Registry (for Quality Improvement)     Postoperative nausea/vomiting risk protocol (Adult = 18 yrs and Pediatric 3-17 yrs)- (430 and 463)  General inhalation anesthetic (NOT TIVA) with PONV risk factors: No  Provision of anti-emetic therapy with at least 2 different classes of agents: N/A  Patient DID NOT receive anti-emetic therapy and reason is documented in Medical Record: N/A    Multimodal Pain Management- (477)  Non-emergent surgery AND patient age >= 18: Yes  Use of Multimodal Pain Management, two or more drugs and/or interventions, NOT including systemic opioids: No  Exception: Documented allergy to multiple classes of analgesics:        Smoking Abstinence (404)  Patient is current smoker (cigarette, pipe, e-cig, marijuanna): No  Elective Surgery:   Abstinence instructions provided prior to day of surgery:   Patient abstained from smoking on day of surgery:     Pre-Op Beta-Blocker in Isolated CABG (44)  Isolated CABG AND patient age >= 18: No  Beta-blocker admin within 24 hours of surgical incision:   Exception:of medical reason(s) for not administering beta blocker within 24 hours prior to surgical incision (e.g., not  indicated,other medical reason):     PACU assessment of acute postoperative pain prior to Anesthesia Care End- Applies to Patients Age = 18- (ABG7)  Initial PACU pain score is which of the following: < 7/10  Patient unable to report pain score: N/A    Post-anesthetic transfer of care checklist/protocol to PACU/ICU- (426 and 427)  Upon conclusion of case, patient transferred to which of the following locations: PACU/Non-ICU  Use of transfer checklist/protocol: Yes  Exclusion: Service Performed in Patient Hospital Room (and thus did not require transfer): N/A  Unplanned admission to ICU related to anesthesia service up through end of PACU care- (MD51)  Unplanned admission to ICU (not initially anticipated at anesthesia start time): No

## 2020-09-26 NOTE — ANESTHESIA TIME REPORT
Anesthesia Start and Stop Event Times     Date Time Event    9/26/2020 1235 Ready for Procedure     1242 Anesthesia Start     1312 Anesthesia Stop        Responsible Staff  09/26/20    Name Role Begin End    Marcy Gonzalez M.D. Anesth 1242 1312        Preop Diagnosis (Free Text):  Pre-op Diagnosis     ANEMIA        Preop Diagnosis (Codes):    Post op Diagnosis  GIB (gastrointestinal bleeding)      Premium Reason  E. Weekend    Comments:

## 2020-09-26 NOTE — PROGRESS NOTES
On call hospitalist, Dr. Gonzalez notified of pt not completing bowel prep. Dr. Gonzalez came to bedside to assess and discuss POC with pt. PRBCs ordered for Hgb 7.3.

## 2020-09-26 NOTE — PROGRESS NOTES
Nicola Jimenez patient has chosen to leave the hospital against medical advice. The attending physician has not discharged the patient. Patient is not a risk to himself or others. I have discussed with the patient the following:  Physician has not determined patient is ready for discharge, Risks and consequences of leaving the hospital too soon and Benefit of continued hospitalization.      Patient is a greater than 60 years old  and no referral to  was made, pt left to abruptly.  Attending physician has been notified.

## 2020-09-26 NOTE — PROGRESS NOTES
Assumed care at 0700, bedside report received from Susie CAMERON. Pt is SR on the monitor. Initial assessment completed, orders reviewed, call light with reach, bed alarm in use, and hourly rounding in place. POC addressed with patient, no additional questions at this time.

## 2020-09-26 NOTE — PROGRESS NOTES
Received report from day shift RN, Dane. Pt is sleeping in bed and does not appear to be in distress. Call light and personal belongings within reach, bed in lowest locked position. White board updated.

## 2020-09-26 NOTE — PROGRESS NOTES
Gastroenterology Progress Note     Author: Dane Bliss M.D.   Date & Time Created: 9/25/2020 5:06 PM    Chief Complaint:  Anemia    Interval History:  EGD negative.   Colonoscopy planned for today.  Prep incomplete. Colonoscopy cancelled.   Patient wants to try again.     Review of Systems:  Review of Systems   Constitutional: Positive for malaise/fatigue.   Respiratory: Negative for cough.    Cardiovascular: Negative for chest pain.   Gastrointestinal: Negative.        Physical Exam:  Physical Exam  HENT:      Head: Normocephalic.   Pulmonary:      Effort: Pulmonary effort is normal.   Abdominal:      General: Abdomen is flat.   Neurological:      General: No focal deficit present.      Mental Status: He is alert.         Labs:          Recent Labs     09/23/20  0823 09/24/20  0742 09/25/20  0242   SODIUM 133* 132* 133*   POTASSIUM 3.5* 3.8 4.0   CHLORIDE 95* 97 98   CO2 26 25 23   BUN 12 11 8   CREATININE 0.59 0.64 0.74   MAGNESIUM 1.3* 1.2* 1.9   PHOSPHORUS  --  1.8* 1.7*   CALCIUM 8.9 8.5 8.4*     Recent Labs     09/23/20  0823 09/24/20  0742 09/24/20  1641 09/25/20  0242   ALTSGPT 18 14  --  12   ASTSGOT 31 28  --  30   ALKPHOSPHAT 77 66  --  67   TBILIRUBIN 1.1 1.8*  --  1.4   DBILIRUBIN  --   --  0.4  --    GLUCOSE 97 102*  --  99     Recent Labs     09/23/20  0823 09/23/20  1837 09/23/20  2321 09/24/20  0742 09/24/20  1852 09/25/20  0242 09/25/20  0936   RBC 1.83*  --  2.37* 2.41*  --  2.38*  --    HEMOGLOBIN 6.3* 6.3* 7.7* 7.8* 8.9* 7.8* 7.7*   HEMATOCRIT 18.5* 18.5* 22.8* 23.0* 26.1* 22.6* 22.1*   PLATELETCT 75*  --  81* 70*  --  66*  --    PROTHROMBTM 14.3  --   --   --   --   --   --    APTT 27.5  --   --   --   --   --   --    INR 1.07  --   --   --   --   --   --    IRON  --  187*  --   --   --   --   --    FERRITIN  --  954.0*  --   --   --   --   --    TOTIRONBC  --  204*  --   --   --   --   --      Recent Labs     09/23/20  0823 09/23/20  2321 09/24/20  0742 09/25/20  0242   WBC 3.2* 3.7* 3.6*  5.9   NEUTSPOLYS 57.50  --  55.70 65.80   LYMPHOCYTES 29.10  --  28.00 19.70*   MONOCYTES 11.90  --  14.30* 13.00   EOSINOPHILS 0.60  --  0.80 0.50   BASOPHILS 0.30  --  0.60 0.30   ASTSGOT 31  --  28 30   ALTSGPT 18  --  14 12   ALKPHOSPHAT 77  --  66 67   TBILIRUBIN 1.1  --  1.8* 1.4     Hemodynamics:  Temp (24hrs), Av.6 °C (97.9 °F), Min:36.5 °C (97.7 °F), Max:36.7 °C (98.1 °F)  Temperature: 36.7 °C (98.1 °F)  Pulse  Av.6  Min: 69  Max: 115   Blood Pressure : 102/66     Respiratory:    Respiration: 16, Pulse Oximetry: 98 %        RUL Breath Sounds: Clear, RML Breath Sounds: Diminished, RLL Breath Sounds: Diminished, SOLEDAD Breath Sounds: Clear, LLL Breath Sounds: Diminished  Fluids:    Intake/Output Summary (Last 24 hours) at 2020 1706  Last data filed at 2020 0923  Gross per 24 hour   Intake 0 ml   Output --   Net 0 ml     Weight: 69.4 kg (153 lb)  GI/Nutrition:  Orders Placed This Encounter   Procedures   • Diet Order Clear Liquid     Standing Status:   Standing     Number of Occurrences:   1     Order Specific Question:   Diet:     Answer:   Clear Liquid [10]   • Diet NPO     Standing Status:   Standing     Number of Occurrences:   8     Order Specific Question:   Restrict to:     Answer:   Sips with Medications [3]     Medical Decision Making, by Problem:  Active Hospital Problems    Diagnosis   • *Anemia [D64.9]   • Esophageal abnormality concerning for malignancy [K22.9]   • Troponin level elevated [R79.89]   • Aortic stenosis [I35.0]   • Lactic acid acidosis in the setting of incidental splenic infarct [E87.2]       Plan:  Anemia: EGD negative. Preparation incomplete but patient wants to try again. Colonoscopy rescheduled for tomorrow. Risks and benefits reviewed. Discussed with Dr. Morales, appreciate his discussion.     Quality-Core Measures

## 2020-09-26 NOTE — DISCHARGE SUMMARY
Discharge Summary    Date of Admission: 9/22/2020  Date of Discharge: 09/26/2020.  Discharging Attending: BRIDGETT Sanchez M.D.   Discharging Senior Resident: .  Discharging Intern: .    CHIEF COMPLAINT ON ADMISSION  Chief Complaint   Patient presents with   • Weakness       Reason for Admission  -------------------------------  Hemolytic anaemia.    Admission Date  9/22/2020    CODE STATUS  DNAR/DNI    HPI & HOSPITAL COURSE  This is a 75-year-old male with history of CAD ,severe AS & moderate MR based on echo done in 2017 who presents with weakness/fatigue for the past month.  He had an elevated troponin and possible EKG changes suggestive of anterior infarct.  Repeat troponin trended down.  CT chest abdomen pelvis suggestive of esophageal inflammation or carcinoma. Hemoglobin down to 6.3 compared to 8.3 on admission requiring blood transfusion.patient labs remarkable for increased serum bilirubin possibly secondary to extravascular hemolysis.  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  Patient elected to leave [place] and states that he will follow-up with his PCP and cardiology as an outpatient.  Patient's hemoglobin continued to drop during hospital stay he will receive a total of 3 units of PRBCs.  2-Severe aortic stenosis:-  Cardiology was consulted to see if the patient is a candidate for TAVR, however patient elected to leave [place] and follow-up with outpatient cardiology.  3-Esophageal abnormalities concerning for malignancy:-  CT chest abdomen/pelvis showed esophageal wall thickening concerning for malignancy.patient underwent EGD on 9/24 which showed evidence of tight  lower esophageal sphincter, gastritis and duodenitis but no evidence of esophageal lesions.histopathology showed no evidence of malignant cell transformation and no evidence of H. Pylori.  4-Elevated troponin:-  Most likely secondary to imbalance between supply and demand from anemia.  Resolved with blood transfusion.    Patient has chosen to leave hospital AGAINST MEDICAL ADVICE.    The patient met 2-midnight criteria for an inpatient stay at the time of discharge.    PHYSICAL EXAM ON DISCHARGE  Temp:  [36.1 °C (97 °F)-37.1 °C (98.7 °F)] 36.1 °C (97 °F)  Pulse:  [62-95] 62  Resp:  [16-20] 20  BP: (106-122)/(42-79) 106/63  SpO2:  [92 %-99 %] 96 %    Physical Exam  Constitutional:       General: He is not in acute distress.     Appearance: Normal appearance.   HENT:      Head: Normocephalic and atraumatic.      Right Ear: External ear normal.      Left Ear: External ear normal.      Nose: Nose normal.   Eyes:      Extraocular Movements: Extraocular movements intact.      Pupils: Pupils are equal, round, and reactive to light.   Neck:      Musculoskeletal: Normal range of motion. No neck rigidity or muscular tenderness.   Cardiovascular:      Rate and Rhythm: Normal rate and regular rhythm.      Pulses: Normal pulses.      Heart sounds: Murmur present. No friction rub. No gallop.    Pulmonary:      Effort: Pulmonary effort is normal.      Breath sounds: Normal breath sounds.   Abdominal:      General: There is no distension.      Palpations: There is no mass.      Tenderness: There is no abdominal tenderness.      Hernia: No hernia is present.   Skin:     Capillary Refill: Capillary refill takes less than 2 seconds.      Findings: No bruising, erythema, lesion or rash.   Neurological:      General: No focal deficit present.      Mental Status: He is alert and oriented to person, place, and time.      Motor: No weakness.   Psychiatric:         Mood and Affect: Mood normal.         Behavior: Behavior normal.          Thought Content: Thought content normal.         Judgment: Judgment normal.         Discharge Date  09/26/2020    FOLLOW UP ITEMS POST DISCHARGE  - Follow up with PCP in 1-2 weeks.  - Follow up with cardiology as scheduled.    DISCHARGE DIAGNOSES  Principal Problem:    Anemia POA: Yes  Active Problems:    Esophageal abnormality concerning for malignancy POA: Unknown      Overview: -diet per bedside swallow eval on admission, with NPO at 12am      -GI consulted- follow for recs          Aortic stenosis POA: Yes    Troponin level elevated POA: Unknown    Lactic acid acidosis in the setting of incidental splenic infarct POA: Unknown  Resolved Problems:    * No resolved hospital problems. *      FOLLOW UP  No future appointments.  No follow-up provider specified.    MEDICATIONS ON DISCHARGE     Medication List      You have not been prescribed any medications.         Allergies  No Known Allergies    DIET  Orders Placed This Encounter   Procedures   • Diet Order Cardiac     Standing Status:   Standing     Number of Occurrences:   1     Order Specific Question:   Diet:     Answer:   Cardiac [6]       ACTIVITY  As tolerated.  Weight bearing as tolerated    CONSULTATIONS  Dr. Blue with Cardiology consulted.  Treatment options were discussed and plan of care agreed upon. and Dr. Bliss with Gastroenterology consulted.  Treatment options were discussed and plan of care agreed upon.    PROCEDURES  EGD done by  and Colonoscopy done by  on 9/24 and 9/26/2020 , respectively. With no post-procedure complications.    Total time spent with patient: 30 minutes.

## 2020-09-26 NOTE — PROGRESS NOTES
Dr. Bliss called and updated about pt refusing to complete bowel prep before midnight. Per Dr. Bliss case will be canceled.

## 2020-09-26 NOTE — ANESTHESIA POSTPROCEDURE EVALUATION
Patient: Nicola Jimenez    Procedure Summary     Date: 09/26/20 Room / Location: [place] [address] / SURGERY [facility]    Anesthesia Start: 1242 Anesthesia Stop: 1312    Procedure: COLONOSCOPY (N/A Anus) Diagnosis: (NORMAL EXAM,HEMORRHOIDS)    Surgeon: Dane Bliss M.D. Responsible Provider: Marcy Gonzalez M.D.    Anesthesia Type: general ASA Status: 4          Final Anesthesia Type: general  Last vitals  BP   Blood Pressure : 106/63    Temp   36.1 °C (97 °F)    Pulse   Pulse: 62   Resp   20    SpO2   96 %      Anesthesia Post Evaluation    Patient location during evaluation: PACU  Patient participation: complete - patient participated  Level of consciousness: awake and alert    Airway patency: patent  Anesthetic complications: no  Cardiovascular status: hemodynamically stable  Respiratory status: acceptable  Hydration status: euvolemic    PONV: none           Nurse Pain Score: 0 (NPRS)

## 2020-09-26 NOTE — OR NURSING
Pt awake, alert w/o complaints; VSS; monitors audible. Pt playing games on cell phone with room air sats of 98%.

## 2020-09-26 NOTE — NON-PROVIDER
Internal Medicine Daily Progress Note  Note Author: Ines Palacio, Third Year Medical Student    Date of Service: 2020  Date of Admission: 2020   Primary Team: [facility] IM White Team   Attending: BRIDGETT Sanchez MD   Senior Resident: Dr. Stephenie JimenezNicola     1945   Age/Sex 75 y.o. male   MRN [id number]         Reason for interval visit  Anemia    SUBJECTIVE:  Nicola is a 75 y.o. male with PMH of coronary artery disease, esophageal dysphagia, and hypercholesterolemia who presented on 2020 with concerns of 30lb unintentional weight loss over the past three months, increased generalized weakness over the past month, and difficulty with swallowing solids and liquids for the past ten years. The pt reports he has had increased difficulty walking due to the generalized weakness.     Interval Update: Hemoglobin 7.3 early this morning, and 8.6 last night. Packed red blood cells given. Colonoscopy scheduled for today. Pt reports that he finished Bowel Prep to resident. Refused PT yesterday. States that his generalized weakness and lightheadedness is unchanged. He wants to go home but understands why he is still here and the plan to consult cardiology.     Consultants/Specialty: Gastroenterology, Cardiology    ROS:  Constitutional: Positive for unintentional 30lb weight loss.   HENT: Positive for difficulty hearing. History of deafness, per pt.  Eyes: Negative for eye pain.   Respiratory: Negative for shortness of breath.   Cardiovascular: Negative for chest pain. Positive for lightheadedness, which pt says has been ongoing with weakness over the past month.  Gastrointestinal: Positive for trouble swallowing solids and liquids x10 years.  Genitourinary: Negative for flank pain.  Musculoskeletal: Negative for back pain.  Skin: Negative for bruises, rashes.  Neurological: Positive for generalized weakness x1 month.     Vitals   Temp:  [36.2 °C (97.1 °F)-37.1 °C (98.7 °F)] 36.6 °C (97.9  °F)  Pulse:  [70-95] 72  Resp:  [16-19] 19  BP: (107-122)/(42-79) 118/50  SpO2:  [92 %-99 %] 99 %    Body mass index is 21.95 kg/m².    Physical Exam  General: Not in acute distress. Lying in bed upside down with feet at head of bed.      Appearance: Not toxic appearing. Appears frail and mild pallor is present.   HENT:      Head: Normocephalic and atraumatic.     Ears: Trouble hearing.   Eyes:      General: No scleral icterus.  Neck:      Musculoskeletal: Normal range of motion.   Cardiovascular:      Rate and Rhythm: Regular rate and rhythm.      Pulses: Radial pulses 2+ bilaterally. Dorsalis pedis pulses 2+ bilaterally.      Heart sounds: Aortic stenosis murmur present with radiation to the carotids. Tricuspid regurgitation murmur present. Mitral regurgitation murmur present.   Pulmonary:      Effort: Pulmonary effort is normal. No respiratory distress. Mild cough present on exam, non-productive.     Breath sounds: Lungs clear to auscultation bilaterally. No wheezes or crackles.   Abdominal:      General: There is no distension.      Palpations: Abdomen is soft.      Tenderness: There is no abdominal tenderness to palpation. There is no guarding.   Musculoskeletal:         General: No signs of trauma.       Range of motion: Moves all extremities x4.      Right lower leg: No edema.      Left lower leg: No edema. Left 3rd toe amputated.  Skin:     Findings: No bruises or rashes present. Pallor present.   Neurological:      Mental status, orientation: Alert.      Speech and language: speech is clear and fluent.     MMSE score 22/25.    Labs/Imaging:  Recent/pertinent lab results & imaging reviewed.  Lab Results   Component Value Date/Time    WBC 5.9 09/25/2020 02:42 AM    RBC 2.38 (L) 09/25/2020 02:42 AM    HEMOGLOBIN 7.3 (L) 09/26/2020 01:28 AM    HEMATOCRIT 21.7 (L) 09/26/2020 01:28 AM    MCV 95.0 09/25/2020 02:42 AM    MCH 32.8 09/25/2020 02:42 AM    MCHC 34.5 09/25/2020 02:42 AM    MPV 9.9 09/25/2020 02:42 AM     NEUTSPOLYS 65.80 09/25/2020 02:42 AM    LYMPHOCYTES 19.70 (L) 09/25/2020 02:42 AM    MONOCYTES 13.00 09/25/2020 02:42 AM    EOSINOPHILS 0.50 09/25/2020 02:42 AM    BASOPHILS 0.30 09/25/2020 02:42 AM    ANISOCYTOSIS 1+ 09/23/2020 08:23 AM      Lab Results   Component Value Date/Time    SODIUM 132 (L) 09/26/2020 10:08 AM    POTASSIUM 4.0 09/26/2020 10:08 AM    CHLORIDE 98 09/26/2020 10:08 AM    CO2 19 (L) 09/26/2020 10:08 AM    GLUCOSE 101 (H) 09/26/2020 10:08 AM    BUN 7 (L) 09/26/2020 10:08 AM    CREATININE 0.74 09/26/2020 10:08 AM    CREATININE 1.1 05/25/2007 12:00 PM      Lab Results   Component Value Date/Time    PROTHROMBTM 14.3 09/23/2020 08:23 AM    INR 1.07 09/23/2020 08:23 AM        EC-ECHOCARDIOGRAM COMPLETE W/O CONT   Final Result      US-ABDOMEN COMPLETE SURVEY   Final Result      1.  Hepatic steatosis.   2.  Gallbladder mass versus tumefactive sludge. Follow-up is recommended.   3.   Additional findings as detailed.         CT-CHEST,ABDOMEN,PELVIS WITH   Final Result      1.  Diffuse esophageal wall thickening, likely inflammatory.  Neoplasm is not entirely excluded.   2.  Wedge-shaped low density at the anterior aspect of the spleen, most concerning for infarct.   3.  Fatty infiltration of liver.   4.  Abdominal aortic aneurysm with stent graft in place.   5.  LEFT ureteral stent present.   6.  Postoperative change of lower abdominal wall.      DX-CHEST-PORTABLE (1 VIEW)   Final Result      Left basilar atelectasis or scarring.      Nodular density in the right lung base likely representing nipple shadow. Recommend repeat chest x-ray with nipple markers.          Assessment/Plan   Pt is a 75 y.o. male with PMH notable for coronary artery disease, esophageal dysphagia, and hypercholesterolemia c/o generalized weakness, difficulty swallowing solids and liquids, and weight loss.    * Anemia  Assessment & Plan       Assessment:       Anemia likely due to anemia of chronic disease as indicated by high iron  of 187. Anemia also due to likely hemolysis from aortic stenosis and/or GI blood loss. 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 ECHO shows severe aortic stenosis with left ventricular hypertrophy of 1.4cm. LVEF 60%. Right ventricle systolic pressure ~30. GI did not shows a sight of bleeding in EGD, but showed gastritis and duodenitis with possible achalasia.  Colonoscopy planned for today. Hemoglobin 7.8. Folate was low at 3.6. B12 within normal limits. Ferritin high at 954. Iron high at 187. TSH normal. LDH high end of normal. Haptoglobin 83 normal.      Plan:  -Given packed red blood cells x3 units.  -NS 30ml/hr   -Continue to give Phosphorus and Potassium Chloride  -Continue to repeat CBC to monitor Hemoglobin and Hematocrit.  -IV Protonix 40mg BID ordered.   -Ordered Folate and Thiamine.   -Consult cardiology for TAVR procedure, as aortic stenosis is likely cause of anemia.   -Colonoscopy planned for today.     Esophageal dysphagia  Assessment & Plan  Assessment:  CT shows esophageal wall thickening, likely inflammatory, but neoplasm unable to be ruled out. EGD shows duodenitis and gastritis as well as possible achalasia. Recommends colonoscopy.     Plan:  -Colonoscopy today.  -Continue IV Protonix     Elevated Troponin, likely due to ischemia from severe anemia  Assessment & Plan  Assessment:  Pt with known coronary artery disease with elevated troponin of 33. Troponin was most recently 52. EKG concerning with T wave inversion present on admission. Pt has no chest pain at this time. Repeat EKG shows changes. There is no T wave inversion now, suggesting that pt likely had an ischemic event which improved with treatment of anemia.      Plan:  -Continue repeat EKG.     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 Abdominal US does not show a splenic infarct. PT, INR, PTT within normal  limits. Lactic acid 2.4, down from 4.2 on admission. ECHO shows severe aortic stenosis with left ventricle hypertrophy of 1.4cm. LVEF 60%. Right ventricle systolic pressure ~30.      Plan:  -Consult cardiology for TAVR procedure.      Gastritis, Duodenitis  Assessment & Plan  Assessment:  Pt had EGD showing gastritis, duodenitis, and possible achalasia which may be contributing to his esophageal dysphagia. Pathology negative for H pylori.     Plan:  -Continue Protonix.

## 2020-09-27 LAB
BACTERIA BLD CULT: NORMAL
BACTERIA BLD CULT: NORMAL
SIGNIFICANT IND 70042: NORMAL
SIGNIFICANT IND 70042: NORMAL
SITE SITE: NORMAL
SITE SITE: NORMAL
SOURCE SOURCE: NORMAL
SOURCE SOURCE: NORMAL

## 2020-10-02 ENCOUNTER — TELEPHONE (OUTPATIENT)
Dept: CARDIOLOGY | Facility: MEDICAL CENTER | Age: 75
End: 2020-10-02

## 2020-10-02 NOTE — TELEPHONE ENCOUNTER
Multiple attempts to contact patient regarding referral to [facility] per JI. Phone message states that patient has voice mail box that has not yet been set up. Will continue attempts to contact patient.

## 2020-11-03 ENCOUNTER — TELEPHONE (OUTPATIENT)
Dept: CARDIOLOGY | Facility: MEDICAL CENTER | Age: 75
End: 2020-11-03

## 2020-11-03 NOTE — TELEPHONE ENCOUNTER
Multiple attempts to contact patient regarding Dr. Blue referral to [facility]. Phone states voice mail box not set up. Will continue attempts to contact patient.

## 2020-11-28 ENCOUNTER — APPOINTMENT (OUTPATIENT)
Dept: RADIOLOGY | Facility: MEDICAL CENTER | Age: 75
DRG: 307 | End: 2020-11-28
Attending: EMERGENCY MEDICINE
Payer: MEDICARE

## 2020-11-28 ENCOUNTER — HOSPITAL ENCOUNTER (INPATIENT)
Facility: MEDICAL CENTER | Age: 75
LOS: 5 days | DRG: 307 | End: 2020-12-04
Attending: EMERGENCY MEDICINE | Admitting: HOSPITALIST
Payer: MEDICARE

## 2020-11-28 DIAGNOSIS — D61.818 PANCYTOPENIA (HCC): ICD-10-CM

## 2020-11-28 DIAGNOSIS — K70.30 ALCOHOLIC CIRRHOSIS OF LIVER WITHOUT ASCITES (HCC): ICD-10-CM

## 2020-11-28 DIAGNOSIS — E87.20 LACTIC ACIDOSIS: ICD-10-CM

## 2020-11-28 DIAGNOSIS — R73.09 ELEVATED RANDOM BLOOD GLUCOSE LEVEL: ICD-10-CM

## 2020-11-28 DIAGNOSIS — I35.0 CRITICAL AORTIC VALVE STENOSIS: ICD-10-CM

## 2020-11-28 PROBLEM — D69.6 THROMBOCYTOPENIA (HCC): Status: ACTIVE | Noted: 2020-11-28

## 2020-11-28 PROBLEM — R73.9 HYPERGLYCEMIA: Status: ACTIVE | Noted: 2020-11-28

## 2020-11-28 LAB
ALBUMIN SERPL BCP-MCNC: 4 G/DL (ref 3.2–4.9)
ALBUMIN/GLOB SERPL: 1.2 G/DL
ALP SERPL-CCNC: 84 U/L (ref 30–99)
ALT SERPL-CCNC: 14 U/L (ref 2–50)
ANION GAP SERPL CALC-SCNC: 12 MMOL/L (ref 7–16)
ANION GAP SERPL CALC-SCNC: 16 MMOL/L (ref 7–16)
ANION GAP SERPL CALC-SCNC: 24 MMOL/L (ref 7–16)
ANISOCYTOSIS BLD QL SMEAR: ABNORMAL
AST SERPL-CCNC: 43 U/L (ref 12–45)
B-OH-BUTYR SERPL-MCNC: 3.31 MMOL/L (ref 0.02–0.27)
BASE EXCESS BLDV CALC-SCNC: -4 MMOL/L
BASE EXCESS BLDV CALC-SCNC: -5 MMOL/L
BASOPHILS # BLD AUTO: 0.3 % (ref 0–1.8)
BASOPHILS # BLD: 0.01 K/UL (ref 0–0.12)
BILIRUB SERPL-MCNC: 2.6 MG/DL (ref 0.1–1.5)
BODY TEMPERATURE: ABNORMAL CENTIGRADE
BODY TEMPERATURE: ABNORMAL CENTIGRADE
BUN SERPL-MCNC: 16 MG/DL (ref 8–22)
BUN SERPL-MCNC: 17 MG/DL (ref 8–22)
BUN SERPL-MCNC: 17 MG/DL (ref 8–22)
CALCIUM SERPL-MCNC: 8.7 MG/DL (ref 8.4–10.2)
CALCIUM SERPL-MCNC: 8.8 MG/DL (ref 8.4–10.2)
CALCIUM SERPL-MCNC: 9.3 MG/DL (ref 8.4–10.2)
CHLORIDE SERPL-SCNC: 87 MMOL/L (ref 96–112)
CHLORIDE SERPL-SCNC: 95 MMOL/L (ref 96–112)
CHLORIDE SERPL-SCNC: 95 MMOL/L (ref 96–112)
CO2 SERPL-SCNC: 17 MMOL/L (ref 20–33)
CO2 SERPL-SCNC: 22 MMOL/L (ref 20–33)
CO2 SERPL-SCNC: 25 MMOL/L (ref 20–33)
COMMENT 1642: NORMAL
COVID ORDER STATUS COVID19: NORMAL
CREAT SERPL-MCNC: 0.81 MG/DL (ref 0.5–1.4)
CREAT SERPL-MCNC: 1 MG/DL (ref 0.5–1.4)
CREAT SERPL-MCNC: 1.01 MG/DL (ref 0.5–1.4)
EOSINOPHIL # BLD AUTO: 0.01 K/UL (ref 0–0.51)
EOSINOPHIL NFR BLD: 0.3 % (ref 0–6.9)
ERYTHROCYTE [DISTWIDTH] IN BLOOD BY AUTOMATED COUNT: 71.7 FL (ref 35.9–50)
ETHANOL BLD-MCNC: <10.1 MG/DL (ref 0–10)
GLOBULIN SER CALC-MCNC: 3.3 G/DL (ref 1.9–3.5)
GLUCOSE BLD-MCNC: 141 MG/DL (ref 65–99)
GLUCOSE SERPL-MCNC: 158 MG/DL (ref 65–99)
GLUCOSE SERPL-MCNC: 171 MG/DL (ref 65–99)
GLUCOSE SERPL-MCNC: 210 MG/DL (ref 65–99)
HCO3 BLDV-SCNC: 19 MMOL/L (ref 24–28)
HCO3 BLDV-SCNC: 19 MMOL/L (ref 24–28)
HCT VFR BLD AUTO: 27.9 % (ref 42–52)
HGB BLD-MCNC: 9.7 G/DL (ref 14–18)
IMM GRANULOCYTES # BLD AUTO: 0.04 K/UL (ref 0–0.11)
IMM GRANULOCYTES NFR BLD AUTO: 1.1 % (ref 0–0.9)
LACTATE BLD-SCNC: 2.9 MMOL/L (ref 0.5–2)
LACTATE BLD-SCNC: 3.2 MMOL/L (ref 0.5–2)
LACTATE BLD-SCNC: 5.1 MMOL/L (ref 0.5–2)
LACTATE BLD-SCNC: 7.6 MMOL/L (ref 0.5–2)
LDH SERPL L TO P-CCNC: 234 U/L (ref 107–266)
LIPASE SERPL-CCNC: 6 U/L (ref 7–58)
LYMPHOCYTES # BLD AUTO: 0.3 K/UL (ref 1–4.8)
LYMPHOCYTES NFR BLD: 8.1 % (ref 22–41)
MACROCYTES BLD QL SMEAR: ABNORMAL
MCH RBC QN AUTO: 35.4 PG (ref 27–33)
MCHC RBC AUTO-ENTMCNC: 34.8 G/DL (ref 33.7–35.3)
MCV RBC AUTO: 101.8 FL (ref 81.4–97.8)
MONOCYTES # BLD AUTO: 0.67 K/UL (ref 0–0.85)
MONOCYTES NFR BLD AUTO: 18.2 % (ref 0–13.4)
NEUTROPHILS # BLD AUTO: 2.66 K/UL (ref 1.82–7.42)
NEUTROPHILS NFR BLD: 72 % (ref 44–72)
NRBC # BLD AUTO: 0.02 K/UL
NRBC BLD-RTO: 0.5 /100 WBC
OVALOCYTES BLD QL SMEAR: NORMAL
PCO2 BLDV: 27.3 MMHG (ref 41–51)
PCO2 BLDV: 30 MMHG (ref 41–51)
PH BLDV: 7.41 [PH] (ref 7.31–7.45)
PH BLDV: 7.47 [PH] (ref 7.31–7.45)
PLATELET # BLD AUTO: 52 K/UL (ref 164–446)
PLATELET BLD QL SMEAR: NORMAL
PLATELETS.RETICULATED NFR BLD AUTO: 5.2 K/UL (ref 0.6–13.1)
PMV BLD AUTO: 10 FL (ref 9–12.9)
PO2 BLDV: 25.6 MMHG (ref 25–40)
PO2 BLDV: 85.7 MMHG (ref 25–40)
POIKILOCYTOSIS BLD QL SMEAR: NORMAL
POLYCHROMASIA BLD QL SMEAR: NORMAL
POTASSIUM SERPL-SCNC: 3.5 MMOL/L (ref 3.6–5.5)
POTASSIUM SERPL-SCNC: 3.7 MMOL/L (ref 3.6–5.5)
POTASSIUM SERPL-SCNC: 3.8 MMOL/L (ref 3.6–5.5)
PROT SERPL-MCNC: 7.3 G/DL (ref 6–8.2)
RBC # BLD AUTO: 2.74 M/UL (ref 4.7–6.1)
RBC BLD AUTO: PRESENT
SAO2 % BLDV: 43.8 %
SAO2 % BLDV: 96.1 %
SARS-COV-2 RNA RESP QL NAA+PROBE: NOTDETECTED
SODIUM SERPL-SCNC: 128 MMOL/L (ref 135–145)
SODIUM SERPL-SCNC: 132 MMOL/L (ref 135–145)
SODIUM SERPL-SCNC: 133 MMOL/L (ref 135–145)
SPECIMEN SOURCE: NORMAL
TROPONIN T SERPL-MCNC: 37 NG/L (ref 6–19)
WBC # BLD AUTO: 3.7 K/UL (ref 4.8–10.8)

## 2020-11-28 PROCEDURE — 93005 ELECTROCARDIOGRAM TRACING: CPT | Performed by: EMERGENCY MEDICINE

## 2020-11-28 PROCEDURE — 82803 BLOOD GASES ANY COMBINATION: CPT | Mod: 91

## 2020-11-28 PROCEDURE — 93005 ELECTROCARDIOGRAM TRACING: CPT | Performed by: HOSPITALIST

## 2020-11-28 PROCEDURE — 700105 HCHG RX REV CODE 258: Performed by: HOSPITALIST

## 2020-11-28 PROCEDURE — 700105 HCHG RX REV CODE 258: Performed by: EMERGENCY MEDICINE

## 2020-11-28 PROCEDURE — 85025 COMPLETE CBC W/AUTO DIFF WBC: CPT

## 2020-11-28 PROCEDURE — 700102 HCHG RX REV CODE 250 W/ 637 OVERRIDE(OP): Performed by: INTERNAL MEDICINE

## 2020-11-28 PROCEDURE — 82010 KETONE BODYS QUAN: CPT

## 2020-11-28 PROCEDURE — 84484 ASSAY OF TROPONIN QUANT: CPT

## 2020-11-28 PROCEDURE — 99220 PR INITIAL OBSERVATION CARE,LEVL III: CPT | Performed by: HOSPITALIST

## 2020-11-28 PROCEDURE — 700111 HCHG RX REV CODE 636 W/ 250 OVERRIDE (IP): Performed by: HOSPITALIST

## 2020-11-28 PROCEDURE — 85055 RETICULATED PLATELET ASSAY: CPT

## 2020-11-28 PROCEDURE — 700111 HCHG RX REV CODE 636 W/ 250 OVERRIDE (IP): Performed by: EMERGENCY MEDICINE

## 2020-11-28 PROCEDURE — 80048 BASIC METABOLIC PNL TOTAL CA: CPT

## 2020-11-28 PROCEDURE — G0378 HOSPITAL OBSERVATION PER HR: HCPCS

## 2020-11-28 PROCEDURE — A9270 NON-COVERED ITEM OR SERVICE: HCPCS | Performed by: INTERNAL MEDICINE

## 2020-11-28 PROCEDURE — 80307 DRUG TEST PRSMV CHEM ANLYZR: CPT

## 2020-11-28 PROCEDURE — 36415 COLL VENOUS BLD VENIPUNCTURE: CPT

## 2020-11-28 PROCEDURE — 87040 BLOOD CULTURE FOR BACTERIA: CPT

## 2020-11-28 PROCEDURE — 99285 EMERGENCY DEPT VISIT HI MDM: CPT

## 2020-11-28 PROCEDURE — 83010 ASSAY OF HAPTOGLOBIN QUANT: CPT

## 2020-11-28 PROCEDURE — 700102 HCHG RX REV CODE 250 W/ 637 OVERRIDE(OP): Performed by: HOSPITALIST

## 2020-11-28 PROCEDURE — 83615 LACTATE (LD) (LDH) ENZYME: CPT

## 2020-11-28 PROCEDURE — 83690 ASSAY OF LIPASE: CPT

## 2020-11-28 PROCEDURE — 82962 GLUCOSE BLOOD TEST: CPT

## 2020-11-28 PROCEDURE — 94760 N-INVAS EAR/PLS OXIMETRY 1: CPT

## 2020-11-28 PROCEDURE — 80053 COMPREHEN METABOLIC PANEL: CPT

## 2020-11-28 PROCEDURE — 96365 THER/PROPH/DIAG IV INF INIT: CPT

## 2020-11-28 PROCEDURE — 71045 X-RAY EXAM CHEST 1 VIEW: CPT

## 2020-11-28 PROCEDURE — 83605 ASSAY OF LACTIC ACID: CPT

## 2020-11-28 PROCEDURE — 96372 THER/PROPH/DIAG INJ SC/IM: CPT

## 2020-11-28 PROCEDURE — U0003 INFECTIOUS AGENT DETECTION BY NUCLEIC ACID (DNA OR RNA); SEVERE ACUTE RESPIRATORY SYNDROME CORONAVIRUS 2 (SARS-COV-2) (CORONAVIRUS DISEASE [COVID-19]), AMPLIFIED PROBE TECHNIQUE, MAKING USE OF HIGH THROUGHPUT TECHNOLOGIES AS DESCRIBED BY CMS-2020-01-R: HCPCS

## 2020-11-28 RX ORDER — POLYETHYLENE GLYCOL 3350 17 G/17G
1 POWDER, FOR SOLUTION ORAL
Status: DISCONTINUED | OUTPATIENT
Start: 2020-11-28 | End: 2020-12-04 | Stop reason: HOSPADM

## 2020-11-28 RX ORDER — SODIUM CHLORIDE, SODIUM LACTATE, POTASSIUM CHLORIDE, AND CALCIUM CHLORIDE .6; .31; .03; .02 G/100ML; G/100ML; G/100ML; G/100ML
1000 INJECTION, SOLUTION INTRAVENOUS
Status: COMPLETED | OUTPATIENT
Start: 2020-11-28 | End: 2020-11-28

## 2020-11-28 RX ORDER — SODIUM CHLORIDE, SODIUM LACTATE, POTASSIUM CHLORIDE, AND CALCIUM CHLORIDE .6; .31; .03; .02 G/100ML; G/100ML; G/100ML; G/100ML
30 INJECTION, SOLUTION INTRAVENOUS
Status: DISCONTINUED | OUTPATIENT
Start: 2020-11-28 | End: 2020-12-04 | Stop reason: HOSPADM

## 2020-11-28 RX ORDER — SODIUM CHLORIDE, SODIUM LACTATE, POTASSIUM CHLORIDE, CALCIUM CHLORIDE 600; 310; 30; 20 MG/100ML; MG/100ML; MG/100ML; MG/100ML
INJECTION, SOLUTION INTRAVENOUS CONTINUOUS
Status: DISCONTINUED | OUTPATIENT
Start: 2020-11-28 | End: 2020-12-04 | Stop reason: HOSPADM

## 2020-11-28 RX ORDER — SODIUM CHLORIDE 9 MG/ML
INJECTION, SOLUTION INTRAVENOUS CONTINUOUS
Status: DISCONTINUED | OUTPATIENT
Start: 2020-11-28 | End: 2020-11-28

## 2020-11-28 RX ORDER — ALPRAZOLAM 0.25 MG/1
0.25 TABLET ORAL NIGHTLY PRN
Status: DISCONTINUED | OUTPATIENT
Start: 2020-11-28 | End: 2020-12-02

## 2020-11-28 RX ORDER — BISACODYL 10 MG
10 SUPPOSITORY, RECTAL RECTAL
Status: DISCONTINUED | OUTPATIENT
Start: 2020-11-28 | End: 2020-12-04 | Stop reason: HOSPADM

## 2020-11-28 RX ORDER — DEXTROSE MONOHYDRATE 25 G/50ML
50 INJECTION, SOLUTION INTRAVENOUS
Status: DISCONTINUED | OUTPATIENT
Start: 2020-11-28 | End: 2020-12-04 | Stop reason: HOSPADM

## 2020-11-28 RX ORDER — HEPARIN SODIUM 5000 [USP'U]/ML
5000 INJECTION, SOLUTION INTRAVENOUS; SUBCUTANEOUS EVERY 8 HOURS
Status: DISCONTINUED | OUTPATIENT
Start: 2020-11-28 | End: 2020-11-29

## 2020-11-28 RX ORDER — SODIUM CHLORIDE 9 MG/ML
1000 INJECTION, SOLUTION INTRAVENOUS ONCE
Status: ACTIVE | OUTPATIENT
Start: 2020-11-28 | End: 2020-11-29

## 2020-11-28 RX ORDER — SODIUM CHLORIDE, SODIUM LACTATE, POTASSIUM CHLORIDE, CALCIUM CHLORIDE 600; 310; 30; 20 MG/100ML; MG/100ML; MG/100ML; MG/100ML
1000 INJECTION, SOLUTION INTRAVENOUS CONTINUOUS
Status: DISCONTINUED | OUTPATIENT
Start: 2020-11-28 | End: 2020-11-28

## 2020-11-28 RX ORDER — SODIUM CHLORIDE, SODIUM LACTATE, POTASSIUM CHLORIDE, CALCIUM CHLORIDE 600; 310; 30; 20 MG/100ML; MG/100ML; MG/100ML; MG/100ML
1000 INJECTION, SOLUTION INTRAVENOUS ONCE
Status: COMPLETED | OUTPATIENT
Start: 2020-11-28 | End: 2020-11-28

## 2020-11-28 RX ORDER — ACETAMINOPHEN 325 MG/1
650 TABLET ORAL EVERY 6 HOURS PRN
Status: DISCONTINUED | OUTPATIENT
Start: 2020-11-28 | End: 2020-12-04 | Stop reason: HOSPADM

## 2020-11-28 RX ORDER — AMOXICILLIN 250 MG
2 CAPSULE ORAL 2 TIMES DAILY
Status: DISCONTINUED | OUTPATIENT
Start: 2020-11-28 | End: 2020-12-04 | Stop reason: HOSPADM

## 2020-11-28 RX ADMIN — SODIUM CHLORIDE, POTASSIUM CHLORIDE, SODIUM LACTATE AND CALCIUM CHLORIDE: 600; 310; 30; 20 INJECTION, SOLUTION INTRAVENOUS at 21:13

## 2020-11-28 RX ADMIN — SODIUM CHLORIDE, POTASSIUM CHLORIDE, SODIUM LACTATE AND CALCIUM CHLORIDE 1000 ML: 600; 310; 30; 20 INJECTION, SOLUTION INTRAVENOUS at 15:25

## 2020-11-28 RX ADMIN — SODIUM CHLORIDE, POTASSIUM CHLORIDE, SODIUM LACTATE AND CALCIUM CHLORIDE 1000 ML: 600; 310; 30; 20 INJECTION, SOLUTION INTRAVENOUS at 19:55

## 2020-11-28 RX ADMIN — HEPARIN SODIUM 5000 UNITS: 5000 INJECTION, SOLUTION INTRAVENOUS; SUBCUTANEOUS at 21:22

## 2020-11-28 RX ADMIN — ALPRAZOLAM 0.25 MG: 0.25 TABLET ORAL at 22:34

## 2020-11-28 RX ADMIN — SODIUM CHLORIDE: 9 INJECTION, SOLUTION INTRAVENOUS at 18:55

## 2020-11-28 RX ADMIN — CEFTRIAXONE SODIUM 2 G: 2 INJECTION, POWDER, FOR SOLUTION INTRAMUSCULAR; INTRAVENOUS at 16:47

## 2020-11-28 ASSESSMENT — ENCOUNTER SYMPTOMS
SEIZURES: 0
DIARRHEA: 0
VOMITING: 0
EYE REDNESS: 0
FOCAL WEAKNESS: 0
SPUTUM PRODUCTION: 0
CHILLS: 0
FEVER: 0
HALLUCINATIONS: 0
SHORTNESS OF BREATH: 0
FLANK PAIN: 0
EYE DISCHARGE: 0
SPEECH CHANGE: 0
EYE PAIN: 0
BRUISES/BLEEDS EASILY: 0
NERVOUS/ANXIOUS: 0
SORE THROAT: 0
HEMOPTYSIS: 0
MYALGIAS: 0
STRIDOR: 0
PALPITATIONS: 0
BLOOD IN STOOL: 0
COUGH: 0
ABDOMINAL PAIN: 0

## 2020-11-28 ASSESSMENT — FIBROSIS 4 INDEX
FIB4 SCORE: 17.02
FIB4 SCORE: 11.2

## 2020-11-28 NOTE — ED PROVIDER NOTES
"ED Provider Note    CHIEF COMPLAINT  Chief Complaint   Patient presents with   • Weakness       HPI  Nicola Jimenez is a 75 y.o. male who presents for severe fatigue that struck while he was at [facility] today.  Medics responded but he refused transport walked outside and then his weakness greatly increased.  His weakness is generalized.  No lateralizing or focal weakness.  No headache, fever, cough, chest pain, vomiting, diarrhea, GI bleed.    Per chart review patient was admitted in September with critical aortic stenosis and pancytopenia and was thought to have hemolysis secondary to his critical aortic stenosis.  He was recommended for TAVR but he left the hospital AGAINST MEDICAL ADVICE and stated he would follow-up as an outpatient with cardiology.  He states he never has followed up with cardiology.    REVIEW OF SYSTEMS  Pertinent positives include: Severe generalized weakness sudden onset.  Pertinent negatives include: Chest pain, palpitations, leg swelling, vomiting, melena.  10+ systems reviewed and negative.      PAST MEDICAL HISTORY  Past Medical History:   Diagnosis Date   • Arrhythmia    • Arthritis    • Atherosclerosis of aorta (HCC)    • Blood clotting disorder (Formerly [facility]) 1988    MI   • Carotid artery disease (Formerly [facility])    • Cataract     tracey eyes    • Dental disorder    • Esophageal dilatation    • H/O heart artery stent     x2   • Hernia, inguinal, bilateral    • High cholesterol    • History of AAA (abdominal aortic aneurysm) repair 01/30/2012    Graft repair with    • [place] (hard of hearing)    • Indigestion    • MI (myocardial infarction) (Formerly [facility]) 1988   • MI, old     3 X's late 1980's with cardioversion & coronary stent placement   • Myocardial infarct (Formerly [facility])    • PAD (peripheral artery disease)    • Status post aortic coarctation stent placement    • Umbilical hernia     repair \"years ago\"   • Urinary incontinence        SOCIAL HISTORY  Social History     Tobacco Use   • Smoking status: Current " Every Day Smoker     Packs/day: 1.00     Types: Cigarettes, Pipe   • Smokeless tobacco: Never Used   Substance Use Topics   • Alcohol use: Yes     Comment: 2 per day   • Drug use: Yes     Types: Inhaled     Comment: recreational marijuana     Social History     Substance and Sexual Activity   Drug Use Yes   • Types: Inhaled    Comment: recreational marijuana   States he last used alcohol 3 or 4 days ago    SURGICAL HISTORY  Past Surgical History:   Procedure Laterality Date   • PB COLONOSCOPY,DIAGNOSTIC N/A 9/26/2020    Procedure: COLONOSCOPY;  Surgeon: Dane Bliss M.D.;  Location: SURGERY [facility];  Service: Gastroenterology   • GASTROSCOPY N/A 9/24/2020    Procedure: GASTROSCOPY;  Surgeon: Jerad Arellano M.D.;  Location: SURGERY SAME DAY [facility];  Service: Gastroenterology   • PB CYSTOSCOPY,INSERT URETERAL STENT Left 6/13/2019    Procedure: CYSTOSCOPY, WITH URETERAL STENT INSERTION - EXCHANGE;  Surgeon: Mihai Whittington M.D.;  Location: SURGERY [facility];  Service: Urology   • CYSTOSCOPY STENT PLACEMENT Left 11/14/2018    Procedure: CYSTOSCOPY STENT PLACEMENT - EXCHANGE;  Surgeon: Mihai Whittington M.D.;  Location: SURGERY [facility];  Service: Urology   • CYSTOSCOPY STENT PLACEMENT Left 4/9/2018    Procedure: CYSTOSCOPY STENT PLACEMENT- FOR EXCHANGE;  Surgeon: Mihai Whittington M.D.;  Location: [facility];  Service: Urology   • CYSTOSCOPY STENT PLACEMENT  4/16/2017    Procedure: CYSTOSCOPY (l) RETROGRADE PYLOGRAM (L) URETERAL STENT PLACEMENT ;  Surgeon: Mihai Whittington M.D.;  Location: SURGERY [facility];  Service:    • TOE AMPUTATION Left 3/25/2017    Procedure: TOE AMPUTATION -  3RD  ;  Surgeon: Edson Samayoa M.D.;  Location: SURGERY [facility];  Service:    • GASTROSCOPY WITH BOTOX INJECTION N/A 3/23/2017    Procedure: GASTROSCOPY WITH BOTOX INJECTION;  Surgeon: Wm Cottrell M.D.;  Location: [facility];  Service:    •  ESOPHAGEAL MOTILITY OR MANOMETRY  3/21/2017    Procedure: ESOPHAGEAL MOTILITY OR MANOMETRY;  Surgeon: Wm Cottrell M.D.;  Location: ENDOSCOPY [facility];  Service:    • GASTROSCOPY-ENDO  3/19/2017    Procedure: GASTROSCOPY-ENDO;  Surgeon: Tod Adrian D.O.;  Location: ENDOSCOPY [facility];  Service:    • ESOPHAGEAL MOTILITY OR MANOMETRY  3/27/2012    Performed by CATHY OCHOA at ENDOSCOPY [facility]   • AAA WITH STENT GRAFT  1/30/2012    Performed by SHAGUFTA TRIPP at SURGERY [facility]       CURRENT MEDICATIONS  Denies    ALLERGIES  No Known Allergies    PHYSICAL EXAM  VITAL SIGNS: /76   Pulse 100   Temp 37 °C (98.6 °F)   Resp (!) 22   Wt 69 kg (152 lb 1.9 oz)   SpO2 99%   BMI 21.83 kg/m²   Reviewed and elevated blood pressure, borderline tachycardic, mildly tachypneic  Constitutional: Well developed, Well nourished, well-appearing, pale, hard of hearing.  HENT: Normocephalic, atraumatic, bilateral external ears normal, Wearing mask.   Eyes: PERRLA, conjunctiva pink, no scleral icterus.   Cardiovascular: Regular S1-S2 with 2/6 systolic murmur at the right sternal border but no rub, gallop.  No dependent edema or calf tenderness.  Respiratory: No rales, rhonchi, wheeze or cough.  Gastrointestinal: Soft, nontender, nondistended, no organomegaly.  Skin: No erythema, no rash.   Genitourinary:  No costovertebral angle tenderness.   Neurologic: Alert & oriented x 3, cranial nerves 2-12 intact by passive exam.  No focal deficit noted.  Psychiatric: Affect normal, Judgment normal, Mood normal.     DIFFERENTIAL DIAGNOSIS:  Critical aortic stenosis, pancytopenia, sepsis, Covid.    EKG  EKG Interpretation 8:20 PM    Interpreted by me.  Indication: Weakness    Rhythm: Regular with baseline artifact  Rate: Tachycardic at 103  Axis: normal  Ectopy: none  Conduction: normal  ST/T Waves: no acute change  Q Waves: none  R Wave progression: normal    Clinical Impression: Regular  tachycardia  .    RADIOLOGY/PROCEDURES  DX-CHEST-LIMITED (1 VIEW)   Final Result         No acute cardiac or pulmonary abnormality is identified.          LABORATORY:  Results for orders placed or performed during the hospital encounter of 11/28/20   CBC WITH DIFFERENTIAL   Result Value Ref Range    WBC 3.7 (L) 4.8 - 10.8 K/uL    RBC 2.74 (L) 4.70 - 6.10 M/uL    Hemoglobin 9.7 (L) 14.0 - 18.0 g/dL    Hematocrit 27.9 (L) 42.0 - 52.0 %    .8 (H) 81.4 - 97.8 fL    MCH 35.4 (H) 27.0 - 33.0 pg    MCHC 34.8 33.7 - 35.3 g/dL    RDW 71.7 (H) 35.9 - 50.0 fL    Platelet Count 52 (L) 164 - 446 K/uL    MPV 10.0 9.0 - 12.9 fL    Neutrophils-Polys 72.00 44.00 - 72.00 %    Lymphocytes 8.10 (L) 22.00 - 41.00 %    Monocytes 18.20 (H) 0.00 - 13.40 %    Eosinophils 0.30 0.00 - 6.90 %    Basophils 0.30 0.00 - 1.80 %    Immature Granulocytes 1.10 (H) 0.00 - 0.90 %    Nucleated RBC 0.50 /100 WBC    Neutrophils (Absolute) 2.66 1.82 - 7.42 K/uL    Lymphs (Absolute) 0.30 (L) 1.00 - 4.80 K/uL    Monos (Absolute) 0.67 0.00 - 0.85 K/uL    Eos (Absolute) 0.01 0.00 - 0.51 K/uL    Baso (Absolute) 0.01 0.00 - 0.12 K/uL    Immature Granulocytes (abs) 0.04 0.00 - 0.11 K/uL    NRBC (Absolute) 0.02 K/uL    Anisocytosis 1+     Macrocytosis 1+    COMP METABOLIC PANEL   Result Value Ref Range    Sodium 128 (L) 135 - 145 mmol/L    Potassium 3.8 3.6 - 5.5 mmol/L    Chloride 87 (L) 96 - 112 mmol/L    Co2 17 (L) 20 - 33 mmol/L    Anion Gap 24.0 (H) 7.0 - 16.0    Glucose 210 (H) 65 - 99 mg/dL    Bun 16 8 - 22 mg/dL    Creatinine 1.01 0.50 - 1.40 mg/dL    Calcium 9.3 8.4 - 10.2 mg/dL    AST(SGOT) 43 12 - 45 U/L    ALT(SGPT) 14 2 - 50 U/L    Alkaline Phosphatase 84 30 - 99 U/L    Total Bilirubin 2.6 (H) 0.1 - 1.5 mg/dL    Albumin 4.0 3.2 - 4.9 g/dL    Total Protein 7.3 6.0 - 8.2 g/dL    Globulin 3.3 1.9 - 3.5 g/dL    A-G Ratio 1.2 g/dL   LIPASE   Result Value Ref Range    Lipase 6 (L) 7 - 58 U/L   TROPONIN   Result Value Ref Range    Troponin T 37 (H) 6  - 19 ng/L   LACTIC ACID   Result Value Ref Range    Lactic Acid 7.6 (HH) 0.5 - 2.0 mmol/L   LACTIC ACID   Result Value Ref Range    Lactic Acid 3.2 (H) 0.5 - 2.0 mmol/L   LACTIC ACID   Result Value Ref Range    Lactic Acid 5.1 (HH) 0.5 - 2.0 mmol/L   ESTIMATED GFR   Result Value Ref Range    GFR If African American >60 >60 mL/min/1.73 m 2    GFR If Non African American >60 >60 mL/min/1.73 m 2   MORPHOLOGY   Result Value Ref Range    RBC Morphology Present     Polychromia 1+     Poikilocytosis 1+     Ovalocytes 1+    SARS-CoV-2, PCR (In-House)   Result Value Ref Range    SARS-CoV-2 Source NP Swab     SARS-CoV-2 by PCR NotDetected NotDetected   LDH   Result Value Ref Range    LDH Total 234 107 - 266 U/L   ETHYL ALCOHOL (BLOOD)   Result Value Ref Range    Diagnostic Alcohol <10.1 0.0 - 10.0 mg/dL   BETA-HYDROXYBUTYRIC ACID   Result Value Ref Range    beta-Hydroxybutyric Acid 3.31 (H) 0.02 - 0.27 mmol/L   VENOUS BLOOD GAS   Result Value Ref Range    Venous Bg Ph 7.41 7.31 - 7.45    Venous Bg Pco2 30.0 (L) 41.0 - 51.0 mmHg    Venous Bg Po2 25.6 25.0 - 40.0 mmHg    Venous Bg O2 Saturation 43.8 %    Venous Bg Hco3 19 (L) 24 - 28 mmol/L    Venous Bg Base Excess -5 mmol/L    Body Temp see below Centigrade       INTERVENTIONS:  Medications   NS infusion 1,000 mL (has no administration in time range)     Case discussed with the hospitalist who will admit the patient    COURSE & MEDICAL DECISION MAKING  Patient presents with an episode of severe generalized weakness that appears to have been due to his critical aortic stenosis.  He also has pancytopenia and may have some component of hemolysis but that is unlikely to explain his low platelets leukopenia.  He has an anion gap lactic acidosis with an elevation of serum ketones as well.  He has new elevation of serum glucose to 200 and may have new onset diabetes.  This is likely type II.  He may have some component of DKA.  She agrees to be admitted.    PLAN:  IV fluids,,  cardiology and cardiovascular surgery consultation, possible bone marrow biopsy,, insulin therapy    CONDITION: Guarded.    FINAL IMPRESSION  1. Critical aortic valve stenosis    2. Pancytopenia (HCC)    3. Lactic acidosis    4. Elevated random blood glucose level          Electronically signed by: Diego Barksdale M.D., 11/28/2020 2:25 PM

## 2020-11-28 NOTE — ED NOTES
Assessment done Blood to lab Warm blankets applied PO fluids given OK per ERP POC reviewed Awaiting results

## 2020-11-28 NOTE — ED TRIAGE NOTES
Pt to ed , was at [facility] , initially evaluated by winter. Left ama.  Later walking out from store to car , pt extremely weak  Winter , transferred pt to hospital this time.    Iv placed PTA, Zofran given. Glucose 273, no hx of DM  Pt denies any pain , just doesn't feel well

## 2020-11-29 PROBLEM — F10.10 ALCOHOL ABUSE: Status: ACTIVE | Noted: 2020-11-29

## 2020-11-29 PROBLEM — N39.0 UTI (URINARY TRACT INFECTION): Status: ACTIVE | Noted: 2020-11-29

## 2020-11-29 PROBLEM — D61.818 PANCYTOPENIA (HCC): Status: ACTIVE | Noted: 2020-11-29

## 2020-11-29 LAB
ALBUMIN SERPL BCP-MCNC: 3.4 G/DL (ref 3.2–4.9)
ALBUMIN/GLOB SERPL: 1.5 G/DL
ALP SERPL-CCNC: 68 U/L (ref 30–99)
ALT SERPL-CCNC: 10 U/L (ref 2–50)
ANION GAP SERPL CALC-SCNC: 10 MMOL/L (ref 7–16)
AST SERPL-CCNC: 44 U/L (ref 12–45)
BASOPHILS # BLD AUTO: 0.5 % (ref 0–1.8)
BASOPHILS # BLD: 0.01 K/UL (ref 0–0.12)
BILIRUB SERPL-MCNC: 0.7 MG/DL (ref 0.1–1.5)
BUN SERPL-MCNC: 16 MG/DL (ref 8–22)
CALCIUM SERPL-MCNC: 8.6 MG/DL (ref 8.4–10.2)
CHLORIDE SERPL-SCNC: 95 MMOL/L (ref 96–112)
CO2 SERPL-SCNC: 27 MMOL/L (ref 20–33)
CREAT SERPL-MCNC: 0.74 MG/DL (ref 0.5–1.4)
EKG IMPRESSION: NORMAL
EOSINOPHIL # BLD AUTO: 0.01 K/UL (ref 0–0.51)
EOSINOPHIL NFR BLD: 0.5 % (ref 0–6.9)
ERYTHROCYTE [DISTWIDTH] IN BLOOD BY AUTOMATED COUNT: 69.5 FL (ref 35.9–50)
ERYTHROCYTE [DISTWIDTH] IN BLOOD BY AUTOMATED COUNT: 70.5 FL (ref 35.9–50)
EST. AVERAGE GLUCOSE BLD GHB EST-MCNC: 82 MG/DL
GLOBULIN SER CALC-MCNC: 2.2 G/DL (ref 1.9–3.5)
GLUCOSE BLD-MCNC: 105 MG/DL (ref 65–99)
GLUCOSE BLD-MCNC: 134 MG/DL (ref 65–99)
GLUCOSE BLD-MCNC: 190 MG/DL (ref 65–99)
GLUCOSE SERPL-MCNC: 146 MG/DL (ref 65–99)
HBA1C MFR BLD: 4.5 % (ref 0–5.6)
HCT VFR BLD AUTO: 20.6 % (ref 42–52)
HCT VFR BLD AUTO: 22.4 % (ref 42–52)
HGB BLD-MCNC: 7.1 G/DL (ref 14–18)
HGB BLD-MCNC: 7.6 G/DL (ref 14–18)
IMM GRANULOCYTES # BLD AUTO: 0.01 K/UL (ref 0–0.11)
IMM GRANULOCYTES NFR BLD AUTO: 0.5 % (ref 0–0.9)
IRON SATN MFR SERPL: 93 % (ref 15–55)
IRON SERPL-MCNC: 222 UG/DL (ref 50–180)
LACTATE BLD-SCNC: 2.1 MMOL/L (ref 0.5–2)
LACTATE BLD-SCNC: 2.5 MMOL/L (ref 0.5–2)
LYMPHOCYTES # BLD AUTO: 0.8 K/UL (ref 1–4.8)
LYMPHOCYTES NFR BLD: 39.2 % (ref 22–41)
MAGNESIUM SERPL-MCNC: 1.4 MG/DL (ref 1.5–2.5)
MCH RBC QN AUTO: 34.4 PG (ref 27–33)
MCH RBC QN AUTO: 34.6 PG (ref 27–33)
MCHC RBC AUTO-ENTMCNC: 33.9 G/DL (ref 33.7–35.3)
MCHC RBC AUTO-ENTMCNC: 34.5 G/DL (ref 33.7–35.3)
MCV RBC AUTO: 100.5 FL (ref 81.4–97.8)
MCV RBC AUTO: 101.4 FL (ref 81.4–97.8)
MONOCYTES # BLD AUTO: 0.43 K/UL (ref 0–0.85)
MONOCYTES NFR BLD AUTO: 21.1 % (ref 0–13.4)
NEUTROPHILS # BLD AUTO: 0.78 K/UL (ref 1.82–7.42)
NEUTROPHILS NFR BLD: 38.2 % (ref 44–72)
NRBC # BLD AUTO: 0.02 K/UL
NRBC BLD-RTO: 1 /100 WBC
PLATELET # BLD AUTO: 37 K/UL (ref 164–446)
PLATELET # BLD AUTO: 41 K/UL (ref 164–446)
PMV BLD AUTO: 9.6 FL (ref 9–12.9)
PMV BLD AUTO: 9.8 FL (ref 9–12.9)
POTASSIUM SERPL-SCNC: 3.4 MMOL/L (ref 3.6–5.5)
PROT SERPL-MCNC: 5.6 G/DL (ref 6–8.2)
RBC # BLD AUTO: 2.05 M/UL (ref 4.7–6.1)
RBC # BLD AUTO: 2.21 M/UL (ref 4.7–6.1)
SODIUM SERPL-SCNC: 132 MMOL/L (ref 135–145)
TIBC SERPL-MCNC: 239 UG/DL (ref 250–450)
UIBC SERPL-MCNC: <17 UG/DL (ref 110–370)
WBC # BLD AUTO: 2 K/UL (ref 4.8–10.8)
WBC # BLD AUTO: 2.4 K/UL (ref 4.8–10.8)

## 2020-11-29 PROCEDURE — 83605 ASSAY OF LACTIC ACID: CPT

## 2020-11-29 PROCEDURE — 770020 HCHG ROOM/CARE - TELE (206)

## 2020-11-29 PROCEDURE — 93010 ELECTROCARDIOGRAM REPORT: CPT | Performed by: INTERNAL MEDICINE

## 2020-11-29 PROCEDURE — 83036 HEMOGLOBIN GLYCOSYLATED A1C: CPT

## 2020-11-29 PROCEDURE — 83735 ASSAY OF MAGNESIUM: CPT

## 2020-11-29 PROCEDURE — 96372 THER/PROPH/DIAG INJ SC/IM: CPT

## 2020-11-29 PROCEDURE — 99226 PR SUBSEQUENT OBSERVATION CARE,LEVEL III: CPT | Performed by: HOSPITALIST

## 2020-11-29 PROCEDURE — 82962 GLUCOSE BLOOD TEST: CPT | Mod: 91

## 2020-11-29 PROCEDURE — 83550 IRON BINDING TEST: CPT

## 2020-11-29 PROCEDURE — 700105 HCHG RX REV CODE 258: Performed by: HOSPITALIST

## 2020-11-29 PROCEDURE — 83540 ASSAY OF IRON: CPT

## 2020-11-29 PROCEDURE — 80053 COMPREHEN METABOLIC PANEL: CPT

## 2020-11-29 PROCEDURE — 85025 COMPLETE CBC W/AUTO DIFF WBC: CPT

## 2020-11-29 PROCEDURE — 700111 HCHG RX REV CODE 636 W/ 250 OVERRIDE (IP): Performed by: HOSPITALIST

## 2020-11-29 PROCEDURE — 85027 COMPLETE CBC AUTOMATED: CPT

## 2020-11-29 RX ADMIN — CEFTRIAXONE SODIUM 1 G: 1 INJECTION, POWDER, FOR SOLUTION INTRAMUSCULAR; INTRAVENOUS at 17:20

## 2020-11-29 RX ADMIN — INSULIN HUMAN 1 UNITS: 100 INJECTION, SOLUTION PARENTERAL at 06:33

## 2020-11-29 ASSESSMENT — FIBROSIS 4 INDEX: FIB4 SCORE: 26.13

## 2020-11-29 ASSESSMENT — ENCOUNTER SYMPTOMS
VOMITING: 0
CONSTITUTIONAL NEGATIVE: 1
DEPRESSION: 0
CHILLS: 0
CARDIOVASCULAR NEGATIVE: 1
BRUISES/BLEEDS EASILY: 0
WEIGHT LOSS: 0
GASTROINTESTINAL NEGATIVE: 1
NEUROLOGICAL NEGATIVE: 1
MUSCULOSKELETAL NEGATIVE: 1
ABDOMINAL PAIN: 0
COUGH: 0
EYES NEGATIVE: 1
DIZZINESS: 0
PALPITATIONS: 0
FOCAL WEAKNESS: 0
SHORTNESS OF BREATH: 0
WEAKNESS: 0
NAUSEA: 0
MYALGIAS: 0
SORE THROAT: 0
DIAPHORESIS: 0
HEADACHES: 0
BLURRED VISION: 0
RESPIRATORY NEGATIVE: 1
FEVER: 0

## 2020-11-29 ASSESSMENT — LIFESTYLE VARIABLES: SUBSTANCE_ABUSE: 1

## 2020-11-29 NOTE — PROGRESS NOTES
Pt received from nightshift RN. Pt resting in bed, call light within reach. Will continue plan of care.

## 2020-11-29 NOTE — ED NOTES
Repeat lactic done  2nd lactic that was drawn at 1625 was used for other bld work orders   MD nicole

## 2020-11-29 NOTE — PROGRESS NOTES
Radha from Lab called with critical result of Lactic at 5.1. Critical lab result read back to Radha.   Dr. Templeton notified of critical lab result at 1928.  Critical lab result read back by Dr. Templeton.    Give 1L LR bolus. Continuous fluids changed to LR @ 100/hr. Monitor pulse ox, and O2 needs for signs of pulmonary edema. Monitor I/O's.

## 2020-11-29 NOTE — ED NOTES
Pt's car was moved by [employer] to a safe spot    [facility].  By the [facility], in a handicap spot  Pt has the car keys

## 2020-11-29 NOTE — ASSESSMENT & PLAN NOTE
Acute on chronic  Recent US showed splenomegaly which is likely contributing along with etoh abuse and cirrhosis  No s/o bleeding  Continue to follow

## 2020-11-29 NOTE — PROGRESS NOTES
Pt to 312-1. Pt confused to time stated it was 1980. Neuro intact otherwise. Assessment complete. Report given to Isidro CAMERON

## 2020-11-29 NOTE — ASSESSMENT & PLAN NOTE
This is acute on chronic, received transfusion 12/1 and 12/3  Likely multifactorial: hemolytic anemia due to AD, pancytopenia due to cirrhosis and etoh  Recent EGD and colonoscopy  CTA chest showing esophagitis  Continue to monitor and transfuse for hemoglobin of less than or equal to 7  LDH wnl

## 2020-11-29 NOTE — ASSESSMENT & PLAN NOTE
Multifactorial likely combination of severe aortic stenosis and severe dehydration.  EKG shows a sinus rhythm with a rate of 103, there is background artifact secondary to tremor that has confused on the order read as atrial flutter/fibrillation.  There is no ST elevation, there is T wave inversion in lead I to aVL and aVF.  QTc 471.   See above  PT/ OT to eval

## 2020-11-29 NOTE — ASSESSMENT & PLAN NOTE
Severe aortic stenosis, likely triggered his attack with a combined severe dehydration.  Currently not a candidate for TAVR and has been non compliant with follow up  See above  Cardiology recommending outpatient follow up given poor patient compliance.

## 2020-11-29 NOTE — PROGRESS NOTES
"Hospital Medicine Daily Progress Note    Date of Service  11/29/2020    Chief Complaint  75 y.o. male admitted 11/28/2020 with weakness    Hospital Course  Patient is a 75 y.o. male with past medical history of severe aortic stenosis complicated by mechanical hemolytic anemia, history of splenic infarction, abdominal aortic aneurysm status post repair, esophageal thickening, and noncompliance who presented 11/28/2020 with an episodes of sudden generalized weakness to the point that he almost fainted at [employer] on the day of admission. There was no history consistent with seizure activity.  He denied losing consciousness.  He was recently diagnosed with severe aortic stenosis and a TAVR was recommended however he left the hospital AMA and did not follow up with cardiology.    Interval Problem Update  He is hard of hearing but [employer], a very poor historian.  He said he did not recall being told about the need for a TAVR in the past and does not recall leaving the hospital AMA last time \"but that sounds like something I would do.\"  He states he would be interested in a TAVR but would also consider hospice.  He denies sob, today, denies cp, no dizzness, no melena or hematochezia  Ros otherwise negative  Discussed with cardiology who reports multiple attempts were made to contact pt and he never followed up, they will make an appointment with the TAVR nurse, it is an outpatient only procedure, however currently he is not a candidate due to his pancytopenia.  Pt also tells me he is still drinking etoh so this also makes him a poor candidate.  Ros otherwise negative  Discussed with nursing and cardiology    Consultants/Specialty  Cardiology  Hospice    Code Status  Full Code    Disposition  tbd    Review of Systems  Review of Systems   Constitutional: Negative.  Negative for chills, diaphoresis, fever, malaise/fatigue and weight loss.   HENT: Negative.  Negative for sore throat.    Eyes: Negative.  Negative for blurred " vision.   Respiratory: Negative.  Negative for cough and shortness of breath.    Cardiovascular: Negative.  Negative for chest pain, palpitations and leg swelling.   Gastrointestinal: Negative.  Negative for abdominal pain, nausea and vomiting.   Genitourinary: Negative.  Negative for dysuria.   Musculoskeletal: Negative.  Negative for myalgias.   Skin: Negative.  Negative for itching and rash.   Neurological: Negative.  Negative for dizziness, focal weakness, weakness and headaches.   Endo/Heme/Allergies: Negative.  Does not bruise/bleed easily.   Psychiatric/Behavioral: Positive for substance abuse. Negative for depression and suicidal ideas.   All other systems reviewed and are negative.       Physical Exam  Temp:  [36.4 °C (97.6 °F)-37 °C (98.6 °F)] 36.9 °C (98.5 °F)  Pulse:  [] 85  Resp:  [16-22] 18  BP: (100-140)/(50-91) 112/50  SpO2:  [94 %-99 %] 95 %    Physical Exam  Vitals signs and nursing note reviewed. Exam conducted with a chaperone present.   Constitutional:       General: He is not in acute distress.     Appearance: Normal appearance. He is not diaphoretic.   HENT:      Head: Normocephalic.      Comments: Hearing loss, chronic     Nose: Nose normal.      Mouth/Throat:      Mouth: Mucous membranes are moist.   Eyes:      Pupils: Pupils are equal, round, and reactive to light.   Cardiovascular:      Rate and Rhythm: Normal rate and regular rhythm.      Pulses: Normal pulses.      Heart sounds: Murmur present.   Pulmonary:      Effort: Pulmonary effort is normal.      Breath sounds: Normal breath sounds.   Abdominal:      General: Abdomen is flat. Bowel sounds are normal.      Palpations: Abdomen is soft.      Comments: splenomegaly   Musculoskeletal: Normal range of motion.         General: No swelling or deformity.   Skin:     General: Skin is warm and dry.      Capillary Refill: Capillary refill takes less than 2 seconds.   Neurological:      General: No focal deficit present.      Mental  Status: He is alert and oriented to person, place, and time.      Cranial Nerves: No cranial nerve deficit.   Psychiatric:         Mood and Affect: Mood normal.         Behavior: Behavior normal.         Fluids    Intake/Output Summary (Last 24 hours) at 11/29/2020 1201  Last data filed at 11/29/2020 0920  Gross per 24 hour   Intake 200 ml   Output --   Net 200 ml       Laboratory  Recent Labs     11/28/20  1415 11/29/20  0222   WBC 3.7* 2.0*   RBC 2.74* 2.05*   HEMOGLOBIN 9.7* 7.1*   HEMATOCRIT 27.9* 20.6*   .8* 100.5*   MCH 35.4* 34.6*   MCHC 34.8 34.5   RDW 71.7* 69.5*   PLATELETCT 52* 37*   MPV 10.0 9.6     Recent Labs     11/28/20  1909 11/28/20  2230 11/29/20  0222   SODIUM 133* 132* 132*   POTASSIUM 3.7 3.5* 3.4*   CHLORIDE 95* 95* 95*   CO2 22 25 27   GLUCOSE 158* 171* 146*   BUN 17 17 16   CREATININE 1.00 0.81 0.74   CALCIUM 8.8 8.7 8.6                   Imaging  DX-CHEST-LIMITED (1 VIEW)   Final Result         No acute cardiac or pulmonary abnormality is identified.           Assessment/Plan  * Near syncope- (present on admission)  Assessment & Plan  Multifactorial likely combination of severe aortic stenosis and severe dehydration.  EKG shows a sinus rhythm with a rate of 103, there is background artifact secondary to tremor that has confused on the order read as atrial flutter/fibrillation.  There is no ST elevation, there is T wave inversion in lead I to aVL and aVF.  QTc 471.   See above  PT/ OT to eval    Anemia- (present on admission)  Assessment & Plan  This is acute on chronic  Likely multifactorial: hemolytic anemia due to AD, pancytopenia due to cirrhosis and etoh  Recent EGD and colonoscopy  Continue to monitor and transfuse for hemoglobin of less than or equal to 7  Will check LDH, recheck bilirubin and follow    Hyperglycemia- (present on admission)  Assessment & Plan  Improving  Will check an A1c    Thrombocytopenia (HCC)- (present on admission)  Assessment & Plan  Acute on  chronic  Recent US showed splenomegaly which is likely contributing along with etoh abuse and cirrhosis  No s/o bleeding  Continue to follow    Aortic stenosis- (present on admission)  Assessment & Plan  Severe aortic stenosis, likely triggered his attack with a combined severe dehydration.  Currently not a candidate for TAVR and has been non compliant with follow up  See above  Cardiology to schedule another outpatient appointment    Alcohol abuse  Assessment & Plan  See above  Monitoring for w/d  Cessation discussed and recommended    Pancytopenia (HCC)  Assessment & Plan  See above  Due to etoh abuse, if continues to worsen, consider a bone marrow biopsy  retic count pending    UTI (urinary tract infection)  Assessment & Plan  Continue ceftriaxone    Lactic acidosis- (present on admission)  Assessment & Plan  Likely due to dehydration and uti  Overall improving    Tobacco dependence- (present on admission)  Assessment & Plan  Cessation discussed and recommended       VTE prophylaxis: scd

## 2020-11-29 NOTE — H&P
Hospital Medicine History & Physical Note    Date of Service  11/28/2020    Primary Care Physician  MORRIS Alexis    Consultants  None     Code Status  Full Code    Chief Complaint  Chief Complaint   Patient presents with   • Weakness       History of Presenting Illness  75 y.o. male with past medical history of severe aortic stenosis complicated by mechanical hemolytic anemia, history of splenic infarction, abdominal aortic aneurysm status post repair, esophageal thickening, and noncompliance who presented 11/28/2020 with an episodes of sudden generalized weakness to the point that he almost fainted at [employer] on the day of admission.  He denies having abnormal jerking movements, fecal or urinary incontinence.  He denies having tongue biting or eye rolling.  He also denies tinnitus and vertigo.  However he reports that he did not lose consciousness.  Patient denies having fevers cough chills shortness of breath or contact with sick people.  Denies having diarrhea dysuria or urgency.  He has been recently diagnosed with severe aortic stenosis requiring intervention however was not compliant with the recommendations.           Review of Systems  Review of Systems   Constitutional: Positive for malaise/fatigue. Negative for chills and fever.   HENT: Negative for congestion and sore throat.    Eyes: Negative for pain, discharge and redness.   Respiratory: Negative for cough, hemoptysis, sputum production, shortness of breath and stridor.    Cardiovascular: Negative for chest pain, palpitations and leg swelling.   Gastrointestinal: Negative for abdominal pain, blood in stool, diarrhea and vomiting.   Genitourinary: Negative for flank pain, hematuria and urgency.   Musculoskeletal: Negative for myalgias.   Skin: Negative.    Neurological: Negative for speech change, focal weakness and seizures.   Endo/Heme/Allergies: Does not bruise/bleed easily.   Psychiatric/Behavioral: Negative for hallucinations and  suicidal ideas. The patient is not nervous/anxious.      Past Medical History  History of AAA (abdominal aortic aneurysm) repair (01/30/2012)  Coronary artery disease.  Esophageal thickening.  Severe aortic stenosis.    Surgical History   has a past surgical history that includes aaa with stent graft (1/30/2012); esophageal motility or manometry (3/27/2012); gastroscopy-endo (3/19/2017); esophageal motility or manometry (3/21/2017); gastroscopy with botox injection (N/A, 3/23/2017); toe amputation (Left, 3/25/2017); cystoscopy stent placement (4/16/2017); cystoscopy stent placement (Left, 4/9/2018); cystoscopy stent placement (Left, 11/14/2018); pr cystoscopy,insert ureteral stent (Left, 6/13/2019); gastroscopy (N/A, 9/24/2020); and pr colonoscopy,diagnostic (N/A, 9/26/2020).     Family History  family history is not on file.     Social History   reports that he has been smoking cigarettes and pipe. He has been smoking about 1.00 pack per day. He has never used smokeless tobacco. He reports current alcohol use. He reports current drug use. Drug: Inhaled.    Allergies  No Known Allergies    Medications  None     Physical Exam  Temp:  [36.4 °C (97.6 °F)-37 °C (98.6 °F)] 36.4 °C (97.6 °F)  Pulse:  [] 98  Resp:  [16-22] 16  BP: (139-140)/(76-78) 139/78  SpO2:  [97 %-99 %] 97 %    Physical Exam  Constitutional:       General: He is not in acute distress.     Appearance: He is ill-appearing. He is not diaphoretic.   HENT:      Head: Atraumatic.      Right Ear: External ear normal.      Left Ear: External ear normal.      Nose: No congestion or rhinorrhea.      Mouth/Throat:      Mouth: Mucous membranes are dry.   Eyes:      General: No scleral icterus.        Right eye: No discharge.         Left eye: No discharge.      Pupils: Pupils are equal, round, and reactive to light.   Neck:      Musculoskeletal: Neck supple. No neck rigidity or muscular tenderness.   Cardiovascular:      Rate and Rhythm: Normal rate and  regular rhythm.      Heart sounds: No friction rub. No gallop.    Pulmonary:      Effort: No respiratory distress.      Breath sounds: No stridor. No wheezing.   Abdominal:      Palpations: Abdomen is soft.      Tenderness: There is no abdominal tenderness. There is no guarding or rebound.   Musculoskeletal:      Right lower leg: No edema.      Left lower leg: No edema.   Skin:     General: Skin is dry.      Capillary Refill: Capillary refill takes 2 to 3 seconds.      Coloration: Skin is pale. Skin is not jaundiced.   Neurological:      Mental Status: He is alert and oriented to person, place, and time.      Coordination: Coordination normal.   Psychiatric:         Mood and Affect: Mood normal.         Behavior: Behavior normal.       Laboratory:  Recent Labs     11/28/20  1415   WBC 3.7*   RBC 2.74*   HEMOGLOBIN 9.7*   HEMATOCRIT 27.9*   .8*   MCH 35.4*   MCHC 34.8   RDW 71.7*   PLATELETCT 52*   MPV 10.0     Recent Labs     11/28/20  1415   SODIUM 128*   POTASSIUM 3.8   CHLORIDE 87*   CO2 17*   GLUCOSE 210*   BUN 16   CREATININE 1.01   CALCIUM 9.3     Recent Labs     11/28/20  1415   ALTSGPT 14   ASTSGOT 43   ALKPHOSPHAT 84   TBILIRUBIN 2.6*   LIPASE 6*   GLUCOSE 210*         No results for input(s): NTPROBNP in the last 72 hours.      Recent Labs     11/28/20  1415   TROPONINT 37*       Imaging:  DX-CHEST-LIMITED (1 VIEW)   Final Result         No acute cardiac or pulmonary abnormality is identified.        I personally reviewed patient's EKG it shows a sinus rhythm with a rate of 103, there is background artifact secondary to tremor that has confused on the order read as atrial flutter/fibrillation.  There is no ST elevation, there is T wave inversion in lead I to aVL and aVF.  QTc 471.    Assessment/Plan:  I anticipate this patient is appropriate for observation status at this time.    * Near syncope- (present on admission)  Assessment & Plan  Multifactorial likely combination of severe aortic stenosis  and severe dehydration.  EKG shows a sinus rhythm with a rate of 103, there is background artifact secondary to tremor that has confused on the order read as atrial flutter/fibrillation.  There is no ST elevation, there is T wave inversion in lead I to aVL and aVF.  QTc 471.   Orthostatic vital  Telemetry monitor  Patient was found with a lactic acid of more than 7 and severe dehydration, started on intravenous fluids, encourage oral intake, antiemetics as needed.     Anemia- (present on admission)  Assessment & Plan  Appears to be chronic  Likely mechanical hemolytic anemia, raised bilirubin.   Continue to monitor and transfuse for hemoglobin of less than or equal to 7.    Hyperglycemia- (present on admission)  Assessment & Plan  Patient denies history of diabetes.  He has acidosis and raised anion gap, that could be explained with severe dehydration and lactic acidosis however early DKA cannot be excluded.  We will start short acting insulin for now, will check glycated hemoglobin and follow lites closely.     Thrombocytopenia (HCC)- (present on admission)  Assessment & Plan  No evidence of gross bleeding so far.  Continue to monitor, consider holding pharmacologic prophylaxis if platelet count drops below 50,000, or if there is evidence of bleeding.     Aortic stenosis- (present on admission)  Assessment & Plan  Severe aortic stenosis, likely triggered his attack with a combined severe dehydration.  Continue to monitor and optimize volume status.  Consider cardiology consult versus outpatient follow-up with TAVR clinic according to clinical course    Lactic acidosis- (present on admission)  Assessment & Plan  Unclear etiology.  Patient appears to be severely dehydrated.  Expect to improve with rehydration.  So far no clear source for infection, he has been started on ceftriaxone in the emergency department for presumed urinary tract infection, continue for now follow cultures and sensitivities.

## 2020-11-29 NOTE — ED NOTES
MD aware that pt is unable to give voided UA  Okay to now given IV antibiotic now  Also pt cleared for meal, given

## 2020-11-29 NOTE — PROGRESS NOTES
Oxana from Lab called with critical result of WBC at 2.0. Critical lab result read back to Oxana.   Dr. Carter notified of critical lab result at 0456.  Critical lab result read back by Dr. Carter.    Oxana from Lab called with critical result of Platlets at 37. Critical lab result read back to Oxana.   Dr. Carter notified of critical lab result at 0456.  Critical lab result read back by Dr. Carter.    No overt signs of bleeding. No orders at this time, continue to monitor.

## 2020-11-29 NOTE — CONSULTS
Asked by Dr Minaya to comment on TAVR evaluation, presents with sever dehyradtion lactic acidosis and pancytopenia    He has pancytopenia and is not a TAVR candidate also seems to have significant alcoholism and poor adherence to recommendations.    Would advise pancytopenia work up, alcohol cessation and have arranged close follow up with TAVR program, please reinforce the importance of the above    Certainly call if there is any acute issues    Future Appointments   Date Time Provider Department Center   12/4/2020 12:45 PM ALLEGRA Garcia. [facility] None     It is my pleasure to participate in the care of Mr. Jimenez.  Please do not hesitate to contact me with questions or concerns.    Kalyan Goodman MD PhD [facility]  Cardiologist [facility]    Please note that this dictation was created using voice recognition software. There may be errors I did not discover before finalizing the note.     11/29/2020  11:20 AM

## 2020-11-30 ENCOUNTER — APPOINTMENT (OUTPATIENT)
Dept: RADIOLOGY | Facility: MEDICAL CENTER | Age: 75
DRG: 307 | End: 2020-11-30
Attending: INTERNAL MEDICINE
Payer: MEDICARE

## 2020-11-30 PROBLEM — R79.89 ELEVATED D-DIMER: Status: ACTIVE | Noted: 2020-11-30

## 2020-11-30 LAB
ALBUMIN SERPL BCP-MCNC: 3.5 G/DL (ref 3.2–4.9)
ALBUMIN/GLOB SERPL: 1.6 G/DL
ALP SERPL-CCNC: 66 U/L (ref 30–99)
ALT SERPL-CCNC: 11 U/L (ref 2–50)
ANION GAP SERPL CALC-SCNC: 11 MMOL/L (ref 7–16)
AST SERPL-CCNC: 50 U/L (ref 12–45)
BASOPHILS # BLD AUTO: 0 % (ref 0–1.8)
BASOPHILS # BLD: 0 K/UL (ref 0–0.12)
BILIRUB SERPL-MCNC: 0.6 MG/DL (ref 0.1–1.5)
BUN SERPL-MCNC: 10 MG/DL (ref 8–22)
CALCIUM SERPL-MCNC: 8.5 MG/DL (ref 8.4–10.2)
CHLORIDE SERPL-SCNC: 96 MMOL/L (ref 96–112)
CO2 SERPL-SCNC: 29 MMOL/L (ref 20–33)
CREAT SERPL-MCNC: 0.7 MG/DL (ref 0.5–1.4)
D DIMER PPP IA.FEU-MCNC: 2.86 UG/ML (FEU) (ref 0–0.5)
EKG IMPRESSION: NORMAL
EKG IMPRESSION: NORMAL
EOSINOPHIL # BLD AUTO: 0.01 K/UL (ref 0–0.51)
EOSINOPHIL NFR BLD: 0.4 % (ref 0–6.9)
ERYTHROCYTE [DISTWIDTH] IN BLOOD BY AUTOMATED COUNT: 71 FL (ref 35.9–50)
GLOBULIN SER CALC-MCNC: 2.2 G/DL (ref 1.9–3.5)
GLUCOSE BLD-MCNC: 111 MG/DL (ref 65–99)
GLUCOSE BLD-MCNC: 112 MG/DL (ref 65–99)
GLUCOSE BLD-MCNC: 115 MG/DL (ref 65–99)
GLUCOSE BLD-MCNC: 117 MG/DL (ref 65–99)
GLUCOSE BLD-MCNC: 119 MG/DL (ref 65–99)
GLUCOSE SERPL-MCNC: 115 MG/DL (ref 65–99)
HAPTOGLOB SERPL-MCNC: <10 MG/DL (ref 30–200)
HCT VFR BLD AUTO: 20.5 % (ref 42–52)
HGB BLD-MCNC: 7.1 G/DL (ref 14–18)
HGB RETIC QN AUTO: 36 PG/CELL (ref 29–35)
IMM GRANULOCYTES # BLD AUTO: 0.02 K/UL (ref 0–0.11)
IMM GRANULOCYTES NFR BLD AUTO: 0.9 % (ref 0–0.9)
IMM RETICS NFR: 15.3 % (ref 9.3–17.4)
LDH SERPL L TO P-CCNC: 246 U/L (ref 107–266)
LYMPHOCYTES # BLD AUTO: 0.73 K/UL (ref 1–4.8)
LYMPHOCYTES NFR BLD: 31.7 % (ref 22–41)
MCH RBC QN AUTO: 35.9 PG (ref 27–33)
MCHC RBC AUTO-ENTMCNC: 34.6 G/DL (ref 33.7–35.3)
MCV RBC AUTO: 103.5 FL (ref 81.4–97.8)
MONOCYTES # BLD AUTO: 0.36 K/UL (ref 0–0.85)
MONOCYTES NFR BLD AUTO: 15.7 % (ref 0–13.4)
NEUTROPHILS # BLD AUTO: 1.18 K/UL (ref 1.82–7.42)
NEUTROPHILS NFR BLD: 51.3 % (ref 44–72)
NRBC # BLD AUTO: 0 K/UL
NRBC BLD-RTO: 0 /100 WBC
PLATELET # BLD AUTO: 35 K/UL (ref 164–446)
PMV BLD AUTO: 9.9 FL (ref 9–12.9)
POTASSIUM SERPL-SCNC: 3.2 MMOL/L (ref 3.6–5.5)
PROT SERPL-MCNC: 5.7 G/DL (ref 6–8.2)
RBC # BLD AUTO: 1.98 M/UL (ref 4.7–6.1)
RETICS # AUTO: 0.02 M/UL (ref 0.04–0.06)
RETICS/RBC NFR: 1.1 % (ref 0.8–2.1)
SODIUM SERPL-SCNC: 136 MMOL/L (ref 135–145)
TROPONIN T SERPL-MCNC: 36 NG/L (ref 6–19)
WBC # BLD AUTO: 2.3 K/UL (ref 4.8–10.8)

## 2020-11-30 PROCEDURE — 99232 SBSQ HOSP IP/OBS MODERATE 35: CPT | Performed by: INTERNAL MEDICINE

## 2020-11-30 PROCEDURE — 80053 COMPREHEN METABOLIC PANEL: CPT

## 2020-11-30 PROCEDURE — A9270 NON-COVERED ITEM OR SERVICE: HCPCS | Performed by: HOSPITALIST

## 2020-11-30 PROCEDURE — A9270 NON-COVERED ITEM OR SERVICE: HCPCS | Performed by: INTERNAL MEDICINE

## 2020-11-30 PROCEDURE — 83615 LACTATE (LD) (LDH) ENZYME: CPT

## 2020-11-30 PROCEDURE — 93005 ELECTROCARDIOGRAM TRACING: CPT | Performed by: INTERNAL MEDICINE

## 2020-11-30 PROCEDURE — 700105 HCHG RX REV CODE 258: Performed by: HOSPITALIST

## 2020-11-30 PROCEDURE — 700102 HCHG RX REV CODE 250 W/ 637 OVERRIDE(OP): Performed by: INTERNAL MEDICINE

## 2020-11-30 PROCEDURE — 85025 COMPLETE CBC W/AUTO DIFF WBC: CPT

## 2020-11-30 PROCEDURE — 71275 CT ANGIOGRAPHY CHEST: CPT

## 2020-11-30 PROCEDURE — 700111 HCHG RX REV CODE 636 W/ 250 OVERRIDE (IP): Performed by: HOSPITALIST

## 2020-11-30 PROCEDURE — 700102 HCHG RX REV CODE 250 W/ 637 OVERRIDE(OP): Performed by: HOSPITALIST

## 2020-11-30 PROCEDURE — 84484 ASSAY OF TROPONIN QUANT: CPT

## 2020-11-30 PROCEDURE — 85379 FIBRIN DEGRADATION QUANT: CPT

## 2020-11-30 PROCEDURE — 82962 GLUCOSE BLOOD TEST: CPT | Mod: 91

## 2020-11-30 PROCEDURE — 93010 ELECTROCARDIOGRAM REPORT: CPT | Performed by: INTERNAL MEDICINE

## 2020-11-30 PROCEDURE — 770020 HCHG ROOM/CARE - TELE (206)

## 2020-11-30 PROCEDURE — 85046 RETICYTE/HGB CONCENTRATE: CPT

## 2020-11-30 PROCEDURE — 700117 HCHG RX CONTRAST REV CODE 255: Performed by: INTERNAL MEDICINE

## 2020-11-30 RX ORDER — NITROGLYCERIN 0.4 MG/1
0.4 TABLET SUBLINGUAL
Status: DISCONTINUED | OUTPATIENT
Start: 2020-11-30 | End: 2020-12-04 | Stop reason: HOSPADM

## 2020-11-30 RX ORDER — POTASSIUM CHLORIDE 20 MEQ/1
40 TABLET, EXTENDED RELEASE ORAL ONCE
Status: COMPLETED | OUTPATIENT
Start: 2020-11-30 | End: 2020-11-30

## 2020-11-30 RX ORDER — MORPHINE SULFATE 4 MG/ML
1-4 INJECTION, SOLUTION INTRAMUSCULAR; INTRAVENOUS
Status: DISCONTINUED | OUTPATIENT
Start: 2020-11-30 | End: 2020-12-04 | Stop reason: HOSPADM

## 2020-11-30 RX ADMIN — IOHEXOL 75 ML: 350 INJECTION, SOLUTION INTRAVENOUS at 15:38

## 2020-11-30 RX ADMIN — SENNOSIDES-DOCUSATE SODIUM TAB 8.6-50 MG 2 TABLET: 8.6-5 TAB at 16:21

## 2020-11-30 RX ADMIN — SODIUM CHLORIDE, POTASSIUM CHLORIDE, SODIUM LACTATE AND CALCIUM CHLORIDE: 600; 310; 30; 20 INJECTION, SOLUTION INTRAVENOUS at 00:21

## 2020-11-30 RX ADMIN — POTASSIUM CHLORIDE 40 MEQ: 1500 TABLET, EXTENDED RELEASE ORAL at 09:59

## 2020-11-30 RX ADMIN — SODIUM CHLORIDE, POTASSIUM CHLORIDE, SODIUM LACTATE AND CALCIUM CHLORIDE: 600; 310; 30; 20 INJECTION, SOLUTION INTRAVENOUS at 16:21

## 2020-11-30 RX ADMIN — CEFTRIAXONE SODIUM 1 G: 1 INJECTION, POWDER, FOR SOLUTION INTRAMUSCULAR; INTRAVENOUS at 16:21

## 2020-11-30 ASSESSMENT — ENCOUNTER SYMPTOMS
BLURRED VISION: 0
MUSCULOSKELETAL NEGATIVE: 1
MYALGIAS: 0
HEADACHES: 0
NEUROLOGICAL NEGATIVE: 1
FOCAL WEAKNESS: 0
COUGH: 0
NAUSEA: 0
VOMITING: 0
GASTROINTESTINAL NEGATIVE: 1
ABDOMINAL PAIN: 0
WEAKNESS: 0
CONSTITUTIONAL NEGATIVE: 1
RESPIRATORY NEGATIVE: 1
SHORTNESS OF BREATH: 0
FEVER: 0
DEPRESSION: 0
DIZZINESS: 0
SORE THROAT: 0
DIAPHORESIS: 0
PALPITATIONS: 0
CARDIOVASCULAR NEGATIVE: 1
WEIGHT LOSS: 0
EYES NEGATIVE: 1
BRUISES/BLEEDS EASILY: 0
CHILLS: 0

## 2020-11-30 ASSESSMENT — LIFESTYLE VARIABLES: SUBSTANCE_ABUSE: 1

## 2020-11-30 NOTE — PROGRESS NOTES
Pt complained about chest pain as Dr Carter was rounding. 12 lead done, labs ordered and pain meds and NTG ordered. Chest pain subsided before NTG was given.

## 2020-11-30 NOTE — PROGRESS NOTES
Hospital Medicine Daily Progress Note    Date of Service  11/30/2020    Chief Complaint  75 y.o. male admitted 11/28/2020 with weakness    Hospital Course  Patient is a 75 y.o. male with past medical history of severe aortic stenosis complicated by mechanical hemolytic anemia, history of splenic infarction, abdominal aortic aneurysm status post repair, esophageal thickening, and noncompliance who presented 11/28/2020 with an episodes of sudden generalized weakness to the point that he almost fainted at [employer] on the day of admission. There was no history consistent with seizure activity.  He denied losing consciousness.  He was recently diagnosed with severe aortic stenosis and a TAVR was recommended however he left the hospital AMA and did not follow up with cardiology.  Presyncope likely due to patient's severe aortic stenosis.  Cardiology was consulted and recommended alcohol cessation and outpatient follow-up with the TAVR program although patient is not a candidate at this time due to significant alcoholism and poor adherence to recommendations.    Interval Problem Update  Previously noted that patient was considering hospice, discussed with him today and he denies talking about this previously and states he is not interested in hospice.  Overnight patient had episode of chest pain that subsided prior to nitroglycerin being given.  D-dimer was done and is elevated.  CTA pending.  Patient has no complaints, just asking if he can go back to sleep. PT/OT pending     Consultants/Specialty  Cardiology  Hospice    Code Status  Full Code    Disposition  tbd    Review of Systems  Review of Systems   Constitutional: Negative.  Negative for chills, diaphoresis, fever, malaise/fatigue and weight loss.   HENT: Negative.  Negative for sore throat.    Eyes: Negative.  Negative for blurred vision.   Respiratory: Negative.  Negative for cough and shortness of breath.    Cardiovascular: Negative.  Negative for chest pain,  palpitations and leg swelling.   Gastrointestinal: Negative.  Negative for abdominal pain, nausea and vomiting.   Genitourinary: Negative.  Negative for dysuria.   Musculoskeletal: Negative.  Negative for myalgias.   Skin: Negative.  Negative for itching and rash.   Neurological: Negative.  Negative for dizziness, focal weakness, weakness and headaches.   Endo/Heme/Allergies: Negative.  Does not bruise/bleed easily.   Psychiatric/Behavioral: Positive for substance abuse. Negative for depression and suicidal ideas.   All other systems reviewed and are negative.       Physical Exam  Temp:  [36.6 °C (97.8 °F)-37 °C (98.6 °F)] 36.6 °C (97.8 °F)  Pulse:  [81-97] 81  Resp:  [16-18] 16  BP: ()/(41-64) 124/63  SpO2:  [93 %-97 %] 96 %    Physical Exam  Vitals signs and nursing note reviewed. Exam conducted with a chaperone present.   Constitutional:       General: He is not in acute distress.     Appearance: Normal appearance. He is not diaphoretic.   HENT:      Head: Normocephalic.      Comments: Hearing loss, chronic     Nose: Nose normal.      Mouth/Throat:      Mouth: Mucous membranes are moist.   Eyes:      Pupils: Pupils are equal, round, and reactive to light.   Cardiovascular:      Rate and Rhythm: Normal rate and regular rhythm.      Pulses: Normal pulses.      Heart sounds: Murmur present.   Pulmonary:      Effort: Pulmonary effort is normal.      Breath sounds: Normal breath sounds.   Abdominal:      General: Abdomen is flat. Bowel sounds are normal.      Palpations: Abdomen is soft.      Comments: splenomegaly   Musculoskeletal: Normal range of motion.         General: No swelling or deformity.   Skin:     General: Skin is warm and dry.      Capillary Refill: Capillary refill takes less than 2 seconds.   Neurological:      General: No focal deficit present.      Mental Status: He is alert and oriented to person, place, and time.      Cranial Nerves: No cranial nerve deficit.   Psychiatric:         Mood and  Affect: Mood normal.         Behavior: Behavior normal.         Fluids    Intake/Output Summary (Last 24 hours) at 11/30/2020 1419  Last data filed at 11/30/2020 1300  Gross per 24 hour   Intake 500 ml   Output --   Net 500 ml       Laboratory  Recent Labs     11/29/20 0222 11/29/20  1311 11/30/20  0426   WBC 2.0* 2.4* 2.3*   RBC 2.05* 2.21* 1.98*   HEMOGLOBIN 7.1* 7.6* 7.1*   HEMATOCRIT 20.6* 22.4* 20.5*   .5* 101.4* 103.5*   MCH 34.6* 34.4* 35.9*   MCHC 34.5 33.9 34.6   RDW 69.5* 70.5* 71.0*   PLATELETCT 37* 41* 35*   MPV 9.6 9.8 9.9     Recent Labs     11/28/20  2230 11/29/20 0222 11/30/20  0426   SODIUM 132* 132* 136   POTASSIUM 3.5* 3.4* 3.2*   CHLORIDE 95* 95* 96   CO2 25 27 29   GLUCOSE 171* 146* 115*   BUN 17 16 10   CREATININE 0.81 0.74 0.70   CALCIUM 8.7 8.6 8.5                   Imaging  DX-CHEST-LIMITED (1 VIEW)   Final Result         No acute cardiac or pulmonary abnormality is identified.      CT-CTA CHEST PULMONARY ARTERY W/ RECONS    (Results Pending)        Assessment/Plan  * Near syncope- (present on admission)  Assessment & Plan  Multifactorial likely combination of severe aortic stenosis and severe dehydration.  EKG shows a sinus rhythm with a rate of 103, there is background artifact secondary to tremor that has confused on the order read as atrial flutter/fibrillation.  There is no ST elevation, there is T wave inversion in lead I to aVL and aVF.  QTc 471.   See above  PT/ OT to eval    Anemia- (present on admission)  Assessment & Plan  This is acute on chronic  Likely multifactorial: hemolytic anemia due to AD, pancytopenia due to cirrhosis and etoh  Recent EGD and colonoscopy  Continue to monitor and transfuse for hemoglobin of less than or equal to 7  LDH wnl     Hyperglycemia- (present on admission)  Assessment & Plan  Improving  Will check an A1c    Thrombocytopenia (HCC)- (present on admission)  Assessment & Plan  Acute on chronic  Recent US showed splenomegaly which is likely  contributing along with etoh abuse and cirrhosis  No s/o bleeding  Continue to follow    Aortic stenosis- (present on admission)  Assessment & Plan  Severe aortic stenosis, likely triggered his attack with a combined severe dehydration.  Currently not a candidate for TAVR and has been non compliant with follow up  See above  Cardiology to schedule another outpatient appointment    Elevated d-dimer  Assessment & Plan  elevated 2.8   CTA chest pending    Alcohol abuse  Assessment & Plan  See above  Monitoring for w/d  Cessation discussed and recommended    Pancytopenia (HCC)  Assessment & Plan  See above  Due to etoh abuse, if continues to worsen, consider a bone marrow biopsy    UTI (urinary tract infection)  Assessment & Plan  Continue ceftriaxone    Lactic acidosis- (present on admission)  Assessment & Plan  Likely due to dehydration and uti  Overall improving    Tobacco dependence- (present on admission)  Assessment & Plan  Cessation discussed and recommended       VTE prophylaxis: scd

## 2020-11-30 NOTE — PROGRESS NOTES
Telemetry Strip     Strip printed: Yes  Rhythm: SR  HR: 99  Measurements: 0.14/ 0.08/ 0.34  Ectopy: PVC and PAC's noted              Normal Values  Rhythm SR  HR Range    Measurements 0.12-0.20 / 0.06-0.10  / 0.30-0.52

## 2020-11-30 NOTE — PROGRESS NOTES
Texted critical lab values to Dr. Carter. WBC 2.3, Platelets 35 and I also sent him the elevated d.dimer. receive the values at 0630. Texted Dr Carter at 0637.

## 2020-12-01 PROBLEM — N39.0 UTI (URINARY TRACT INFECTION): Status: RESOLVED | Noted: 2020-11-29 | Resolved: 2020-12-01

## 2020-12-01 LAB
ABO GROUP BLD: NORMAL
BARCODED ABORH UBTYP: 5100
BARCODED ABORH UBTYP: 5100
BARCODED PRD CODE UBPRD: NORMAL
BARCODED PRD CODE UBPRD: NORMAL
BARCODED UNIT NUM UBUNT: NORMAL
BARCODED UNIT NUM UBUNT: NORMAL
BASOPHILS # BLD AUTO: 0.4 % (ref 0–1.8)
BASOPHILS # BLD: 0.01 K/UL (ref 0–0.12)
BLD GP AB SCN SERPL QL: NORMAL
COMPONENT R 8504R: NORMAL
COMPONENT R 8504R: NORMAL
EOSINOPHIL # BLD AUTO: 0.01 K/UL (ref 0–0.51)
EOSINOPHIL NFR BLD: 0.4 % (ref 0–6.9)
ERYTHROCYTE [DISTWIDTH] IN BLOOD BY AUTOMATED COUNT: 73 FL (ref 35.9–50)
GLUCOSE BLD-MCNC: 120 MG/DL (ref 65–99)
GLUCOSE BLD-MCNC: 120 MG/DL (ref 65–99)
GLUCOSE BLD-MCNC: 121 MG/DL (ref 65–99)
GLUCOSE BLD-MCNC: 130 MG/DL (ref 65–99)
HCT VFR BLD AUTO: 19.7 % (ref 42–52)
HGB BLD-MCNC: 6.6 G/DL (ref 14–18)
IMM GRANULOCYTES # BLD AUTO: 0.01 K/UL (ref 0–0.11)
IMM GRANULOCYTES NFR BLD AUTO: 0.4 % (ref 0–0.9)
LYMPHOCYTES # BLD AUTO: 0.6 K/UL (ref 1–4.8)
LYMPHOCYTES NFR BLD: 23.3 % (ref 22–41)
MCH RBC QN AUTO: 35.1 PG (ref 27–33)
MCHC RBC AUTO-ENTMCNC: 33.5 G/DL (ref 33.7–35.3)
MCV RBC AUTO: 104.8 FL (ref 81.4–97.8)
MONOCYTES # BLD AUTO: 0.45 K/UL (ref 0–0.85)
MONOCYTES NFR BLD AUTO: 17.5 % (ref 0–13.4)
NEUTROPHILS # BLD AUTO: 1.49 K/UL (ref 1.82–7.42)
NEUTROPHILS NFR BLD: 58 % (ref 44–72)
NRBC # BLD AUTO: 0 K/UL
NRBC BLD-RTO: 0 /100 WBC
PLATELET # BLD AUTO: 36 K/UL (ref 164–446)
PMV BLD AUTO: 10.4 FL (ref 9–12.9)
PROCALCITONIN SERPL-MCNC: 0.14 NG/ML
PRODUCT TYPE UPROD: NORMAL
PRODUCT TYPE UPROD: NORMAL
RBC # BLD AUTO: 1.88 M/UL (ref 4.7–6.1)
RH BLD: NORMAL
TROPONIN T SERPL-MCNC: 34 NG/L (ref 6–19)
UNIT STATUS USTAT: NORMAL
UNIT STATUS USTAT: NORMAL
WBC # BLD AUTO: 2.6 K/UL (ref 4.8–10.8)

## 2020-12-01 PROCEDURE — 86923 COMPATIBILITY TEST ELECTRIC: CPT

## 2020-12-01 PROCEDURE — 700102 HCHG RX REV CODE 250 W/ 637 OVERRIDE(OP): Performed by: INTERNAL MEDICINE

## 2020-12-01 PROCEDURE — 84145 PROCALCITONIN (PCT): CPT

## 2020-12-01 PROCEDURE — 36430 TRANSFUSION BLD/BLD COMPNT: CPT

## 2020-12-01 PROCEDURE — 700105 HCHG RX REV CODE 258: Performed by: INTERNAL MEDICINE

## 2020-12-01 PROCEDURE — 700102 HCHG RX REV CODE 250 W/ 637 OVERRIDE(OP): Performed by: HOSPITALIST

## 2020-12-01 PROCEDURE — 86900 BLOOD TYPING SEROLOGIC ABO: CPT

## 2020-12-01 PROCEDURE — 86901 BLOOD TYPING SEROLOGIC RH(D): CPT

## 2020-12-01 PROCEDURE — P9016 RBC LEUKOCYTES REDUCED: HCPCS

## 2020-12-01 PROCEDURE — 86850 RBC ANTIBODY SCREEN: CPT

## 2020-12-01 PROCEDURE — 82962 GLUCOSE BLOOD TEST: CPT | Mod: 91

## 2020-12-01 PROCEDURE — 700111 HCHG RX REV CODE 636 W/ 250 OVERRIDE (IP): Performed by: INTERNAL MEDICINE

## 2020-12-01 PROCEDURE — A9270 NON-COVERED ITEM OR SERVICE: HCPCS | Performed by: HOSPITALIST

## 2020-12-01 PROCEDURE — 700105 HCHG RX REV CODE 258: Performed by: HOSPITALIST

## 2020-12-01 PROCEDURE — A9270 NON-COVERED ITEM OR SERVICE: HCPCS | Performed by: INTERNAL MEDICINE

## 2020-12-01 PROCEDURE — 99233 SBSQ HOSP IP/OBS HIGH 50: CPT | Performed by: INTERNAL MEDICINE

## 2020-12-01 PROCEDURE — 30233N1 TRANSFUSION OF NONAUTOLOGOUS RED BLOOD CELLS INTO PERIPHERAL VEIN, PERCUTANEOUS APPROACH: ICD-10-PCS | Performed by: INTERNAL MEDICINE

## 2020-12-01 PROCEDURE — 770020 HCHG ROOM/CARE - TELE (206)

## 2020-12-01 PROCEDURE — 84484 ASSAY OF TROPONIN QUANT: CPT

## 2020-12-01 PROCEDURE — 85025 COMPLETE CBC W/AUTO DIFF WBC: CPT

## 2020-12-01 RX ORDER — SUCRALFATE ORAL 1 G/10ML
1 SUSPENSION ORAL
Status: DISCONTINUED | OUTPATIENT
Start: 2020-12-01 | End: 2020-12-04 | Stop reason: HOSPADM

## 2020-12-01 RX ORDER — DILTIAZEM HYDROCHLORIDE 5 MG/ML
10 INJECTION INTRAVENOUS EVERY 4 HOURS PRN
Status: DISCONTINUED | OUTPATIENT
Start: 2020-12-01 | End: 2020-12-04 | Stop reason: HOSPADM

## 2020-12-01 RX ORDER — DILTIAZEM HYDROCHLORIDE 5 MG/ML
20 INJECTION INTRAVENOUS ONCE
Status: COMPLETED | OUTPATIENT
Start: 2020-12-01 | End: 2020-12-01

## 2020-12-01 RX ORDER — OMEPRAZOLE 20 MG/1
20 CAPSULE, DELAYED RELEASE ORAL 2 TIMES DAILY
Status: DISCONTINUED | OUTPATIENT
Start: 2020-12-01 | End: 2020-12-04 | Stop reason: HOSPADM

## 2020-12-01 RX ADMIN — DILTIAZEM HYDROCHLORIDE 10 MG: 5 INJECTION INTRAVENOUS at 06:41

## 2020-12-01 RX ADMIN — DILTIAZEM HYDROCHLORIDE 30 MG: 30 TABLET, FILM COATED ORAL at 17:25

## 2020-12-01 RX ADMIN — ALPRAZOLAM 0.25 MG: 0.25 TABLET ORAL at 23:24

## 2020-12-01 RX ADMIN — DILTIAZEM HYDROCHLORIDE 30 MG: 30 TABLET, FILM COATED ORAL at 12:25

## 2020-12-01 RX ADMIN — DILTIAZEM HYDROCHLORIDE 30 MG: 30 TABLET, FILM COATED ORAL at 06:42

## 2020-12-01 RX ADMIN — SENNOSIDES-DOCUSATE SODIUM TAB 8.6-50 MG 2 TABLET: 8.6-5 TAB at 17:25

## 2020-12-01 RX ADMIN — MORPHINE SULFATE 2 MG: 4 INJECTION INTRAVENOUS at 02:36

## 2020-12-01 RX ADMIN — NITROGLYCERIN 0.4 MG: 0.4 TABLET, ORALLY DISINTEGRATING SUBLINGUAL at 01:52

## 2020-12-01 RX ADMIN — NITROGLYCERIN 0.4 MG: 0.4 TABLET, ORALLY DISINTEGRATING SUBLINGUAL at 01:56

## 2020-12-01 RX ADMIN — DILTIAZEM HYDROCHLORIDE 30 MG: 30 TABLET, FILM COATED ORAL at 23:24

## 2020-12-01 RX ADMIN — OMEPRAZOLE 20 MG: 20 CAPSULE, DELAYED RELEASE ORAL at 17:25

## 2020-12-01 RX ADMIN — DILTIAZEM HYDROCHLORIDE 20 MG: 5 INJECTION INTRAVENOUS at 08:17

## 2020-12-01 RX ADMIN — SUCRALFATE 1 G: 1 SUSPENSION ORAL at 17:25

## 2020-12-01 RX ADMIN — SUCRALFATE 1 G: 1 SUSPENSION ORAL at 23:24

## 2020-12-01 RX ADMIN — SODIUM CHLORIDE, POTASSIUM CHLORIDE, SODIUM LACTATE AND CALCIUM CHLORIDE: 600; 310; 30; 20 INJECTION, SOLUTION INTRAVENOUS at 21:53

## 2020-12-01 RX ADMIN — SODIUM CHLORIDE, POTASSIUM CHLORIDE, SODIUM LACTATE AND CALCIUM CHLORIDE: 600; 310; 30; 20 INJECTION, SOLUTION INTRAVENOUS at 07:35

## 2020-12-01 ASSESSMENT — ENCOUNTER SYMPTOMS
MYALGIAS: 0
HEADACHES: 0
SENSORY CHANGE: 0
DEPRESSION: 0
FEVER: 0
DIZZINESS: 1
VOMITING: 0
COUGH: 0
HEARTBURN: 0
INSOMNIA: 0
CONSTIPATION: 0
SPEECH CHANGE: 0
CHILLS: 0
WEAKNESS: 1
SHORTNESS OF BREATH: 0
NERVOUS/ANXIOUS: 0
ABDOMINAL PAIN: 0
SORE THROAT: 0
PHOTOPHOBIA: 0
CLAUDICATION: 0
DIARRHEA: 0
BLURRED VISION: 0

## 2020-12-01 NOTE — THERAPY
Missed Therapy     Patient Name: Nicola Jimenez  Age:  75 y.o., Sex:  male  Medical Record #: [id number]  Today's Date: 12/1/2020    Discussed missed therapy with RN       12/01/20 1411   Interdisciplinary Plan of Care Collaboration   IDT Collaboration with  Nursing   Collaboration Comments PT order received. Attempted PT evaluation, however, RN advising to hold as pt is currently hypotensive and will be having blood transfusion soon. Will re-attempt once appropriate.

## 2020-12-01 NOTE — HOSPITAL COURSE
Patient is a 75 y.o. male with past medical history of severe aortic stenosis complicated by mechanical hemolytic anemia, history of splenic infarction, abdominal aortic aneurysm status post repair, esophageal thickening, and noncompliance who presented 11/28/2020 with an episodes of sudden generalized weakness to the point that [facility]. There was no history consistent with seizure activity.  He denied losing consciousness.  He was recently diagnosed with severe aortic stenosis and a TAVR was recommended however he left the hospital AMA and did not follow up with cardiology.  Presyncope likely due to patient's severe aortic stenosis.  Cardiology was consulted and recommended alcohol cessation and outpatient follow-up with the TAVR program although patient is not a candidate at this time due to significant alcoholism and poor adherence to recommendations.

## 2020-12-01 NOTE — PROGRESS NOTES
Telemetry Shift Summary    Rhythm SR/ST  HR Range 78-95  Ectopy r-f PVC, r Coup, r Trigem, r Bigem  Measurements 0.14/0.10/0.38

## 2020-12-01 NOTE — PROGRESS NOTES
[facility] Medicine Daily Progress Note    Date of Service  12/1/2020    Chief Complaint  75 y.o. male admitted 11/28/2020 with weakness and near syncope.    Hospital Course  Patient is a 75 y.o. male with past medical history of severe aortic stenosis complicated by mechanical hemolytic anemia, history of splenic infarction, abdominal aortic aneurysm status post repair, esophageal thickening, and noncompliance who presented 11/28/2020 with an episodes of sudden generalized weakness to the point that he almost fainted at [employer] on the day of admission. There was no history consistent with seizure activity.  He denied losing consciousness.  He was recently diagnosed with severe aortic stenosis and a TAVR was recommended however he left the hospital AMA and did not follow up with cardiology.  Presyncope likely due to patient's severe aortic stenosis.  Cardiology was consulted and recommended alcohol cessation and outpatient follow-up with the TAVR program although patient is not a candidate at this time due to significant alcoholism and poor adherence to recommendations.      Interval Problem Update  Patient with hemogobin at 6.6 this am, was 7.1 yesterday.  He complains of fatigue and weakness.  He states he has received blood in the past and gives consent to transfusion today.  He has a significant history of alcoholism and when asked if he would be okay with a transfusion today, he stated he would rather have a bourbon and a cigarette.  CTA chest was done yesterday given elevated d dimer and no evidence of PE but there was significant esophagitis so I will initiate bid prilosec and carafate to decrease irritation.    Consultants/Specialty  Cardiology - recommendations regarding TAVR, no official complete consultation.    Code Status  Full Code    Disposition  Home when appropriate.    Review of Systems  Review of Systems   Constitutional: Positive for malaise/fatigue. Negative for chills and fever.   HENT: Negative  for congestion and sore throat.    Eyes: Negative for blurred vision and photophobia.   Respiratory: Negative for cough and shortness of breath.    Cardiovascular: Negative for chest pain, claudication and leg swelling.   Gastrointestinal: Negative for abdominal pain, constipation, diarrhea, heartburn and vomiting.   Genitourinary: Negative for dysuria and hematuria.   Musculoskeletal: Negative for joint pain and myalgias.   Skin: Negative for itching and rash.   Neurological: Positive for dizziness and weakness. Negative for sensory change, speech change and headaches.   Psychiatric/Behavioral: Negative for depression. The patient is not nervous/anxious and does not have insomnia.         Physical Exam  Temp:  [36.5 °C (97.7 °F)-37.7 °C (99.9 °F)] 36.5 °C (97.7 °F)  Pulse:  [] 97  Resp:  [16-18] 16  BP: ()/(41-86) 106/43  SpO2:  [87 %-94 %] 92 %    Physical Exam  Vitals signs and nursing note reviewed.   Constitutional:       General: He is not in acute distress.     Appearance: Normal appearance.   HENT:      Head: Normocephalic and atraumatic.   Eyes:      General: No scleral icterus.     Extraocular Movements: Extraocular movements intact.   Neck:      Musculoskeletal: Normal range of motion and neck supple.   Cardiovascular:      Rate and Rhythm: Normal rate. Rhythm irregular.      Pulses: Normal pulses.      Heart sounds: Normal heart sounds. No murmur.   Pulmonary:      Effort: Pulmonary effort is normal. No respiratory distress.      Breath sounds: Normal breath sounds. No wheezing, rhonchi or rales.   Abdominal:      General: Abdomen is flat. Bowel sounds are normal. There is no distension.      Palpations: Abdomen is soft.      Tenderness: There is no rebound.   Musculoskeletal:         General: No swelling or tenderness.   Lymphadenopathy:      Cervical: No cervical adenopathy.   Skin:     Coloration: Skin is not jaundiced.      Findings: No erythema.   Neurological:      General: No focal  deficit present.      Mental Status: He is alert and oriented to person, place, and time. Mental status is at baseline.      Cranial Nerves: No cranial nerve deficit.   Psychiatric:         Mood and Affect: Mood normal.         Behavior: Behavior normal.         Fluids    Intake/Output Summary (Last 24 hours) at 12/1/2020 1504  Last data filed at 12/1/2020 1430  Gross per 24 hour   Intake 3550 ml   Output 275 ml   Net 3275 ml       Laboratory  Recent Labs     11/29/20  1311 11/30/20  0426 12/01/20  0411   WBC 2.4* 2.3* 2.6*   RBC 2.21* 1.98* 1.88*   HEMOGLOBIN 7.6* 7.1* 6.6*   HEMATOCRIT 22.4* 20.5* 19.7*   .4* 103.5* 104.8*   MCH 34.4* 35.9* 35.1*   MCHC 33.9 34.6 33.5*   RDW 70.5* 71.0* 73.0*   PLATELETCT 41* 35* 36*   MPV 9.8 9.9 10.4     Recent Labs     11/28/20  2230 11/29/20  0222 11/30/20  0426   SODIUM 132* 132* 136   POTASSIUM 3.5* 3.4* 3.2*   CHLORIDE 95* 95* 96   CO2 25 27 29   GLUCOSE 171* 146* 115*   BUN 17 16 10   CREATININE 0.81 0.74 0.70   CALCIUM 8.7 8.6 8.5                   Imaging  CT-CTA CHEST PULMONARY ARTERY W/ RECONS   Final Result      1.  No CT evidence for pulmonary emboli.   2.  Bibasilar atelectasis.   3.  No pneumonia or pneumothorax.   4.  Mildly dilated esophagus with diffuse wall thickening, likely inflammatory.   5.  Severe coronary artery calcifications.               DX-CHEST-LIMITED (1 VIEW)   Final Result         No acute cardiac or pulmonary abnormality is identified.           Assessment/Plan  * Near syncope- (present on admission)  Assessment & Plan  Multifactorial likely combination of severe aortic stenosis and severe dehydration.  EKG shows a sinus rhythm with a rate of 103, there is background artifact secondary to tremor that has confused on the order read as atrial flutter/fibrillation.  There is no ST elevation, there is T wave inversion in lead I to aVL and aVF.  QTc 471.   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, pancytopenia due to cirrhosis and etoh  Recent EGD and colonoscopy  CTA chest showing esophagitis  Continue to monitor and transfuse for hemoglobin of less than or equal to 7  LDH wnl     Hyperglycemia- (present on admission)  Assessment & Plan  Improving  Will check an A1c    Thrombocytopenia (HCC)- (present on admission)  Assessment & Plan  Acute on chronic  Recent US showed splenomegaly which is likely contributing along with etoh abuse and cirrhosis  No s/o bleeding  Continue to follow    Aortic stenosis- (present on admission)  Assessment & Plan  Severe aortic stenosis, likely triggered his attack with a combined severe dehydration.  Currently not a candidate for TAVR and has been non compliant with follow up  See above  Cardiology recommending outpatient follow up given poor patient compliance.      Atrial fibrillation with RVR (HCC)  Assessment & Plan  Exacerbated by anemia  Required single dose of IV cardizem this am for rate control, may need to adjust his oral cardizem dosage if RVR returns.      Elevated d-dimer  Assessment & Plan  elevated 2.8   CTA chest shows no evidence of PE but there is significant esophagitis seen      Alcohol abuse  Assessment & Plan  See above  No evidence of  w/d  Cessation discussed and recommended    Pancytopenia (HCC)  Assessment & Plan  See above  Due to etoh abuse, if continues to worsen, consider a bone marrow biopsy    Lactic acidosis- (present on admission)  Assessment & Plan  Likely due to dehydration and uti  Overall improving    Tobacco dependence- (present on admission)  Assessment & Plan  Cessation discussed and recommended         VTE prophylaxis: AC c/i secondary to suspected slow GI bleed with esophagitis

## 2020-12-01 NOTE — PROGRESS NOTES
Assumed care of pt w/ bedside report from RAKESH Corona. Pt resting comfortably in bed, no complaints offered. Fall precautions/safety maintained. Hourly rounding. Will continue to monitor.

## 2020-12-01 NOTE — ASSESSMENT & PLAN NOTE
Exacerbated by anemia  Required single dose of IV cardizem this am for rate control, may need to adjust his oral cardizem dosage if RVR returns.

## 2020-12-01 NOTE — PROGRESS NOTES
Telemetry Strip     Strip printed: yes  Rhythm: SR/ST   HR: 113  Measurements: 0.16/ 0.08/ 0.32  Ectopy: F PVC's              Normal Values  Rhythm SR  HR Range    Measurements 0.12-0.20 / 0.06-0.10  / 0.30-0.52

## 2020-12-01 NOTE — PROGRESS NOTES
MD made aware for afib with increased HR and low pressure. Also about pt complaining of chest pain.

## 2020-12-01 NOTE — THERAPY
Missed Therapy     Patient Name: Nicola Jimenez  Age:  75 y.o., Sex:  male  Medical Record #: [id number]  Today's Date: 12/1/2020    Discussed missed therapy with RN        12/01/20 1130   Initial Contact Note    Initial Contact Note Order Received and Verified, Occupational Therapy Evaluation in Progress with Full Report to Follow.   Interdisciplinary Plan of Care Collaboration   Collaboration Comments OT eval held disucssed w/RN not medically stable; d/t hypotension and HR will check back tomorrow    Session Information   Date / Session Number  12/1 held    Priority   (NEEDS EVAL )

## 2020-12-02 PROBLEM — R45.1 AGITATION: Status: ACTIVE | Noted: 2020-12-02

## 2020-12-02 LAB
ANION GAP SERPL CALC-SCNC: 8 MMOL/L (ref 7–16)
BASOPHILS # BLD AUTO: 0.4 % (ref 0–1.8)
BASOPHILS # BLD: 0.02 K/UL (ref 0–0.12)
BUN SERPL-MCNC: 6 MG/DL (ref 8–22)
CALCIUM SERPL-MCNC: 8.7 MG/DL (ref 8.4–10.2)
CHLORIDE SERPL-SCNC: 97 MMOL/L (ref 96–112)
CO2 SERPL-SCNC: 28 MMOL/L (ref 20–33)
CREAT SERPL-MCNC: 0.62 MG/DL (ref 0.5–1.4)
EOSINOPHIL # BLD AUTO: 0.03 K/UL (ref 0–0.51)
EOSINOPHIL NFR BLD: 0.6 % (ref 0–6.9)
ERYTHROCYTE [DISTWIDTH] IN BLOOD BY AUTOMATED COUNT: 81.6 FL (ref 35.9–50)
GLUCOSE BLD-MCNC: 122 MG/DL (ref 65–99)
GLUCOSE BLD-MCNC: 125 MG/DL (ref 65–99)
GLUCOSE BLD-MCNC: 126 MG/DL (ref 65–99)
GLUCOSE SERPL-MCNC: 116 MG/DL (ref 65–99)
HCT VFR BLD AUTO: 23.9 % (ref 42–52)
HGB BLD-MCNC: 8.1 G/DL (ref 14–18)
IMM GRANULOCYTES # BLD AUTO: 0.03 K/UL (ref 0–0.11)
IMM GRANULOCYTES NFR BLD AUTO: 0.6 % (ref 0–0.9)
LYMPHOCYTES # BLD AUTO: 0.77 K/UL (ref 1–4.8)
LYMPHOCYTES NFR BLD: 15.7 % (ref 22–41)
MCH RBC QN AUTO: 34.2 PG (ref 27–33)
MCHC RBC AUTO-ENTMCNC: 33.9 G/DL (ref 33.7–35.3)
MCV RBC AUTO: 100.8 FL (ref 81.4–97.8)
MONOCYTES # BLD AUTO: 1.09 K/UL (ref 0–0.85)
MONOCYTES NFR BLD AUTO: 22.3 % (ref 0–13.4)
NEUTROPHILS # BLD AUTO: 2.95 K/UL (ref 1.82–7.42)
NEUTROPHILS NFR BLD: 60.4 % (ref 44–72)
NRBC # BLD AUTO: 0 K/UL
NRBC BLD-RTO: 0 /100 WBC
PLATELET # BLD AUTO: 48 K/UL (ref 164–446)
PMV BLD AUTO: 9.8 FL (ref 9–12.9)
POTASSIUM SERPL-SCNC: 3.6 MMOL/L (ref 3.6–5.5)
RBC # BLD AUTO: 2.37 M/UL (ref 4.7–6.1)
SODIUM SERPL-SCNC: 133 MMOL/L (ref 135–145)
WBC # BLD AUTO: 4.9 K/UL (ref 4.8–10.8)

## 2020-12-02 PROCEDURE — 700111 HCHG RX REV CODE 636 W/ 250 OVERRIDE (IP): Performed by: INTERNAL MEDICINE

## 2020-12-02 PROCEDURE — 700102 HCHG RX REV CODE 250 W/ 637 OVERRIDE(OP): Performed by: HOSPITALIST

## 2020-12-02 PROCEDURE — 700105 HCHG RX REV CODE 258: Performed by: INTERNAL MEDICINE

## 2020-12-02 PROCEDURE — 700102 HCHG RX REV CODE 250 W/ 637 OVERRIDE(OP): Performed by: INTERNAL MEDICINE

## 2020-12-02 PROCEDURE — A9270 NON-COVERED ITEM OR SERVICE: HCPCS | Performed by: INTERNAL MEDICINE

## 2020-12-02 PROCEDURE — 97162 PT EVAL MOD COMPLEX 30 MIN: CPT

## 2020-12-02 PROCEDURE — 82962 GLUCOSE BLOOD TEST: CPT

## 2020-12-02 PROCEDURE — 770020 HCHG ROOM/CARE - TELE (206)

## 2020-12-02 PROCEDURE — 99232 SBSQ HOSP IP/OBS MODERATE 35: CPT | Performed by: INTERNAL MEDICINE

## 2020-12-02 PROCEDURE — 85025 COMPLETE CBC W/AUTO DIFF WBC: CPT

## 2020-12-02 PROCEDURE — 80048 BASIC METABOLIC PNL TOTAL CA: CPT

## 2020-12-02 PROCEDURE — A9270 NON-COVERED ITEM OR SERVICE: HCPCS | Performed by: HOSPITALIST

## 2020-12-02 RX ORDER — FOLIC ACID 1 MG/1
1 TABLET ORAL DAILY
Status: DISCONTINUED | OUTPATIENT
Start: 2020-12-02 | End: 2020-12-04 | Stop reason: HOSPADM

## 2020-12-02 RX ORDER — LORAZEPAM 2 MG/ML
1.5 INJECTION INTRAMUSCULAR
Status: DISCONTINUED | OUTPATIENT
Start: 2020-12-02 | End: 2020-12-04 | Stop reason: HOSPADM

## 2020-12-02 RX ORDER — HALOPERIDOL 5 MG/ML
5 INJECTION INTRAMUSCULAR EVERY 4 HOURS PRN
Status: DISCONTINUED | OUTPATIENT
Start: 2020-12-02 | End: 2020-12-04 | Stop reason: HOSPADM

## 2020-12-02 RX ORDER — LORAZEPAM 1 MG/1
1 TABLET ORAL EVERY 4 HOURS PRN
Status: DISCONTINUED | OUTPATIENT
Start: 2020-12-02 | End: 2020-12-04 | Stop reason: HOSPADM

## 2020-12-02 RX ORDER — LORAZEPAM 2 MG/ML
2 INJECTION INTRAMUSCULAR
Status: DISCONTINUED | OUTPATIENT
Start: 2020-12-02 | End: 2020-12-04 | Stop reason: HOSPADM

## 2020-12-02 RX ORDER — LORAZEPAM 1 MG/1
2 TABLET ORAL
Status: DISCONTINUED | OUTPATIENT
Start: 2020-12-02 | End: 2020-12-04 | Stop reason: HOSPADM

## 2020-12-02 RX ORDER — LORAZEPAM 2 MG/ML
1 INJECTION INTRAMUSCULAR
Status: DISCONTINUED | OUTPATIENT
Start: 2020-12-02 | End: 2020-12-04 | Stop reason: HOSPADM

## 2020-12-02 RX ORDER — LORAZEPAM 1 MG/1
3 TABLET ORAL
Status: DISCONTINUED | OUTPATIENT
Start: 2020-12-02 | End: 2020-12-04 | Stop reason: HOSPADM

## 2020-12-02 RX ORDER — LORAZEPAM 2 MG/ML
.5-1 INJECTION INTRAMUSCULAR EVERY 4 HOURS PRN
Status: DISCONTINUED | OUTPATIENT
Start: 2020-12-02 | End: 2020-12-02

## 2020-12-02 RX ORDER — THIAMINE MONONITRATE (VIT B1) 100 MG
100 TABLET ORAL DAILY
Status: DISCONTINUED | OUTPATIENT
Start: 2020-12-02 | End: 2020-12-04 | Stop reason: HOSPADM

## 2020-12-02 RX ORDER — LORAZEPAM 2 MG/ML
0.5 INJECTION INTRAMUSCULAR EVERY 4 HOURS PRN
Status: DISCONTINUED | OUTPATIENT
Start: 2020-12-02 | End: 2020-12-04 | Stop reason: HOSPADM

## 2020-12-02 RX ORDER — LORAZEPAM 0.5 MG/1
0.5 TABLET ORAL EVERY 4 HOURS PRN
Status: DISCONTINUED | OUTPATIENT
Start: 2020-12-02 | End: 2020-12-04 | Stop reason: HOSPADM

## 2020-12-02 RX ORDER — LORAZEPAM 1 MG/1
4 TABLET ORAL
Status: DISCONTINUED | OUTPATIENT
Start: 2020-12-02 | End: 2020-12-04 | Stop reason: HOSPADM

## 2020-12-02 RX ADMIN — LORAZEPAM 0.5 MG: 2 INJECTION INTRAMUSCULAR; INTRAVENOUS at 11:20

## 2020-12-02 RX ADMIN — SUCRALFATE 1 G: 1 SUSPENSION ORAL at 11:20

## 2020-12-02 RX ADMIN — OMEPRAZOLE 20 MG: 20 CAPSULE, DELAYED RELEASE ORAL at 05:46

## 2020-12-02 RX ADMIN — DILTIAZEM HYDROCHLORIDE 30 MG: 30 TABLET, FILM COATED ORAL at 18:00

## 2020-12-02 RX ADMIN — OMEPRAZOLE 20 MG: 20 CAPSULE, DELAYED RELEASE ORAL at 18:00

## 2020-12-02 RX ADMIN — Medication 100 MG: at 13:24

## 2020-12-02 RX ADMIN — HALOPERIDOL LACTATE 5 MG: 5 INJECTION, SOLUTION INTRAMUSCULAR at 12:08

## 2020-12-02 RX ADMIN — SUCRALFATE 1 G: 1 SUSPENSION ORAL at 07:37

## 2020-12-02 RX ADMIN — DILTIAZEM HYDROCHLORIDE 30 MG: 30 TABLET, FILM COATED ORAL at 11:20

## 2020-12-02 RX ADMIN — SUCRALFATE 1 G: 1 SUSPENSION ORAL at 17:59

## 2020-12-02 RX ADMIN — THERA TABS 1 TABLET: TAB at 13:24

## 2020-12-02 RX ADMIN — FOLIC ACID 1 MG: 1 TABLET ORAL at 13:24

## 2020-12-02 RX ADMIN — SENNOSIDES-DOCUSATE SODIUM TAB 8.6-50 MG 2 TABLET: 8.6-5 TAB at 18:00

## 2020-12-02 RX ADMIN — DILTIAZEM HYDROCHLORIDE 30 MG: 30 TABLET, FILM COATED ORAL at 05:47

## 2020-12-02 RX ADMIN — SODIUM CHLORIDE, POTASSIUM CHLORIDE, SODIUM LACTATE AND CALCIUM CHLORIDE: 600; 310; 30; 20 INJECTION, SOLUTION INTRAVENOUS at 11:20

## 2020-12-02 ASSESSMENT — COGNITIVE AND FUNCTIONAL STATUS - GENERAL
WALKING IN HOSPITAL ROOM: A LITTLE
CLIMB 3 TO 5 STEPS WITH RAILING: A LITTLE
MOBILITY SCORE: 20
MOVING FROM LYING ON BACK TO SITTING ON SIDE OF FLAT BED: A LITTLE
STANDING UP FROM CHAIR USING ARMS: A LITTLE
SUGGESTED CMS G CODE MODIFIER MOBILITY: CJ

## 2020-12-02 ASSESSMENT — LIFESTYLE VARIABLES
ORIENTATION AND CLOUDING OF SENSORIUM: ORIENTED AND CAN DO SERIAL ADDITIONS
TOTAL SCORE: 3
NAUSEA AND VOMITING: NO NAUSEA AND NO VOMITING
HEADACHE, FULLNESS IN HEAD: NOT PRESENT
HEADACHE, FULLNESS IN HEAD: NOT PRESENT
PAROXYSMAL SWEATS: NO SWEAT VISIBLE
ANXIETY: MILDLY ANXIOUS
VISUAL DISTURBANCES: NOT PRESENT
AUDITORY DISTURBANCES: NOT PRESENT
TOTAL SCORE: 1
TREMOR: TREMOR NOT VISIBLE BUT CAN BE FELT, FINGERTIP TO FINGERTIP
ANXIETY: NO ANXIETY (AT EASE)
AUDITORY DISTURBANCES: NOT PRESENT
ORIENTATION AND CLOUDING OF SENSORIUM: ORIENTED AND CAN DO SERIAL ADDITIONS
AGITATION: SOMEWHAT MORE THAN NORMAL ACTIVITY
PAROXYSMAL SWEATS: NO SWEAT VISIBLE
VISUAL DISTURBANCES: NOT PRESENT
AGITATION: NORMAL ACTIVITY
NAUSEA AND VOMITING: NO NAUSEA AND NO VOMITING
TREMOR: TREMOR NOT VISIBLE BUT CAN BE FELT, FINGERTIP TO FINGERTIP

## 2020-12-02 ASSESSMENT — ENCOUNTER SYMPTOMS
COUGH: 0
ABDOMINAL PAIN: 0
PHOTOPHOBIA: 0
DIARRHEA: 0
NERVOUS/ANXIOUS: 0
HEARTBURN: 0
HEADACHES: 0
CONSTIPATION: 0
SHORTNESS OF BREATH: 0
FEVER: 0
VOMITING: 0
DEPRESSION: 0
SENSORY CHANGE: 0
CHILLS: 0
SORE THROAT: 0
DIZZINESS: 1
BLURRED VISION: 0
CLAUDICATION: 0
MYALGIAS: 0
WEAKNESS: 0
INSOMNIA: 0
SPEECH CHANGE: 0

## 2020-12-02 ASSESSMENT — GAIT ASSESSMENTS
DEVIATION: DECREASED BASE OF SUPPORT
GAIT LEVEL OF ASSIST: MINIMAL ASSIST
ASSISTIVE DEVICE: FRONT WHEEL WALKER
DISTANCE (FEET): 75

## 2020-12-02 NOTE — THERAPY
Physical Therapy   Initial Evaluation     Patient Name: Nicola Jimenez  Age:  75 y.o., Sex:  male  Medical Record #: [id number]  Today's Date: 12/2/2020     Precautions: (P) Fall Risk    Assessment  Patient is 75 y.o. male with a diagnosis of Syncope,latic acid acidosis,Pt lives at home alone and is usually active,Pt is unsafe at this time to be home alone.Acute PT needed to improve transfers and ambulation     12/02/20 1200   Prior Living Situation   Prior Services None   Housing / Facility 1 Story House   Steps Into Home 0   Steps In Home 0   Equipment Owned 4-Wheel Walker;Single Point Cane   Lives with - Patient's Self Care Capacity Alone and Able to Care For Self   Prior Level of Functional Mobility   Bed Mobility Independent   Transfer Status Independent   Ambulation Independent   Distance Ambulation (Feet)   (limited community)   Assistive Devices Used Single Point Cane   Coordination Upper Body   Coordination WDL   Coordination Lower Body    Coordination Lower Body  WDL   Balance Assessment   Sitting Balance (Static) Fair +   Sitting Balance (Dynamic) Fair +   Standing Balance (Static) Fair   Standing Balance (Dynamic) Fair -   Weight Shift Sitting Fair   Weight Shift Standing Fair   Gait Analysis   Gait Level Of Assist Minimal Assist   Assistive Device Front Wheel Walker   Distance (Feet) 75   # of Times Distance was Traveled 1   Deviation Decreased Base Of Support   # of Stairs Climbed 0   Weight Bearing Status full   Functional Mobility   Sit to Stand Minimal Assist   Bed, Chair, Wheelchair Transfer Minimal Assist   Transfer Method Stand Step   Activity Tolerance   Sitting Edge of Bed 10   Standing 10   Patient / Family Goals    Patient / Family Goal #1 not stated   Short Term Goals    Short Term Goal # 1 Pt to B I with transfers in 4 V so can D/C home   Short Term Goal # 2 Pt to B S with ambulation x 200 feet in 4 V so can D/C home   Anticipated Discharge Equipment and Recommendations   DC Equipment  Recommendations None   Discharge Recommendations   (Home V SNF depending on progress)   Interdisciplinary Plan of Care Collaboration   IDT Collaboration with  Nursing        Plan    Recommend Physical Therapy 4 times per week until therapy goals are met for the following treatments:  Bed Mobility, Gait Training, Neuro Re-Education / Balance, Therapeutic Activities and Therapeutic Exercises      12/02/20 1200   Prior Living Situation   Prior Services None   Housing / Facility 1 Story House   Steps Into Home 0   Steps In Home 0   Equipment Owned 4-Wheel Walker;Single Point Cane   Lives with - Patient's Self Care Capacity Alone and Able to Care For Self   Prior Level of Functional Mobility   Bed Mobility Independent   Transfer Status Independent   Ambulation Independent   Distance Ambulation (Feet)   (limited community)   Assistive Devices Used Single Point Cane   Coordination Upper Body   Coordination WDL   Coordination Lower Body    Coordination Lower Body  WDL   Balance Assessment   Sitting Balance (Static) Fair +   Sitting Balance (Dynamic) Fair +   Standing Balance (Static) Fair   Standing Balance (Dynamic) Fair -   Weight Shift Sitting Fair   Weight Shift Standing Fair   Gait Analysis   Gait Level Of Assist Minimal Assist   Assistive Device Front Wheel Walker   Distance (Feet) 75   # of Times Distance was Traveled 1   Deviation Decreased Base Of Support   # of Stairs Climbed 0   Weight Bearing Status full   Functional Mobility   Sit to Stand Minimal Assist   Bed, Chair, Wheelchair Transfer Minimal Assist   Transfer Method Stand Step   Activity Tolerance   Sitting Edge of Bed 10   Standing 10   Patient / Family Goals    Patient / Family Goal #1 not stated   Short Term Goals    Short Term Goal # 1 Pt to B I with transfers in 4 V so can D/C home   Short Term Goal # 2 Pt to B S with ambulation x 200 feet in 4 V so can D/C home   Anticipated Discharge Equipment and Recommendations   DC Equipment Recommendations None    Discharge Recommendations   (Home V SNF depending on progress)   Interdisciplinary Plan of Care Collaboration   IDT Collaboration with  Nursing       DC Equipment Recommendations: (P) None  Discharge Recommendations: (P) (Home V SNF depending on progress)

## 2020-12-02 NOTE — ASSESSMENT & PLAN NOTE
Suspect is aspect of alcohol withdrawal  Restraints currently required for patient and staff safety  Will order CIWA protocol.

## 2020-12-02 NOTE — PROGRESS NOTES
"RN called to room because pt climbing out of bed, removing tele leads, attempting to pull out IV. Pt states he wants to go home. RN explains pt is sick, and he cannot leave hospital. Pt states, \"I don't care!\"  Pt attempts to walk around bed to put clothes on, pt falls into bed d/t weakness and instability. RR RN came to bedside, convinced pt to put tele and gown back on and get back in bed. Pt determined to go home. States EMS brought him to hospital, but that he could drive his car home from hospital. Pt's car is not at hospital. Pt would like to speak with his doctor. Dr. See aware of situation. No further orders at this time.  "

## 2020-12-02 NOTE — PROGRESS NOTES
Pt again attempting to get out bed to get dressed and leave hospital. RN contacted MD See requesting she come to bedside.

## 2020-12-02 NOTE — PROGRESS NOTES
Hospital Medicine Daily Progress Note    Date of Service  12/2/2020    Chief Complaint  75 y.o. male admitted 11/28/2020 with weakness and near syncope.    Hospital Course  Patient is a 75 y.o. male with past medical history of severe aortic stenosis complicated by mechanical hemolytic anemia, history of splenic infarction, abdominal aortic aneurysm status post repair, esophageal thickening, and noncompliance who presented 11/28/2020 with an episodes of sudden generalized weakness to the point that he almost fainted at [employer] on the day of admission. There was no history consistent with seizure activity.  He denied losing consciousness.  He was recently diagnosed with severe aortic stenosis and a TAVR was recommended however he left the hospital AMA and did not follow up with cardiology.  Presyncope likely due to patient's severe aortic stenosis.  Cardiology was consulted and recommended alcohol cessation and outpatient follow-up with the TAVR program although patient is not a candidate at this time due to significant alcoholism and poor adherence to recommendations.      Interval Problem Update  12/1 Patient with hemogobin at 6.6 this am, was 7.1 yesterday.  He complains of fatigue and weakness.  He states he has received blood in the past and gives consent to transfusion today.  He has a significant history of alcoholism and when asked if he would be okay with a transfusion today, he stated he would rather have a bourbon and a cigarette.  CTA chest was done yesterday given elevated d dimer and no evidence of PE but there was significant esophagitis so I will initiate bid prilosec and carafate to decrease irritation.  12/2  Patient much more impulsive and non-cooperative with care today.  Will initiate CIWA protocol as I suspect withdrawal is likely the cause of his change.  Soft restraints are in place currently until patient calms down as he is a definite fall risk.    Consultants/Specialty  Cardiology -  recommendations regarding TAVR, no official complete consultation.    Code Status  Full Code    Disposition  Home when appropriate.    Review of Systems  Review of Systems   Constitutional: Positive for malaise/fatigue. Negative for chills and fever.   HENT: Negative for congestion and sore throat.    Eyes: Negative for blurred vision and photophobia.   Respiratory: Negative for cough and shortness of breath.    Cardiovascular: Negative for chest pain, claudication and leg swelling.   Gastrointestinal: Negative for abdominal pain, constipation, diarrhea, heartburn and vomiting.   Genitourinary: Negative for dysuria and hematuria.   Musculoskeletal: Negative for joint pain and myalgias.   Skin: Negative for itching and rash.   Neurological: Positive for dizziness. Negative for sensory change, speech change, weakness and headaches.   Psychiatric/Behavioral: Negative for depression. The patient is not nervous/anxious and does not have insomnia.         Physical Exam  Temp:  [36.7 °C (98.1 °F)-37.1 °C (98.7 °F)] 36.8 °C (98.3 °F)  Pulse:  [] 107  Resp:  [17-20] 20  BP: ()/(49-64) 105/60  SpO2:  [80 %-93 %] 93 %    Physical Exam  Vitals signs and nursing note reviewed.   Constitutional:       General: He is not in acute distress.     Appearance: Normal appearance.   HENT:      Head: Normocephalic and atraumatic.   Eyes:      General: No scleral icterus.     Extraocular Movements: Extraocular movements intact.   Neck:      Musculoskeletal: Normal range of motion and neck supple.   Cardiovascular:      Rate and Rhythm: Normal rate. Rhythm irregular.      Pulses: Normal pulses.      Heart sounds: Normal heart sounds. No murmur.   Pulmonary:      Effort: Pulmonary effort is normal. No respiratory distress.      Breath sounds: Normal breath sounds. No wheezing, rhonchi or rales.   Abdominal:      General: Abdomen is flat. Bowel sounds are normal. There is no distension.      Palpations: Abdomen is soft.       Tenderness: There is no rebound.   Musculoskeletal:         General: No swelling or tenderness.   Lymphadenopathy:      Cervical: No cervical adenopathy.   Skin:     Coloration: Skin is not jaundiced.      Findings: No erythema.   Neurological:      General: No focal deficit present.      Mental Status: He is alert and oriented to person, place, and time. Mental status is at baseline.      Cranial Nerves: No cranial nerve deficit.   Psychiatric:         Mood and Affect: Mood normal.         Behavior: Behavior normal.         Fluids    Intake/Output Summary (Last 24 hours) at 12/2/2020 1243  Last data filed at 12/1/2020 1430  Gross per 24 hour   Intake 326 ml   Output --   Net 326 ml       Laboratory  Recent Labs     11/30/20  0426 12/01/20  0411 12/02/20  0447   WBC 2.3* 2.6* 4.9   RBC 1.98* 1.88* 2.37*   HEMOGLOBIN 7.1* 6.6* 8.1*   HEMATOCRIT 20.5* 19.7* 23.9*   .5* 104.8* 100.8*   MCH 35.9* 35.1* 34.2*   MCHC 34.6 33.5* 33.9   RDW 71.0* 73.0* 81.6*   PLATELETCT 35* 36* 48*   MPV 9.9 10.4 9.8     Recent Labs     11/30/20  0426 12/02/20  0447   SODIUM 136 133*   POTASSIUM 3.2* 3.6   CHLORIDE 96 97   CO2 29 28   GLUCOSE 115* 116*   BUN 10 6*   CREATININE 0.70 0.62   CALCIUM 8.5 8.7                   Imaging  CT-CTA CHEST PULMONARY ARTERY W/ RECONS   Final Result      1.  No CT evidence for pulmonary emboli.   2.  Bibasilar atelectasis.   3.  No pneumonia or pneumothorax.   4.  Mildly dilated esophagus with diffuse wall thickening, likely inflammatory.   5.  Severe coronary artery calcifications.               DX-CHEST-LIMITED (1 VIEW)   Final Result         No acute cardiac or pulmonary abnormality is identified.           Assessment/Plan  * Near syncope- (present on admission)  Assessment & Plan  Multifactorial likely combination of severe aortic stenosis and severe dehydration.  EKG shows a sinus rhythm with a rate of 103, there is background artifact secondary to tremor that has confused on the order read  as atrial flutter/fibrillation.  There is no ST elevation, there is T wave inversion in lead I to aVL and aVF.  QTc 471.   See above  PT/ OT to eval    Anemia- (present on admission)  Assessment & Plan  This is acute on chronic, received transfusion 12/1  Likely multifactorial: hemolytic anemia due to AD, pancytopenia due to cirrhosis and etoh  Recent EGD and colonoscopy  CTA chest showing esophagitis  Continue to monitor and transfuse for hemoglobin of less than or equal to 7  LDH wnl     Hyperglycemia- (present on admission)  Assessment & Plan  Improving  Will check an A1c    Thrombocytopenia (HCC)- (present on admission)  Assessment & Plan  Acute on chronic  Recent US showed splenomegaly which is likely contributing along with etoh abuse and cirrhosis  No s/o bleeding  Continue to follow    Aortic stenosis- (present on admission)  Assessment & Plan  Severe aortic stenosis, likely triggered his attack with a combined severe dehydration.  Currently not a candidate for TAVR and has been non compliant with follow up  See above  Cardiology recommending outpatient follow up given poor patient compliance.      Atrial fibrillation with RVR (HCC)  Assessment & Plan  Exacerbated by anemia  Required single dose of IV cardizem this am for rate control, may need to adjust his oral cardizem dosage if RVR returns.      Agitation  Assessment & Plan  Suspect is aspect of alcohol withdrawal  Restraints currently required for patient and staff safety  Will order CIWA protocol.      Elevated d-dimer  Assessment & Plan  elevated 2.8   CTA chest shows no evidence of PE but there is significant esophagitis seen      Alcohol abuse  Assessment & Plan  See above  No evidence of  w/d  Cessation discussed and recommended    Pancytopenia (HCC)  Assessment & Plan  See above  Due to etoh abuse, if continues to worsen, consider a bone marrow biopsy    Lactic acidosis- (present on admission)  Assessment & Plan  Likely due to dehydration and  uti  Overall improving    Tobacco dependence- (present on admission)  Assessment & Plan  Cessation discussed and recommended       VTE prophylaxis: AC c/i secondary to suspected slow GI bleed with esophagitis

## 2020-12-02 NOTE — PROGRESS NOTES
Security arrived and assisted in escorting pt back to bed from chair in hallway. Security remained at bedside as this RN placed pt in BL soft wrist restraints. Will continue to monitor.

## 2020-12-02 NOTE — RESPIRATORY CARE
COPD EDUCATION by COPD CLINICAL EDUCATOR  12/2/2020 at 2:08 PM by Erin Jimenez, RRT     Patient reviewed by COPD education team. Patient   impulsive and non-cooperative with care today, in soft restraints until patient calms down will check back later.

## 2020-12-02 NOTE — PROGRESS NOTES
Telemetry Shift Summary    Rhythm SR/ST  HR Range   Ectopy o-f PVC, r-PAC, r-o Coup, r-Trigem  Measurements 0.16/0.08/0.38      Rhythm A Fib RVR  HR Range 120-180  Ectopy -  Measurements -/-/-

## 2020-12-02 NOTE — PROGRESS NOTES
Pt has attempted multiple time to get out bed, nearly fallen, and is still pulling at lines. Pt has received 1x dose of ativan and 1x dose of haldol. Pt refusing to sit on the bed in a spot that allows bed alarm to be on. Pt determined to go for walk in hallway although pt is definitely not stable enough to walk on his own. RN assisted pt to hallway while asking him to please return to his room. RN requested CNA to call security. Pt sitting on chair in hallway refusing to move.

## 2020-12-02 NOTE — PROGRESS NOTES
Telemetry Strip     Strip printed: Yes  Rhythm: SR/ST  HR:   Measurements: 0.18/ 0.08/ 0.40  Ectopy: f PVC, r PVC, r Couplet,               Normal Values  Rhythm SR  HR Range    Measurements 0.12-0.20 / 0.06-0.10  / 0.30-0.52

## 2020-12-03 LAB
ANION GAP SERPL CALC-SCNC: 10 MMOL/L (ref 7–16)
BACTERIA BLD CULT: NORMAL
BACTERIA BLD CULT: NORMAL
BUN SERPL-MCNC: 8 MG/DL (ref 8–22)
CALCIUM SERPL-MCNC: 8.5 MG/DL (ref 8.4–10.2)
CHLORIDE SERPL-SCNC: 100 MMOL/L (ref 96–112)
CO2 SERPL-SCNC: 26 MMOL/L (ref 20–33)
CREAT SERPL-MCNC: 0.64 MG/DL (ref 0.5–1.4)
ERYTHROCYTE [DISTWIDTH] IN BLOOD BY AUTOMATED COUNT: 82.8 FL (ref 35.9–50)
GLUCOSE BLD-MCNC: 101 MG/DL (ref 65–99)
GLUCOSE BLD-MCNC: 107 MG/DL (ref 65–99)
GLUCOSE BLD-MCNC: 120 MG/DL (ref 65–99)
GLUCOSE BLD-MCNC: 128 MG/DL (ref 65–99)
GLUCOSE BLD-MCNC: 99 MG/DL (ref 65–99)
GLUCOSE SERPL-MCNC: 106 MG/DL (ref 65–99)
HCT VFR BLD AUTO: 20.3 % (ref 42–52)
HGB BLD-MCNC: 6.9 G/DL (ref 14–18)
MCH RBC QN AUTO: 34.7 PG (ref 27–33)
MCHC RBC AUTO-ENTMCNC: 34 G/DL (ref 33.7–35.3)
MCV RBC AUTO: 102 FL (ref 81.4–97.8)
PLATELET # BLD AUTO: 55 K/UL (ref 164–446)
PMV BLD AUTO: 10.6 FL (ref 9–12.9)
POTASSIUM SERPL-SCNC: 3 MMOL/L (ref 3.6–5.5)
RBC # BLD AUTO: 1.99 M/UL (ref 4.7–6.1)
SIGNIFICANT IND 70042: NORMAL
SIGNIFICANT IND 70042: NORMAL
SITE SITE: NORMAL
SITE SITE: NORMAL
SODIUM SERPL-SCNC: 136 MMOL/L (ref 135–145)
SOURCE SOURCE: NORMAL
SOURCE SOURCE: NORMAL
WBC # BLD AUTO: 4 K/UL (ref 4.8–10.8)

## 2020-12-03 PROCEDURE — A9270 NON-COVERED ITEM OR SERVICE: HCPCS | Performed by: INTERNAL MEDICINE

## 2020-12-03 PROCEDURE — 85027 COMPLETE CBC AUTOMATED: CPT

## 2020-12-03 PROCEDURE — 36430 TRANSFUSION BLD/BLD COMPNT: CPT

## 2020-12-03 PROCEDURE — 700102 HCHG RX REV CODE 250 W/ 637 OVERRIDE(OP): Performed by: HOSPITALIST

## 2020-12-03 PROCEDURE — 99232 SBSQ HOSP IP/OBS MODERATE 35: CPT | Performed by: INTERNAL MEDICINE

## 2020-12-03 PROCEDURE — 82962 GLUCOSE BLOOD TEST: CPT | Mod: 91

## 2020-12-03 PROCEDURE — 700102 HCHG RX REV CODE 250 W/ 637 OVERRIDE(OP): Performed by: INTERNAL MEDICINE

## 2020-12-03 PROCEDURE — P9016 RBC LEUKOCYTES REDUCED: HCPCS

## 2020-12-03 PROCEDURE — 97166 OT EVAL MOD COMPLEX 45 MIN: CPT

## 2020-12-03 PROCEDURE — 770020 HCHG ROOM/CARE - TELE (206)

## 2020-12-03 PROCEDURE — A9270 NON-COVERED ITEM OR SERVICE: HCPCS | Performed by: HOSPITALIST

## 2020-12-03 PROCEDURE — 86923 COMPATIBILITY TEST ELECTRIC: CPT

## 2020-12-03 PROCEDURE — 80048 BASIC METABOLIC PNL TOTAL CA: CPT

## 2020-12-03 RX ORDER — POTASSIUM CHLORIDE 20 MEQ/1
40 TABLET, EXTENDED RELEASE ORAL DAILY
Status: DISCONTINUED | OUTPATIENT
Start: 2020-12-03 | End: 2020-12-04 | Stop reason: HOSPADM

## 2020-12-03 RX ADMIN — THERA TABS 1 TABLET: TAB at 06:23

## 2020-12-03 RX ADMIN — SUCRALFATE 1 G: 1 SUSPENSION ORAL at 11:50

## 2020-12-03 RX ADMIN — SUCRALFATE 1 G: 1 SUSPENSION ORAL at 17:29

## 2020-12-03 RX ADMIN — Medication 100 MG: at 06:22

## 2020-12-03 RX ADMIN — SUCRALFATE 1 G: 1 SUSPENSION ORAL at 21:11

## 2020-12-03 RX ADMIN — FOLIC ACID 1 MG: 1 TABLET ORAL at 06:23

## 2020-12-03 RX ADMIN — DILTIAZEM HYDROCHLORIDE 30 MG: 30 TABLET, FILM COATED ORAL at 06:23

## 2020-12-03 RX ADMIN — DILTIAZEM HYDROCHLORIDE 30 MG: 30 TABLET, FILM COATED ORAL at 11:50

## 2020-12-03 RX ADMIN — POTASSIUM CHLORIDE 40 MEQ: 1500 TABLET, EXTENDED RELEASE ORAL at 08:56

## 2020-12-03 RX ADMIN — OMEPRAZOLE 20 MG: 20 CAPSULE, DELAYED RELEASE ORAL at 17:29

## 2020-12-03 RX ADMIN — SENNOSIDES-DOCUSATE SODIUM TAB 8.6-50 MG 2 TABLET: 8.6-5 TAB at 06:23

## 2020-12-03 RX ADMIN — SUCRALFATE 1 G: 1 SUSPENSION ORAL at 06:26

## 2020-12-03 RX ADMIN — OMEPRAZOLE 20 MG: 20 CAPSULE, DELAYED RELEASE ORAL at 06:26

## 2020-12-03 RX ADMIN — DILTIAZEM HYDROCHLORIDE 30 MG: 30 TABLET, FILM COATED ORAL at 17:29

## 2020-12-03 ASSESSMENT — LIFESTYLE VARIABLES
ORIENTATION AND CLOUDING OF SENSORIUM: ORIENTED AND CAN DO SERIAL ADDITIONS
NAUSEA AND VOMITING: NO NAUSEA AND NO VOMITING
HEADACHE, FULLNESS IN HEAD: NOT PRESENT
ANXIETY: NO ANXIETY (AT EASE)
ORIENTATION AND CLOUDING OF SENSORIUM: ORIENTED AND CAN DO SERIAL ADDITIONS
VISUAL DISTURBANCES: NOT PRESENT
AUDITORY DISTURBANCES: NOT PRESENT
TREMOR: TREMOR NOT VISIBLE BUT CAN BE FELT, FINGERTIP TO FINGERTIP
VISUAL DISTURBANCES: NOT PRESENT
HEADACHE, FULLNESS IN HEAD: NOT PRESENT
ANXIETY: NO ANXIETY (AT EASE)
TOTAL SCORE: 1
AUDITORY DISTURBANCES: NOT PRESENT
NAUSEA AND VOMITING: NO NAUSEA AND NO VOMITING
PAROXYSMAL SWEATS: NO SWEAT VISIBLE
AGITATION: SOMEWHAT MORE THAN NORMAL ACTIVITY
AGITATION: NORMAL ACTIVITY
TOTAL SCORE: 2
PAROXYSMAL SWEATS: NO SWEAT VISIBLE
TREMOR: TREMOR NOT VISIBLE BUT CAN BE FELT, FINGERTIP TO FINGERTIP

## 2020-12-03 ASSESSMENT — COGNITIVE AND FUNCTIONAL STATUS - GENERAL
HELP NEEDED FOR BATHING: A LITTLE
DAILY ACTIVITIY SCORE: 19
DRESSING REGULAR UPPER BODY CLOTHING: A LITTLE
PERSONAL GROOMING: A LITTLE
DRESSING REGULAR LOWER BODY CLOTHING: A LITTLE
SUGGESTED CMS G CODE MODIFIER DAILY ACTIVITY: CK
TOILETING: A LITTLE

## 2020-12-03 ASSESSMENT — ENCOUNTER SYMPTOMS
VOMITING: 0
PHOTOPHOBIA: 0
COUGH: 0
MYALGIAS: 0
SENSORY CHANGE: 0
SHORTNESS OF BREATH: 0
DEPRESSION: 0
FEVER: 0
ABDOMINAL PAIN: 0
HEARTBURN: 0
SORE THROAT: 0
CHILLS: 0
CONSTIPATION: 0
BLURRED VISION: 0
NERVOUS/ANXIOUS: 0
CLAUDICATION: 0
DIZZINESS: 1
SPEECH CHANGE: 0
DIARRHEA: 0
HEADACHES: 0
WEAKNESS: 0
INSOMNIA: 0

## 2020-12-03 ASSESSMENT — FIBROSIS 4 INDEX: FIB4 SCORE: 21.11

## 2020-12-03 ASSESSMENT — PAIN DESCRIPTION - PAIN TYPE: TYPE: CHRONIC PAIN

## 2020-12-03 ASSESSMENT — ACTIVITIES OF DAILY LIVING (ADL): TOILETING: INDEPENDENT

## 2020-12-03 NOTE — THERAPY
SLP Contact Note    Patient Name: Nicola Jimenez  Age:  75 y.o., Sex:  male  Medical Record #: [id number]  Today's Date: 12/3/2020         12/03/20 1330   Treatment Variance   Reason For Missed Therapy Medical - Other (Please Comment)  (CSE not indicated; GI consult recommended)   Total Time Spent   Total Time Spent (Mins) 5   Initial Contact Note    Initial Contact Note  Order Received and Verified. Speech Therapy Evaluation NOT Completed Because Patient Does Not Require Acute Speech Therapy at this Time.   Interdisciplinary Plan of Care Collaboration   IDT Collaboration with  Nursing   Collaboration Comments Clinical swallow evaluation received and verified. Per RN, Pt has been vomiting after every meal, daily. Per chart review, Pt with documented hx of esophageal achalasia and distal esophageal obstruction (noted 2017). Pt with long-standing esophageal dysphagia and was recently seen by SLP services September 2020 and was cleared for Regular/thin diet. At this time, clinical swallow evaluation does not appear indicated as Pt is not presenting with oropharyngeal dysphagia and recommend referral/consult to GI. RN notified. Thank you.

## 2020-12-03 NOTE — PROGRESS NOTES
Telemetry Shift Summary    Rhythm SR/ST  HR Range   Ectopy f-PAC, PVC, o-Coup, r-Trigem, Trip  Measurements 0.16/0.08/0.38

## 2020-12-03 NOTE — THERAPY
"Occupational Therapy   Initial Evaluation     Patient Name: Nicola Jimenez  Age:  75 y.o., Sex:  male  Medical Record #: [id number]  Today's Date: 12/3/2020     Precautions  Precautions:  Fall Risk    Assessment  Patient is 75 y.o. male admitted after being found very weak at the store. PMHX: Critical aortic stenosis, splenic infarction, AAA repair, esophogeal thickening, pancytopenia, ETOH abuse and recently recommended need for TAVR, however pt left AMA and did not follow up. This admission pt is dx w/near syncope, anemia, hyperglycemia, thrombocytopenia, on going alcohol abuse w/possible withdrawal and intermittent agitation. During today's session pt presents w/generalized weakness, poor activity tolerance, very [place], and minimal participation in ADL's. Pt did not report any specific supports at d/c. Will follow in this setting. Recommend nsg assist pt to chair daily as appropriate     Plan  Recommend Occupational Therapy 3 times per week until therapy goals are met for the following treatments:  Adaptive Equipment, Self Care/Activities of Daily Living, Therapeutic Activities and Therapeutic Exercises.    DC Equipment Recommendations:  Unable to determine at this time  Discharge Recommendations: Recommend post-acute placement for additional occupational therapy services prior to discharge home(TBD see note ); At this time pt would not be able to safely complete his ADL/IADL's to return to independent I'ly, however pt has the potential to improve in this setting.     Subjective  \"can I go back to sleep now\"      Objective     12/03/20 1048   Prior Living Situation   Prior Services None   Housing / Facility 1 Story House   Steps Into Home 0   Steps In Home 0   Equipment Owned 4-Wheel Walker;Single Point Cane   Lives with - Patient's Self Care Capacity Alone and Able to Care For Self   Comments provided minimal info    Prior Level of ADL Function   Self Feeding Independent   Grooming / Hygiene Independent "   Bathing Independent   Dressing Independent   Toileting Independent   Comments per pt    Prior Level of IADL Function   Medication Management Unable To Determine At This Time   Laundry Unable To Determine At This Time   Kitchen Mobility Unable To Determine At This Time   Finances Unable To Determine At This Time   Home Management Unable To Determine At This Time   Shopping Unable To Determine At This Time   Prior Level Of Mobility Independent With Device in Community   Comments declined to answer questions    Precautions   Precautions Fall Risk   Pain 0 - 10 Group   Therapist Pain Assessment During Activity;Post Activity Pain Same as Prior to Activity;0;Nurse Notified   Cognition    Cognition / Consciousness X   Level of Consciousness Alert   Ability To Follow Commands 1 Step   New Learning Impaired   Comments poor participation, asking to sleep mininal verbal responses to questions appeared withdrawn    Passive ROM Upper Body   Passive ROM Upper Body WDL   Active ROM Upper Body   Active ROM Upper Body  X   Comments limited BUE shoulders d/t weakness    Strength Upper Body   Upper Body Strength  X   Gross Strength Generalized Weakness, Equal Bilaterally.    Sensation Upper Body   Upper Extremity Sensation  Not Tested   Upper Body Muscle Tone   Upper Body Muscle Tone  WDL   Neurological Concerns   Neurological Concerns No   Coordination Upper Body   Coordination WDL   Balance Assessment   Sitting Balance (Static) Fair +   Sitting Balance (Dynamic) Fair +   Standing Balance (Static) Fair   Standing Balance (Dynamic) Fair -   Weight Shift Sitting Fair   Weight Shift Standing Fair   Comments w/HHA    Bed Mobility    Supine to Sit Supervised   Sit to Supine Minimal Assist   Scooting Supervised   ADL Assessment   Grooming Supervision;Seated   Upper Body Dressing Minimal Assist   Lower Body Dressing Moderate Assist   Toileting Moderate Assist   Functional Mobility   Sit to Stand Minimal Assist   Bed, Chair, Wheelchair  Transfer Minimal Assist   Mobility EOB sit>Stand then BTB    Visual Perception   Visual Perception  X   Nystagmus   (resting in both eyes )   Edema / Skin Assessment   Edema / Skin  X   Comments edema and weepy skin on face    Activity Tolerance   Comments c/o fatigue    Patient / Family Goals   Patient / Family Goal #1 to go back to sleep   Short Term Goals   Short Term Goal # 1 pt will complete FB dressing w/spv    Short Term Goal # 2 pt will complete toilet txf w/spv    Short Term Goal # 3 pt will complete grooming standing at sink w/spv    Education Group   Role of Occupational Therapist Patient Response Patient;Reinforcement Needed;No Learning Evidence   Problem List   Problem List Decreased Active Daily Living Skills;Decreased Upper Extremity Strength Right;Decreased Upper Extremity Strength Left;Decreased Functional Mobility;Decreased Activity Tolerance;Safety Awareness Deficits / Cognition;Impaired Postural Control / Balance   Anticipated Discharge Equipment and Recommendations   DC Equipment Recommendations Unable to determine at this time   Discharge Recommendations Recommend post-acute placement for additional occupational therapy services prior to discharge home  (TBD see note )   Interdisciplinary Plan of Care Collaboration   IDT Collaboration with  Nursing   Patient Position at End of Therapy In Bed;Call Light within Reach;Tray Table within Reach;Phone within Reach   Collaboration Comments Rn aware of session    Session Information   Date / Session Number  12/3 #1 (1/3, 12/9)   Priority 2

## 2020-12-03 NOTE — PROGRESS NOTES
Hospital Medicine Daily Progress Note    Date of Service  12/3/2020    Chief Complaint  75 y.o. male admitted 11/28/2020 with weakness and near syncope.    Hospital Course  Patient is a 75 y.o. male with past medical history of severe aortic stenosis complicated by mechanical hemolytic anemia, history of splenic infarction, abdominal aortic aneurysm status post repair, esophageal thickening, and noncompliance who presented 11/28/2020 with an episodes of sudden generalized weakness to the point that he almost fainted at [employer] on the day of admission. There was no history consistent with seizure activity.  He denied losing consciousness.  He was recently diagnosed with severe aortic stenosis and a TAVR was recommended however he left the hospital AMA and did not follow up with cardiology.  Presyncope likely due to patient's severe aortic stenosis.  Cardiology was consulted and recommended alcohol cessation and outpatient follow-up with the TAVR program although patient is not a candidate at this time due to significant alcoholism and poor adherence to recommendations.      Interval Problem Update  12/1 Patient with hemogobin at 6.6 this am, was 7.1 yesterday.  He complains of fatigue and weakness.  He states he has received blood in the past and gives consent to transfusion today.  He has a significant history of alcoholism and when asked if he would be okay with a transfusion today, he stated he would rather have a bourbon and a cigarette.  CTA chest was done yesterday given elevated d dimer and no evidence of PE but there was significant esophagitis so I will initiate bid prilosec and carafate to decrease irritation.  12/2  Patient much more impulsive and non-cooperative with care today.  Will initiate CIWA protocol as I suspect withdrawal is likely the cause of his change.  Soft restraints are in place currently until patient calms down as he is a definite fall risk.  12/3 Patient having issues today with emesis  with PO intake which he tells his RN has been going on prior to admission.  He hadn't been coughing or choking during this admission until this am.  H/H is low again - will transfuse.  No reports of active bleeding.    Consultants/Specialty  Cardiology - recommendations regarding TAVR, no official complete consultation.    Code Status  Full Code    Disposition  Home when appropriate.    Review of Systems  Review of Systems   Constitutional: Positive for malaise/fatigue. Negative for chills and fever.   HENT: Negative for congestion and sore throat.    Eyes: Negative for blurred vision and photophobia.   Respiratory: Negative for cough and shortness of breath.    Cardiovascular: Negative for chest pain, claudication and leg swelling.   Gastrointestinal: Negative for abdominal pain, constipation, diarrhea, heartburn and vomiting.   Genitourinary: Negative for dysuria and hematuria.   Musculoskeletal: Negative for joint pain and myalgias.   Skin: Negative for itching and rash.   Neurological: Positive for dizziness. Negative for sensory change, speech change, weakness and headaches.   Psychiatric/Behavioral: Negative for depression. The patient is not nervous/anxious and does not have insomnia.         Physical Exam  Temp:  [36.3 °C (97.4 °F)-37 °C (98.6 °F)] 37 °C (98.6 °F)  Pulse:  [] 93  Resp:  [18-20] 18  BP: ()/(46-83) 121/68  SpO2:  [88 %-100 %] 96 %    Physical Exam  Vitals signs and nursing note reviewed.   Constitutional:       General: He is not in acute distress.     Appearance: Normal appearance.   HENT:      Head: Normocephalic and atraumatic.   Eyes:      General: No scleral icterus.     Extraocular Movements: Extraocular movements intact.   Neck:      Musculoskeletal: Normal range of motion and neck supple.   Cardiovascular:      Rate and Rhythm: Normal rate. Rhythm irregular.      Pulses: Normal pulses.      Heart sounds: Normal heart sounds. No murmur.   Pulmonary:      Effort: Pulmonary  effort is normal. No respiratory distress.      Breath sounds: Normal breath sounds. No wheezing, rhonchi or rales.   Abdominal:      General: Abdomen is flat. Bowel sounds are normal. There is no distension.      Palpations: Abdomen is soft.      Tenderness: There is no rebound.   Musculoskeletal:         General: No swelling or tenderness.   Lymphadenopathy:      Cervical: No cervical adenopathy.   Skin:     Coloration: Skin is not jaundiced.      Findings: No erythema.   Neurological:      General: No focal deficit present.      Mental Status: He is alert and oriented to person, place, and time. Mental status is at baseline.      Cranial Nerves: No cranial nerve deficit.   Psychiatric:         Mood and Affect: Mood normal.         Behavior: Behavior normal.         Fluids    Intake/Output Summary (Last 24 hours) at 12/3/2020 1403  Last data filed at 12/3/2020 0900  Gross per 24 hour   Intake 620 ml   Output --   Net 620 ml       Laboratory  Recent Labs     12/01/20  0411 12/02/20 0447 12/03/20  0419   WBC 2.6* 4.9 4.0*   RBC 1.88* 2.37* 1.99*   HEMOGLOBIN 6.6* 8.1* 6.9*   HEMATOCRIT 19.7* 23.9* 20.3*   .8* 100.8* 102.0*   MCH 35.1* 34.2* 34.7*   MCHC 33.5* 33.9 34.0   RDW 73.0* 81.6* 82.8*   PLATELETCT 36* 48* 55*   MPV 10.4 9.8 10.6     Recent Labs     12/02/20 0447 12/03/20 0419   SODIUM 133* 136   POTASSIUM 3.6 3.0*   CHLORIDE 97 100   CO2 28 26   GLUCOSE 116* 106*   BUN 6* 8   CREATININE 0.62 0.64   CALCIUM 8.7 8.5                   Imaging  CT-CTA CHEST PULMONARY ARTERY W/ RECONS   Final Result      1.  No CT evidence for pulmonary emboli.   2.  Bibasilar atelectasis.   3.  No pneumonia or pneumothorax.   4.  Mildly dilated esophagus with diffuse wall thickening, likely inflammatory.   5.  Severe coronary artery calcifications.               DX-CHEST-LIMITED (1 VIEW)   Final Result         No acute cardiac or pulmonary abnormality is identified.           Assessment/Plan  * Near syncope- (present  on admission)  Assessment & Plan  Multifactorial likely combination of severe aortic stenosis and severe dehydration.  EKG shows a sinus rhythm with a rate of 103, there is background artifact secondary to tremor that has confused on the order read as atrial flutter/fibrillation.  There is no ST elevation, there is T wave inversion in lead I to aVL and aVF.  QTc 471.   See above  PT/ OT to eval    Anemia- (present on admission)  Assessment & Plan  This is acute on chronic, received transfusion 12/1 and 12/3  Likely multifactorial: hemolytic anemia due to AD, pancytopenia due to cirrhosis and etoh  Recent EGD and colonoscopy  CTA chest showing esophagitis  Continue to monitor and transfuse for hemoglobin of less than or equal to 7  LDH wnl     Hyperglycemia- (present on admission)  Assessment & Plan  Improving  Will check an A1c    Thrombocytopenia (HCC)- (present on admission)  Assessment & Plan  Acute on chronic  Recent US showed splenomegaly which is likely contributing along with etoh abuse and cirrhosis  No s/o bleeding  Continue to follow    Aortic stenosis- (present on admission)  Assessment & Plan  Severe aortic stenosis, likely triggered his attack with a combined severe dehydration.  Currently not a candidate for TAVR and has been non compliant with follow up  See above  Cardiology recommending outpatient follow up given poor patient compliance.      Atrial fibrillation with RVR (HCC)  Assessment & Plan  Exacerbated by anemia  Required single dose of IV cardizem this am for rate control, may need to adjust his oral cardizem dosage if RVR returns.      Agitation  Assessment & Plan  Suspect is aspect of alcohol withdrawal  Restraints currently required for patient and staff safety  Will order CIWA protocol.      Elevated d-dimer  Assessment & Plan  elevated 2.8   CTA chest shows no evidence of PE but there is significant esophagitis seen      Alcohol abuse  Assessment & Plan  See above  No evidence of   w/d  Cessation discussed and recommended    Pancytopenia (HCC)  Assessment & Plan  See above  Due to etoh abuse, if continues to worsen, consider a bone marrow biopsy    Lactic acidosis- (present on admission)  Assessment & Plan  Likely due to dehydration and uti  Overall improving    Tobacco dependence- (present on admission)  Assessment & Plan  Cessation discussed and recommended       VTE prophylaxis: AC c/i secondary to suspected slow GI bleed with esophagitis

## 2020-12-03 NOTE — PROGRESS NOTES
Assisting RN - New PIV placed - R FA 20g, new site clean, dry, intact. PIV flushes w/o difficulty.

## 2020-12-04 ENCOUNTER — PATIENT OUTREACH (OUTPATIENT)
Dept: HEALTH INFORMATION MANAGEMENT | Facility: OTHER | Age: 75
End: 2020-12-04

## 2020-12-04 VITALS
OXYGEN SATURATION: 98 % | RESPIRATION RATE: 18 BRPM | SYSTOLIC BLOOD PRESSURE: 107 MMHG | WEIGHT: 157.19 LBS | DIASTOLIC BLOOD PRESSURE: 67 MMHG | TEMPERATURE: 97.3 F | BODY MASS INDEX: 22.5 KG/M2 | HEIGHT: 70 IN | HEART RATE: 70 BPM

## 2020-12-04 LAB
ALBUMIN SERPL BCP-MCNC: 3 G/DL (ref 3.2–4.9)
ALBUMIN/GLOB SERPL: 1.4 G/DL
ALP SERPL-CCNC: 59 U/L (ref 30–99)
ALT SERPL-CCNC: 18 U/L (ref 2–50)
AMMONIA PLAS-SCNC: 25 UMOL/L (ref 11–45)
ANION GAP SERPL CALC-SCNC: 6 MMOL/L (ref 7–16)
ANISOCYTOSIS BLD QL SMEAR: ABNORMAL
AST SERPL-CCNC: 44 U/L (ref 12–45)
BASOPHILS # BLD AUTO: 0.2 % (ref 0–1.8)
BASOPHILS # BLD: 0.01 K/UL (ref 0–0.12)
BILIRUB SERPL-MCNC: 1.5 MG/DL (ref 0.1–1.5)
BUN SERPL-MCNC: 11 MG/DL (ref 8–22)
CALCIUM SERPL-MCNC: 8.3 MG/DL (ref 8.4–10.2)
CHLORIDE SERPL-SCNC: 103 MMOL/L (ref 96–112)
CO2 SERPL-SCNC: 27 MMOL/L (ref 20–33)
COMMENT 1642: NORMAL
CREAT SERPL-MCNC: 0.7 MG/DL (ref 0.5–1.4)
EOSINOPHIL # BLD AUTO: 0.03 K/UL (ref 0–0.51)
EOSINOPHIL NFR BLD: 0.7 % (ref 0–6.9)
ERYTHROCYTE [DISTWIDTH] IN BLOOD BY AUTOMATED COUNT: 79.1 FL (ref 35.9–50)
GLOBULIN SER CALC-MCNC: 2.1 G/DL (ref 1.9–3.5)
GLUCOSE BLD-MCNC: 99 MG/DL (ref 65–99)
GLUCOSE SERPL-MCNC: 106 MG/DL (ref 65–99)
HCT VFR BLD AUTO: 24.4 % (ref 42–52)
HGB BLD-MCNC: 8.2 G/DL (ref 14–18)
HYPOCHROMIA BLD QL SMEAR: ABNORMAL
IMM GRANULOCYTES # BLD AUTO: 0.1 K/UL (ref 0–0.11)
IMM GRANULOCYTES NFR BLD AUTO: 2.4 % (ref 0–0.9)
LYMPHOCYTES # BLD AUTO: 0.94 K/UL (ref 1–4.8)
LYMPHOCYTES NFR BLD: 22.3 % (ref 22–41)
MACROCYTES BLD QL SMEAR: ABNORMAL
MCH RBC QN AUTO: 33.7 PG (ref 27–33)
MCHC RBC AUTO-ENTMCNC: 33.6 G/DL (ref 33.7–35.3)
MCV RBC AUTO: 100.4 FL (ref 81.4–97.8)
MICROCYTES BLD QL SMEAR: ABNORMAL
MONOCYTES # BLD AUTO: 1.08 K/UL (ref 0–0.85)
MONOCYTES NFR BLD AUTO: 25.7 % (ref 0–13.4)
NEUTROPHILS # BLD AUTO: 2.05 K/UL (ref 1.82–7.42)
NEUTROPHILS NFR BLD: 48.7 % (ref 44–72)
NRBC # BLD AUTO: 0.02 K/UL
NRBC BLD-RTO: 0.5 /100 WBC
OVALOCYTES BLD QL SMEAR: NORMAL
PLATELET # BLD AUTO: 52 K/UL (ref 164–446)
PLATELET BLD QL SMEAR: NORMAL
PMV BLD AUTO: 10.1 FL (ref 9–12.9)
POIKILOCYTOSIS BLD QL SMEAR: NORMAL
POLYCHROMASIA BLD QL SMEAR: NORMAL
POTASSIUM SERPL-SCNC: 3.6 MMOL/L (ref 3.6–5.5)
PROT SERPL-MCNC: 5.1 G/DL (ref 6–8.2)
RBC # BLD AUTO: 2.43 M/UL (ref 4.7–6.1)
RBC BLD AUTO: PRESENT
SODIUM SERPL-SCNC: 136 MMOL/L (ref 135–145)
WBC # BLD AUTO: 4.2 K/UL (ref 4.8–10.8)

## 2020-12-04 PROCEDURE — 700102 HCHG RX REV CODE 250 W/ 637 OVERRIDE(OP): Performed by: INTERNAL MEDICINE

## 2020-12-04 PROCEDURE — 82140 ASSAY OF AMMONIA: CPT

## 2020-12-04 PROCEDURE — A9270 NON-COVERED ITEM OR SERVICE: HCPCS | Performed by: INTERNAL MEDICINE

## 2020-12-04 PROCEDURE — 82962 GLUCOSE BLOOD TEST: CPT

## 2020-12-04 PROCEDURE — 700102 HCHG RX REV CODE 250 W/ 637 OVERRIDE(OP): Performed by: HOSPITALIST

## 2020-12-04 PROCEDURE — 80053 COMPREHEN METABOLIC PANEL: CPT

## 2020-12-04 PROCEDURE — 85025 COMPLETE CBC W/AUTO DIFF WBC: CPT

## 2020-12-04 PROCEDURE — 99239 HOSP IP/OBS DSCHRG MGMT >30: CPT | Performed by: INTERNAL MEDICINE

## 2020-12-04 PROCEDURE — 700105 HCHG RX REV CODE 258: Performed by: INTERNAL MEDICINE

## 2020-12-04 PROCEDURE — A9270 NON-COVERED ITEM OR SERVICE: HCPCS | Performed by: HOSPITALIST

## 2020-12-04 RX ORDER — OMEPRAZOLE 20 MG/1
20 CAPSULE, DELAYED RELEASE ORAL 2 TIMES DAILY
Qty: 30 CAP | Refills: 1 | Status: SHIPPED | OUTPATIENT
Start: 2020-12-04

## 2020-12-04 RX ORDER — SUCRALFATE ORAL 1 G/10ML
1 SUSPENSION ORAL
Qty: 400 ML | Refills: 3 | Status: SHIPPED | OUTPATIENT
Start: 2020-12-04

## 2020-12-04 RX ADMIN — SENNOSIDES-DOCUSATE SODIUM TAB 8.6-50 MG 2 TABLET: 8.6-5 TAB at 05:27

## 2020-12-04 RX ADMIN — Medication 100 MG: at 05:27

## 2020-12-04 RX ADMIN — POTASSIUM CHLORIDE 40 MEQ: 1500 TABLET, EXTENDED RELEASE ORAL at 05:27

## 2020-12-04 RX ADMIN — DILTIAZEM HYDROCHLORIDE 30 MG: 30 TABLET, FILM COATED ORAL at 00:18

## 2020-12-04 RX ADMIN — FOLIC ACID 1 MG: 1 TABLET ORAL at 05:27

## 2020-12-04 RX ADMIN — SODIUM CHLORIDE, POTASSIUM CHLORIDE, SODIUM LACTATE AND CALCIUM CHLORIDE: 600; 310; 30; 20 INJECTION, SOLUTION INTRAVENOUS at 00:18

## 2020-12-04 RX ADMIN — THERA TABS 1 TABLET: TAB at 05:27

## 2020-12-04 RX ADMIN — OMEPRAZOLE 20 MG: 20 CAPSULE, DELAYED RELEASE ORAL at 05:27

## 2020-12-04 RX ADMIN — DILTIAZEM HYDROCHLORIDE 30 MG: 30 TABLET, FILM COATED ORAL at 05:27

## 2020-12-04 NOTE — DISCHARGE SUMMARY
Discharge Summary    CHIEF COMPLAINT ON ADMISSION  Chief Complaint   Patient presents with   • Weakness       Reason for Admission  ems     Admission Date  11/28/2020    CODE STATUS  Prior    HPI & HOSPITAL COURSE  Patient is a 75 y.o. male with past medical history of severe aortic stenosis complicated by mechanical hemolytic anemia, history of splenic infarction, abdominal aortic aneurysm status post repair, esophageal thickening, and noncompliance who presented 11/28/2020 with an episodes of sudden generalized weakness to the point that he almost fainted at [employer] on the day of admission. There was no history consistent with seizure activity.  He denied losing consciousness.  He was recently diagnosed with severe aortic stenosis and a TAVR was recommended however he left the hospital AMA and did not follow up with cardiology.  Presyncope likely due to patient's severe aortic stenosis.  Cardiology was consulted and recommended alcohol cessation and outpatient follow-up with the TAVR program although patient is not a candidate at this time due to significant alcoholism and poor adherence to recommendations.  He has been having some difficulty with swallowing which has been a long term issue.  He has had upper and lower endoscopy most recently in September without any evidence of his source for anemia.  Given his alcoholism, likely it is a constant gastritis and prescribed prilosec and carafate on discharge.  He did have blood transfusion during this hospitalization but no evidence of bleeding.       Therefore, he is discharged in good and stable condition to home with close outpatient follow-up.    The patient met 2-midnight criteria for an inpatient stay at the time of discharge.    Discharge Date  12/4/2020    FOLLOW UP ITEMS POST DISCHARGE  PCP - Melina Trejo    DISCHARGE DIAGNOSES  Principal Problem:    Near syncope POA: Yes  Active Problems:    Anemia POA: Yes    Atrial fibrillation with RVR (HCC) POA:  Unknown    Aortic stenosis POA: Yes    Thrombocytopenia (HCC) POA: Yes    Hyperglycemia POA: Yes    Lactic acidosis POA: Yes    Pancytopenia (HCC) POA: Unknown    Alcohol abuse POA: Unknown    Elevated d-dimer POA: Unknown    Agitation POA: Unknown    Tobacco dependence POA: Yes  Resolved Problems:    UTI (urinary tract infection) POA: Unknown      FOLLOW UP  Future Appointments   Date Time Provider Department Center   12/7/2020  1:15 PM JESS Garcia [facility] None     [facility] Alexis  [address] [place] [id number]  [phone]    In 2 weeks        MEDICATIONS ON DISCHARGE     Medication List      START taking these medications      Instructions   DILTIAZem 30 MG Tabs  Commonly known as: CARDIZEM   Take 1 Tab by mouth every 6 hours.  Dose: 30 mg     omeprazole 20 MG delayed-release capsule  Commonly known as: PRILOSEC   Take 1 Cap by mouth 2 Times a Day.  Dose: 20 mg     sucralfate 1 GM/10ML Susp  Commonly known as: CARAFATE   Take 10 mL by mouth 4 Times a Day,Before Meals and at Bedtime.  Dose: 1 g            Allergies  No Known Allergies    DIET  No orders of the defined types were placed in this encounter.      ACTIVITY  As tolerated.  Weight bearing as tolerated    CONSULTATIONS  none    PROCEDURES  none    LABORATORY  Lab Results   Component Value Date    SODIUM 136 12/04/2020    POTASSIUM 3.6 12/04/2020    CHLORIDE 103 12/04/2020    CO2 27 12/04/2020    GLUCOSE 106 (H) 12/04/2020    BUN 11 12/04/2020    CREATININE 0.70 12/04/2020    CREATININE 1.1 05/25/2007        Lab Results   Component Value Date    WBC 4.2 (L) 12/04/2020    HEMOGLOBIN 8.2 (L) 12/04/2020    HEMATOCRIT 24.4 (L) 12/04/2020    PLATELETCT 52 (L) 12/04/2020        Total time of the discharge process exceeds 35 minutes.

## 2020-12-04 NOTE — PROGRESS NOTES
Patient discharged home via taxi. Discharge instructions explained to patient. Patient verbalizes understanding of discharge instructions. All questions answered, PIV and Tele box removed. Patient has cane, wallet and keys with patient as well as discharge instructions.

## 2020-12-04 NOTE — DISCHARGE INSTRUCTIONS
Discharge Instructions    Discharged to home by taxi with self. Discharged via walking, hospital escort: Yes.  Special equipment needed: Cane    Be sure to schedule a follow-up appointment with your primary care doctor or any specialists as instructed.     Discharge Plan:   Diet Plan: Discussed  Activity Level: Discussed  Confirmed Follow up Appointment: Patient to Call and Schedule Appointment  Confirmed Symptoms Management: Discussed  Medication Reconciliation Updated: Yes  Influenza Vaccine Indication: Patient Refuses    I understand that a diet low in cholesterol, fat, and sodium is recommended for good health. Unless I have been given specific instructions below for another diet, I accept this instruction as my diet prescription.   Other diet: Diabetic    Special Instructions: None    · Is patient discharged on Warfarin / Coumadin?   No     Depression / Suicide Risk    As you are discharged from this [facility] facility, it is important to learn how to keep safe from harming yourself.    Recognize the warning signs:  · Abrupt changes in personality, positive or negative- including increase in energy   · Giving away possessions  · Change in eating patterns- significant weight changes-  positive or negative  · Change in sleeping patterns- unable to sleep or sleeping all the time   · Unwillingness or inability to communicate  · Depression  · Unusual sadness, discouragement and loneliness  · Talk of wanting to die  · Neglect of personal appearance   · Rebelliousness- reckless behavior  · Withdrawal from people/activities they love  · Confusion- inability to concentrate     If you or a loved one observes any of these behaviors or has concerns about self-harm, here's what you can do:  · Talk about it- your feelings and reasons for harming yourself  · Remove any means that you might use to hurt yourself (examples: pills, rope, extension cords, firearm)  · Get professional help from the community (Mental Health,  Substance Abuse, psychological counseling)  · Do not be alone:Call your Safe Contact- someone whom you trust who will be there for you.  · Call your local CRISIS HOTLINE 384-9049 or 981-494-1753  · Call your local Children's Mobile Crisis Response Team [place] (638) 199-3788 or www.Stackops  · Call the toll free National Suicide Prevention Hotlines   · National Suicide Prevention Lifeline 717-225-SYWU (0253)  · National Hope Line Network 800-SUICIDE (157-1008)

## 2020-12-04 NOTE — PROGRESS NOTES
Received shift handoff report from Ellis CAMERON. Pt is in bed and stable. Bed alarm is on.  Pt on 2LNC. Bed locked and in lowest position, upper side rails up, call light in reach, whiteboard updated. POC discussed and pt verbalizes understanding.

## 2020-12-04 NOTE — PROGRESS NOTES
Telemetry Shift Summary    Rhythm SR  HR Range 70-90  Ectopy fPAC, fPVC, rTrig  Measurements 0.18/0.08/0.42        Normal Values  Rhythm SR  HR Range    Measurements 0.12-0.20 / 0.06-0.10  / 0.30-0.52

## 2020-12-04 NOTE — PROGRESS NOTES
Patient alert and sitting up in chair this morning. Refuses to anwser orientations questions but denies pain/N/V/D and constipation. Denies numbness and tingling in extremities. Verbalizes wanting to take a walk with or without someone by his side. Education on fall risk and waiting for someone to be avalable to walk with. Patient needs education reinforcement. Will continue to educate and provided support as needed.

## 2020-12-04 NOTE — RESPIRATORY CARE
COPD EDUCATION by COPD CLINICAL EDUCATOR  12/4/2020 at 7:19 AM by Erin Jimenez RRT     Patient reviewed by COPD education team. Patient does not have a history or diagnosis of COPD and is currently a smoker per report does not plan to quit.  Therefore does not qualify for the COPD program.

## 2020-12-04 NOTE — PROGRESS NOTES
Dr. See notified that patient vomits after every meal. Pt denies nausea and CIWA 1. ST consulted but ST recommends GI consult and see ST note. Barium swallow to be obtained tomorrow if patient continue to have emesis post meals.

## 2020-12-04 NOTE — PROGRESS NOTES
Pt voided x1 during shift. Pt denies pain or sensation to pee currently. 270 ml in bladder; Irvin RN will be notified.

## 2020-12-07 ENCOUNTER — TELEPHONE (OUTPATIENT)
Dept: CARDIOLOGY | Facility: MEDICAL CENTER | Age: 75
End: 2020-12-07

## 2020-12-07 NOTE — PROGRESS NOTES
[facility] will discharge pt From CCM services due to lack of contact after x3 TC attempts 12/07/20 .  12/7 Mailed CCM Brochure to pt.

## 2021-01-11 DIAGNOSIS — Z23 NEED FOR VACCINATION: ICD-10-CM

## 2021-09-09 NOTE — PROGRESS NOTES
ED RN at bedside to place EJ IV.      0600  L AC 18g placed.   Patient came into the office requesting oral  medication voltaren.   Per elisa Nazario to put in request.

## 2023-02-06 NOTE — PROGRESS NOTES
Triage officer:  I discussed with Dr. Fletcher about Mr. Jimenez. He presents with weakness and 30 lb unintentional weight loss. CT reveals a thickening of the esophagus concerning for malignancy. [facility] was called from the ER.   Lactic acid is elevated at 4.2 and 4.7 though he does not have an apparent source of infection.  He now has pancytopenia.  Dr. Osei, [employer]  senior resident has graciously agreed to admit.       How Many Mls Were Removed From The 10 Mg/Ml (5ml) Vial When Preparing The Injectable Solution?: 0

## 2024-06-10 NOTE — PROGRESS NOTES
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 Patient does not currently have a PCP. This is my first time meeting Mr Jimenez. PMH +CAD with MI, aortic coarctation stent, PAD, AAA s/p graft, esophageal strictures s/p dilatation.      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 Prior to admission patient was hospitalized at [facility] and left AMA (admitted at [facility] 3/9/17, left [place] 3/14/17). While at [facility] he was  found to have afib with AVR. He underwent a JEANETH with cardioversion. No vegetation was noted. He was also diagnosed with group A streptococcal bacteremia and R great toe osteomyelitis via MRI. Dr. Powell was consulted but the patient had left prior to being evaluated. In addition, he was complaining of vomiting and was evaluated by GI who performed an esophageal dilatation. He was also diagnosed with C diff colitis. He left against medical advice because he states his pain was not adequately controlled.    [facility] wound cx on 3/9 - GAS, MSSA   S/p 3rd toe amp on 3/25. No purulence seen proximally to amp site  OR cx (left 3rd toe) on 3/25 - MSSA (S-unasyn) Vanco LINUS 2, MRSE  [facility] Bcx 3/9  - GAS  [facility] Bcx 3/14  - NGTD  S/p L ureteral stent on 4/16 with Dr. Whittington due to obstructive uropathy  Discharged to [facility] on 6 weeks IV Unasyn. Stop date 4/27/17 to be followed with PO Amoxicillin for lifelong suppression. He was also discharged on PO Vanco BID while on IV abx with 3 additional days, stop date of 04/30/17.    Hospital records reviewed    Today 4/26/17: Patient reports feeling well. He is anxious to complete the IV antibiotics and leave  "[facility] and go home.  He reports continued LBP, stating that it is basically unchanged.  Pt stating that as long as  nurses give him his pain medications his pain is controlled. Pt stating that the left 3rd toe amp site is healing well. Pt stating that sutures had been removed prior to hospital DC. Denies drainage, pungent odor, redness, toe/foot pain. Denies feeling generally ill, fevers/chills, general malaise, headache, n/v/d. Pt stating that he is tolerating both IV Unasyn and PO Vanco without adverse effect. No questions regarding antibiotics or PICC line.       Past Medical History   Diagnosis Date   • MI (myocardial infarction) (CMS-[facility])    • Status post aortic coarctation stent placement    • H/O heart artery stent      x2   • Arrhythmia    • Indigestion    • High cholesterol    • Atherosclerosis of aorta (CMS-[facility])    • Carotid artery disease (CMS-[facility])    • PAD (peripheral artery disease)    • History of AAA (abdominal aortic aneurysm) repair 01/30/2012     Graft repair with    • [place] (hard of hearing)    • Esophageal dilatation    • Hernia, inguinal, bilateral    • Umbilical hernia      repair \"years ago\"   • Arthritis    • Myocardial infarct (CMS-[facility])    • MI, old      3 X's late 1980's with cardioversion & coronary stent placement       Allergies: Review of patient's allergies indicates no known allergies.    Current medications and problem list reviewed with patient and updated in EPIC.    ROS  As documented above in my HPI       Objective:   Blood pressure 92/58, pulse 67, temperature 36.5 °C (97.7 °F), height 1.778 m (5' 10\"), weight 73.074 kg (161 lb 1.6 oz), SpO2 96 %.    Vital signs reviewed  Constitutional: patient is oriented to person, place, and time. He appears well-developed and well-nourished. No distress  Eyes: Conjunctivae normal and EOM are normal. Pupils are equal, round, and reactive to light.   Mouth/Throat: Lips without lesions, good dentition, oropharynx is clear and " moist.  Neck: Trachea midline. Normal range of motion. Neck supple. No masses  Cardiovascular: Normal rate, regular rhythm, normal heart sounds and intact distal pulses. No murmur, gallop, or friction rub. No edema.  Pulmonary/Chest: No respiratory distress. Unlabored respiratory effort, lungs clear to auscultation. No wheezes or rales.   Abdominal: Soft, non tender. BS + x 4. No masses or hepatosplenomegaly.   Musculoskeletal: Normal range of motion. No tenderness, swelling, erythema, deformity noted.  Neurological: He is alert and oriented to person, place, and time. No cranial nerve deficit. Coordination normal.   Skin: Skin is warm and dry. Good turgor. No rashes visable.  Left 3rd toe amp site healing well. Skin intact, well approximated. No erythema. Diffuse bilateral LE 1+ pitting edema.   PICC in place  Psychiatric: He has a normal mood and affect. His behavior is normal.     No labs available since hospital DC    Assessment and Plan:   The following treatment plan was discussed with patient at length  1. Bacteremia due to Streptococcus  Continue IV Unasyn. Stop date 04/27/17.   2. Osteomyelitis of toe (CMS-[facility])  S/p amp. Site healing well   3. S/P amputation of lesser toe, left (CMS-[facility])     4. S/P AAA (abdominal aortic aneurysm) repair  amoxicillin (AMOXIL) 500 MG Cap   5. 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 Clostridium difficile diarrhea  Continue PO Vancomycin BID while on IV abx with 3 additional days, stop date of 04/30/17.   7. Chronic bilateral low back pain without sciatica  Discussed with patient possible referral to pain management. Patient will first need to establish with PCP   8. Ureteral obstruction, left- s/p L ureteral stent  Stent will need to be changed Q6 months per Dr. Dinero.     CBC, CMP, ESR today   Continue IV Unasyn. Stop date 04/27/17.  DC PICC after completion of IV antibiotics and  transition to PO Amoxicillin 500 mg PO BID  He will be on lifelong suppression- this was discussed with patient at length as well as the importance of being compliant with this and not missing doses. Call with any questions, concerns, or adverse effects  Discussed importance of establishing with PCP  Follow up: 3-4 weeks, RTC sooner if needed. FU with PCP for ongoing chronic medical conditions.     Orders Written and sent with patient to [facility]. Will also fax copy of today's note.    (2) more than 100 beats/min

## (undated) DEVICE — SUTURE 3-0 ETHILON FS-1 - (36/BX) 30 INCH

## (undated) DEVICE — MASK ANESTHESIA ADULT  - (100/CA)

## (undated) DEVICE — KIT ANESTHESIA W/CIRCUIT & 3/LT BAG W/FILTER (20EA/CA)

## (undated) DEVICE — SUTURE 2-0 ETHILON FS - (36/BX) 18 INCH

## (undated) DEVICE — SYRINGE DISP. 50CC LS - (40/BX)

## (undated) DEVICE — CATHERTER CLEAR SINGLE USE INJECTION THERAPY NEEDLE 25GA X 4MM  2.3MM X 240CM (5EA/BX)

## (undated) DEVICE — GOWN WARMING STANDARD FLEX - (30/CA)

## (undated) DEVICE — COVER FOOT UNIVERSAL DISP. - (25EA/CA)

## (undated) DEVICE — SODIUM CHL IRRIGATION 0.9% 1000ML (12EA/CA)

## (undated) DEVICE — SUCTION INSTRUMENT YANKAUER BULBOUS TIP W/O VENT (50EA/CA)

## (undated) DEVICE — HEAD HOLDER JUNIOR/ADULT

## (undated) DEVICE — SYRINGE 50ML CATHETER TIP (40EA/BX 4BX/CA)

## (undated) DEVICE — SUTURE GENERAL

## (undated) DEVICE — CONTAINER, SPECIMEN, STERILE

## (undated) DEVICE — SOD. CHL 10CC SYRINGE PREFILL - W/10 CC (30/BX)

## (undated) DEVICE — GLOVE, LITE (PAIR)

## (undated) DEVICE — GOWN SURGICAL X-LARGE ULTRA - FILM-REINFORCED (20/CA)

## (undated) DEVICE — CON SEDATION EA ADDL 15 MIN

## (undated) DEVICE — GLOVE SZ 6 BIOGEL PI MICRO - PF LF (50PR/BX 4BX/CA)

## (undated) DEVICE — NEPTUNE 4 PORT MANIFOLD - (20/PK)

## (undated) DEVICE — TUBE CONNECT SUCTION CLEAR 120 X 1/4" (50EA/CA)"

## (undated) DEVICE — KIT CUSTOM PROCEDURE SINGLE FOR ENDO  (15/CA)

## (undated) DEVICE — PACK CYSTOSCOPY - (14/CA)

## (undated) DEVICE — GLOVE BIOGEL SZ 7.5 SURGICAL PF LTX - (50PR/BX 4BX/CA)

## (undated) DEVICE — SLEEVE, VASO, THIGH, MED

## (undated) DEVICE — ELECTRODE 850 FOAM ADHESIVE - HYDROGEL RADIOTRNSPRNT (50/PK)

## (undated) DEVICE — FILM CASSETTE ENDO

## (undated) DEVICE — GLOVE BIOGEL INDICATOR SZ 7.5 SURGICAL PF LTX - (50PR/BX 4BX/CA)

## (undated) DEVICE — SYRINGE DISP. 12 CC LL - (100/BX)

## (undated) DEVICE — CANISTER SUCTION 3000ML MECHANICAL FILTER AUTO SHUTOFF MEDI-VAC NONSTERILE LF DISP  (40EA/CA)

## (undated) DEVICE — LACTATED RINGERS INJ 1000 ML - (14EA/CA 60CA/PF)

## (undated) DEVICE — SET EXTENSION WITH 2 PORTS (48EA/CA) ***PART #2C8610 IS A SUBSTITUTE*****

## (undated) DEVICE — BANDAGE ELASTIC 4 HONEYCOMB - 4"X5YD LF (20/CA)"

## (undated) DEVICE — BAG URODRAIN WITH TUBING - (20/CA)

## (undated) DEVICE — GOWN SURGEONS X-LARGE - DISP. (30/CA)

## (undated) DEVICE — ZIPWIRE .038 STRAIGHT BENTSON TIP (5EA/BX)

## (undated) DEVICE — JELLY, KY 2 0Z STERILE

## (undated) DEVICE — BITE BLOCK ADULT 60FR (100EA/CA)

## (undated) DEVICE — SYRINGE 20 ML LS (48/BX 4BX/CA)

## (undated) DEVICE — SPONGE GAUZE NON-STERILE 4X4 - (2000/CA 10PK/CA)

## (undated) DEVICE — CATHETER URETHRAL OPEN END AXXCESS (10EA/BX)

## (undated) DEVICE — KIT ROOM DECONTAMINATION

## (undated) DEVICE — CATHETER IV SAFETY 22 GA X 1 (50EA/BX)

## (undated) DEVICE — BASIN EMESIS DISP. - (250/CA)

## (undated) DEVICE — PROTECTOR ULNA NERVE - (36PR/CA)

## (undated) DEVICE — SENSOR SPO2 NEO LNCS ADHESIVE (20/BX) SEE USER NOTES

## (undated) DEVICE — CONTAINER SPECIMEN BAG OR - STERILE 4 OZ W/LID (100EA/CA)

## (undated) DEVICE — MASK AIRWAY SIZE 3 UNIQUE SILICON (10/BX)

## (undated) DEVICE — TUBING CLEARLINK DUO-VENT - C-FLO (48EA/CA)

## (undated) DEVICE — CON SEDATION/>5 YR 1ST 15 MIN

## (undated) DEVICE — GLOVE SZ 7.5 BIOGEL PI MICRO - PF LF (50PR/BX)

## (undated) DEVICE — MASK, LARYNGEAL AIRWAY #4

## (undated) DEVICE — SYRINGE 3 CC 22 GA X 1-1/2 - NDL SAFETY (50/BX 8BX/CA)

## (undated) DEVICE — SET FLUID WARMING IRRIGATRION CYSTOSCOPY (10EA/CA)

## (undated) DEVICE — GLOVE BIOGEL PI INDICATOR SZ 6.5 SURGICAL PF LF - (50/BX 4BX/CA)

## (undated) DEVICE — WATER IRRIG. STER 3000 ML - (4/CA)

## (undated) DEVICE — WATER IRRIG. STER. 1500 ML - (9/CA)

## (undated) DEVICE — ZIPWIRE .025 STRAIGHT TIP (5EA/BX)

## (undated) DEVICE — TOURNIQUET CUFF 24 X 4 ONE PORT - STERILE (10/BX)

## (undated) DEVICE — SYRINGE 6 CC 20 GA X 1 1/2 - NDL SAFETY  (50/BX)

## (undated) DEVICE — TOWELS CLOTH SURGICAL - (4/PK 20PK/CA)

## (undated) DEVICE — SUTURE 2-0 VICRYL PLUS CT-1 - 8 X 18 INCH(12/BX)

## (undated) DEVICE — GLOVE BIOGEL PI INDICATOR SZ 8.0 SURGICAL PF LF -(50/BX 4BX/CA)

## (undated) DEVICE — PACK LOWER EXTREMITY - (2/CA)

## (undated) DEVICE — FORCEP RADIAL JAW 4 STANDARD CAPACITY W/NEEDLE 240CM (40EA/BX)

## (undated) DEVICE — SPLINT PLASTER 4 IN  X 15 IN - (50/BX 12BX/CA)

## (undated) DEVICE — MASK OXYGEN VNYL ADLT MED CONC WITH 7 FOOT TUBING  - (50EA/CA)

## (undated) DEVICE — CATHETER URETHRAL OPEN END AXXCESS (10EA/BX) [78257].

## (undated) DEVICE — SET IRRIGATION CYSTOSCOPY Y-TYPE L81 IN (20EA/CA)

## (undated) DEVICE — WATER IRRIGATION STERILE 1000ML (12EA/CA)

## (undated) DEVICE — PADDING CAST 4 IN STERILE - 4 X 4 YDS (24/CA)

## (undated) DEVICE — GLOVE BIOGEL INDICATOR SZ 8 SURGICAL PF LTX - (50/BX 4BX/CA)

## (undated) DEVICE — SET LEADWIRE 5 LEAD BEDSIDE DISPOSABLE ECG (1SET OF 5/EA)

## (undated) DEVICE — MASK WITH FACE SHIELD (25/BX 4BX/CA)

## (undated) DEVICE — BLADE SAGITTAL SAW 9.4MM X 25.5MM X .4MM FINE TOOTH (1/EA)

## (undated) DEVICE — GLOVE BIOGEL SZ 9 SURGICAL PF LTX - (50/BX 4BX/CA)

## (undated) DEVICE — SPONGE GAUZESTER 4 X 4 4PLY - (128PK/CA)

## (undated) DEVICE — LEAD SET 6 DISP. EKG NIHON KOHDEN (100EA/CA) [9859].

## (undated) DEVICE — CANISTER SUCTION RIGID RED 1500CC (40EA/CA)

## (undated) DEVICE — CANNULA W/ SUPPLY TUBING O2 - (50/CA)

## (undated) DEVICE — PAD PREP 24 X 48 CUFFED - (100/CA)

## (undated) DEVICE — GLOVE SZ 8 BIOGEL PI MICRO - PF LF (50PR/BX)

## (undated) DEVICE — TRAY SKIN SCRUB PVP WET (20EA/CA) PART #DYND70356 DISCONTINUED

## (undated) DEVICE — TUBE CONNECTING SUCTION - CLEAR PLASTIC STERILE 72 IN (50EA/CA)

## (undated) DEVICE — ELECTRODE DUAL RETURN W/ CORD - (50/PK)

## (undated) DEVICE — SODIUM CHL. IRRIGATION 0.9% 3000ML (4EA/CA 65CA/PF)

## (undated) DEVICE — BANDAGE ROLL STERILE BULKEE 4.5 IN X 4 YD (100EA/CA)

## (undated) DEVICE — KIT  I.V. START (100EA/CA)

## (undated) DEVICE — DRAPE LARGE 3 QUARTER - (20/CA)

## (undated) DEVICE — PAD LAP STERILE 18 X 18 - (5/PK 40PK/CA)